# Patient Record
Sex: FEMALE | Race: WHITE | Employment: OTHER
[De-identification: names, ages, dates, MRNs, and addresses within clinical notes are randomized per-mention and may not be internally consistent; named-entity substitution may affect disease eponyms.]

---

## 2017-01-11 DIAGNOSIS — R60.0 BILATERAL EDEMA OF LOWER EXTREMITY: ICD-10-CM

## 2017-01-11 RX ORDER — FUROSEMIDE 20 MG/1
TABLET ORAL
Qty: 30 TABLET | Refills: 0 | Status: SHIPPED | OUTPATIENT
Start: 2017-01-11 | End: 2017-03-17 | Stop reason: SDUPTHER

## 2017-01-16 ENCOUNTER — TELEPHONE (OUTPATIENT)
Dept: FAMILY MEDICINE CLINIC | Facility: CLINIC | Age: 57
End: 2017-01-16

## 2017-01-16 RX ORDER — PANTOPRAZOLE SODIUM 20 MG/1
20 TABLET, DELAYED RELEASE ORAL DAILY
Qty: 30 TABLET | Refills: 3 | Status: ON HOLD | OUTPATIENT
Start: 2017-01-16 | End: 2017-03-30 | Stop reason: SDUPTHER

## 2017-01-19 RX ORDER — LORATADINE 10 MG/1
TABLET ORAL
Qty: 30 TABLET | Refills: 0 | Status: SHIPPED | OUTPATIENT
Start: 2017-01-19 | End: 2017-02-20 | Stop reason: SDUPTHER

## 2017-01-20 ENCOUNTER — TELEPHONE (OUTPATIENT)
Dept: GASTROENTEROLOGY | Facility: CLINIC | Age: 57
End: 2017-01-20

## 2017-01-20 DIAGNOSIS — K21.9 GASTROESOPHAGEAL REFLUX DISEASE, ESOPHAGITIS PRESENCE NOT SPECIFIED: Primary | ICD-10-CM

## 2017-01-25 RX ORDER — LANSOPRAZOLE 30 MG/1
30 CAPSULE, DELAYED RELEASE ORAL 2 TIMES DAILY
Qty: 60 CAPSULE | Refills: 3 | Status: ON HOLD | OUTPATIENT
Start: 2017-01-25 | End: 2017-04-01 | Stop reason: HOSPADM

## 2017-01-30 RX ORDER — BLOOD-GLUCOSE METER
KIT MISCELLANEOUS
Qty: 100 STRIP | Refills: 0 | Status: SHIPPED | OUTPATIENT
Start: 2017-01-30 | End: 2017-02-28 | Stop reason: SDUPTHER

## 2017-02-01 ENCOUNTER — TELEPHONE (OUTPATIENT)
Dept: FAMILY MEDICINE CLINIC | Facility: CLINIC | Age: 57
End: 2017-02-01

## 2017-02-03 ENCOUNTER — OFFICE VISIT (OUTPATIENT)
Dept: PULMONOLOGY | Facility: CLINIC | Age: 57
End: 2017-02-03

## 2017-02-03 VITALS
BODY MASS INDEX: 47.77 KG/M2 | DIASTOLIC BLOOD PRESSURE: 76 MMHG | SYSTOLIC BLOOD PRESSURE: 145 MMHG | RESPIRATION RATE: 16 BRPM | OXYGEN SATURATION: 97 % | WEIGHT: 293 LBS | HEART RATE: 81 BPM

## 2017-02-03 DIAGNOSIS — I48.0 PAROXYSMAL ATRIAL FIBRILLATION (HCC): ICD-10-CM

## 2017-02-03 DIAGNOSIS — Z13.1 DM (DIABETES MELLITUS SCREEN): ICD-10-CM

## 2017-02-03 DIAGNOSIS — I10 ESSENTIAL HYPERTENSION: ICD-10-CM

## 2017-02-03 DIAGNOSIS — G47.33 OSA ON CPAP: Primary | ICD-10-CM

## 2017-02-03 DIAGNOSIS — M54.40 CHRONIC MIDLINE LOW BACK PAIN WITH SCIATICA, SCIATICA LATERALITY UNSPECIFIED: ICD-10-CM

## 2017-02-03 DIAGNOSIS — G89.29 CHRONIC MIDLINE LOW BACK PAIN WITH SCIATICA, SCIATICA LATERALITY UNSPECIFIED: ICD-10-CM

## 2017-02-03 DIAGNOSIS — Z99.89 OSA ON CPAP: Primary | ICD-10-CM

## 2017-02-03 DIAGNOSIS — E03.9 ACQUIRED HYPOTHYROIDISM: ICD-10-CM

## 2017-02-03 DIAGNOSIS — H54.7 BLINDNESS: ICD-10-CM

## 2017-02-03 DIAGNOSIS — H40.10X0 OPEN-ANGLE GLAUCOMA OF RIGHT EYE, UNSPECIFIED GLAUCOMA STAGE, UNSPECIFIED OPEN-ANGLE GLAUCOMA TYPE: ICD-10-CM

## 2017-02-03 DIAGNOSIS — E66.01 MORBID OBESITY DUE TO EXCESS CALORIES (HCC): ICD-10-CM

## 2017-02-03 PROCEDURE — 99213 OFFICE O/P EST LOW 20 MIN: CPT | Performed by: INTERNAL MEDICINE

## 2017-02-21 RX ORDER — LORATADINE 10 MG/1
TABLET ORAL
Qty: 30 TABLET | Refills: 0 | Status: SHIPPED | OUTPATIENT
Start: 2017-02-21 | End: 2017-03-17 | Stop reason: SDUPTHER

## 2017-02-28 RX ORDER — BLOOD-GLUCOSE METER
KIT MISCELLANEOUS
Qty: 100 STRIP | Refills: 0 | Status: SHIPPED | OUTPATIENT
Start: 2017-02-28 | End: 2017-03-17 | Stop reason: SDUPTHER

## 2017-03-03 RX ORDER — BLOOD-GLUCOSE CONTROL, NORMAL
EACH MISCELLANEOUS
Qty: 1 EACH | Refills: 3 | Status: SHIPPED | OUTPATIENT
Start: 2017-03-03 | End: 2018-06-05 | Stop reason: SDUPTHER

## 2017-03-06 DIAGNOSIS — K21.9 GASTROESOPHAGEAL REFLUX DISEASE, ESOPHAGITIS PRESENCE NOT SPECIFIED: Primary | ICD-10-CM

## 2017-03-06 RX ORDER — ISOPROPYL ALCOHOL 0.75 G/1
SWAB TOPICAL
Qty: 100 EACH | Refills: 0 | Status: SHIPPED | OUTPATIENT
Start: 2017-03-06 | End: 2017-03-17 | Stop reason: SDUPTHER

## 2017-03-08 RX ORDER — RANITIDINE 150 MG/1
150 TABLET ORAL 2 TIMES DAILY
Qty: 60 TABLET | Refills: 3 | Status: ON HOLD | OUTPATIENT
Start: 2017-03-08 | End: 2017-04-01 | Stop reason: HOSPADM

## 2017-03-09 ENCOUNTER — OFFICE VISIT (OUTPATIENT)
Dept: FAMILY MEDICINE CLINIC | Facility: CLINIC | Age: 57
End: 2017-03-09

## 2017-03-09 VITALS
WEIGHT: 293 LBS | TEMPERATURE: 98 F | BODY MASS INDEX: 45.99 KG/M2 | DIASTOLIC BLOOD PRESSURE: 70 MMHG | HEART RATE: 86 BPM | HEIGHT: 67 IN | SYSTOLIC BLOOD PRESSURE: 138 MMHG

## 2017-03-09 DIAGNOSIS — I87.2 VENOUS INSUFFICIENCY OF BOTH LOWER EXTREMITIES: ICD-10-CM

## 2017-03-09 DIAGNOSIS — M79.89 LEG SWELLING: ICD-10-CM

## 2017-03-09 DIAGNOSIS — I83.90 VARICOSE VEIN OF LEG: Primary | ICD-10-CM

## 2017-03-09 DIAGNOSIS — I10 ESSENTIAL HYPERTENSION: ICD-10-CM

## 2017-03-09 PROCEDURE — 99213 OFFICE O/P EST LOW 20 MIN: CPT | Performed by: FAMILY MEDICINE

## 2017-03-09 RX ORDER — TACROLIMUS 1 MG/G
OINTMENT TOPICAL 2 TIMES DAILY
COMMUNITY
End: 2018-03-09 | Stop reason: ALTCHOICE

## 2017-03-09 ASSESSMENT — ENCOUNTER SYMPTOMS
COUGH: 0
ABDOMINAL DISTENTION: 0
CHEST TIGHTNESS: 0
ABDOMINAL PAIN: 0
SHORTNESS OF BREATH: 0
WHEEZING: 0

## 2017-03-17 DIAGNOSIS — R60.0 BILATERAL EDEMA OF LOWER EXTREMITY: ICD-10-CM

## 2017-03-20 ENCOUNTER — TELEPHONE (OUTPATIENT)
Dept: GASTROENTEROLOGY | Age: 57
End: 2017-03-20

## 2017-03-20 RX ORDER — BLOOD-GLUCOSE METER
KIT MISCELLANEOUS
Qty: 100 STRIP | Refills: 0 | Status: SHIPPED | OUTPATIENT
Start: 2017-03-20 | End: 2017-05-02 | Stop reason: SDUPTHER

## 2017-03-20 RX ORDER — ISOPROPYL ALCOHOL 0.75 G/1
SWAB TOPICAL
Qty: 100 EACH | Refills: 0 | Status: SHIPPED | OUTPATIENT
Start: 2017-03-20 | End: 2017-04-14 | Stop reason: SDUPTHER

## 2017-03-20 RX ORDER — FUROSEMIDE 20 MG/1
TABLET ORAL
Qty: 30 TABLET | Refills: 0 | Status: SHIPPED | OUTPATIENT
Start: 2017-03-20 | End: 2017-05-02 | Stop reason: SDUPTHER

## 2017-03-20 RX ORDER — LORATADINE 10 MG/1
TABLET ORAL
Qty: 30 TABLET | Refills: 0 | Status: SHIPPED | OUTPATIENT
Start: 2017-03-20 | End: 2017-06-08 | Stop reason: SDUPTHER

## 2017-03-23 DIAGNOSIS — K21.00 GASTROESOPHAGEAL REFLUX DISEASE WITH ESOPHAGITIS: Primary | ICD-10-CM

## 2017-03-23 RX ORDER — NICOTINE POLACRILEX 4 MG/1
20 GUM, CHEWING ORAL DAILY
Qty: 30 TABLET | Refills: 2 | Status: ON HOLD | OUTPATIENT
Start: 2017-03-23 | End: 2017-04-01 | Stop reason: HOSPADM

## 2017-03-27 RX ORDER — LANCETS
EACH MISCELLANEOUS
Qty: 102 EACH | Refills: 0 | Status: SHIPPED | OUTPATIENT
Start: 2017-03-27 | End: 2017-05-02 | Stop reason: SDUPTHER

## 2017-03-30 ENCOUNTER — HOSPITAL ENCOUNTER (OUTPATIENT)
Age: 57
Setting detail: OBSERVATION
Discharge: HOME | End: 2017-04-01
Attending: INTERNAL MEDICINE | Admitting: INTERNAL MEDICINE
Payer: COMMERCIAL

## 2017-03-30 DIAGNOSIS — M51.36 DEGENERATIVE DISC DISEASE, LUMBAR: ICD-10-CM

## 2017-03-30 LAB
ANION GAP SERPL CALCULATED.3IONS-SCNC: 17 MMOL/L (ref 9–17)
BUN BLDV-MCNC: 12 MG/DL (ref 6–20)
BUN/CREAT BLD: ABNORMAL (ref 9–20)
CALCIUM SERPL-MCNC: 8.6 MG/DL (ref 8.6–10.4)
CHLORIDE BLD-SCNC: 107 MMOL/L (ref 98–107)
CO2: 22 MMOL/L (ref 20–31)
CREAT SERPL-MCNC: 0.63 MG/DL (ref 0.5–0.9)
GFR AFRICAN AMERICAN: >60 ML/MIN
GFR NON-AFRICAN AMERICAN: >60 ML/MIN
GFR SERPL CREATININE-BSD FRML MDRD: ABNORMAL ML/MIN/{1.73_M2}
GFR SERPL CREATININE-BSD FRML MDRD: ABNORMAL ML/MIN/{1.73_M2}
GLUCOSE BLD-MCNC: 115 MG/DL (ref 65–105)
GLUCOSE BLD-MCNC: 122 MG/DL (ref 70–99)
GLUCOSE BLD-MCNC: 82 MG/DL (ref 65–105)
HCT VFR BLD CALC: 38.9 % (ref 36–46)
HEMOGLOBIN: 13.4 G/DL (ref 12–16)
MCH RBC QN AUTO: 30.6 PG (ref 26–34)
MCHC RBC AUTO-ENTMCNC: 34.6 G/DL (ref 31–37)
MCV RBC AUTO: 88.3 FL (ref 80–100)
PDW BLD-RTO: 13.3 % (ref 12.5–15.4)
PLATELET # BLD: 219 K/UL (ref 140–450)
PMV BLD AUTO: 8.7 FL (ref 6–12)
POTASSIUM SERPL-SCNC: 4 MMOL/L (ref 3.7–5.3)
RBC # BLD: 4.4 M/UL (ref 4–5.2)
SODIUM BLD-SCNC: 146 MMOL/L (ref 135–144)
TROPONIN INTERP: NORMAL
TROPONIN INTERP: NORMAL
TROPONIN T: <0.03 NG/ML
TROPONIN T: <0.03 NG/ML
WBC # BLD: 7 K/UL (ref 3.5–11)

## 2017-03-30 PROCEDURE — 6370000000 HC RX 637 (ALT 250 FOR IP): Performed by: INTERNAL MEDICINE

## 2017-03-30 PROCEDURE — 80048 BASIC METABOLIC PNL TOTAL CA: CPT

## 2017-03-30 PROCEDURE — 82947 ASSAY GLUCOSE BLOOD QUANT: CPT

## 2017-03-30 PROCEDURE — G0379 DIRECT REFER HOSPITAL OBSERV: HCPCS

## 2017-03-30 PROCEDURE — 93005 ELECTROCARDIOGRAM TRACING: CPT

## 2017-03-30 PROCEDURE — 36415 COLL VENOUS BLD VENIPUNCTURE: CPT

## 2017-03-30 PROCEDURE — 85027 COMPLETE CBC AUTOMATED: CPT

## 2017-03-30 PROCEDURE — G0378 HOSPITAL OBSERVATION PER HR: HCPCS

## 2017-03-30 PROCEDURE — 84484 ASSAY OF TROPONIN QUANT: CPT

## 2017-03-30 RX ORDER — ALBUTEROL SULFATE 90 UG/1
2 AEROSOL, METERED RESPIRATORY (INHALATION) EVERY 4 HOURS PRN
Status: DISCONTINUED | OUTPATIENT
Start: 2017-03-30 | End: 2017-03-30

## 2017-03-30 RX ORDER — CYCLOBENZAPRINE HCL 5 MG
5 TABLET ORAL NIGHTLY PRN
Status: DISCONTINUED | OUTPATIENT
Start: 2017-03-30 | End: 2017-04-01 | Stop reason: HOSPADM

## 2017-03-30 RX ORDER — GABAPENTIN 100 MG/1
100 CAPSULE ORAL DAILY
Status: DISCONTINUED | OUTPATIENT
Start: 2017-03-30 | End: 2017-04-01 | Stop reason: HOSPADM

## 2017-03-30 RX ORDER — MAGNESIUM HYDROXIDE/ALUMINUM HYDROXICE/SIMETHICONE 120; 1200; 1200 MG/30ML; MG/30ML; MG/30ML
5 SUSPENSION ORAL PRN
Status: DISCONTINUED | OUTPATIENT
Start: 2017-03-30 | End: 2017-04-01 | Stop reason: HOSPADM

## 2017-03-30 RX ORDER — CETIRIZINE HYDROCHLORIDE 10 MG/1
10 TABLET ORAL DAILY
Status: DISCONTINUED | OUTPATIENT
Start: 2017-03-30 | End: 2017-04-01 | Stop reason: HOSPADM

## 2017-03-30 RX ORDER — NITROGLYCERIN 0.4 MG/1
0.4 TABLET SUBLINGUAL EVERY 5 MIN PRN
Status: DISCONTINUED | OUTPATIENT
Start: 2017-03-30 | End: 2017-04-01 | Stop reason: HOSPADM

## 2017-03-30 RX ORDER — M-VIT,TX,IRON,MINS/CALC/FOLIC 27MG-0.4MG
1 TABLET ORAL DAILY
Status: DISCONTINUED | OUTPATIENT
Start: 2017-03-30 | End: 2017-04-01 | Stop reason: HOSPADM

## 2017-03-30 RX ORDER — SIMVASTATIN 5 MG
5 TABLET ORAL NIGHTLY
Status: DISCONTINUED | OUTPATIENT
Start: 2017-03-30 | End: 2017-04-01 | Stop reason: HOSPADM

## 2017-03-30 RX ORDER — FUROSEMIDE 20 MG/1
20 TABLET ORAL EVERY OTHER DAY
Status: DISCONTINUED | OUTPATIENT
Start: 2017-03-30 | End: 2017-04-01 | Stop reason: HOSPADM

## 2017-03-30 RX ORDER — ALBUTEROL SULFATE 90 UG/1
2 AEROSOL, METERED RESPIRATORY (INHALATION) EVERY 4 HOURS PRN
Status: DISCONTINUED | OUTPATIENT
Start: 2017-03-30 | End: 2017-04-01 | Stop reason: HOSPADM

## 2017-03-30 RX ORDER — ONDANSETRON 2 MG/ML
4 INJECTION INTRAMUSCULAR; INTRAVENOUS EVERY 6 HOURS PRN
Status: DISCONTINUED | OUTPATIENT
Start: 2017-03-30 | End: 2017-04-01 | Stop reason: HOSPADM

## 2017-03-30 RX ORDER — FLUTICASONE PROPIONATE 50 MCG
1 SPRAY, SUSPENSION (ML) NASAL DAILY
Status: DISCONTINUED | OUTPATIENT
Start: 2017-03-30 | End: 2017-04-01 | Stop reason: HOSPADM

## 2017-03-30 RX ORDER — ACETAMINOPHEN 325 MG/1
650 TABLET ORAL EVERY 4 HOURS PRN
Status: DISCONTINUED | OUTPATIENT
Start: 2017-03-30 | End: 2017-04-01 | Stop reason: HOSPADM

## 2017-03-30 RX ORDER — PANTOPRAZOLE SODIUM 40 MG/1
40 TABLET, DELAYED RELEASE ORAL
Status: DISCONTINUED | OUTPATIENT
Start: 2017-03-31 | End: 2017-04-01 | Stop reason: HOSPADM

## 2017-03-30 RX ORDER — LEVOTHYROXINE SODIUM 0.1 MG/1
100 TABLET ORAL DAILY
Status: DISCONTINUED | OUTPATIENT
Start: 2017-03-30 | End: 2017-04-01 | Stop reason: HOSPADM

## 2017-03-30 RX ADMIN — METFORMIN HYDROCHLORIDE 500 MG: 500 TABLET ORAL at 17:34

## 2017-03-30 RX ADMIN — SIMVASTATIN 5 MG: 5 TABLET, FILM COATED ORAL at 20:14

## 2017-03-30 RX ADMIN — Medication 1 TABLET: at 20:14

## 2017-03-30 RX ADMIN — ACETAMINOPHEN 650 MG: 325 TABLET ORAL at 23:57

## 2017-03-30 ASSESSMENT — PAIN DESCRIPTION - PROGRESSION
CLINICAL_PROGRESSION: RAPIDLY IMPROVING

## 2017-03-30 ASSESSMENT — PAIN DESCRIPTION - PAIN TYPE
TYPE: ACUTE PAIN
TYPE: ACUTE PAIN

## 2017-03-30 ASSESSMENT — PAIN DESCRIPTION - LOCATION
LOCATION: CHEST
LOCATION: CHEST;NECK

## 2017-03-30 ASSESSMENT — PAIN SCALES - GENERAL
PAINLEVEL_OUTOF10: 2
PAINLEVEL_OUTOF10: 2
PAINLEVEL_OUTOF10: 3

## 2017-03-30 ASSESSMENT — PAIN DESCRIPTION - ORIENTATION: ORIENTATION: RIGHT

## 2017-03-30 ASSESSMENT — PAIN DESCRIPTION - DESCRIPTORS
DESCRIPTORS: BURNING
DESCRIPTORS: ACHING;BURNING

## 2017-03-31 LAB
EKG ATRIAL RATE: 61 BPM
EKG P AXIS: 30 DEGREES
EKG P-R INTERVAL: 172 MS
EKG Q-T INTERVAL: 448 MS
EKG QRS DURATION: 84 MS
EKG QTC CALCULATION (BAZETT): 450 MS
EKG R AXIS: 19 DEGREES
EKG T AXIS: 13 DEGREES
EKG VENTRICULAR RATE: 61 BPM
GLUCOSE BLD-MCNC: 80 MG/DL (ref 65–105)
GLUCOSE BLD-MCNC: 95 MG/DL (ref 65–105)
GLUCOSE BLD-MCNC: 96 MG/DL (ref 65–105)
GLUCOSE BLD-MCNC: 98 MG/DL (ref 65–105)
TROPONIN INTERP: NORMAL
TROPONIN T: <0.03 NG/ML

## 2017-03-31 PROCEDURE — G0378 HOSPITAL OBSERVATION PER HR: HCPCS

## 2017-03-31 PROCEDURE — 6360000002 HC RX W HCPCS: Performed by: INTERNAL MEDICINE

## 2017-03-31 PROCEDURE — 96374 THER/PROPH/DIAG INJ IV PUSH: CPT

## 2017-03-31 PROCEDURE — 84484 ASSAY OF TROPONIN QUANT: CPT

## 2017-03-31 PROCEDURE — 6370000000 HC RX 637 (ALT 250 FOR IP): Performed by: INTERNAL MEDICINE

## 2017-03-31 PROCEDURE — 82947 ASSAY GLUCOSE BLOOD QUANT: CPT

## 2017-03-31 PROCEDURE — 36415 COLL VENOUS BLD VENIPUNCTURE: CPT

## 2017-03-31 RX ADMIN — ACETAMINOPHEN 650 MG: 325 TABLET ORAL at 12:20

## 2017-03-31 RX ADMIN — METFORMIN HYDROCHLORIDE 500 MG: 500 TABLET ORAL at 17:51

## 2017-03-31 RX ADMIN — ASPIRIN 325 MG: 325 TABLET, COATED ORAL at 12:20

## 2017-03-31 RX ADMIN — SIMVASTATIN 5 MG: 5 TABLET, FILM COATED ORAL at 20:04

## 2017-03-31 RX ADMIN — Medication 1 TABLET: at 20:04

## 2017-03-31 RX ADMIN — PANTOPRAZOLE SODIUM 40 MG: 40 TABLET, DELAYED RELEASE ORAL at 12:20

## 2017-03-31 RX ADMIN — ONDANSETRON 4 MG: 2 INJECTION, SOLUTION INTRAMUSCULAR; INTRAVENOUS at 06:49

## 2017-03-31 ASSESSMENT — PAIN SCALES - GENERAL: PAINLEVEL_OUTOF10: 9

## 2017-04-01 VITALS
RESPIRATION RATE: 16 BRPM | DIASTOLIC BLOOD PRESSURE: 53 MMHG | OXYGEN SATURATION: 97 % | TEMPERATURE: 98.6 F | BODY MASS INDEX: 47.35 KG/M2 | HEART RATE: 62 BPM | SYSTOLIC BLOOD PRESSURE: 104 MMHG | WEIGHT: 293 LBS

## 2017-04-01 LAB
GLUCOSE BLD-MCNC: 116 MG/DL (ref 65–105)
GLUCOSE BLD-MCNC: 118 MG/DL (ref 65–105)

## 2017-04-01 PROCEDURE — G0378 HOSPITAL OBSERVATION PER HR: HCPCS

## 2017-04-01 PROCEDURE — 82947 ASSAY GLUCOSE BLOOD QUANT: CPT

## 2017-04-01 PROCEDURE — 6370000000 HC RX 637 (ALT 250 FOR IP): Performed by: INTERNAL MEDICINE

## 2017-04-01 RX ORDER — GABAPENTIN 100 MG/1
100 CAPSULE ORAL DAILY
Qty: 90 CAPSULE | Refills: 3 | Status: SHIPPED | OUTPATIENT
Start: 2017-04-01 | End: 2017-10-27 | Stop reason: SINTOL

## 2017-04-01 RX ORDER — PANTOPRAZOLE SODIUM 40 MG/1
40 TABLET, DELAYED RELEASE ORAL
Qty: 30 TABLET | Refills: 3 | Status: SHIPPED | OUTPATIENT
Start: 2017-04-01 | End: 2017-08-08 | Stop reason: SDUPTHER

## 2017-04-01 RX ADMIN — ASPIRIN 325 MG: 325 TABLET, COATED ORAL at 08:53

## 2017-04-01 RX ADMIN — PANTOPRAZOLE SODIUM 40 MG: 40 TABLET, DELAYED RELEASE ORAL at 08:53

## 2017-04-01 RX ADMIN — VITAMIN D, TAB 1000IU (100/BT) 5000 UNITS: 25 TAB at 08:52

## 2017-04-01 RX ADMIN — Medication 1 TABLET: at 08:52

## 2017-04-01 RX ADMIN — MULTIPLE VITAMINS W/ MINERALS TAB 1 TABLET: TAB at 08:53

## 2017-04-01 RX ADMIN — LEVOTHYROXINE SODIUM 100 MCG: 100 TABLET ORAL at 06:38

## 2017-04-01 ASSESSMENT — PAIN SCALES - GENERAL: PAINLEVEL_OUTOF10: 3

## 2017-04-05 RX ORDER — ISOPROPYL ALCOHOL 0.75 G/1
SWAB TOPICAL
Qty: 100 EACH | Refills: 0 | Status: SHIPPED | OUTPATIENT
Start: 2017-04-05 | End: 2017-06-15 | Stop reason: SDUPTHER

## 2017-04-17 RX ORDER — ISOPROPYL ALCOHOL 0.75 G/1
SWAB TOPICAL
Qty: 100 EACH | Refills: 0 | Status: SHIPPED | OUTPATIENT
Start: 2017-04-17 | End: 2017-08-09 | Stop reason: SDUPTHER

## 2017-04-24 RX ORDER — INHALER, ASSIST DEVICES
SPACER (EA) MISCELLANEOUS
Qty: 1 EACH | Refills: 0 | Status: SHIPPED | OUTPATIENT
Start: 2017-04-24 | End: 2018-06-15

## 2017-05-02 DIAGNOSIS — E66.01 MORBID OBESITY DUE TO EXCESS CALORIES (HCC): ICD-10-CM

## 2017-05-02 DIAGNOSIS — R60.0 BILATERAL EDEMA OF LOWER EXTREMITY: ICD-10-CM

## 2017-05-02 RX ORDER — ASPIRIN 325 MG
TABLET, DELAYED RELEASE (ENTERIC COATED) ORAL
Qty: 30 TABLET | Refills: 0 | Status: SHIPPED | OUTPATIENT
Start: 2017-05-02 | End: 2017-06-01 | Stop reason: SDUPTHER

## 2017-05-02 RX ORDER — FOLIC ACID/MV,IRON,MIN/LUTEIN 0.4-18-25
TABLET ORAL
Qty: 30 TABLET | Refills: 0 | Status: SHIPPED | OUTPATIENT
Start: 2017-05-02 | End: 2017-06-01 | Stop reason: SDUPTHER

## 2017-05-02 RX ORDER — LOVASTATIN 10 MG/1
TABLET ORAL
Qty: 30 TABLET | Refills: 0 | Status: SHIPPED | OUTPATIENT
Start: 2017-05-02 | End: 2017-06-01 | Stop reason: SDUPTHER

## 2017-05-02 RX ORDER — LANCETS
EACH MISCELLANEOUS
Qty: 102 EACH | Refills: 0 | Status: SHIPPED | OUTPATIENT
Start: 2017-05-02 | End: 2017-06-01 | Stop reason: SDUPTHER

## 2017-05-02 RX ORDER — FUROSEMIDE 20 MG/1
TABLET ORAL
Qty: 30 TABLET | Refills: 0 | Status: SHIPPED | OUTPATIENT
Start: 2017-05-02 | End: 2017-08-04

## 2017-05-02 RX ORDER — BLOOD-GLUCOSE METER
KIT MISCELLANEOUS
Qty: 100 STRIP | Refills: 0 | Status: SHIPPED | OUTPATIENT
Start: 2017-05-02 | End: 2017-06-01 | Stop reason: SDUPTHER

## 2017-05-31 ENCOUNTER — OFFICE VISIT (OUTPATIENT)
Dept: GASTROENTEROLOGY | Age: 57
End: 2017-05-31
Payer: COMMERCIAL

## 2017-05-31 VITALS
HEIGHT: 66 IN | WEIGHT: 293 LBS | SYSTOLIC BLOOD PRESSURE: 127 MMHG | HEART RATE: 72 BPM | DIASTOLIC BLOOD PRESSURE: 81 MMHG | BODY MASS INDEX: 47.09 KG/M2 | TEMPERATURE: 98.3 F

## 2017-05-31 DIAGNOSIS — K21.00 GASTROESOPHAGEAL REFLUX DISEASE WITH ESOPHAGITIS: Primary | ICD-10-CM

## 2017-05-31 PROCEDURE — 99213 OFFICE O/P EST LOW 20 MIN: CPT | Performed by: INTERNAL MEDICINE

## 2017-06-01 ENCOUNTER — OFFICE VISIT (OUTPATIENT)
Dept: FAMILY MEDICINE CLINIC | Age: 57
End: 2017-06-01
Payer: COMMERCIAL

## 2017-06-01 VITALS
SYSTOLIC BLOOD PRESSURE: 150 MMHG | BODY MASS INDEX: 47.09 KG/M2 | TEMPERATURE: 96.8 F | DIASTOLIC BLOOD PRESSURE: 81 MMHG | HEART RATE: 70 BPM | WEIGHT: 293 LBS | HEIGHT: 66 IN

## 2017-06-01 DIAGNOSIS — E03.9 HYPOTHYROIDISM, UNSPECIFIED TYPE: ICD-10-CM

## 2017-06-01 DIAGNOSIS — E66.01 MORBID OBESITY DUE TO EXCESS CALORIES (HCC): ICD-10-CM

## 2017-06-01 DIAGNOSIS — Z76.0 MEDICATION REFILL: ICD-10-CM

## 2017-06-01 DIAGNOSIS — I83.90 VARICOSE VEIN OF LEG: Primary | ICD-10-CM

## 2017-06-01 DIAGNOSIS — Z00.00 HEALTHCARE MAINTENANCE: ICD-10-CM

## 2017-06-01 PROCEDURE — 90471 IMMUNIZATION ADMIN: CPT | Performed by: HOSPITALIST

## 2017-06-01 PROCEDURE — 99213 OFFICE O/P EST LOW 20 MIN: CPT | Performed by: HOSPITALIST

## 2017-06-01 PROCEDURE — 90715 TDAP VACCINE 7 YRS/> IM: CPT | Performed by: HOSPITALIST

## 2017-06-01 RX ORDER — LEVOTHYROXINE SODIUM 0.1 MG/1
100 TABLET ORAL DAILY
Qty: 30 TABLET | Refills: 5 | Status: SHIPPED | OUTPATIENT
Start: 2017-06-01 | End: 2018-07-10 | Stop reason: SDUPTHER

## 2017-06-01 RX ORDER — ASPIRIN 325 MG
TABLET, DELAYED RELEASE (ENTERIC COATED) ORAL
Qty: 30 TABLET | Refills: 0 | Status: SHIPPED | OUTPATIENT
Start: 2017-06-01 | End: 2017-06-01 | Stop reason: SDUPTHER

## 2017-06-01 RX ORDER — ASPIRIN 325 MG
TABLET, DELAYED RELEASE (ENTERIC COATED) ORAL
Qty: 30 TABLET | Refills: 2 | Status: SHIPPED | OUTPATIENT
Start: 2017-06-01 | End: 2017-08-04 | Stop reason: SDUPTHER

## 2017-06-01 RX ORDER — FOLIC ACID/MV,IRON,MIN/LUTEIN 0.4-18-25
TABLET ORAL
Qty: 30 TABLET | Refills: 0 | Status: SHIPPED | OUTPATIENT
Start: 2017-06-01 | End: 2017-06-29 | Stop reason: SDUPTHER

## 2017-06-01 RX ORDER — LANCETS
EACH MISCELLANEOUS
Qty: 102 EACH | Refills: 0 | Status: SHIPPED | OUTPATIENT
Start: 2017-06-01 | End: 2017-06-29 | Stop reason: SDUPTHER

## 2017-06-01 RX ORDER — LOVASTATIN 10 MG/1
TABLET ORAL
Qty: 30 TABLET | Refills: 0 | Status: SHIPPED | OUTPATIENT
Start: 2017-06-01 | End: 2017-06-01 | Stop reason: SDUPTHER

## 2017-06-01 RX ORDER — BLOOD-GLUCOSE METER
KIT MISCELLANEOUS
Qty: 100 STRIP | Refills: 0 | Status: SHIPPED | OUTPATIENT
Start: 2017-06-01 | End: 2017-06-28 | Stop reason: SDUPTHER

## 2017-06-01 RX ORDER — LOVASTATIN 10 MG/1
TABLET ORAL
Qty: 30 TABLET | Refills: 2 | Status: SHIPPED | OUTPATIENT
Start: 2017-06-01 | End: 2017-09-21 | Stop reason: SDUPTHER

## 2017-06-01 ASSESSMENT — ENCOUNTER SYMPTOMS
CHOKING: 0
APNEA: 0
COUGH: 0
ABDOMINAL DISTENTION: 0
WHEEZING: 0
SHORTNESS OF BREATH: 0

## 2017-06-08 RX ORDER — FLUTICASONE PROPIONATE 50 MCG
SPRAY, SUSPENSION (ML) NASAL
Qty: 32 BOTTLE | Refills: 0 | Status: SHIPPED | OUTPATIENT
Start: 2017-06-08 | End: 2017-07-25 | Stop reason: SDUPTHER

## 2017-06-08 RX ORDER — LORATADINE 10 MG/1
TABLET ORAL
Qty: 30 TABLET | Refills: 0 | Status: SHIPPED | OUTPATIENT
Start: 2017-06-08 | End: 2017-07-07 | Stop reason: SDUPTHER

## 2017-06-15 RX ORDER — ISOPROPYL ALCOHOL 0.75 G/1
SWAB TOPICAL
Qty: 100 EACH | Refills: 0 | Status: SHIPPED | OUTPATIENT
Start: 2017-06-15 | End: 2017-07-11 | Stop reason: SDUPTHER

## 2017-06-16 LAB
CHOLESTEROL, TOTAL: 135 MG/DL
CHOLESTEROL/HDL RATIO: 3.4
HDLC SERPL-MCNC: 40 MG/DL (ref 35–70)
LDL CHOLESTEROL CALCULATED: 68 MG/DL (ref 0–160)
TRIGL SERPL-MCNC: 137 MG/DL
TSH SERPL DL<=0.05 MIU/L-ACNC: 3.28 UIU/ML
VITAMIN D 25-HYDROXY: 62.8
VITAMIN D2, 25 HYDROXY: NORMAL
VITAMIN D3,25 HYDROXY: NORMAL
VLDLC SERPL CALC-MCNC: NORMAL MG/DL

## 2017-06-22 DIAGNOSIS — M51.36 DEGENERATIVE DISC DISEASE, LUMBAR: ICD-10-CM

## 2017-06-22 RX ORDER — ACETAMINOPHEN 500 MG
TABLET ORAL
Qty: 120 TABLET | Refills: 0 | Status: SHIPPED | OUTPATIENT
Start: 2017-06-22 | End: 2017-08-04 | Stop reason: SDUPTHER

## 2017-06-22 RX ORDER — ACETAMINOPHEN 500 MG
1000 TABLET ORAL EVERY 6 HOURS PRN
Qty: 120 TABLET | Refills: 3 | Status: SHIPPED | OUTPATIENT
Start: 2017-06-22 | End: 2018-01-17 | Stop reason: SDUPTHER

## 2017-06-29 RX ORDER — BLOOD-GLUCOSE METER
KIT MISCELLANEOUS
Qty: 100 STRIP | Refills: 0 | Status: SHIPPED | OUTPATIENT
Start: 2017-06-29 | End: 2017-07-20 | Stop reason: SDUPTHER

## 2017-06-30 RX ORDER — FOLIC ACID/MV,IRON,MIN/LUTEIN 0.4-18-25
TABLET ORAL
Qty: 30 TABLET | Refills: 0 | Status: SHIPPED | OUTPATIENT
Start: 2017-06-30 | End: 2017-07-20 | Stop reason: SDUPTHER

## 2017-06-30 RX ORDER — LANCETS
EACH MISCELLANEOUS
Qty: 102 EACH | Refills: 0 | Status: SHIPPED | OUTPATIENT
Start: 2017-06-30 | End: 2017-07-20 | Stop reason: SDUPTHER

## 2017-07-08 RX ORDER — LORATADINE 10 MG/1
TABLET ORAL
Qty: 30 TABLET | Refills: 0 | Status: SHIPPED | OUTPATIENT
Start: 2017-07-08 | End: 2017-07-25 | Stop reason: SDUPTHER

## 2017-07-12 RX ORDER — ISOPROPYL ALCOHOL 0.75 G/1
SWAB TOPICAL
Qty: 100 EACH | Refills: 0 | Status: SHIPPED | OUTPATIENT
Start: 2017-07-12 | End: 2017-08-04 | Stop reason: SDUPTHER

## 2017-07-14 DIAGNOSIS — E03.9 HYPOTHYROIDISM, UNSPECIFIED TYPE: ICD-10-CM

## 2017-07-14 DIAGNOSIS — Z00.00 HEALTHCARE MAINTENANCE: ICD-10-CM

## 2017-07-14 DIAGNOSIS — E66.01 MORBID OBESITY DUE TO EXCESS CALORIES (HCC): ICD-10-CM

## 2017-07-20 RX ORDER — FOLIC ACID/MV,IRON,MIN/LUTEIN 0.4-18-25
TABLET ORAL
Qty: 30 TABLET | Refills: 0 | Status: SHIPPED | OUTPATIENT
Start: 2017-07-20 | End: 2017-08-22 | Stop reason: SDUPTHER

## 2017-07-20 RX ORDER — BLOOD-GLUCOSE METER
KIT MISCELLANEOUS
Qty: 100 STRIP | Refills: 0 | Status: SHIPPED | OUTPATIENT
Start: 2017-07-20 | End: 2017-09-05 | Stop reason: SDUPTHER

## 2017-07-20 RX ORDER — LANCETS
EACH MISCELLANEOUS
Qty: 102 EACH | Refills: 0 | Status: SHIPPED | OUTPATIENT
Start: 2017-07-20 | End: 2017-08-22 | Stop reason: SDUPTHER

## 2017-07-25 RX ORDER — LORATADINE 10 MG/1
TABLET ORAL
Qty: 30 TABLET | Refills: 0 | Status: SHIPPED | OUTPATIENT
Start: 2017-07-25 | End: 2017-09-21 | Stop reason: SDUPTHER

## 2017-07-25 RX ORDER — FLUTICASONE PROPIONATE 50 MCG
SPRAY, SUSPENSION (ML) NASAL
Qty: 32 BOTTLE | Refills: 0 | Status: SHIPPED | OUTPATIENT
Start: 2017-07-25 | End: 2017-09-27 | Stop reason: SDUPTHER

## 2017-07-28 ENCOUNTER — OFFICE VISIT (OUTPATIENT)
Dept: FAMILY MEDICINE CLINIC | Age: 57
End: 2017-07-28
Payer: COMMERCIAL

## 2017-07-28 VITALS
TEMPERATURE: 96.6 F | HEART RATE: 74 BPM | DIASTOLIC BLOOD PRESSURE: 86 MMHG | WEIGHT: 293 LBS | HEIGHT: 66 IN | SYSTOLIC BLOOD PRESSURE: 140 MMHG | BODY MASS INDEX: 47.09 KG/M2

## 2017-07-28 DIAGNOSIS — J01.00 ACUTE NON-RECURRENT MAXILLARY SINUSITIS: Primary | ICD-10-CM

## 2017-07-28 PROCEDURE — 99213 OFFICE O/P EST LOW 20 MIN: CPT | Performed by: FAMILY MEDICINE

## 2017-07-28 ASSESSMENT — PATIENT HEALTH QUESTIONNAIRE - PHQ9
1. LITTLE INTEREST OR PLEASURE IN DOING THINGS: 0
2. FEELING DOWN, DEPRESSED OR HOPELESS: 0
SUM OF ALL RESPONSES TO PHQ QUESTIONS 1-9: 0
SUM OF ALL RESPONSES TO PHQ9 QUESTIONS 1 & 2: 0

## 2017-07-28 ASSESSMENT — ENCOUNTER SYMPTOMS
COUGH: 0
WHEEZING: 0
SHORTNESS OF BREATH: 0
SINUS PRESSURE: 1
FACIAL SWELLING: 0
SORE THROAT: 0

## 2017-08-04 ENCOUNTER — OFFICE VISIT (OUTPATIENT)
Dept: PULMONOLOGY | Age: 57
End: 2017-08-04
Payer: COMMERCIAL

## 2017-08-04 VITALS
BODY MASS INDEX: 47.09 KG/M2 | TEMPERATURE: 97.3 F | RESPIRATION RATE: 16 BRPM | OXYGEN SATURATION: 96 % | WEIGHT: 293 LBS | DIASTOLIC BLOOD PRESSURE: 83 MMHG | SYSTOLIC BLOOD PRESSURE: 146 MMHG | HEART RATE: 65 BPM | HEIGHT: 66 IN

## 2017-08-04 DIAGNOSIS — I10 ESSENTIAL HYPERTENSION: ICD-10-CM

## 2017-08-04 DIAGNOSIS — Z99.89 OSA ON CPAP: Primary | ICD-10-CM

## 2017-08-04 DIAGNOSIS — E03.9 HYPOTHYROIDISM, UNSPECIFIED TYPE: ICD-10-CM

## 2017-08-04 DIAGNOSIS — R73.9 HYPERGLYCEMIA: ICD-10-CM

## 2017-08-04 DIAGNOSIS — G47.33 OSA ON CPAP: Primary | ICD-10-CM

## 2017-08-04 DIAGNOSIS — E66.9 OBESITY (BMI 30.0-34.9): ICD-10-CM

## 2017-08-04 PROCEDURE — 99214 OFFICE O/P EST MOD 30 MIN: CPT | Performed by: INTERNAL MEDICINE

## 2017-08-05 RX ORDER — ISOPROPYL ALCOHOL 0.75 G/1
SWAB TOPICAL
Qty: 100 EACH | Refills: 0 | Status: SHIPPED | OUTPATIENT
Start: 2017-08-05 | End: 2017-08-09 | Stop reason: SDUPTHER

## 2017-08-07 ENCOUNTER — TELEPHONE (OUTPATIENT)
Dept: GASTROENTEROLOGY | Age: 57
End: 2017-08-07

## 2017-08-07 DIAGNOSIS — K21.00 GASTROESOPHAGEAL REFLUX DISEASE WITH ESOPHAGITIS: Primary | ICD-10-CM

## 2017-08-09 RX ORDER — ISOPROPYL ALCOHOL 0.75 G/1
SWAB TOPICAL
Qty: 100 EACH | Refills: 0 | Status: SHIPPED | OUTPATIENT
Start: 2017-08-09 | End: 2017-10-27 | Stop reason: SDUPTHER

## 2017-08-09 RX ORDER — PANTOPRAZOLE SODIUM 40 MG/1
40 TABLET, DELAYED RELEASE ORAL
Qty: 30 TABLET | Refills: 3 | Status: SHIPPED | OUTPATIENT
Start: 2017-08-09 | End: 2017-12-04 | Stop reason: SDUPTHER

## 2017-08-24 RX ORDER — LANCETS
EACH MISCELLANEOUS
Qty: 102 EACH | Refills: 0 | Status: SHIPPED | OUTPATIENT
Start: 2017-08-24 | End: 2018-06-15

## 2017-08-24 RX ORDER — FOLIC ACID/MV,IRON,MIN/LUTEIN 0.4-18-25
TABLET ORAL
Qty: 30 TABLET | Refills: 0 | Status: SHIPPED | OUTPATIENT
Start: 2017-08-24 | End: 2017-09-21 | Stop reason: SDUPTHER

## 2017-08-30 ENCOUNTER — TELEPHONE (OUTPATIENT)
Dept: SURGERY | Age: 57
End: 2017-08-30

## 2017-09-05 RX ORDER — BLOOD-GLUCOSE METER
KIT MISCELLANEOUS
Qty: 100 STRIP | Refills: 0 | Status: SHIPPED | OUTPATIENT
Start: 2017-09-05 | End: 2017-10-24 | Stop reason: SDUPTHER

## 2017-09-07 ENCOUNTER — OFFICE VISIT (OUTPATIENT)
Dept: FAMILY MEDICINE CLINIC | Age: 57
End: 2017-09-07
Payer: COMMERCIAL

## 2017-09-07 VITALS
HEIGHT: 67 IN | DIASTOLIC BLOOD PRESSURE: 80 MMHG | WEIGHT: 293 LBS | HEART RATE: 80 BPM | BODY MASS INDEX: 45.99 KG/M2 | SYSTOLIC BLOOD PRESSURE: 127 MMHG | TEMPERATURE: 97.6 F

## 2017-09-07 DIAGNOSIS — Z83.3 FAMILY HISTORY OF DIABETES MELLITUS (DM): ICD-10-CM

## 2017-09-07 DIAGNOSIS — E03.9 HYPOTHYROIDISM, UNSPECIFIED TYPE: ICD-10-CM

## 2017-09-07 DIAGNOSIS — E66.01 MORBID OBESITY DUE TO EXCESS CALORIES (HCC): ICD-10-CM

## 2017-09-07 DIAGNOSIS — R73.03 PREDIABETES: Primary | ICD-10-CM

## 2017-09-07 DIAGNOSIS — H40.9 GLAUCOMA OF BOTH EYES, UNSPECIFIED GLAUCOMA: ICD-10-CM

## 2017-09-07 LAB
CREATININE URINE POCT: NORMAL
HBA1C MFR BLD: 5.3 %
MICROALBUMIN/CREAT 24H UR: NORMAL MG/G{CREAT}
MICROALBUMIN/CREAT UR-RTO: NORMAL

## 2017-09-07 PROCEDURE — 99214 OFFICE O/P EST MOD 30 MIN: CPT | Performed by: FAMILY MEDICINE

## 2017-09-07 PROCEDURE — 83036 HEMOGLOBIN GLYCOSYLATED A1C: CPT | Performed by: FAMILY MEDICINE

## 2017-09-07 PROCEDURE — 82044 UR ALBUMIN SEMIQUANTITATIVE: CPT | Performed by: FAMILY MEDICINE

## 2017-09-07 RX ORDER — PREDNISONE 50 MG/1
TABLET ORAL
COMMUNITY
Start: 2017-09-05 | End: 2017-11-13 | Stop reason: ALTCHOICE

## 2017-09-10 ASSESSMENT — ENCOUNTER SYMPTOMS: SHORTNESS OF BREATH: 0

## 2017-09-21 DIAGNOSIS — Z76.0 MEDICATION REFILL: ICD-10-CM

## 2017-09-21 DIAGNOSIS — E66.01 MORBID OBESITY DUE TO EXCESS CALORIES (HCC): ICD-10-CM

## 2017-09-21 RX ORDER — ASPIRIN 325 MG
TABLET, DELAYED RELEASE (ENTERIC COATED) ORAL
Qty: 30 TABLET | Refills: 0 | Status: SHIPPED | OUTPATIENT
Start: 2017-09-21 | End: 2017-10-17 | Stop reason: SDUPTHER

## 2017-09-21 RX ORDER — LANCETS
EACH MISCELLANEOUS
Qty: 102 EACH | Refills: 0 | Status: SHIPPED | OUTPATIENT
Start: 2017-09-21 | End: 2017-10-17 | Stop reason: SDUPTHER

## 2017-09-21 RX ORDER — LOVASTATIN 10 MG/1
TABLET ORAL
Qty: 30 TABLET | Refills: 0 | Status: SHIPPED | OUTPATIENT
Start: 2017-09-21 | End: 2017-10-17 | Stop reason: SDUPTHER

## 2017-09-21 RX ORDER — FOLIC ACID/MV,IRON,MIN/LUTEIN 0.4-18-25
TABLET ORAL
Qty: 30 TABLET | Refills: 0 | Status: SHIPPED | OUTPATIENT
Start: 2017-09-21 | End: 2017-10-17 | Stop reason: SDUPTHER

## 2017-09-21 RX ORDER — LORATADINE 10 MG/1
TABLET ORAL
Qty: 30 TABLET | Refills: 0 | Status: SHIPPED | OUTPATIENT
Start: 2017-09-21 | End: 2017-10-17 | Stop reason: SDUPTHER

## 2017-09-27 DIAGNOSIS — Z76.0 MEDICATION REFILL: ICD-10-CM

## 2017-09-28 RX ORDER — METFORMIN HYDROCHLORIDE 500 MG/1
TABLET, EXTENDED RELEASE ORAL
Qty: 30 TABLET | Refills: 0 | Status: SHIPPED | OUTPATIENT
Start: 2017-09-28 | End: 2017-10-27 | Stop reason: SDUPTHER

## 2017-09-28 RX ORDER — BLOOD-GLUCOSE METER
KIT MISCELLANEOUS
Qty: 100 STRIP | Refills: 0 | Status: SHIPPED | OUTPATIENT
Start: 2017-09-28 | End: 2017-10-21 | Stop reason: SDUPTHER

## 2017-09-28 RX ORDER — FLUTICASONE PROPIONATE 50 MCG
SPRAY, SUSPENSION (ML) NASAL
Qty: 32 BOTTLE | Refills: 0 | Status: SHIPPED | OUTPATIENT
Start: 2017-09-28 | End: 2017-11-22 | Stop reason: SDUPTHER

## 2017-10-02 RX ORDER — ALBUTEROL SULFATE 90 UG/1
2 AEROSOL, METERED RESPIRATORY (INHALATION) EVERY 6 HOURS PRN
Qty: 18 G | Refills: 2 | Status: SHIPPED | OUTPATIENT
Start: 2017-10-02 | End: 2017-12-13 | Stop reason: SDUPTHER

## 2017-10-17 DIAGNOSIS — E66.01 MORBID OBESITY DUE TO EXCESS CALORIES (HCC): ICD-10-CM

## 2017-10-17 DIAGNOSIS — Z76.0 MEDICATION REFILL: ICD-10-CM

## 2017-10-17 RX ORDER — FOLIC ACID/MV,IRON,MIN/LUTEIN 0.4-18-25
TABLET ORAL
Qty: 30 TABLET | Refills: 0 | Status: SHIPPED | OUTPATIENT
Start: 2017-10-17 | End: 2017-11-16 | Stop reason: SDUPTHER

## 2017-10-17 RX ORDER — LORATADINE 10 MG/1
TABLET ORAL
Qty: 30 TABLET | Refills: 0 | Status: SHIPPED | OUTPATIENT
Start: 2017-10-17 | End: 2017-11-16 | Stop reason: SDUPTHER

## 2017-10-17 RX ORDER — LANCETS
EACH MISCELLANEOUS
Qty: 102 EACH | Refills: 0 | Status: SHIPPED | OUTPATIENT
Start: 2017-10-17 | End: 2017-11-13 | Stop reason: SDUPTHER

## 2017-10-17 RX ORDER — ASPIRIN 325 MG
TABLET, DELAYED RELEASE (ENTERIC COATED) ORAL
Qty: 30 TABLET | Refills: 0 | Status: SHIPPED | OUTPATIENT
Start: 2017-10-17 | End: 2017-11-13

## 2017-10-17 RX ORDER — LOVASTATIN 10 MG/1
TABLET ORAL
Qty: 30 TABLET | Refills: 0 | Status: SHIPPED | OUTPATIENT
Start: 2017-10-17 | End: 2017-11-16 | Stop reason: SDUPTHER

## 2017-10-17 NOTE — TELEPHONE ENCOUNTER
Medications are on med list please review and address    Please address the medication refill and close the encounter. If I can be of assistance, please route to the applicable pool. Thank you. Next Visit Date:  Future Appointments  Date Time Provider Fabby Artisi   10/27/2017 9:45 AM [facility]   11/13/2017 11:00 AM Db Singleton MD [facility]   12/4/2017 10:00 AM STV MAMMO RM 1 STV[facility] MAMMO STV Radiolog   12/8/2017 10:45 AM Marcelle Gant MD Resp Spec [facility]   6/6/2018 10:00 AM SCHEDULE, P [facility] [address] Maintenance   Topic Date Due    HIV screen  03/09/1975    Diabetic retinal exam  07/08/2016    Flu vaccine (1) 09/01/2017    Lipid screen  06/16/2018    Diabetic foot exam  09/07/2018    Diabetic hemoglobin A1C test  09/07/2018    Diabetic microalbuminuria test  09/07/2018    Breast cancer screen  11/29/2018    Colon cancer screen colonoscopy  04/16/2023    DTaP/Tdap/Td vaccine (2 - Td) 06/01/2027    Pneumococcal med risk  Completed    Hepatitis C screen  Completed       Hemoglobin A1C (%)   Date Value   09/07/2017 5.3   06/19/2015 5.0   04/27/2015 5.2             ( goal A1C is < 7)   Microalb/Crt.  Ratio (mcg/mg creat)   Date Value   05/07/2013 2     LDL Cholesterol (mg/dL)   Date Value   05/07/2013 78     LDL Calculated (mg/dL)   Date Value   06/16/2017 68       (goal LDL is <100)   AST (U/L)   Date Value   05/13/2015 18     ALT (U/L)   Date Value   05/13/2015 23     BUN (mg/dL)   Date Value   03/30/2017 12     BP Readings from Last 3 Encounters:   09/07/17 127/80   08/04/17 (!) 146/83   07/28/17 (!) 140/86          (goal 120/80)    All Future Testing planned in [facility]  Lab Frequency Next Occurrence               Patient Active Problem List:     Glaucoma     GERD (gastroesophageal reflux disease)     Hyperlipidemia     HTN (hypertension)     DJD (degenerative joint disease)     Obesity     Hypothyroidism     Polyneuropathy

## 2017-10-24 RX ORDER — BLOOD-GLUCOSE METER
KIT MISCELLANEOUS
Qty: 100 STRIP | Refills: 0 | Status: SHIPPED | OUTPATIENT
Start: 2017-10-24 | End: 2017-10-31 | Stop reason: SDUPTHER

## 2017-10-26 RX ORDER — ISOPROPYL ALCOHOL 0.75 G/1
SWAB TOPICAL
Qty: 100 EACH | Refills: 0 | Status: SHIPPED | OUTPATIENT
Start: 2017-10-26 | End: 2018-06-15

## 2017-10-27 ENCOUNTER — OFFICE VISIT (OUTPATIENT)
Dept: FAMILY MEDICINE CLINIC | Age: 57
End: 2017-10-27
Payer: COMMERCIAL

## 2017-10-27 VITALS
HEIGHT: 67 IN | DIASTOLIC BLOOD PRESSURE: 90 MMHG | SYSTOLIC BLOOD PRESSURE: 139 MMHG | HEART RATE: 82 BPM | TEMPERATURE: 97.7 F | BODY MASS INDEX: 45.99 KG/M2 | WEIGHT: 293 LBS

## 2017-10-27 DIAGNOSIS — E55.9 VITAMIN D DEFICIENCY: ICD-10-CM

## 2017-10-27 DIAGNOSIS — M47.896 OTHER OSTEOARTHRITIS OF SPINE, LUMBAR REGION: ICD-10-CM

## 2017-10-27 DIAGNOSIS — Z76.0 MEDICATION REFILL: ICD-10-CM

## 2017-10-27 DIAGNOSIS — G62.9 POLYNEUROPATHY: ICD-10-CM

## 2017-10-27 DIAGNOSIS — R26.89 LOSS OF BALANCE: Primary | ICD-10-CM

## 2017-10-27 DIAGNOSIS — Z23 NEEDS FLU SHOT: ICD-10-CM

## 2017-10-27 DIAGNOSIS — Z00.00 HEALTH CARE MAINTENANCE: ICD-10-CM

## 2017-10-27 PROBLEM — R42 DIZZINESS: Status: ACTIVE | Noted: 2017-10-27

## 2017-10-27 PROCEDURE — G8417 CALC BMI ABV UP PARAM F/U: HCPCS | Performed by: FAMILY MEDICINE

## 2017-10-27 PROCEDURE — 1036F TOBACCO NON-USER: CPT | Performed by: FAMILY MEDICINE

## 2017-10-27 PROCEDURE — 99213 OFFICE O/P EST LOW 20 MIN: CPT | Performed by: FAMILY MEDICINE

## 2017-10-27 PROCEDURE — G8484 FLU IMMUNIZE NO ADMIN: HCPCS | Performed by: FAMILY MEDICINE

## 2017-10-27 PROCEDURE — 3017F COLORECTAL CA SCREEN DOC REV: CPT | Performed by: FAMILY MEDICINE

## 2017-10-27 PROCEDURE — 90688 IIV4 VACCINE SPLT 0.5 ML IM: CPT | Performed by: FAMILY MEDICINE

## 2017-10-27 PROCEDURE — 90471 IMMUNIZATION ADMIN: CPT | Performed by: FAMILY MEDICINE

## 2017-10-27 PROCEDURE — 3014F SCREEN MAMMO DOC REV: CPT | Performed by: FAMILY MEDICINE

## 2017-10-27 PROCEDURE — G8427 DOCREV CUR MEDS BY ELIG CLIN: HCPCS | Performed by: FAMILY MEDICINE

## 2017-10-27 RX ORDER — CYCLOBENZAPRINE HCL 5 MG
TABLET ORAL
Qty: 30 TABLET | Refills: 3 | Status: SHIPPED | OUTPATIENT
Start: 2017-10-27 | End: 2018-02-15 | Stop reason: SDUPTHER

## 2017-10-27 RX ORDER — METFORMIN HYDROCHLORIDE 500 MG/1
TABLET, EXTENDED RELEASE ORAL
Qty: 30 TABLET | Refills: 0 | Status: SHIPPED | OUTPATIENT
Start: 2017-10-27 | End: 2018-03-09 | Stop reason: ALTCHOICE

## 2017-10-27 RX ORDER — DIPHENHYD/PHENYLEPH/ACETAMINOP 12.5-5-325
1 TABLET ORAL DAILY
Qty: 1 KIT | Refills: 0 | Status: SHIPPED | OUTPATIENT
Start: 2017-10-27 | End: 2017-10-27 | Stop reason: SDUPTHER

## 2017-10-27 RX ORDER — DIPHENHYD/PHENYLEPH/ACETAMINOP 12.5-5-325
1 TABLET ORAL DAILY
Qty: 1 KIT | Refills: 0 | Status: SHIPPED | OUTPATIENT
Start: 2017-10-27 | End: 2018-06-15 | Stop reason: SDUPTHER

## 2017-10-27 RX ORDER — ISOPROPYL ALCOHOL 0.75 G/1
SWAB TOPICAL
Qty: 100 EACH | Refills: 0 | Status: SHIPPED | OUTPATIENT
Start: 2017-10-27 | End: 2017-12-22 | Stop reason: SDUPTHER

## 2017-10-27 ASSESSMENT — ENCOUNTER SYMPTOMS
WHEEZING: 0
VOMITING: 0
SHORTNESS OF BREATH: 0
NAUSEA: 0
ABDOMINAL PAIN: 0
BACK PAIN: 1
COUGH: 0

## 2017-10-27 NOTE — TELEPHONE ENCOUNTER
PT called and wanted to know since she is allergic to Shellfish and shrimp is it still safe for her to be taking the [facility] calcium she was prescribed.  Please advise

## 2017-10-27 NOTE — PROGRESS NOTES
Subjective:      Patient ID: Knog Reid is a 62 y.o. female. HPI  Patient is here today with complaints of \"loss of balance. \" She states that she would be standing, washing her dishes when she would all ofa sudden feel like she's losing balance. She denies any presyncopal episodes/falls. She denies any dizziness, lightheadedness. She has a history of chronic low back pain, and associates the loss of balance to that. She has not used a walker in the past.     Review of Systems   Constitutional: Negative for appetite change, fatigue and fever. Respiratory: Negative for cough, shortness of breath and wheezing. Cardiovascular: Negative for chest pain, palpitations and leg swelling. Gastrointestinal: Negative for abdominal pain, nausea and vomiting. Endocrine: Negative for polydipsia and polyuria. Musculoskeletal: Positive for back pain. Negative for gait problem. Neurological: Negative for dizziness and light-headedness. Objective:   Physical Exam   Constitutional: She is oriented to person, place, and time. Morbidly obese   HENT:   Head: Normocephalic and atraumatic. Cardiovascular: Normal rate, regular rhythm and normal heart sounds. No murmur heard. Pulmonary/Chest: Effort normal and breath sounds normal. She has no wheezes. Musculoskeletal: She exhibits no edema. Neurological: She is alert and oriented to person, place, and time. Assessment/Plan:      1. Loss of balance  - calcium carbonate-vitamin D (CALTRATE) 600-400 MG-UNIT TABS per tab; TAKE 1 TABLET BY MOUTH TWICE A DAY  Dispense: 60 tablet; Refill: 0  - Walker rolling  - Misc. Devices (WALKER) MISC; 1 each by Does not apply route daily Heavy duty >300lbs    Loss of balance   Difficulty in ambulation  Dispense: 1 each; Refill: 0  - Blood Pressure Monitoring (BLOOD PRESSURE KIT) KIT; 1 kit by Does not apply route daily  Dispense: 1 kit; Refill: 0    2.  Other osteoarthritis of spine, lumbar region  - cyclobenzaprine (FLEXERIL) 5 MG tablet; TAKE 1 TABLET AT BEDTIME AS NEEDED FOR MUSCLE SPASMS  Dispense: 30 tablet; Refill: 3    3. Vitamin D deficiency  - calcium carbonate-vitamin D (CALTRATE) 600-400 MG-UNIT TABS per tab; TAKE 1 TABLET BY MOUTH TWICE A DAY  Dispense: 60 tablet; Refill: 0        Health care maintenance  - flu shot   - HIV Screen; Future    BP Readings from Last 3 Encounters:   10/27/17 (!) 139/90   09/07/17 127/80   08/04/17 (!) 146/83       Kaci received counseling on the following healthy behaviors: nutrition, exercise and medication adherence    Discussed use, benefit, and side effects of prescribed medications. Barriers to medication compliance addressed. All patient questions answered. Pt voiced understanding. Return in about 3 months (around 1/27/2018).

## 2017-10-27 NOTE — PROGRESS NOTES
Attending Physician Statement  I have discussed the [facility], including pertinent history and exam findings,  with the resident. I have reviewed the key elements of all parts of the encounter with the resident. I agree with the assessment, plan and orders as documented by the resident.   (GE Modifier)

## 2017-10-31 RX ORDER — BLOOD-GLUCOSE METER
KIT MISCELLANEOUS
Qty: 100 STRIP | Refills: 0 | Status: SHIPPED | OUTPATIENT
Start: 2017-10-31 | End: 2018-02-28 | Stop reason: SDUPTHER

## 2017-11-03 ENCOUNTER — TELEPHONE (OUTPATIENT)
Dept: ADMINISTRATIVE | Age: 57
End: 2017-11-03

## 2017-11-13 ENCOUNTER — HOSPITAL ENCOUNTER (OUTPATIENT)
Age: 57
Setting detail: SPECIMEN
Discharge: HOME | End: 2017-11-13
Payer: COMMERCIAL

## 2017-11-13 ENCOUNTER — OFFICE VISIT (OUTPATIENT)
Dept: OBGYN | Age: 57
End: 2017-11-13
Payer: COMMERCIAL

## 2017-11-13 VITALS
WEIGHT: 293 LBS | HEIGHT: 67 IN | BODY MASS INDEX: 45.99 KG/M2 | DIASTOLIC BLOOD PRESSURE: 76 MMHG | SYSTOLIC BLOOD PRESSURE: 136 MMHG

## 2017-11-13 DIAGNOSIS — Z01.419 WOMEN'S ANNUAL ROUTINE GYNECOLOGICAL EXAMINATION: ICD-10-CM

## 2017-11-13 DIAGNOSIS — Z01.419 WOMEN'S ANNUAL ROUTINE GYNECOLOGICAL EXAMINATION: Primary | ICD-10-CM

## 2017-11-13 DIAGNOSIS — Z12.39 SCREENING FOR BREAST CANCER: ICD-10-CM

## 2017-11-13 PROCEDURE — G0145 SCR C/V CYTO,THINLAYER,RESCR: HCPCS

## 2017-11-13 PROCEDURE — 99396 PREV VISIT EST AGE 40-64: CPT | Performed by: OBSTETRICS & GYNECOLOGY

## 2017-11-13 RX ORDER — CYCLOBENZAPRINE HCL 5 MG
5 TABLET ORAL
COMMUNITY
End: 2017-11-13 | Stop reason: SDUPTHER

## 2017-11-13 NOTE — PROGRESS NOTES
5-    Do you do self breast exams: Yes    Past Medical History:   Diagnosis Date    Anxiety     Bronchial asthma     mild, intermittent    Carpal tunnel syndrome     Cataract     Chronic back pain     Chronic sinusitis     Depression     Diet-controlled type 2 diabetes mellitus ([address] 10/18/2012    DJD (degenerative joint disease)     S1, L3, L4, L5, T12    Edema of leg     bilateral legs    Environmental allergies     GERD (gastroesophageal reflux disease)     Glaucoma     Glucose intolerance (impaired glucose tolerance)     Headache(784.0)     History of bronchitis     Viral    History of diarrhea     HTN (hypertension)     Hyperlipidemia     Hypothyroid     hypothyroid state    Hypothyroidism     IBS (irritable bowel syndrome) 10/2/2012    Legally blind     due to glaucoma    MVP (mitral valve prolapse)     Obesity     EMILIA on CPAP     Osteopenia     Pancreatic cyst     Polyneuropathy (HCC)     Raynaud disease     Rhinopharyngitis     Allergic rhinopharyngitis    Stress fracture     Right 5th Metatarsal     TIA (transient ischemic attack)     Urinary incontinence     Vasodepressor syncope     Wears glasses        Past Surgical History:   Procedure Laterality Date    APPENDECTOMY  1978    CATARACT REMOVAL Left     CHOLECYSTECTOMY  1978    COLONOSCOPY      ESOPHAGEAL DILATATION      EYE SURGERY Bilateral     right iridectomy, right laser, bilateral trabeculectomy, left drain    GLAUCOMA SURGERY      HAND SURGERY Right     HYSTERECTOMY  1982    KNEE SURGERY Right 2000    TUBAL LIGATION  1981    UPPER GASTROINTESTINAL ENDOSCOPY         Family History   Problem Relation Age of Onset    Diabetes Mother     Heart Disease Mother      multiple heart attacks    Arthritis Mother     High Blood Pressure Mother     High Cholesterol Mother     Substance Abuse Mother     Heart Disease Father     Arthritis Father     Depression Father     High Cholesterol Father     Mental Illness Father     Substance Abuse Father     Diabetes Sister     High Blood Pressure Sister     Cancer Paternal Uncle      esophageal cancer    Diabetes Maternal Aunt     Cancer Maternal Aunt      colorectal cancer    Diabetes Maternal Uncle     Cancer Maternal Grandmother      colorectal cancer    Arthritis Maternal Grandmother     Diabetes Maternal Grandmother     High Blood Pressure Maternal Grandmother     Stroke Maternal Grandmother     Arthritis Maternal Grandfather     Arthritis Paternal Grandmother     Cancer Paternal Grandmother     Depression Paternal Grandmother     Heart Disease Paternal Grandmother     High Blood Pressure Paternal Grandmother     High Cholesterol Paternal Grandmother     Mental Illness Paternal Grandmother     Arthritis Paternal Grandfather        Chief Complaint   Patient presents with    Gynecologic Exam          Nurse: MAMADOU              PE:  Vital Signs  Blood pressure 136/76, height 5' 7\" (1.702 m), weight (!) 312 lb (141.5 kg), not currently breastfeeding. Labs:    No results found for this visit on 11/13/17. HPI: The patient is here today for a yearly exam.  She has no specific problems related to today's visit    NoPT denies fever, chills, nausea and vomiting       Objective  Lymphatic:   no lymphadenopathy  Heent:   negative   Cor: regular rate and rhythm, no murmurs              Pul:clear to auscultation bilaterally- no wheezes, rales or rhonchi, normal air movement, no respiratory distress      GI: Abdomen soft, non-tender.  BS normal. No masses,  No organomegaly, morbid obesity noted, old well-healed abdominal scar           Extremities: normal strength, tone, and muscle mass   Breasts: Breast:normal appearance, no masses or tenderness, Inspection negative, No nipple retraction or dimpling, No nipple discharge or bleeding, No axillary or supraclavicular adenopathy, Normal to palpation without dominant masses   Pelvic Exam: GENITAL/URINARY: External Genitalia:  General appearance; normal, Hair distribution; normal, Lesions absent  Vagina:  General appearance normal, Estrogen effect normal, Discharge absent, Lesions absent, Pelvic support normal  Uterus:  Hystory of hysterectomy  Adenexa: Masses absent, Tenderness absent, Enlargement absent, Nodularity absent                                      Vaginal discharge: no vaginal discharge                 Over 50% of time spent on counseling and care coordination on: see assessment and plan                        Assessment and Plan       1. Women's annual routine gynecological examination  PAP SMEAR   2. Screening for breast cancer  ADELINA DIGITAL SCREEN W CAD BILATERAL       I have discontinued Ms. Banerjee's prednisoLONE acetate and predniSONE. I am also having her maintain her aluminum & magnesium hydroxide-simethicone, Incontinence Supplies, vitamin D3, Medical Compression Stockings, aspirin, FREESTYLE CONTROL SOLUTION, triamcinolone, tacrolimus, Compression Stockings, OPTICHAMBER RIMA, levothyroxine, acetaminophen, pantoprazole, ACCU-CHEK MULTICLIX LANCETS, fluticasone, albuterol sulfate HFA, CERTAVITE/ANTIOXIDANTS, loratadine, lovastatin, B-D SINGLE USE SWABS REGULAR, B-D SINGLE USE SWABS REGULAR, calcium carbonate-vitamin D, cyclobenzaprine, Walker, Blood Pressure Kit, metFORMIN, and FREESTYLE LITE.

## 2017-11-16 DIAGNOSIS — E66.01 MORBID OBESITY DUE TO EXCESS CALORIES (HCC): ICD-10-CM

## 2017-11-16 DIAGNOSIS — Z76.0 MEDICATION REFILL: ICD-10-CM

## 2017-11-16 RX ORDER — LOVASTATIN 10 MG/1
TABLET ORAL
Qty: 30 TABLET | Refills: 0 | Status: SHIPPED | OUTPATIENT
Start: 2017-11-16 | End: 2017-12-13 | Stop reason: SDUPTHER

## 2017-11-16 RX ORDER — BLOOD-GLUCOSE METER
KIT MISCELLANEOUS
Qty: 100 STRIP | Refills: 0 | Status: SHIPPED | OUTPATIENT
Start: 2017-11-16 | End: 2017-12-13 | Stop reason: SDUPTHER

## 2017-11-16 RX ORDER — LORATADINE 10 MG/1
TABLET ORAL
Qty: 30 TABLET | Refills: 0 | Status: SHIPPED | OUTPATIENT
Start: 2017-11-16 | End: 2017-12-13 | Stop reason: SDUPTHER

## 2017-11-16 RX ORDER — FOLIC ACID/MV,IRON,MIN/LUTEIN 0.4-18-25
TABLET ORAL
Qty: 30 TABLET | Refills: 0 | Status: SHIPPED | OUTPATIENT
Start: 2017-11-16 | End: 2017-12-13 | Stop reason: SDUPTHER

## 2017-11-16 RX ORDER — LANCETS
EACH MISCELLANEOUS
Qty: 102 EACH | Refills: 0 | Status: SHIPPED | OUTPATIENT
Start: 2017-11-16 | End: 2017-12-13 | Stop reason: SDUPTHER

## 2017-11-16 RX ORDER — ASPIRIN 325 MG
TABLET, DELAYED RELEASE (ENTERIC COATED) ORAL
Qty: 30 TABLET | Refills: 0 | Status: SHIPPED | OUTPATIENT
Start: 2017-11-16 | End: 2017-12-13 | Stop reason: SDUPTHER

## 2017-11-21 LAB — CYTOLOGY REPORT: NORMAL

## 2017-11-22 RX ORDER — FLUTICASONE PROPIONATE 50 MCG
SPRAY, SUSPENSION (ML) NASAL
Qty: 32 BOTTLE | Refills: 0 | Status: SHIPPED | OUTPATIENT
Start: 2017-11-22 | End: 2018-01-12 | Stop reason: SDUPTHER

## 2017-11-24 DIAGNOSIS — E55.9 VITAMIN D DEFICIENCY: ICD-10-CM

## 2017-11-24 DIAGNOSIS — R26.89 LOSS OF BALANCE: ICD-10-CM

## 2017-11-24 NOTE — TELEPHONE ENCOUNTER
(hypertension)     DJD (degenerative joint disease)     Obesity     Hypothyroidism     Polyneuropathy (HCC)     Migraine     HIGH CHOLESTEROL     Mitral prolapse     Low back pain     CTS (carpal tunnel syndrome)     Supraspinatus syndrome     Impaired fasting glucose     Acute sinus infection     IBS (irritable bowel syndrome)     Back pain, chronic     Stress fracture, right 5th metatarsal      Upper airway cough syndrome     Ear fullness     Perioral numbness     Screening for osteoporosis     Headache     Left eye injury     Medication reaction     Osteopenia     Dyslipidemia     Lumbosacral pain     Needs flu shot     Hypothyroid     Need for hepatitis C screening test     Urinary incontinence     Anxiety     Sleep apnea     Depression     Chronic back pain     Carpal tunnel syndrome     Neuropathy ([address]     Mitral valve problem     Morbid obesity with BMI of 45.0-49.9, adult (HCC)     Frozen shoulder     Screening for breast cancer     Prediabetes     Skin tag     Degenerative disc disease, lumbar     Bilateral edema of lower extremity     Medication refill     Varicose vein of leg     Healthcare maintenance     Dizziness

## 2017-12-04 ENCOUNTER — HOSPITAL ENCOUNTER (OUTPATIENT)
Dept: MAMMOGRAPHY | Age: 57
Discharge: HOME | End: 2017-12-04
Payer: COMMERCIAL

## 2017-12-04 DIAGNOSIS — K21.00 GASTROESOPHAGEAL REFLUX DISEASE WITH ESOPHAGITIS: Primary | ICD-10-CM

## 2017-12-04 DIAGNOSIS — Z12.39 SCREENING FOR BREAST CANCER: ICD-10-CM

## 2017-12-04 PROCEDURE — G0202 SCR MAMMO BI INCL CAD: HCPCS

## 2017-12-04 RX ORDER — PANTOPRAZOLE SODIUM 40 MG/1
40 TABLET, DELAYED RELEASE ORAL
Qty: 30 TABLET | Refills: 3 | Status: SHIPPED | OUTPATIENT
Start: 2017-12-04 | End: 2018-04-11 | Stop reason: SDUPTHER

## 2017-12-08 ENCOUNTER — OFFICE VISIT (OUTPATIENT)
Dept: PULMONOLOGY | Age: 57
End: 2017-12-08
Payer: COMMERCIAL

## 2017-12-08 ENCOUNTER — TELEPHONE (OUTPATIENT)
Dept: PULMONOLOGY | Age: 57
End: 2017-12-08

## 2017-12-08 VITALS
SYSTOLIC BLOOD PRESSURE: 154 MMHG | HEIGHT: 67 IN | BODY MASS INDEX: 45.99 KG/M2 | WEIGHT: 293 LBS | OXYGEN SATURATION: 96 % | RESPIRATION RATE: 14 BRPM | DIASTOLIC BLOOD PRESSURE: 84 MMHG | TEMPERATURE: 97.1 F | HEART RATE: 73 BPM

## 2017-12-08 DIAGNOSIS — I10 ESSENTIAL HYPERTENSION: ICD-10-CM

## 2017-12-08 DIAGNOSIS — Z99.89 OSA ON CPAP: Primary | ICD-10-CM

## 2017-12-08 DIAGNOSIS — J45.20 MILD INTERMITTENT ASTHMA WITHOUT COMPLICATION: ICD-10-CM

## 2017-12-08 DIAGNOSIS — H54.7 BLINDNESS: ICD-10-CM

## 2017-12-08 DIAGNOSIS — G47.33 OSA ON CPAP: Primary | ICD-10-CM

## 2017-12-08 DIAGNOSIS — E66.9 OBESITY (BMI 35.0-39.9 WITHOUT COMORBIDITY): ICD-10-CM

## 2017-12-08 PROCEDURE — 3017F COLORECTAL CA SCREEN DOC REV: CPT | Performed by: INTERNAL MEDICINE

## 2017-12-08 PROCEDURE — G8417 CALC BMI ABV UP PARAM F/U: HCPCS | Performed by: INTERNAL MEDICINE

## 2017-12-08 PROCEDURE — 99214 OFFICE O/P EST MOD 30 MIN: CPT | Performed by: INTERNAL MEDICINE

## 2017-12-08 PROCEDURE — 1036F TOBACCO NON-USER: CPT | Performed by: INTERNAL MEDICINE

## 2017-12-08 PROCEDURE — 3014F SCREEN MAMMO DOC REV: CPT | Performed by: INTERNAL MEDICINE

## 2017-12-08 PROCEDURE — G8427 DOCREV CUR MEDS BY ELIG CLIN: HCPCS | Performed by: INTERNAL MEDICINE

## 2017-12-08 PROCEDURE — G8484 FLU IMMUNIZE NO ADMIN: HCPCS | Performed by: INTERNAL MEDICINE

## 2017-12-08 RX ORDER — CHOLECALCIFEROL (VITAMIN D3) 50 MCG
1 TABLET ORAL DAILY
COMMUNITY
Start: 2017-11-24 | End: 2017-12-26 | Stop reason: ALTCHOICE

## 2017-12-08 NOTE — PROGRESS NOTES
MOUTH DAILY 30 tablet 0    ACCU-CHEK MULTICLIX LANCETS MISC USE AS DIRECTED 102 each 0    FREESTYLE LITE strip USE AS DIRECTED THREE OR FOUR TIMES DAILY 100 strip 0    FREESTYLE LITE strip USE AS DIRECTED THREE OR FOUR TIMES DAILY 100 strip 0    Alcohol Swabs (B-D SINGLE USE SWABS REGULAR) PADS USE AS DIRECTED DAILY 100 each 0    cyclobenzaprine (FLEXERIL) 5 MG tablet TAKE 1 TABLET AT BEDTIME AS NEEDED FOR MUSCLE SPASMS 30 tablet 3    Misc. Devices (WALKER) MISC 1 each by Does not apply route daily Heavy duty >300lbs    Loss of balance   Difficulty in ambulation 1 each 0    Blood Pressure Monitoring (BLOOD PRESSURE KIT) KIT 1 kit by Does not apply route daily 1 kit 0    metFORMIN (GLUCOPHAGE-XR) 500 MG extended release tablet TAKE 1 TABLET BY MOUTH DAILY WITH SUPPER 30 tablet 0    Alcohol Swabs (B-D SINGLE USE SWABS REGULAR) PADS USE AS DIRECTED DAILY 100 each 0    albuterol sulfate HFA (VENTOLIN HFA) 108 (90 Base) MCG/ACT inhaler Inhale 2 puffs into the lungs every 6 hours as needed for Wheezing or Shortness of Breath 18 g 2    Accu-Chek Multiclix Lancets MISC USE AS DIRECTED 102 each 0    acetaminophen (APAP EXTRA STRENGTH) 500 MG tablet Take 2 tablets by mouth every 6 hours as needed for Pain 120 tablet 3    levothyroxine (SYNTHROID) 100 MCG tablet Take 1 tablet by mouth Daily 30 tablet 5    Spacer/Aero-Holding Chambers (OPTICHAMBER RIMA) MISC USE AS DIRECTED WITH INHALERS 1 each 0    triamcinolone (KENALOG) 0.1 % ointment Apply topically 2 times daily Apply topically 2 times daily.  tacrolimus (PROTOPIC) 0.1 % ointment Apply topically 2 times daily Apply topically 2 times daily.       Compression Stockings MISC by Does not apply route 20-30 mmHg  Wear it daily as instructed 1 each 0    Blood Glucose Calibration (FREESTYLE CONTROL SOLUTION) LIQD USE AS DIRECTED 1 each 3    aspirin 325 MG EC tablet TAKE 1 TABLET DAILY 30 tablet 3    Elastic Bandages & Supports (MEDICAL COMPRESSION STOCKINGS) MISC 1 each by Does not apply route daily as needed (daily as needed) 20-30 mmHG Ready to wear Knee High Compression Stockings # of 3 pairs 3 each 0    Incontinence Supplies MISC DX: Urinary Incontinence 100 each 3    aluminum & magnesium hydroxide-simethicone (MAALOX ADVANCED) 200-200-20 MG/5ML SUSP suspension Take 5 mLs by mouth as needed for Indigestion        No current facility-administered medications for this visit. Allergies   Allergen Reactions    Latex Rash     blisters    Amlodipine Other (See Comments)     Facial numbness  Facial numbness  Facial numbness  Facial numbness    Brimonidine Itching     Burning eyes severe    Lisinopril Nausea Only and Nausea And Vomiting     Other reaction(s): Hypotension    Metoprolol      Other reaction(s): Dizziness    Shellfish-Derived Products Anaphylaxis and Rash     shrimp    Statins      Other reaction(s): muscle cramps    Fosamax [Alendronate] Nausea Only and Nausea And Vomiting    Alendronate Sodium     Alphagan [Brimonidine Tartrate]      Eye irritation    Daypro [Oxaprozin]     Dilaudid [Hydromorphone Hcl]     Ibuprofen      \"rips up stomach\", black tarry stool    Naproxen     Nsaids     Pepcid Complete [Famotidine-Ca Carb-Mag Hydrox] Hives and Other (See Comments)     blisters    Pilocarpine      Blurred vision    Shrimp Flavor Hives and Swelling    Statins Support Therapy      Tolerates lovastatin.  Pravachol caused numbness in head/face    Sulfa Antibiotics     Tetracyclines & Related     Timoptic [Timolol Maleate]      Eye irritation      Timoptic [Timolol Maleate]     Tolectin [Tolmetin]     Voltaren [Diclofenac Sodium]      Flu symptoms      Bextra [Valdecoxib] Rash    Nubain [Nalbuphine Hcl] Nausea And Vomiting     Chest pain      Percocet [Oxycodone-Acetaminophen] Nausea And Vomiting     Sweating, chest pain    Tolectin [Tolmetin Sodium] Rash    Ultram [Tramadol Hcl] Itching and Rash     Rash on face    Vicodin [Hydrocodone-Acetaminophen] Nausea And Vomiting     swearing    Vioxx Rash       Immunization History   Administered Date(s) Administered    Influenza Virus Vaccine 10/18/2012, 10/22/2013, 09/28/2015    Influenza Whole 09/24/2010    Influenza, Mahamed Swartz, 3 Years and older, IM 10/04/2016, 10/27/2017    Pneumococcal Polysaccharide ([id number]) 12/08/2008    Tdap (Boostrix, Adacel) 06/01/2017        PAST MEDICAL HISTORY:         Diagnosis Date    Anxiety     Bronchial asthma     mild, intermittent    Carpal tunnel syndrome     Cataract     Chronic back pain     Chronic sinusitis     Depression     Diet-controlled type 2 diabetes mellitus ([facility] [address] 10/18/2012    DJD (degenerative joint disease)     S1, L3, L4, L5, T12    Edema of leg     bilateral legs    Environmental allergies     GERD (gastroesophageal reflux disease)     Glaucoma     Glucose intolerance (impaired glucose tolerance)     Headache(784.0)     History of bronchitis     Viral    History of diarrhea     HTN (hypertension)     Hyperlipidemia     Hypothyroid     hypothyroid state    Hypothyroidism     IBS (irritable bowel syndrome) 10/2/2012    Legally blind     due to glaucoma    MVP (mitral valve prolapse)     Obesity     EMILIA on CPAP     Osteopenia     Pancreatic cyst     Polyneuropathy (HCC)     Raynaud disease     Rhinopharyngitis     Allergic rhinopharyngitis    Stress fracture     Right 5th Metatarsal     TIA (transient ischemic attack)     Urinary incontinence     Vasodepressor syncope     Wears glasses        FAMILY HISTORY:       Problem Relation Age of Onset    Diabetes Mother     Heart Disease Mother      multiple heart attacks    Arthritis Mother     High Blood Pressure Mother     High Cholesterol Mother     Substance Abuse Mother     Heart Disease Father     Arthritis Father     Depression Father     High Cholesterol Father     Mental Illness Father     Substance Abuse Father     Diabetes Sister     High Blood Pressure Sister     Cancer Paternal Uncle      esophageal cancer    Diabetes Maternal Aunt     Cancer Maternal Aunt      colorectal cancer    Diabetes Maternal Uncle     Cancer Maternal Grandmother      colorectal cancer    Arthritis Maternal Grandmother     Diabetes Maternal Grandmother     High Blood Pressure Maternal Grandmother     Stroke Maternal Grandmother     Arthritis Maternal Grandfather     Arthritis Paternal Grandmother     Cancer Paternal Grandmother     Depression Paternal Grandmother     Heart Disease Paternal Grandmother     High Blood Pressure Paternal Grandmother     High Cholesterol Paternal Grandmother     Mental Illness Paternal Grandmother     Arthritis Paternal Grandfather        SURGICAL HISTORY:   Past Surgical History:   Procedure Laterality Date    APPENDECTOMY  1978    CATARACT REMOVAL Left     CHOLECYSTECTOMY  1978    COLONOSCOPY      ESOPHAGEAL DILATATION      EYE SURGERY Bilateral     right iridectomy, right laser, bilateral trabeculectomy, left drain    GLAUCOMA SURGERY      HAND SURGERY Right     HYSTERECTOMY  1982    KNEE SURGERY Right 2000    TUBAL LIGATION  1981    UPPER GASTROINTESTINAL ENDOSCOPY       History   Smoking Status    Never Smoker   Smokeless Tobacco    Never Used         PHYSICAL EXAM:   General Appearance:    Alert, cooperative, no distress, appears stated age. Legally blind or morbidly obese     Head:    Normocephalic, without obvious abnormality, atraumatic      Neck:   Supple, symmetrical, trachea midline, no adenopathy;     thyroid:  no enlargement/tenderness/nodules; no carotid    bruit or JVD.   Short fat neck     Back:     Symmetric, no curvature, ROM normal, no CVA tenderness     Lungs:     Clear to auscultation bilaterally, respirations unlabored no     dullness no bb, no wheezing no rales, vent all lobes,     diaphram motion normal      Chest Wall:    No tenderness or deformity      Heart:    Regular rate and rhythm, S1 and S2 normal, no murmur, rub     or gallop no rvh, P2 normal                          Abdomen:                              Pulses:                                 Lymph nodes:                    Neurologic:                  Soft, non-tender, bowel sounds active all four quadrants,     no masses, no organomegaly     2+ and symmetric all extremities      Cervical, supraclavicular not enlarged or matted or tender      CNII-XII intact, normal strength 5/5 . Sensation grossly normal  and reflexes normal 2+ throughout. Does have evidence of peripheral neuropathy due to diabetes. Extremities - Clubbing - no    Edema - no    DVT - no    Skin changes - no    Pulses normal       Musculoskeletal - no joint swelling or tenderness or synovitis. No acute arthritis        BP (!) 154/84   Pulse 73   Temp 97.1 °F (36.2 °C)   Resp 14   Ht 5' 7\" (1.702 m)   Wt (!) 309 lb (140.2 kg)   SpO2 96% Comment: Room air  BMI 48.40 kg/m²     Results for orders placed or performed during the hospital encounter of 11/13/17   GYN Cytology   Result Value Ref Range    Cytology Report       (NOTE)  [address],  [address] [place], [address]  [phone]  Fax: [phone]  GYNECOLOGIC CYTOLOGY REPORT    Patient Name: Onelia Wise  MR#: [id number]  Specimen #[id number]  Source:  1: Vaginal material, (ThinPrep vial, Imaging-assisted review)    Clinical History  Hysterectomy  Z01.419 Routine gyn exam without abnormal findings  High Risk HPV DNA testing is requested if the diagnosis is ASC-US    INTERPRETATION    Vaginal material, (ThinPrep vial, Imaging-assisted review):  Specimen Adequacy:      Satisfactory for evaluation. Descriptive Diagnosis:      Negative for intraepithelial lesion or malignancy.          Cytotechnologist:   LUDY Manuel(ASCP)  **Electronically Signed Out**  kelly/11/21/2017         Xray Chest:

## 2017-12-11 ENCOUNTER — OFFICE VISIT (OUTPATIENT)
Dept: PODIATRY | Age: 57
End: 2017-12-11
Payer: COMMERCIAL

## 2017-12-11 VITALS
SYSTOLIC BLOOD PRESSURE: 133 MMHG | TEMPERATURE: 97.7 F | DIASTOLIC BLOOD PRESSURE: 80 MMHG | HEART RATE: 83 BPM | WEIGHT: 293 LBS | BODY MASS INDEX: 45.99 KG/M2 | HEIGHT: 67 IN

## 2017-12-11 DIAGNOSIS — B35.1 ONYCHOMYCOSIS: ICD-10-CM

## 2017-12-11 DIAGNOSIS — M79.672 BILATERAL FOOT PAIN: ICD-10-CM

## 2017-12-11 DIAGNOSIS — E11.43 CONTROLLED TYPE 2 DIABETES MELLITUS WITH DIABETIC AUTONOMIC NEUROPATHY, WITHOUT LONG-TERM CURRENT USE OF INSULIN (HCC): Primary | ICD-10-CM

## 2017-12-11 DIAGNOSIS — M79.671 BILATERAL FOOT PAIN: ICD-10-CM

## 2017-12-11 DIAGNOSIS — M54.17 RADICULOPATHY OF LUMBOSACRAL REGION: ICD-10-CM

## 2017-12-11 PROCEDURE — 11721 DEBRIDE NAIL 6 OR MORE: CPT | Performed by: PODIATRIST

## 2017-12-11 PROCEDURE — 99213 OFFICE O/P EST LOW 20 MIN: CPT | Performed by: PODIATRIST

## 2017-12-13 DIAGNOSIS — E66.01 MORBID OBESITY DUE TO EXCESS CALORIES (HCC): ICD-10-CM

## 2017-12-13 DIAGNOSIS — Z76.0 MEDICATION REFILL: ICD-10-CM

## 2017-12-13 NOTE — TELEPHONE ENCOUNTER
Please address the medication refill and close the encounter. If I can be of assistance, please route to the applicable pool. Thank you. Next Visit Date:  Future Appointments  Date Time Provider Fabby Artisi   1/26/2018 9:45 AM [facility]   3/9/2018 10:30 AM Verner Erichsen, MD Resp Spec [address]   3/12/2018 2:15 PM Anu Chamorro DPM [facility]   6/6/2018 10:00 AM SCHEDULE, P ACC GI CLINIC ACC GI [facility]P   11/15/2018 11:10 AM Romana Elder MD [facility] Ob[facility]       Health Maintenance   Topic Date Due    HIV screen  03/09/1975    Diabetic retinal exam  07/08/2016    Lipid screen  06/16/2018    Diabetic foot exam  09/07/2018    Diabetic hemoglobin A1C test  09/07/2018    Diabetic microalbuminuria test  09/07/2018    Breast cancer screen  12/04/2019    Colon cancer screen colonoscopy  04/16/2023    DTaP/Tdap/Td vaccine (2 - Td) 06/01/2027    Flu vaccine  Completed    Pneumococcal med risk  Completed    Hepatitis C screen  Completed       Hemoglobin A1C (%)   Date Value   09/07/2017 5.3   06/19/2015 5.0   04/27/2015 5.2             ( goal A1C is < 7)   Microalb/Crt.  Ratio (mcg/mg creat)   Date Value   05/07/2013 2     LDL Cholesterol (mg/dL)   Date Value   05/07/2013 78     LDL Calculated (mg/dL)   Date Value   06/16/2017 68       (goal LDL is <100)   AST (U/L)   Date Value   05/13/2015 18     ALT (U/L)   Date Value   05/13/2015 23     BUN (mg/dL)   Date Value   03/30/2017 12     BP Readings from Last 3 Encounters:   12/11/17 133/80   12/08/17 (!) 154/84   11/13/17 136/76          (goal 120/80)    All Future Testing planned in [facility]  Lab Frequency Next Occurrence   Vitamin D 25 Hydroxy Once 01/30/2018   Vitamin B12 & Folate Once 12/15/2017   HIV Screen Once 01/26/2018   Sleep Study with PAP Titration Once 12/22/2017               Patient Active Problem List:     Glaucoma     GERD (gastroesophageal reflux disease)     Hyperlipidemia     HTN

## 2017-12-14 RX ORDER — BLOOD-GLUCOSE METER
KIT MISCELLANEOUS
Qty: 100 STRIP | Refills: 0 | Status: SHIPPED | OUTPATIENT
Start: 2017-12-14 | End: 2018-01-11 | Stop reason: SDUPTHER

## 2017-12-14 RX ORDER — LORATADINE 10 MG/1
TABLET ORAL
Qty: 30 TABLET | Refills: 0 | Status: SHIPPED | OUTPATIENT
Start: 2017-12-14 | End: 2018-01-11 | Stop reason: SDUPTHER

## 2017-12-14 RX ORDER — LANCETS
EACH MISCELLANEOUS
Qty: 102 EACH | Refills: 0 | Status: SHIPPED | OUTPATIENT
Start: 2017-12-14 | End: 2018-01-11 | Stop reason: SDUPTHER

## 2017-12-14 RX ORDER — LOVASTATIN 10 MG/1
TABLET ORAL
Qty: 30 TABLET | Refills: 0 | Status: SHIPPED | OUTPATIENT
Start: 2017-12-14 | End: 2018-01-11 | Stop reason: SDUPTHER

## 2017-12-14 RX ORDER — ASPIRIN 325 MG
TABLET, DELAYED RELEASE (ENTERIC COATED) ORAL
Qty: 30 TABLET | Refills: 0 | Status: SHIPPED | OUTPATIENT
Start: 2017-12-14 | End: 2018-01-11 | Stop reason: SDUPTHER

## 2017-12-14 RX ORDER — FOLIC ACID/MV,IRON,MIN/LUTEIN 0.4-18-25
TABLET ORAL
Qty: 30 TABLET | Refills: 0 | Status: SHIPPED | OUTPATIENT
Start: 2017-12-14 | End: 2018-01-11 | Stop reason: SDUPTHER

## 2017-12-22 DIAGNOSIS — E55.9 VITAMIN D DEFICIENCY: ICD-10-CM

## 2017-12-22 DIAGNOSIS — G62.9 POLYNEUROPATHY: ICD-10-CM

## 2017-12-22 DIAGNOSIS — R26.89 LOSS OF BALANCE: ICD-10-CM

## 2017-12-26 RX ORDER — ISOPROPYL ALCOHOL 0.75 G/1
SWAB TOPICAL
Qty: 100 EACH | Refills: 0 | Status: SHIPPED | OUTPATIENT
Start: 2017-12-26 | End: 2018-01-19 | Stop reason: SDUPTHER

## 2017-12-28 ENCOUNTER — TELEPHONE (OUTPATIENT)
Dept: ADMINISTRATIVE | Age: 57
End: 2017-12-28

## 2018-01-08 ENCOUNTER — HOSPITAL ENCOUNTER (OUTPATIENT)
Dept: SLEEP CENTER | Age: 58
Discharge: HOME | End: 2018-01-08
Payer: COMMERCIAL

## 2018-01-08 DIAGNOSIS — G47.33 OSA ON CPAP: ICD-10-CM

## 2018-01-08 DIAGNOSIS — Z99.89 OSA ON CPAP: ICD-10-CM

## 2018-01-08 PROCEDURE — 95811 POLYSOM 6/>YRS CPAP 4/> PARM: CPT

## 2018-01-08 ASSESSMENT — SLEEP AND FATIGUE QUESTIONNAIRES
HOW LIKELY ARE YOU TO NOD OFF OR FALL ASLEEP WHILE WATCHING TV: 2
HOW LIKELY ARE YOU TO NOD OFF OR FALL ASLEEP WHILE LYING DOWN TO REST IN THE AFTERNOON WHEN CIRCUMSTANCES PERMIT: 3
HOW LIKELY ARE YOU TO NOD OFF OR FALL ASLEEP WHILE SITTING INACTIVE IN A PUBLIC PLACE: 0
HOW LIKELY ARE YOU TO NOD OFF OR FALL ASLEEP WHEN YOU ARE A PASSENGER IN A CAR FOR AN HOUR WITHOUT A BREAK: 0
HOW LIKELY ARE YOU TO NOD OFF OR FALL ASLEEP WHILE SITTING AND TALKING TO SOMEONE: 0
HOW LIKELY ARE YOU TO NOD OFF OR FALL ASLEEP WHILE SITTING AND READING: 3
HOW LIKELY ARE YOU TO NOD OFF OR FALL ASLEEP WHILE SITTING QUIETLY AFTER LUNCH WITHOUT ALCOHOL: 1
ESS TOTAL SCORE: 9
HOW LIKELY ARE YOU TO NOD OFF OR FALL ASLEEP IN A CAR, WHILE STOPPED FOR A FEW MINUTES IN TRAFFIC: 0

## 2018-01-09 VITALS — BODY MASS INDEX: 45.99 KG/M2 | HEIGHT: 67 IN | RESPIRATION RATE: 12 BRPM | HEART RATE: 67 BPM | WEIGHT: 293 LBS

## 2018-01-09 ASSESSMENT — SLEEP AND FATIGUE QUESTIONNAIRES: NECK CIRCUMFERENCE (INCHES): 38

## 2018-01-12 RX ORDER — FLUTICASONE PROPIONATE 50 MCG
SPRAY, SUSPENSION (ML) NASAL
Qty: 32 BOTTLE | Refills: 0 | Status: SHIPPED | OUTPATIENT
Start: 2018-01-12 | End: 2018-03-12 | Stop reason: SDUPTHER

## 2018-01-12 NOTE — TELEPHONE ENCOUNTER
disease)     Hyperlipidemia     HTN (hypertension)     DJD (degenerative joint disease)     Obesity     Hypothyroidism     Polyneuropathy (HCC)     Migraine     HIGH CHOLESTEROL     Mitral prolapse     Low back pain     CTS (carpal tunnel syndrome)     Supraspinatus syndrome     Impaired fasting glucose     Acute sinus infection     IBS (irritable bowel syndrome)     Back pain, chronic     Stress fracture, right 5th metatarsal      Upper airway cough syndrome     Ear fullness     Perioral numbness     Screening for osteoporosis     Headache     Left eye injury     Medication reaction     Osteopenia     Dyslipidemia     Lumbosacral pain     Needs flu shot     Hypothyroid     Need for hepatitis C screening test     Urinary incontinence     Anxiety     Sleep apnea     Depression     Chronic back pain     Carpal tunnel syndrome     Neuropathy ([address]     Mitral valve problem     Morbid obesity with BMI of 45.0-49.9, adult (HCC)     Frozen shoulder     Screening for breast cancer     Prediabetes     Skin tag     Degenerative disc disease, lumbar     Bilateral edema of lower extremity     Medication refill     Varicose vein of leg     Healthcare maintenance     Dizziness

## 2018-01-17 DIAGNOSIS — M51.36 DEGENERATIVE DISC DISEASE, LUMBAR: ICD-10-CM

## 2018-01-18 RX ORDER — ACETAMINOPHEN 500 MG
1000 TABLET ORAL EVERY 6 HOURS PRN
Qty: 120 TABLET | Refills: 0 | Status: SHIPPED | OUTPATIENT
Start: 2018-01-18 | End: 2018-02-15 | Stop reason: SDUPTHER

## 2018-01-19 DIAGNOSIS — R26.89 LOSS OF BALANCE: ICD-10-CM

## 2018-01-19 DIAGNOSIS — E55.9 VITAMIN D DEFICIENCY: ICD-10-CM

## 2018-01-19 DIAGNOSIS — G62.9 POLYNEUROPATHY: ICD-10-CM

## 2018-01-21 RX ORDER — ISOPROPYL ALCOHOL 0.75 G/1
SWAB TOPICAL
Qty: 100 EACH | Refills: 0 | Status: SHIPPED | OUTPATIENT
Start: 2018-01-21 | End: 2018-02-15 | Stop reason: SDUPTHER

## 2018-01-26 ENCOUNTER — OFFICE VISIT (OUTPATIENT)
Dept: FAMILY MEDICINE CLINIC | Age: 58
End: 2018-01-26
Payer: COMMERCIAL

## 2018-01-26 VITALS
BODY MASS INDEX: 45.99 KG/M2 | DIASTOLIC BLOOD PRESSURE: 61 MMHG | SYSTOLIC BLOOD PRESSURE: 129 MMHG | HEIGHT: 67 IN | HEART RATE: 71 BPM | TEMPERATURE: 97.1 F | WEIGHT: 293 LBS

## 2018-01-26 DIAGNOSIS — M77.8 ELBOW TENDONITIS: ICD-10-CM

## 2018-01-26 DIAGNOSIS — E03.9 HYPOTHYROIDISM, UNSPECIFIED TYPE: ICD-10-CM

## 2018-01-26 DIAGNOSIS — I10 ESSENTIAL HYPERTENSION: Primary | ICD-10-CM

## 2018-01-26 PROCEDURE — 3014F SCREEN MAMMO DOC REV: CPT | Performed by: FAMILY MEDICINE

## 2018-01-26 PROCEDURE — 1036F TOBACCO NON-USER: CPT | Performed by: FAMILY MEDICINE

## 2018-01-26 PROCEDURE — G8417 CALC BMI ABV UP PARAM F/U: HCPCS | Performed by: FAMILY MEDICINE

## 2018-01-26 PROCEDURE — 99213 OFFICE O/P EST LOW 20 MIN: CPT | Performed by: FAMILY MEDICINE

## 2018-01-26 PROCEDURE — G8427 DOCREV CUR MEDS BY ELIG CLIN: HCPCS | Performed by: FAMILY MEDICINE

## 2018-01-26 PROCEDURE — 3017F COLORECTAL CA SCREEN DOC REV: CPT | Performed by: FAMILY MEDICINE

## 2018-01-26 PROCEDURE — G8484 FLU IMMUNIZE NO ADMIN: HCPCS | Performed by: FAMILY MEDICINE

## 2018-01-26 ASSESSMENT — ENCOUNTER SYMPTOMS
SORE THROAT: 0
NAUSEA: 0
WHEEZING: 0
SHORTNESS OF BREATH: 0
VOMITING: 0
ABDOMINAL PAIN: 0

## 2018-01-26 NOTE — PROGRESS NOTES
Attending Physician Statement  I have discussed the care of Ashley W Vicki Hudson, 62 y.o. female,including pertinent history and exam findings,  with the resident Dr. Alejandra Solis MD.  History:  Chief Complaint   Patient presents with    3 Month Follow-Up     labs    Elbow Pain     x 1 month pain with flexing/extending      61 yo female here for follow up on hypertension, type II DM, and hypothyroidism. I have reviewed the key elements of the encounter with the resident. Examination was done by resident as documented in residents note. BP Readings from Last 3 Encounters:   01/26/18 129/61   12/11/17 133/80   12/08/17 (!) 154/84     /61 (Site: Left Arm, Position: Sitting, Cuff Size: Medium Adult)   Pulse 71   Temp 97.1 °F (36.2 °C) (Temporal)   Ht 5' 7.01\" (1.702 m)   Wt (!) 313 lb 9.6 oz (142.2 kg)   BMI 49.10 kg/m²   Lab Results   Component Value Date    WBC 7.0 03/30/2017    HGB 13.4 03/30/2017    HCT 38.9 03/30/2017     03/30/2017    CHOL 135 06/16/2017    TRIG 137 06/16/2017    HDL 40 06/16/2017    ALT 23 05/13/2015    AST 18 05/13/2015     (H) 03/30/2017    K 4.0 03/30/2017     03/30/2017    CREATININE 0.63 03/30/2017    BUN 12 03/30/2017    CO2 22 03/30/2017    TSH 3.28 06/16/2017    INR 0.9 06/29/2013    LABA1C 5.3 09/07/2017    LABMICR 2 05/07/2013     Lab Results   Component Value Date    CALCIUM 8.6 03/30/2017     Lab Results   Component Value Date    LDLCALC 68 06/16/2017    LDLCHOLESTEROL 78 05/07/2013     I agree with the assessment, plan and diagnosis of   1. Essential hypertension  Basic Metabolic Panel    CBC With Auto Differential   2. Hypothyroidism, unspecified type     3. Elbow tendonitis       I agree with  orders as documented by the resident. Recommendations:   Patient's regimen reviewed, Hgb A1C is 5.9 and she follows up with endocrinology Dr. Severo Corning. Will update labs and she is up to date on her diabetic screens. Health maintenance reviewed and updated. Return in about 3 months (around 4/26/2018).    (Daniella Royalty ) Dr. Rodrigue Martin MD

## 2018-01-26 NOTE — PROGRESS NOTES
patient's weight loss. Patient would like to wait on bariatric surgery referral for now  - Basic Metabolic Panel; Future  - CBC With Auto Differential; Future    2. Hypothyroidism, unspecified type  Follows up with Dr. Yunior Cabral    3.  Elbow tendonitis  -Advised on supportive care  -extrastrength Tylenol prn

## 2018-02-09 RX ORDER — FOLIC ACID/MV,IRON,MIN/LUTEIN 0.4-18-25
TABLET ORAL
Qty: 30 TABLET | Refills: 0 | Status: SHIPPED | OUTPATIENT
Start: 2018-02-09 | End: 2018-03-12 | Stop reason: SDUPTHER

## 2018-02-09 RX ORDER — LANCETS
EACH MISCELLANEOUS
Qty: 102 EACH | Refills: 0 | Status: SHIPPED | OUTPATIENT
Start: 2018-02-09 | End: 2018-03-12 | Stop reason: SDUPTHER

## 2018-02-15 DIAGNOSIS — M47.896 OTHER OSTEOARTHRITIS OF SPINE, LUMBAR REGION: ICD-10-CM

## 2018-02-15 DIAGNOSIS — G62.9 POLYNEUROPATHY: ICD-10-CM

## 2018-02-15 DIAGNOSIS — R26.89 LOSS OF BALANCE: ICD-10-CM

## 2018-02-15 DIAGNOSIS — M51.36 DEGENERATIVE DISC DISEASE, LUMBAR: ICD-10-CM

## 2018-02-15 DIAGNOSIS — E55.9 VITAMIN D DEFICIENCY: ICD-10-CM

## 2018-02-16 RX ORDER — CYCLOBENZAPRINE HCL 5 MG
TABLET ORAL
Qty: 30 TABLET | Refills: 0 | Status: SHIPPED | OUTPATIENT
Start: 2018-02-16 | End: 2018-03-12 | Stop reason: SDUPTHER

## 2018-02-16 RX ORDER — ISOPROPYL ALCOHOL 0.75 G/1
SWAB TOPICAL
Qty: 100 EACH | Refills: 0 | Status: SHIPPED | OUTPATIENT
Start: 2018-02-16 | End: 2018-03-12 | Stop reason: SDUPTHER

## 2018-02-16 RX ORDER — ACETAMINOPHEN 500 MG
1000 TABLET ORAL EVERY 6 HOURS PRN
Qty: 120 TABLET | Refills: 0 | Status: SHIPPED | OUTPATIENT
Start: 2018-02-16 | End: 2018-03-12 | Stop reason: SDUPTHER

## 2018-03-02 ENCOUNTER — TELEPHONE (OUTPATIENT)
Dept: FAMILY MEDICINE CLINIC | Age: 58
End: 2018-03-02

## 2018-03-02 DIAGNOSIS — M15.9 PRIMARY OSTEOARTHRITIS INVOLVING MULTIPLE JOINTS: ICD-10-CM

## 2018-03-02 DIAGNOSIS — I10 ESSENTIAL HYPERTENSION: Primary | ICD-10-CM

## 2018-03-02 RX ORDER — BLOOD-GLUCOSE METER
KIT MISCELLANEOUS
Qty: 100 STRIP | Refills: 0 | Status: SHIPPED | OUTPATIENT
Start: 2018-03-02 | End: 2018-04-02 | Stop reason: SDUPTHER

## 2018-03-09 ENCOUNTER — OFFICE VISIT (OUTPATIENT)
Dept: PULMONOLOGY | Age: 58
End: 2018-03-09
Payer: COMMERCIAL

## 2018-03-09 VITALS
TEMPERATURE: 97.7 F | BODY MASS INDEX: 45.99 KG/M2 | RESPIRATION RATE: 14 BRPM | OXYGEN SATURATION: 97 % | SYSTOLIC BLOOD PRESSURE: 143 MMHG | WEIGHT: 293 LBS | HEIGHT: 67 IN | HEART RATE: 68 BPM | DIASTOLIC BLOOD PRESSURE: 81 MMHG

## 2018-03-09 DIAGNOSIS — E66.9 OBESITY (BMI 35.0-39.9 WITHOUT COMORBIDITY): ICD-10-CM

## 2018-03-09 DIAGNOSIS — J45.20 MILD INTERMITTENT ASTHMA, UNSPECIFIED WHETHER COMPLICATED: ICD-10-CM

## 2018-03-09 DIAGNOSIS — I10 ESSENTIAL HYPERTENSION: ICD-10-CM

## 2018-03-09 DIAGNOSIS — G47.33 OSA ON CPAP: Primary | ICD-10-CM

## 2018-03-09 DIAGNOSIS — Z99.89 OSA ON CPAP: Primary | ICD-10-CM

## 2018-03-09 DIAGNOSIS — H54.7 BLINDNESS: ICD-10-CM

## 2018-03-09 PROCEDURE — 3017F COLORECTAL CA SCREEN DOC REV: CPT | Performed by: INTERNAL MEDICINE

## 2018-03-09 PROCEDURE — 3014F SCREEN MAMMO DOC REV: CPT | Performed by: INTERNAL MEDICINE

## 2018-03-09 PROCEDURE — 99214 OFFICE O/P EST MOD 30 MIN: CPT | Performed by: INTERNAL MEDICINE

## 2018-03-09 PROCEDURE — G8417 CALC BMI ABV UP PARAM F/U: HCPCS | Performed by: INTERNAL MEDICINE

## 2018-03-09 PROCEDURE — G8482 FLU IMMUNIZE ORDER/ADMIN: HCPCS | Performed by: INTERNAL MEDICINE

## 2018-03-09 PROCEDURE — 1036F TOBACCO NON-USER: CPT | Performed by: INTERNAL MEDICINE

## 2018-03-09 PROCEDURE — G8427 DOCREV CUR MEDS BY ELIG CLIN: HCPCS | Performed by: INTERNAL MEDICINE

## 2018-03-09 ASSESSMENT — SLEEP AND FATIGUE QUESTIONNAIRES
HOW LIKELY ARE YOU TO NOD OFF OR FALL ASLEEP WHILE SITTING AND READING: 2
HOW LIKELY ARE YOU TO NOD OFF OR FALL ASLEEP IN A CAR, WHILE STOPPED FOR A FEW MINUTES IN TRAFFIC: 0
HOW LIKELY ARE YOU TO NOD OFF OR FALL ASLEEP WHEN YOU ARE A PASSENGER IN A CAR FOR AN HOUR WITHOUT A BREAK: 3
HOW LIKELY ARE YOU TO NOD OFF OR FALL ASLEEP WHILE SITTING AND TALKING TO SOMEONE: 0
HOW LIKELY ARE YOU TO NOD OFF OR FALL ASLEEP WHILE SITTING INACTIVE IN A PUBLIC PLACE: 2
HOW LIKELY ARE YOU TO NOD OFF OR FALL ASLEEP WHILE LYING DOWN TO REST IN THE AFTERNOON WHEN CIRCUMSTANCES PERMIT: 3
ESS TOTAL SCORE: 14
HOW LIKELY ARE YOU TO NOD OFF OR FALL ASLEEP WHILE SITTING QUIETLY AFTER LUNCH WITHOUT ALCOHOL: 2
HOW LIKELY ARE YOU TO NOD OFF OR FALL ASLEEP WHILE WATCHING TV: 2

## 2018-03-09 NOTE — PROGRESS NOTES
Date(s) Administered    Influenza Virus Vaccine 10/18/2012, 10/22/2013, 09/28/2015    Influenza Whole 09/24/2010    Influenza, Orlando Regalado, 3 Years and older, IM 10/04/2016, 10/27/2017    Pneumococcal Polysaccharide ([id number]) 12/08/2008    Tdap (Boostrix, Adacel) 06/01/2017        Pneumococcal Vaccine     [x] Up to date    [] Indicated   [] Refused  [] Contraindicated       Influenza Vaccine   [x] Up to date    [] Indicated   [] Refused  [] Contraindicated     REVIEW OF SYSTEMS:Systolic conductor for all other system including upper and lower extremities and no additional information was obtained   Review of Systems -  General ROS: negative for - chills, fatigue, fever or weight loss  ENT ROS: negative for - headaches, oral lesions or sore throat  Cardiovascular ROS: no chest pain , orthopnea or pnd   Gastrointestinal ROS: no abdominal pain, change in bowel habits, or black or bloody stools  Skin - no rash   Neuro - no blurry vision , no loc .  No focal weakness   msk - no jt tenderness or swelling    Vascular - no claudication , rest completed and negative   Lymphatic - complete and negative   Hematology - oncology - complete and negative   Allergy immunology - complete and negative    no burning or hematuria   Upper Extremities  Lower Extremities      Current Outpatient Prescriptions   Medication Sig Dispense Refill    FREESTYLE LITE strip USE AS DIRECTED THREE OR FOUR TIMES DAILY 100 strip 0    VENTOLIN  (90 Base) MCG/ACT inhaler INHALE 2 PUFFS INTO THE LUNGS EVERY SIX HOURS AS NEEDED 18 g 0    calcium carbonate-vitamin D (CALTRATE) 600-400 MG-UNIT TABS per tab TAKE 1 TABLET BY MOUTH TWICE A DAY 60 tablet 0    Alcohol Swabs (B-D SINGLE USE SWABS REGULAR) PADS USE AS DIRECTED DAILY 100 each 0    MAPAP 500 MG tablet TAKE 2 TABLETS BY MOUTH EVERY 6 HOURS AS NEEDED FOR PAIN 120 tablet 0    cyclobenzaprine (FLEXERIL) 5 MG tablet TAKE 1 TABLET AT BEDTIME AS NEEDED FOR MUSCLE SPASMS 30 tablet 0    ACCU-CHEK MULTICLIX LANCETS MISC USE AS DIRECTED 102 each 0    Multiple Vitamins-Minerals (CERTAVITE/ANTIOXIDANTS) TABS TAKE 1 TABLET BY MOUTH DAILY 30 tablet 0    fluticasone (FLONASE) 50 MCG/ACT nasal spray USE ONE SPRAY IN EACH NOSTRIL TWICE A DAY 32 Bottle 0    FREESTYLE LITE strip USE AS DIRECTED THREE OR FOUR TIMES DAILY 100 strip 0    lovastatin (MEVACOR) 10 MG tablet TAKE 1 TABLET BY MOUTH AT BEDTIME 30 tablet 3    loratadine (CLARITIN) 10 MG tablet TAKE 1 TABLET DAILY 30 tablet 3    pantoprazole (PROTONIX) 40 MG tablet Take 1 tablet by mouth every morning (before breakfast) 30 tablet 3    Misc.  Devices (WALKER) MISC 1 each by Does not apply route daily Heavy duty >300lbs    Loss of balance   Difficulty in ambulation 1 each 0    Blood Pressure Monitoring (BLOOD PRESSURE KIT) KIT 1 kit by Does not apply route daily 1 kit 0    Alcohol Swabs (B-D SINGLE USE SWABS REGULAR) PADS USE AS DIRECTED DAILY 100 each 0    Accu-Chek Multiclix Lancets MISC USE AS DIRECTED 102 each 0    levothyroxine (SYNTHROID) 100 MCG tablet Take 1 tablet by mouth Daily (Patient taking differently: Take 100 mcg by mouth Daily ) 30 tablet 5    Spacer/Aero-Holding Chambers (OPTICHAMBER RIMA) MISC USE AS DIRECTED WITH INHALERS 1 each 0    Compression Stockings MISC by Does not apply route 20-30 mmHg  Wear it daily as instructed 1 each 0    Blood Glucose Calibration (FREESTYLE CONTROL SOLUTION) LIQD USE AS DIRECTED 1 each 3    aspirin 325 MG EC tablet TAKE 1 TABLET DAILY 30 tablet 3    Elastic Bandages & Supports (MEDICAL COMPRESSION STOCKINGS) MISC 1 each by Does not apply route daily as needed (daily as needed) 20-30 mmHG Ready to wear Knee High Compression Stockings # of 3 pairs 3 each 0    Incontinence Supplies MISC DX: Urinary Incontinence 100 each 3    aluminum & magnesium hydroxide-simethicone (MAALOX ADVANCED) 200-200-20 MG/5ML SUSP suspension Take 5 mLs by mouth as needed for Indigestion        No current facility-administered medications for this visit. Allergies   Allergen Reactions    Latex Rash     blisters    Amlodipine Other (See Comments)     Facial numbness  Facial numbness  Facial numbness  Facial numbness    Brimonidine Itching     Burning eyes severe    Lisinopril Nausea Only and Nausea And Vomiting     Other reaction(s): Hypotension    Metoprolol      Other reaction(s): Dizziness    Shellfish-Derived Products Anaphylaxis and Rash     shrimp    Statins      Other reaction(s): muscle cramps    Fosamax [Alendronate] Nausea Only and Nausea And Vomiting    Alendronate Sodium     Alphagan [Brimonidine Tartrate]      Eye irritation    Daypro [Oxaprozin]     Dilaudid [Hydromorphone Hcl]     Ibuprofen      \"rips up stomach\", black tarry stool    Naproxen     Nsaids     Pepcid Complete [Famotidine-Ca Carb-Mag Hydrox] Hives and Other (See Comments)     blisters    Pilocarpine      Blurred vision    Shrimp Flavor Hives and Swelling    Statins Support Therapy      Tolerates lovastatin.  Pravachol caused numbness in head/face    Sulfa Antibiotics     Tetracyclines & Related     Timoptic [Timolol Maleate]      Eye irritation      Timoptic [Timolol Maleate]     Tolectin [Tolmetin]     Voltaren [Diclofenac Sodium]      Flu symptoms      Bextra [Valdecoxib] Rash    Nubain [Nalbuphine Hcl] Nausea And Vomiting     Chest pain      Percocet [Oxycodone-Acetaminophen] Nausea And Vomiting     Sweating, chest pain    Tolectin [Tolmetin Sodium] Rash    Ultram [Tramadol Hcl] Itching and Rash     Rash on face    Vicodin [Hydrocodone-Acetaminophen] Nausea And Vomiting     swearing    Vioxx Rash       Immunization History   Administered Date(s) Administered    Influenza Virus Vaccine 10/18/2012, 10/22/2013, 09/28/2015    Influenza Whole 09/24/2010    Influenza, Quadv, 3 Years and older, IM 10/04/2016, 10/27/2017    Pneumococcal Polysaccharide ([id number]) 12/08/2008    Tdap (Boostrix, Adacel)

## 2018-03-12 DIAGNOSIS — M51.36 DEGENERATIVE DISC DISEASE, LUMBAR: ICD-10-CM

## 2018-03-12 DIAGNOSIS — E55.9 VITAMIN D DEFICIENCY: ICD-10-CM

## 2018-03-12 DIAGNOSIS — G62.9 POLYNEUROPATHY: ICD-10-CM

## 2018-03-12 DIAGNOSIS — M47.896 OTHER OSTEOARTHRITIS OF SPINE, LUMBAR REGION: ICD-10-CM

## 2018-03-12 DIAGNOSIS — R26.89 LOSS OF BALANCE: ICD-10-CM

## 2018-03-12 NOTE — TELEPHONE ENCOUNTER
Next Visit Date:  Future Appointments  Date Time Provider Fabby Juarez   4/2/2018 1:15 PM [facility]letier DPM [facility]   4/27/2018 9:00 AM [facility]   6/6/2018 10:00 AM SCHEDULE, P [facility]   6/15/2018 10:45 AM Nitza Rob MD Resp Spec [facility]   11/15/2018 11:10 AM Kerri Jordan MD [facility]       Health Maintenance   Topic Date Due    HIV screen  03/09/1975    Shingles Vaccine (1 of 2 - 2 Dose Series) 03/09/2010    Diabetic retinal exam  07/08/2016    Lipid screen  06/16/2018    TSH testing  06/16/2018    Diabetic foot exam  09/07/2018    A1C test (Diabetic or Prediabetic)  09/07/2018    Diabetic microalbuminuria test  09/07/2018    Breast cancer screen  12/04/2019    Colon cancer screen colonoscopy  04/16/2023    DTaP/Tdap/Td vaccine (2 - Td) 06/01/2027    Flu vaccine  Completed    Pneumococcal med risk  Completed    Hepatitis C screen  Completed       Hemoglobin A1C (%)   Date Value   09/07/2017 5.3   06/19/2015 5.0   04/27/2015 5.2             ( goal A1C is < 7)   Microalb/Crt.  Ratio (mcg/mg creat)   Date Value   05/07/2013 2     LDL Cholesterol (mg/dL)   Date Value   05/07/2013 78     LDL Calculated (mg/dL)   Date Value   06/16/2017 68       (goal LDL is <100)   AST (U/L)   Date Value   05/13/2015 18     ALT (U/L)   Date Value   05/13/2015 23     BUN (mg/dL)   Date Value   03/30/2017 12     BP Readings from Last 3 Encounters:   03/09/18 (!) 143/81   01/26/18 129/61   12/11/17 133/80          (goal 120/80)    All Future Testing planned in [facility]  Lab Frequency Next Occurrence   Vitamin D 25 Hydroxy Once 06/01/2018   Vitamin B12 & Folate Once 02/28/2018   HIV Screen Once 08/38/8761   Basic Metabolic Panel Once 80/83/8079   CBC With Auto Differential Once 07/26/2018               Patient Active Problem List:     Glaucoma     GERD (gastroesophageal reflux disease)     Hyperlipidemia     HTN (hypertension)     DJD

## 2018-03-12 NOTE — TELEPHONE ENCOUNTER
Sidney Nunez from [facility] is calling again , she said she has been waiting 10 days for these orders. Can you please place these orders ASAP.

## 2018-03-13 NOTE — TELEPHONE ENCOUNTER
Sidney Nunez from [facility] is calling to check the status of these orders.

## 2018-03-14 RX ORDER — ISOPROPYL ALCOHOL 0.75 G/1
SWAB TOPICAL
Qty: 100 EACH | Refills: 0 | Status: SHIPPED | OUTPATIENT
Start: 2018-03-14 | End: 2018-04-05 | Stop reason: SDUPTHER

## 2018-03-14 RX ORDER — CYCLOBENZAPRINE HCL 5 MG
TABLET ORAL
Qty: 30 TABLET | Refills: 0 | Status: SHIPPED | OUTPATIENT
Start: 2018-03-14 | End: 2018-04-05 | Stop reason: SDUPTHER

## 2018-03-14 RX ORDER — LANCETS
EACH MISCELLANEOUS
Qty: 102 EACH | Refills: 0 | Status: SHIPPED | OUTPATIENT
Start: 2018-03-14 | End: 2018-04-05 | Stop reason: SDUPTHER

## 2018-03-14 RX ORDER — ACETAMINOPHEN 500 MG
1000 TABLET ORAL EVERY 6 HOURS PRN
Qty: 120 TABLET | Refills: 0 | Status: SHIPPED | OUTPATIENT
Start: 2018-03-14 | End: 2018-04-05 | Stop reason: SDUPTHER

## 2018-03-14 RX ORDER — FLUTICASONE PROPIONATE 50 MCG
SPRAY, SUSPENSION (ML) NASAL
Qty: 32 BOTTLE | Refills: 0 | Status: SHIPPED | OUTPATIENT
Start: 2018-03-14 | End: 2018-05-09 | Stop reason: SDUPTHER

## 2018-03-14 RX ORDER — ADHESIVE BANDAGE 3/4"
1 BANDAGE TOPICAL DAILY
Qty: 1 EACH | Refills: 0 | Status: SHIPPED | OUTPATIENT
Start: 2018-03-14 | End: 2018-06-15

## 2018-03-14 RX ORDER — FOLIC ACID/MV,IRON,MIN/LUTEIN 0.4-18-25
TABLET ORAL
Qty: 30 TABLET | Refills: 0 | Status: SHIPPED | OUTPATIENT
Start: 2018-03-14 | End: 2018-04-05 | Stop reason: SDUPTHER

## 2018-03-19 NOTE — TELEPHONE ENCOUNTER
The prescription needs to be printed for BP Cuff , thank you. Abdoul Valle will address this tomorrow with  while she in office.

## 2018-03-20 DIAGNOSIS — M15.9 PRIMARY OSTEOARTHRITIS INVOLVING MULTIPLE JOINTS: Primary | ICD-10-CM

## 2018-03-28 LAB — STATUS: NORMAL

## 2018-04-02 ENCOUNTER — TELEPHONE (OUTPATIENT)
Dept: FAMILY MEDICINE CLINIC | Age: 58
End: 2018-04-02

## 2018-04-02 ENCOUNTER — OFFICE VISIT (OUTPATIENT)
Dept: PODIATRY | Age: 58
End: 2018-04-02
Payer: COMMERCIAL

## 2018-04-02 VITALS
HEIGHT: 67 IN | SYSTOLIC BLOOD PRESSURE: 137 MMHG | BODY MASS INDEX: 45.99 KG/M2 | WEIGHT: 293 LBS | HEART RATE: 80 BPM | DIASTOLIC BLOOD PRESSURE: 77 MMHG

## 2018-04-02 DIAGNOSIS — M54.17 RADICULOPATHY OF LUMBOSACRAL REGION: ICD-10-CM

## 2018-04-02 DIAGNOSIS — B35.1 ONYCHOMYCOSIS: ICD-10-CM

## 2018-04-02 DIAGNOSIS — M79.675 PAIN DUE TO ONYCHOMYCOSIS OF TOENAILS OF BOTH FEET: ICD-10-CM

## 2018-04-02 DIAGNOSIS — M79.674 PAIN DUE TO ONYCHOMYCOSIS OF TOENAILS OF BOTH FEET: ICD-10-CM

## 2018-04-02 DIAGNOSIS — B35.1 PAIN DUE TO ONYCHOMYCOSIS OF TOENAILS OF BOTH FEET: ICD-10-CM

## 2018-04-02 DIAGNOSIS — E11.43 CONTROLLED TYPE 2 DIABETES MELLITUS WITH DIABETIC AUTONOMIC NEUROPATHY, WITHOUT LONG-TERM CURRENT USE OF INSULIN (HCC): Primary | ICD-10-CM

## 2018-04-02 PROCEDURE — 3046F HEMOGLOBIN A1C LEVEL >9.0%: CPT | Performed by: PODIATRIST

## 2018-04-02 PROCEDURE — 1036F TOBACCO NON-USER: CPT | Performed by: PODIATRIST

## 2018-04-02 PROCEDURE — 11721 DEBRIDE NAIL 6 OR MORE: CPT | Performed by: PODIATRIST

## 2018-04-02 PROCEDURE — 3017F COLORECTAL CA SCREEN DOC REV: CPT | Performed by: PODIATRIST

## 2018-04-02 PROCEDURE — 3014F SCREEN MAMMO DOC REV: CPT | Performed by: PODIATRIST

## 2018-04-02 PROCEDURE — 99213 OFFICE O/P EST LOW 20 MIN: CPT | Performed by: PODIATRIST

## 2018-04-02 PROCEDURE — G8417 CALC BMI ABV UP PARAM F/U: HCPCS | Performed by: PODIATRIST

## 2018-04-03 RX ORDER — BLOOD-GLUCOSE METER
KIT MISCELLANEOUS
Qty: 100 STRIP | Refills: 0 | Status: SHIPPED | OUTPATIENT
Start: 2018-04-03 | End: 2018-04-26 | Stop reason: SDUPTHER

## 2018-04-05 DIAGNOSIS — M51.36 DEGENERATIVE DISC DISEASE, LUMBAR: ICD-10-CM

## 2018-04-05 DIAGNOSIS — G62.9 POLYNEUROPATHY: ICD-10-CM

## 2018-04-05 DIAGNOSIS — M47.896 OTHER OSTEOARTHRITIS OF SPINE, LUMBAR REGION: ICD-10-CM

## 2018-04-05 RX ORDER — ACETAMINOPHEN 500 MG
1000 TABLET ORAL EVERY 6 HOURS PRN
Qty: 120 TABLET | Refills: 0 | Status: SHIPPED | OUTPATIENT
Start: 2018-04-05 | End: 2018-05-09 | Stop reason: SDUPTHER

## 2018-04-05 RX ORDER — ISOPROPYL ALCOHOL 0.75 G/1
SWAB TOPICAL
Qty: 100 EACH | Refills: 0 | Status: SHIPPED | OUTPATIENT
Start: 2018-04-05 | End: 2018-05-09 | Stop reason: SDUPTHER

## 2018-04-05 RX ORDER — FOLIC ACID/MV,IRON,MIN/LUTEIN 0.4-18-25
TABLET ORAL
Qty: 30 TABLET | Refills: 0 | Status: SHIPPED | OUTPATIENT
Start: 2018-04-05 | End: 2018-05-09 | Stop reason: SDUPTHER

## 2018-04-05 RX ORDER — CYCLOBENZAPRINE HCL 5 MG
TABLET ORAL
Qty: 30 TABLET | Refills: 0 | Status: SHIPPED | OUTPATIENT
Start: 2018-04-05 | End: 2018-05-09 | Stop reason: SDUPTHER

## 2018-04-05 RX ORDER — LANCETS
EACH MISCELLANEOUS
Qty: 102 EACH | Refills: 0 | Status: SHIPPED | OUTPATIENT
Start: 2018-04-05 | End: 2018-05-09 | Stop reason: SDUPTHER

## 2018-04-11 ENCOUNTER — TELEPHONE (OUTPATIENT)
Dept: GASTROENTEROLOGY | Age: 58
End: 2018-04-11

## 2018-04-11 DIAGNOSIS — K21.00 GASTROESOPHAGEAL REFLUX DISEASE WITH ESOPHAGITIS: ICD-10-CM

## 2018-04-11 RX ORDER — PANTOPRAZOLE SODIUM 40 MG/1
40 TABLET, DELAYED RELEASE ORAL
Qty: 30 TABLET | Refills: 3 | Status: SHIPPED | OUTPATIENT
Start: 2018-04-11 | End: 2018-07-10 | Stop reason: SDUPTHER

## 2018-04-18 DIAGNOSIS — R26.89 LOSS OF BALANCE: ICD-10-CM

## 2018-04-18 DIAGNOSIS — E55.9 VITAMIN D DEFICIENCY: ICD-10-CM

## 2018-04-27 ENCOUNTER — HOSPITAL ENCOUNTER (OUTPATIENT)
Age: 58
Setting detail: SPECIMEN
Discharge: HOME | End: 2018-04-27
Payer: COMMERCIAL

## 2018-04-27 ENCOUNTER — OFFICE VISIT (OUTPATIENT)
Dept: FAMILY MEDICINE CLINIC | Age: 58
End: 2018-04-27
Payer: COMMERCIAL

## 2018-04-27 VITALS
DIASTOLIC BLOOD PRESSURE: 83 MMHG | HEIGHT: 67 IN | HEART RATE: 75 BPM | SYSTOLIC BLOOD PRESSURE: 132 MMHG | BODY MASS INDEX: 45.99 KG/M2 | TEMPERATURE: 97.5 F | WEIGHT: 293 LBS

## 2018-04-27 DIAGNOSIS — M15.9 PRIMARY OSTEOARTHRITIS INVOLVING MULTIPLE JOINTS: ICD-10-CM

## 2018-04-27 DIAGNOSIS — Z00.00 HEALTH CARE MAINTENANCE: ICD-10-CM

## 2018-04-27 DIAGNOSIS — I10 ESSENTIAL HYPERTENSION: ICD-10-CM

## 2018-04-27 DIAGNOSIS — I10 ESSENTIAL HYPERTENSION: Primary | ICD-10-CM

## 2018-04-27 LAB
ABSOLUTE EOS #: 0.16 K/UL (ref 0–0.44)
ABSOLUTE IMMATURE GRANULOCYTE: 0.03 K/UL (ref 0–0.3)
ABSOLUTE LYMPH #: 2.57 K/UL (ref 1.1–3.7)
ABSOLUTE MONO #: 0.5 K/UL (ref 0.1–1.2)
ANION GAP SERPL CALCULATED.3IONS-SCNC: 10 MMOL/L (ref 9–17)
BASOPHILS # BLD: 1 % (ref 0–2)
BASOPHILS ABSOLUTE: 0.04 K/UL (ref 0–0.2)
BUN BLDV-MCNC: 13 MG/DL (ref 6–20)
BUN/CREAT BLD: ABNORMAL (ref 9–20)
CALCIUM SERPL-MCNC: 9.4 MG/DL (ref 8.6–10.4)
CHLORIDE BLD-SCNC: 105 MMOL/L (ref 98–107)
CO2: 30 MMOL/L (ref 20–31)
CREAT SERPL-MCNC: 0.69 MG/DL (ref 0.5–0.9)
DIFFERENTIAL TYPE: NORMAL
EOSINOPHILS RELATIVE PERCENT: 2 % (ref 1–4)
GFR AFRICAN AMERICAN: >60 ML/MIN
GFR NON-AFRICAN AMERICAN: >60 ML/MIN
GFR SERPL CREATININE-BSD FRML MDRD: ABNORMAL ML/MIN/{1.73_M2}
GFR SERPL CREATININE-BSD FRML MDRD: ABNORMAL ML/MIN/{1.73_M2}
GLUCOSE BLD-MCNC: 94 MG/DL (ref 70–99)
HCT VFR BLD CALC: 44.6 % (ref 36.3–47.1)
HEMOGLOBIN: 14.6 G/DL (ref 11.9–15.1)
HIV AG/AB: NONREACTIVE
IMMATURE GRANULOCYTES: 0 %
LYMPHOCYTES # BLD: 30 % (ref 24–43)
MCH RBC QN AUTO: 30.7 PG (ref 25.2–33.5)
MCHC RBC AUTO-ENTMCNC: 32.7 G/DL (ref 28.4–34.8)
MCV RBC AUTO: 93.7 FL (ref 82.6–102.9)
MONOCYTES # BLD: 6 % (ref 3–12)
NRBC AUTOMATED: 0 PER 100 WBC
PDW BLD-RTO: 12.1 % (ref 11.8–14.4)
PLATELET # BLD: 257 K/UL (ref 138–453)
PLATELET ESTIMATE: NORMAL
PMV BLD AUTO: 10.9 FL (ref 8.1–13.5)
POTASSIUM SERPL-SCNC: 4.5 MMOL/L (ref 3.7–5.3)
RBC # BLD: 4.76 M/UL (ref 3.95–5.11)
RBC # BLD: NORMAL 10*6/UL
SEG NEUTROPHILS: 61 % (ref 36–65)
SEGMENTED NEUTROPHILS ABSOLUTE COUNT: 5.16 K/UL (ref 1.5–8.1)
SODIUM BLD-SCNC: 145 MMOL/L (ref 135–144)
WBC # BLD: 8.5 K/UL (ref 3.5–11.3)
WBC # BLD: NORMAL 10*3/UL

## 2018-04-27 PROCEDURE — 1036F TOBACCO NON-USER: CPT | Performed by: FAMILY MEDICINE

## 2018-04-27 PROCEDURE — 99213 OFFICE O/P EST LOW 20 MIN: CPT | Performed by: FAMILY MEDICINE

## 2018-04-27 PROCEDURE — 99213 OFFICE O/P EST LOW 20 MIN: CPT

## 2018-04-27 PROCEDURE — 3017F COLORECTAL CA SCREEN DOC REV: CPT | Performed by: FAMILY MEDICINE

## 2018-04-27 PROCEDURE — G8427 DOCREV CUR MEDS BY ELIG CLIN: HCPCS | Performed by: FAMILY MEDICINE

## 2018-04-27 PROCEDURE — G8417 CALC BMI ABV UP PARAM F/U: HCPCS | Performed by: FAMILY MEDICINE

## 2018-04-27 RX ORDER — LIDOCAINE 50 MG/G
1 PATCH TOPICAL DAILY
Qty: 30 PATCH | Refills: 0 | Status: SHIPPED | OUTPATIENT
Start: 2018-04-27 | End: 2018-05-21 | Stop reason: SDUPTHER

## 2018-04-27 ASSESSMENT — ENCOUNTER SYMPTOMS
SHORTNESS OF BREATH: 0
PHOTOPHOBIA: 0
BLURRED VISION: 0
COUGH: 0
WHEEZING: 0
ORTHOPNEA: 0

## 2018-04-28 ENCOUNTER — APPOINTMENT (OUTPATIENT)
Dept: GENERAL RADIOLOGY | Age: 58
End: 2018-04-28
Payer: COMMERCIAL

## 2018-04-28 ENCOUNTER — HOSPITAL ENCOUNTER (EMERGENCY)
Age: 58
Discharge: HOME | End: 2018-04-28
Attending: EMERGENCY MEDICINE
Payer: COMMERCIAL

## 2018-04-28 VITALS
SYSTOLIC BLOOD PRESSURE: 154 MMHG | TEMPERATURE: 97.3 F | RESPIRATION RATE: 16 BRPM | HEART RATE: 88 BPM | WEIGHT: 293 LBS | BODY MASS INDEX: 46.99 KG/M2 | OXYGEN SATURATION: 99 % | DIASTOLIC BLOOD PRESSURE: 96 MMHG

## 2018-04-28 DIAGNOSIS — R13.10 DYSPHAGIA, UNSPECIFIED TYPE: Primary | ICD-10-CM

## 2018-04-28 PROCEDURE — 99284 EMERGENCY DEPT VISIT MOD MDM: CPT

## 2018-04-28 PROCEDURE — 70360 X-RAY EXAM OF NECK: CPT

## 2018-04-28 ASSESSMENT — ENCOUNTER SYMPTOMS
RHINORRHEA: 0
TROUBLE SWALLOWING: 1
ABDOMINAL PAIN: 0
SHORTNESS OF BREATH: 0

## 2018-04-30 ENCOUNTER — TELEPHONE (OUTPATIENT)
Dept: GASTROENTEROLOGY | Age: 58
End: 2018-04-30

## 2018-05-09 ENCOUNTER — TELEPHONE (OUTPATIENT)
Dept: ADMINISTRATIVE | Age: 58
End: 2018-05-09

## 2018-05-09 DIAGNOSIS — G62.9 POLYNEUROPATHY: ICD-10-CM

## 2018-05-09 DIAGNOSIS — M47.896 OTHER OSTEOARTHRITIS OF SPINE, LUMBAR REGION: ICD-10-CM

## 2018-05-09 DIAGNOSIS — M51.36 DEGENERATIVE DISC DISEASE, LUMBAR: ICD-10-CM

## 2018-05-09 RX ORDER — ISOPROPYL ALCOHOL 0.75 G/1
SWAB TOPICAL
Qty: 100 EACH | Refills: 0 | Status: SHIPPED | OUTPATIENT
Start: 2018-05-09 | End: 2018-06-06 | Stop reason: SDUPTHER

## 2018-05-09 RX ORDER — LANCETS
EACH MISCELLANEOUS
Qty: 102 EACH | Refills: 0 | Status: SHIPPED | OUTPATIENT
Start: 2018-05-09 | End: 2018-06-06 | Stop reason: SDUPTHER

## 2018-05-09 RX ORDER — ACETAMINOPHEN 500 MG
1000 TABLET ORAL EVERY 6 HOURS PRN
Qty: 120 TABLET | Refills: 0 | Status: SHIPPED | OUTPATIENT
Start: 2018-05-09 | End: 2018-06-06 | Stop reason: SDUPTHER

## 2018-05-09 RX ORDER — FOLIC ACID/MV,IRON,MIN/LUTEIN 0.4-18-25
TABLET ORAL
Qty: 30 TABLET | Refills: 0 | Status: SHIPPED | OUTPATIENT
Start: 2018-05-09 | End: 2018-06-06 | Stop reason: SDUPTHER

## 2018-05-09 RX ORDER — CYCLOBENZAPRINE HCL 5 MG
TABLET ORAL
Qty: 30 TABLET | Refills: 0 | Status: SHIPPED | OUTPATIENT
Start: 2018-05-09 | End: 2018-06-06 | Stop reason: SDUPTHER

## 2018-05-09 RX ORDER — FLUTICASONE PROPIONATE 50 MCG
SPRAY, SUSPENSION (ML) NASAL
Qty: 32 BOTTLE | Refills: 0 | Status: SHIPPED | OUTPATIENT
Start: 2018-05-09 | End: 2018-07-03 | Stop reason: SDUPTHER

## 2018-05-18 DIAGNOSIS — E55.9 VITAMIN D DEFICIENCY: ICD-10-CM

## 2018-05-18 DIAGNOSIS — R26.89 LOSS OF BALANCE: ICD-10-CM

## 2018-05-21 DIAGNOSIS — Z76.0 MEDICATION REFILL: ICD-10-CM

## 2018-05-21 DIAGNOSIS — M15.9 PRIMARY OSTEOARTHRITIS INVOLVING MULTIPLE JOINTS: ICD-10-CM

## 2018-05-22 RX ORDER — LIDOCAINE 50 MG/G
1 PATCH TOPICAL DAILY
Qty: 30 PATCH | Refills: 0 | Status: SHIPPED | OUTPATIENT
Start: 2018-05-22 | End: 2018-06-19 | Stop reason: SDUPTHER

## 2018-05-22 RX ORDER — BLOOD-GLUCOSE METER
KIT MISCELLANEOUS
Qty: 100 STRIP | Refills: 0 | Status: SHIPPED | OUTPATIENT
Start: 2018-05-22 | End: 2018-05-30 | Stop reason: SDUPTHER

## 2018-05-22 RX ORDER — ASPIRIN 325 MG
TABLET, DELAYED RELEASE (ENTERIC COATED) ORAL
Qty: 30 TABLET | Refills: 0 | Status: SHIPPED | OUTPATIENT
Start: 2018-05-22 | End: 2018-06-15 | Stop reason: SDUPTHER

## 2018-05-24 ENCOUNTER — HOSPITAL ENCOUNTER (OUTPATIENT)
Age: 58
Setting detail: OUTPATIENT SURGERY
Discharge: HOME | End: 2018-05-24
Attending: INTERNAL MEDICINE | Admitting: INTERNAL MEDICINE
Payer: COMMERCIAL

## 2018-05-24 ENCOUNTER — ANESTHESIA EVENT (OUTPATIENT)
Dept: ENDOSCOPY | Age: 58
End: 2018-05-24
Payer: COMMERCIAL

## 2018-05-24 ENCOUNTER — ANESTHESIA (OUTPATIENT)
Dept: ENDOSCOPY | Age: 58
End: 2018-05-24
Payer: COMMERCIAL

## 2018-05-24 VITALS
SYSTOLIC BLOOD PRESSURE: 126 MMHG | OXYGEN SATURATION: 98 % | DIASTOLIC BLOOD PRESSURE: 71 MMHG | RESPIRATION RATE: 21 BRPM

## 2018-05-24 VITALS
TEMPERATURE: 97.6 F | HEART RATE: 79 BPM | RESPIRATION RATE: 25 BRPM | OXYGEN SATURATION: 100 % | DIASTOLIC BLOOD PRESSURE: 83 MMHG | SYSTOLIC BLOOD PRESSURE: 131 MMHG

## 2018-05-24 LAB — GLUCOSE BLD-MCNC: 104 MG/DL (ref 65–105)

## 2018-05-24 PROCEDURE — 2500000003 HC RX 250 WO HCPCS: Performed by: NURSE ANESTHETIST, CERTIFIED REGISTERED

## 2018-05-24 PROCEDURE — 3609012700 HC EGD DILATION SAVORY: Performed by: INTERNAL MEDICINE

## 2018-05-24 PROCEDURE — 2580000003 HC RX 258: Performed by: NURSE ANESTHETIST, CERTIFIED REGISTERED

## 2018-05-24 PROCEDURE — 7100000011 HC PHASE II RECOVERY - ADDTL 15 MIN: Performed by: INTERNAL MEDICINE

## 2018-05-24 PROCEDURE — 6360000002 HC RX W HCPCS: Performed by: NURSE ANESTHETIST, CERTIFIED REGISTERED

## 2018-05-24 PROCEDURE — 7100000010 HC PHASE II RECOVERY - FIRST 15 MIN: Performed by: INTERNAL MEDICINE

## 2018-05-24 PROCEDURE — 3700000001 HC ADD 15 MINUTES (ANESTHESIA): Performed by: INTERNAL MEDICINE

## 2018-05-24 PROCEDURE — 82947 ASSAY GLUCOSE BLOOD QUANT: CPT

## 2018-05-24 PROCEDURE — 3700000000 HC ANESTHESIA ATTENDED CARE: Performed by: INTERNAL MEDICINE

## 2018-05-24 RX ORDER — LIDOCAINE HYDROCHLORIDE 10 MG/ML
INJECTION, SOLUTION INFILTRATION; PERINEURAL PRN
Status: DISCONTINUED | OUTPATIENT
Start: 2018-05-24 | End: 2018-05-24 | Stop reason: SDUPTHER

## 2018-05-24 RX ORDER — PROPOFOL 10 MG/ML
INJECTION, EMULSION INTRAVENOUS PRN
Status: DISCONTINUED | OUTPATIENT
Start: 2018-05-24 | End: 2018-05-24 | Stop reason: SDUPTHER

## 2018-05-24 RX ORDER — GLYCOPYRROLATE 1 MG/5 ML
SYRINGE (ML) INTRAVENOUS PRN
Status: DISCONTINUED | OUTPATIENT
Start: 2018-05-24 | End: 2018-05-24 | Stop reason: SDUPTHER

## 2018-05-24 RX ORDER — SODIUM CHLORIDE 9 MG/ML
INJECTION, SOLUTION INTRAVENOUS CONTINUOUS PRN
Status: DISCONTINUED | OUTPATIENT
Start: 2018-05-24 | End: 2018-05-24 | Stop reason: SDUPTHER

## 2018-05-24 RX ADMIN — PROPOFOL 20 MG: 10 INJECTION, EMULSION INTRAVENOUS at 10:24

## 2018-05-24 RX ADMIN — PROPOFOL 20 MG: 10 INJECTION, EMULSION INTRAVENOUS at 10:28

## 2018-05-24 RX ADMIN — PROPOFOL 20 MG: 10 INJECTION, EMULSION INTRAVENOUS at 10:30

## 2018-05-24 RX ADMIN — Medication 0.2 MG: at 10:22

## 2018-05-24 RX ADMIN — LIDOCAINE HYDROCHLORIDE 50 MG: 10 INJECTION, SOLUTION INFILTRATION; PERINEURAL at 10:22

## 2018-05-24 RX ADMIN — PROPOFOL 80 MG: 10 INJECTION, EMULSION INTRAVENOUS at 10:22

## 2018-05-24 RX ADMIN — PROPOFOL 20 MG: 10 INJECTION, EMULSION INTRAVENOUS at 10:27

## 2018-05-24 RX ADMIN — PHENYLEPHRINE HYDROCHLORIDE 200 MCG: 10 INJECTION INTRAVENOUS at 10:26

## 2018-05-24 RX ADMIN — SODIUM CHLORIDE: 9 INJECTION, SOLUTION INTRAVENOUS at 10:19

## 2018-05-24 ASSESSMENT — PAIN DESCRIPTION - ONSET: ONSET: ON-GOING

## 2018-05-24 ASSESSMENT — PAIN - FUNCTIONAL ASSESSMENT: PAIN_FUNCTIONAL_ASSESSMENT: 0-10

## 2018-05-24 ASSESSMENT — PAIN SCALES - GENERAL
PAINLEVEL_OUTOF10: 4
PAINLEVEL_OUTOF10: 3

## 2018-05-24 ASSESSMENT — PAIN DESCRIPTION - DESCRIPTORS
DESCRIPTORS: ACHING
DESCRIPTORS: ACHING
DESCRIPTORS: BURNING;ACHING;DULL

## 2018-05-24 ASSESSMENT — PAIN DESCRIPTION - LOCATION
LOCATION: ABDOMEN
LOCATION: ABDOMEN

## 2018-05-24 ASSESSMENT — PAIN DESCRIPTION - PAIN TYPE
TYPE: ACUTE PAIN
TYPE: ACUTE PAIN

## 2018-05-24 ASSESSMENT — ENCOUNTER SYMPTOMS
SHORTNESS OF BREATH: 0
STRIDOR: 0

## 2018-05-24 ASSESSMENT — PAIN DESCRIPTION - FREQUENCY: FREQUENCY: INTERMITTENT

## 2018-05-30 RX ORDER — BLOOD-GLUCOSE METER
KIT MISCELLANEOUS
Qty: 100 STRIP | Refills: 0 | Status: SHIPPED | OUTPATIENT
Start: 2018-05-30 | End: 2018-06-15

## 2018-06-06 ENCOUNTER — OFFICE VISIT (OUTPATIENT)
Dept: GASTROENTEROLOGY | Age: 58
End: 2018-06-06
Payer: COMMERCIAL

## 2018-06-06 VITALS
SYSTOLIC BLOOD PRESSURE: 151 MMHG | WEIGHT: 293 LBS | HEIGHT: 67 IN | DIASTOLIC BLOOD PRESSURE: 78 MMHG | BODY MASS INDEX: 45.99 KG/M2 | HEART RATE: 73 BPM

## 2018-06-06 DIAGNOSIS — R13.19 ESOPHAGEAL DYSPHAGIA: Primary | ICD-10-CM

## 2018-06-06 DIAGNOSIS — G62.9 POLYNEUROPATHY: ICD-10-CM

## 2018-06-06 DIAGNOSIS — M47.896 OTHER OSTEOARTHRITIS OF SPINE, LUMBAR REGION: ICD-10-CM

## 2018-06-06 DIAGNOSIS — M51.36 DEGENERATIVE DISC DISEASE, LUMBAR: ICD-10-CM

## 2018-06-06 PROCEDURE — 99214 OFFICE O/P EST MOD 30 MIN: CPT | Performed by: INTERNAL MEDICINE

## 2018-06-06 RX ORDER — SUCRALFATE ORAL 1 G/10ML
1 SUSPENSION ORAL 4 TIMES DAILY
Qty: 400 ML | Refills: 0 | Status: ON HOLD | OUTPATIENT
Start: 2018-06-06 | End: 2019-03-06

## 2018-06-07 RX ORDER — CYCLOBENZAPRINE HCL 5 MG
TABLET ORAL
Qty: 30 TABLET | Refills: 0 | Status: SHIPPED | OUTPATIENT
Start: 2018-06-07 | End: 2020-01-02

## 2018-06-07 RX ORDER — LANCETS
EACH MISCELLANEOUS
Qty: 102 EACH | Refills: 0 | Status: SHIPPED | OUTPATIENT
Start: 2018-06-07 | End: 2018-06-15

## 2018-06-07 RX ORDER — BLOOD-GLUCOSE CONTROL, NORMAL
EACH MISCELLANEOUS
Qty: 1 EACH | Refills: 0 | Status: SHIPPED | OUTPATIENT
Start: 2018-06-07 | End: 2018-11-05 | Stop reason: ALTCHOICE

## 2018-06-07 RX ORDER — FOLIC ACID/MV,IRON,MIN/LUTEIN 0.4-18-25
TABLET ORAL
Qty: 30 TABLET | Refills: 0 | Status: SHIPPED | OUTPATIENT
Start: 2018-06-07 | End: 2018-09-26 | Stop reason: SDUPTHER

## 2018-06-07 RX ORDER — ISOPROPYL ALCOHOL 0.75 G/1
SWAB TOPICAL
Qty: 100 EACH | Refills: 0 | Status: SHIPPED | OUTPATIENT
Start: 2018-06-07 | End: 2018-06-15

## 2018-06-07 RX ORDER — ACETAMINOPHEN 500 MG
1000 TABLET ORAL EVERY 6 HOURS PRN
Qty: 120 TABLET | Refills: 0 | Status: SHIPPED | OUTPATIENT
Start: 2018-06-07 | End: 2018-07-03 | Stop reason: SDUPTHER

## 2018-06-15 ENCOUNTER — OFFICE VISIT (OUTPATIENT)
Dept: PULMONOLOGY | Age: 58
End: 2018-06-15
Payer: COMMERCIAL

## 2018-06-15 VITALS
BODY MASS INDEX: 45.99 KG/M2 | WEIGHT: 293 LBS | SYSTOLIC BLOOD PRESSURE: 136 MMHG | TEMPERATURE: 97.1 F | DIASTOLIC BLOOD PRESSURE: 86 MMHG | HEIGHT: 67 IN | OXYGEN SATURATION: 98 % | RESPIRATION RATE: 16 BRPM | HEART RATE: 64 BPM

## 2018-06-15 DIAGNOSIS — K21.9 GASTROESOPHAGEAL REFLUX DISEASE WITHOUT ESOPHAGITIS: ICD-10-CM

## 2018-06-15 DIAGNOSIS — E11.49 CONTROLLED TYPE 2 DIABETES MELLITUS WITH OTHER NEUROLOGIC COMPLICATION, WITHOUT LONG-TERM CURRENT USE OF INSULIN (HCC): ICD-10-CM

## 2018-06-15 DIAGNOSIS — G47.33 OSA ON CPAP: Primary | ICD-10-CM

## 2018-06-15 DIAGNOSIS — R55 SYNCOPE, UNSPECIFIED SYNCOPE TYPE: ICD-10-CM

## 2018-06-15 DIAGNOSIS — J45.20 MILD INTERMITTENT ASTHMA WITHOUT COMPLICATION: ICD-10-CM

## 2018-06-15 DIAGNOSIS — E03.8 OTHER SPECIFIED HYPOTHYROIDISM: ICD-10-CM

## 2018-06-15 DIAGNOSIS — I10 ESSENTIAL HYPERTENSION: ICD-10-CM

## 2018-06-15 DIAGNOSIS — E66.9 OBESITY (BMI 35.0-39.9 WITHOUT COMORBIDITY): ICD-10-CM

## 2018-06-15 DIAGNOSIS — Z99.89 OSA ON CPAP: Primary | ICD-10-CM

## 2018-06-15 PROCEDURE — 2022F DILAT RTA XM EVC RTNOPTHY: CPT | Performed by: INTERNAL MEDICINE

## 2018-06-15 PROCEDURE — 3046F HEMOGLOBIN A1C LEVEL >9.0%: CPT | Performed by: INTERNAL MEDICINE

## 2018-06-15 PROCEDURE — 1036F TOBACCO NON-USER: CPT | Performed by: INTERNAL MEDICINE

## 2018-06-15 PROCEDURE — 3017F COLORECTAL CA SCREEN DOC REV: CPT | Performed by: INTERNAL MEDICINE

## 2018-06-15 PROCEDURE — G8427 DOCREV CUR MEDS BY ELIG CLIN: HCPCS | Performed by: INTERNAL MEDICINE

## 2018-06-15 PROCEDURE — G8417 CALC BMI ABV UP PARAM F/U: HCPCS | Performed by: INTERNAL MEDICINE

## 2018-06-15 PROCEDURE — 99214 OFFICE O/P EST MOD 30 MIN: CPT | Performed by: INTERNAL MEDICINE

## 2018-06-15 ASSESSMENT — SLEEP AND FATIGUE QUESTIONNAIRES
ESS TOTAL SCORE: 7
HOW LIKELY ARE YOU TO NOD OFF OR FALL ASLEEP WHILE SITTING AND TALKING TO SOMEONE: 0
HOW LIKELY ARE YOU TO NOD OFF OR FALL ASLEEP WHILE SITTING INACTIVE IN A PUBLIC PLACE: 0
HOW LIKELY ARE YOU TO NOD OFF OR FALL ASLEEP WHILE LYING DOWN TO REST IN THE AFTERNOON WHEN CIRCUMSTANCES PERMIT: 3
HOW LIKELY ARE YOU TO NOD OFF OR FALL ASLEEP WHILE WATCHING TV: 1
HOW LIKELY ARE YOU TO NOD OFF OR FALL ASLEEP IN A CAR, WHILE STOPPED FOR A FEW MINUTES IN TRAFFIC: 0
HOW LIKELY ARE YOU TO NOD OFF OR FALL ASLEEP WHILE SITTING QUIETLY AFTER LUNCH WITHOUT ALCOHOL: 0
HOW LIKELY ARE YOU TO NOD OFF OR FALL ASLEEP WHILE SITTING AND READING: 2
HOW LIKELY ARE YOU TO NOD OFF OR FALL ASLEEP WHEN YOU ARE A PASSENGER IN A CAR FOR AN HOUR WITHOUT A BREAK: 1

## 2018-06-19 DIAGNOSIS — Z76.0 MEDICATION REFILL: ICD-10-CM

## 2018-06-19 DIAGNOSIS — R26.89 LOSS OF BALANCE: ICD-10-CM

## 2018-06-19 DIAGNOSIS — E55.9 VITAMIN D DEFICIENCY: ICD-10-CM

## 2018-06-19 DIAGNOSIS — M15.9 PRIMARY OSTEOARTHRITIS INVOLVING MULTIPLE JOINTS: ICD-10-CM

## 2018-06-19 RX ORDER — LIDOCAINE 50 MG/G
1 PATCH TOPICAL DAILY
Qty: 30 PATCH | Refills: 0 | Status: SHIPPED | OUTPATIENT
Start: 2018-06-19 | End: 2018-07-17 | Stop reason: SDUPTHER

## 2018-06-19 RX ORDER — ASPIRIN 325 MG
TABLET, DELAYED RELEASE (ENTERIC COATED) ORAL
Qty: 30 TABLET | Refills: 0 | Status: SHIPPED | OUTPATIENT
Start: 2018-06-19 | End: 2018-07-17 | Stop reason: SDUPTHER

## 2018-07-05 ENCOUNTER — TELEPHONE (OUTPATIENT)
Dept: FAMILY MEDICINE CLINIC | Age: 58
End: 2018-07-05

## 2018-07-05 NOTE — TELEPHONE ENCOUNTER
Called patient to let her know that Dr Adriano Hahn would like her to be seen for further refills of her flexeril and to let her know that the Dr said that she was allergic to some components in her multivitamin. She stated that she has been taking them with no issues.  She has an appointment already scheduled for 07/10/2018

## 2018-07-10 ENCOUNTER — OFFICE VISIT (OUTPATIENT)
Dept: FAMILY MEDICINE CLINIC | Age: 58
End: 2018-07-10
Payer: COMMERCIAL

## 2018-07-10 VITALS
HEART RATE: 75 BPM | WEIGHT: 293 LBS | BODY MASS INDEX: 45.99 KG/M2 | TEMPERATURE: 98.5 F | DIASTOLIC BLOOD PRESSURE: 74 MMHG | HEIGHT: 67 IN | SYSTOLIC BLOOD PRESSURE: 135 MMHG

## 2018-07-10 DIAGNOSIS — Z23 NEED FOR PROPHYLACTIC VACCINATION AND INOCULATION AGAINST VARICELLA: ICD-10-CM

## 2018-07-10 DIAGNOSIS — Z76.0 MEDICATION REFILL: ICD-10-CM

## 2018-07-10 DIAGNOSIS — Z13.220 SCREENING FOR HYPERLIPIDEMIA: ICD-10-CM

## 2018-07-10 DIAGNOSIS — K21.00 GASTROESOPHAGEAL REFLUX DISEASE WITH ESOPHAGITIS: ICD-10-CM

## 2018-07-10 DIAGNOSIS — E03.9 HYPOTHYROIDISM, UNSPECIFIED TYPE: ICD-10-CM

## 2018-07-10 LAB — HBA1C MFR BLD: 5.4 %

## 2018-07-10 PROCEDURE — G8417 CALC BMI ABV UP PARAM F/U: HCPCS | Performed by: STUDENT IN AN ORGANIZED HEALTH CARE EDUCATION/TRAINING PROGRAM

## 2018-07-10 PROCEDURE — 1036F TOBACCO NON-USER: CPT | Performed by: STUDENT IN AN ORGANIZED HEALTH CARE EDUCATION/TRAINING PROGRAM

## 2018-07-10 PROCEDURE — 99213 OFFICE O/P EST LOW 20 MIN: CPT | Performed by: STUDENT IN AN ORGANIZED HEALTH CARE EDUCATION/TRAINING PROGRAM

## 2018-07-10 PROCEDURE — 83036 HEMOGLOBIN GLYCOSYLATED A1C: CPT | Performed by: STUDENT IN AN ORGANIZED HEALTH CARE EDUCATION/TRAINING PROGRAM

## 2018-07-10 PROCEDURE — 3017F COLORECTAL CA SCREEN DOC REV: CPT | Performed by: STUDENT IN AN ORGANIZED HEALTH CARE EDUCATION/TRAINING PROGRAM

## 2018-07-10 PROCEDURE — G8427 DOCREV CUR MEDS BY ELIG CLIN: HCPCS | Performed by: STUDENT IN AN ORGANIZED HEALTH CARE EDUCATION/TRAINING PROGRAM

## 2018-07-10 RX ORDER — ALBUTEROL SULFATE 90 UG/1
AEROSOL, METERED RESPIRATORY (INHALATION)
Qty: 18 G | Refills: 3 | Status: SHIPPED | OUTPATIENT
Start: 2018-07-10 | End: 2018-11-28 | Stop reason: SDUPTHER

## 2018-07-10 RX ORDER — LOVASTATIN 10 MG/1
TABLET ORAL
Qty: 30 TABLET | Refills: 3 | Status: SHIPPED | OUTPATIENT
Start: 2018-07-10 | End: 2018-09-12 | Stop reason: SDUPTHER

## 2018-07-10 RX ORDER — FOLIC ACID/MV,IRON,MIN/LUTEIN 0.4-18-25
TABLET ORAL
Qty: 30 TABLET | Refills: 0 | Status: SHIPPED | OUTPATIENT
Start: 2018-07-10 | End: 2018-08-09 | Stop reason: SDUPTHER

## 2018-07-10 RX ORDER — PANTOPRAZOLE SODIUM 40 MG/1
40 TABLET, DELAYED RELEASE ORAL
Qty: 30 TABLET | Refills: 3 | Status: SHIPPED | OUTPATIENT
Start: 2018-07-10 | End: 2018-11-28 | Stop reason: SDUPTHER

## 2018-07-10 RX ORDER — LEVOTHYROXINE SODIUM 0.1 MG/1
100 TABLET ORAL DAILY
Qty: 30 TABLET | Refills: 0 | Status: SHIPPED | OUTPATIENT
Start: 2018-07-10 | End: 2018-11-05 | Stop reason: SDUPTHER

## 2018-07-10 RX ORDER — FLUTICASONE PROPIONATE 50 MCG
SPRAY, SUSPENSION (ML) NASAL
Qty: 32 BOTTLE | Refills: 0 | Status: SHIPPED | OUTPATIENT
Start: 2018-07-10 | End: 2018-10-29 | Stop reason: SDUPTHER

## 2018-07-10 RX ORDER — ACETAMINOPHEN 500 MG
1000 TABLET ORAL EVERY 6 HOURS PRN
Qty: 120 TABLET | Refills: 0 | Status: CANCELLED | OUTPATIENT
Start: 2018-07-10

## 2018-07-10 RX ORDER — LORATADINE 10 MG/1
TABLET ORAL
Qty: 30 TABLET | Refills: 3 | Status: SHIPPED | OUTPATIENT
Start: 2018-07-10 | End: 2019-05-25 | Stop reason: SDUPTHER

## 2018-07-10 ASSESSMENT — ENCOUNTER SYMPTOMS
NAUSEA: 0
SHORTNESS OF BREATH: 0
RHINORRHEA: 0
ABDOMINAL PAIN: 0
SORE THROAT: 0
VOMITING: 0
EYE PAIN: 0
COUGH: 0
PHOTOPHOBIA: 0
WHEEZING: 0

## 2018-07-10 NOTE — PATIENT INSTRUCTIONS
Thank you for letting us take care of you today. We hope all your questions were addressed. If a question was overlooked or something else comes to mind after you return home, please contact a member of your Care Team listed below. Please make sure you have a routine office visit set up to follow-up on [address]. Your Care Team at [place] [id number] is Team #3  Delfina Zhao MD (Faculty)  Andreea Traore MD (Faculty  Luis A Ortiz MD (Resident)  Cammie Jaramillo MD (Resident)   Bruce Wang MD (Resident)  Shakir Aguilar MD (Resident)  Db Montenegro MD (Resident)  Ronna Skiff, LPN Gaylin Petit., HARRIS Oliver., Carlos Og ([facility])  Edi Nguyen RN, ([id number] [facility])  Mary Kay Calderón, Ph.D., (Behavioral Services)  Chang Kasper, [address] [place] [address] (Clinical Pharmacist)     Office phone number: [phone]    If you need to get in right away due to illness, please be advised we have \"Same Day\" appointments available Monday-Friday. Please call us at [phone] option #3 to schedule your \"Same Day\" appointment.

## 2018-07-10 NOTE — PROGRESS NOTES
Attending Physician Statement  I have discussed the care of Agnieszka Dwyer, including pertinent history and exam findings,  with the resident. I have reviewed the key elements of all parts of the encounter with the resident. I agree with the assessment, plan and orders as documented by the resident. (Evelena Schirmer) - Abby Horvath M.D  Vitals:    07/10/18 0930   BP: 135/74   Pulse: 75   Temp: 98.5 °F (36.9 °C)     1. Screening for hyperlipidemia    2. Need for prophylactic vaccination and inoculation against varicella    3. Degenerative disc disease, lumbar    4. Medication refill    5. Gastroesophageal reflux disease with esophagitis    6. Hypothyroidism, unspecified type    delete pre diabetes diagnosis.   Stop antihistaminics if not needed
testing  06/16/2018    Lipid screen  06/16/2018    Flu vaccine (1) 09/01/2018    Diabetic foot exam  09/07/2018    A1C test (Diabetic or Prediabetic)  09/07/2018    Diabetic microalbuminuria test  09/07/2018    Breast cancer screen  12/04/2019    Colon cancer screen colonoscopy  04/16/2023    DTaP/Tdap/Td vaccine (2 - Td) 06/01/2027    Pneumococcal med risk  Completed    Hepatitis C screen  Completed    HIV screen  Completed
Reason    fluticasone (FLONASE) 50 MCG/ACT nasal spray REORDER    VENTOLIN  (90 Base) MCG/ACT inhaler REORDER    lovastatin (MEVACOR) 10 MG tablet REORDER    loratadine (CLARITIN) 10 MG tablet REORDER    pantoprazole (PROTONIX) 40 MG tablet REORDER    levothyroxine (SYNTHROID) 100 MCG tablet REORDER       Kaci received counseling on the following healthy behaviors: nutrition, exercise and medication adherence    Discussed use, benefit, and side effects of prescribed medications. Barriers to medication compliance addressed. All patient questions answered. Pt voiced understanding.

## 2018-07-17 DIAGNOSIS — Z76.0 MEDICATION REFILL: ICD-10-CM

## 2018-07-17 DIAGNOSIS — R26.89 LOSS OF BALANCE: ICD-10-CM

## 2018-07-17 DIAGNOSIS — E55.9 VITAMIN D DEFICIENCY: ICD-10-CM

## 2018-07-17 DIAGNOSIS — M15.9 PRIMARY OSTEOARTHRITIS INVOLVING MULTIPLE JOINTS: ICD-10-CM

## 2018-07-17 RX ORDER — LIDOCAINE 50 MG/G
1 PATCH TOPICAL DAILY
Qty: 30 PATCH | Refills: 0 | Status: SHIPPED | OUTPATIENT
Start: 2018-07-17 | End: 2018-08-15 | Stop reason: SDUPTHER

## 2018-07-17 RX ORDER — ASPIRIN 325 MG
TABLET, DELAYED RELEASE (ENTERIC COATED) ORAL
Qty: 30 TABLET | Refills: 0 | Status: SHIPPED | OUTPATIENT
Start: 2018-07-17 | End: 2018-08-15 | Stop reason: SDUPTHER

## 2018-07-17 NOTE — TELEPHONE ENCOUNTER
Hyperlipidemia     HTN (hypertension)     DJD (degenerative joint disease)     Obesity     Hypothyroidism     Polyneuropathy (HCC)     Migraine     HIGH CHOLESTEROL     Mitral prolapse     Low back pain     CTS (carpal tunnel syndrome)     Supraspinatus syndrome     Impaired fasting glucose     Acute sinus infection     IBS (irritable bowel syndrome)     Back pain, chronic     Stress fracture, right 5th metatarsal      Upper airway cough syndrome     Ear fullness     Perioral numbness     Screening for osteoporosis     Headache     Left eye injury     Medication reaction     Osteopenia     Dyslipidemia     Lumbosacral pain     Needs flu shot     Hypothyroid     Need for hepatitis C screening test     Urinary incontinence     Anxiety     Sleep apnea     Depression     Chronic back pain     Carpal tunnel syndrome     Neuropathy ([address]     Mitral valve problem     Morbid obesity with BMI of 45.0-49.9, adult (HCC)     Frozen shoulder     Screening for breast cancer     Skin tag     Degenerative disc disease, lumbar     Bilateral edema of lower extremity     Medication refill     Varicose vein of leg     Healthcare maintenance     Dizziness

## 2018-07-23 ENCOUNTER — OFFICE VISIT (OUTPATIENT)
Dept: PODIATRY | Age: 58
End: 2018-07-23
Payer: COMMERCIAL

## 2018-07-23 VITALS
BODY MASS INDEX: 45.99 KG/M2 | SYSTOLIC BLOOD PRESSURE: 117 MMHG | HEIGHT: 67 IN | HEART RATE: 67 BPM | DIASTOLIC BLOOD PRESSURE: 74 MMHG | WEIGHT: 293 LBS

## 2018-07-23 DIAGNOSIS — E11.43 CONTROLLED TYPE 2 DIABETES MELLITUS WITH DIABETIC AUTONOMIC NEUROPATHY, WITHOUT LONG-TERM CURRENT USE OF INSULIN (HCC): Primary | ICD-10-CM

## 2018-07-23 DIAGNOSIS — B35.1 PAIN DUE TO ONYCHOMYCOSIS OF TOENAILS OF BOTH FEET: ICD-10-CM

## 2018-07-23 DIAGNOSIS — M79.674 PAIN DUE TO ONYCHOMYCOSIS OF TOENAILS OF BOTH FEET: ICD-10-CM

## 2018-07-23 DIAGNOSIS — B35.1 ONYCHOMYCOSIS: ICD-10-CM

## 2018-07-23 DIAGNOSIS — M79.675 PAIN DUE TO ONYCHOMYCOSIS OF TOENAILS OF BOTH FEET: ICD-10-CM

## 2018-07-23 DIAGNOSIS — M54.17 RADICULOPATHY OF LUMBOSACRAL REGION: ICD-10-CM

## 2018-07-23 PROCEDURE — 3017F COLORECTAL CA SCREEN DOC REV: CPT | Performed by: PODIATRIST

## 2018-07-23 PROCEDURE — G8417 CALC BMI ABV UP PARAM F/U: HCPCS | Performed by: PODIATRIST

## 2018-07-23 PROCEDURE — 3044F HG A1C LEVEL LT 7.0%: CPT | Performed by: PODIATRIST

## 2018-07-23 PROCEDURE — 1036F TOBACCO NON-USER: CPT | Performed by: PODIATRIST

## 2018-07-23 PROCEDURE — G8427 DOCREV CUR MEDS BY ELIG CLIN: HCPCS | Performed by: PODIATRIST

## 2018-07-23 PROCEDURE — 99212 OFFICE O/P EST SF 10 MIN: CPT | Performed by: PODIATRIST

## 2018-07-23 PROCEDURE — 2022F DILAT RTA XM EVC RTNOPTHY: CPT | Performed by: PODIATRIST

## 2018-07-23 PROCEDURE — 11721 DEBRIDE NAIL 6 OR MORE: CPT | Performed by: PODIATRIST

## 2018-07-23 NOTE — PROGRESS NOTES
last year     Blood Pressure  BP Readings from Last 3 Encounters:   07/23/18 117/74   07/10/18 135/74   06/15/18 136/86      []  If SBP >140 mmhg - refer to PCP for follow up   []  If DBP > 90 mmhg - refer to PCP for follow up   [] BP is controlled <140/90     Order labs as PCP ordered.   (ie: Lipids, A1C, CMP)

## 2018-07-23 NOTE — PROGRESS NOTES
NEEDED FOR PAIN 7/3/18  Yes Carlitos Gamble MD   Alcohol Swabs (B-D SINGLE USE SWABS REGULAR) PADS USE AS DIRECTED DAILY 7/3/18  Yes Carlitos Gamble MD   Blood Glucose Calibration (FREESTYLE CONTROL SOLUTION) LIQD USE AS DIRECTED 6/7/18  Yes Cata Luna MD   Multiple Vitamins-Minerals (CERTAVITE/ANTIOXIDANTS) TABS TAKE 1 TABLET BY MOUTH DAILY 6/7/18  Yes Cata Luna MD   cyclobenzaprine (FLEXERIL) 5 MG tablet TAKE 1 TABLET AT BEDTIME AS NEEDED FOR MUSCLE SPASMS 6/7/18  Yes Cata Luna MD   aspirin 325 MG EC tablet TAKE 1 TABLET DAILY 12/16/16  Yes Bravo Andrews MD   aluminum & magnesium hydroxide-simethicone (MAALOX ADVANCED) 200-200-20 MG/5ML SUSP suspension Take 5 mLs by mouth as needed for Indigestion    Yes Historical Provider, MD   sucralfate (CARAFATE) 1 GM/10ML suspension Take 10 mLs by mouth 4 times daily for 10 days 6/6/18 6/16/18  Dawson Savage MD     Scheduled Meds:  Continuous Infusions:  PRN Meds:    Allergies   Allergen Reactions    Latex Rash     blisters    Amlodipine Other (See Comments)     Facial numbness  Facial numbness  Facial numbness  Facial numbness    Brimonidine Itching     Burning eyes severe    Lisinopril Nausea Only and Nausea And Vomiting     Other reaction(s): Hypotension    Metoprolol      Other reaction(s): Dizziness    Shellfish-Derived Products Anaphylaxis and Rash     shrimp    Statins      Other reaction(s): muscle cramps    Fosamax [Alendronate] Nausea Only and Nausea And Vomiting    Alendronate Sodium     Alphagan [Brimonidine Tartrate]      Eye irritation    Daypro [Oxaprozin]     Dilaudid [Hydromorphone Hcl]     Ibuprofen      \"rips up stomach\", black tarry stool    Naproxen     Nsaids     Pepcid Complete [Famotidine-Ca Carb-Mag Hydrox] Hives and Other (See Comments)     blisters    Pilocarpine      Blurred vision    Shrimp Flavor Hives and Swelling    Statins Support Therapy      Tolerates lovastatin.  Pravachol caused numbness in head/face  Sulfa Antibiotics     Tetracyclines & Related     Timoptic [Timolol Maleate]      Eye irritation      Timoptic [Timolol Maleate]     Tolectin [Tolmetin]     Voltaren [Diclofenac Sodium]      Flu symptoms      Bextra [Valdecoxib] Rash    Nubain [Nalbuphine Hcl] Nausea And Vomiting     Chest pain      Percocet [Oxycodone-Acetaminophen] Nausea And Vomiting     Sweating, chest pain    Tolectin [Tolmetin Sodium] Rash    Ultram [Tramadol Hcl] Itching and Rash     Rash on face    Vicodin [Hydrocodone-Acetaminophen] Nausea And Vomiting     swearing    Vioxx Rash       Family History   Problem Relation Age of Onset    Diabetes Mother     Heart Disease Mother         multiple heart attacks    Arthritis Mother     High Blood Pressure Mother     High Cholesterol Mother     Substance Abuse Mother     Heart Disease Father     Arthritis Father     Depression Father     High Cholesterol Father     Mental Illness Father     Substance Abuse Father     Diabetes Sister     High Blood Pressure Sister     Cancer Paternal Uncle         esophageal cancer    Diabetes Maternal Aunt     Cancer Maternal Aunt         colorectal cancer    Diabetes Maternal Uncle     Cancer Maternal Grandmother         colorectal cancer    Arthritis Maternal Grandmother     Diabetes Maternal Grandmother     High Blood Pressure Maternal Grandmother     Stroke Maternal Grandmother     Arthritis Maternal Grandfather     Arthritis Paternal Grandmother     Cancer Paternal Grandmother     Depression Paternal Grandmother     Heart Disease Paternal Grandmother     High Blood Pressure Paternal Grandmother     High Cholesterol Paternal Grandmother     Mental Illness Paternal Grandmother     Arthritis Paternal Grandfather        Social History   Substance Use Topics    Smoking status: Never Smoker    Smokeless tobacco: Never Used    Alcohol use No       Review of Systems: All 12 systems reviewed and pertinent positives noted above. Lower Extremity Physical Examination:     Vitals:   Vitals:    07/23/18 1259   BP: 117/74   Pulse: 67     Gen: AAOx3, NAD     Vascular: DP pulses are palpable, bilateral. PT pulses not palpable, likely due to edema. CFT is brisk to all digits. Skin temp is warm to warm proximal to distal, bilateral. Non-pitting edema to leg, + varicosities noted, bilateral.     Neurologic:  SWMF on right 9/10, 7/10 on left.       Dermatologic: Nails 1-5 b/l are thickened, discolored and elongated. Webspaces 1-4 are c/d/i, bilateral. No open lesions or subcutaneous nodules noted, bilateral.     Musculoskeletal: Muscle strength 5/5 for all LE muscle groups. No gross deformity, Bilateral.      NCV/EMG: L-5 radiculopathy    Asessment: Patient is a 62 y.o. female with:    Diagnosis Orders   1. Controlled type 2 diabetes mellitus with diabetic autonomic neuropathy, without long-term current use of insulin ([facility])  26856 - [place] DEBRIDEMENT OF NAILS, 6 OR MORE     DIABETES FOOT EXAM   2. Onychomycosis  58163 - [place] DEBRIDEMENT OF NAILS, 6 OR MORE   3. Radiculopathy of lumbosacral region     4. Pain due to onychomycosis of toenails of both feet  76993 - [place] DEBRIDEMENT OF NAILS, 6 OR MORE       Plan:   Patient seen and evaluated. Current condition and treatment options discussed in detail. Debrided nails 1-5, bilateral in thickness and in length without incident.   Discussed gentle use of pumice stone on HPK of left foot    RTC in 3 months     Electronically signed by Mary Trejo DPM on 7/23/2018 at 1:16 PM

## 2018-07-25 RX ORDER — BLOOD-GLUCOSE METER
KIT MISCELLANEOUS
Qty: 100 STRIP | Refills: 0 | Status: SHIPPED | OUTPATIENT
Start: 2018-07-25 | End: 2018-08-15 | Stop reason: SDUPTHER

## 2018-07-27 DIAGNOSIS — G62.9 POLYNEUROPATHY: ICD-10-CM

## 2018-07-27 DIAGNOSIS — M51.36 DEGENERATIVE DISC DISEASE, LUMBAR: ICD-10-CM

## 2018-07-27 RX ORDER — PEN NEEDLE, DIABETIC 32GX 5/32"
NEEDLE, DISPOSABLE MISCELLANEOUS
Qty: 100 EACH | Refills: 0 | Status: SHIPPED | OUTPATIENT
Start: 2018-07-27 | End: 2018-08-24 | Stop reason: SDUPTHER

## 2018-07-27 RX ORDER — ACETAMINOPHEN 500 MG
1000 TABLET ORAL EVERY 6 HOURS PRN
Qty: 120 TABLET | Refills: 0 | Status: SHIPPED | OUTPATIENT
Start: 2018-07-27 | End: 2018-08-24 | Stop reason: SDUPTHER

## 2018-07-27 NOTE — TELEPHONE ENCOUNTER
Next Visit Date:  Future Appointments  Date Time Provider Fabby Juarez   9/12/2018 10:00 AM SCHEDULE, P [facility] ACC GI [facility]   10/4/2018 9:15 [facility]   10/29/2018 1:15 PM Korin Webster DPM [facility] Podiatry Arvie Bradshaw   11/15/2018 11:10 AM Theo Stuart MD [place] [facility]   12/10/2018 10:45 AM Tasneem Corrales MD Resp Spec [address] Maintenance   Topic Date Due    Shingles Vaccine (1 of 2 - 2 Dose Series) 03/09/2010    Diabetic retinal exam  07/08/2016    Lipid screen  06/16/2018    TSH testing  06/16/2018    Flu vaccine (1) 09/01/2018    Diabetic microalbuminuria test  09/07/2018    A1C test (Diabetic or Prediabetic)  07/10/2019    Diabetic foot exam  07/23/2019    Breast cancer screen  12/04/2019    Colon cancer screen colonoscopy  04/16/2023    DTaP/Tdap/Td vaccine (2 - Td) 06/01/2027    Pneumococcal med risk  Completed    Hepatitis C screen  Completed    HIV screen  Completed       Hemoglobin A1C (%)   Date Value   07/10/2018 5.4   09/07/2017 5.3   06/19/2015 5.0             ( goal A1C is < 7)   Microalb/Crt.  Ratio (mcg/mg creat)   Date Value   05/07/2013 2     LDL Cholesterol (mg/dL)   Date Value   05/07/2013 78   10/17/2012 86     LDL Calculated (mg/dL)   Date Value   06/16/2017 68   05/19/2014 104       (goal LDL is <100)   AST (U/L)   Date Value   05/13/2015 18     ALT (U/L)   Date Value   05/13/2015 23     BUN (mg/dL)   Date Value   04/27/2018 13     BP Readings from Last 3 Encounters:   07/23/18 117/74   07/10/18 135/74   06/15/18 136/86          (goal 120/80)    All Future Testing planned in [facility]  Lab Frequency Next Occurrence   Lipid, Fasting Once 08/09/2018   TSH Once 07/10/2019               Patient Active Problem List:     Glaucoma     GERD (gastroesophageal reflux disease)     Hyperlipidemia     HTN (hypertension)     DJD (degenerative joint disease)     Obesity     Hypothyroidism     Polyneuropathy (HCC)     Migraine     HIGH

## 2018-08-02 ENCOUNTER — TELEPHONE (OUTPATIENT)
Dept: ADMINISTRATIVE | Age: 58
End: 2018-08-02

## 2018-08-02 NOTE — TELEPHONE ENCOUNTER
Hello pt is calling to see if a nurse can please call her about her meds please, [phone] thanks writer.

## 2018-08-07 ENCOUNTER — TELEPHONE (OUTPATIENT)
Dept: ADMINISTRATIVE | Age: 58
End: 2018-08-07

## 2018-08-08 NOTE — TELEPHONE ENCOUNTER
Spoke with patient regarding her statin. Pt states that she has been feeling unwell with multiple forms of the generic. Pt has been on Mevacor since 2012. Reviewed pts chart, calculated her ASCVD score: 3.6%. Statin is not recommended. Pt to continue off of Mevacor for the time being. Will have fating lipid performed, which was ordered at her last visit. Will address at her next appointment. Thanks.

## 2018-08-09 DIAGNOSIS — Z76.0 MEDICATION REFILL: ICD-10-CM

## 2018-08-09 RX ORDER — FOLIC ACID/MV,IRON,MIN/LUTEIN 0.4-18-25
TABLET ORAL
Qty: 30 TABLET | Refills: 0 | Status: SHIPPED | OUTPATIENT
Start: 2018-08-09 | End: 2018-08-24 | Stop reason: SDUPTHER

## 2018-08-09 NOTE — TELEPHONE ENCOUNTER
Medication is on med list please review and address    Please address the medication refill and close the encounter. If I can be of assistance, please route to the applicable pool. Thank you. Last visit:N/A  Last Med refill:N/A    Next Visit Date:  Future Appointments  Date Time Provider Fabby Juarez   9/12/2018 10:00 AM [facility]EDU[facility]   10/4/2018 9:15 AM MD Monica Liz [facility]   10/29/2018 1:15 PM David Luna DPM [facility] Podiatry [address] [facility]   11/15/2018 11:10 AM Ana Lilia Wilson MD [place] [facility]   12/10/2018 10:45 AM Otoniel Alcantara MD Resp Spec [address] Maintenance   Topic Date Due    Shingles Vaccine (1 of 2 - 2 Dose Series) 03/09/2010    Diabetic retinal exam  07/08/2016    Lipid screen  06/16/2018    TSH testing  06/16/2018    Flu vaccine (1) 09/01/2018    Diabetic microalbuminuria test  09/07/2018    A1C test (Diabetic or Prediabetic)  07/10/2019    Diabetic foot exam  07/23/2019    Breast cancer screen  12/04/2019    Colon cancer screen colonoscopy  04/16/2023    DTaP/Tdap/Td vaccine (2 - Td) 06/01/2027    Pneumococcal med risk  Completed    Hepatitis C screen  Completed    HIV screen  Completed       Hemoglobin A1C (%)   Date Value   07/10/2018 5.4   09/07/2017 5.3   06/19/2015 5.0             ( goal A1C is < 7)   Microalb/Crt.  Ratio (mcg/mg creat)   Date Value   05/07/2013 2     LDL Cholesterol (mg/dL)   Date Value   05/07/2013 78   10/17/2012 86     LDL Calculated (mg/dL)   Date Value   06/16/2017 68   05/19/2014 104       (goal LDL is <100)   AST (U/L)   Date Value   05/13/2015 18     ALT (U/L)   Date Value   05/13/2015 23     BUN (mg/dL)   Date Value   04/27/2018 13     BP Readings from Last 3 Encounters:   07/23/18 117/74   07/10/18 135/74   06/15/18 136/86          (goal 120/80)    All Future Testing planned in [facility]  Lab Frequency Next Occurrence   Lipid, Fasting Once 08/09/2018   TSH Once 07/10/2019

## 2018-08-15 DIAGNOSIS — M15.9 PRIMARY OSTEOARTHRITIS INVOLVING MULTIPLE JOINTS: ICD-10-CM

## 2018-08-15 DIAGNOSIS — Z76.0 MEDICATION REFILL: ICD-10-CM

## 2018-08-15 DIAGNOSIS — R26.89 LOSS OF BALANCE: ICD-10-CM

## 2018-08-15 DIAGNOSIS — E55.9 VITAMIN D DEFICIENCY: ICD-10-CM

## 2018-08-16 RX ORDER — BLOOD-GLUCOSE METER
KIT MISCELLANEOUS
Qty: 100 STRIP | Refills: 0 | Status: SHIPPED | OUTPATIENT
Start: 2018-08-16 | End: 2018-09-12 | Stop reason: SDUPTHER

## 2018-08-16 RX ORDER — LIDOCAINE 50 MG/G
1 PATCH TOPICAL DAILY
Qty: 30 PATCH | Refills: 0 | Status: SHIPPED | OUTPATIENT
Start: 2018-08-16 | End: 2018-09-12 | Stop reason: SDUPTHER

## 2018-08-16 RX ORDER — ASPIRIN 325 MG
TABLET, DELAYED RELEASE (ENTERIC COATED) ORAL
Qty: 30 TABLET | Refills: 0 | Status: SHIPPED | OUTPATIENT
Start: 2018-08-16 | End: 2018-09-12 | Stop reason: SDUPTHER

## 2018-08-16 NOTE — TELEPHONE ENCOUNTER
Medication is on med list please review and address    Please address the medication refill and close the encounter. If I can be of assistance, please route to the applicable pool. Thank you. Last visit:NA  Last Med refill:N/A    Next Visit Date:  Future Appointments  Date Time Provider Fabby Juarez   9/12/2018 10:00 AM SCHEDULE, [facility] ACC GI CLINIC ACC GI [facility]   10/4/2018 9:15 AM [facility]   10/29/2018 1:15 PM Brian Cordova DPM [facility] Podiatry [address] [facility]   11/15/2018 11:10 AM Leila Morejon MD [place] [facility]   12/10/2018 10:45 AM Franklin Ortega MD Resp Spec [address] Maintenance   Topic Date Due    Shingles Vaccine (1 of 2 - 2 Dose Series) 03/09/2010    Diabetic retinal exam  07/08/2016    Lipid screen  06/16/2018    TSH testing  06/16/2018    Flu vaccine (1) 09/01/2018    Diabetic microalbuminuria test  09/07/2018    A1C test (Diabetic or Prediabetic)  07/10/2019    Diabetic foot exam  07/23/2019    Breast cancer screen  12/04/2019    Colon cancer screen colonoscopy  04/16/2023    DTaP/Tdap/Td vaccine (2 - Td) 06/01/2027    Pneumococcal med risk  Completed    Hepatitis C screen  Completed    HIV screen  Completed       Hemoglobin A1C (%)   Date Value   07/10/2018 5.4   09/07/2017 5.3   06/19/2015 5.0             ( goal A1C is < 7)   Microalb/Crt.  Ratio (mcg/mg creat)   Date Value   05/07/2013 2     LDL Cholesterol (mg/dL)   Date Value   05/07/2013 78   10/17/2012 86     LDL Calculated (mg/dL)   Date Value   06/16/2017 68   05/19/2014 104       (goal LDL is <100)   AST (U/L)   Date Value   05/13/2015 18     ALT (U/L)   Date Value   05/13/2015 23     BUN (mg/dL)   Date Value   04/27/2018 13     BP Readings from Last 3 Encounters:   07/23/18 117/74   07/10/18 135/74   06/15/18 136/86          (goal 120/80)    All Future Testing planned in [facility]  Lab Frequency Next Occurrence   Lipid, Fasting Once 08/09/2018   TSH Once 07/10/2019 Patient Active Problem List:     Glaucoma     GERD (gastroesophageal reflux disease)     Hyperlipidemia     HTN (hypertension)     DJD (degenerative joint disease)     Obesity     Hypothyroidism     Polyneuropathy     Migraine     HIGH CHOLESTEROL     Mitral prolapse     Low back pain     CTS (carpal tunnel syndrome)     Supraspinatus syndrome     Impaired fasting glucose     Acute sinus infection     IBS (irritable bowel syndrome)     Back pain, chronic     Stress fracture, right 5th metatarsal      Upper airway cough syndrome     Ear fullness     Perioral numbness     Screening for osteoporosis     Headache     Left eye injury     Medication reaction     Osteopenia     Dyslipidemia     Lumbosacral pain     Needs flu shot     Hypothyroid     Need for hepatitis C screening test     Urinary incontinence     Anxiety     Sleep apnea     Depression     Chronic back pain     Carpal tunnel syndrome     Neuropathy     Mitral valve problem     Morbid obesity with BMI of 45.0-49.9, adult (HCC)     Frozen shoulder     Screening for breast cancer     Skin tag     Degenerative disc disease, lumbar     Bilateral edema of lower extremity     Medication refill     Varicose vein of leg     Healthcare maintenance     Dizziness

## 2018-08-24 DIAGNOSIS — G62.9 POLYNEUROPATHY: ICD-10-CM

## 2018-08-24 DIAGNOSIS — M51.36 DEGENERATIVE DISC DISEASE, LUMBAR: ICD-10-CM

## 2018-08-24 DIAGNOSIS — Z76.0 MEDICATION REFILL: ICD-10-CM

## 2018-08-24 RX ORDER — ACETAMINOPHEN 500 MG
1000 TABLET ORAL EVERY 6 HOURS PRN
Qty: 120 TABLET | Refills: 0 | Status: SHIPPED | OUTPATIENT
Start: 2018-08-24 | End: 2018-09-12 | Stop reason: SDUPTHER

## 2018-08-24 RX ORDER — FOLIC ACID/MV,IRON,MIN/LUTEIN 0.4-18-25
TABLET ORAL
Qty: 30 TABLET | Refills: 0 | Status: SHIPPED | OUTPATIENT
Start: 2018-08-24 | End: 2018-09-26 | Stop reason: SDUPTHER

## 2018-08-24 RX ORDER — FLUTICASONE PROPIONATE 50 MCG
SPRAY, SUSPENSION (ML) NASAL
Qty: 32 BOTTLE | Refills: 0 | Status: SHIPPED | OUTPATIENT
Start: 2018-08-24 | End: 2018-10-29 | Stop reason: SDUPTHER

## 2018-08-24 RX ORDER — PEN NEEDLE, DIABETIC 32GX 5/32"
NEEDLE, DISPOSABLE MISCELLANEOUS
Qty: 100 EACH | Refills: 0 | Status: SHIPPED | OUTPATIENT
Start: 2018-08-24 | End: 2018-09-26 | Stop reason: SDUPTHER

## 2018-08-24 NOTE — TELEPHONE ENCOUNTER
(gastroesophageal reflux disease)     Hyperlipidemia     HTN (hypertension)     DJD (degenerative joint disease)     Obesity     Hypothyroidism     Polyneuropathy     Migraine     HIGH CHOLESTEROL     Mitral prolapse     Low back pain     CTS (carpal tunnel syndrome)     Supraspinatus syndrome     Impaired fasting glucose     Acute sinus infection     IBS (irritable bowel syndrome)     Back pain, chronic     Stress fracture, right 5th metatarsal      Upper airway cough syndrome     Ear fullness     Perioral numbness     Screening for osteoporosis     Headache     Left eye injury     Medication reaction     Osteopenia     Dyslipidemia     Lumbosacral pain     Needs flu shot     Hypothyroid     Need for hepatitis C screening test     Urinary incontinence     Anxiety     Sleep apnea     Depression     Chronic back pain     Carpal tunnel syndrome     Neuropathy     Mitral valve problem     Morbid obesity with BMI of 45.0-49.9, adult (HCC)     Frozen shoulder     Screening for breast cancer     Skin tag     Degenerative disc disease, lumbar     Bilateral edema of lower extremity     Medication refill     Varicose vein of leg     Healthcare maintenance     Dizziness     Please address the medication refill and close the encounter. If I can be of assistance, please route to the applicable pool. Thank you.

## 2018-08-24 NOTE — TELEPHONE ENCOUNTER
Last visit:07/10/2018  Last Med refill:07/27/2018    Next Visit Date:  Future Appointments  Date Time Provider Department Center   9/12/2018 10:00 AM [facility]   10/4/2018 9:15 AM [facility]   10/29/2018 1:15 PM Liliana Ruiz DPM [facility] Podiatry T[facility]   11/15/2018 11:10 AM Jany Wayne MD [place] [facility]   12/10/2018 10:45 AM Kamran Wilson MD Resp Spec [address] [facility]   12/11/2018 12:30 PM [facility] MAMMOGRAPHY ROOM AT [facility] Rad       Health Maintenance   Topic Date Due    Shingles Vaccine (1 of 2 - 2 Dose Series) 03/09/2010    Diabetic retinal exam  07/08/2016    Lipid screen  06/16/2018    TSH testing  06/16/2018    Diabetic microalbuminuria test  09/07/2018    Flu vaccine (1) 09/01/2018    A1C test (Diabetic or Prediabetic)  07/10/2019    Diabetic foot exam  07/23/2019    Breast cancer screen  12/04/2019    Colon cancer screen colonoscopy  04/16/2023    DTaP/Tdap/Td vaccine (2 - Td) 06/01/2027    Pneumococcal med risk  Completed    Hepatitis C screen  Completed    HIV screen  Completed       Hemoglobin A1C (%)   Date Value   07/10/2018 5.4   09/07/2017 5.3   06/19/2015 5.0             ( goal A1C is < 7)   Microalb/Crt.  Ratio (mcg/mg creat)   Date Value   05/07/2013 2     LDL Cholesterol (mg/dL)   Date Value   05/07/2013 78   10/17/2012 86     LDL Calculated (mg/dL)   Date Value   06/16/2017 68   05/19/2014 104       (goal LDL is <100)   AST (U/L)   Date Value   05/13/2015 18     ALT (U/L)   Date Value   05/13/2015 23     BUN (mg/dL)   Date Value   04/27/2018 13     BP Readings from Last 3 Encounters:   07/23/18 117/74   07/10/18 135/74   06/15/18 136/86          (goal 120/80)    All Future Testing planned in [facility]  Lab Frequency Next Occurrence   Lipid, Fasting Once 10/10/2018   TSH Once 07/10/2019   ADELINA DIGITAL SCREENING SELF REFERRAL BILATERAL Once 08/22/2018               Patient Active Problem List:     Glaucoma     GERD

## 2018-08-27 RX ORDER — LANCETS
EACH MISCELLANEOUS
Qty: 102 EACH | Refills: 0 | Status: SHIPPED | OUTPATIENT
Start: 2018-08-27 | End: 2018-09-25 | Stop reason: SDUPTHER

## 2018-08-27 NOTE — TELEPHONE ENCOUNTER
PT also needs a new order for a blood glucose machine    Item is listed on med list please review and address    Please address the medication refill and close the encounter. If I can be of assistance, please route to the applicable pool. Thank you. Last visit:n/a  Last Med refill:n/a    Next Visit Date:  Future Appointments  Date Time Provider Fabby Juarez   9/12/2018 10:00 AM SCHEDULE, [facility] ACC GI CLINIC ACC GI [facility]   10/4/2018 9:15 AM [facility]   10/29/2018 1:15 PM Grayson Ramirez DPM [address] [facility]   11/15/2018 11:10 AM [facility]   12/10/2018 10:45 AM Viry Zayas MD Resp Spec TOLP   12/11/2018 12:30 PM [facility] MAMMOGRAPHY ROOM AT [facility] Rad       Health Maintenance   Topic Date Due    Shingles Vaccine (1 of 2 - 2 Dose Series) 03/09/2010    Diabetic retinal exam  07/08/2016    Lipid screen  06/16/2018    TSH testing  06/16/2018    Diabetic microalbuminuria test  09/07/2018    Flu vaccine (1) 09/01/2018    A1C test (Diabetic or Prediabetic)  07/10/2019    Diabetic foot exam  07/23/2019    Breast cancer screen  12/04/2019    Colon cancer screen colonoscopy  04/16/2023    DTaP/Tdap/Td vaccine (2 - Td) 06/01/2027    Pneumococcal med risk  Completed    Hepatitis C screen  Completed    HIV screen  Completed       Hemoglobin A1C (%)   Date Value   07/10/2018 5.4   09/07/2017 5.3   06/19/2015 5.0             ( goal A1C is < 7)   Microalb/Crt.  Ratio (mcg/mg creat)   Date Value   05/07/2013 2     LDL Cholesterol (mg/dL)   Date Value   05/07/2013 78   10/17/2012 86     LDL Calculated (mg/dL)   Date Value   06/16/2017 68   05/19/2014 104       (goal LDL is <100)   AST (U/L)   Date Value   05/13/2015 18     ALT (U/L)   Date Value   05/13/2015 23     BUN (mg/dL)   Date Value   04/27/2018 13     BP Readings from Last 3 Encounters:   07/23/18 117/74   07/10/18 135/74   06/15/18 136/86          (goal 120/80)    All Future

## 2018-09-12 ENCOUNTER — OFFICE VISIT (OUTPATIENT)
Dept: GASTROENTEROLOGY | Age: 58
End: 2018-09-12
Payer: COMMERCIAL

## 2018-09-12 VITALS
HEART RATE: 74 BPM | DIASTOLIC BLOOD PRESSURE: 82 MMHG | WEIGHT: 293 LBS | BODY MASS INDEX: 45.99 KG/M2 | HEIGHT: 67 IN | SYSTOLIC BLOOD PRESSURE: 146 MMHG

## 2018-09-12 DIAGNOSIS — E55.9 VITAMIN D DEFICIENCY: ICD-10-CM

## 2018-09-12 DIAGNOSIS — M15.9 PRIMARY OSTEOARTHRITIS INVOLVING MULTIPLE JOINTS: ICD-10-CM

## 2018-09-12 DIAGNOSIS — R26.89 LOSS OF BALANCE: ICD-10-CM

## 2018-09-12 DIAGNOSIS — R13.13 PHARYNGEAL DYSPHAGIA: Primary | ICD-10-CM

## 2018-09-12 DIAGNOSIS — Z76.0 MEDICATION REFILL: ICD-10-CM

## 2018-09-12 PROCEDURE — 99213 OFFICE O/P EST LOW 20 MIN: CPT

## 2018-09-12 PROCEDURE — 99214 OFFICE O/P EST MOD 30 MIN: CPT | Performed by: INTERNAL MEDICINE

## 2018-09-12 PROCEDURE — 99213 OFFICE O/P EST LOW 20 MIN: CPT | Performed by: INTERNAL MEDICINE

## 2018-09-12 NOTE — TELEPHONE ENCOUNTER
Please address the medication refill and close the encounter. If I can be of assistance, please route to the applicable pool. Thank you. Last visit:  Last Med refill:    Next Visit Date:  Future Appointments  Date Time Provider Fabby Juarez   9/18/2018 9:00 AM [address] [facility] RAD [facility] Rad   10/4/2018 9:15 AM [facility]   10/29/2018 1:15 PM Korin Webster DPM [facility] Podiatry TOLP   11/15/2018 11:10 AM Theo Stuart [facility] [place] [facility]   12/10/2018 10:45 AM Tasneem Corrales MD Resp Spec Arvie Bradshaw   12/11/2018 12:30 PM [facility] MAMMOGRAPHY [facility] Rad   12/19/2018 10:30 AM SCHEDULE, [facility] ACC GI CLINIC ACC GI TOLP       Health Maintenance   Topic Date Due    Shingles Vaccine (1 of 2 - 2 Dose Series) 03/09/2010    Diabetic retinal exam  07/08/2016    Lipid screen  06/16/2018    TSH testing  06/16/2018    Flu vaccine (1) 09/01/2018    Diabetic microalbuminuria test  09/07/2018    A1C test (Diabetic or Prediabetic)  07/10/2019    Diabetic foot exam  07/23/2019    Breast cancer screen  12/04/2019    Colon cancer screen colonoscopy  04/16/2023    DTaP/Tdap/Td vaccine (2 - Td) 06/01/2027    Pneumococcal med risk  Completed    Hepatitis C screen  Completed    HIV screen  Completed       Hemoglobin A1C (%)   Date Value   07/10/2018 5.4   09/07/2017 5.3   06/19/2015 5.0             ( goal A1C is < 7)   Microalb/Crt.  Ratio (mcg/mg creat)   Date Value   05/07/2013 2     LDL Cholesterol (mg/dL)   Date Value   05/07/2013 78   10/17/2012 86     LDL Calculated (mg/dL)   Date Value   06/16/2017 68   05/19/2014 104       (goal LDL is <100)   AST (U/L)   Date Value   05/13/2015 18     ALT (U/L)   Date Value   05/13/2015 23     BUN (mg/dL)   Date Value   04/27/2018 13     BP Readings from Last 3 Encounters:   09/12/18 (!) 146/82   07/23/18 117/74   07/10/18 135/74          (goal 120/80)    All Future Testing planned in [facility]  Lab Frequency Next Occurrence Lipid, Fasting Once 10/10/2018   TSH Once 07/10/2019   ADELINA DIGITAL SCREENING SELF REFERRAL BILATERAL Once 08/22/2018   FL ESOPHAGRAM Once 09/12/2018               Patient Active Problem List:     Glaucoma     GERD (gastroesophageal reflux disease)     Hyperlipidemia     HTN (hypertension)     DJD (degenerative joint disease)     Obesity     Hypothyroidism     Polyneuropathy     Migraine     HIGH CHOLESTEROL     Mitral prolapse     Low back pain     CTS (carpal tunnel syndrome)     Supraspinatus syndrome     Impaired fasting glucose     Acute sinus infection     IBS (irritable bowel syndrome)     Back pain, chronic     Stress fracture, right 5th metatarsal      Upper airway cough syndrome     Ear fullness     Perioral numbness     Screening for osteoporosis     Headache     Left eye injury     Medication reaction     Osteopenia     Dyslipidemia     Lumbosacral pain     Needs flu shot     Hypothyroid     Need for hepatitis C screening test     Urinary incontinence     Anxiety     Sleep apnea     Depression     Chronic back pain     Carpal tunnel syndrome     Neuropathy     Mitral valve problem     Morbid obesity with BMI of 45.0-49.9, adult (HCC)     Frozen shoulder     Screening for breast cancer     Skin tag     Degenerative disc disease, lumbar     Bilateral edema of lower extremity     Medication refill     Varicose vein of leg     Healthcare maintenance     Dizziness

## 2018-09-12 NOTE — PROGRESS NOTES
GASTROENTEROLOGY CLINIC VISIT      REASON FOR VISIT:  Dysphagia      HISTORY OF PRESENT ILLNESS:     This is a 62 y.o.  female. Patient denies any further dysphagia since last EGD with dilation of stenosis. Also, denies any further abdominal pain which she experienced after last EGD. Carafate for 10 days resolved the epigastric pain. On Protonix 40 mg before 1st meal.  Primary issue that remains is globus sensation. No coughing or choking when drinking/eating. BM regular. No melena or hematochezia. Relates last colon negative at age 48. VITAL SIGNS:  BP (!) 146/82   Pulse 74   Ht 5' 7\" (1.702 m)   Wt (!) 312 lb 9.6 oz (141.8 kg)   BMI 48.96 kg/m²      PHYSICAL EXAM:   General appearance: Alert, NAD  Lungs: CTA bilaterally, unlabored breathing pattern. Heart: S1S2, RRR without murmurs or gallops. Abdomen: Soft, NT, ND +BS, no masses. Morbidly obese. Skin:  No jaundice. No clubbing, cyanosis, or edema. ASSESSMENT:  1. Globus, likely gerd related. 2. Dysphagia from esophageal stricture, resolved after last dilation. 3. Obesity        PLAN:  1. Barium esophagram to evaluate proximal esophagus. If negative, consider increasing PPI to BID for globus sensation. Also, consider esophageal manometry. 2. Next colon at age 61 for screening. 3. Wt reduction. 4. Continue daily PPI for now.         Electronically signed by:  Frank Rivera D.O.  9/12/2018    10:14 AM

## 2018-09-13 RX ORDER — BLOOD-GLUCOSE METER
KIT MISCELLANEOUS
Qty: 100 STRIP | Refills: 0 | Status: SHIPPED | OUTPATIENT
Start: 2018-09-13 | End: 2018-10-10 | Stop reason: SDUPTHER

## 2018-09-13 RX ORDER — ASPIRIN 325 MG
TABLET, DELAYED RELEASE (ENTERIC COATED) ORAL
Qty: 30 TABLET | Refills: 0 | Status: SHIPPED | OUTPATIENT
Start: 2018-09-13 | End: 2018-10-10 | Stop reason: SDUPTHER

## 2018-09-13 RX ORDER — LIDOCAINE 50 MG/G
1 PATCH TOPICAL DAILY
Qty: 30 PATCH | Refills: 0 | Status: SHIPPED | OUTPATIENT
Start: 2018-09-13 | End: 2018-10-10 | Stop reason: SDUPTHER

## 2018-09-18 ENCOUNTER — HOSPITAL ENCOUNTER (OUTPATIENT)
Dept: GENERAL RADIOLOGY | Age: 58
Discharge: HOME | End: 2018-09-20
Payer: COMMERCIAL

## 2018-09-18 DIAGNOSIS — R13.13 PHARYNGEAL DYSPHAGIA: ICD-10-CM

## 2018-09-18 PROCEDURE — 74220 X-RAY XM ESOPHAGUS 1CNTRST: CPT

## 2018-09-25 RX ORDER — LANCETS
EACH MISCELLANEOUS
Qty: 102 EACH | Refills: 0 | Status: SHIPPED | OUTPATIENT
Start: 2018-09-25 | End: 2018-10-29 | Stop reason: SDUPTHER

## 2018-09-25 NOTE — TELEPHONE ENCOUNTER
Last visit:7/10/2018  Last Med refill:8/27/2018    Next Visit Date:  Future Appointments  Date Time Provider Fabby Ann   10/4/2018 9:15 AM [facility]   10/29/2018 1:15 PM Migue Pelaez DPM [facility]   11/15/2018 11:10 AM Vinayak Crocker MD [place] [facility]   12/10/2018 10:45 AM Chana Shanks MD Resp Spec TOLP   12/11/2018 12:30 PM [facility] MAMMOGRAPHY ROOM AT [facility] [address] [facility] Rad   12/19/2018 10:30 AM SCHEDULE, P ACC GI CLINIC ACC GI [facility]       Health Maintenance   Topic Date Due    Shingles Vaccine (1 of 2 - 2 Dose Series) 03/09/2010    Diabetic retinal exam  07/08/2016    Lipid screen  06/16/2018    TSH testing  06/16/2018    Flu vaccine (1) 09/01/2018    Diabetic microalbuminuria test  09/07/2018    A1C test (Diabetic or Prediabetic)  07/10/2019    Diabetic foot exam  07/23/2019    Breast cancer screen  12/04/2019    Colon cancer screen colonoscopy  04/16/2023    DTaP/Tdap/Td vaccine (2 - Td) 06/01/2027    Pneumococcal med risk  Completed    Hepatitis C screen  Completed    HIV screen  Completed       Hemoglobin A1C (%)   Date Value   07/10/2018 5.4   09/07/2017 5.3   06/19/2015 5.0             ( goal A1C is < 7)   Microalb/Crt.  Ratio (mcg/mg creat)   Date Value   05/07/2013 2     LDL Cholesterol (mg/dL)   Date Value   05/07/2013 78   10/17/2012 86     LDL Calculated (mg/dL)   Date Value   06/16/2017 68   05/19/2014 104       (goal LDL is <100)   AST (U/L)   Date Value   05/13/2015 18     ALT (U/L)   Date Value   05/13/2015 23     BUN (mg/dL)   Date Value   04/27/2018 13     BP Readings from Last 3 Encounters:   09/12/18 (!) 146/82   07/23/18 117/74   07/10/18 135/74          (goal 120/80)    All Future Testing planned in [facility]  Lab Frequency Next Occurrence   Lipid, Fasting Once 10/10/2018   TSH Once 07/10/2019   ADELINA DIGITAL SCREENING SELF REFERRAL BILATERAL Once 08/22/2018               Patient Active Problem List:     Glaucoma     GERD (gastroesophageal reflux disease)     Hyperlipidemia     HTN (hypertension)     DJD (degenerative joint disease)     Obesity     Hypothyroidism     Polyneuropathy     Migraine     HIGH CHOLESTEROL     Mitral prolapse     Low back pain     CTS (carpal tunnel syndrome)     Supraspinatus syndrome     Impaired fasting glucose     Acute sinus infection     IBS (irritable bowel syndrome)     Back pain, chronic     Stress fracture, right 5th metatarsal      Upper airway cough syndrome     Ear fullness     Perioral numbness     Screening for osteoporosis     Headache     Left eye injury     Medication reaction     Osteopenia     Dyslipidemia     Lumbosacral pain     Needs flu shot     Hypothyroid     Need for hepatitis C screening test     Urinary incontinence     Anxiety     Sleep apnea     Depression     Chronic back pain     Carpal tunnel syndrome     Neuropathy     Mitral valve problem     Morbid obesity with BMI of 45.0-49.9, adult (HCC)     Frozen shoulder     Screening for breast cancer     Skin tag     Degenerative disc disease, lumbar     Bilateral edema of lower extremity     Medication refill     Varicose vein of leg     Healthcare maintenance     Dizziness       Please address the medication refill and close the encounter. If I can be of assistance, please route to the applicable pool. Thank you.

## 2018-09-26 DIAGNOSIS — Z76.0 MEDICATION REFILL: ICD-10-CM

## 2018-09-26 DIAGNOSIS — G62.9 POLYNEUROPATHY: ICD-10-CM

## 2018-09-26 DIAGNOSIS — M51.36 DEGENERATIVE DISC DISEASE, LUMBAR: ICD-10-CM

## 2018-09-26 PROBLEM — Z00.00 HEALTHCARE MAINTENANCE: Status: RESOLVED | Noted: 2017-06-01 | Resolved: 2018-09-26

## 2018-09-26 RX ORDER — PEN NEEDLE, DIABETIC 32GX 5/32"
NEEDLE, DISPOSABLE MISCELLANEOUS
Qty: 100 EACH | Refills: 0 | Status: SHIPPED | OUTPATIENT
Start: 2018-09-26 | End: 2018-10-29 | Stop reason: SDUPTHER

## 2018-09-26 RX ORDER — FOLIC ACID/MV,IRON,MIN/LUTEIN 0.4-18-25
TABLET ORAL
Qty: 30 TABLET | Refills: 0 | Status: SHIPPED | OUTPATIENT
Start: 2018-09-26 | End: 2018-10-29 | Stop reason: SDUPTHER

## 2018-09-26 RX ORDER — ACETAMINOPHEN 500MG 500 MG/1
1000 TABLET, COATED ORAL EVERY 6 HOURS PRN
Qty: 120 TABLET | Refills: 0 | Status: SHIPPED | OUTPATIENT
Start: 2018-09-26 | End: 2018-10-29 | Stop reason: SDUPTHER

## 2018-09-26 NOTE — TELEPHONE ENCOUNTER
Next Visit Date:  Future Appointments  Date Time Provider Fabby Artisi   10/4/2018 9:15 AM [facility]   10/29/2018 1:15 PM Maryana Joe DPM [facility]   11/15/2018 11:10 AM Gerardo Epley, MD [facility]   12/10/2018 10:45 AM Rodolfo Vasquez MD Resp Spec [facility]   12/11/2018 12:30 PM [facility]MOGRAP[facility]   12/19/2018 10:30 AM SCHEDULE, [facility]       Health Maintenance   Topic Date Due    Shingles Vaccine (1 of 2 - 2 Dose Series) 03/09/2010    Diabetic retinal exam  07/08/2016    Lipid screen  06/16/2018    TSH testing  06/16/2018    Flu vaccine (1) 09/01/2018    Diabetic microalbuminuria test  09/07/2018    A1C test (Diabetic or Prediabetic)  07/10/2019    Diabetic foot exam  07/23/2019    Breast cancer screen  12/04/2019    Colon cancer screen colonoscopy  04/16/2023    DTaP/Tdap/Td vaccine (2 - Td) 06/01/2027    Pneumococcal med risk  Completed    Hepatitis C screen  Completed    HIV screen  Completed       Hemoglobin A1C (%)   Date Value   07/10/2018 5.4   09/07/2017 5.3   06/19/2015 5.0             ( goal A1C is < 7)   Microalb/Crt.  Ratio (mcg/mg creat)   Date Value   05/07/2013 2     LDL Cholesterol (mg/dL)   Date Value   05/07/2013 78   10/17/2012 86     LDL Calculated (mg/dL)   Date Value   06/16/2017 68   05/19/2014 104       (goal LDL is <100)   AST (U/L)   Date Value   05/13/2015 18     ALT (U/L)   Date Value   05/13/2015 23     BUN (mg/dL)   Date Value   04/27/2018 13     BP Readings from Last 3 Encounters:   09/12/18 (!) 146/82   07/23/18 117/74   07/10/18 135/74          (goal 120/80)    All Future Testing planned in [facility]  Lab Frequency Next Occurrence   Lipid, Fasting Once 10/10/2018   TSH Once 07/10/2019   ADELINA DIGITAL SCREENING SELF REFERRAL BILATERAL Once 08/22/2018               Patient Active Problem List:     Glaucoma     GERD (gastroesophageal reflux disease)

## 2018-10-03 ENCOUNTER — OFFICE VISIT (OUTPATIENT)
Dept: OBGYN | Age: 58
End: 2018-10-03
Payer: COMMERCIAL

## 2018-10-03 ENCOUNTER — HOSPITAL ENCOUNTER (OUTPATIENT)
Age: 58
Setting detail: SPECIMEN
Discharge: HOME | End: 2018-10-03
Payer: COMMERCIAL

## 2018-10-03 VITALS
DIASTOLIC BLOOD PRESSURE: 78 MMHG | SYSTOLIC BLOOD PRESSURE: 126 MMHG | HEIGHT: 67 IN | WEIGHT: 293 LBS | BODY MASS INDEX: 45.99 KG/M2

## 2018-10-03 DIAGNOSIS — N76.3 CHRONIC VULVITIS: Primary | ICD-10-CM

## 2018-10-03 PROCEDURE — G8417 CALC BMI ABV UP PARAM F/U: HCPCS | Performed by: OBSTETRICS & GYNECOLOGY

## 2018-10-03 PROCEDURE — 1036F TOBACCO NON-USER: CPT | Performed by: OBSTETRICS & GYNECOLOGY

## 2018-10-03 PROCEDURE — 87070 CULTURE OTHR SPECIMN AEROBIC: CPT

## 2018-10-03 PROCEDURE — 99213 OFFICE O/P EST LOW 20 MIN: CPT | Performed by: OBSTETRICS & GYNECOLOGY

## 2018-10-03 PROCEDURE — 3017F COLORECTAL CA SCREEN DOC REV: CPT | Performed by: OBSTETRICS & GYNECOLOGY

## 2018-10-03 PROCEDURE — G8427 DOCREV CUR MEDS BY ELIG CLIN: HCPCS | Performed by: OBSTETRICS & GYNECOLOGY

## 2018-10-03 PROCEDURE — G8484 FLU IMMUNIZE NO ADMIN: HCPCS | Performed by: OBSTETRICS & GYNECOLOGY

## 2018-10-03 RX ORDER — CLOTRIMAZOLE AND BETAMETHASONE DIPROPIONATE 10; .64 MG/G; MG/G
CREAM TOPICAL
Qty: 1 TUBE | Refills: 1 | Status: SHIPPED | OUTPATIENT
Start: 2018-10-03 | End: 2019-04-24

## 2018-10-03 ASSESSMENT — PATIENT HEALTH QUESTIONNAIRE - PHQ9
2. FEELING DOWN, DEPRESSED OR HOPELESS: 0
1. LITTLE INTEREST OR PLEASURE IN DOING THINGS: 0
SUM OF ALL RESPONSES TO PHQ QUESTIONS 1-9: 0
SUM OF ALL RESPONSES TO PHQ QUESTIONS 1-9: 0
SUM OF ALL RESPONSES TO PHQ9 QUESTIONS 1 & 2: 0

## 2018-10-03 NOTE — PROGRESS NOTES
albuterol sulfate HFA (VENTOLIN HFA) 108 (90 Base) MCG/ACT inhaler, INHALE 2 PUFFS INTO THE LUNGS EVERY SIX HOURS AS NEEDED, Disp: 18 g, Rfl: 3    pantoprazole (PROTONIX) 40 MG tablet, Take 1 tablet by mouth every morning (before breakfast), Disp: 30 tablet, Rfl: 3    levothyroxine (SYNTHROID) 100 MCG tablet, Take 1 tablet by mouth Daily, Disp: 30 tablet, Rfl: 0    Blood Glucose Calibration (FREESTYLE CONTROL SOLUTION) LIQD, USE AS DIRECTED, Disp: 1 each, Rfl: 0    cyclobenzaprine (FLEXERIL) 5 MG tablet, TAKE 1 TABLET AT BEDTIME AS NEEDED FOR MUSCLE SPASMS, Disp: 30 tablet, Rfl: 0    sucralfate (CARAFATE) 1 GM/10ML suspension, Take 10 mLs by mouth 4 times daily for 10 days, Disp: 400 mL, Rfl: 0    aluminum & magnesium hydroxide-simethicone (MAALOX ADVANCED) 200-200-20 MG/5ML SUSP suspension, Take 5 mLs by mouth as needed for Indigestion , Disp: , Rfl:     Alcohol Swabs (PRO COMFORT ALCOHOL) 70 % PADS, USE AS DIRECTED DAILY, Disp: 100 each, Rfl: 0    PHARBETOL EXTRA STRENGTH 500 MG tablet, TAKE 2 TABLETS BY MOUTH EVERY 6 HOURS AS NEEDED FOR PAIN, Disp: 120 tablet, Rfl: 0    FREESTYLE LITE strip, USE AS DIRECTED THREE OR FOUR TIMES DAILY, Disp: 100 strip, Rfl: 0    lidocaine (LIDODERM) 5 %, PLACE 1 PATCH ONTO THE SKIN DAILY 12 HOURS ON, 12 HOURS OFF., Disp: 30 patch, Rfl: 0    fluticasone (FLONASE) 50 MCG/ACT nasal spray, USE 1 SPRAY IN EACH NOSTRIL TWICE A DAY, Disp: 32 Bottle, Rfl: 0    Elastic Bandages & Supports (MEDICAL COMPRESSION STOCKINGS) MISC, 1 each by Does not apply route daily as needed (swelling) Grade II: 20-30, Disp: 1 each, Rfl: 0    fluticasone (FLONASE) 50 MCG/ACT nasal spray, USE ONE SPRAY IN EACH NOSTRIL TWICE A DAY, Disp: 32 Bottle, Rfl: 0    loratadine (CLARITIN) 10 MG tablet, TAKE 1 TABLET DAILY, Disp: 30 tablet, Rfl: 3    History   Sexual Activity    Sexual activity: No       Last Yearly:  11/9/16    Last pap: 11/9/16    Last HPV: n/a    Chief Complaint   Patient presents with

## 2018-10-04 ENCOUNTER — HOSPITAL ENCOUNTER (OUTPATIENT)
Age: 58
Setting detail: SPECIMEN
Discharge: HOME | End: 2018-10-04
Payer: COMMERCIAL

## 2018-10-04 ENCOUNTER — OFFICE VISIT (OUTPATIENT)
Dept: FAMILY MEDICINE CLINIC | Age: 58
End: 2018-10-04
Payer: COMMERCIAL

## 2018-10-04 VITALS
HEART RATE: 73 BPM | HEIGHT: 67 IN | BODY MASS INDEX: 45.99 KG/M2 | WEIGHT: 293 LBS | DIASTOLIC BLOOD PRESSURE: 78 MMHG | TEMPERATURE: 97.1 F | SYSTOLIC BLOOD PRESSURE: 128 MMHG

## 2018-10-04 DIAGNOSIS — G89.29 CHRONIC BILATERAL LOW BACK PAIN, WITH SCIATICA PRESENCE UNSPECIFIED: ICD-10-CM

## 2018-10-04 DIAGNOSIS — Z23 FLU VACCINE NEED: ICD-10-CM

## 2018-10-04 DIAGNOSIS — E66.01 MORBID OBESITY WITH BMI OF 45.0-49.9, ADULT (HCC): ICD-10-CM

## 2018-10-04 DIAGNOSIS — R73.03 PREDIABETES: ICD-10-CM

## 2018-10-04 DIAGNOSIS — E03.8 OTHER SPECIFIED HYPOTHYROIDISM: ICD-10-CM

## 2018-10-04 DIAGNOSIS — M54.5 CHRONIC BILATERAL LOW BACK PAIN, WITH SCIATICA PRESENCE UNSPECIFIED: ICD-10-CM

## 2018-10-04 DIAGNOSIS — I10 ESSENTIAL HYPERTENSION: Primary | ICD-10-CM

## 2018-10-04 LAB
CREATININE URINE: 36 MG/DL (ref 28–217)
MICROALBUMIN/CREAT 24H UR: <12 MG/L
MICROALBUMIN/CREAT UR-RTO: NORMAL MCG/MG CREAT

## 2018-10-04 PROCEDURE — 90688 IIV4 VACCINE SPLT 0.5 ML IM: CPT | Performed by: STUDENT IN AN ORGANIZED HEALTH CARE EDUCATION/TRAINING PROGRAM

## 2018-10-04 PROCEDURE — 99213 OFFICE O/P EST LOW 20 MIN: CPT | Performed by: STUDENT IN AN ORGANIZED HEALTH CARE EDUCATION/TRAINING PROGRAM

## 2018-10-04 PROCEDURE — 3017F COLORECTAL CA SCREEN DOC REV: CPT | Performed by: STUDENT IN AN ORGANIZED HEALTH CARE EDUCATION/TRAINING PROGRAM

## 2018-10-04 PROCEDURE — G8482 FLU IMMUNIZE ORDER/ADMIN: HCPCS | Performed by: STUDENT IN AN ORGANIZED HEALTH CARE EDUCATION/TRAINING PROGRAM

## 2018-10-04 PROCEDURE — 1036F TOBACCO NON-USER: CPT | Performed by: STUDENT IN AN ORGANIZED HEALTH CARE EDUCATION/TRAINING PROGRAM

## 2018-10-04 PROCEDURE — G8417 CALC BMI ABV UP PARAM F/U: HCPCS | Performed by: STUDENT IN AN ORGANIZED HEALTH CARE EDUCATION/TRAINING PROGRAM

## 2018-10-04 PROCEDURE — G8427 DOCREV CUR MEDS BY ELIG CLIN: HCPCS | Performed by: STUDENT IN AN ORGANIZED HEALTH CARE EDUCATION/TRAINING PROGRAM

## 2018-10-04 RX ORDER — LEVOTHYROXINE SODIUM 112 UG/1
112 TABLET ORAL DAILY
Status: CANCELLED | OUTPATIENT
Start: 2018-10-04

## 2018-10-04 RX ORDER — PRAVASTATIN SODIUM 10 MG
10 TABLET ORAL DAILY
Qty: 30 TABLET | Refills: 3 | Status: CANCELLED | OUTPATIENT
Start: 2018-10-04

## 2018-10-04 RX ORDER — SIMVASTATIN 10 MG
10 TABLET ORAL NIGHTLY
Qty: 30 TABLET | Refills: 3 | Status: CANCELLED | OUTPATIENT
Start: 2018-10-04

## 2018-10-04 RX ORDER — ROSUVASTATIN CALCIUM 10 MG/1
10 TABLET, COATED ORAL DAILY
Qty: 30 TABLET | Refills: 3 | Status: CANCELLED | OUTPATIENT
Start: 2018-10-04

## 2018-10-04 ASSESSMENT — ENCOUNTER SYMPTOMS
RHINORRHEA: 0
VOMITING: 0
WHEEZING: 0
NAUSEA: 0
ABDOMINAL PAIN: 0
PHOTOPHOBIA: 0
SORE THROAT: 0
COUGH: 0
EYE PAIN: 0
SHORTNESS OF BREATH: 0

## 2018-10-04 NOTE — PROGRESS NOTES
Subjective:    Wendy Starks is a 62 y.o. female with  has a past medical history of Anxiety; Bronchial asthma; Carpal tunnel syndrome; Cataract; Chronic back pain; Chronic sinusitis; Depression; Diet-controlled type 2 diabetes mellitus ([address] DJD (degenerative joint disease); Edema of leg; Environmental allergies; GERD (gastroesophageal reflux disease); Glaucoma; Glucose intolerance (impaired glucose tolerance); Headache(784.0); History of bronchitis; History of diarrhea; HTN (hypertension); Hyperlipidemia; Hypothyroid; Hypothyroidism; IBS (irritable bowel syndrome); Legally blind; MVP (mitral valve prolapse); Obesity; EMILIA on CPAP; Osteopenia; Pancreatic cyst; Polyneuropathy; Raynaud disease; Rhinopharyngitis; Stress fracture; TIA (transient ischemic attack); Urinary incontinence; Vasodepressor syncope; and Wears glasses.     Family History   Problem Relation Age of Onset    Diabetes Mother     Heart Disease Mother         multiple heart attacks    Arthritis Mother     High Blood Pressure Mother     High Cholesterol Mother     Substance Abuse Mother     Heart Disease Father     Arthritis Father     Depression Father     High Cholesterol Father     Mental Illness Father     Substance Abuse Father     Diabetes Sister     High Blood Pressure Sister     Cancer Paternal Uncle         esophageal cancer    Diabetes Maternal Aunt     Cancer Maternal Aunt         colorectal cancer    Diabetes Maternal Uncle     Cancer Maternal Grandmother         colorectal cancer    Arthritis Maternal Grandmother     Diabetes Maternal Grandmother     High Blood Pressure Maternal Grandmother     Stroke Maternal Grandmother     Arthritis Maternal Grandfather     Arthritis Paternal Grandmother     Cancer Paternal Grandmother     Depression Paternal Grandmother     Heart Disease Paternal Grandmother     High Blood Pressure Paternal Grandmother     High Cholesterol Paternal Grandmother     Mental Illness and time. She appears well-developed. She is cooperative. No distress. HENT:   Head: Normocephalic and atraumatic. Eyes: Pupils are equal, round, and reactive to light. EOM are normal.   Neck: Neck supple. No tracheal deviation present. Cardiovascular: Normal rate and regular rhythm. Exam reveals no gallop and no friction rub. No murmur heard. Pulmonary/Chest: Effort normal and breath sounds normal. She has no wheezes. She has no rales. Abdominal: Soft. She exhibits no distension and no mass. There is no tenderness. Musculoskeletal: Normal range of motion. She exhibits tenderness (of the lumbar back). She exhibits no deformity. Neurological: She is alert and oriented to person, place, and time. Skin: Skin is warm and dry. No rash noted. No erythema. Lab Results   Component Value Date    WBC 8.5 04/27/2018    HGB 14.6 04/27/2018    HCT 44.6 04/27/2018     04/27/2018    CHOL 135 06/16/2017    TRIG 137 06/16/2017    HDL 40 06/16/2017    ALT 23 05/13/2015    AST 18 05/13/2015     (H) 04/27/2018    K 4.5 04/27/2018     04/27/2018    CREATININE 0.69 04/27/2018    BUN 13 04/27/2018    CO2 30 04/27/2018    TSH 3.28 06/16/2017    INR 0.9 06/29/2013    LABA1C 5.4 07/10/2018    LABMICR 2 05/07/2013     Lab Results   Component Value Date    CALCIUM 9.4 04/27/2018     Lab Results   Component Value Date    LDLCALC 68 06/16/2017    LDLCHOLESTEROL 78 05/07/2013       Assessment and Plan:    1. Other specified hypothyroidism  - Recent TSH was elevated to 6.78, recommended pt to increase her synthroid to 112mcg daily. Pt refusing at this time, states that she had bad reaction to that dose in the past with Dr. Maryse Montes. Pt wanting to wait at this time and repeat labs at next visit  - Pt denies any current symptoms    2. Essential hypertension  - Stable, continue current management  - Will hold off on starting new statin (Crestor), recent lipid panel reviewed. Total :148, LDL: 82    3.

## 2018-10-05 ENCOUNTER — TELEPHONE (OUTPATIENT)
Dept: FAMILY MEDICINE CLINIC | Age: 58
End: 2018-10-05

## 2018-10-05 NOTE — TELEPHONE ENCOUNTER
Writer called pt- ask her how was her visit on 10 04 18, with Dr Bishop Paz, said overall her visit was okay, and wants to discuss cholesterol medication during upcoming appointment 10 05 18.

## 2018-10-06 LAB
CULTURE: NORMAL
Lab: NORMAL
SPECIMEN DESCRIPTION: NORMAL
STATUS: NORMAL

## 2018-10-10 DIAGNOSIS — R26.89 LOSS OF BALANCE: ICD-10-CM

## 2018-10-10 DIAGNOSIS — E55.9 VITAMIN D DEFICIENCY: ICD-10-CM

## 2018-10-10 DIAGNOSIS — Z76.0 MEDICATION REFILL: ICD-10-CM

## 2018-10-10 DIAGNOSIS — M15.9 PRIMARY OSTEOARTHRITIS INVOLVING MULTIPLE JOINTS: ICD-10-CM

## 2018-10-11 RX ORDER — BLOOD-GLUCOSE METER
KIT MISCELLANEOUS
Qty: 100 STRIP | Refills: 0 | Status: SHIPPED | OUTPATIENT
Start: 2018-10-11 | End: 2018-11-05 | Stop reason: ALTCHOICE

## 2018-10-11 RX ORDER — LIDOCAINE 50 MG/G
1 PATCH TOPICAL DAILY
Qty: 30 PATCH | Refills: 0 | Status: SHIPPED | OUTPATIENT
Start: 2018-10-11 | End: 2018-11-06 | Stop reason: SDUPTHER

## 2018-10-11 RX ORDER — ASPIRIN 325 MG
TABLET, DELAYED RELEASE (ENTERIC COATED) ORAL
Qty: 30 TABLET | Refills: 0 | Status: SHIPPED | OUTPATIENT
Start: 2018-10-11 | End: 2018-11-06 | Stop reason: SDUPTHER

## 2018-10-29 ENCOUNTER — HOSPITAL ENCOUNTER (OUTPATIENT)
Age: 58
Setting detail: SPECIMEN
Discharge: HOME | End: 2018-10-29
Payer: COMMERCIAL

## 2018-10-29 ENCOUNTER — OFFICE VISIT (OUTPATIENT)
Dept: PODIATRY | Age: 58
End: 2018-10-29
Payer: COMMERCIAL

## 2018-10-29 VITALS
BODY MASS INDEX: 45.99 KG/M2 | HEIGHT: 67 IN | SYSTOLIC BLOOD PRESSURE: 126 MMHG | WEIGHT: 293 LBS | HEART RATE: 64 BPM | DIASTOLIC BLOOD PRESSURE: 78 MMHG

## 2018-10-29 DIAGNOSIS — G62.9 POLYNEUROPATHY: ICD-10-CM

## 2018-10-29 DIAGNOSIS — E11.42 TYPE 2 DIABETES MELLITUS WITH DIABETIC POLYNEUROPATHY, WITHOUT LONG-TERM CURRENT USE OF INSULIN (HCC): Primary | ICD-10-CM

## 2018-10-29 DIAGNOSIS — L30.9 DERMATITIS: ICD-10-CM

## 2018-10-29 DIAGNOSIS — M51.36 DEGENERATIVE DISC DISEASE, LUMBAR: ICD-10-CM

## 2018-10-29 DIAGNOSIS — Z76.0 MEDICATION REFILL: ICD-10-CM

## 2018-10-29 DIAGNOSIS — B35.1 ONYCHOMYCOSIS: ICD-10-CM

## 2018-10-29 PROCEDURE — 3017F COLORECTAL CA SCREEN DOC REV: CPT | Performed by: STUDENT IN AN ORGANIZED HEALTH CARE EDUCATION/TRAINING PROGRAM

## 2018-10-29 PROCEDURE — 11100 PR BIOPSY OF SKIN LESION: CPT | Performed by: STUDENT IN AN ORGANIZED HEALTH CARE EDUCATION/TRAINING PROGRAM

## 2018-10-29 PROCEDURE — 11721 DEBRIDE NAIL 6 OR MORE: CPT | Performed by: STUDENT IN AN ORGANIZED HEALTH CARE EDUCATION/TRAINING PROGRAM

## 2018-10-29 PROCEDURE — 2022F DILAT RTA XM EVC RTNOPTHY: CPT | Performed by: STUDENT IN AN ORGANIZED HEALTH CARE EDUCATION/TRAINING PROGRAM

## 2018-10-29 PROCEDURE — G8427 DOCREV CUR MEDS BY ELIG CLIN: HCPCS | Performed by: STUDENT IN AN ORGANIZED HEALTH CARE EDUCATION/TRAINING PROGRAM

## 2018-10-29 PROCEDURE — 3044F HG A1C LEVEL LT 7.0%: CPT | Performed by: STUDENT IN AN ORGANIZED HEALTH CARE EDUCATION/TRAINING PROGRAM

## 2018-10-29 PROCEDURE — 99212 OFFICE O/P EST SF 10 MIN: CPT | Performed by: STUDENT IN AN ORGANIZED HEALTH CARE EDUCATION/TRAINING PROGRAM

## 2018-10-29 PROCEDURE — 1036F TOBACCO NON-USER: CPT | Performed by: STUDENT IN AN ORGANIZED HEALTH CARE EDUCATION/TRAINING PROGRAM

## 2018-10-29 PROCEDURE — 99213 OFFICE O/P EST LOW 20 MIN: CPT | Performed by: STUDENT IN AN ORGANIZED HEALTH CARE EDUCATION/TRAINING PROGRAM

## 2018-10-29 PROCEDURE — G8482 FLU IMMUNIZE ORDER/ADMIN: HCPCS | Performed by: STUDENT IN AN ORGANIZED HEALTH CARE EDUCATION/TRAINING PROGRAM

## 2018-10-29 PROCEDURE — G8417 CALC BMI ABV UP PARAM F/U: HCPCS | Performed by: STUDENT IN AN ORGANIZED HEALTH CARE EDUCATION/TRAINING PROGRAM

## 2018-10-29 RX ORDER — ACETAMINOPHEN 500 MG
1000 TABLET ORAL EVERY 6 HOURS PRN
Qty: 120 TABLET | Refills: 0 | Status: SHIPPED | OUTPATIENT
Start: 2018-10-29 | End: 2018-11-28 | Stop reason: SDUPTHER

## 2018-10-29 RX ORDER — FOLIC ACID/MV,IRON,MIN/LUTEIN 0.4-18-25
TABLET ORAL
Qty: 30 TABLET | Refills: 0 | Status: SHIPPED | OUTPATIENT
Start: 2018-10-29 | End: 2018-12-03 | Stop reason: SDUPTHER

## 2018-10-29 RX ORDER — FLUTICASONE PROPIONATE 50 MCG
SPRAY, SUSPENSION (ML) NASAL
Qty: 32 BOTTLE | Refills: 0 | Status: SHIPPED | OUTPATIENT
Start: 2018-10-29 | End: 2019-01-02 | Stop reason: SDUPTHER

## 2018-10-29 RX ORDER — LANCETS
EACH MISCELLANEOUS
Qty: 102 EACH | Refills: 0 | Status: SHIPPED | OUTPATIENT
Start: 2018-10-29 | End: 2018-11-05 | Stop reason: ALTCHOICE

## 2018-10-29 RX ORDER — LIDOCAINE HYDROCHLORIDE 10 MG/ML
10 INJECTION, SOLUTION INFILTRATION; PERINEURAL ONCE
Status: COMPLETED | OUTPATIENT
Start: 2018-10-29 | End: 2018-10-29

## 2018-10-29 RX ORDER — PEN NEEDLE, DIABETIC 32GX 5/32"
NEEDLE, DISPOSABLE MISCELLANEOUS
Qty: 100 EACH | Refills: 0 | Status: SHIPPED | OUTPATIENT
Start: 2018-10-29 | End: 2018-11-05 | Stop reason: ALTCHOICE

## 2018-10-29 RX ADMIN — LIDOCAINE HYDROCHLORIDE 10 ML: 10 INJECTION, SOLUTION INFILTRATION; PERINEURAL at 14:25

## 2018-10-29 NOTE — PROGRESS NOTES
stomach\", black tarry stool  Black stools  \"rips up stomach\", black tarry stool    Lisinopril Nausea Only, Nausea And Vomiting and Other (See Comments)     Other reaction(s): Hypotension    Metoprolol Other (See Comments)     Other reaction(s): Dizziness    Naproxen Other (See Comments)     Chest pain , swelling    Oxycodone-Acetaminophen      Chest pain severe    Rofecoxib Rash     swelling    Shellfish-Derived Products Anaphylaxis and Rash     shrimp  shrimp    Simvastatin Nausea And Vomiting     Other reaction(s): muscle cramps    Statins      Other reaction(s): muscle cramps    Sulfa Antibiotics Hives    Tetracyclines & Related Itching and Rash    Timoptic [Timolol Maleate] Itching and Swelling     Eyes burning severely    Tramadol Itching and Rash    Fosamax [Alendronate] Nausea Only and Nausea And Vomiting    Nalbuphine Nausea And Vomiting    Alendronate Sodium     Alphagan [Brimonidine Tartrate]      Eye irritation    Dilaudid [Hydromorphone Hcl]     Doxycycline Monohydrate     Nsaids     Other Itching and Swelling     Eyes burning    Pepcid Complete [Famotidine-Ca Carb-Mag Hydrox] Hives and Other (See Comments)     blisters    Pilocarpine      Blurred vision    Pravastatin Other (See Comments)     Facial numming    Shrimp Flavor Hives and Swelling    Statins Support Therapy      Tolerates lovastatin.  Pravachol caused numbness in head/face    Timoptic [Timolol Maleate]      Eye irritation      Tolectin [Tolmetin]      Unknown reaction  - as a child    Voltaren [Diclofenac Sodium]      Flu symptoms      Nubain [Nalbuphine Hcl] Nausea And Vomiting     Chest pain      Percocet [Oxycodone-Acetaminophen] Nausea And Vomiting     Sweating, chest pain    Tolectin [Tolmetin Sodium] Rash    Tolmetin Sodium Rash    Ultram [Tramadol Hcl] Itching and Rash     Rash on face    Vicodin [Hydrocodone-Acetaminophen] Nausea And Vomiting     swearing    Vioxx Rash       Family History   Problem red in coloration with scaling skin noted to the L anterior foot and medial aspect of the leg.     Musculoskeletal: Muscle strength 5/5 for all LE muscle groups. No gross deformity, Bilateral.      NCV/EMG: L-5 radiculopathy    10/29/18 L Dorsal Foot      10/29/18 L medial leg          Asessment: Patient is a 62 y.o. female with:    Diagnosis Orders   1. Type 2 diabetes mellitus with diabetic polyneuropathy, without long-term current use of insulin ([facility])  05060 - VA DEBRIDEMENT OF NAILS, 6 OR MORE     DIABETES FOOT EXAM   2. Dermatitis  48465 - VA BIOPSY OF SKIN LESION    Surgical Pathology   3. Onychomycosis  17000 - VA DEBRIDEMENT OF NAILS, 6 OR MORE       Plan:   Patient seen and evaluated. Current condition and treatment options discussed in detail. Debrided nails 1-5, bilateral in thickness and in length without incident. Punch biopsy performed to the L medial skin lesion, sent for pathology. Applied fibracol to the wound and adhesive bandage overtop. Patient to keep LLE dry. Patient to RTC 11/7/18 to review results.       Electronically signed by Maurice Donis DPM on 10/29/2018 at 2:15 PM

## 2018-11-01 LAB — DERMATOLOGY PATHOLOGY REPORT: NORMAL

## 2018-11-02 DIAGNOSIS — R60.0 BILATERAL EDEMA OF LOWER EXTREMITY: ICD-10-CM

## 2018-11-02 RX ORDER — FUROSEMIDE 20 MG/1
TABLET ORAL
Qty: 30 TABLET | Refills: 0 | Status: SHIPPED | OUTPATIENT
Start: 2018-11-02 | End: 2019-01-02 | Stop reason: SDUPTHER

## 2018-11-05 ENCOUNTER — HOSPITAL ENCOUNTER (OUTPATIENT)
Age: 58
Setting detail: SPECIMEN
Discharge: HOME | End: 2018-11-05
Payer: COMMERCIAL

## 2018-11-05 ENCOUNTER — OFFICE VISIT (OUTPATIENT)
Dept: FAMILY MEDICINE CLINIC | Age: 58
End: 2018-11-05
Payer: COMMERCIAL

## 2018-11-05 VITALS
BODY MASS INDEX: 45.99 KG/M2 | HEIGHT: 67 IN | HEART RATE: 71 BPM | WEIGHT: 293 LBS | TEMPERATURE: 97.4 F | DIASTOLIC BLOOD PRESSURE: 78 MMHG | SYSTOLIC BLOOD PRESSURE: 134 MMHG

## 2018-11-05 DIAGNOSIS — R22.43 LOCALIZED SWELLING OF BOTH LOWER LEGS: ICD-10-CM

## 2018-11-05 DIAGNOSIS — E66.01 MORBID OBESITY WITH BMI OF 45.0-49.9, ADULT (HCC): ICD-10-CM

## 2018-11-05 DIAGNOSIS — E03.9 HYPOTHYROIDISM, UNSPECIFIED TYPE: ICD-10-CM

## 2018-11-05 DIAGNOSIS — Z76.0 MEDICATION REFILL: ICD-10-CM

## 2018-11-05 DIAGNOSIS — E66.01 MORBID OBESITY WITH BMI OF 45.0-49.9, ADULT (HCC): Primary | ICD-10-CM

## 2018-11-05 LAB
ESTIMATED AVERAGE GLUCOSE: 111 MG/DL
HBA1C MFR BLD: 5.5 % (ref 4–6)
THYROXINE, FREE: 1.29 NG/DL (ref 0.93–1.7)
TSH SERPL DL<=0.05 MIU/L-ACNC: 2.5 MIU/L (ref 0.3–5)

## 2018-11-05 PROCEDURE — 99213 OFFICE O/P EST LOW 20 MIN: CPT | Performed by: STUDENT IN AN ORGANIZED HEALTH CARE EDUCATION/TRAINING PROGRAM

## 2018-11-05 RX ORDER — CHLORAL HYDRATE 500 MG
3000 CAPSULE ORAL 2 TIMES DAILY
Qty: 90 CAPSULE | Refills: 3 | Status: ON HOLD | OUTPATIENT
Start: 2018-11-05 | End: 2019-03-06

## 2018-11-05 RX ORDER — LEVOTHYROXINE SODIUM 0.1 MG/1
100 TABLET ORAL DAILY
Qty: 30 TABLET | Refills: 0 | Status: SHIPPED | OUTPATIENT
Start: 2018-11-05 | End: 2020-01-02 | Stop reason: SDUPTHER

## 2018-11-05 ASSESSMENT — ENCOUNTER SYMPTOMS
RHINORRHEA: 0
COLOR CHANGE: 1
COUGH: 0
SORE THROAT: 0
EYE PAIN: 0
VOMITING: 0
NAUSEA: 0
BACK PAIN: 1
PHOTOPHOBIA: 0
ABDOMINAL PAIN: 0
SHORTNESS OF BREATH: 0
WHEEZING: 0

## 2018-11-05 ASSESSMENT — PATIENT HEALTH QUESTIONNAIRE - PHQ9
SUM OF ALL RESPONSES TO PHQ QUESTIONS 1-9: 0
1. LITTLE INTEREST OR PLEASURE IN DOING THINGS: 0
2. FEELING DOWN, DEPRESSED OR HOPELESS: 0
SUM OF ALL RESPONSES TO PHQ QUESTIONS 1-9: 0
SUM OF ALL RESPONSES TO PHQ9 QUESTIONS 1 & 2: 0

## 2018-11-05 NOTE — PROGRESS NOTES
Attending Physician Statement    Wt Readings from Last 3 Encounters:   11/05/18 (!) 311 lb (141.1 kg)   10/29/18 (!) 312 lb 12.8 oz (141.9 kg)   10/04/18 (!) 310 lb 6.4 oz (140.8 kg)     Temp Readings from Last 3 Encounters:   11/05/18 97.4 °F (36.3 °C) (Oral)   10/04/18 97.1 °F (36.2 °C) (Temporal)   07/10/18 98.5 °F (36.9 °C) (Temporal)     BP Readings from Last 3 Encounters:   11/05/18 134/78   10/29/18 126/78   10/04/18 128/78     Pulse Readings from Last 3 Encounters:   11/05/18 71   10/29/18 64   10/04/18 73         I have discussed the care of Soumyae 36, including pertinent history and exam findings,  with the resident. I have reviewed the key elements of all parts of the encounter with the resident. I agree with the assessment, plan and orders as documented by the resident.   (GE Modifier)
HDL 40 06/16/2017    ALT 23 05/13/2015    AST 18 05/13/2015     (H) 04/27/2018    K 4.5 04/27/2018     04/27/2018    CREATININE 0.69 04/27/2018    BUN 13 04/27/2018    CO2 30 04/27/2018    TSH 3.28 06/16/2017    INR 0.9 06/29/2013    LABA1C 5.4 07/10/2018    LABMICR CANNOT BE CALCULATED 10/04/2018     Lab Results   Component Value Date    CALCIUM 9.4 04/27/2018     Lab Results   Component Value Date    LDLCALC 68 06/16/2017    LDLCHOLESTEROL 78 05/07/2013       Assessment and Plan:    1. Localized swelling of both lower legs  - ECHO Complete 2D W Doppler W Color; Future  - Pt to obtain Compression stockings, waiting on insurance  - Follows with [facility]    2. Hypothyroidism, unspecified type  - TSH; Future  - T4, Free; Future  - Wanted to increase dose of synthroid to 112 mcg but pt adamantly refuses due to history of adverse reactions at that dose  - Pt to follow with Dr. Edyta Lyman     3. Morbid obesity with BMI of 45.0-49.9, adult ([facility] [address]  - Hemoglobin A1C; Future  - Pt off metformin  - A1C has been below 5.7 since 2013    4. Hypothyroidism, unspecified type  - levothyroxine (SYNTHROID) 100 MCG tablet; Take 1 tablet by mouth Daily  Dispense: 30 tablet;  Refill: 0      Requested Prescriptions     Signed Prescriptions Disp Refills    levothyroxine (SYNTHROID) 100 MCG tablet 30 tablet 0     Sig: Take 1 tablet by mouth Daily    Omega-3 Fatty Acids (FISH OIL) 1000 MG CAPS 90 capsule 3     Sig: Take 3 capsules by mouth 2 times daily       Medications Discontinued During This Encounter   Medication Reason    ACCU-CHEK FASTCLIX LANCETS MISC Therapy completed    Alcohol Swabs (PRO COMFORT ALCOHOL) 70 % PADS Therapy completed    FREESTYLE LITE strip Therapy completed    Blood Glucose Calibration (FREESTYLE CONTROL SOLUTION) LIQD Therapy completed    levothyroxine (SYNTHROID) 100 MCG tablet CT       Kaci received counseling on the following healthy behaviors:nutrition, exercise and medication

## 2018-11-06 ENCOUNTER — TELEPHONE (OUTPATIENT)
Dept: FAMILY MEDICINE CLINIC | Age: 58
End: 2018-11-06

## 2018-11-07 ENCOUNTER — OFFICE VISIT (OUTPATIENT)
Dept: PODIATRY | Age: 58
End: 2018-11-07
Payer: COMMERCIAL

## 2018-11-07 VITALS
HEART RATE: 75 BPM | DIASTOLIC BLOOD PRESSURE: 84 MMHG | BODY MASS INDEX: 45.99 KG/M2 | SYSTOLIC BLOOD PRESSURE: 133 MMHG | HEIGHT: 67 IN | WEIGHT: 293 LBS

## 2018-11-07 DIAGNOSIS — E11.42 TYPE 2 DIABETES MELLITUS WITH DIABETIC POLYNEUROPATHY, WITHOUT LONG-TERM CURRENT USE OF INSULIN (HCC): ICD-10-CM

## 2018-11-07 DIAGNOSIS — L30.9 DERMATITIS: Primary | ICD-10-CM

## 2018-11-07 PROCEDURE — 99212 OFFICE O/P EST SF 10 MIN: CPT | Performed by: STUDENT IN AN ORGANIZED HEALTH CARE EDUCATION/TRAINING PROGRAM

## 2018-11-07 PROCEDURE — 99213 OFFICE O/P EST LOW 20 MIN: CPT | Performed by: STUDENT IN AN ORGANIZED HEALTH CARE EDUCATION/TRAINING PROGRAM

## 2018-11-07 RX ORDER — BETAMETHASONE DIPROPIONATE 0.5 MG/G
CREAM TOPICAL
Qty: 1 TUBE | Refills: 0 | Status: SHIPPED | OUTPATIENT
Start: 2018-11-07 | End: 2019-04-24

## 2018-11-07 RX ORDER — METHYLPREDNISOLONE 4 MG/1
TABLET ORAL
Qty: 1 KIT | Refills: 0 | Status: SHIPPED | OUTPATIENT
Start: 2018-11-07 | End: 2018-12-10 | Stop reason: ALTCHOICE

## 2018-11-07 NOTE — PROGRESS NOTES
 ESOPHAGEAL DILATATION      EYE SURGERY Bilateral     right iridectomy, right laser, bilateral trabeculectomy, left drain    GLAUCOMA SURGERY      HAND SURGERY Right     HYSTERECTOMY  1982    KNEE SURGERY Right 2000    TUBAL LIGATION  1981    UPPER GASTROINTESTINAL ENDOSCOPY      UPPER GASTROINTESTINAL ENDOSCOPY  5/24/2018    EGD DILATION SAVORY performed by Lazarus Bean, MD at [facility] Endoscopy       Prior to Admission medications    Medication Sig Start Date End Date Taking? Authorizing Provider   betamethasone dipropionate (DIPROLENE) 0.05 % cream Apply topically 2 times daily. 11/7/18  Yes Sriram Welch DPM   methylPREDNISolone (MEDROL DOSEPACK) 4 MG tablet Take by mouth. 6 tab day 1  5 tab day 2  4 tab day 3  3 tab day 4  2 tab day 5  1 tab day 6 11/7/18  Yes Sriram Welch DPM   levothyroxine (SYNTHROID) 100 MCG tablet Take 1 tablet by mouth Daily 11/5/18  Yes Marcos Levin MD   Omega-3 Fatty Acids (FISH OIL) 1000 MG CAPS Take 3 capsules by mouth 2 times daily 11/5/18  Yes Hilda Greenfield MD   furosemide (LASIX) 20 MG tablet TAKE 1 TABLET EVERY OTHER DAY 11/2/18  Yes Hilda Greenfield MD   MAPAP 500 MG tablet TAKE 2 TABLETS BY MOUTH EVERY 6 HOURS AS NEEDED FOR PAIN 10/29/18  Yes Marcos Levin MD   Multiple Vitamins-Minerals (CERTAVITE/ANTIOXIDANTS) TABS TAKE 1 TABLET BY MOUTH DAILY 10/29/18  Yes Marcos Levin MD   fluticasone (FLONASE) 50 MCG/ACT nasal spray USE 1 SPRAY IN EACH NOSTRIL TWICE A DAY 10/29/18  Yes Marcos Levin MD   aspirin 325 MG EC tablet TAKE 1 TABLET DAILY 10/11/18  Yes Hilda Greenfield MD   lidocaine (LIDODERM) 5 % PLACE 1 PATCH ONTO THE SKIN DAILY 12 HOURS ON, 12 HOURS OFF. 10/11/18  Yes Marcos Levin MD   calcium carbonate-vitamin D (CALTRATE) 600-400 MG-UNIT TABS per tab TAKE 1 TABLET BY MOUTH TWICE A DAY 10/11/18  Yes Marcos Levin MD   clotrimazole-betamethasone (LOTRISONE) 1-0.05 % cream Apply topically 2 times daily as needed for burning.   Not to exceed 6 weeks

## 2018-11-15 ENCOUNTER — OFFICE VISIT (OUTPATIENT)
Dept: OBGYN | Age: 58
End: 2018-11-15
Payer: COMMERCIAL

## 2018-11-15 ENCOUNTER — HOSPITAL ENCOUNTER (OUTPATIENT)
Age: 58
Setting detail: SPECIMEN
Discharge: HOME | End: 2018-11-15
Payer: COMMERCIAL

## 2018-11-15 VITALS — HEIGHT: 67 IN | WEIGHT: 293 LBS | BODY MASS INDEX: 45.99 KG/M2

## 2018-11-15 DIAGNOSIS — Z01.419 WOMEN'S ANNUAL ROUTINE GYNECOLOGICAL EXAMINATION: ICD-10-CM

## 2018-11-15 DIAGNOSIS — Z12.39 SCREENING FOR BREAST CANCER: ICD-10-CM

## 2018-11-15 DIAGNOSIS — Z01.419 WOMEN'S ANNUAL ROUTINE GYNECOLOGICAL EXAMINATION: Primary | ICD-10-CM

## 2018-11-15 PROCEDURE — G8482 FLU IMMUNIZE ORDER/ADMIN: HCPCS | Performed by: OBSTETRICS & GYNECOLOGY

## 2018-11-15 PROCEDURE — 99396 PREV VISIT EST AGE 40-64: CPT | Performed by: OBSTETRICS & GYNECOLOGY

## 2018-11-15 PROCEDURE — G0145 SCR C/V CYTO,THINLAYER,RESCR: HCPCS

## 2018-11-15 NOTE — PROGRESS NOTES
YEARLY PHYSICAL    Date of service: 11/15/2018    Chuck Houser  Is a 62 y.o.  single female    PT's PCP is: Titus Moe MD     : 1960                                             Subjective:       No LMP recorded. Patient has had a hysterectomy. Are your menses regular: not applicable    OB History   No data available        History   Smoking Status    Never Smoker   Smokeless Tobacco    Never Used        History   Alcohol Use No       Family History   Problem Relation Age of Onset    Diabetes Mother     Heart Disease Mother         multiple heart attacks    Arthritis Mother     High Blood Pressure Mother     High Cholesterol Mother     Substance Abuse Mother     Heart Disease Father     Arthritis Father     Depression Father     High Cholesterol Father     Mental Illness Father     Substance Abuse Father     Diabetes Sister     High Blood Pressure Sister     Cancer Paternal Uncle         esophageal cancer    Diabetes Maternal Aunt     Cancer Maternal Aunt         colorectal cancer    Diabetes Maternal Uncle     Cancer Maternal Grandmother         colorectal cancer    Arthritis Maternal Grandmother     Diabetes Maternal Grandmother     High Blood Pressure Maternal Grandmother     Stroke Maternal Grandmother     Arthritis Maternal Grandfather     Arthritis Paternal Grandmother     Cancer Paternal Grandmother     Depression Paternal Grandmother     Heart Disease Paternal Grandmother     High Blood Pressure Paternal Grandmother     High Cholesterol Paternal Grandmother     Mental Illness Paternal Grandmother     Arthritis Paternal Grandfather        Allergies: Latex; Adhesive tape; Amlodipine; Bextra [valdecoxib]; Brimonidine; Daypro [oxaprozin]; Diclofenac sodium; Famotidine; Hydrocodone-acetaminophen; Hydromorphone; Ibuprofen; Lisinopril; Metoprolol; Naproxen;  Oxycodone-acetaminophen; Rofecoxib; (hypertension)     Hyperlipidemia     Hypothyroid     hypothyroid state    Hypothyroidism     IBS (irritable bowel syndrome) 10/2/2012    Legally blind     due to glaucoma    Mild intermittent asthma without complication     MVP (mitral valve prolapse)     Obesity     EMILIA on CPAP     Osteopenia     Pancreatic cyst     Polyneuropathy     Raynaud disease     Rhinopharyngitis     Allergic rhinopharyngitis    Stress fracture     Right 5th Metatarsal     Syncope     TIA (transient ischemic attack)     Urinary incontinence     Vasodepressor syncope     Wears glasses        Past Surgical History:   Procedure Laterality Date    APPENDECTOMY  1978    CATARACT REMOVAL Left     CHOLECYSTECTOMY  1978    COLONOSCOPY      ESOPHAGEAL DILATATION      EYE SURGERY Bilateral     right iridectomy, right laser, bilateral trabeculectomy, left drain    GLAUCOMA SURGERY      HAND SURGERY Right     HYSTERECTOMY  1982    KNEE SURGERY Right 2000    TUBAL LIGATION  1981    UPPER GASTROINTESTINAL ENDOSCOPY      UPPER GASTROINTESTINAL ENDOSCOPY  5/24/2018    EGD DILATION SAVORY performed by Fortino Jalloh MD at [facility] Endoscopy       Family History   Problem Relation Age of Onset    Diabetes Mother     Heart Disease Mother         multiple heart attacks    Arthritis Mother     High Blood Pressure Mother     High Cholesterol Mother     Substance Abuse Mother     Heart Disease Father     Arthritis Father     Depression Father     High Cholesterol Father     Mental Illness Father     Substance Abuse Father     Diabetes Sister     High Blood Pressure Sister     Cancer Paternal Uncle         esophageal cancer    Diabetes Maternal Aunt     Cancer Maternal Aunt         colorectal cancer    Diabetes Maternal Uncle     Cancer Maternal Grandmother         colorectal cancer    Arthritis Maternal Grandmother     Diabetes Maternal Grandmother     High Blood Pressure Maternal Grandmother     Stroke counseled on her preventative health maintenance recommendations and follow-up. Assessment and Plan        Diagnosis Orders   1. Women's annual routine gynecological examination  PAP SMEAR   2. Screening for breast cancer  ADELINA DIGITAL SCREEN W CAD BILATERAL             I am having Ms. Banerjee maintain her aluminum & magnesium hydroxide-simethicone, sucralfate, cyclobenzaprine, Medical Compression Stockings, albuterol sulfate HFA, loratadine, pantoprazole, clotrimazole-betamethasone, MAPAP, CERTAVITE/ANTIOXIDANTS, fluticasone, furosemide, levothyroxine, fish oil, calcium carbonate-vitamin D, lidocaine, aspirin, betamethasone dipropionate, and methylPREDNISolone. Return in about 1 year (around 11/15/2019). There are no Patient Instructions on file for this visit.        Lesa Ayala,11/15/2018 11:21 AM

## 2018-11-21 ENCOUNTER — OFFICE VISIT (OUTPATIENT)
Dept: PODIATRY | Age: 58
End: 2018-11-21
Payer: COMMERCIAL

## 2018-11-21 VITALS
SYSTOLIC BLOOD PRESSURE: 133 MMHG | BODY MASS INDEX: 45.99 KG/M2 | DIASTOLIC BLOOD PRESSURE: 70 MMHG | HEART RATE: 69 BPM | WEIGHT: 293 LBS | HEIGHT: 67 IN

## 2018-11-21 DIAGNOSIS — S81.802S WOUND OF LEFT LOWER EXTREMITY, SEQUELA: Primary | ICD-10-CM

## 2018-11-21 DIAGNOSIS — L30.9 DERMATITIS: ICD-10-CM

## 2018-11-21 PROCEDURE — 99213 OFFICE O/P EST LOW 20 MIN: CPT | Performed by: STUDENT IN AN ORGANIZED HEALTH CARE EDUCATION/TRAINING PROGRAM

## 2018-11-21 RX ORDER — BACITRACIN ZINC AND POLYMYXIN B SULFATE 500; 1000 [USP'U]/G; [USP'U]/G
OINTMENT TOPICAL
Qty: 1 TUBE | Refills: 1 | Status: SHIPPED | OUTPATIENT
Start: 2018-11-21 | End: 2018-11-28

## 2018-11-21 NOTE — PROGRESS NOTES
Patient instructed to remove shoes and socks, instructed to sit in exam chair. Current PCP name is  and date of last visit 11/5/18. Do you have a follow up visit scheduled? No  Diabetic visit information    Blood pressure (Control is BP <140/90)  BP Readings from Last 3 Encounters:   11/07/18 133/84   11/05/18 134/78   10/29/18 126/78       BP taken with correct size cuff? - Yes   Repeated if > 140/90 No      Tobacco use:  Patient  reports that she has never smoked. She has never used smokeless tobacco.  If Smoker - Cessation materials given?- NA       Diabetic Health Maintenance Items due  There are no preventive care reminders to display for this patient. Diabetic retinal exam done in last year? - Yes   If No: remind patient that it is due and they should schedule an exam    Medications  Is patient taking any medications for diabetes? -   Yes  Have blood sugars been controlled? Fasting blood sugars under 120   -   Yes   Random home sugars or today's POCT glucose is under 180 -   Yes   []  If No to the above then patient should schedule appt with PCP. Diabetic Plan    A1C Plan  Lab Results   Component Value Date    LABA1C 5.5 11/05/2018    LABA1C 5.4 07/10/2018    LABA1C 5.3 09/07/2017      []  If A1C over 8 and last result >3 months ago - Order A1C and refer to PCP   []  If last A1C over 6 months ago - Order A1C and refer to PCP for follow up   []  If elevated blood sugars > 180 - refer to PCP for follow up    []  Blood sugar controlled - A1C under 8 and last check was < 6 months      Cholesterol Plan   Lab Results   Component Value Date    LDLCALC 68 06/16/2017    LDLCHOLESTEROL 78 05/07/2013      []  If LDL > 100 and last result >3 months ago - order Fasting lipids and refer to PCP for follow up   []  If LDL < 100 and over 1 year ago - Order Fasting lipids and refer to PCP for follow up   [] LDL is controlled.   LDL < 100 and checked within the last year     Blood Pressure  BP Readings from Last
Relation Age of Onset    Diabetes Mother     Heart Disease Mother         multiple heart attacks    Arthritis Mother     High Blood Pressure Mother     High Cholesterol Mother     Substance Abuse Mother     Heart Disease Father     Arthritis Father     Depression Father     High Cholesterol Father     Mental Illness Father     Substance Abuse Father     Diabetes Sister     High Blood Pressure Sister     Cancer Paternal Uncle         esophageal cancer    Diabetes Maternal Aunt     Cancer Maternal Aunt         colorectal cancer    Diabetes Maternal Uncle     Cancer Maternal Grandmother         colorectal cancer    Arthritis Maternal Grandmother     Diabetes Maternal Grandmother     High Blood Pressure Maternal Grandmother     Stroke Maternal Grandmother     Arthritis Maternal Grandfather     Arthritis Paternal Grandmother     Cancer Paternal Grandmother     Depression Paternal Grandmother     Heart Disease Paternal Grandmother     High Blood Pressure Paternal Grandmother     High Cholesterol Paternal Grandmother     Mental Illness Paternal Grandmother     Arthritis Paternal Grandfather        Social History   Substance Use Topics    Smoking status: Never Smoker    Smokeless tobacco: Never Used    Alcohol use No       Review of Systems: All 12 systems reviewed and pertinent positives noted above. Lower Extremity Physical Examination:     Vitals:   Vitals:    11/21/18 1301   BP: 133/70   Pulse: 69     Gen: AAOx3, NAD     Vascular: DP pulses are palpable, bilateral. PT pulses not palpable, likely due to edema. CFT is brisk to all digits. Skin temp is warm to warm proximal to distal, bilateral. Non-pitting edema to leg, + varicosities noted, bilateral.     Neurologic:  SWMF on right 9/10, 7/10 on left.       Dermatologic: Nails 1-5 b/l are thickened, discolored and elongated.  Webspaces 1-4 are c/d/i, bilateral. No open lesions or subcutaneous nodules noted, bilateral. Skin lesion

## 2018-11-29 ENCOUNTER — TELEPHONE (OUTPATIENT)
Dept: GASTROENTEROLOGY | Age: 58
End: 2018-11-29

## 2018-12-03 DIAGNOSIS — G62.9 POLYNEUROPATHY: ICD-10-CM

## 2018-12-03 DIAGNOSIS — Z76.0 MEDICATION REFILL: ICD-10-CM

## 2018-12-03 LAB — CYTOLOGY REPORT: NORMAL

## 2018-12-03 RX ORDER — LANCETS
EACH MISCELLANEOUS
Qty: 102 EACH | Refills: 0 | OUTPATIENT
Start: 2018-12-03

## 2018-12-03 RX ORDER — PEN NEEDLE, DIABETIC 32GX 5/32"
NEEDLE, DISPOSABLE MISCELLANEOUS
Qty: 100 EACH | Refills: 0 | OUTPATIENT
Start: 2018-12-03

## 2018-12-03 RX ORDER — FOLIC ACID/MV,IRON,MIN/LUTEIN 0.4-18-25
TABLET ORAL
Qty: 30 TABLET | Refills: 0 | Status: SHIPPED | OUTPATIENT
Start: 2018-12-03 | End: 2019-01-02 | Stop reason: SDUPTHER

## 2018-12-03 RX ORDER — BLOOD-GLUCOSE METER
KIT MISCELLANEOUS
Qty: 100 STRIP | Refills: 0 | OUTPATIENT
Start: 2018-12-03

## 2018-12-03 NOTE — TELEPHONE ENCOUNTER
Please address the medication refill and close the encounter. If I can be of assistance, please route to the applicable pool. Thank you. Last visit:11/5/2018  Last Med refill:11/30/2018    Next Visit Date:  Future Appointments  Date Time Provider Fabby Ann   12/5/2018 2:15 PM Yazmin Us DPM [facility] Pod[facility]ry [facility]   12/10/2018 10:45 AM Joycelyn Velarde MD Resp Spec [facility]   12/11/2018 12:30 PM [facility] MAMMOGRAPHY ROOM AT [facility]. [address] [facility]   1/16/2019 8:30 AM SCHEDULE, [facility]       Health Maintenance   Topic Date Due    Shingles Vaccine (1 of 2 - 2 Dose Series) 03/09/2010    Diabetic retinal exam  12/28/2018 (Originally 7/8/2016)    Lipid screen  12/28/2018 (Originally 6/16/2018)    Potassium monitoring  04/27/2019    Creatinine monitoring  04/27/2019    Diabetic microalbuminuria test  10/04/2019    Diabetic foot exam  10/29/2019    A1C test (Diabetic or Prediabetic)  11/05/2019    TSH testing  11/05/2019    Breast cancer screen  12/04/2019    Colon cancer screen colonoscopy  04/16/2023    DTaP/Tdap/Td vaccine (2 - Td) 06/01/2027    Flu vaccine  Completed    Pneumococcal med risk  Completed    Hepatitis C screen  Completed    HIV screen  Completed       Hemoglobin A1C (%)   Date Value   11/05/2018 5.5   07/10/2018 5.4   09/07/2017 5.3             ( goal A1C is < 7)   Microalb/Crt.  Ratio (mcg/mg creat)   Date Value   10/04/2018 CANNOT BE CALCULATED     LDL Cholesterol (mg/dL)   Date Value   05/07/2013 78   10/17/2012 86     LDL Calculated (mg/dL)   Date Value   06/16/2017 68   05/19/2014 104       (goal LDL is <100)   AST (U/L)   Date Value   05/13/2015 18     ALT (U/L)   Date Value   05/13/2015 23     BUN (mg/dL)   Date Value   04/27/2018 13     BP Readings from Last 3 Encounters:   11/21/18 133/70   11/07/18 133/84   11/05/18 134/78          (goal 120/80)    All Future Testing planned in [facility]  Lab Frequency Next Occurrence   Lipid, Fasting Once

## 2018-12-05 ENCOUNTER — OFFICE VISIT (OUTPATIENT)
Dept: PODIATRY | Age: 58
End: 2018-12-05
Payer: COMMERCIAL

## 2018-12-05 VITALS
HEIGHT: 67 IN | HEART RATE: 69 BPM | DIASTOLIC BLOOD PRESSURE: 76 MMHG | BODY MASS INDEX: 45.99 KG/M2 | WEIGHT: 293 LBS | SYSTOLIC BLOOD PRESSURE: 127 MMHG

## 2018-12-05 DIAGNOSIS — S81.802S WOUND OF LEFT LOWER EXTREMITY, SEQUELA: ICD-10-CM

## 2018-12-05 DIAGNOSIS — E11.42 TYPE 2 DIABETES MELLITUS WITH DIABETIC POLYNEUROPATHY, WITHOUT LONG-TERM CURRENT USE OF INSULIN (HCC): ICD-10-CM

## 2018-12-05 DIAGNOSIS — L30.9 DERMATITIS: Primary | ICD-10-CM

## 2018-12-05 PROCEDURE — 99213 OFFICE O/P EST LOW 20 MIN: CPT | Performed by: STUDENT IN AN ORGANIZED HEALTH CARE EDUCATION/TRAINING PROGRAM

## 2018-12-05 PROCEDURE — 99212 OFFICE O/P EST SF 10 MIN: CPT | Performed by: STUDENT IN AN ORGANIZED HEALTH CARE EDUCATION/TRAINING PROGRAM

## 2018-12-05 NOTE — PROGRESS NOTES
Patient instructed to remove shoes and socks, instructed to sit in exam chair. Current PCP name is Dr Cheryl Franco  and date of last visit 11-5-18. Do you have a follow up visit scheduled?   Yes or no    If yes the date is NO
Disease Mother         multiple heart attacks    Arthritis Mother     High Blood Pressure Mother     High Cholesterol Mother     Substance Abuse Mother     Heart Disease Father     Arthritis Father     Depression Father     High Cholesterol Father     Mental Illness Father     Substance Abuse Father     Diabetes Sister     High Blood Pressure Sister     Cancer Paternal Uncle         esophageal cancer    Diabetes Maternal Aunt     Cancer Maternal Aunt         colorectal cancer    Diabetes Maternal Uncle     Cancer Maternal Grandmother         colorectal cancer    Arthritis Maternal Grandmother     Diabetes Maternal Grandmother     High Blood Pressure Maternal Grandmother     Stroke Maternal Grandmother     Arthritis Maternal Grandfather     Arthritis Paternal Grandmother     Cancer Paternal Grandmother     Depression Paternal Grandmother     Heart Disease Paternal Grandmother     High Blood Pressure Paternal Grandmother     High Cholesterol Paternal Grandmother     Mental Illness Paternal Grandmother     Arthritis Paternal Grandfather        Social History   Substance Use Topics    Smoking status: Never Smoker    Smokeless tobacco: Never Used    Alcohol use No       Review of Systems: All 12 systems reviewed and pertinent positives noted above. Lower Extremity Physical Examination:     Vitals:   Vitals:    12/05/18 1403   BP: 127/76   Pulse: 69     Gen: AAOx3, NAD     Vascular: DP pulses are palpable, bilateral. PT pulses not palpable, likely due to edema. CFT is brisk to all digits. Skin temp is warm to warm proximal to distal, bilateral. Non-pitting edema to leg, + varicosities noted, bilateral.     Neurologic:  SWMF on right 9/10, 7/10 on left.       Dermatologic: Nails 1-5 b/l are thickened, discolored and elongated.  Webspaces 1-4 are c/d/i, bilateral. No open lesions or subcutaneous nodules noted, bilateral. Skin lesion hyperpigmented in coloration with scaling skin noted to

## 2018-12-10 ENCOUNTER — OFFICE VISIT (OUTPATIENT)
Dept: PULMONOLOGY | Age: 58
End: 2018-12-10
Payer: COMMERCIAL

## 2018-12-10 VITALS
OXYGEN SATURATION: 98 % | RESPIRATION RATE: 16 BRPM | HEART RATE: 69 BPM | SYSTOLIC BLOOD PRESSURE: 135 MMHG | BODY MASS INDEX: 45.99 KG/M2 | TEMPERATURE: 98.4 F | HEIGHT: 67 IN | WEIGHT: 293 LBS | DIASTOLIC BLOOD PRESSURE: 86 MMHG

## 2018-12-10 DIAGNOSIS — G47.33 OSA ON CPAP: Primary | ICD-10-CM

## 2018-12-10 DIAGNOSIS — E66.01 MORBID OBESITY DUE TO EXCESS CALORIES (HCC): ICD-10-CM

## 2018-12-10 DIAGNOSIS — Z99.89 OSA ON CPAP: Primary | ICD-10-CM

## 2018-12-10 PROCEDURE — 99213 OFFICE O/P EST LOW 20 MIN: CPT | Performed by: INTERNAL MEDICINE

## 2018-12-10 PROCEDURE — G8427 DOCREV CUR MEDS BY ELIG CLIN: HCPCS | Performed by: INTERNAL MEDICINE

## 2018-12-10 PROCEDURE — G8482 FLU IMMUNIZE ORDER/ADMIN: HCPCS | Performed by: INTERNAL MEDICINE

## 2018-12-10 PROCEDURE — 1036F TOBACCO NON-USER: CPT | Performed by: INTERNAL MEDICINE

## 2018-12-10 PROCEDURE — 3017F COLORECTAL CA SCREEN DOC REV: CPT | Performed by: INTERNAL MEDICINE

## 2018-12-10 PROCEDURE — G8417 CALC BMI ABV UP PARAM F/U: HCPCS | Performed by: INTERNAL MEDICINE

## 2018-12-10 ASSESSMENT — SLEEP AND FATIGUE QUESTIONNAIRES
HOW LIKELY ARE YOU TO NOD OFF OR FALL ASLEEP WHILE WATCHING TV: 2
HOW LIKELY ARE YOU TO NOD OFF OR FALL ASLEEP WHEN YOU ARE A PASSENGER IN A CAR FOR AN HOUR WITHOUT A BREAK: 0
HOW LIKELY ARE YOU TO NOD OFF OR FALL ASLEEP WHILE SITTING INACTIVE IN A PUBLIC PLACE: 0
ESS TOTAL SCORE: 8
HOW LIKELY ARE YOU TO NOD OFF OR FALL ASLEEP WHILE SITTING QUIETLY AFTER LUNCH WITHOUT ALCOHOL: 1
HOW LIKELY ARE YOU TO NOD OFF OR FALL ASLEEP IN A CAR, WHILE STOPPED FOR A FEW MINUTES IN TRAFFIC: 0
HOW LIKELY ARE YOU TO NOD OFF OR FALL ASLEEP WHILE LYING DOWN TO REST IN THE AFTERNOON WHEN CIRCUMSTANCES PERMIT: 3
HOW LIKELY ARE YOU TO NOD OFF OR FALL ASLEEP WHILE SITTING AND TALKING TO SOMEONE: 0
HOW LIKELY ARE YOU TO NOD OFF OR FALL ASLEEP WHILE SITTING AND READING: 2

## 2018-12-11 ENCOUNTER — HOSPITAL ENCOUNTER (OUTPATIENT)
Dept: WOMENS IMAGING | Age: 58
Discharge: HOME | End: 2018-12-13
Payer: COMMERCIAL

## 2018-12-11 DIAGNOSIS — Z12.39 SCREENING FOR BREAST CANCER: ICD-10-CM

## 2018-12-11 DIAGNOSIS — Z12.39 BREAST SCREENING: ICD-10-CM

## 2018-12-11 PROCEDURE — 77067 SCR MAMMO BI INCL CAD: CPT

## 2018-12-19 ENCOUNTER — OFFICE VISIT (OUTPATIENT)
Dept: PODIATRY | Age: 58
End: 2018-12-19
Payer: COMMERCIAL

## 2018-12-19 VITALS
HEART RATE: 64 BPM | RESPIRATION RATE: 18 BRPM | WEIGHT: 293 LBS | SYSTOLIC BLOOD PRESSURE: 127 MMHG | DIASTOLIC BLOOD PRESSURE: 86 MMHG | BODY MASS INDEX: 45.99 KG/M2 | HEIGHT: 67 IN

## 2018-12-19 DIAGNOSIS — S81.802S WOUND OF LEFT LOWER EXTREMITY, SEQUELA: ICD-10-CM

## 2018-12-19 DIAGNOSIS — B35.1 ONYCHOMYCOSIS: ICD-10-CM

## 2018-12-19 DIAGNOSIS — L30.9 DERMATITIS: Primary | ICD-10-CM

## 2018-12-19 DIAGNOSIS — E11.42 TYPE 2 DIABETES MELLITUS WITH DIABETIC POLYNEUROPATHY, WITHOUT LONG-TERM CURRENT USE OF INSULIN (HCC): ICD-10-CM

## 2018-12-19 PROCEDURE — 99213 OFFICE O/P EST LOW 20 MIN: CPT | Performed by: STUDENT IN AN ORGANIZED HEALTH CARE EDUCATION/TRAINING PROGRAM

## 2018-12-19 PROCEDURE — 11721 DEBRIDE NAIL 6 OR MORE: CPT | Performed by: STUDENT IN AN ORGANIZED HEALTH CARE EDUCATION/TRAINING PROGRAM

## 2018-12-19 PROCEDURE — 99212 OFFICE O/P EST SF 10 MIN: CPT

## 2018-12-19 NOTE — PROGRESS NOTES
[facility] Progress Note    Nazia Shah is a 62 y.o. female who presents to clinic for follow of left skin lesion and dermatitis that was previously biopsied in November. Biopsy results showed dermatitis/eczema. Pt finished course of medrol dose pack and still has steroid cream. Last visit patient had yellow drainage from the biopsy site. The area has healed and she has not had drainage or problems since last visit. Relates that the dermatitis does not burn/itch anymore and that the scaling has resolved. Patient denies any other complaints at this time. Currently denies F/C/N/V.      Lab Results   Component Value Date    LABA1C 5.5 11/05/2018     Lab Results   Component Value Date     11/05/2018       Past Medical History:   Diagnosis Date    Anxiety     Bronchial asthma     mild, intermittent    Carpal tunnel syndrome     Cataract     Chronic back pain     Chronic sinusitis     Depression     Diet-controlled type 2 diabetes mellitus ([address] 10/18/2012    DJD (degenerative joint disease)     S1, L3, L4, L5, T12    Edema of leg     bilateral legs    Environmental allergies     GERD (gastroesophageal reflux disease)     Glaucoma     Glucose intolerance (impaired glucose tolerance)     Headache(784.0)     History of bronchitis     Viral    History of diarrhea     HTN (hypertension)     Hyperlipidemia     Hypothyroid     hypothyroid state    Hypothyroidism     IBS (irritable bowel syndrome) 10/2/2012    Legally blind     due to glaucoma    Mild intermittent asthma without complication     MVP (mitral valve prolapse)     Obesity     EMILIA on CPAP     Osteopenia     Pancreatic cyst     Polyneuropathy     Raynaud disease     Rhinopharyngitis     Allergic rhinopharyngitis    Stress fracture     Right 5th Metatarsal     Syncope     TIA (transient ischemic attack)     Urinary incontinence     Vasodepressor syncope     Wears glasses        Past Surgical History:

## 2018-12-19 NOTE — PROGRESS NOTES
Patient instructed to remove shoes and socks, instructed to sit in exam chair. Current PCP name is madelyn and date of last visit 11-15-18. Do you have a follow up visit scheduled? Yes or no    If yes the date is NO  Diabetic visit information    Blood pressure (Control is BP <140/90)  BP Readings from Last 3 Encounters:   12/10/18 135/86   12/05/18 127/76   11/21/18 133/70       BP taken with correct size cuff? - Yes   Repeated if > 140/90 Yes      Tobacco use:  Patient  reports that she has never smoked. She has never used smokeless tobacco.  If Smoker - Cessation materials given?- NA       Diabetic Health Maintenance Items due  There are no preventive care reminders to display for this patient. Diabetic retinal exam done in last year? - Yes   If No: remind patient that it is due and they should schedule an exam    Medications  Is patient taking any medications for diabetes? -   Yes  Have blood sugars been controlled? Fasting blood sugars under 120   -   Yes   Random home sugars or today's POCT glucose is under 180 -   Yes   []  If No to the above then patient should schedule appt with PCP. Diabetic Plan    A1C Plan  Lab Results   Component Value Date    LABA1C 5.5 11/05/2018    LABA1C 5.4 07/10/2018    LABA1C 5.3 09/07/2017      []  If A1C over 8 and last result >3 months ago - Order A1C and refer to PCP   []  If last A1C over 6 months ago - Order A1C and refer to PCP for follow up   []  If elevated blood sugars > 180 - refer to PCP for follow up    []  Blood sugar controlled - A1C under 8 and last check was < 6 months      Cholesterol Plan   Lab Results   Component Value Date    LDLCALC 68 06/16/2017    LDLCHOLESTEROL 78 05/07/2013      []  If LDL > 100 and last result >3 months ago - order Fasting lipids and refer to PCP for follow up   []  If LDL < 100 and over 1 year ago - Order Fasting lipids and refer to PCP for follow up   [] LDL is controlled.   LDL < 100 and checked within the last year     Blood

## 2018-12-20 DIAGNOSIS — Z76.0 MEDICATION REFILL: ICD-10-CM

## 2018-12-20 DIAGNOSIS — M51.36 DEGENERATIVE DISC DISEASE, LUMBAR: ICD-10-CM

## 2018-12-20 RX ORDER — ASPIRIN 325 MG
TABLET, DELAYED RELEASE (ENTERIC COATED) ORAL
Qty: 30 TABLET | Refills: 0 | Status: SHIPPED | OUTPATIENT
Start: 2018-12-20 | End: 2019-01-17 | Stop reason: SDUPTHER

## 2018-12-20 RX ORDER — PSEUDOEPHED/ACETAMINOPH/DIPHEN 30MG-500MG
1000 TABLET ORAL EVERY 6 HOURS PRN
Qty: 120 TABLET | Refills: 0 | Status: SHIPPED | OUTPATIENT
Start: 2018-12-20 | End: 2019-01-17 | Stop reason: SDUPTHER

## 2018-12-20 NOTE — TELEPHONE ENCOUNTER
PELVIS (2 VIEWS) Once 01/19/2019   XR LUMBAR SPINE (2-3 VIEWS) Once 01/19/2019   ECHO Complete 2D W Doppler W Color Once 11/12/2018               Patient Active Problem List:     Glaucoma     GERD (gastroesophageal reflux disease)     DJD (degenerative joint disease)     Obesity     Hypothyroidism     Polyneuropathy     Migraine     Mitral prolapse     Low back pain     CTS (carpal tunnel syndrome)     Supraspinatus syndrome     Impaired fasting glucose     Acute sinus infection     IBS (irritable bowel syndrome)     Back pain, chronic     Stress fracture, right 5th metatarsal      Upper airway cough syndrome     Ear fullness     Perioral numbness     Screening for osteoporosis     Headache     Left eye injury     Medication reaction     Osteopenia     Lumbosacral pain     Flu vaccine need     Hypothyroid     Urinary incontinence     Anxiety     Sleep apnea     Depression     Chronic back pain     Carpal tunnel syndrome     Neuropathy     Mitral valve problem     Morbid obesity with BMI of 45.0-49.9, adult (HCC)     Frozen shoulder     Skin tag     Degenerative disc disease, lumbar     Bilateral edema of lower extremity     Medication refill     Varicose vein of leg     Dizziness

## 2018-12-26 DIAGNOSIS — K21.00 GASTROESOPHAGEAL REFLUX DISEASE WITH ESOPHAGITIS: ICD-10-CM

## 2018-12-26 RX ORDER — PANTOPRAZOLE SODIUM 40 MG/1
TABLET, DELAYED RELEASE ORAL
Qty: 30 TABLET | Refills: 0 | Status: SHIPPED | OUTPATIENT
Start: 2018-12-26 | End: 2019-01-29 | Stop reason: SDUPTHER

## 2018-12-27 ENCOUNTER — TELEPHONE (OUTPATIENT)
Dept: GASTROENTEROLOGY | Age: 58
End: 2018-12-27

## 2019-01-02 DIAGNOSIS — Z76.0 MEDICATION REFILL: ICD-10-CM

## 2019-01-02 DIAGNOSIS — R60.0 BILATERAL EDEMA OF LOWER EXTREMITY: ICD-10-CM

## 2019-01-03 ENCOUNTER — TELEPHONE (OUTPATIENT)
Dept: FAMILY MEDICINE CLINIC | Age: 59
End: 2019-01-03

## 2019-01-04 RX ORDER — FUROSEMIDE 20 MG/1
TABLET ORAL
Qty: 30 TABLET | Refills: 0 | Status: SHIPPED | OUTPATIENT
Start: 2019-01-04 | End: 2019-05-02 | Stop reason: SDUPTHER

## 2019-01-04 RX ORDER — FLUTICASONE PROPIONATE 50 MCG
SPRAY, SUSPENSION (ML) NASAL
Qty: 32 BOTTLE | Refills: 0 | Status: SHIPPED | OUTPATIENT
Start: 2019-01-04 | End: 2019-05-02 | Stop reason: SDUPTHER

## 2019-01-04 RX ORDER — FOLIC ACID/MV,IRON,MIN/LUTEIN 0.4-18-25
TABLET ORAL
Qty: 30 TABLET | Refills: 0 | Status: SHIPPED | OUTPATIENT
Start: 2019-01-04 | End: 2019-04-08 | Stop reason: SDUPTHER

## 2019-01-17 DIAGNOSIS — M51.36 DEGENERATIVE DISC DISEASE, LUMBAR: ICD-10-CM

## 2019-01-17 DIAGNOSIS — Z76.0 MEDICATION REFILL: ICD-10-CM

## 2019-01-17 RX ORDER — PSEUDOEPHED/ACETAMINOPH/DIPHEN 30MG-500MG
1000 TABLET ORAL EVERY 6 HOURS PRN
Qty: 120 TABLET | Refills: 0 | Status: SHIPPED | OUTPATIENT
Start: 2019-01-17 | End: 2019-04-13 | Stop reason: SDUPTHER

## 2019-01-17 RX ORDER — ASPIRIN 325 MG
TABLET, DELAYED RELEASE (ENTERIC COATED) ORAL
Qty: 30 TABLET | Refills: 0 | Status: SHIPPED | OUTPATIENT
Start: 2019-01-17 | End: 2019-04-13 | Stop reason: SDUPTHER

## 2019-01-23 ENCOUNTER — OFFICE VISIT (OUTPATIENT)
Dept: GASTROENTEROLOGY | Age: 59
End: 2019-01-23
Payer: COMMERCIAL

## 2019-01-23 VITALS
BODY MASS INDEX: 48.4 KG/M2 | HEART RATE: 77 BPM | DIASTOLIC BLOOD PRESSURE: 70 MMHG | SYSTOLIC BLOOD PRESSURE: 132 MMHG | WEIGHT: 293 LBS

## 2019-01-23 DIAGNOSIS — R14.0 ABDOMINAL BLOATING: ICD-10-CM

## 2019-01-23 DIAGNOSIS — K21.00 GASTROESOPHAGEAL REFLUX DISEASE WITH ESOPHAGITIS: Primary | ICD-10-CM

## 2019-01-23 DIAGNOSIS — R13.10 DYSPHAGIA, UNSPECIFIED TYPE: ICD-10-CM

## 2019-01-23 PROCEDURE — G8482 FLU IMMUNIZE ORDER/ADMIN: HCPCS | Performed by: INTERNAL MEDICINE

## 2019-01-23 PROCEDURE — G8417 CALC BMI ABV UP PARAM F/U: HCPCS | Performed by: INTERNAL MEDICINE

## 2019-01-23 PROCEDURE — G8427 DOCREV CUR MEDS BY ELIG CLIN: HCPCS | Performed by: INTERNAL MEDICINE

## 2019-01-23 PROCEDURE — 1036F TOBACCO NON-USER: CPT | Performed by: INTERNAL MEDICINE

## 2019-01-23 PROCEDURE — 3017F COLORECTAL CA SCREEN DOC REV: CPT | Performed by: INTERNAL MEDICINE

## 2019-01-23 PROCEDURE — 99214 OFFICE O/P EST MOD 30 MIN: CPT | Performed by: INTERNAL MEDICINE

## 2019-01-23 ASSESSMENT — ENCOUNTER SYMPTOMS
ABDOMINAL DISTENTION: 1
NAUSEA: 0
CONSTIPATION: 0
VOMITING: 0
ABDOMINAL PAIN: 1
SORE THROAT: 1
SHORTNESS OF BREATH: 1
DIARRHEA: 0
ANAL BLEEDING: 0
SINUS PAIN: 0
RECTAL PAIN: 0
SINUS PRESSURE: 0
BLOOD IN STOOL: 0
TROUBLE SWALLOWING: 1
CHEST TIGHTNESS: 0
COUGH: 0
BACK PAIN: 1

## 2019-01-29 DIAGNOSIS — K21.00 GASTROESOPHAGEAL REFLUX DISEASE WITH ESOPHAGITIS: ICD-10-CM

## 2019-01-29 RX ORDER — PANTOPRAZOLE SODIUM 40 MG/1
TABLET, DELAYED RELEASE ORAL
Qty: 30 TABLET | Refills: 0 | Status: SHIPPED | OUTPATIENT
Start: 2019-01-29 | End: 2019-04-01 | Stop reason: SDUPTHER

## 2019-02-01 ENCOUNTER — TELEPHONE (OUTPATIENT)
Dept: PULMONOLOGY | Age: 59
End: 2019-02-01

## 2019-02-01 DIAGNOSIS — G47.39 OTHER SLEEP APNEA: Primary | ICD-10-CM

## 2019-02-01 DIAGNOSIS — Z99.89 OSA ON CPAP: ICD-10-CM

## 2019-02-01 DIAGNOSIS — G47.33 OSA ON CPAP: ICD-10-CM

## 2019-02-11 ENCOUNTER — HOSPITAL ENCOUNTER (OUTPATIENT)
Dept: GENERAL RADIOLOGY | Age: 59
Discharge: HOME | End: 2019-02-13
Payer: COMMERCIAL

## 2019-02-11 DIAGNOSIS — R13.10 DYSPHAGIA, UNSPECIFIED TYPE: ICD-10-CM

## 2019-02-11 DIAGNOSIS — R13.12 OROPHARYNGEAL DYSPHAGIA: Primary | ICD-10-CM

## 2019-02-11 PROCEDURE — 6360000004 HC RX CONTRAST MEDICATION: Performed by: INTERNAL MEDICINE

## 2019-02-11 PROCEDURE — 92611 MOTION FLUOROSCOPY/SWALLOW: CPT

## 2019-02-11 PROCEDURE — 74230 X-RAY XM SWLNG FUNCJ C+: CPT

## 2019-02-11 PROCEDURE — A4641 RADIOPHARM DX AGENT NOC: HCPCS | Performed by: INTERNAL MEDICINE

## 2019-02-11 RX ADMIN — BARIUM SULFATE 355 ML: 0.6 SUSPENSION ORAL at 13:57

## 2019-02-19 ENCOUNTER — TELEPHONE (OUTPATIENT)
Dept: PODIATRY | Age: 59
End: 2019-02-19

## 2019-02-27 ENCOUNTER — TELEPHONE (OUTPATIENT)
Dept: GASTROENTEROLOGY | Age: 59
End: 2019-02-27

## 2019-03-06 ENCOUNTER — ANESTHESIA (OUTPATIENT)
Dept: ENDOSCOPY | Age: 59
End: 2019-03-06
Payer: COMMERCIAL

## 2019-03-06 ENCOUNTER — HOSPITAL ENCOUNTER (OUTPATIENT)
Age: 59
Setting detail: OUTPATIENT SURGERY
Discharge: HOME | End: 2019-03-06
Attending: INTERNAL MEDICINE | Admitting: INTERNAL MEDICINE
Payer: COMMERCIAL

## 2019-03-06 ENCOUNTER — ANESTHESIA EVENT (OUTPATIENT)
Dept: ENDOSCOPY | Age: 59
End: 2019-03-06
Payer: COMMERCIAL

## 2019-03-06 VITALS
RESPIRATION RATE: 20 BRPM | BODY MASS INDEX: 45.99 KG/M2 | HEIGHT: 67 IN | SYSTOLIC BLOOD PRESSURE: 120 MMHG | OXYGEN SATURATION: 97 % | WEIGHT: 293 LBS | TEMPERATURE: 97.9 F | DIASTOLIC BLOOD PRESSURE: 60 MMHG | HEART RATE: 72 BPM

## 2019-03-06 VITALS
DIASTOLIC BLOOD PRESSURE: 59 MMHG | OXYGEN SATURATION: 98 % | RESPIRATION RATE: 22 BRPM | SYSTOLIC BLOOD PRESSURE: 124 MMHG

## 2019-03-06 PROCEDURE — 43248 EGD GUIDE WIRE INSERTION: CPT | Performed by: INTERNAL MEDICINE

## 2019-03-06 PROCEDURE — 7100000010 HC PHASE II RECOVERY - FIRST 15 MIN: Performed by: INTERNAL MEDICINE

## 2019-03-06 PROCEDURE — 6360000002 HC RX W HCPCS: Performed by: NURSE ANESTHETIST, CERTIFIED REGISTERED

## 2019-03-06 PROCEDURE — 93005 ELECTROCARDIOGRAM TRACING: CPT

## 2019-03-06 PROCEDURE — 3609012700 HC EGD DILATION SAVORY: Performed by: INTERNAL MEDICINE

## 2019-03-06 PROCEDURE — 2580000003 HC RX 258: Performed by: NURSE ANESTHETIST, CERTIFIED REGISTERED

## 2019-03-06 PROCEDURE — C1769 GUIDE WIRE: HCPCS | Performed by: INTERNAL MEDICINE

## 2019-03-06 PROCEDURE — 7100000011 HC PHASE II RECOVERY - ADDTL 15 MIN: Performed by: INTERNAL MEDICINE

## 2019-03-06 PROCEDURE — 3700000000 HC ANESTHESIA ATTENDED CARE: Performed by: INTERNAL MEDICINE

## 2019-03-06 PROCEDURE — 2500000003 HC RX 250 WO HCPCS: Performed by: NURSE ANESTHETIST, CERTIFIED REGISTERED

## 2019-03-06 RX ORDER — SODIUM CHLORIDE 9 MG/ML
INJECTION, SOLUTION INTRAVENOUS CONTINUOUS PRN
Status: DISCONTINUED | OUTPATIENT
Start: 2019-03-06 | End: 2019-03-06 | Stop reason: SDUPTHER

## 2019-03-06 RX ORDER — PROPOFOL 10 MG/ML
INJECTION, EMULSION INTRAVENOUS PRN
Status: DISCONTINUED | OUTPATIENT
Start: 2019-03-06 | End: 2019-03-06 | Stop reason: SDUPTHER

## 2019-03-06 RX ORDER — LIDOCAINE HYDROCHLORIDE 10 MG/ML
INJECTION, SOLUTION EPIDURAL; INFILTRATION; INTRACAUDAL; PERINEURAL PRN
Status: DISCONTINUED | OUTPATIENT
Start: 2019-03-06 | End: 2019-03-06 | Stop reason: SDUPTHER

## 2019-03-06 RX ADMIN — LIDOCAINE HYDROCHLORIDE 50 MG: 10 INJECTION, SOLUTION EPIDURAL; INFILTRATION; INTRACAUDAL at 14:18

## 2019-03-06 RX ADMIN — SODIUM CHLORIDE: 9 INJECTION, SOLUTION INTRAVENOUS at 14:04

## 2019-03-06 RX ADMIN — PROPOFOL 200 MG: 10 INJECTION, EMULSION INTRAVENOUS at 14:18

## 2019-03-06 ASSESSMENT — PAIN SCALES - GENERAL
PAINLEVEL_OUTOF10: 0

## 2019-03-06 ASSESSMENT — ENCOUNTER SYMPTOMS
SHORTNESS OF BREATH: 0
STRIDOR: 0

## 2019-03-06 ASSESSMENT — PAIN - FUNCTIONAL ASSESSMENT: PAIN_FUNCTIONAL_ASSESSMENT: 0-10

## 2019-03-07 LAB
EKG ATRIAL RATE: 64 BPM
EKG P AXIS: 50 DEGREES
EKG P-R INTERVAL: 172 MS
EKG Q-T INTERVAL: 454 MS
EKG QRS DURATION: 86 MS
EKG QTC CALCULATION (BAZETT): 468 MS
EKG R AXIS: 16 DEGREES
EKG T AXIS: 13 DEGREES
EKG VENTRICULAR RATE: 64 BPM

## 2019-03-20 ENCOUNTER — OFFICE VISIT (OUTPATIENT)
Dept: GASTROENTEROLOGY | Age: 59
End: 2019-03-20
Payer: COMMERCIAL

## 2019-03-20 VITALS
DIASTOLIC BLOOD PRESSURE: 89 MMHG | HEART RATE: 72 BPM | SYSTOLIC BLOOD PRESSURE: 129 MMHG | WEIGHT: 293 LBS | BODY MASS INDEX: 48.4 KG/M2

## 2019-03-20 DIAGNOSIS — K21.00 GASTROESOPHAGEAL REFLUX DISEASE WITH ESOPHAGITIS: Primary | ICD-10-CM

## 2019-03-20 DIAGNOSIS — M15.9 PRIMARY OSTEOARTHRITIS INVOLVING MULTIPLE JOINTS: ICD-10-CM

## 2019-03-20 DIAGNOSIS — K58.9 IRRITABLE BOWEL SYNDROME, UNSPECIFIED TYPE: ICD-10-CM

## 2019-03-20 DIAGNOSIS — R13.10 DYSPHAGIA, UNSPECIFIED TYPE: ICD-10-CM

## 2019-03-20 PROCEDURE — G8428 CUR MEDS NOT DOCUMENT: HCPCS | Performed by: INTERNAL MEDICINE

## 2019-03-20 PROCEDURE — 99213 OFFICE O/P EST LOW 20 MIN: CPT | Performed by: INTERNAL MEDICINE

## 2019-03-20 PROCEDURE — G8417 CALC BMI ABV UP PARAM F/U: HCPCS | Performed by: INTERNAL MEDICINE

## 2019-03-20 PROCEDURE — 3017F COLORECTAL CA SCREEN DOC REV: CPT | Performed by: INTERNAL MEDICINE

## 2019-03-20 PROCEDURE — 1036F TOBACCO NON-USER: CPT | Performed by: INTERNAL MEDICINE

## 2019-03-20 PROCEDURE — G8482 FLU IMMUNIZE ORDER/ADMIN: HCPCS | Performed by: INTERNAL MEDICINE

## 2019-03-20 RX ORDER — MULTIVIT-MIN/IRON/FOLIC ACID/K 18-600-40
1000 CAPSULE ORAL DAILY
COMMUNITY
End: 2021-06-11 | Stop reason: SDUPTHER

## 2019-03-20 RX ORDER — LIDOCAINE 50 MG/G
1 PATCH TOPICAL DAILY
Qty: 30 PATCH | Refills: 2 | Status: SHIPPED | OUTPATIENT
Start: 2019-03-20 | End: 2019-04-13 | Stop reason: SDUPTHER

## 2019-03-22 ENCOUNTER — OFFICE VISIT (OUTPATIENT)
Dept: PULMONOLOGY | Age: 59
End: 2019-03-22
Payer: COMMERCIAL

## 2019-03-22 VITALS
RESPIRATION RATE: 12 BRPM | HEART RATE: 81 BPM | BODY MASS INDEX: 45.99 KG/M2 | SYSTOLIC BLOOD PRESSURE: 136 MMHG | OXYGEN SATURATION: 98 % | WEIGHT: 293 LBS | HEIGHT: 67 IN | DIASTOLIC BLOOD PRESSURE: 78 MMHG

## 2019-03-22 DIAGNOSIS — G47.33 OSA ON CPAP: Primary | ICD-10-CM

## 2019-03-22 DIAGNOSIS — E08.22 DIABETES MELLITUS DUE TO UNDERLYING CONDITION WITH STAGE 3 CHRONIC KIDNEY DISEASE, WITHOUT LONG-TERM CURRENT USE OF INSULIN (HCC): ICD-10-CM

## 2019-03-22 DIAGNOSIS — M85.80 OSTEOPENIA, UNSPECIFIED LOCATION: ICD-10-CM

## 2019-03-22 DIAGNOSIS — K21.9 GASTROESOPHAGEAL REFLUX DISEASE WITHOUT ESOPHAGITIS: ICD-10-CM

## 2019-03-22 DIAGNOSIS — E66.01 CLASS 2 SEVERE OBESITY DUE TO EXCESS CALORIES WITH SERIOUS COMORBIDITY IN ADULT, UNSPECIFIED BMI (HCC): ICD-10-CM

## 2019-03-22 DIAGNOSIS — I10 ESSENTIAL HYPERTENSION: ICD-10-CM

## 2019-03-22 DIAGNOSIS — Z99.89 OSA ON CPAP: Primary | ICD-10-CM

## 2019-03-22 DIAGNOSIS — N18.30 DIABETES MELLITUS DUE TO UNDERLYING CONDITION WITH STAGE 3 CHRONIC KIDNEY DISEASE, WITHOUT LONG-TERM CURRENT USE OF INSULIN (HCC): ICD-10-CM

## 2019-03-22 PROCEDURE — 3017F COLORECTAL CA SCREEN DOC REV: CPT | Performed by: INTERNAL MEDICINE

## 2019-03-22 PROCEDURE — G8427 DOCREV CUR MEDS BY ELIG CLIN: HCPCS | Performed by: INTERNAL MEDICINE

## 2019-03-22 PROCEDURE — G8417 CALC BMI ABV UP PARAM F/U: HCPCS | Performed by: INTERNAL MEDICINE

## 2019-03-22 PROCEDURE — 1036F TOBACCO NON-USER: CPT | Performed by: INTERNAL MEDICINE

## 2019-03-22 PROCEDURE — G8482 FLU IMMUNIZE ORDER/ADMIN: HCPCS | Performed by: INTERNAL MEDICINE

## 2019-03-22 PROCEDURE — 99214 OFFICE O/P EST MOD 30 MIN: CPT | Performed by: INTERNAL MEDICINE

## 2019-03-22 ASSESSMENT — SLEEP AND FATIGUE QUESTIONNAIRES
HOW LIKELY ARE YOU TO NOD OFF OR FALL ASLEEP WHILE SITTING AND READING: 1
HOW LIKELY ARE YOU TO NOD OFF OR FALL ASLEEP WHILE SITTING QUIETLY AFTER LUNCH WITHOUT ALCOHOL: 0
HOW LIKELY ARE YOU TO NOD OFF OR FALL ASLEEP WHILE SITTING AND TALKING TO SOMEONE: 0
HOW LIKELY ARE YOU TO NOD OFF OR FALL ASLEEP WHILE WATCHING TV: 1
ESS TOTAL SCORE: 5
HOW LIKELY ARE YOU TO NOD OFF OR FALL ASLEEP WHEN YOU ARE A PASSENGER IN A CAR FOR AN HOUR WITHOUT A BREAK: 0
HOW LIKELY ARE YOU TO NOD OFF OR FALL ASLEEP WHILE SITTING INACTIVE IN A PUBLIC PLACE: 0
HOW LIKELY ARE YOU TO NOD OFF OR FALL ASLEEP IN A CAR, WHILE STOPPED FOR A FEW MINUTES IN TRAFFIC: 0
HOW LIKELY ARE YOU TO NOD OFF OR FALL ASLEEP WHILE LYING DOWN TO REST IN THE AFTERNOON WHEN CIRCUMSTANCES PERMIT: 3

## 2019-04-01 ENCOUNTER — TELEPHONE (OUTPATIENT)
Dept: GASTROENTEROLOGY | Age: 59
End: 2019-04-01

## 2019-04-01 DIAGNOSIS — K21.00 GASTROESOPHAGEAL REFLUX DISEASE WITH ESOPHAGITIS: ICD-10-CM

## 2019-04-01 NOTE — TELEPHONE ENCOUNTER
Patient contacted the office and left a voicemail stating that the ENT that Dr. Rosado referred her to is unable to accommodate her transportation schedule because that is the days the provider does his surgery on. Patient is questioning if Dr. Rosado can refer her to a different ENT. Please discuss with Dr. Rosado and inform the patient of which ENT he would like her to see.

## 2019-04-01 NOTE — TELEPHONE ENCOUNTER
Please address the medication refill and close the encounter. If I can be of assistance, please route to the applicable pool. Thank you. Next Visit Date:  Future Appointments   Date Time Provider Fabby Artisi   4/24/2019  1:15 PM Harjinder Giordano DPM [facility] Podiatry [facility]   5/8/2019 10:30 AM [facility]   7/10/2019  8:45 AM Deandre Sharif MD sv gr lks [facility]   7/26/2019 10:30 AM Christ Siegel  [address] Maintenance   Topic Date Due    Hepatitis B Vaccine (1 of 3 - Risk 3-dose series) 03/09/1979    Shingles Vaccine (1 of 2) 03/09/2010    Lipid screen  06/16/2018    Potassium monitoring  04/27/2019    Creatinine monitoring  04/27/2019    Diabetic microalbuminuria test  10/04/2019    Diabetic foot exam  10/29/2019    A1C test (Diabetic or Prediabetic)  11/05/2019    TSH testing  11/05/2019    Diabetic retinal exam  02/22/2020    Breast cancer screen  12/11/2020    Colon cancer screen colonoscopy  04/16/2023    DTaP/Tdap/Td vaccine (2 - Td) 06/01/2027    Flu vaccine  Completed    Pneumococcal 0-64 years at Risk Vaccine  Completed    Hepatitis C screen  Completed    HIV screen  Completed       Hemoglobin A1C (%)   Date Value   11/05/2018 5.5   07/10/2018 5.4   09/07/2017 5.3             ( goal A1C is < 7)   Microalb/Crt.  Ratio (mcg/mg creat)   Date Value   10/04/2018 CANNOT BE CALCULATED     LDL Cholesterol (mg/dL)   Date Value   05/07/2013 78   10/17/2012 86     LDL Calculated (mg/dL)   Date Value   06/16/2017 68   05/19/2014 104       (goal LDL is <100)   AST (U/L)   Date Value   05/13/2015 18     ALT (U/L)   Date Value   05/13/2015 23     BUN (mg/dL)   Date Value   04/27/2018 13     BP Readings from Last 3 Encounters:   03/22/19 136/78   03/20/19 129/89   03/06/19 120/60          (goal 120/80)    All Future Testing planned in [facility]  Lab Frequency Next Occurrence   Lipid, Fasting Once 04/18/2019   XR HIP BILATERAL W AP PELVIS (2 VIEWS) Once 10/04/2019   XR LUMBAR SPINE (2-3 VIEWS) Once 10/04/2019   ECHO Complete 2D W Doppler W Color Once 11/12/2018   EGD Once 04/25/2019   SLP videofluoroscopic swallow study Once 04/25/2019               Patient Active Problem List:     Glaucoma     GERD (gastroesophageal reflux disease)     DJD (degenerative joint disease)     Obesity     Hypothyroidism     Polyneuropathy     Migraine     Mitral prolapse     Low back pain     CTS (carpal tunnel syndrome)     Supraspinatus syndrome     Impaired fasting glucose     Acute sinus infection     IBS (irritable bowel syndrome)     Back pain, chronic     Stress fracture, right 5th metatarsal      Upper airway cough syndrome     Ear fullness     Perioral numbness     Screening for osteoporosis     Headache     Left eye injury     Medication reaction     Osteopenia     Lumbosacral pain     Flu vaccine need     Hypothyroid     Urinary incontinence     Anxiety     Sleep apnea     Depression     Chronic back pain     Carpal tunnel syndrome     Neuropathy     Mitral valve problem     Morbid obesity with BMI of 45.0-49.9, adult (HCC)     Frozen shoulder     Skin tag     Degenerative disc disease, lumbar     Bilateral edema of lower extremity     Medication refill     Varicose vein of leg     Dizziness     Dysphagia     Abdominal bloating

## 2019-04-01 NOTE — TELEPHONE ENCOUNTER
Last visit:   Last Med refill: 1-30-19  Does patient have enough medication for 72 hours: No:     Next Visit Date:  Future Appointments   Date Time Provider Fabby Juarez   4/24/2019  1:15 PM Zenobia Lugo DPM [facility] Podiatry [facility]   5/8/2019 10:30 AM Dalia Duggan MD [address] FP [facility]   7/10/2019  8:45 AM Baltazar Hickman MD sv gr lks [facility]   7/26/2019 10:30 AM Tania Bullock  [address] Maintenance   Topic Date Due    Hepatitis B Vaccine (1 of 3 - Risk 3-dose series) 03/09/1979    Shingles Vaccine (1 of 2) 03/09/2010    Lipid screen  06/16/2018    Potassium monitoring  04/27/2019    Creatinine monitoring  04/27/2019    Diabetic microalbuminuria test  10/04/2019    Diabetic foot exam  10/29/2019    A1C test (Diabetic or Prediabetic)  11/05/2019    TSH testing  11/05/2019    Diabetic retinal exam  02/22/2020    Breast cancer screen  12/11/2020    Colon cancer screen colonoscopy  04/16/2023    DTaP/Tdap/Td vaccine (2 - Td) 06/01/2027    Flu vaccine  Completed    Pneumococcal 0-64 years at Risk Vaccine  Completed    Hepatitis C screen  Completed    HIV screen  Completed       Hemoglobin A1C (%)   Date Value   11/05/2018 5.5   07/10/2018 5.4   09/07/2017 5.3             ( goal A1C is < 7)   Microalb/Crt.  Ratio (mcg/mg creat)   Date Value   10/04/2018 CANNOT BE CALCULATED     LDL Cholesterol (mg/dL)   Date Value   05/07/2013 78   10/17/2012 86     LDL Calculated (mg/dL)   Date Value   06/16/2017 68   05/19/2014 104       (goal LDL is <100)   AST (U/L)   Date Value   05/13/2015 18     ALT (U/L)   Date Value   05/13/2015 23     BUN (mg/dL)   Date Value   04/27/2018 13     BP Readings from Last 3 Encounters:   03/22/19 136/78   03/20/19 129/89   03/06/19 120/60          (goal 120/80)    All Future Testing planned in [facility]  Lab Frequency Next Occurrence   Lipid, Fasting Once 04/18/2019   XR HIP BILATERAL W AP PELVIS (2 VIEWS) Once 10/04/2019   XR LUMBAR SPINE (2-3 VIEWS) Once

## 2019-04-02 RX ORDER — PANTOPRAZOLE SODIUM 40 MG/1
TABLET, DELAYED RELEASE ORAL
Qty: 30 TABLET | Refills: 0 | Status: SHIPPED | OUTPATIENT
Start: 2019-04-02 | End: 2019-04-29 | Stop reason: SDUPTHER

## 2019-04-03 ENCOUNTER — TELEPHONE (OUTPATIENT)
Dept: GASTROENTEROLOGY | Age: 59
End: 2019-04-03

## 2019-04-03 DIAGNOSIS — R13.12 OROPHARYNGEAL DYSPHAGIA: Primary | ICD-10-CM

## 2019-04-03 RX ORDER — PANTOPRAZOLE SODIUM 40 MG/1
TABLET, DELAYED RELEASE ORAL
Qty: 30 TABLET | Refills: 0 | OUTPATIENT
Start: 2019-04-03

## 2019-04-08 DIAGNOSIS — Z76.0 MEDICATION REFILL: ICD-10-CM

## 2019-04-09 RX ORDER — FOLIC ACID/MV,IRON,MIN/LUTEIN 0.4-18-25
TABLET ORAL
Qty: 30 TABLET | Refills: 0 | Status: SHIPPED | OUTPATIENT
Start: 2019-04-09 | End: 2019-08-26 | Stop reason: SDUPTHER

## 2019-04-13 DIAGNOSIS — M15.9 PRIMARY OSTEOARTHRITIS INVOLVING MULTIPLE JOINTS: ICD-10-CM

## 2019-04-13 DIAGNOSIS — Z76.0 MEDICATION REFILL: ICD-10-CM

## 2019-04-13 DIAGNOSIS — M51.36 DEGENERATIVE DISC DISEASE, LUMBAR: ICD-10-CM

## 2019-04-16 RX ORDER — ASPIRIN 325 MG
TABLET, DELAYED RELEASE (ENTERIC COATED) ORAL
Qty: 30 TABLET | Refills: 0 | Status: SHIPPED | OUTPATIENT
Start: 2019-04-16 | End: 2019-05-02 | Stop reason: SDUPTHER

## 2019-04-16 RX ORDER — PSEUDOEPHED/ACETAMINOPH/DIPHEN 30MG-500MG
1000 TABLET ORAL EVERY 6 HOURS PRN
Qty: 120 TABLET | Refills: 0 | Status: SHIPPED | OUTPATIENT
Start: 2019-04-16 | End: 2019-05-01 | Stop reason: SDUPTHER

## 2019-04-16 RX ORDER — LIDOCAINE 50 MG/G
1 PATCH TOPICAL DAILY
Qty: 30 PATCH | Refills: 2 | Status: ON HOLD | OUTPATIENT
Start: 2019-04-16 | End: 2019-12-09

## 2019-04-16 NOTE — TELEPHONE ENCOUNTER
Please address the medication refill and close the encounter. If I can be of assistance, please route to the applicable pool. Thank you. Last visit: [id number]  Last Med refill: [id number]  Does patient have enough medication for 72 hours:  Next Visit Date:  Future Appointments   Date Time Provider Fabby Juarez   4/24/2019  1:15 PM Margie Prieto DPM [facility] Podiatry [facility]   5/8/2019 10:30 [facility]   7/10/2019  8:45 AM Mary Ann Reis MD sv gr lks [facility]   7/26/2019 10:30 AM Mariama Harrell  [address] Maintenance   Topic Date Due    Hepatitis B Vaccine (1 of 3 - Risk 3-dose series) 03/09/1979    Shingles Vaccine (1 of 2) 03/09/2010    Lipid screen  06/16/2018    Potassium monitoring  04/27/2019    Creatinine monitoring  04/27/2019    Diabetic microalbuminuria test  10/04/2019    Diabetic foot exam  10/29/2019    A1C test (Diabetic or Prediabetic)  11/05/2019    TSH testing  11/05/2019    Diabetic retinal exam  02/22/2020    Breast cancer screen  12/11/2020    Colon cancer screen colonoscopy  04/16/2023    DTaP/Tdap/Td vaccine (2 - Td) 06/01/2027    Flu vaccine  Completed    Pneumococcal 0-64 years Vaccine  Completed    Hepatitis C screen  Completed    HIV screen  Completed       Hemoglobin A1C (%)   Date Value   11/05/2018 5.5   07/10/2018 5.4   09/07/2017 5.3             ( goal A1C is < 7)   Microalb/Crt.  Ratio (mcg/mg creat)   Date Value   10/04/2018 CANNOT BE CALCULATED     LDL Cholesterol (mg/dL)   Date Value   05/07/2013 78   10/17/2012 86     LDL Calculated (mg/dL)   Date Value   06/16/2017 68   05/19/2014 104       (goal LDL is <100)   AST (U/L)   Date Value   05/13/2015 18     ALT (U/L)   Date Value   05/13/2015 23     BUN (mg/dL)   Date Value   04/27/2018 13     BP Readings from Last 3 Encounters:   03/22/19 136/78   03/20/19 129/89   03/06/19 120/60          (goal 120/80)    All Future Testing planned in [facility]  Lab Frequency Next Occurrence   Lipid, Fasting Once 04/18/2019   XR HIP BILATERAL W AP PELVIS (2 VIEWS) Once 10/04/2019   XR LUMBAR SPINE (2-3 VIEWS) Once 10/04/2019   ECHO Complete 2D W Doppler W Color Once 11/12/2018   EGD Once 04/25/2019   SLP videofluoroscopic swallow study Once 04/25/2019               Patient Active Problem List:     Glaucoma     GERD (gastroesophageal reflux disease)     DJD (degenerative joint disease)     Obesity     Hypothyroidism     Polyneuropathy     Migraine     Mitral prolapse     Low back pain     CTS (carpal tunnel syndrome)     Supraspinatus syndrome     Impaired fasting glucose     Acute sinus infection     IBS (irritable bowel syndrome)     Back pain, chronic     Stress fracture, right 5th metatarsal      Upper airway cough syndrome     Ear fullness     Perioral numbness     Screening for osteoporosis     Headache     Left eye injury     Medication reaction     Osteopenia     Lumbosacral pain     Flu vaccine need     Hypothyroid     Urinary incontinence     Anxiety     Sleep apnea     Depression     Chronic back pain     Carpal tunnel syndrome     Neuropathy     Mitral valve problem     Morbid obesity with BMI of 45.0-49.9, adult (HCC)     Frozen shoulder     Skin tag     Degenerative disc disease, lumbar     Bilateral edema of lower extremity     Medication refill     Varicose vein of leg     Dizziness     Dysphagia     Abdominal bloating

## 2019-04-24 ENCOUNTER — OFFICE VISIT (OUTPATIENT)
Dept: PODIATRY | Age: 59
End: 2019-04-24
Payer: COMMERCIAL

## 2019-04-24 VITALS
HEART RATE: 70 BPM | WEIGHT: 293 LBS | HEIGHT: 67 IN | SYSTOLIC BLOOD PRESSURE: 125 MMHG | DIASTOLIC BLOOD PRESSURE: 67 MMHG | BODY MASS INDEX: 45.99 KG/M2

## 2019-04-24 DIAGNOSIS — E11.43 CONTROLLED TYPE 2 DIABETES MELLITUS WITH DIABETIC AUTONOMIC NEUROPATHY, WITHOUT LONG-TERM CURRENT USE OF INSULIN (HCC): ICD-10-CM

## 2019-04-24 DIAGNOSIS — M72.2 PLANTAR FASCIITIS, LEFT: ICD-10-CM

## 2019-04-24 DIAGNOSIS — L30.0 NUMMULAR ECZEMA: ICD-10-CM

## 2019-04-24 DIAGNOSIS — M79.674 PAIN DUE TO ONYCHOMYCOSIS OF TOENAILS OF BOTH FEET: Primary | ICD-10-CM

## 2019-04-24 DIAGNOSIS — M79.675 PAIN DUE TO ONYCHOMYCOSIS OF TOENAILS OF BOTH FEET: Primary | ICD-10-CM

## 2019-04-24 DIAGNOSIS — B35.1 PAIN DUE TO ONYCHOMYCOSIS OF TOENAILS OF BOTH FEET: Primary | ICD-10-CM

## 2019-04-24 PROCEDURE — G8427 DOCREV CUR MEDS BY ELIG CLIN: HCPCS | Performed by: STUDENT IN AN ORGANIZED HEALTH CARE EDUCATION/TRAINING PROGRAM

## 2019-04-24 PROCEDURE — 3017F COLORECTAL CA SCREEN DOC REV: CPT | Performed by: STUDENT IN AN ORGANIZED HEALTH CARE EDUCATION/TRAINING PROGRAM

## 2019-04-24 PROCEDURE — 1036F TOBACCO NON-USER: CPT | Performed by: STUDENT IN AN ORGANIZED HEALTH CARE EDUCATION/TRAINING PROGRAM

## 2019-04-24 PROCEDURE — 2022F DILAT RTA XM EVC RTNOPTHY: CPT | Performed by: STUDENT IN AN ORGANIZED HEALTH CARE EDUCATION/TRAINING PROGRAM

## 2019-04-24 PROCEDURE — 99213 OFFICE O/P EST LOW 20 MIN: CPT | Performed by: STUDENT IN AN ORGANIZED HEALTH CARE EDUCATION/TRAINING PROGRAM

## 2019-04-24 PROCEDURE — 3046F HEMOGLOBIN A1C LEVEL >9.0%: CPT | Performed by: STUDENT IN AN ORGANIZED HEALTH CARE EDUCATION/TRAINING PROGRAM

## 2019-04-24 PROCEDURE — 99212 OFFICE O/P EST SF 10 MIN: CPT | Performed by: STUDENT IN AN ORGANIZED HEALTH CARE EDUCATION/TRAINING PROGRAM

## 2019-04-24 PROCEDURE — 11721 DEBRIDE NAIL 6 OR MORE: CPT | Performed by: STUDENT IN AN ORGANIZED HEALTH CARE EDUCATION/TRAINING PROGRAM

## 2019-04-24 PROCEDURE — G8417 CALC BMI ABV UP PARAM F/U: HCPCS | Performed by: STUDENT IN AN ORGANIZED HEALTH CARE EDUCATION/TRAINING PROGRAM

## 2019-04-24 RX ORDER — BETAMETHASONE DIPROPIONATE 0.5 MG/G
CREAM TOPICAL
Qty: 1 TUBE | Refills: 0 | Status: SHIPPED | OUTPATIENT
Start: 2019-04-24 | End: 2019-07-03 | Stop reason: ALTCHOICE

## 2019-04-24 NOTE — PATIENT INSTRUCTIONS
treatment and safety. Be sure to make and go to all appointments, and call your doctor if you are having problems. It's also a good idea to know your test results and keep a list of the medicines you take. Where can you learn more? Go to [email or web address] and sign in to your [employer] account. Enter B780 in the SkimaTalk box to learn more about \"Plantar Fasciitis: Exercises. \"     If you do not have an account, please click on the \"Sign Up Now\" link. Current as of: September 20, 2018  Content Version: 11.9  © 0957-5434 [employer]. Care instructions adapted under license by [facility] ([facility]). If you have questions about a medical condition or this instruction, always ask your healthcare professional. [address] any warranty or liability for your use of this information. Patient Education        Plantar Fasciitis: Exercises  Your Care Instructions  Here are some examples of typical rehabilitation exercises for your condition. Start each exercise slowly. Ease off the exercise if you start to have pain. Your doctor or physical therapist will tell you when you can start these exercises and which ones will work best for you. How to do the exercises  Towel stretch    7. Sit with your legs extended and knees straight. 8. Place a towel around your foot just under the toes. 9. Hold each end of the towel in each hand, with your hands above your knees. 10. Pull back with the towel so that your foot stretches toward you. 11. Hold the position for at least 15 to 30 seconds. 12. Repeat 2 to 4 times a session, up to 5 sessions a day. Calf stretch    4. Stand facing a wall with your hands on the wall at about eye level. Put the leg you want to stretch about a step behind your other leg. 5. Keeping your back heel on the floor, bend your front knee until you feel a stretch in the back leg. 6. Hold the stretch for 15 to 30 seconds.  Repeat 2 to 4

## 2019-04-24 NOTE — PROGRESS NOTES
disease     Rhinopharyngitis     Allergic rhinopharyngitis    Stress fracture     Right 5th Metatarsal     Syncope     TIA (transient ischemic attack)     Urinary incontinence     Vasodepressor syncope     Wears glasses        Past Surgical History:   Procedure Laterality Date    APPENDECTOMY  1978    CATARACT REMOVAL Left     CHOLECYSTECTOMY  1978    COLONOSCOPY      ESOPHAGEAL DILATATION      EYE SURGERY Bilateral     right iridectomy, right laser, bilateral trabeculectomy, left drain    GLAUCOMA SURGERY      HAND SURGERY Right     HYSTERECTOMY  1982    KNEE SURGERY Right 2000    TUBAL LIGATION  1981    UPPER GASTROINTESTINAL ENDOSCOPY      UPPER GASTROINTESTINAL ENDOSCOPY  5/24/2018    EGD DILATION SAVORY performed by Sander Basurto MD at [facility]  3/6/2019    EGD DILATION SAVORY performed by Ghislaine Epperson MD at [facility]       Prior to Admission medications    Medication Sig Start Date End Date Taking?  Authorizing Provider   betamethasone dipropionate (DIPROLENE) 0.05 % cream Apply small thin layer topically to eczema lesions to foot/leg 4/24/19  Yes Kelly Brandon DPM   ACETAMINOPHEN EXTRA STRENGTH 500 MG tablet TAKE 2 TABLETS BY MOUTH EVERY 6 HOURS AS NEEDED FOR PAIN 4/16/19  Yes Areli Kunz MD   aspirin 325 MG EC tablet TAKE 1 TABLET DAILY 4/16/19  Yes Hilda Greenfield MD   lidocaine (LIDODERM) 5 % PLACE 1 PATCH ONTO THE SKIN DAILY 12 HOURS ON, 12 HOURS OFF. 4/16/19  Yes Areli Kunz MD   Multiple Vitamins-Minerals (CERTAVITE/ANTIOXIDANTS) TABS TAKE 1 TABLET BY MOUTH DAILY 4/9/19  Yes Hilda Greenfield MD   pantoprazole (PROTONIX) 40 MG tablet TAKE 1 TABLET BY MOUTH EVERY MORNING BEFORE BREAKFAST 4/2/19  Yes Juana Bravo MD   vitamin D (CHOLECALCIFEROL) 1000 UNIT TABS tablet Take 1,000 Units by mouth daily   Yes Historical Provider, MD   furosemide (LASIX) 20 MG tablet TAKE 1 TABLET EVERY OTHER DAY 1/4/19  Yes Hilda Greenfield MD   fluticasone (FLONASE) 50 MCG/ACT nasal spray USE 1 SPRAY IN EACH NOSTRIL TWICE A DAY 1/4/19  Yes León Viera MD   VENTOLIN  (90 Base) MCG/ACT inhaler INHALE 2 PUFFS INTO THE LUNGS EVERY SIX HOURS AS NEEDED 11/30/18  Yes León Viera MD   calcium carbonate-vitamin D (CALTRATE) 600-400 MG-UNIT TABS per tab TAKE 1 TABLET BY MOUTH TWICE A DAY 11/12/18  Yes León Viera MD   levothyroxine (SYNTHROID) 100 MCG tablet Take 1 tablet by mouth Daily 11/5/18  Yes León Viera MD   Elastic Bandages & Supports (MEDICAL COMPRESSION STOCKINGS) [address] 1 each by Does not apply route daily as needed (swelling) Grade II: 20-30 7/10/18  Yes León iVera MD   loratadine (CLARITIN) 10 MG tablet TAKE 1 TABLET DAILY 7/10/18  Yes León Virea MD   cyclobenzaprine (FLEXERIL) 5 MG tablet TAKE 1 TABLET AT BEDTIME AS NEEDED FOR MUSCLE SPASMS 6/7/18  Yes Francisco J Saucedo MD   aluminum & magnesium hydroxide-simethicone (MAALOX ADVANCED) 200-200-20 MG/5ML SUSP suspension Take 5 mLs by mouth as needed for Indigestion    Yes Historical Provider, MD     Scheduled Meds:  Continuous Infusions:  PRN Meds:    Allergies   Allergen Reactions    Latex Rash     blisters  blisters    Adhesive Tape Rash     blisters    Amlodipine Other (See Comments)     Facial numbness  Facial numbness  Facial numbness  Facial numbness  Facial numbness  Facial numbness  Facial numbness    Bextra [Valdecoxib] Rash     swelling    Brimonidine Itching     Burning eyes severe    Daypro [Oxaprozin] Anaphylaxis    Diclofenac Sodium Swelling and Shortness Of Breath    Famotidine Other (See Comments)     Blisters down her arms    Hydrocodone-Acetaminophen Nausea Only     Severe chest pain    Hydromorphone Other (See Comments)     Rapid heart beat,  Nausea, spent 3 days in cardiac unit    Ibuprofen      Other reaction(s): bleeding  Other reaction(s): Unknown  Black stools  \"rips up stomach\", black tarry stool  Black stools  \"rips up stomach\", black tarry stool    Lisinopril Nausea Only, Nausea And Vomiting and Other (See Comments)     Other reaction(s): Hypotension    Metoprolol Other (See Comments)     Other reaction(s): Dizziness    Naproxen Other (See Comments)     Chest pain , swelling    Oxycodone-Acetaminophen      Chest pain severe    Rofecoxib Rash     swelling    Shellfish-Derived Products Anaphylaxis and Rash     shrimp  shrimp  shrimp    Simvastatin Nausea And Vomiting     Other reaction(s): muscle cramps    Statins      Other reaction(s): muscle cramps  Tolerates lovastatin. Pravachol caused numbness in head/face    Sulfa Antibiotics Hives    Tetracyclines & Related Itching and Rash    Timoptic [Timolol Maleate] Itching and Swelling     Eyes burning severely    Tramadol Itching and Rash    Fosamax [Alendronate] Nausea Only and Nausea And Vomiting    Nalbuphine Nausea And Vomiting    Alendronate Sodium     Alendronate Sodium     Alphagan [Brimonidine Tartrate]      Eye irritation    Dilaudid [Hydromorphone Hcl]     Doxycycline Monohydrate     Nsaids     Other Itching and Swelling     Eyes burning    Pepcid Complete [Famotidine-Ca Carb-Mag Hydrox] Hives and Other (See Comments)     blisters    Pilocarpine Itching and Swelling     Blurred vision  Eyes burning    Pravastatin Other (See Comments)     Facial numming    Shrimp Flavor Hives and Swelling    Statins Support Therapy      Tolerates lovastatin.  Pravachol caused numbness in head/face    Timoptic [Timolol Maleate]      Eye irritation      Tolectin [Tolmetin]      Unknown reaction  - as a child    Voltaren [Diclofenac Sodium]      Flu symptoms      Nubain [Nalbuphine Hcl] Nausea And Vomiting     Chest pain      Percocet [Oxycodone-Acetaminophen] Nausea And Vomiting     Sweating, chest pain    Tolectin [Tolmetin Sodium] Rash    Tolmetin Sodium Rash    Ultram [Tramadol Hcl] Itching and Rash     Rash on face    Vicodin [Hydrocodone-Acetaminophen] Nausea And Webspaces 1-4 are c/d/i, bilateral. Circular well-circumscribed scaling salmon colored plaques to anterior leg and dorsal foot x3, left. Biopsy site to left leg 100% epithelialized.     Musculoskeletal: Muscle strength 5/5 for all LE muscle groups. No gross deformity, Bilateral. No pain on palpation of lesions. TTP medial calc tuberosity, Left. No TTP to plantar fascial bands or pain with medial-lateral squeeze. 10/29/18 Derm Path  -- Diagnosis --   SKIN, LEFT MEDIAL LEG, PUNCH BIOPSY:            -  SUBACUTE SPONGIOTIC DERMATITIS, COMPATIBLE WITH A CONTACT   DERMATITIS, NUMMULAR ECZEMA OR EARLY STASIS DERMATITIS.      Asessment: Patient is a 61 y.o. female with:    Diagnosis Orders   1. Pain due to onychomycosis of toenails of both feet     2. Controlled type 2 diabetes mellitus with diabetic autonomic neuropathy, without long-term current use of insulin ([facility])  01472 - NV DEBRIDEMENT OF NAILS, 6 OR MORE   3. Nummular eczema  betamethasone dipropionate (DIPROLENE) 0.05 % cream    Faiza Garza MD, Dermatology, [facility]   4.  Plantar fasciitis, left         Plan:   -Patient seen and evaluated  -Current condition and treatment options discussed in detail  -Nails 1-5 b/l debrided in thickness/length with sterile nipper without incident  -Educated pt regarding importance of tight glycemic control, daily foot checks/hygiene, supportive shoe gear, no barefoot ambulation and monitor for SOI  -Rx Betamethasone diproprionate 0.05% cream, apply thin layer to affected lesions once daily  -Referral to Dermatology for long-term management of recurrent eczema  -Plantar fascitis stretching exercises dispensed to patient  -Patient left before all paperwork dispensed - clinic staff will mail Rx/Referral  -Discussed with Dr. Joseluis Mcdaniel    Electronically signed by Jovani Davies DPM on 4/24/2019 at 1:35 PM

## 2019-04-29 DIAGNOSIS — K21.00 GASTROESOPHAGEAL REFLUX DISEASE WITH ESOPHAGITIS: ICD-10-CM

## 2019-04-29 RX ORDER — PANTOPRAZOLE SODIUM 40 MG/1
TABLET, DELAYED RELEASE ORAL
Qty: 30 TABLET | Refills: 0 | Status: SHIPPED | OUTPATIENT
Start: 2019-04-29 | End: 2019-05-02 | Stop reason: SDUPTHER

## 2019-04-29 NOTE — TELEPHONE ENCOUNTER
Last visit: 11/05/2019  Last Med refill:   Does patient have enough medication for 72 hours:     Next Visit Date:  Future Appointments   Date Time Provider Fabby Ann   5/8/2019 10:30 AM MD Ki Egan FP [facility]   5/29/2019 12:45 PM Ravinder Gay DPM [facility] Podiatry [facility]   7/3/2019 10:30 AM Sasha Crump MD [facility]   7/10/2019  8:45 AM Rubin Gibson MD sv gr lks [facility]   7/26/2019 10:30 AM Marie Miner  [address] Maintenance   Topic Date Due    Hepatitis B Vaccine (1 of 3 - Risk 3-dose series) 03/09/1979    Shingles Vaccine (1 of 2) 03/09/2010    Lipid screen  06/16/2018    Potassium monitoring  04/27/2019    Creatinine monitoring  04/27/2019    Diabetic microalbuminuria test  10/04/2019    Diabetic foot exam  10/29/2019    A1C test (Diabetic or Prediabetic)  11/05/2019    TSH testing  11/05/2019    Diabetic retinal exam  02/22/2020    Breast cancer screen  12/11/2020    Colon cancer screen colonoscopy  04/16/2023    DTaP/Tdap/Td vaccine (2 - Td) 06/01/2027    Flu vaccine  Completed    Pneumococcal 0-64 years Vaccine  Completed    Hepatitis C screen  Completed    HIV screen  Completed       Hemoglobin A1C (%)   Date Value   11/05/2018 5.5   07/10/2018 5.4   09/07/2017 5.3             ( goal A1C is < 7)   Microalb/Crt.  Ratio (mcg/mg creat)   Date Value   10/04/2018 CANNOT BE CALCULATED     LDL Cholesterol (mg/dL)   Date Value   05/07/2013 78   10/17/2012 86     LDL Calculated (mg/dL)   Date Value   06/16/2017 68   05/19/2014 104       (goal LDL is <100)   AST (U/L)   Date Value   05/13/2015 18     ALT (U/L)   Date Value   05/13/2015 23     BUN (mg/dL)   Date Value   04/27/2018 13     BP Readings from Last 3 Encounters:   04/24/19 125/67   03/22/19 136/78   03/20/19 129/89          (goal 120/80)    All Future Testing planned in [facility]  Lab Frequency Next Occurrence   Lipid, Fasting Once 07/10/2019   XR HIP BILATERAL W AP PELVIS (2 VIEWS) Once

## 2019-05-01 DIAGNOSIS — M51.36 DEGENERATIVE DISC DISEASE, LUMBAR: ICD-10-CM

## 2019-05-01 RX ORDER — PSEUDOEPHED/ACETAMINOPH/DIPHEN 30MG-500MG
1000 TABLET ORAL EVERY 6 HOURS PRN
Qty: 120 TABLET | Refills: 0 | Status: SHIPPED | OUTPATIENT
Start: 2019-05-01 | End: 2019-10-08 | Stop reason: SDUPTHER

## 2019-05-01 NOTE — TELEPHONE ENCOUNTER
Please address the medication refill and close the encounter. If I can be of assistance, please route to the applicable pool. Thank you. Last visit: [id number]  Last Med refill: [id number]  Does patient have enough medication for 72 hours:     Next Visit Date:  Future Appointments   Date Time Provider Fabby Juarez   5/8/2019 10:30 AM Jazmín Byrd MD [facility] FP [facility]   5/29/2019 12:45 PM Mikey Ny DPM [facility] Pod[facility]ry [facility]   7/3/2019 10:30 AM Genet Recio MD [facility] derm [facility]   7/10/2019  8:45 AM Eagle Erazo MD sv gr lks [facility]   7/26/2019 10:30 AM Sharron Luna  [address] Maintenance   Topic Date Due    Hepatitis B Vaccine (1 of 3 - Risk 3-dose series) 03/09/1979    Shingles Vaccine (1 of 2) 03/09/2010    Lipid screen  06/16/2018    Potassium monitoring  04/27/2019    Creatinine monitoring  04/27/2019    Diabetic microalbuminuria test  10/04/2019    Diabetic foot exam  10/29/2019    A1C test (Diabetic or Prediabetic)  11/05/2019    TSH testing  11/05/2019    Diabetic retinal exam  02/22/2020    Breast cancer screen  12/11/2020    Colon cancer screen colonoscopy  04/16/2023    DTaP/Tdap/Td vaccine (2 - Td) 06/01/2027    Flu vaccine  Completed    Pneumococcal 0-64 years Vaccine  Completed    Hepatitis C screen  Completed    HIV screen  Completed       Hemoglobin A1C (%)   Date Value   11/05/2018 5.5   07/10/2018 5.4   09/07/2017 5.3             ( goal A1C is < 7)   Microalb/Crt.  Ratio (mcg/mg creat)   Date Value   10/04/2018 CANNOT BE CALCULATED     LDL Cholesterol (mg/dL)   Date Value   05/07/2013 78   10/17/2012 86     LDL Calculated (mg/dL)   Date Value   06/16/2017 68   05/19/2014 104       (goal LDL is <100)   AST (U/L)   Date Value   05/13/2015 18     ALT (U/L)   Date Value   05/13/2015 23     BUN (mg/dL)   Date Value   04/27/2018 13     BP Readings from Last 3 Encounters:   04/24/19 125/67   03/22/19 136/78   03/20/19 129/89          (goal 120/80)    All Future Testing planned in [facility]  Lab Frequency Next Occurrence   Lipid, Fasting Once 07/10/2019   XR HIP BILATERAL W AP PELVIS (2 VIEWS) Once 10/04/2019   XR LUMBAR SPINE (2-3 VIEWS) Once 10/04/2019   ECHO Complete 2D W Doppler W Color Once 11/12/2018   EGD Once 04/25/2019   SLP videofluoroscopic swallow study Once 04/25/2019               Patient Active Problem List:     Glaucoma     GERD (gastroesophageal reflux disease)     DJD (degenerative joint disease)     Obesity     Hypothyroidism     Polyneuropathy     Migraine     Mitral prolapse     Low back pain     CTS (carpal tunnel syndrome)     Supraspinatus syndrome     Impaired fasting glucose     Acute sinus infection     IBS (irritable bowel syndrome)     Back pain, chronic     Stress fracture, right 5th metatarsal      Upper airway cough syndrome     Ear fullness     Perioral numbness     Screening for osteoporosis     Headache     Left eye injury     Medication reaction     Osteopenia     Lumbosacral pain     Flu vaccine need     Hypothyroid     Urinary incontinence     Anxiety     Sleep apnea     Depression     Chronic back pain     Carpal tunnel syndrome     Neuropathy     Mitral valve problem     Morbid obesity with BMI of 45.0-49.9, adult (HCC)     Frozen shoulder     Skin tag     Degenerative disc disease, lumbar     Bilateral edema of lower extremity     Medication refill     Varicose vein of leg     Dizziness     Dysphagia     Abdominal bloating

## 2019-05-02 DIAGNOSIS — R60.0 BILATERAL EDEMA OF LOWER EXTREMITY: ICD-10-CM

## 2019-05-02 DIAGNOSIS — R26.89 LOSS OF BALANCE: ICD-10-CM

## 2019-05-02 DIAGNOSIS — E55.9 VITAMIN D DEFICIENCY: ICD-10-CM

## 2019-05-02 DIAGNOSIS — Z76.0 MEDICATION REFILL: ICD-10-CM

## 2019-05-02 DIAGNOSIS — K21.00 GASTROESOPHAGEAL REFLUX DISEASE WITH ESOPHAGITIS: ICD-10-CM

## 2019-05-06 RX ORDER — PANTOPRAZOLE SODIUM 40 MG/1
TABLET, DELAYED RELEASE ORAL
Qty: 30 TABLET | Refills: 0 | Status: SHIPPED | OUTPATIENT
Start: 2019-05-06 | End: 2019-05-20 | Stop reason: SDUPTHER

## 2019-05-06 RX ORDER — ASPIRIN 325 MG
TABLET, DELAYED RELEASE (ENTERIC COATED) ORAL
Qty: 30 TABLET | Refills: 0 | Status: SHIPPED | OUTPATIENT
Start: 2019-05-06 | End: 2019-05-20

## 2019-05-06 RX ORDER — FUROSEMIDE 20 MG/1
TABLET ORAL
Qty: 30 TABLET | Refills: 0 | Status: SHIPPED | OUTPATIENT
Start: 2019-05-06 | End: 2019-05-20

## 2019-05-06 RX ORDER — FLUTICASONE PROPIONATE 50 MCG
SPRAY, SUSPENSION (ML) NASAL
Qty: 32 BOTTLE | Refills: 0 | Status: SHIPPED | OUTPATIENT
Start: 2019-05-06 | End: 2020-04-16 | Stop reason: SDUPTHER

## 2019-05-13 DIAGNOSIS — Z76.0 MEDICATION REFILL: ICD-10-CM

## 2019-05-13 RX ORDER — ASPIRIN 325 MG
TABLET, DELAYED RELEASE (ENTERIC COATED) ORAL
Qty: 30 TABLET | Refills: 0 | Status: SHIPPED | OUTPATIENT
Start: 2019-05-13 | End: 2019-05-20

## 2019-05-20 ENCOUNTER — OFFICE VISIT (OUTPATIENT)
Dept: FAMILY MEDICINE CLINIC | Age: 59
End: 2019-05-20
Payer: COMMERCIAL

## 2019-05-20 VITALS
BODY MASS INDEX: 47.61 KG/M2 | OXYGEN SATURATION: 99 % | DIASTOLIC BLOOD PRESSURE: 78 MMHG | TEMPERATURE: 97.6 F | SYSTOLIC BLOOD PRESSURE: 134 MMHG | HEART RATE: 71 BPM | WEIGHT: 293 LBS | RESPIRATION RATE: 18 BRPM

## 2019-05-20 DIAGNOSIS — Z76.0 MEDICATION REFILL: ICD-10-CM

## 2019-05-20 DIAGNOSIS — E55.9 VITAMIN D DEFICIENCY: ICD-10-CM

## 2019-05-20 DIAGNOSIS — Z13.220 SCREENING FOR HYPERLIPIDEMIA: ICD-10-CM

## 2019-05-20 DIAGNOSIS — K21.00 GASTROESOPHAGEAL REFLUX DISEASE WITH ESOPHAGITIS: Primary | ICD-10-CM

## 2019-05-20 PROCEDURE — 1036F TOBACCO NON-USER: CPT | Performed by: STUDENT IN AN ORGANIZED HEALTH CARE EDUCATION/TRAINING PROGRAM

## 2019-05-20 PROCEDURE — 99213 OFFICE O/P EST LOW 20 MIN: CPT | Performed by: STUDENT IN AN ORGANIZED HEALTH CARE EDUCATION/TRAINING PROGRAM

## 2019-05-20 PROCEDURE — 3017F COLORECTAL CA SCREEN DOC REV: CPT | Performed by: STUDENT IN AN ORGANIZED HEALTH CARE EDUCATION/TRAINING PROGRAM

## 2019-05-20 PROCEDURE — G8417 CALC BMI ABV UP PARAM F/U: HCPCS | Performed by: STUDENT IN AN ORGANIZED HEALTH CARE EDUCATION/TRAINING PROGRAM

## 2019-05-20 PROCEDURE — G8427 DOCREV CUR MEDS BY ELIG CLIN: HCPCS | Performed by: STUDENT IN AN ORGANIZED HEALTH CARE EDUCATION/TRAINING PROGRAM

## 2019-05-20 RX ORDER — ATORVASTATIN CALCIUM 10 MG/1
TABLET, FILM COATED ORAL
Refills: 3 | COMMUNITY
Start: 2019-05-13 | End: 2019-08-16

## 2019-05-20 RX ORDER — ASPIRIN 81 MG/1
81 TABLET ORAL DAILY
Qty: 90 TABLET | Refills: 1 | Status: SHIPPED | OUTPATIENT
Start: 2019-05-20 | End: 2019-08-16

## 2019-05-20 RX ORDER — PANTOPRAZOLE SODIUM 40 MG/1
TABLET, DELAYED RELEASE ORAL
Qty: 90 TABLET | Refills: 1 | Status: SHIPPED | OUTPATIENT
Start: 2019-05-20 | End: 2019-07-08 | Stop reason: SDUPTHER

## 2019-05-20 RX ORDER — ALBUTEROL SULFATE 90 UG/1
AEROSOL, METERED RESPIRATORY (INHALATION)
Qty: 18 G | Refills: 2 | Status: SHIPPED | OUTPATIENT
Start: 2019-05-20 | End: 2020-01-02 | Stop reason: SDUPTHER

## 2019-05-20 ASSESSMENT — ENCOUNTER SYMPTOMS
CHEST TIGHTNESS: 0
COUGH: 1
ABDOMINAL PAIN: 0

## 2019-05-20 NOTE — PROGRESS NOTES
I have reviewed and discussed key elements of [facility] Darrell Andre with the resident including plan of care and follow up and agree with the care rodrigue plan.

## 2019-05-20 NOTE — PROGRESS NOTES
Subjective:      Patient ID: Ras Hahn is a 61 y.o. female care of GERD and asthma presents for follow-up for med refills. She had PFT done many years ago per patient with pulmonology and was started on an albuterol inhaler. Patient says she uses it 3-4 times a week for dyspneic episodes and at times. Occasional night awakenings for cough. Takes PPI daily which helps with her GERD. Had EGD done recently by GI and had esophageal dilation for dysphagia which is now better. HPI    Review of Systems   Constitutional: Negative for fatigue, fever and unexpected weight change. Respiratory: Positive for cough. Negative for chest tightness. Cardiovascular: Negative for chest pain, palpitations and leg swelling. Gastrointestinal: Negative for abdominal pain. Neurological: Negative for dizziness. Objective:   Physical Exam   Constitutional: She is oriented to person, place, and time. She appears well-developed and well-nourished. No distress. Cardiovascular: Normal rate, regular rhythm and normal heart sounds. Exam reveals no gallop and no friction rub. No murmur heard. Pulmonary/Chest: Effort normal and breath sounds normal.   Neurological: She is alert and oriented to person, place, and time. Skin: She is not diaphoretic. Nursing note and vitals reviewed. /78   Pulse 71   Temp 97.6 °F (36.4 °C) (Oral)   Resp 18   Wt (!) 304 lb (137.9 kg)   SpO2 99%   BMI 47.61 kg/m²       Assessment:        1. Gastroesophageal reflux disease with esophagitis  Controlled, cont same and next time can try lower dose. Already had EGD and f/u with GI.    - pantoprazole (PROTONIX) 40 MG tablet; TAKE 1 TABLET BY MOUTH EVERY MORNING BEFORE BREAKFAST  Dispense: 90 tablet; Refill: 1    2. Vitamin D deficiency    - calcium carbonate-vitamin D (CALTRATE) 600-400 MG-UNIT TABS per tab; TAKE 1 TABLET BY MOUTH TWICE A DAY  Dispense: 90 tablet; Refill: 1    3.  Screening for hyperlipidemia    - Lipid Panel; Future    4. Medication refill  Will f/u next month for asthma    - albuterol sulfate HFA (VENTOLIN HFA) 108 (90 Base) MCG/ACT inhaler; INHALE 2 PUFFS INTO THE LUNGS EVERY SIX HOURS AS NEEDED  Dispense: 18 g; Refill: 2    Advised to use flonase daily until next month to evaluate asthma, likely uncontrolled, may need maintenance therapy. 1.  Kaci received counseling on the following healthy behaviors: medication adherence  2. Reviewed prior labs and health maintenance  3. Continue current medications, diet and exercise. 4.  Discussed use, benefit, and side effects of prescribed medications. Barriers to medication compliance addressed. All patient questions answered. Pt voiced understanding. 5.  Patient given educational materials - see patient instructions  6. Was a self-tracking handout given in paper form or via S[employer]? No  If yes, see orders or list here. PHQ Scores 11/5/2018 10/3/2018 7/28/2017   PHQ2 Score 0 0 0   PHQ9 Score 0 0 0     Interpretation of Total Score Depression Severity: 1-4 = Minimal depression, 5-9 = Mild depression, 10-14 = Moderate depression, 15-19 = Moderately severe depression, 20-27 = Severe depression  Normal    Completed Refills   Requested Prescriptions     Signed Prescriptions Disp Refills    aspirin EC 81 MG EC tablet 90 tablet 1     Sig: Take 1 tablet by mouth daily    calcium carbonate-vitamin D (CALTRATE) 600-400 MG-UNIT TABS per tab 90 tablet 1     Sig: TAKE 1 TABLET BY MOUTH TWICE A DAY    pantoprazole (PROTONIX) 40 MG tablet 90 tablet 1     Sig: TAKE 1 TABLET BY MOUTH EVERY MORNING BEFORE BREAKFAST    albuterol sulfate HFA (VENTOLIN HFA) 108 (90 Base) MCG/ACT inhaler 18 g 2     Sig: INHALE 2 PUFFS INTO THE LUNGS EVERY SIX HOURS AS NEEDED       Return in about 1 month (around 6/20/2019) for asthma.           Alyx Chaney MD

## 2019-05-20 NOTE — PROGRESS NOTES
Visit Information    Have you changed or started any medications since your last visit including any over-the-counter medicines, vitamins, or herbal medicines? no   Are you having any side effects from any of your medications? -  no  Have you stopped taking any of your medications? Is so, why? -  yes -     Have you seen any other physician or provider since your last visit? Yes - Records Obtained  Have you had any other diagnostic tests since your last visit? No  Have you been seen in the emergency room and/or had an admission to a hospital since we last saw you? No  Have you had your routine dental cleaning in the past 6 months? no    Have you activated your [employer] account? If not, what are your barriers?  Yes     Patient Care Team:  Bebeto Omer MD as PCP - General (Family Medicine)  Todd Johnson MD as PCP - S Attributed Provider  Adine Baumgarten, DO as Surgeon (Orthopedic Surgery)  Norris Amor MD as Consulting Physician (Cardiology)  Petros Clinton MD as Consulting Physician (Pulmonology)  Laqueta Dandy as Consulting Physician  Melanie Dacosta MD as Consulting Physician (Endocrinology)  Anni Bustamante MD as Consulting Physician (Obstetrics & Gynecology)  Shanta Ricardo MD as Consulting Physician (Gastroenterology)    Medical History Review  Past Medical, Family, and Social History reviewed and does contribute to the patient presenting condition    Health Maintenance   Topic Date Due    Hepatitis B Vaccine (1 of 3 - Risk 3-dose series) 03/09/1979    Shingles Vaccine (1 of 2) 03/09/2010    Lipid screen  06/16/2018    Potassium monitoring  04/27/2019    Creatinine monitoring  04/27/2019    Diabetic microalbuminuria test  10/04/2019    Diabetic foot exam  10/29/2019    A1C test (Diabetic or Prediabetic)  11/05/2019    TSH testing  11/05/2019    Diabetic retinal exam  02/22/2020    Breast cancer screen  12/11/2020    Colon cancer screen colonoscopy  04/16/2023    DTaP/Tdap/Td

## 2019-05-25 DIAGNOSIS — Z76.0 MEDICATION REFILL: ICD-10-CM

## 2019-05-28 RX ORDER — LORATADINE 10 MG/1
TABLET ORAL
Qty: 30 TABLET | Refills: 5 | Status: SHIPPED | OUTPATIENT
Start: 2019-05-28 | End: 2020-04-16 | Stop reason: SDUPTHER

## 2019-05-28 NOTE — TELEPHONE ENCOUNTER
Doppler W Color Once 11/12/2018   EGD Once 04/25/2019   SLP videofluoroscopic swallow study Once 04/25/2019   Lipid Panel Once 05/20/2019               Patient Active Problem List:     Glaucoma     GERD (gastroesophageal reflux disease)     DJD (degenerative joint disease)     Obesity     Hypothyroidism     Polyneuropathy     Migraine     Mitral prolapse     Low back pain     CTS (carpal tunnel syndrome)     Supraspinatus syndrome     Impaired fasting glucose     Acute sinus infection     IBS (irritable bowel syndrome)     Back pain, chronic     Stress fracture, right 5th metatarsal      Upper airway cough syndrome     Ear fullness     Perioral numbness     Screening for osteoporosis     Headache     Left eye injury     Medication reaction     Osteopenia     Lumbosacral pain     Flu vaccine need     Hypothyroid     Urinary incontinence     Anxiety     Sleep apnea     Depression     Chronic back pain     Carpal tunnel syndrome     Neuropathy     Mitral valve problem     Morbid obesity with BMI of 45.0-49.9, adult (HCC)     Frozen shoulder     Skin tag     Degenerative disc disease, lumbar     Bilateral edema of lower extremity     Medication refill     Varicose vein of leg     Dizziness     Dysphagia     Abdominal bloating

## 2019-05-29 ENCOUNTER — OFFICE VISIT (OUTPATIENT)
Dept: PODIATRY | Age: 59
End: 2019-05-29
Payer: COMMERCIAL

## 2019-05-29 ENCOUNTER — HOSPITAL ENCOUNTER (OUTPATIENT)
Dept: GENERAL RADIOLOGY | Age: 59
Discharge: HOME | End: 2019-05-31
Payer: COMMERCIAL

## 2019-05-29 ENCOUNTER — HOSPITAL ENCOUNTER (OUTPATIENT)
Age: 59
Discharge: HOME | End: 2019-05-31
Payer: COMMERCIAL

## 2019-05-29 VITALS
DIASTOLIC BLOOD PRESSURE: 80 MMHG | BODY MASS INDEX: 45.99 KG/M2 | HEIGHT: 67 IN | SYSTOLIC BLOOD PRESSURE: 133 MMHG | WEIGHT: 293 LBS | HEART RATE: 69 BPM

## 2019-05-29 DIAGNOSIS — M79.674 PAIN DUE TO ONYCHOMYCOSIS OF TOENAILS OF BOTH FEET: ICD-10-CM

## 2019-05-29 DIAGNOSIS — B35.1 PAIN DUE TO ONYCHOMYCOSIS OF TOENAILS OF BOTH FEET: ICD-10-CM

## 2019-05-29 DIAGNOSIS — M79.671 RIGHT FOOT PAIN: Primary | ICD-10-CM

## 2019-05-29 DIAGNOSIS — M79.671 RIGHT FOOT PAIN: ICD-10-CM

## 2019-05-29 DIAGNOSIS — E11.43 CONTROLLED TYPE 2 DIABETES MELLITUS WITH DIABETIC AUTONOMIC NEUROPATHY, WITHOUT LONG-TERM CURRENT USE OF INSULIN (HCC): ICD-10-CM

## 2019-05-29 DIAGNOSIS — L30.0 NUMMULAR ECZEMA: ICD-10-CM

## 2019-05-29 DIAGNOSIS — E11.42 TYPE 2 DIABETES MELLITUS WITH DIABETIC POLYNEUROPATHY, WITHOUT LONG-TERM CURRENT USE OF INSULIN (HCC): ICD-10-CM

## 2019-05-29 DIAGNOSIS — M79.675 PAIN DUE TO ONYCHOMYCOSIS OF TOENAILS OF BOTH FEET: ICD-10-CM

## 2019-05-29 PROCEDURE — 99213 OFFICE O/P EST LOW 20 MIN: CPT | Performed by: STUDENT IN AN ORGANIZED HEALTH CARE EDUCATION/TRAINING PROGRAM

## 2019-05-29 PROCEDURE — G8427 DOCREV CUR MEDS BY ELIG CLIN: HCPCS | Performed by: STUDENT IN AN ORGANIZED HEALTH CARE EDUCATION/TRAINING PROGRAM

## 2019-05-29 PROCEDURE — 73630 X-RAY EXAM OF FOOT: CPT

## 2019-05-29 PROCEDURE — 1036F TOBACCO NON-USER: CPT | Performed by: STUDENT IN AN ORGANIZED HEALTH CARE EDUCATION/TRAINING PROGRAM

## 2019-05-29 PROCEDURE — 3017F COLORECTAL CA SCREEN DOC REV: CPT | Performed by: STUDENT IN AN ORGANIZED HEALTH CARE EDUCATION/TRAINING PROGRAM

## 2019-05-29 PROCEDURE — 2022F DILAT RTA XM EVC RTNOPTHY: CPT | Performed by: STUDENT IN AN ORGANIZED HEALTH CARE EDUCATION/TRAINING PROGRAM

## 2019-05-29 PROCEDURE — 11721 DEBRIDE NAIL 6 OR MORE: CPT | Performed by: STUDENT IN AN ORGANIZED HEALTH CARE EDUCATION/TRAINING PROGRAM

## 2019-05-29 PROCEDURE — 99212 OFFICE O/P EST SF 10 MIN: CPT | Performed by: STUDENT IN AN ORGANIZED HEALTH CARE EDUCATION/TRAINING PROGRAM

## 2019-05-29 PROCEDURE — G8417 CALC BMI ABV UP PARAM F/U: HCPCS | Performed by: STUDENT IN AN ORGANIZED HEALTH CARE EDUCATION/TRAINING PROGRAM

## 2019-05-29 PROCEDURE — 3046F HEMOGLOBIN A1C LEVEL >9.0%: CPT | Performed by: STUDENT IN AN ORGANIZED HEALTH CARE EDUCATION/TRAINING PROGRAM

## 2019-05-29 NOTE — PROGRESS NOTES
[facility]ry [facility] Progress Note    Kamila Murphy is a 61 y.o. female who presents to clinic for routine diabetic foot care. Patient is a well-controlled diabetic, last A1C was 5.5% on 11/5/18. She states BS was 128 this morning. Relates nails are thickened/elongated and painful with ambulation, requests debridement. She reports she dropped a fan on her foot yesterday and reports pain to the right foot. She cannot see well and does not know if the foot is injured. Reports pins and needles occasionally to b/l feet, no symptoms of intermittent claudication. She denies wounds but relates recurrence of eczema on left dorsal foot and anterior leg. Patient was previously prescribed betamethasone cream by the clinic after skin biopsy revealed nummular eczema (10/29/19). She states she will follow up with Derm early July. She uses betamethasone for eczema.      Lab Results   Component Value Date    LABA1C 5.5 11/05/2018     Lab Results   Component Value Date     11/05/2018       Past Medical History:   Diagnosis Date    Anxiety     Bronchial asthma     mild, intermittent    Carpal tunnel syndrome     Cataract     Chronic back pain     Chronic sinusitis     Depression     Diet-controlled type 2 diabetes mellitus ([facility] [address] 10/18/2012    DJD (degenerative joint disease)     S1, L3, L4, L5, T12    Edema of leg     bilateral legs    Environmental allergies     GERD (gastroesophageal reflux disease)     Glaucoma     Glucose intolerance (impaired glucose tolerance)     Headache(784.0)     History of bronchitis     Viral    History of diarrhea     HTN (hypertension)     Hyperlipidemia     Hypothyroid     hypothyroid state    Hypothyroidism     IBS (irritable bowel syndrome) 10/2/2012    Legally blind     due to glaucoma    Mild intermittent asthma without complication     MVP (mitral valve prolapse)     Obesity     EMILIA on CPAP     Osteopenia     Pancreatic cyst     Polyneuropathy     Raynaud disease     Rhinopharyngitis     Allergic rhinopharyngitis    Stress fracture     Right 5th Metatarsal     Syncope     TIA (transient ischemic attack)     Urinary incontinence     Vasodepressor syncope     Wears glasses        Past Surgical History:   Procedure Laterality Date    APPENDECTOMY  1978    CATARACT REMOVAL Left     CHOLECYSTECTOMY  1978    COLONOSCOPY      ESOPHAGEAL DILATATION      EYE SURGERY Bilateral     right iridectomy, right laser, bilateral trabeculectomy, left drain    GLAUCOMA SURGERY      HAND SURGERY Right     HYSTERECTOMY  1982    KNEE SURGERY Right 2000    TUBAL LIGATION  1981    UPPER GASTROINTESTINAL ENDOSCOPY      UPPER GASTROINTESTINAL ENDOSCOPY  5/24/2018    EGD DILATION SAVORY performed by Saranya Jose MD at [address] [place] [address]  3/6/2019    EGD DILATION SAVORY performed by Shanda Ignacio MD at [facility]       Prior to Admission medications    Medication Sig Start Date End Date Taking?  Authorizing Provider   loratadine (CLARITIN) 10 MG tablet TAKE 1 TABLET BY MOUTH DAILY 5/28/19  Yes Jaya Preciado MD   atorvastatin (LIPITOR) 10 MG tablet  5/13/19  Yes Historical Provider, MD   aspirin EC 81 MG EC tablet Take 1 tablet by mouth daily 5/20/19  Yes Kayleigh Donis MD   calcium carbonate-vitamin D (CALTRATE) 600-400 MG-UNIT TABS per tab TAKE 1 TABLET BY MOUTH TWICE A DAY 5/20/19  Yes Kayleigh Donis MD   pantoprazole (PROTONIX) 40 MG tablet TAKE 1 TABLET BY MOUTH EVERY MORNING BEFORE BREAKFAST 5/20/19  Yes Johann Christie MD   albuterol sulfate HFA (VENTOLIN HFA) 108 (90 Base) MCG/ACT inhaler INHALE 2 PUFFS INTO THE LUNGS EVERY SIX HOURS AS NEEDED 5/20/19  Yes Kayleigh Donis MD   fluticasone (FLONASE) 50 MCG/ACT nasal spray USE 1 SPRAY IN EACH NOSTRIL TWICE A DAY 5/6/19  Yes Hilda Greenfield MD   ACETAMINOPHEN EXTRA STRENGTH 500 MG tablet TAKE 2 TABLETS BY MOUTH EVERY 6 HOURS AS NEEDED FOR PAIN 5/1/19  Yes Jaya Preciado MD betamethasone dipropionate (DIPROLENE) 0.05 % cream Apply small thin layer topically to eczema lesions to foot/leg 4/24/19  Yes Louise Sabillon DPM   lidocaine (LIDODERM) 5 % PLACE 1 PATCH ONTO THE SKIN DAILY 12 HOURS ON, 12 HOURS OFF. 4/16/19  Yes Renita Valladares MD   Multiple Vitamins-Minerals (CERTAVITE/ANTIOXIDANTS) TABS TAKE 1 TABLET BY MOUTH DAILY 4/9/19  Yes Renita Valladares MD   vitamin D (CHOLECALCIFEROL) 1000 UNIT TABS tablet Take 1,000 Units by mouth daily   Yes Historical Provider, MD   levothyroxine (SYNTHROID) 100 MCG tablet Take 1 tablet by mouth Daily 11/5/18  Yes Renita Valladares MD   Elastic Bandages & Supports (MEDICAL [employer] STOCKINGS) [facility] 1 each by Does not apply route daily as needed (swelling) Grade II: 20-30 7/10/18  Yes Hilda Wills MD   cyclobenzaprine (FLEXERIL) 5 MG tablet TAKE 1 TABLET AT BEDTIME AS NEEDED FOR MUSCLE SPASMS 6/7/18  Yes Jonelle Gómez MD   aluminum & magnesium hydroxide-simethicone (MAALOX ADVANCED) 200-200-20 MG/5ML SUSP suspension Take 5 mLs by mouth as needed for Indigestion    Yes Historical Provider, MD     Scheduled Meds:  Continuous Infusions:  PRN Meds:    Allergies   Allergen Reactions    Latex Rash     blisters  blisters    Adhesive Tape Rash     blisters    Amlodipine Other (See Comments)     Facial numbness  Facial numbness  Facial numbness  Facial numbness  Facial numbness  Facial numbness  Facial numbness    Bextra [Valdecoxib] Rash     swelling    Brimonidine Itching     Burning eyes severe    Daypro [Oxaprozin] Anaphylaxis    Diclofenac Sodium Swelling and Shortness Of Breath    Famotidine Other (See Comments)     Blisters down her arms    Hydrocodone-Acetaminophen Nausea Only     Severe chest pain    Hydromorphone Other (See Comments)     Rapid heart beat,  Nausea, spent 3 days in cardiac unit    Ibuprofen      Other reaction(s): bleeding  Other reaction(s): Unknown  Black stools  \"rips up stomach\", black tarry stool  Black stools  \"rips up stomach\", black tarry stool    Lisinopril Nausea Only, Nausea And Vomiting and Other (See Comments)     Other reaction(s): Hypotension    Metoprolol Other (See Comments)     Other reaction(s): Dizziness    Naproxen Other (See Comments)     Chest pain , swelling    Oxycodone-Acetaminophen      Chest pain severe    Rofecoxib Rash     swelling    Shellfish-Derived Products Anaphylaxis and Rash     shrimp  shrimp  shrimp    Simvastatin Nausea And Vomiting     Other reaction(s): muscle cramps    Statins      Other reaction(s): muscle cramps  Tolerates lovastatin. Pravachol caused numbness in head/face    Sulfa Antibiotics Hives    Tetracyclines & Related Itching and Rash    Timoptic [Timolol Maleate] Itching and Swelling     Eyes burning severely    Tramadol Itching and Rash    Fosamax [Alendronate] Nausea Only and Nausea And Vomiting    Nalbuphine Nausea And Vomiting    Alendronate Sodium     Alendronate Sodium     Alphagan [Brimonidine Tartrate]      Eye irritation    Dilaudid [Hydromorphone Hcl]     Doxycycline Monohydrate     Nsaids     Other Itching and Swelling     Eyes burning    Pepcid Complete [Famotidine-Ca Carb-Mag Hydrox] Hives and Other (See Comments)     blisters    Pilocarpine Itching and Swelling     Blurred vision  Eyes burning    Pravastatin Other (See Comments)     Facial numming    Shrimp Flavor Hives and Swelling    Statins Support Therapy      Tolerates lovastatin.  Pravachol caused numbness in head/face    Timoptic [Timolol Maleate]      Eye irritation      Tolectin [Tolmetin]      Unknown reaction  - as a child    Voltaren [Diclofenac Sodium]      Flu symptoms      Nubain [Nalbuphine Hcl] Nausea And Vomiting     Chest pain      Percocet [Oxycodone-Acetaminophen] Nausea And Vomiting     Sweating, chest pain    Tolectin [Tolmetin Sodium] Rash    Tolmetin Sodium Rash    Ultram [Tramadol Hcl] Itching and Rash     Rash on face    Vicodin are thickened, discolored and elongated. Webspaces 1-4 are c/d/i, bilateral. Circular well-circumscribed scaling salmon colored plaques to anterior leg and dorsal foot x3, left. Biopsy site to left leg 100% epithelialized. Right 3rd digit ecchymosis with flaking epidermis. No deep wound or gross deformity.      Musculoskeletal: Muscle strength 5/5 for all LE muscle groups. No gross deformity, Bilateral. No pain on palpation of lesions. POP to right 3rd and 4th digit. 10/29/18 Derm Path  -- Diagnosis --   SKIN, LEFT MEDIAL LEG, PUNCH BIOPSY:            -  SUBACUTE SPONGIOTIC DERMATITIS, COMPATIBLE WITH A CONTACT   DERMATITIS, NUMMULAR ECZEMA OR EARLY STASIS DERMATITIS.      Asessment: Patient is a 61 y.o. female with:    Diagnosis Orders   1. Right foot pain  XR FOOT RIGHT (MIN 3 VIEWS)   2. Pain due to onychomycosis of toenails of both feet     3. Controlled type 2 diabetes mellitus with diabetic autonomic neuropathy, without long-term current use of insulin ([address]     4. Nummular eczema     5.  Type 2 diabetes mellitus with diabetic polyneuropathy, without long-term current use of insulin ([facility])         Plan:   -Patient seen and evaluated  -Current condition and treatment options discussed in detail  -Nails 1-5 b/l debrided in thickness/length with sterile nipper without incident  -Educated pt regarding importance of tight glycemic control, daily foot checks/hygiene, supportive shoe gear, no barefoot ambulation and monitor for SOI  -Continue Betamethasone diproprionate 0.05% cream, apply thin layer to affected lesions once daily  -Follow up Dermatology for long-term management of recurrent eczema  -Rx XR right foot - will call patient and let her know if fracture is present  -Issac splint daily and continue wearing stiff soled diabetic shoe  -Discussed with Dr. Crow Irizarry    Electronically signed by Carmenza Rausch DPM on 5/29/2019 at 1:35 PM

## 2019-05-29 NOTE — PROGRESS NOTES
Patient instructed to remove shoes and socks, instructed to sit in exam chair. Current PCP name is Becka Liu and date of last visit 5/20/19. Do you have a follow up visit scheduled?   Yes   If yes the date is 6/17/19

## 2019-05-29 NOTE — PATIENT INSTRUCTIONS
Patient Education        Foot Pain: Care Instructions  Your Care Instructions  Foot injuries that cause pain and swelling are fairly common. Almost all sports or home repair projects can cause a misstep that ends up as foot pain. Normal wear and tear, especially as you get older, also can cause foot pain. Most minor foot injuries will heal on their own, and home treatment is usually all you need to do. If you have a severe injury, you may need tests and treatment. Follow-up care is a key part of your treatment and safety. Be sure to make and go to all appointments, and call your doctor if you are having problems. It's also a good idea to know your test results and keep a list of the medicines you take. How can you care for yourself at home? · Take pain medicines exactly as directed. ? If the doctor gave you a prescription medicine for pain, take it as prescribed. ? If you are not taking a prescription pain medicine, ask your doctor if you can take an over-the-counter medicine. · Rest and protect your foot. Take a break from any activity that may cause pain. · Put ice or a cold pack on your foot for 10 to 20 minutes at a time. Put a thin cloth between the ice and your skin. · Prop up the sore foot on a pillow when you ice it or anytime you sit or lie down during the next 3 days. Try to keep it above the level of your heart. This will help reduce swelling. · Your doctor may recommend that you wrap your foot with an elastic bandage. Keep your foot wrapped for as long as your doctor advises. · If your doctor recommends crutches, use them as directed. · Wear roomy footwear. · As soon as pain and swelling end, begin gentle exercises of your foot. Your doctor can tell you which exercises will help. When should you call for help? Call 911 anytime you think you may need emergency care.  For example, call if:    · Your foot turns pale, white, blue, or cold.    Call your doctor now or seek immediate medical care

## 2019-06-03 ENCOUNTER — TELEPHONE (OUTPATIENT)
Dept: GASTROENTEROLOGY | Age: 59
End: 2019-06-03

## 2019-06-19 ENCOUNTER — OFFICE VISIT (OUTPATIENT)
Dept: PODIATRY | Age: 59
End: 2019-06-19
Payer: COMMERCIAL

## 2019-06-19 VITALS
SYSTOLIC BLOOD PRESSURE: 139 MMHG | WEIGHT: 293 LBS | DIASTOLIC BLOOD PRESSURE: 69 MMHG | HEIGHT: 67 IN | HEART RATE: 70 BPM | BODY MASS INDEX: 45.99 KG/M2

## 2019-06-19 DIAGNOSIS — S92.511D CLOSED DISPLACED FRACTURE OF PROXIMAL PHALANX OF LESSER TOE OF RIGHT FOOT WITH ROUTINE HEALING, SUBSEQUENT ENCOUNTER: Primary | ICD-10-CM

## 2019-06-19 PROCEDURE — 99212 OFFICE O/P EST SF 10 MIN: CPT

## 2019-06-19 PROCEDURE — 99213 OFFICE O/P EST LOW 20 MIN: CPT | Performed by: PODIATRIST

## 2019-06-19 NOTE — PROGRESS NOTES
Patient instructed to remove shoes and socks, instructed to sit in exam chair. Current PCP name is  and date of last visit 5/20/19. Do you have a follow up visit scheduled?   Yes 6/24/19

## 2019-06-19 NOTE — PROGRESS NOTES
[facility] Podiatry Clinic Progress Note    Wendel Dancer is a 61 y.o. female who presents to clinic for follow-up of right 2nd toe fracture. States that she has been aminta-taping the toe. Relates that the pain is gone except for some occasional aching. She states that she will be seeing dermatology in a few weeks for the skin lesions on the leg. Had been using steroid cream with some improvement of symptoms. Denies any new problems.    PCP is Renita Valladares MD      Past Medical History:   Diagnosis Date    Anxiety     Bronchial asthma     mild, intermittent    Carpal tunnel syndrome     Cataract     Chronic back pain     Chronic sinusitis     Depression     Diet-controlled type 2 diabetes mellitus ([address] 10/18/2012    DJD (degenerative joint disease)     S1, L3, L4, L5, T12    Edema of leg     bilateral legs    Environmental allergies     GERD (gastroesophageal reflux disease)     Glaucoma     Glucose intolerance (impaired glucose tolerance)     Headache(784.0)     History of bronchitis     Viral    History of diarrhea     HTN (hypertension)     Hyperlipidemia     Hypothyroid     hypothyroid state    Hypothyroidism     IBS (irritable bowel syndrome) 10/2/2012    Legally blind     due to glaucoma    Mild intermittent asthma without complication     MVP (mitral valve prolapse)     Obesity     EMILIA on CPAP     Osteopenia     Pancreatic cyst     Polyneuropathy     Raynaud disease     Rhinopharyngitis     Allergic rhinopharyngitis    Stress fracture     Right 5th Metatarsal     Syncope     TIA (transient ischemic attack)     Urinary incontinence     Vasodepressor syncope     Wears glasses        Past Surgical History:   Procedure Laterality Date    APPENDECTOMY  1978    CATARACT REMOVAL Left     CHOLECYSTECTOMY  1978    COLONOSCOPY      ESOPHAGEAL DILATATION      EYE SURGERY Bilateral     right iridectomy, right laser, bilateral trabeculectomy, left drain    GLAUCOMA SURGERY  HAND SURGERY Right     HYSTERECTOMY  1982    KNEE SURGERY Right 2000    TUBAL LIGATION  1981    UPPER GASTROINTESTINAL ENDOSCOPY      UPPER GASTROINTESTINAL ENDOSCOPY  5/24/2018    EGD DILATION SAVORY performed by Fátima Frank MD at [address] [facility]  3/6/2019    EGD DILATION SAVORY performed by Jj Santoro MD at [facility]       Prior to Admission medications    Medication Sig Start Date End Date Taking?  Authorizing Provider   loratadine (CLARITIN) 10 MG tablet TAKE 1 TABLET BY MOUTH DAILY 5/28/19  Yes Luke Boggs MD   atorvastatin (LIPITOR) 10 MG tablet  5/13/19  Yes Historical Provider, MD   aspirin EC 81 MG EC tablet Take 1 tablet by mouth daily 5/20/19  Yes Zenobia Mason MD   calcium carbonate-vitamin D (CALTRATE) 600-400 MG-UNIT TABS per tab TAKE 1 TABLET BY MOUTH TWICE A DAY 5/20/19  Yes Zenobia Mason MD   pantoprazole (PROTONIX) 40 MG tablet TAKE 1 TABLET BY MOUTH EVERY MORNING BEFORE BREAKFAST 5/20/19  Yes Johann Leblanc MD   albuterol sulfate HFA (VENTOLIN HFA) 108 (90 Base) MCG/ACT inhaler INHALE 2 PUFFS INTO THE LUNGS EVERY SIX HOURS AS NEEDED 5/20/19  Yes Zenobia Mason MD   fluticasone (FLONASE) 50 MCG/ACT nasal spray USE 1 SPRAY IN EACH NOSTRIL TWICE A DAY 5/6/19  Yes Hilda Greenfield MD   ACETAMINOPHEN EXTRA STRENGTH 500 MG tablet TAKE 2 TABLETS BY MOUTH EVERY 6 HOURS AS NEEDED FOR PAIN 5/1/19  Yes Luke Boggs MD   betamethasone dipropionate (DIPROLENE) 0.05 % cream Apply small thin layer topically to eczema lesions to foot/leg 4/24/19  Yes Guevara Katz DPM   lidocaine (LIDODERM) 5 % PLACE 1 PATCH ONTO THE SKIN DAILY 12 HOURS ON, 12 HOURS OFF. 4/16/19  Yes Luke Boggs MD   Multiple Vitamins-Minerals (CERTAVITE/ANTIOXIDANTS) TABS TAKE 1 TABLET BY MOUTH DAILY 4/9/19  Yes Luke Boggs MD   vitamin D (CHOLECALCIFEROL) 1000 UNIT TABS tablet Take 1,000 Units by mouth daily   Yes Historical Provider, MD   levothyroxine (SYNTHROID) 100 MCG tablet Take 1 tablet by mouth Daily 11/5/18  Yes Radha Asher MD   Elastic Bandages & Supports ([facility]) [address] 1 each by Does not apply route daily as needed (swelling) Grade II: 20-30 7/10/18  Yes Hilda Abdi MD   cyclobenzaprine (FLEXERIL) 5 MG tablet TAKE 1 TABLET AT BEDTIME AS NEEDED FOR MUSCLE SPASMS 6/7/18  Yes Stella Butler MD   aluminum & magnesium hydroxide-simethicone (MAALOX ADVANCED) 200-200-20 MG/5ML SUSP suspension Take 5 mLs by mouth as needed for Indigestion    Yes Historical Provider, MD     Scheduled Meds:  Continuous Infusions:  PRN Meds:    Allergies   Allergen Reactions    Latex Rash     blisters  blisters    Adhesive Tape Rash     blisters    Amlodipine Other (See Comments)     Facial numbness  Facial numbness  Facial numbness  Facial numbness  Facial numbness  Facial numbness  Facial numbness    Bextra [Valdecoxib] Rash     swelling    Brimonidine Itching     Burning eyes severe    Daypro [Oxaprozin] Anaphylaxis    Diclofenac Sodium Swelling and Shortness Of Breath    Famotidine Other (See Comments)     Blisters down her arms    Hydrocodone-Acetaminophen Nausea Only     Severe chest pain    Hydromorphone Other (See Comments)     Rapid heart beat,  Nausea, spent 3 days in cardiac unit    Ibuprofen      Other reaction(s): bleeding  Other reaction(s): Unknown  Black stools  \"rips up stomach\", black tarry stool  Black stools  \"rips up stomach\", black tarry stool    Lisinopril Nausea Only, Nausea And Vomiting and Other (See Comments)     Other reaction(s): Hypotension    Metoprolol Other (See Comments)     Other reaction(s): Dizziness    Naproxen Other (See Comments)     Chest pain , swelling    Oxycodone-Acetaminophen      Chest pain severe    Rofecoxib Rash     swelling    Shellfish-Derived Products Anaphylaxis and Rash     shrimp  shrimp  shrimp    Simvastatin Nausea And Vomiting     Other reaction(s): muscle cramps    Statins      Other esophageal cancer    Diabetes Maternal Aunt     Cancer Maternal Aunt         colorectal cancer    Diabetes Maternal Uncle     Cancer Maternal Grandmother         colorectal cancer    Arthritis Maternal Grandmother     Diabetes Maternal Grandmother     High Blood Pressure Maternal Grandmother     Stroke Maternal Grandmother     Arthritis Maternal Grandfather     Arthritis Paternal Grandmother     Cancer Paternal Grandmother     Depression Paternal Grandmother     Heart Disease Paternal Grandmother     High Blood Pressure Paternal Grandmother     High Cholesterol Paternal Grandmother     Mental Illness Paternal Grandmother     Arthritis Paternal Grandfather        Social History     Tobacco Use    Smoking status: Never Smoker    Smokeless tobacco: Never Used   Substance Use Topics    Alcohol use: No     Alcohol/week: 0.0 oz       Lower Extremity Physical Examination:     Vitals:   Vitals:    06/19/19 1248   BP: 139/69   Pulse: 70        Lab Results   Component Value Date    LABA1C 5.5 11/05/2018       Gen: AAOx3, NAD     Vascular: DP/PT pulses are palpable, bilateral. CFT <3 sec to all digits. Skin temp is warm to warm proximal to distal, bilateral. Non-pitting edema to leg, + varicosities noted, bilateral.     Neurologic:  SWMF 5.07g 10/10 sites bilateral.      Dermatologic: Nails 1-5 b/l are thickened, discolored. Webspaces 1-4 are c/d/i, bilateral. Hemosiderin deposition to lower extremity. No open wound, Bilateral.      Musculoskeletal: Muscle strength 5/5 for all LE muscle groups. No gross deformity, Bilateral. No TTP of right 2nd toe or 2nd MTPJ.         Asessment: Patient is a 61 y.o. female with:    Diagnosis Orders   1.  Closed displaced fracture of proximal phalanx of lesser toe of right foot with routine healing, subsequent encounter         Plan:   -Patient seen and evaluated  -Current condition and treatment options discussed in detail  -XR reviewed and discussed with patient -Follow-up with dermatology  -May continue aminta splinting if pain present   -RTC in 3 months  -Call the office if any new problems present or if patient has any questions     Electronically signed by Mata Cortez DPM on 6/19/2019 at 1:51 PM   I performed a history and physical examination of the patient and discussed management with the resident. I reviewed the residents note and agree with the documented findings and plan of care. Any areas of disagreement are noted on the chart. I was personally present for the key portions of any procedures. I have documented in the chart those procedures where I was not present during the key portions. I have reviewed the Podiatry Resident progress note. I agree with the chief complaint, past medical history, past surgical history, allergies, medications, social and family history as documented unless otherwise noted below. Documentation of the HPI, Physical Exam and Medical Decision Making performed by medical students or scribes is based on my personal performance of the HPI, PE and MDM. I have personally evaluated this patient and have completed at least one if not all key elements of the E/M (history, physical exam, and MDM). Additional findings are as noted. I performed a history and physical examination of the patient and discussed management with the resident. I reviewed the residents note and agree with the documented findings and plan of care. Any areas of disagreement are noted on the chart. I was personally present for the key portions of any procedures. I have documented in the chart those procedures where I was not present during the key portions. I have reviewed the Podiatry Resident progress note. I agree with the chief complaint, past medical history, past surgical history, allergies, medications, social and family history as documented unless otherwise noted below.  Documentation of the HPI, Physical Exam and Medical Decision Making performed by medical

## 2019-07-03 ENCOUNTER — OFFICE VISIT (OUTPATIENT)
Dept: DERMATOLOGY | Age: 59
End: 2019-07-03
Payer: COMMERCIAL

## 2019-07-03 VITALS
DIASTOLIC BLOOD PRESSURE: 78 MMHG | SYSTOLIC BLOOD PRESSURE: 137 MMHG | HEART RATE: 71 BPM | HEIGHT: 67 IN | BODY MASS INDEX: 45.99 KG/M2 | WEIGHT: 293 LBS | OXYGEN SATURATION: 97 %

## 2019-07-03 DIAGNOSIS — I87.2 VENOUS STASIS DERMATITIS OF LEFT LOWER EXTREMITY: Primary | ICD-10-CM

## 2019-07-03 PROCEDURE — G8427 DOCREV CUR MEDS BY ELIG CLIN: HCPCS | Performed by: DERMATOLOGY

## 2019-07-03 PROCEDURE — 99212 OFFICE O/P EST SF 10 MIN: CPT | Performed by: DERMATOLOGY

## 2019-07-03 PROCEDURE — 3017F COLORECTAL CA SCREEN DOC REV: CPT | Performed by: DERMATOLOGY

## 2019-07-03 PROCEDURE — G8417 CALC BMI ABV UP PARAM F/U: HCPCS | Performed by: DERMATOLOGY

## 2019-07-03 PROCEDURE — 1036F TOBACCO NON-USER: CPT | Performed by: DERMATOLOGY

## 2019-07-03 RX ORDER — BETAMETHASONE DIPROPIONATE 0.05 %
OINTMENT (GRAM) TOPICAL
Qty: 50 G | Refills: 2 | Status: SHIPPED | OUTPATIENT
Start: 2019-07-03 | End: 2019-10-16

## 2019-07-08 ENCOUNTER — HOSPITAL ENCOUNTER (OUTPATIENT)
Age: 59
Setting detail: SPECIMEN
Discharge: HOME | End: 2019-07-08
Payer: COMMERCIAL

## 2019-07-08 ENCOUNTER — OFFICE VISIT (OUTPATIENT)
Dept: FAMILY MEDICINE CLINIC | Age: 59
End: 2019-07-08
Payer: COMMERCIAL

## 2019-07-08 VITALS
WEIGHT: 293 LBS | OXYGEN SATURATION: 98 % | HEIGHT: 67 IN | HEART RATE: 71 BPM | BODY MASS INDEX: 45.99 KG/M2 | SYSTOLIC BLOOD PRESSURE: 131 MMHG | DIASTOLIC BLOOD PRESSURE: 77 MMHG

## 2019-07-08 DIAGNOSIS — J45.20 MILD INTERMITTENT ASTHMA WITHOUT COMPLICATION: Primary | ICD-10-CM

## 2019-07-08 DIAGNOSIS — K21.00 GASTROESOPHAGEAL REFLUX DISEASE WITH ESOPHAGITIS: ICD-10-CM

## 2019-07-08 DIAGNOSIS — Z13.220 SCREENING FOR HYPERLIPIDEMIA: ICD-10-CM

## 2019-07-08 LAB
CHOLESTEROL/HDL RATIO: 3.4
CHOLESTEROL: 162 MG/DL
HDLC SERPL-MCNC: 47 MG/DL
LDL CHOLESTEROL: 97 MG/DL (ref 0–130)
TRIGL SERPL-MCNC: 88 MG/DL
VLDLC SERPL CALC-MCNC: NORMAL MG/DL (ref 1–30)

## 2019-07-08 PROCEDURE — 3017F COLORECTAL CA SCREEN DOC REV: CPT | Performed by: STUDENT IN AN ORGANIZED HEALTH CARE EDUCATION/TRAINING PROGRAM

## 2019-07-08 PROCEDURE — G8417 CALC BMI ABV UP PARAM F/U: HCPCS | Performed by: STUDENT IN AN ORGANIZED HEALTH CARE EDUCATION/TRAINING PROGRAM

## 2019-07-08 PROCEDURE — 1036F TOBACCO NON-USER: CPT | Performed by: STUDENT IN AN ORGANIZED HEALTH CARE EDUCATION/TRAINING PROGRAM

## 2019-07-08 PROCEDURE — G8427 DOCREV CUR MEDS BY ELIG CLIN: HCPCS | Performed by: STUDENT IN AN ORGANIZED HEALTH CARE EDUCATION/TRAINING PROGRAM

## 2019-07-08 PROCEDURE — 99213 OFFICE O/P EST LOW 20 MIN: CPT | Performed by: STUDENT IN AN ORGANIZED HEALTH CARE EDUCATION/TRAINING PROGRAM

## 2019-07-08 RX ORDER — PANTOPRAZOLE SODIUM 40 MG/1
TABLET, DELAYED RELEASE ORAL
Qty: 90 TABLET | Refills: 1 | Status: SHIPPED | OUTPATIENT
Start: 2019-07-08 | End: 2019-08-07

## 2019-07-08 ASSESSMENT — ENCOUNTER SYMPTOMS
NAUSEA: 0
SORE THROAT: 0
VOMITING: 0
PHOTOPHOBIA: 0
COUGH: 0
SHORTNESS OF BREATH: 0
WHEEZING: 0
EYE PAIN: 0
ABDOMINAL PAIN: 0
RHINORRHEA: 0

## 2019-08-07 ENCOUNTER — OFFICE VISIT (OUTPATIENT)
Dept: GASTROENTEROLOGY | Age: 59
End: 2019-08-07
Payer: COMMERCIAL

## 2019-08-07 VITALS — SYSTOLIC BLOOD PRESSURE: 125 MMHG | HEART RATE: 68 BPM | DIASTOLIC BLOOD PRESSURE: 77 MMHG

## 2019-08-07 DIAGNOSIS — K21.9 GASTROESOPHAGEAL REFLUX DISEASE WITHOUT ESOPHAGITIS: Primary | ICD-10-CM

## 2019-08-07 PROCEDURE — G8417 CALC BMI ABV UP PARAM F/U: HCPCS | Performed by: INTERNAL MEDICINE

## 2019-08-07 PROCEDURE — 99213 OFFICE O/P EST LOW 20 MIN: CPT | Performed by: INTERNAL MEDICINE

## 2019-08-07 PROCEDURE — G8427 DOCREV CUR MEDS BY ELIG CLIN: HCPCS | Performed by: INTERNAL MEDICINE

## 2019-08-07 PROCEDURE — 1036F TOBACCO NON-USER: CPT | Performed by: INTERNAL MEDICINE

## 2019-08-07 PROCEDURE — 3017F COLORECTAL CA SCREEN DOC REV: CPT | Performed by: INTERNAL MEDICINE

## 2019-08-07 RX ORDER — PANTOPRAZOLE SODIUM 40 MG/1
40 TABLET, DELAYED RELEASE ORAL
Qty: 60 TABLET | Refills: 5 | Status: SHIPPED | OUTPATIENT
Start: 2019-08-07 | End: 2020-03-11

## 2019-08-07 NOTE — PROGRESS NOTES
DIGESTIVE HEALTH PROGRESS NOTE    HISTORY OF PRESENT ILLNESS: Ms. Jaxon Cabral is a 61 y.o. female who presents for follow up on GERD. She continues to report sensation of residual food in her throat. Occasional heartburns. ENT evaluation was suggestive of acid reflux. She reports left upper quadrant cramping pain lasting for seconds. Intermittent. Bowels moving okay. She takes Protonix once a day. Past Medical, Family, and Social History reviewed and does not contribute to the patient presenting condition. Patient's PMH/PSH,SH,PSYCH Hx, MEDs, ALLERGIES, and ROS were all reviewed and updated in the appropriate sections.     PAST MEDICAL HISTORY:  Past Medical History:   Diagnosis Date    Anxiety     Bronchial asthma     mild, intermittent    Carpal tunnel syndrome     Cataract     Chronic back pain     Chronic sinusitis     Depression     [facility]2012    due to underlying condition with stage 3 chronic kidney disease, without long-termcurrent use of insulin    DJD (degenerative joint disease)     S1, L3, L4, L5, T12    Edema of leg     bilateral legs    Environmental allergies     GERD (gastroesophageal reflux disease)     Glaucoma     Glucose intolerance (impaired glucose tolerance)     Headache(784.0)     History of bronchitis     Viral    History of diarrhea     HTN (hypertension)     Hyperlipidemia     Hypothyroid     hypothyroid state    Hypothyroidism     IBS (irritable bowel syndrome) 10/2/2012    Legally blind     due to glaucoma    Mild intermittent asthma without complication     MVP (mitral valve prolapse)     Obesity     Class 2 severe obesity due to excess calories with serious comorbidity in adult    EMILIA on CPAP     Osteopenia     Pancreatic cyst     Polyneuropathy     Raynaud disease     Rhinopharyngitis     Allergic rhinopharyngitis    Stress fracture     Right 5th Metatarsal     Syncope     TIA (transient ischemic attack)     Urinary incontinence     Vasodepressor syncope     Wears glasses        Past Surgical History:   Procedure Laterality Date    APPENDECTOMY  1978    CATARACT REMOVAL Left     CHOLECYSTECTOMY  1978    COLONOSCOPY      ESOPHAGEAL DILATATION      EYE SURGERY Bilateral     right iridectomy, right laser, bilateral trabeculectomy, left drain    GLAUCOMA SURGERY      HAND SURGERY Right     HYSTERECTOMY  1982    KNEE SURGERY Right 2000    TUBAL LIGATION  1981    UPPER GASTROINTESTINAL ENDOSCOPY      UPPER GASTROINTESTINAL ENDOSCOPY  5/24/2018    EGD DILATION SAVORY performed by [facility] MD [facility]  3/6/2019    EGD DILATION SAVORY performed by Wilmer Gonzalez MD at [facility]       CURRENT MEDICATIONS:    Current Outpatient Medications:     pantoprazole (PROTONIX) 40 MG tablet, TAKE 1 TABLET BY MOUTH EVERY MORNING BEFORE BREAKFAST, Disp: 90 tablet, Rfl: 1    betamethasone dipropionate (DIPROLENE) 0.05 % ointment, Apply topically twice daily most stubborn areas of rash, Disp: 50 g, Rfl: 2    triamcinolone (KENALOG) 0.1 % ointment, Apply to rash twice daily (not face, armpit or groin), Disp: 80 g, Rfl: 2    loratadine (CLARITIN) 10 MG tablet, TAKE 1 TABLET BY MOUTH DAILY, Disp: 30 tablet, Rfl: 5    atorvastatin (LIPITOR) 10 MG tablet, , Disp: , Rfl: 3    aspirin EC 81 MG EC tablet, Take 1 tablet by mouth daily, Disp: 90 tablet, Rfl: 1    calcium carbonate-vitamin D (CALTRATE) 600-400 MG-UNIT TABS per tab, TAKE 1 TABLET BY MOUTH TWICE A DAY, Disp: 90 tablet, Rfl: 1    albuterol sulfate HFA (VENTOLIN HFA) 108 (90 Base) MCG/ACT inhaler, INHALE 2 PUFFS INTO THE LUNGS EVERY SIX HOURS AS NEEDED, Disp: 18 g, Rfl: 2    fluticasone (FLONASE) 50 MCG/ACT nasal spray, USE 1 SPRAY IN EACH NOSTRIL TWICE A DAY, Disp: 32 Bottle, Rfl: 0    ACETAMINOPHEN EXTRA STRENGTH 500 MG tablet, TAKE 2 TABLETS BY MOUTH EVERY 6 HOURS AS NEEDED FOR PAIN, Disp: 120 tablet, Rfl: 0    lidocaine (LIDODERM) 5 %, PLACE 1 PATCH ONTO THE SKIN DAILY 12 HOURS ON, 12 HOURS OFF., Disp: 30 patch, Rfl: 2    Multiple Vitamins-Minerals (CERTAVITE/ANTIOXIDANTS) TABS, TAKE 1 TABLET BY MOUTH DAILY, Disp: 30 tablet, Rfl: 0    Cholecalciferol (VITAMIN D) 2000 units CAPS capsule, Take 1,000 Units by mouth daily , Disp: , Rfl:     levothyroxine (SYNTHROID) 100 MCG tablet, Take 1 tablet by mouth Daily, Disp: 30 tablet, Rfl: 0    Elastic Bandages & Supports (MEDICAL COMPRESSION STOCKINGS) MISC, 1 each by Does not apply route daily as needed (swelling) Grade II: 20-30, Disp: 1 each, Rfl: 0    cyclobenzaprine (FLEXERIL) 5 MG tablet, TAKE 1 TABLET AT BEDTIME AS NEEDED FOR MUSCLE SPASMS, Disp: 30 tablet, Rfl: 0    aluminum & magnesium hydroxide-simethicone (MAALOX ADVANCED) 200-200-20 MG/5ML SUSP suspension, Take 5 mLs by mouth as needed for Indigestion , Disp: , Rfl:     ALLERGIES:   Allergies   Allergen Reactions    Latex Rash     blisters  blisters    Adhesive Tape Rash     blisters    Amlodipine Other (See Comments)     Facial numbness  Facial numbness  Facial numbness  Facial numbness  Facial numbness  Facial numbness  Facial numbness    Bextra [Valdecoxib] Rash     swelling    Brimonidine Itching     Burning eyes severe    Daypro [Oxaprozin] Anaphylaxis    Diclofenac Sodium Swelling and Shortness Of Breath    Famotidine Other (See Comments)     Blisters down her arms    Hydrocodone-Acetaminophen Nausea Only     Severe chest pain    Hydromorphone Other (See Comments)     Rapid heart beat,  Nausea, spent 3 days in cardiac unit    Ibuprofen      Other reaction(s): bleeding  Other reaction(s): Unknown  Black stools  \"rips up stomach\", black tarry stool  Black stools  \"rips up stomach\", black tarry stool    Lisinopril Nausea Only, Nausea And Vomiting and Other (See Comments)     Other reaction(s): Hypotension    Metoprolol Other (See Comments)     Other reaction(s): Moist oral mucosae, no lesions or ulcers. The neck is supple, without lymphadenopathy or jugular venous distension. No masses. Normal thyroid. Cardiovascular: S1 S2 RRR no rubs or murmurs. Pulmonary: clear BL. No accessory muscle usage. Abdominal Exam: Soft, NT ND, no hepato or spleno megaly, +BS, no ascites. LABORATORY DATA: Reviewed  Lab Results   Component Value Date    WBC 8.5 04/27/2018    HGB 14.6 04/27/2018    HCT 44.6 04/27/2018    MCV 93.7 04/27/2018     04/27/2018     (H) 04/27/2018    K 4.5 04/27/2018     04/27/2018    CO2 30 04/27/2018    BUN 13 04/27/2018    CREATININE 0.69 04/27/2018    LABPROT 6.5 10/17/2012    LABALBU 3.8 05/13/2015    BILITOT 0.52 05/13/2015    ALKPHOS 68 05/13/2015    AST 18 05/13/2015    ALT 23 05/13/2015    INR 0.9 06/29/2013         Lab Results   Component Value Date    RBC 4.76 04/27/2018    HGB 14.6 04/27/2018    MCV 93.7 04/27/2018    MCH 30.7 04/27/2018    MCHC 32.7 04/27/2018    RDW 12.1 04/27/2018    MPV 10.9 04/27/2018    BASOPCT 1 04/27/2018    LYMPHSABS 2.57 04/27/2018    MONOSABS 0.50 04/27/2018    NEUTROABS 5.16 04/27/2018    EOSABS 0.16 04/27/2018    BASOSABS 0.04 04/27/2018         DIAGNOSTIC TESTING:   No results found. IMPRESSION: Ms. Mary Chandler is a 61 y.o. female with GERD. Possible laryngeal pharyngeal reflux. Trial of Protonix twice a day. Follow up in 5 to 6 weeks. Cordelia Lucas MD [facility]      Please note that this chart was generated using voice recognition Dragon dictation software. Although every effort was made to ensure the accuracy of this automated transcription, some errors in transcription may have occurred.

## 2019-08-16 ENCOUNTER — OFFICE VISIT (OUTPATIENT)
Dept: PULMONOLOGY | Age: 59
End: 2019-08-16
Payer: COMMERCIAL

## 2019-08-16 VITALS
BODY MASS INDEX: 45.99 KG/M2 | HEIGHT: 67 IN | WEIGHT: 293 LBS | OXYGEN SATURATION: 99 % | SYSTOLIC BLOOD PRESSURE: 130 MMHG | RESPIRATION RATE: 12 BRPM | HEART RATE: 65 BPM | DIASTOLIC BLOOD PRESSURE: 74 MMHG

## 2019-08-16 DIAGNOSIS — Z99.89 OSA ON CPAP: Primary | ICD-10-CM

## 2019-08-16 DIAGNOSIS — K21.9 GASTROESOPHAGEAL REFLUX DISEASE WITHOUT ESOPHAGITIS: ICD-10-CM

## 2019-08-16 DIAGNOSIS — M85.80 OSTEOPENIA, UNSPECIFIED LOCATION: ICD-10-CM

## 2019-08-16 DIAGNOSIS — E08.00 DIABETES MELLITUS DUE TO UNDERLYING CONDITION WITH HYPEROSMOLARITY WITHOUT COMA, WITHOUT LONG-TERM CURRENT USE OF INSULIN (HCC): ICD-10-CM

## 2019-08-16 DIAGNOSIS — E66.01 CLASS 2 SEVERE OBESITY DUE TO EXCESS CALORIES WITH SERIOUS COMORBIDITY IN ADULT, UNSPECIFIED BMI (HCC): ICD-10-CM

## 2019-08-16 DIAGNOSIS — G47.33 OSA ON CPAP: Primary | ICD-10-CM

## 2019-08-16 DIAGNOSIS — I10 ESSENTIAL HYPERTENSION: ICD-10-CM

## 2019-08-16 PROCEDURE — 3017F COLORECTAL CA SCREEN DOC REV: CPT | Performed by: INTERNAL MEDICINE

## 2019-08-16 PROCEDURE — 1036F TOBACCO NON-USER: CPT | Performed by: INTERNAL MEDICINE

## 2019-08-16 PROCEDURE — 99213 OFFICE O/P EST LOW 20 MIN: CPT | Performed by: INTERNAL MEDICINE

## 2019-08-16 PROCEDURE — G8417 CALC BMI ABV UP PARAM F/U: HCPCS | Performed by: INTERNAL MEDICINE

## 2019-08-16 PROCEDURE — G8427 DOCREV CUR MEDS BY ELIG CLIN: HCPCS | Performed by: INTERNAL MEDICINE

## 2019-08-16 RX ORDER — ROSUVASTATIN CALCIUM 5 MG/1
TABLET, COATED ORAL
Refills: 3 | COMMUNITY
Start: 2019-08-05 | End: 2020-01-02 | Stop reason: SDUPTHER

## 2019-08-16 RX ORDER — ASPIRIN 325 MG
325 TABLET ORAL DAILY
COMMUNITY
End: 2019-10-03

## 2019-08-16 ASSESSMENT — SLEEP AND FATIGUE QUESTIONNAIRES
HOW LIKELY ARE YOU TO NOD OFF OR FALL ASLEEP IN A CAR, WHILE STOPPED FOR A FEW MINUTES IN TRAFFIC: 0
HOW LIKELY ARE YOU TO NOD OFF OR FALL ASLEEP WHILE SITTING AND TALKING TO SOMEONE: 0
HOW LIKELY ARE YOU TO NOD OFF OR FALL ASLEEP WHILE SITTING INACTIVE IN A PUBLIC PLACE: 0
HOW LIKELY ARE YOU TO NOD OFF OR FALL ASLEEP WHILE SITTING AND READING: 1
HOW LIKELY ARE YOU TO NOD OFF OR FALL ASLEEP WHILE SITTING QUIETLY AFTER LUNCH WITHOUT ALCOHOL: 0
HOW LIKELY ARE YOU TO NOD OFF OR FALL ASLEEP WHILE WATCHING TV: 1
HOW LIKELY ARE YOU TO NOD OFF OR FALL ASLEEP WHEN YOU ARE A PASSENGER IN A CAR FOR AN HOUR WITHOUT A BREAK: 0
HOW LIKELY ARE YOU TO NOD OFF OR FALL ASLEEP WHILE LYING DOWN TO REST IN THE AFTERNOON WHEN CIRCUMSTANCES PERMIT: 3
ESS TOTAL SCORE: 5

## 2019-08-16 NOTE — PROGRESS NOTES
NOT MET   [x]      3. REFUSED                    []        REASON CRITERIA NOT MET     1. SMOKING LESS THAN 30 PY  [x]      2. AGE LESS THAN 55 or GREATER 77 YEARS  []      3. QUIT SMOKING 15 YEARS OR GREATER   []      4. RECENT CT WITH IN 11 MONTHS    []      5. LIFE EXPECTANCY < 5 YEARS   []      6.  SIGNS  AND SYMPTOMS OF LUNG CANCER   []           Immunization   Immunization History   Administered Date(s) Administered    Influenza 10/18/2012, 10/22/2013    Influenza Vaccine, unspecified formulation 10/22/2013    Influenza Virus Vaccine 09/28/2015, 10/04/2016, 10/27/2017, 10/04/2018    Influenza Whole 09/24/2010    Influenza, Rosary Cheri, 3 Years and older, IM (Fluzone 3 yrs and older or Afluria 5 yrs and older) 10/04/2016, 10/27/2017, 10/04/2018    Pneumococcal Polysaccharide ([id number]) 12/08/2008    Tdap (Boostrix, Adacel) 06/01/2017        Pneumococcal Vaccine     [x] Up to date    [] Indicated   [] Refused  [] Contraindicated       Influenza Vaccine   [x] Up to date    [] Indicated   [] Refused  [] Contraindicated       PAST MEDICAL HISTORY:         Diagnosis Date    Anxiety     Bronchial asthma     mild, intermittent    Carpal tunnel syndrome     Cataract     Chronic back pain     Chronic sinusitis     Depression     Diet-controlled type 2 diabetes mellitus ([facility] 10/18/2012    due to underlying condition with stage 3 chronic kidney disease, without long-termcurrent use of insulin    DJD (degenerative joint disease)     S1, L3, L4, L5, T12    Edema of leg     bilateral legs    Environmental allergies     GERD (gastroesophageal reflux disease)     Glaucoma     Glucose intolerance (impaired glucose tolerance)     Headache(784.0)     History of bronchitis     Viral    History of diarrhea     HTN (hypertension)     Hyperlipidemia     Hypothyroid     hypothyroid state    Hypothyroidism     IBS (irritable bowel syndrome) 10/2/2012    Legally blind     due to glaucoma    Mild intermittent  Timoptic [Timolol Maleate] Itching and Swelling     Eyes burning severely    Tramadol Itching and Rash    Fosamax [Alendronate] Nausea Only and Nausea And Vomiting    Nalbuphine Nausea And Vomiting    Alendronate Sodium     Alendronate Sodium     Alphagan [Brimonidine Tartrate]      Eye irritation    Dilaudid [Hydromorphone Hcl]     Doxycycline Monohydrate     Nsaids     Other Itching and Swelling     Eyes burning    Pepcid Complete [Famotidine-Ca Carb-Mag Hydrox] Hives and Other (See Comments)     blisters    Pilocarpine Itching and Swelling     Blurred vision  Eyes burning    Pravastatin Other (See Comments)     Facial numming    Shrimp Flavor Hives and Swelling    Statins Support Therapy      Tolerates lovastatin. Pravachol caused numbness in head/face    Timoptic [Timolol Maleate]      Eye irritation      Tolectin [Tolmetin]      Unknown reaction  - as a child    Voltaren [Diclofenac Sodium]      Flu symptoms      Nubain [Nalbuphine Hcl] Nausea And Vomiting     Chest pain      Percocet [Oxycodone-Acetaminophen] Nausea And Vomiting     Sweating, chest pain    Tolectin [Tolmetin Sodium] Rash    Tolmetin Sodium Rash    Ultram [Tramadol Hcl] Itching and Rash     Rash on face    Vicodin [Hydrocodone-Acetaminophen] Nausea And Vomiting     swearing    Vioxx Rash     Social History     Tobacco Use   Smoking Status Never Smoker   Smokeless Tobacco Never Used     Prior to Admission medications    Medication Sig Start Date End Date Taking?  Authorizing Provider   rosuvastatin (CRESTOR) 5 MG tablet  8/5/19  Yes Historical Provider, MD   aspirin 325 MG tablet Take 325 mg by mouth daily   Yes Historical Provider, MD   pantoprazole (PROTONIX) 40 MG tablet Take 1 tablet by mouth 2 times daily (before meals) 8/7/19  Yes Kathy Elizabeth MD   betamethasone dipropionate (DIPROLENE) 0.05 % ointment Apply topically twice daily most stubborn areas of rash 7/3/19  Yes Dang Baxter MD   triamcinolone (KENALOG) 0.1 % ointment Apply to rash twice daily (not face, armpit or groin) 7/3/19  Yes Stella Breaux MD   loratadine (CLARITIN) 10 MG tablet TAKE 1 TABLET BY MOUTH DAILY 5/28/19  Yes Catherine Rose MD   calcium carbonate-vitamin D (CALTRATE) 600-400 MG-UNIT TABS per tab TAKE 1 TABLET BY MOUTH TWICE A DAY 5/20/19  Yes Johann Salazar MD   albuterol sulfate HFA (VENTOLIN HFA) 108 (90 Base) MCG/ACT inhaler INHALE 2 PUFFS INTO THE LUNGS EVERY SIX HOURS AS NEEDED 5/20/19  Yes Johann Salazar MD   fluticasone (FLONASE) 50 MCG/ACT nasal spray USE 1 SPRAY IN EACH NOSTRIL TWICE A DAY 5/6/19  Yes Hilda Greenfield MD   ACETAMINOPHEN EXTRA STRENGTH 500 MG tablet TAKE 2 TABLETS BY MOUTH EVERY 6 HOURS AS NEEDED FOR PAIN 5/1/19  Yes Hilda Greenfield MD   lidocaine (LIDODERM) 5 % PLACE 1 PATCH ONTO THE SKIN DAILY 12 HOURS ON, 12 HOURS OFF. 4/16/19  Yes Catherine Rose MD   Multiple Vitamins-Minerals (CERTAVITE/ANTIOXIDANTS) TABS TAKE 1 TABLET BY MOUTH DAILY 4/9/19  Yes Catherine Rose MD   Cholecalciferol (VITAMIN D) 2000 units CAPS capsule Take 1,000 Units by mouth daily    Yes Historical Provider, MD   levothyroxine (SYNTHROID) 100 MCG tablet Take 1 tablet by mouth Daily 11/5/18  Yes Catherine Rose MD   Elastic Bandages & Supports ([facility]) [facility] 1 each by Does not apply route daily as needed (swelling) Grade II: 20-30 7/10/18  Yes Catherine Rose MD   cyclobenzaprine (FLEXERIL) 5 MG tablet TAKE 1 TABLET AT BEDTIME AS NEEDED FOR MUSCLE SPASMS 6/7/18  Yes Tree Nicole MD   aluminum & magnesium hydroxide-simethicone (MAALOX ADVANCED) 200-200-20 MG/5ML SUSP suspension Take 5 mLs by mouth as needed for Indigestion    Yes Historical Provider, MD         Physical Exam  General Appearance:    Alert, cooperative, no distress, appears stated age, morbid morbid obesity. No respiratory distress, not using any accessory muscles.      Head:    Normocephalic, without obvious abnormality, atraumatic   Nose reveals no polyps. No sinus tenderness. Throat reveal no abnormality, oropharyngeal orifice is not compromised. Eye examination is normal, no jaundice or Darien syndrome. Ear examination normal.                   :    Neck:   Supple, symmetrical, trachea midline, no adenopathy;     thyroid:  no enlargement/tenderness/nodules; no carotid    bruit or JVD. Neck is short and obese    Back:     Symmetric, no curvature, ROM normal, no CVA tenderness   Lungs:       Not a good air entry on both side. Breathing is vesicular. Expiration is not prolonged. No rales or rhonchi are audible. AP diameter is not increased. No pleural friction rub is audible. Chest Wall:    No tenderness or deformity      Heart:    Regular rate and rhythm, S1 and S2 normal, no murmur, rub        or gallop no rvh                           Abdomen:                                                 Pulses:                                            Lymph nodes:                    Neurologic:                  Soft, non-tender, bowel sounds active all four quadrants,     no masses, no organomegaly         2+ and symmetric all extremities            Cervical, supraclavicular not enlarged or matted or tender      CNII-XII intact, normal strength 5/5 . Sensation grossly normal  and reflexes normal 2+  throughout     Clubbing No  Lower ext edema Yes1+   [x] , 2 +  [] , 3+   []  Upper ext edema No       Musculoskeletal - no joint swelling or tenderness or synovitis               /74 (Site: Left Lower Arm, Position: Sitting, Cuff Size: Small Adult)   Pulse 65   Resp 12   Ht 5' 7\" (1.702 m)   Wt (!) 305 lb (138.3 kg)   SpO2 99% Comment: Room air at rest  Breastfeeding? No   BMI 47.77 kg/m²     CXR  No recent chest x-ray      CT Scans  No recent CT scan    Echo  No echocardiogram        Assessment   Diagnosis Orders   1. EMILIA on CPAP     2.  Class 2 severe obesity due to excess calories with serious comorbidity in adult, unspecified BMI precautions again. Assessment Thomas episodes as advised. Review of Systems -system was conducted for all other system including upper and lower extremities. No additional information was obtained. General ROS: negative for - chills, fatigue, fever or weight loss  ENT ROS: negative for - headaches, oral lesions or sore throat  Cardiovascular ROS: no chest pain , orthopnea or pnd   Gastrointestinal ROS: no abdominal pain, change in bowel habits, or black or bloody stools  Skin - no rash   Neuro - no blurry vision , no loc . No focal weakness   msk - no jt tenderness or swelling    Vascular - no claudication , rest completed and negative   Lymphatic - complete and negative   Hematology - oncology - complete and negative   Allergy immunology - complete and negative    no burning or hematuria       LUNG CANCER SCREENING     4. CRITERIA MET    []     CT ORDERED  []      5. CRITERIA NOT MET   [x]      6. REFUSED                    []        REASON CRITERIA NOT MET     7. SMOKING LESS THAN 30 PY  [x]      8. AGE LESS THAN 55 or GREATER 77 YEARS  []      9. QUIT SMOKING 15 YEARS OR GREATER   []      10. RECENT CT WITH IN 11 MONTHS    []      11. LIFE EXPECTANCY < 5 YEARS   []      12.  SIGNS  AND SYMPTOMS OF LUNG CANCER   []           Immunization   Immunization History   Administered Date(s) Administered    Influenza 10/18/2012, 10/22/2013    Influenza Vaccine, unspecified formulation 10/22/2013    Influenza Virus Vaccine 09/28/2015, 10/04/2016, 10/27/2017, 10/04/2018    Influenza Whole 09/24/2010    Influenza, Reyna Pro, 3 Years and older, IM (Fluzone 3 yrs and older or Afluria 5 yrs and older) 10/04/2016, 10/27/2017, 10/04/2018    Pneumococcal Polysaccharide ([id number]) 12/08/2008    Tdap (Boostrix, Adacel) 06/01/2017        Pneumococcal Vaccine     [x] Up to date    [] Indicated   [] Refused  [] Contraindicated       Influenza Vaccine   [x] Up to date    [] Indicated   [] Refused  []

## 2019-08-26 DIAGNOSIS — Z76.0 MEDICATION REFILL: ICD-10-CM

## 2019-08-27 RX ORDER — FOLIC ACID/MV,IRON,MIN/LUTEIN 0.4-18-25
TABLET ORAL
Qty: 30 TABLET | Refills: 3 | Status: SHIPPED | OUTPATIENT
Start: 2019-08-27 | End: 2019-12-02 | Stop reason: SDUPTHER

## 2019-08-27 NOTE — TELEPHONE ENCOUNTER
11/12/2018   EGD Once 04/25/2019   SLP videofluoroscopic swallow study Once 04/25/2019       Next Visit Date:  Future Appointments   Date Time Provider Fabby Artisi   9/25/2019 12:45 PM Royal Yue DPM [facility]   10/3/2019 10:15 AM [facility]   10/16/2019  9:15 AM Carlos Hi MD  gr lks [facility]   11/18/2019  9:40 AM Asa Oconnell MD [facility]   12/12/2019 10:00 AM [facility] MAMMOGRAPHY ROOM AT [facility] Rad   2/21/2020  9:30 AM Dioni Perez MD Resp Spec [facility]         Patient Active Problem List:     Glaucoma     GERD (gastroesophageal reflux disease)     DJD (degenerative joint disease)     Obesity     Hypothyroidism     Polyneuropathy     Migraine     Mitral prolapse     Low back pain     CTS (carpal tunnel syndrome)     Supraspinatus syndrome     Impaired fasting glucose     Acute sinus infection     IBS (irritable bowel syndrome)     Back pain, chronic     Stress fracture, right 5th metatarsal      Upper airway cough syndrome     Ear fullness     Perioral numbness     Screening for osteoporosis     Headache     Left eye injury     Medication reaction     Osteopenia     Lumbosacral pain     Flu vaccine need     Hypothyroid     Urinary incontinence     Anxiety     Sleep apnea     Depression     Chronic back pain     Carpal tunnel syndrome     Neuropathy     Mitral valve problem     Morbid obesity with BMI of 45.0-49.9, adult (HCC)     Frozen shoulder     Skin tag     Degenerative disc disease, lumbar     Bilateral edema of lower extremity     Medication refill     Varicose vein of leg     Dizziness     Dysphagia     Abdominal bloating     Mild intermittent asthma without complication

## 2019-10-03 ENCOUNTER — OFFICE VISIT (OUTPATIENT)
Dept: DERMATOLOGY | Age: 59
End: 2019-10-03
Payer: COMMERCIAL

## 2019-10-03 VITALS
HEIGHT: 67 IN | SYSTOLIC BLOOD PRESSURE: 131 MMHG | DIASTOLIC BLOOD PRESSURE: 85 MMHG | OXYGEN SATURATION: 96 % | BODY MASS INDEX: 45.99 KG/M2 | HEART RATE: 69 BPM | WEIGHT: 293 LBS

## 2019-10-03 DIAGNOSIS — I87.2 VENOUS STASIS DERMATITIS OF LEFT LOWER EXTREMITY: ICD-10-CM

## 2019-10-03 PROCEDURE — 99213 OFFICE O/P EST LOW 20 MIN: CPT | Performed by: DERMATOLOGY

## 2019-10-03 PROCEDURE — 3017F COLORECTAL CA SCREEN DOC REV: CPT | Performed by: DERMATOLOGY

## 2019-10-03 PROCEDURE — 1036F TOBACCO NON-USER: CPT | Performed by: DERMATOLOGY

## 2019-10-03 PROCEDURE — G8427 DOCREV CUR MEDS BY ELIG CLIN: HCPCS | Performed by: DERMATOLOGY

## 2019-10-03 PROCEDURE — G8417 CALC BMI ABV UP PARAM F/U: HCPCS | Performed by: DERMATOLOGY

## 2019-10-03 PROCEDURE — G8484 FLU IMMUNIZE NO ADMIN: HCPCS | Performed by: DERMATOLOGY

## 2019-10-03 RX ORDER — ASPIRIN 81 MG/1
TABLET, COATED ORAL
Refills: 1 | COMMUNITY
Start: 2019-09-16 | End: 2019-12-02 | Stop reason: SDUPTHER

## 2019-10-08 DIAGNOSIS — M51.36 DEGENERATIVE DISC DISEASE, LUMBAR: ICD-10-CM

## 2019-10-08 RX ORDER — PSEUDOEPHED/ACETAMINOPH/DIPHEN 30MG-500MG
1000 TABLET ORAL EVERY 6 HOURS PRN
Qty: 120 TABLET | Refills: 0 | Status: SHIPPED | OUTPATIENT
Start: 2019-10-08 | End: 2019-12-02 | Stop reason: SDUPTHER

## 2019-10-16 ENCOUNTER — OFFICE VISIT (OUTPATIENT)
Dept: GASTROENTEROLOGY | Age: 59
End: 2019-10-16
Payer: COMMERCIAL

## 2019-10-16 VITALS
HEART RATE: 77 BPM | WEIGHT: 293 LBS | BODY MASS INDEX: 47.6 KG/M2 | SYSTOLIC BLOOD PRESSURE: 135 MMHG | DIASTOLIC BLOOD PRESSURE: 81 MMHG

## 2019-10-16 DIAGNOSIS — K59.00 CONSTIPATION, UNSPECIFIED CONSTIPATION TYPE: Primary | ICD-10-CM

## 2019-10-16 PROCEDURE — 1036F TOBACCO NON-USER: CPT | Performed by: INTERNAL MEDICINE

## 2019-10-16 PROCEDURE — G8428 CUR MEDS NOT DOCUMENT: HCPCS | Performed by: INTERNAL MEDICINE

## 2019-10-16 PROCEDURE — 99213 OFFICE O/P EST LOW 20 MIN: CPT | Performed by: INTERNAL MEDICINE

## 2019-10-16 PROCEDURE — G8417 CALC BMI ABV UP PARAM F/U: HCPCS | Performed by: INTERNAL MEDICINE

## 2019-10-16 PROCEDURE — G8484 FLU IMMUNIZE NO ADMIN: HCPCS | Performed by: INTERNAL MEDICINE

## 2019-10-16 PROCEDURE — 3017F COLORECTAL CA SCREEN DOC REV: CPT | Performed by: INTERNAL MEDICINE

## 2019-10-17 ENCOUNTER — TELEPHONE (OUTPATIENT)
Dept: GASTROENTEROLOGY | Age: 59
End: 2019-10-17

## 2019-10-21 NOTE — TELEPHONE ENCOUNTER
Frequency Next Occurrence   Lipid, Fasting Once 04/18/2019   XR HIP BILATERAL W AP PELVIS (2 VIEWS) Once 10/04/2019   XR LUMBAR SPINE (2-3 VIEWS) Once 10/04/2019   ECHO Complete 2D W Doppler W Color Once 11/12/2018   EGD Once 04/25/2019   SLP videofluoroscopic swallow study Once 04/25/2019               Patient Active Problem List:     Glaucoma     GERD (gastroesophageal reflux disease)     DJD (degenerative joint disease)     Obesity     Hypothyroidism     Polyneuropathy     Migraine     Mitral prolapse     Low back pain     CTS (carpal tunnel syndrome)     Supraspinatus syndrome     Impaired fasting glucose     Acute sinus infection     IBS (irritable bowel syndrome)     Back pain, chronic     Stress fracture, right 5th metatarsal      Upper airway cough syndrome     Ear fullness     Perioral numbness     Screening for osteoporosis     Headache     Left eye injury     Medication reaction     Osteopenia     Lumbosacral pain     Flu vaccine need     Hypothyroid     Urinary incontinence     Anxiety     Sleep apnea     Depression     Chronic back pain     Carpal tunnel syndrome     Neuropathy     Mitral valve problem     Morbid obesity with BMI of 45.0-49.9, adult (HCC)     Frozen shoulder     Skin tag     Degenerative disc disease, lumbar     Bilateral edema of lower extremity     Medication refill     Varicose vein of leg     Dizziness     Dysphagia     Abdominal bloating
Detail Level: Detailed
Introduction Text (Please End With A Colon): The following procedure was deferred:

## 2019-10-30 ENCOUNTER — OFFICE VISIT (OUTPATIENT)
Dept: PODIATRY | Age: 59
End: 2019-10-30
Payer: COMMERCIAL

## 2019-10-30 VITALS
RESPIRATION RATE: 18 BRPM | BODY MASS INDEX: 45.99 KG/M2 | HEART RATE: 71 BPM | DIASTOLIC BLOOD PRESSURE: 78 MMHG | WEIGHT: 293 LBS | HEIGHT: 67 IN | SYSTOLIC BLOOD PRESSURE: 132 MMHG

## 2019-10-30 DIAGNOSIS — E55.9 VITAMIN D DEFICIENCY: ICD-10-CM

## 2019-10-30 DIAGNOSIS — M79.674 PAIN DUE TO ONYCHOMYCOSIS OF TOENAILS OF BOTH FEET: ICD-10-CM

## 2019-10-30 DIAGNOSIS — M79.675 PAIN DUE TO ONYCHOMYCOSIS OF TOENAILS OF BOTH FEET: ICD-10-CM

## 2019-10-30 DIAGNOSIS — M72.2 PLANTAR FASCIITIS OF LEFT FOOT: ICD-10-CM

## 2019-10-30 DIAGNOSIS — I73.9 PVD (PERIPHERAL VASCULAR DISEASE) (HCC): ICD-10-CM

## 2019-10-30 DIAGNOSIS — E11.43 CONTROLLED TYPE 2 DIABETES MELLITUS WITH DIABETIC AUTONOMIC NEUROPATHY, WITHOUT LONG-TERM CURRENT USE OF INSULIN (HCC): ICD-10-CM

## 2019-10-30 DIAGNOSIS — K59.00 CONSTIPATION, UNSPECIFIED CONSTIPATION TYPE: Primary | ICD-10-CM

## 2019-10-30 DIAGNOSIS — S92.511D CLOSED DISPLACED FRACTURE OF PROXIMAL PHALANX OF LESSER TOE OF RIGHT FOOT WITH ROUTINE HEALING, SUBSEQUENT ENCOUNTER: Primary | ICD-10-CM

## 2019-10-30 DIAGNOSIS — I87.2 VENOUS STASIS DERMATITIS OF BOTH LOWER EXTREMITIES: ICD-10-CM

## 2019-10-30 DIAGNOSIS — B35.1 PAIN DUE TO ONYCHOMYCOSIS OF TOENAILS OF BOTH FEET: ICD-10-CM

## 2019-10-30 PROCEDURE — G8484 FLU IMMUNIZE NO ADMIN: HCPCS | Performed by: STUDENT IN AN ORGANIZED HEALTH CARE EDUCATION/TRAINING PROGRAM

## 2019-10-30 PROCEDURE — 11721 DEBRIDE NAIL 6 OR MORE: CPT | Performed by: STUDENT IN AN ORGANIZED HEALTH CARE EDUCATION/TRAINING PROGRAM

## 2019-10-30 PROCEDURE — 3017F COLORECTAL CA SCREEN DOC REV: CPT | Performed by: STUDENT IN AN ORGANIZED HEALTH CARE EDUCATION/TRAINING PROGRAM

## 2019-10-30 PROCEDURE — 2022F DILAT RTA XM EVC RTNOPTHY: CPT | Performed by: STUDENT IN AN ORGANIZED HEALTH CARE EDUCATION/TRAINING PROGRAM

## 2019-10-30 PROCEDURE — 3046F HEMOGLOBIN A1C LEVEL >9.0%: CPT | Performed by: STUDENT IN AN ORGANIZED HEALTH CARE EDUCATION/TRAINING PROGRAM

## 2019-10-30 PROCEDURE — 99212 OFFICE O/P EST SF 10 MIN: CPT

## 2019-10-30 PROCEDURE — 1036F TOBACCO NON-USER: CPT | Performed by: STUDENT IN AN ORGANIZED HEALTH CARE EDUCATION/TRAINING PROGRAM

## 2019-10-30 PROCEDURE — 99213 OFFICE O/P EST LOW 20 MIN: CPT | Performed by: STUDENT IN AN ORGANIZED HEALTH CARE EDUCATION/TRAINING PROGRAM

## 2019-10-30 PROCEDURE — G8417 CALC BMI ABV UP PARAM F/U: HCPCS | Performed by: STUDENT IN AN ORGANIZED HEALTH CARE EDUCATION/TRAINING PROGRAM

## 2019-10-30 PROCEDURE — G8427 DOCREV CUR MEDS BY ELIG CLIN: HCPCS | Performed by: STUDENT IN AN ORGANIZED HEALTH CARE EDUCATION/TRAINING PROGRAM

## 2019-11-12 RX ORDER — POLYETHYLENE GLYCOL 3350 17 G/17G
POWDER, FOR SOLUTION ORAL
Qty: 255 G | Refills: 0 | Status: CANCELLED | OUTPATIENT
Start: 2019-11-12

## 2019-11-12 RX ORDER — SODIUM, POTASSIUM,MAG SULFATES 17.5-3.13G
SOLUTION, RECONSTITUTED, ORAL ORAL
Qty: 1 BOTTLE | Refills: 0 | Status: CANCELLED | OUTPATIENT
Start: 2019-11-12

## 2019-11-14 RX ORDER — LACTULOSE 10 G/15ML
30 SOLUTION ORAL
Qty: 400 ML | Refills: 0 | Status: SHIPPED | OUTPATIENT
Start: 2019-11-14 | End: 2019-12-12 | Stop reason: ALTCHOICE

## 2019-11-18 ENCOUNTER — TELEPHONE (OUTPATIENT)
Dept: GASTROENTEROLOGY | Age: 59
End: 2019-11-18

## 2019-12-01 RX ORDER — ALBUTEROL SULFATE 90 UG/1
2 AEROSOL, METERED RESPIRATORY (INHALATION) EVERY 6 HOURS PRN
Qty: 1 INHALER | Refills: 3 | OUTPATIENT
Start: 2019-12-01 | End: 2020-03-11

## 2019-12-02 DIAGNOSIS — E55.9 VITAMIN D DEFICIENCY: ICD-10-CM

## 2019-12-02 DIAGNOSIS — Z76.0 MEDICATION REFILL: ICD-10-CM

## 2019-12-02 DIAGNOSIS — M51.36 DEGENERATIVE DISC DISEASE, LUMBAR: ICD-10-CM

## 2019-12-02 RX ORDER — ACETAMINOPHEN 500 MG
1000 TABLET ORAL EVERY 6 HOURS PRN
Qty: 120 TABLET | Refills: 0 | Status: SHIPPED | OUTPATIENT
Start: 2019-12-02 | End: 2020-01-02 | Stop reason: SDUPTHER

## 2019-12-02 RX ORDER — ASPIRIN 81 MG/1
81 TABLET, COATED ORAL DAILY
Qty: 30 TABLET | Refills: 1 | Status: SHIPPED | OUTPATIENT
Start: 2019-12-02 | End: 2020-01-02 | Stop reason: SDUPTHER

## 2019-12-02 RX ORDER — FOLIC ACID/MV,IRON,MIN/LUTEIN 0.4-18-25
TABLET ORAL
Qty: 30 TABLET | Refills: 3 | Status: SHIPPED | OUTPATIENT
Start: 2019-12-02 | End: 2020-01-02 | Stop reason: SDUPTHER

## 2019-12-05 ENCOUNTER — TELEPHONE (OUTPATIENT)
Dept: GASTROENTEROLOGY | Age: 59
End: 2019-12-05

## 2019-12-09 ENCOUNTER — HOSPITAL ENCOUNTER (OUTPATIENT)
Age: 59
Setting detail: OUTPATIENT SURGERY
Discharge: HOME | End: 2019-12-09
Attending: INTERNAL MEDICINE | Admitting: INTERNAL MEDICINE
Payer: COMMERCIAL

## 2019-12-09 ENCOUNTER — ANESTHESIA (OUTPATIENT)
Dept: OPERATING ROOM | Age: 59
End: 2019-12-09
Payer: COMMERCIAL

## 2019-12-09 ENCOUNTER — ANESTHESIA EVENT (OUTPATIENT)
Dept: OPERATING ROOM | Age: 59
End: 2019-12-09
Payer: COMMERCIAL

## 2019-12-09 VITALS
DIASTOLIC BLOOD PRESSURE: 99 MMHG | TEMPERATURE: 98.1 F | HEIGHT: 67 IN | HEART RATE: 72 BPM | WEIGHT: 293 LBS | RESPIRATION RATE: 18 BRPM | BODY MASS INDEX: 45.99 KG/M2 | OXYGEN SATURATION: 99 % | SYSTOLIC BLOOD PRESSURE: 127 MMHG

## 2019-12-09 VITALS
OXYGEN SATURATION: 99 % | DIASTOLIC BLOOD PRESSURE: 62 MMHG | SYSTOLIC BLOOD PRESSURE: 124 MMHG | RESPIRATION RATE: 17 BRPM

## 2019-12-09 LAB
GLUCOSE BLD-MCNC: 101 MG/DL (ref 65–105)
GLUCOSE BLD-MCNC: 97 MG/DL (ref 65–105)

## 2019-12-09 PROCEDURE — 7100000010 HC PHASE II RECOVERY - FIRST 15 MIN: Performed by: INTERNAL MEDICINE

## 2019-12-09 PROCEDURE — 2500000003 HC RX 250 WO HCPCS: Performed by: NURSE ANESTHETIST, CERTIFIED REGISTERED

## 2019-12-09 PROCEDURE — 3700000000 HC ANESTHESIA ATTENDED CARE: Performed by: INTERNAL MEDICINE

## 2019-12-09 PROCEDURE — 7100000011 HC PHASE II RECOVERY - ADDTL 15 MIN: Performed by: INTERNAL MEDICINE

## 2019-12-09 PROCEDURE — 2709999900 HC NON-CHARGEABLE SUPPLY: Performed by: INTERNAL MEDICINE

## 2019-12-09 PROCEDURE — 82947 ASSAY GLUCOSE BLOOD QUANT: CPT

## 2019-12-09 PROCEDURE — 3700000001 HC ADD 15 MINUTES (ANESTHESIA): Performed by: INTERNAL MEDICINE

## 2019-12-09 PROCEDURE — 45378 DIAGNOSTIC COLONOSCOPY: CPT | Performed by: INTERNAL MEDICINE

## 2019-12-09 PROCEDURE — 2580000003 HC RX 258: Performed by: ANESTHESIOLOGY

## 2019-12-09 PROCEDURE — 6360000002 HC RX W HCPCS: Performed by: NURSE ANESTHETIST, CERTIFIED REGISTERED

## 2019-12-09 PROCEDURE — 2580000003 HC RX 258: Performed by: NURSE ANESTHETIST, CERTIFIED REGISTERED

## 2019-12-09 PROCEDURE — 3609027000 HC COLONOSCOPY: Performed by: INTERNAL MEDICINE

## 2019-12-09 RX ORDER — SODIUM CHLORIDE, SODIUM LACTATE, POTASSIUM CHLORIDE, CALCIUM CHLORIDE 600; 310; 30; 20 MG/100ML; MG/100ML; MG/100ML; MG/100ML
INJECTION, SOLUTION INTRAVENOUS CONTINUOUS PRN
Status: DISCONTINUED | OUTPATIENT
Start: 2019-12-09 | End: 2019-12-09 | Stop reason: SDUPTHER

## 2019-12-09 RX ORDER — PROPOFOL 10 MG/ML
INJECTION, EMULSION INTRAVENOUS PRN
Status: DISCONTINUED | OUTPATIENT
Start: 2019-12-09 | End: 2019-12-09 | Stop reason: SDUPTHER

## 2019-12-09 RX ORDER — SODIUM CHLORIDE 9 MG/ML
INJECTION, SOLUTION INTRAVENOUS CONTINUOUS
Status: DISCONTINUED | OUTPATIENT
Start: 2019-12-09 | End: 2019-12-09 | Stop reason: HOSPADM

## 2019-12-09 RX ORDER — LIDOCAINE HYDROCHLORIDE 20 MG/ML
INJECTION, SOLUTION EPIDURAL; INFILTRATION; INTRACAUDAL; PERINEURAL PRN
Status: DISCONTINUED | OUTPATIENT
Start: 2019-12-09 | End: 2019-12-09 | Stop reason: SDUPTHER

## 2019-12-09 RX ADMIN — PROPOFOL 30 MG: 10 INJECTION, EMULSION INTRAVENOUS at 10:28

## 2019-12-09 RX ADMIN — PROPOFOL 20 MG: 10 INJECTION, EMULSION INTRAVENOUS at 10:22

## 2019-12-09 RX ADMIN — PROPOFOL 30 MG: 10 INJECTION, EMULSION INTRAVENOUS at 10:24

## 2019-12-09 RX ADMIN — PROPOFOL 100 MG: 10 INJECTION, EMULSION INTRAVENOUS at 10:18

## 2019-12-09 RX ADMIN — PROPOFOL 20 MG: 10 INJECTION, EMULSION INTRAVENOUS at 10:26

## 2019-12-09 RX ADMIN — SODIUM CHLORIDE, POTASSIUM CHLORIDE, SODIUM LACTATE AND CALCIUM CHLORIDE: 600; 310; 30; 20 INJECTION, SOLUTION INTRAVENOUS at 09:49

## 2019-12-09 RX ADMIN — SODIUM CHLORIDE: 9 INJECTION, SOLUTION INTRAVENOUS at 09:08

## 2019-12-09 RX ADMIN — LIDOCAINE HYDROCHLORIDE 80 MG: 20 INJECTION, SOLUTION EPIDURAL; INFILTRATION; INTRACAUDAL; PERINEURAL at 10:18

## 2019-12-09 ASSESSMENT — PULMONARY FUNCTION TESTS
PIF_VALUE: 1
PIF_VALUE: 0
PIF_VALUE: 1

## 2019-12-09 ASSESSMENT — PAIN SCALES - GENERAL
PAINLEVEL_OUTOF10: 0
PAINLEVEL_OUTOF10: 7

## 2019-12-09 ASSESSMENT — PAIN DESCRIPTION - ORIENTATION: ORIENTATION: LOWER

## 2019-12-09 ASSESSMENT — PAIN DESCRIPTION - LOCATION: LOCATION: BACK

## 2019-12-09 ASSESSMENT — PAIN DESCRIPTION - DESCRIPTORS: DESCRIPTORS: ACHING

## 2019-12-09 ASSESSMENT — PAIN DESCRIPTION - PAIN TYPE: TYPE: CHRONIC PAIN

## 2019-12-12 ENCOUNTER — OFFICE VISIT (OUTPATIENT)
Dept: OBGYN | Age: 59
End: 2019-12-12
Payer: COMMERCIAL

## 2019-12-12 ENCOUNTER — HOSPITAL ENCOUNTER (OUTPATIENT)
Dept: WOMENS IMAGING | Age: 59
Discharge: HOME | End: 2019-12-14
Payer: COMMERCIAL

## 2019-12-12 ENCOUNTER — HOSPITAL ENCOUNTER (OUTPATIENT)
Age: 59
Setting detail: SPECIMEN
Discharge: HOME | End: 2019-12-12
Payer: COMMERCIAL

## 2019-12-12 VITALS
HEIGHT: 67 IN | DIASTOLIC BLOOD PRESSURE: 72 MMHG | SYSTOLIC BLOOD PRESSURE: 138 MMHG | WEIGHT: 293 LBS | BODY MASS INDEX: 45.99 KG/M2

## 2019-12-12 DIAGNOSIS — Z01.419 WOMEN'S ANNUAL ROUTINE GYNECOLOGICAL EXAMINATION: ICD-10-CM

## 2019-12-12 DIAGNOSIS — Z01.419 WOMEN'S ANNUAL ROUTINE GYNECOLOGICAL EXAMINATION: Primary | ICD-10-CM

## 2019-12-12 DIAGNOSIS — Z12.31 BREAST CANCER SCREENING BY MAMMOGRAM: ICD-10-CM

## 2019-12-12 PROCEDURE — 99396 PREV VISIT EST AGE 40-64: CPT | Performed by: OBSTETRICS & GYNECOLOGY

## 2019-12-12 PROCEDURE — 77063 BREAST TOMOSYNTHESIS BI: CPT

## 2019-12-12 PROCEDURE — G8484 FLU IMMUNIZE NO ADMIN: HCPCS | Performed by: OBSTETRICS & GYNECOLOGY

## 2019-12-12 PROCEDURE — G0145 SCR C/V CYTO,THINLAYER,RESCR: HCPCS

## 2019-12-23 LAB — CYTOLOGY REPORT: NORMAL

## 2020-01-02 ENCOUNTER — HOSPITAL ENCOUNTER (OUTPATIENT)
Age: 60
Setting detail: SPECIMEN
Discharge: HOME | End: 2020-01-02
Payer: COMMERCIAL

## 2020-01-02 ENCOUNTER — OFFICE VISIT (OUTPATIENT)
Dept: FAMILY MEDICINE CLINIC | Age: 60
End: 2020-01-02
Payer: COMMERCIAL

## 2020-01-02 VITALS
HEART RATE: 68 BPM | DIASTOLIC BLOOD PRESSURE: 82 MMHG | HEIGHT: 67 IN | WEIGHT: 293 LBS | SYSTOLIC BLOOD PRESSURE: 141 MMHG | BODY MASS INDEX: 45.99 KG/M2

## 2020-01-02 LAB
CREATININE URINE: 37.1 MG/DL (ref 28–217)
MICROALBUMIN/CREAT 24H UR: <12 MG/L
MICROALBUMIN/CREAT UR-RTO: NORMAL MCG/MG CREAT
T4 TOTAL: 10 UG/DL (ref 4.5–12)
TSH SERPL DL<=0.05 MIU/L-ACNC: 3.02 MIU/L (ref 0.3–5)

## 2020-01-02 PROCEDURE — 99213 OFFICE O/P EST LOW 20 MIN: CPT | Performed by: STUDENT IN AN ORGANIZED HEALTH CARE EDUCATION/TRAINING PROGRAM

## 2020-01-02 PROCEDURE — 3046F HEMOGLOBIN A1C LEVEL >9.0%: CPT | Performed by: FAMILY MEDICINE

## 2020-01-02 PROCEDURE — G8427 DOCREV CUR MEDS BY ELIG CLIN: HCPCS | Performed by: FAMILY MEDICINE

## 2020-01-02 PROCEDURE — 99211 OFF/OP EST MAY X REQ PHY/QHP: CPT | Performed by: FAMILY MEDICINE

## 2020-01-02 PROCEDURE — 1036F TOBACCO NON-USER: CPT | Performed by: FAMILY MEDICINE

## 2020-01-02 PROCEDURE — 3017F COLORECTAL CA SCREEN DOC REV: CPT | Performed by: FAMILY MEDICINE

## 2020-01-02 PROCEDURE — G8417 CALC BMI ABV UP PARAM F/U: HCPCS | Performed by: FAMILY MEDICINE

## 2020-01-02 PROCEDURE — 2022F DILAT RTA XM EVC RTNOPTHY: CPT | Performed by: FAMILY MEDICINE

## 2020-01-02 PROCEDURE — G8484 FLU IMMUNIZE NO ADMIN: HCPCS | Performed by: FAMILY MEDICINE

## 2020-01-02 RX ORDER — LIDOCAINE 4 G/G
1 PATCH TOPICAL DAILY
Qty: 30 PATCH | Refills: 0 | Status: SHIPPED | OUTPATIENT
Start: 2020-01-02 | End: 2020-02-01

## 2020-01-02 RX ORDER — FOLIC ACID/MV,IRON,MIN/LUTEIN 0.4-18-25
TABLET ORAL
Qty: 30 TABLET | Refills: 3 | Status: SHIPPED | OUTPATIENT
Start: 2020-01-02 | End: 2020-05-21 | Stop reason: SDUPTHER

## 2020-01-02 RX ORDER — ACETAMINOPHEN 500 MG
1000 TABLET ORAL EVERY 6 HOURS PRN
Qty: 120 TABLET | Refills: 3 | Status: SHIPPED | OUTPATIENT
Start: 2020-01-02 | End: 2020-05-21 | Stop reason: SDUPTHER

## 2020-01-02 RX ORDER — LEVOTHYROXINE SODIUM 0.1 MG/1
100 TABLET ORAL DAILY
Qty: 30 TABLET | Refills: 3 | Status: SHIPPED | OUTPATIENT
Start: 2020-01-02 | End: 2020-05-21 | Stop reason: SDUPTHER

## 2020-01-02 RX ORDER — CYCLOBENZAPRINE HCL 5 MG
5 TABLET ORAL 2 TIMES DAILY PRN
Qty: 20 TABLET | Refills: 0 | Status: SHIPPED | OUTPATIENT
Start: 2020-01-02 | End: 2020-01-12

## 2020-01-02 RX ORDER — ASPIRIN 81 MG/1
81 TABLET, COATED ORAL DAILY
Qty: 30 TABLET | Refills: 1 | Status: SHIPPED | OUTPATIENT
Start: 2020-01-02 | End: 2020-03-11

## 2020-01-02 RX ORDER — ROSUVASTATIN CALCIUM 5 MG/1
5 TABLET, COATED ORAL DAILY
Qty: 30 TABLET | Refills: 3 | Status: SHIPPED | OUTPATIENT
Start: 2020-01-02 | End: 2020-05-21 | Stop reason: SDUPTHER

## 2020-01-02 RX ORDER — ALBUTEROL SULFATE 90 UG/1
AEROSOL, METERED RESPIRATORY (INHALATION)
Qty: 18 G | Refills: 2 | Status: SHIPPED | OUTPATIENT
Start: 2020-01-02 | End: 2020-01-08 | Stop reason: CLARIF

## 2020-01-02 ASSESSMENT — ENCOUNTER SYMPTOMS
RHINORRHEA: 0
WHEEZING: 0
EYE PAIN: 0
PHOTOPHOBIA: 0
VOMITING: 0
NAUSEA: 0
ABDOMINAL PAIN: 0
SORE THROAT: 0
COUGH: 0
SHORTNESS OF BREATH: 0

## 2020-01-02 NOTE — PROGRESS NOTES
Visit Information    Have you changed or started any medications since your last visit including any over-the-counter medicines, vitamins, or herbal medicines? no   Have you stopped taking any of your medications? Is so, why? -  no  Are you having any side effects from any of your medications? - no    Have you seen any other physician or provider since your last visit?  no   Have you had any other diagnostic tests since your last visit?  no   Have you been seen in the emergency room and/or had an admission in a hospital since we last saw you?  no   Have you had your routine dental cleaning in the past 6 months?  no     Do you have an active MyChart account? If no, what is the barrier?   Yes    Patient Care Team:  Nile Ellison MD as PCP - General (Family Medicine)  Marjan Suh DO as Surgeon (Orthopedic Surgery)  Pooja Myles MD as Consulting Physician (Cardiology)  David Lincoln MD as Consulting Physician (Pulmonology)  Robert Gloria as Consulting Physician  Rolly English MD as Consulting Physician (Endocrinology)  Gilbert Winston MD as Consulting Physician (Obstetrics & Gynecology)  Tone Jarquin MD as Consulting Physician (Gastroenterology)    Medical History Review  Past Medical, Family, and Social History reviewed and does contribute to the patient presenting condition    Health Maintenance   Topic Date Due    Hepatitis B vaccine (1 of 3 - Risk 3-dose series) [birth year]09/1979    Shingles Vaccine (1 of 2) 03/09/2010    Flu vaccine (1) 09/01/2019    Diabetic microalbuminuria test  10/04/2019    Diabetic foot exam  10/29/2019    A1C test (Diabetic or Prediabetic)  11/05/2019    TSH testing  11/05/2019    Diabetic retinal exam  02/22/2020    Lipid screen  07/08/2020    Breast cancer screen  12/12/2021    DTaP/Tdap/Td vaccine (2 - Td) 06/01/2027    Colon cancer screen colonoscopy  12/09/2029    Pneumococcal 0-64 years Vaccine  Completed    Hepatitis C screen  Completed    HIV screen Completed

## 2020-01-02 NOTE — PROGRESS NOTES
Attending Physician Statement  I have discussed the care of RobertaPrebleincluding pertinent history and exam findings,  with the resident. I have reviewed the key elements of all parts of the encounter with the resident. I agree with the assessment, plan and orders as documented by the resident.   (GE Modifier)    Mild intermittent Asthma- stable  Chronic LBP- needs rolling wheel walker  HM- reviewed/ Hep B next visit

## 2020-01-02 NOTE — PROGRESS NOTES
Subjective:    Genesis Kong is a 61 y.o. female with  has a past medical history of Anxiety, Bronchial asthma, Carpal tunnel syndrome, Cataract, Chronic back pain, Chronic sinusitis, Depression, Diet-controlled type 2 diabetes mellitus ([address] DJD (degenerative joint disease), Edema of leg, Environmental allergies, GERD (gastroesophageal reflux disease), Glaucoma, Glucose intolerance (impaired glucose tolerance), Headache(784.0), History of bronchitis, History of diarrhea, HTN (hypertension), Hyperlipidemia, Hypothyroid, Hypothyroidism, IBS (irritable bowel syndrome), Legally blind, Mild intermittent asthma without complication, MVP (mitral valve prolapse), Obesity, EMILIA on CPAP, Osteopenia, Pancreatic cyst, Polyneuropathy, Raynaud disease, Rhinopharyngitis, Stress fracture, Syncope, TIA (transient ischemic attack), Urinary incontinence, Vasodepressor syncope, and Wears glasses.     Family History   Problem Relation Age of Onset    Diabetes Mother     Heart Disease Mother         multiple heart attacks    Arthritis Mother     High Blood Pressure Mother     High Cholesterol Mother     Substance Abuse Mother     Heart Disease Father     Arthritis Father     Depression Father     High Cholesterol Father     Mental Illness Father     Substance Abuse Father     Diabetes Sister     High Blood Pressure Sister     Cancer Paternal Uncle         esophageal cancer    Diabetes Maternal Aunt     Cancer Maternal Aunt         colorectal cancer    Diabetes Maternal Uncle     Cancer Maternal Grandmother         colorectal cancer    Arthritis Maternal Grandmother     Diabetes Maternal Grandmother     High Blood Pressure Maternal Grandmother     Stroke Maternal Grandmother     Arthritis Maternal Grandfather     Arthritis Paternal Grandmother     Cancer Paternal Grandmother     Depression Paternal Grandmother     Heart Disease Paternal Grandmother     High Blood Pressure Paternal Grandmother     High times daily as needed for Muscle spasms  Dispense: 20 tablet; Refill: 0    3. Loss of balance  - Form for rolling walker repair completed. To be faxed. 4. Degenerative disc disease, lumbar  - acetaminophen (ACETAMINOPHEN EXTRA STRENGTH) 500 MG tablet; Take 2 tablets by mouth every 6 hours as needed for Pain  Dispense: 120 tablet; Refill: 3  - lidocaine 4 % external patch; Place 1 patch onto the skin daily  Dispense: 30 patch; Refill: 0  - cyclobenzaprine (FLEXERIL) 5 MG tablet; Take 1 tablet by mouth 2 times daily as needed for Muscle spasms  Dispense: 20 tablet; Refill: 0    5. Hypothyroidism, unspecified type  - TSH; Future  - T4; Future  - levothyroxine (SYNTHROID) 100 MCG tablet; Take 1 tablet by mouth Daily Indications: 112 mg  Dispense: 30 tablet; Refill: 3    6. Vitamin D deficiency  - calcium carbonate-vitamin D (CALTRATE) 600-400 MG-UNIT TABS per tab; TAKE 1 TABLET BY MOUTH TWICE A DAY  Dispense: 90 tablet; Refill: 1    7. Type 2 diabetes mellitus without complication, without long-term current use of insulin ([facility])  - Microalbumin, Ur; Future  - Hemoglobin A1C; Future  - ASPIRIN LOW DOSE 81 MG EC tablet; Take 1 tablet by mouth daily  Dispense: 30 tablet; Refill: 1    8. Medication refill  - Multiple Vitamins-Minerals (CERTAVITE/ANTIOXIDANTS) TABS; TAKE 1 TABLET DAILY  Dispense: 30 tablet; Refill: 3  - albuterol sulfate HFA (VENTOLIN HFA) 108 (90 Base) MCG/ACT inhaler; INHALE 2 PUFFS INTO THE LUNGS EVERY SIX HOURS AS NEEDED  Dispense: 18 g; Refill: 2  - levothyroxine (SYNTHROID) 100 MCG tablet; Take 1 tablet by mouth Daily Indications: 112 mg  Dispense: 30 tablet;  Refill: 3      Requested Prescriptions     Signed Prescriptions Disp Refills    ASPIRIN LOW DOSE 81 MG EC tablet 30 tablet 1     Sig: Take 1 tablet by mouth daily    Multiple Vitamins-Minerals (CERTAVITE/ANTIOXIDANTS) TABS 30 tablet 3     Sig: TAKE 1 TABLET DAILY    calcium carbonate-vitamin D (CALTRATE) 600-400 MG-UNIT TABS per tab 90 tablet 1     Sig: TAKE 1 TABLET BY MOUTH TWICE A DAY    acetaminophen (ACETAMINOPHEN EXTRA STRENGTH) 500 MG tablet 120 tablet 3     Sig: Take 2 tablets by mouth every 6 hours as needed for Pain    rosuvastatin (CRESTOR) 5 MG tablet 30 tablet 3     Sig: Take 1 tablet by mouth daily    albuterol sulfate HFA (VENTOLIN HFA) 108 (90 Base) MCG/ACT inhaler 18 g 2     Sig: INHALE 2 PUFFS INTO THE LUNGS EVERY SIX HOURS AS NEEDED    levothyroxine (SYNTHROID) 100 MCG tablet 30 tablet 3     Sig: Take 1 tablet by mouth Daily Indications: 112 mg    lidocaine 4 % external patch 30 patch 0     Sig: Place 1 patch onto the skin daily    cyclobenzaprine (FLEXERIL) 5 MG tablet 20 tablet 0     Sig: Take 1 tablet by mouth 2 times daily as needed for Muscle spasms       Medications Discontinued During This Encounter   Medication Reason    ASPIRIN LOW DOSE 81 MG EC tablet REORDER    Multiple Vitamins-Minerals (CERTAVITE/ANTIOXIDANTS) TABS REORDER    calcium carbonate-vitamin D (CALTRATE) 600-400 MG-UNIT TABS per tab REORDER    acetaminophen (ACETAMINOPHEN EXTRA STRENGTH) 500 MG tablet REORDER    rosuvastatin (CRESTOR) 5 MG tablet REORDER    albuterol sulfate HFA (VENTOLIN HFA) 108 (90 Base) MCG/ACT inhaler REORDER    levothyroxine (SYNTHROID) 100 MCG tablet REORDER    cyclobenzaprine (FLEXERIL) 5 MG tablet LIST CLEANUP       Kaci received counseling on the following healthy behaviors:nutrition, exercise and medication adherence    Discussed use, benefit, and side effects of prescribed medications. Barriers to medication compliance addressed. All patient questions answered. Pt voicedunderstanding. Return in about 3 months (around 4/2/2020) for DM check, back pain.

## 2020-01-03 ENCOUNTER — TELEPHONE (OUTPATIENT)
Dept: FAMILY MEDICINE CLINIC | Age: 60
End: 2020-01-03

## 2020-01-03 LAB
ESTIMATED AVERAGE GLUCOSE: 108 MG/DL
HBA1C MFR BLD: 5.4 % (ref 4–6)

## 2020-01-03 NOTE — TELEPHONE ENCOUNTER
Pc from patient she is requesting hand rails for her toilet. She wants them faxed to [facility] in [place]. Ph. [phone] Fax: [phone]  Order pended, please sign if appropriate.

## 2020-01-07 ENCOUNTER — TELEPHONE (OUTPATIENT)
Dept: FAMILY MEDICINE CLINIC | Age: 60
End: 2020-01-07

## 2020-01-08 ENCOUNTER — OFFICE VISIT (OUTPATIENT)
Dept: GASTROENTEROLOGY | Age: 60
End: 2020-01-08
Payer: COMMERCIAL

## 2020-01-08 VITALS
HEART RATE: 69 BPM | BODY MASS INDEX: 47.14 KG/M2 | WEIGHT: 293 LBS | DIASTOLIC BLOOD PRESSURE: 73 MMHG | SYSTOLIC BLOOD PRESSURE: 129 MMHG

## 2020-01-08 PROCEDURE — 1036F TOBACCO NON-USER: CPT | Performed by: INTERNAL MEDICINE

## 2020-01-08 PROCEDURE — G8417 CALC BMI ABV UP PARAM F/U: HCPCS | Performed by: INTERNAL MEDICINE

## 2020-01-08 PROCEDURE — 3017F COLORECTAL CA SCREEN DOC REV: CPT | Performed by: INTERNAL MEDICINE

## 2020-01-08 PROCEDURE — 99213 OFFICE O/P EST LOW 20 MIN: CPT | Performed by: INTERNAL MEDICINE

## 2020-01-08 PROCEDURE — G8484 FLU IMMUNIZE NO ADMIN: HCPCS | Performed by: INTERNAL MEDICINE

## 2020-01-08 PROCEDURE — G8427 DOCREV CUR MEDS BY ELIG CLIN: HCPCS | Performed by: INTERNAL MEDICINE

## 2020-01-08 ASSESSMENT — ENCOUNTER SYMPTOMS
SORE THROAT: 0
BACK PAIN: 1
CONSTIPATION: 0
NAUSEA: 0
CHEST TIGHTNESS: 0
ABDOMINAL PAIN: 1
ABDOMINAL DISTENTION: 1
ANAL BLEEDING: 0
VOMITING: 0
TROUBLE SWALLOWING: 1
RECTAL PAIN: 0
SINUS PAIN: 0
BLOOD IN STOOL: 0
SINUS PRESSURE: 0
COUGH: 0
DIARRHEA: 0
SHORTNESS OF BREATH: 1

## 2020-01-08 NOTE — PROGRESS NOTES
GI CLINIC FOLLOW UP    INTERVAL HISTORY:   No referring provider defined for this encounter. Chief Complaint   Patient presents with    Constipation     Constipation getting better but still pain in her LLQ. Colonoscopy done.  Gastroesophageal Reflux     Protonix is helping but still will need Maylox from time to time. HISTORY OF PRESENT ILLNESS: Liana Rivera is a 61 y.o. female GERD and LPR. She takes Protonix twice a day. Occasion she takes antiacids for GERD. She reports intermittent mid abdominal discomfort. No clear triggers. Cramping. Bowels moving okay. She is doing good on Citrucel. Past Medical,Family, and Social History reviewed and does not contribute to the patient presentingcondition. Patient's PMH/PSH,SH,PSYCH Hx, MEDs, ALLERGIES, and ROS were all reviewed and updated in the appropriate sections.     PAST MEDICAL HISTORY:  Past Medical History:   Diagnosis Date    Anxiety     Bronchial asthma     mild, intermittent    Carpal tunnel syndrome     Cataract     Chronic back pain     Chronic sinusitis     Depression     DJD (degenerative joint disease)     S1, L3, L4, L5, T12    Edema of leg     bilateral legs    Environmental allergies     GERD (gastroesophageal reflux disease)     Glaucoma     Glucose intolerance (impaired glucose tolerance)     Headache(784.0)     History of bronchitis     Viral    History of diarrhea     HTN (hypertension)     Hyperlipidemia     Hypothyroid     hypothyroid state    Hypothyroidism     IBS (irritable bowel syndrome) 10/2/2012    Legally blind     due to glaucoma    Mild intermittent asthma without complication     MVP (mitral valve prolapse)     Obesity     Class 2 severe obesity due to excess calories with serious comorbidity in adult    EMILIA on CPAP     Osteopenia     Pancreatic cyst     Polyneuropathy     Raynaud disease     Rhinopharyngitis     Allergic rhinopharyngitis    Stress fracture Right 5th Metatarsal     Syncope     TIA (transient ischemic attack)     Urinary incontinence     Vasodepressor syncope     Wears glasses        Past Surgical History:   Procedure Laterality Date    APPENDECTOMY  1978    CATARACT REMOVAL Left     CHOLECYSTECTOMY  1978    COLONOSCOPY      COLONOSCOPY N/A 12/9/2019    COLORECTAL CANCER SCREENING, NOT HIGH RISK performed by Dimitry Hill MD at [address] [place] [address] Bilateral     right iridectomy, right laser, bilateral trabeculectomy, left drain    GLAUCOMA SURGERY      HAND SURGERY Right     HYSTERECTOMY  1982    KNEE SURGERY Right 2000    TUBAL LIGATION  1981    UPPER GASTROINTESTINAL ENDOSCOPY      UPPER GASTROINTESTINAL ENDOSCOPY  5/24/2018    EGD DILATION SAVORY performed by Leatha Rivera MD at [address] [place] [address]  3/6/2019    EGD DILATION SAVORY performed by Dimitry Hill MD at [facility]       CURRENT MEDICATIONS:    Current Outpatient Medications:     ASPIRIN LOW DOSE 81 MG EC tablet, Take 1 tablet by mouth daily, Disp: 30 tablet, Rfl: 1    Multiple Vitamins-Minerals (CERTAVITE/ANTIOXIDANTS) TABS, TAKE 1 TABLET DAILY, Disp: 30 tablet, Rfl: 3    calcium carbonate-vitamin D (CALTRATE) 600-400 MG-UNIT TABS per tab, TAKE 1 TABLET BY MOUTH TWICE A DAY, Disp: 90 tablet, Rfl: 1    acetaminophen (ACETAMINOPHEN EXTRA STRENGTH) 500 MG tablet, Take 2 tablets by mouth every 6 hours as needed for Pain, Disp: 120 tablet, Rfl: 3    rosuvastatin (CRESTOR) 5 MG tablet, Take 1 tablet by mouth daily, Disp: 30 tablet, Rfl: 3    albuterol sulfate HFA (VENTOLIN HFA) 108 (90 Base) MCG/ACT inhaler, INHALE 2 PUFFS INTO THE LUNGS EVERY SIX HOURS AS NEEDED, Disp: 18 g, Rfl: 2    levothyroxine (SYNTHROID) 100 MCG tablet, Take 1 tablet by mouth Daily Indications: 112 mg, Disp: 30 tablet, Rfl: 3    lidocaine 4 % external patch, Place 1 patch onto the skin daily, Disp: 30 patch, Rfl: 0   stools  \"rips up stomach\", black tarry stool    Lisinopril Nausea Only, Nausea And Vomiting and Other (See Comments)     Other reaction(s): Hypotension    Metoprolol Other (See Comments)     Other reaction(s): Dizziness    Naproxen Other (See Comments)     Chest pain , swelling    Oxycodone-Acetaminophen      Chest pain severe    Rofecoxib Rash     swelling    Shellfish-Derived Products Anaphylaxis and Rash     shrimp  shrimp  shrimp    Simvastatin Nausea And Vomiting     Other reaction(s): muscle cramps    Statins      Other reaction(s): muscle cramps  Tolerates lovastatin. Pravachol caused numbness in head/face    Sulfa Antibiotics Hives    Tetracyclines & Related Itching and Rash    Timoptic [Timolol Maleate] Itching and Swelling     Eyes burning severely    Tramadol Itching and Rash    Fosamax [Alendronate] Nausea Only and Nausea And Vomiting    Nalbuphine Nausea And Vomiting    Alendronate Sodium     Alendronate Sodium     Alphagan [Brimonidine Tartrate]      Eye irritation    Dilaudid [Hydromorphone Hcl]     Doxycycline Monohydrate     Nsaids     Other Itching and Swelling     Eyes burning    Pepcid Complete [Famotidine-Ca Carb-Mag Hydrox] Hives and Other (See Comments)     blisters    Pilocarpine Itching and Swelling     Blurred vision  Eyes burning    Pravastatin Other (See Comments)     Facial numming    Shrimp Flavor Hives and Swelling    Statins Support Therapy      Tolerates lovastatin.  Pravachol caused numbness in head/face    Timoptic [Timolol Maleate]      Eye irritation      Tolectin [Tolmetin]      Unknown reaction  - as a child    Voltaren [Diclofenac Sodium]      Flu symptoms      Nubain [Nalbuphine Hcl] Nausea And Vomiting     Chest pain      Percocet [Oxycodone-Acetaminophen] Nausea And Vomiting     Sweating, chest pain    Tolectin [Tolmetin Sodium] Rash    Tolmetin Sodium Rash    Ultram [Tramadol Hcl] Itching and Rash     Rash on face    [employer]: Male   Lifestyle    Physical activity:     Days per week: Not on file     Minutes per session: Not on file    Stress: Not on file   Relationships    Social connections:     Talks on phone: Not on file     Gets together: Not on file     Attends Holiness service: Not on file     Active member of club or organization: Not on file     Attends meetings of clubs or organizations: Not on file     Relationship status: Not on file    Intimate partner violence:     Fear of current or ex partner: Not on file     Emotionally abused: Not on file     Physically abused: Not on file     Forced sexual activity: Not on file   Other Topics Concern    Not on file   Social History Narrative    Not on file       REVIEW OF SYSTEMS: A 12-point review of systemswas obtained and pertinent positives and negatives were enumerated above in the history of present illness. All other reviewed systems / symptoms were negative. Review of Systems   Constitutional: Positive for fatigue. Negative for appetite change and fever. HENT: Positive for trouble swallowing. Negative for congestion, sinus pressure, sinus pain and sore throat. Eyes: Positive for visual disturbance. Respiratory: Positive for shortness of breath. Negative for cough and chest tightness. Cardiovascular: Negative for chest pain, palpitations and leg swelling. Gastrointestinal: Positive for abdominal distention and abdominal pain. Negative for anal bleeding, blood in stool, constipation, diarrhea, nausea, rectal pain and vomiting. Endocrine: Negative. Genitourinary: Negative for difficulty urinating. Musculoskeletal: Positive for arthralgias, back pain and gait problem. Skin: Negative. Allergic/Immunologic: Positive for environmental allergies and food allergies. Neurological: Positive for dizziness and light-headedness. Negative for weakness and headaches. Hematological: Bruises/bleeds easily.    Psychiatric/Behavioral: Positive for sleep disturbance. The patient is nervous/anxious. LABORATORY DATA: Reviewed  Lab Results   Component Value Date    WBC 8.5 04/27/2018    HGB 14.6 04/27/2018    HCT 44.6 04/27/2018    MCV 93.7 04/27/2018     04/27/2018     (H) 04/27/2018    K 4.5 04/27/2018     04/27/2018    CO2 30 04/27/2018    BUN 13 04/27/2018    CREATININE 0.69 04/27/2018    LABPROT 6.5 10/17/2012    LABALBU 3.8 05/13/2015    BILITOT 0.52 05/13/2015    ALKPHOS 68 05/13/2015    AST 18 05/13/2015    ALT 23 05/13/2015    INR 0.9 06/29/2013         Lab Results   Component Value Date    RBC 4.76 04/27/2018    HGB 14.6 04/27/2018    MCV 93.7 04/27/2018    MCH 30.7 04/27/2018    MCHC 32.7 04/27/2018    RDW 12.1 04/27/2018    MPV 10.9 04/27/2018    BASOPCT 1 04/27/2018    LYMPHSABS 2.57 04/27/2018    MONOSABS 0.50 04/27/2018    NEUTROABS 5.16 04/27/2018    EOSABS 0.16 04/27/2018    BASOSABS 0.04 04/27/2018         DIAGNOSTIC TESTING:     Yessica Digital Screen Self Referral W Or Wo Cad Bilateral    Result Date: 12/12/2019  EXAMINATION: BILATERAL DIGITAL SCREENING MAMMOGRAM, 12/12/2019 10:28 am TECHNIQUE: CC and MLO views of the left and right breasts were obtained. Computer aided detection was utilized in the interpretation of this exam. COMPARISON: December 11, 2018. December 4, 2017. November 29, 2016. HISTORY: Screening. FINDINGS: The breasts are almost entirely fatty. There is no new dominant mass, suspicious microcalcification, or area of architectural distortion. No mammographic evidence for malignancy. BIRADS: BIRADS - CATEGORY 1 Negative, no evidence of malignancy. Normal interval follow-up is recommended in 12 months. OVERALL ASSESSMENT - NEGATIVE A letter of notification will be sent to the patient regarding the results. The [employer] recommends annual mammograms for women 40 years and older. PHYSICAL EXAMINATION: Vital signs reviewed per the nursing documentation.      There were no vitals taken for this visit. There is no height or weight on file to calculate BMI. Physical Exam      I personally reviewed the nurse's notes and documentation and I agree with her notes. General: alert, appears stated age and cooperative Psych: Normal. and Alert and oriented, appropriate affect. . Normal affect. Mentation normal  HEENT: PERRLA. Clear conjunctivae and sclerae. Moist oral mucosae, no lesions or ulcers. The neck is supple, without lymphadenopathy or jugular venous distension. No masses. Normal thyroid. Cardiovascular: S1 S2 RRR no rubs or murmurs. Pulmonary: clear BL. No accessory muscle usage. Abdominal Exam: Soft, NT ND, no hepato or spleno megaly, +BS, no ascites. IMPRESSION: Ms. Jhoana Gonzalez is a 61 y.o. female with GERD and LPR. Mild constipation. Abdominal pain. Very likely functional.  She cannot use Levsin or Bentyl due to glaucoma. We will try peppermint oil or IBgard. Follow-up in 3 to 4 months. Thank you for allowing me to participate in the care of Ms. Banerjee. For any further questions please do not hesitate to contact me. I have reviewed and agree with the ROS entered by the MA/LPN. Note is dictated utilizing voice recognition software. Unfortunately this leads to occasional typographical errors. Please contact our office if you have any questions.     Jasmina Price MD  [facility]  O: #[phone]

## 2020-02-05 ENCOUNTER — OFFICE VISIT (OUTPATIENT)
Dept: PODIATRY | Age: 60
End: 2020-02-05
Payer: COMMERCIAL

## 2020-02-05 VITALS
SYSTOLIC BLOOD PRESSURE: 120 MMHG | BODY MASS INDEX: 45.99 KG/M2 | DIASTOLIC BLOOD PRESSURE: 72 MMHG | HEART RATE: 67 BPM | TEMPERATURE: 98.1 F | HEIGHT: 67 IN | WEIGHT: 293 LBS

## 2020-02-05 PROCEDURE — 99212 OFFICE O/P EST SF 10 MIN: CPT

## 2020-02-05 PROCEDURE — 11721 DEBRIDE NAIL 6 OR MORE: CPT | Performed by: STUDENT IN AN ORGANIZED HEALTH CARE EDUCATION/TRAINING PROGRAM

## 2020-02-05 RX ORDER — LIDOCAINE HYDROCHLORIDE 20 MG/ML
JELLY TOPICAL
COMMUNITY
Start: 2020-01-13 | End: 2021-01-07 | Stop reason: ALTCHOICE

## 2020-02-05 RX ORDER — CHOLECALCIFEROL (VITAMIN D3) 50 MCG
TABLET ORAL
COMMUNITY
Start: 2020-01-16 | End: 2020-05-21 | Stop reason: SDUPTHER

## 2020-02-05 NOTE — PROGRESS NOTES
Readings from Last 3 Encounters:   01/08/20 129/73   01/02/20 (!) 141/82   12/12/19 138/72      []  If SBP >140 mmhg - refer to PCP for follow up   []  If DBP > 90 mmhg - refer to PCP for follow up   [x] BP is controlled <140/90     Order labs as PCP ordered.   (ie: Lipids, A1C, CMP)

## 2020-02-05 NOTE — PROGRESS NOTES
[address],  [address] Valentin Grier  Tel: [phone]   Fax: [phone]    Subjective     CC: Diabetic foot exam and painful and elongated toe nails    HPI:  Reuben Orr is a 61y.o. year old female who presents to clinic today for diabetic foot examination and also complaining of painful and elongated toenails. The patient is a diabetic, and they're last HgbA1c was 5.4% in (1/2/20). The patient states their digits are painful when the toenails are elongated, causing rubbing in shoe gear. The patient denies tingling and numbness in the lower extremities. Patient denies any rest pain or claudication symptoms. The patient denies any history of acute trauma. The patient denies any other pedal complaints. Primary care physician is Zully Brooks MD.    ROS:    Constitutional: Denies nausea, vomiting, fever, chills. Neurologic: Denies numbness, tingling, and burning in the feet. Vascular: Denies symptoms of lower extremity claudication. Skin: Painful thickened toenails   Otherwise negative except as noted in the HPI.      PMH:  Past Medical History:   Diagnosis Date    Anxiety     Bronchial asthma     mild, intermittent    Carpal tunnel syndrome     Cataract     Chronic back pain     Chronic sinusitis     Depression     DJD (degenerative joint disease)     S1, L3, L4, L5, T12    Edema of leg     bilateral legs    Environmental allergies     GERD (gastroesophageal reflux disease)     Glaucoma     Glucose intolerance (impaired glucose tolerance)     Headache(784.0)     History of bronchitis     Viral    History of diarrhea     HTN (hypertension)     Hyperlipidemia     Hypothyroid     hypothyroid state    Hypothyroidism     IBS (irritable bowel syndrome) 10/2/2012    Legally blind     due to glaucoma    Mild intermittent asthma without complication     MVP (mitral valve prolapse)     Obesity     Class 2 severe obesity due to excess calories with serious comorbidity in adult    EMILIA on CPAP     Osteopenia     Pancreatic cyst     Polyneuropathy     Raynaud disease     Rhinopharyngitis     Allergic rhinopharyngitis    Stress fracture     Right 5th Metatarsal     Syncope     TIA (transient ischemic attack)     Urinary incontinence     Vasodepressor syncope     Wears glasses        Surgical History:   Past Surgical History:   Procedure Laterality Date    APPENDECTOMY  1978    CATARACT REMOVAL Left     CHOLECYSTECTOMY  1978    COLONOSCOPY      COLONOSCOPY N/A 12/9/2019    COLORECTAL CANCER SCREENING, NOT HIGH RISK performed by Charmaine Bartlett MD at [address] [place] [address] Bilateral     right iridectomy, right laser, bilateral trabeculectomy, left drain    GLAUCOMA SURGERY      HAND SURGERY Right     HYSTERECTOMY  1982    KNEE SURGERY Right 2000    TUBAL LIGATION  1981    UPPER GASTROINTESTINAL ENDOSCOPY      UPPER GASTROINTESTINAL ENDOSCOPY  5/24/2018    EGD DILATION SAVORY performed by Lisa Cortés MD at [address] [place] [address]  3/6/2019    EGD DILATION SAVORY performed by Charmaine Bartlett MD at [facility]       Social History:  Social History     Tobacco Use    Smoking status: Never Smoker    Smokeless tobacco: Never Used   Substance Use Topics    Alcohol use: No     Alcohol/week: 0.0 standard drinks    Drug use: No       Medications:  Prior to Admission medications    Medication Sig Start Date End Date Taking?  Authorizing Provider   VITAMIN D-3 SUPER STRENGTH 50 MCG (2000 UT) TABS  1/16/20  Yes Historical Provider, MD   lidocaine (XYLOCAINE) 2 % jelly  1/13/20  Yes Historical Provider, MD   ASPIRIN LOW DOSE 81 MG EC tablet Take 1 tablet by mouth daily 1/2/20  Yes Miriam Reyes MD   Multiple Vitamins-Minerals (CERTAVITE/ANTIOXIDANTS) TABS TAKE 1 TABLET DAILY 1/2/20  Yes Miriam Reyes MD   calcium carbonate-vitamin D (CALTRATE) 600-400 MG-UNIT TABS per tab TAKE 1 TABLET BY MOUTH TWICE A DAY 1/2/20  Yes Ana Hayes MD   acetaminophen (ACETAMINOPHEN EXTRA STRENGTH) 500 MG tablet Take 2 tablets by mouth every 6 hours as needed for Pain 1/2/20  Yes Ana Hayes MD   rosuvastatin (CRESTOR) 5 MG tablet Take 1 tablet by mouth daily 1/2/20  Yes Ana Hayes MD   levothyroxine (SYNTHROID) 100 MCG tablet Take 1 tablet by mouth Daily Indications: 112 mg 1/2/20  Yes Ana Hayes MD   Psyllium (HM FIBER POWDER PO) Take by mouth   Yes Historical Provider, MD   albuterol sulfate HFA (PROVENTIL HFA) 108 (90 Base) MCG/ACT inhaler Inhale 2 puffs into the lungs every 6 hours as needed for Wheezing 12/1/19  Yes Mathew Salinas MD   pantoprazole (PROTONIX) 40 MG tablet Take 1 tablet by mouth 2 times daily (before meals) 8/7/19  Yes Polo Pollock MD   loratadine (CLARITIN) 10 MG tablet TAKE 1 TABLET BY MOUTH DAILY 5/28/19  Yes Hilda Greenfield MD   fluticasone (FLONASE) 50 MCG/ACT nasal spray USE 1 SPRAY IN EACH NOSTRIL TWICE A DAY 5/6/19  Yes Ana Hayes MD   Cholecalciferol (VITAMIN D) 2000 units CAPS capsule Take 1,000 Units by mouth daily    Yes Historical Provider, MD   Elastic Bandages & Supports ([facility]) [address] [facility] 1 each by Does not apply route daily as needed (swelling) Grade II: 20-30 7/10/18  Yes Ana Hayes MD   aluminum & magnesium hydroxide-simethicone (MAALOX ADVANCED) 200-200-20 MG/5ML SUSP suspension Take 5 mLs by mouth as needed for Indigestion    Yes Historical Provider, MD       Objective     Vitals:    02/05/20 1245   BP: 120/72   Pulse: 67   Temp: 98.1 °F (36.7 °C)       Lab Results   Component Value Date    LABA1C 5.4 01/02/2020       Physical Exam:  General:  Alert and oriented x3. In no acute distress.      Lower Extremity Physical Exam:    Vascular: DP and PT pulses are palpable, Bilateral. CFT <3 seconds to all digits, Bilateral.  No edema, Bilateral.  Hair growth is absent to the level of the digits, Bilateral.     Neuro: Saph/sural/SP/DP/plantar sensation intact to light touch. Protective sensation is intact to 6/10 sites as tested with a 5.07g SWMF, Bilateral.     Musculoskeletal: EHL/FHL/GS/TA gross motor intact. TTP to distal digits b/l. Gross deformity is absent, Bilateral.     Dermatologic: Skin shiny and atrophic with no hair growth noted. Interdigital maceration absent, Bilateral. Nails 1-5 thickened, elongated with subungual debris noted b/l. Class A Findings (Q7) - One Class A finding  [] Non traumatic amputation of foot or integral skeletal portion thereof  Class B Findings (Q8) - Two Class B findings  []Absent posterior tibial pulse   []Absent dorsalis pedis pulse   [x]Advanced trophic changes; three of the following are required:   [x]hair growth (decrease or absence)   [x]nail changes (thickening)   []pigmentary changes (discoloration)   [x]skin texture (thin, shiny)   []skin color (rubor or redness)  Class C Findings (Q9) - One Class B and two Class C findings  []Claudication   []Temperature changes   []Edema   []Paresthesia   []Burning    Assessment   [facility] A Gypsy Leonardo is a 61 y.o. female with     Diagnosis Orders   1. Controlled type 2 diabetes mellitus with diabetic autonomic neuropathy, without long-term current use of insulin ([facility])  [address]257 - [place] DEBRIDEMENT OF NAILS, 6 OR MORE   2. Pain due to onychomycosis of toenails of both feet  34739 - [place] DEBRIDEMENT OF NAILS, 6 OR MORE   3. Plantar fasciitis of left foot          Plan   · Patient examined and evaluated. · Diagnosis and treatment options discussed in detail. · Mycotic nails 1-10 debrided sharply with sterile nail nippers without incident. · Patient was educated on the utmost importance of tight glycemic control. · Patient was advised to continue daily foot checks, in addition to not ambulating barefoot. · Patient to RTC in 3 months. · Please, call the office with any questions or concerns.     No orders of the defined types were placed in this

## 2020-02-25 ENCOUNTER — HOSPITAL ENCOUNTER (OUTPATIENT)
Dept: WOMENS IMAGING | Age: 60
Discharge: HOME | End: 2020-02-27
Payer: COMMERCIAL

## 2020-02-25 PROCEDURE — 77080 DXA BONE DENSITY AXIAL: CPT

## 2020-03-11 RX ORDER — ASPIRIN 81 MG/1
81 TABLET, COATED ORAL DAILY
Qty: 30 TABLET | Refills: 1 | Status: SHIPPED | OUTPATIENT
Start: 2020-03-11 | End: 2020-05-21 | Stop reason: SDUPTHER

## 2020-03-11 RX ORDER — PANTOPRAZOLE SODIUM 40 MG/1
40 TABLET, DELAYED RELEASE ORAL
Qty: 60 TABLET | Refills: 5 | Status: SHIPPED | OUTPATIENT
Start: 2020-03-11 | End: 2020-07-13 | Stop reason: SDUPTHER

## 2020-03-11 RX ORDER — ALBUTEROL SULFATE 90 UG/1
AEROSOL, METERED RESPIRATORY (INHALATION)
Qty: 18 G | Refills: 2 | Status: SHIPPED | OUTPATIENT
Start: 2020-03-11 | End: 2020-05-21 | Stop reason: SDUPTHER

## 2020-03-11 NOTE — TELEPHONE ENCOUNTER
Last visit:   Last Med refill:   Does patient have enough medication for 72 hours: No:     Next Visit Date:  Future Appointments   Date Time Provider Fabby Ann   4/2/2020  9:30 AM Dulce Zheng MD [facility]   4/6/2020 10:00 AM Dione Feliciano MD Resp Spec [facility]   4/8/2020 12:45 PM Daniella Begum, DPJOSÉ MIGUEL [facility] Podiatry [facility]   5/13/2020  9:30 AM Jairo Saini MD [address] Maintenance   Topic Date Due    Hepatitis B vaccine (1 of 3 - Risk 3-dose series) 03/09/1979    Shingles Vaccine (1 of 2) 03/09/2010    Flu vaccine (1) 09/01/2019    Diabetic foot exam  10/29/2019    Lipid screen  07/08/2020    A1C test (Diabetic or Prediabetic)  01/02/2021    Diabetic microalbuminuria test  01/02/2021    TSH testing  01/02/2021    Diabetic retinal exam  03/06/2021    Breast cancer screen  12/12/2021    DTaP/Tdap/Td vaccine (2 - Td) 06/01/2027    Colon cancer screen colonoscopy  12/09/2029    Pneumococcal 0-64 years Vaccine  Completed    Hepatitis C screen  Completed    HIV screen  Completed    Hepatitis A vaccine  Aged Out    Hib vaccine  Aged Out    Meningococcal (ACWY) vaccine  Aged Out       Hemoglobin A1C (%)   Date Value   01/02/2020 5.4   11/05/2018 5.5   07/10/2018 5.4             ( goal A1C is < 7)   Microalb/Crt.  Ratio (mcg/mg creat)   Date Value   01/02/2020 CANNOT BE CALCULATED     LDL Cholesterol (mg/dL)   Date Value   07/08/2019 97   05/07/2013 78     LDL Calculated (mg/dL)   Date Value   06/16/2017 68   05/19/2014 104       (goal LDL is <100)   AST (U/L)   Date Value   05/13/2015 18     ALT (U/L)   Date Value   05/13/2015 23     BUN (mg/dL)   Date Value   04/27/2018 13     BP Readings from Last 3 Encounters:   02/05/20 120/72   01/08/20 129/73   01/02/20 (!) 141/82          (goal 120/80)    All Future Testing planned in [facility]  Lab Frequency Next Occurrence               Patient Active Problem List:     Glaucoma     GERD (gastroesophageal reflux disease)

## 2020-03-25 ENCOUNTER — TELEPHONE (OUTPATIENT)
Dept: PODIATRY | Age: 60
End: 2020-03-25

## 2020-03-25 PROBLEM — E03.9 HYPOTHYROIDISM: Status: RESOLVED | Noted: 2020-03-25 | Resolved: 2020-03-24

## 2020-04-07 ENCOUNTER — TELEPHONE (OUTPATIENT)
Dept: PHARMACY | Age: 60
End: 2020-04-07

## 2020-04-16 RX ORDER — FLUTICASONE PROPIONATE 50 MCG
SPRAY, SUSPENSION (ML) NASAL
Qty: 1 BOTTLE | Refills: 5 | Status: SHIPPED | OUTPATIENT
Start: 2020-04-16 | End: 2020-10-22

## 2020-04-16 RX ORDER — LORATADINE 10 MG/1
TABLET ORAL
Qty: 30 TABLET | Refills: 5 | Status: SHIPPED | OUTPATIENT
Start: 2020-04-16 | End: 2020-10-22

## 2020-04-21 NOTE — TELEPHONE ENCOUNTER
Patient called asking for the Generic form of the Citrucel which is Methylcellulose fiber caplets. Insurance doesn't cover it so she wanted to find them over the counter. I sent an RX to pharmacy and I told her to call her pharmcy and see how much they would cost or idf they had them over the counter. She also asked about her appointment in May. Writer let her know that when we start seeing patients in the office we will let her know or we will do a phone call if we are not in office yet.

## 2020-05-20 ENCOUNTER — OFFICE VISIT (OUTPATIENT)
Dept: PODIATRY | Age: 60
End: 2020-05-20
Payer: COMMERCIAL

## 2020-05-20 VITALS
SYSTOLIC BLOOD PRESSURE: 124 MMHG | DIASTOLIC BLOOD PRESSURE: 78 MMHG | HEART RATE: 76 BPM | BODY MASS INDEX: 45.99 KG/M2 | WEIGHT: 293 LBS | HEIGHT: 67 IN

## 2020-05-20 PROCEDURE — 11721 DEBRIDE NAIL 6 OR MORE: CPT | Performed by: STUDENT IN AN ORGANIZED HEALTH CARE EDUCATION/TRAINING PROGRAM

## 2020-05-20 PROCEDURE — 99212 OFFICE O/P EST SF 10 MIN: CPT

## 2020-05-20 NOTE — PROGRESS NOTES
TWICE A DAY 4/16/20  Yes JULITA Garcia CNP   loratadine (CLARITIN) 10 MG tablet TAKE 1 TABLET BY MOUTH DAILY 4/16/20  Yes JULITA Garcia CNP   ASPIRIN LOW DOSE 81 MG EC tablet TAKE 1 TABLET BY MOUTH DAILY 3/11/20  Yes Paola Johnson MD   albuterol sulfate  (90 Base) MCG/ACT inhaler INHALE 2 PUFFS INTO THE LUNGS EVERY SIX HOURS AS NEEDED 3/11/20  Yes Paola Johnson MD   pantoprazole (PROTONIX) 40 MG tablet TAKE 1 TABLET BY MOUTH 2 TIMES DAILY (BEFORE MEALS) 3/11/20  Yes Mariaelena Peterson MD   VITAMIN D-3 SUPER STRENGTH 50 MCG (2000 UT) TABS  1/16/20  Yes Historical Provider, MD   lidocaine (XYLOCAINE) 2 % jelly  1/13/20  Yes Historical Provider, MD   Multiple Vitamins-Minerals (CERTAVITE/ANTIOXIDANTS) TABS TAKE 1 TABLET DAILY 1/2/20  Yes Paola Johnson MD   calcium carbonate-vitamin D (CALTRATE) 600-400 MG-UNIT TABS per tab TAKE 1 TABLET BY MOUTH TWICE A DAY 1/2/20  Yes Paola Johnson MD   acetaminophen (ACETAMINOPHEN EXTRA STRENGTH) 500 MG tablet Take 2 tablets by mouth every 6 hours as needed for Pain 1/2/20  Yes Paola Johnson MD   rosuvastatin (CRESTOR) 5 MG tablet Take 1 tablet by mouth daily 1/2/20  Yes Paola Johnson MD   levothyroxine (SYNTHROID) 100 MCG tablet Take 1 tablet by mouth Daily Indications: 112 mg 1/2/20  Yes Paola Johnson MD   Psyllium (HM FIBER POWDER PO) Take by mouth   Yes Historical Provider, MD   Cholecalciferol (VITAMIN D) 2000 units CAPS capsule Take 1,000 Units by mouth daily    Yes Historical Provider, MD   Elastic Bandages & Supports ([facility]) [address] 1 each by Does not apply route daily as needed (swelling) Grade II: 20-30 7/10/18  Yes Paola Johnson MD   aluminum & magnesium hydroxide-simethicone (MAALOX ADVANCED) 200-200-20 MG/5ML SUSP suspension Take 5 mLs by mouth as needed for Indigestion    Yes Historical Provider, MD       Objective     Vitals:    05/20/20 1245   BP: 124/78   Pulse: 76       Lab Results   Component Value Date treatment options discussed in detail. · Debrided nails 1-5 bilateral with sterile nail nippers without incident. · Patient was educated on the utmost importance of tight glycemic control. · Patient was advised to continue daily foot checks, in addition to not ambulating barefoot. · Patient to RTC in 3 months. · Please, call the office with any questions or concerns. No orders of the defined types were placed in this encounter.     Orders Placed This Encounter   Procedures    HM DIABETES FOOT EXAM    [place] DEBRIDEMENT OF NAILS, 6 OR MORE       Austin Ayala DPM   Podiatric Medicine & Surgery   5/20/2020 at 1:14 PM

## 2020-05-20 NOTE — PROGRESS NOTES
last year     Blood Pressure  BP Readings from Last 3 Encounters:   02/05/20 120/72   01/08/20 129/73   01/02/20 (!) 141/82      []  If SBP >140 mmhg - refer to PCP for follow up   []  If DBP > 90 mmhg - refer to PCP for follow up   [] BP is controlled <140/90     Order labs as PCP ordered.   (ie: Lipids, A1C, CMP)

## 2020-05-21 ENCOUNTER — VIRTUAL VISIT (OUTPATIENT)
Dept: FAMILY MEDICINE CLINIC | Age: 60
End: 2020-05-21
Payer: COMMERCIAL

## 2020-05-21 ENCOUNTER — HOSPITAL ENCOUNTER (OUTPATIENT)
Age: 60
Setting detail: SPECIMEN
Discharge: HOME | End: 2020-05-21
Payer: COMMERCIAL

## 2020-05-21 ENCOUNTER — OFFICE VISIT (OUTPATIENT)
Dept: PRIMARY CARE CLINIC | Age: 60
End: 2020-05-21
Payer: COMMERCIAL

## 2020-05-21 VITALS
HEART RATE: 59 BPM | WEIGHT: 293 LBS | SYSTOLIC BLOOD PRESSURE: 148 MMHG | OXYGEN SATURATION: 99 % | BODY MASS INDEX: 45.99 KG/M2 | HEIGHT: 67 IN | DIASTOLIC BLOOD PRESSURE: 99 MMHG | TEMPERATURE: 97.7 F

## 2020-05-21 VITALS
HEIGHT: 67 IN | SYSTOLIC BLOOD PRESSURE: 143 MMHG | HEART RATE: 62 BPM | BODY MASS INDEX: 46.98 KG/M2 | DIASTOLIC BLOOD PRESSURE: 90 MMHG | TEMPERATURE: 96.8 F

## 2020-05-21 PROCEDURE — 3017F COLORECTAL CA SCREEN DOC REV: CPT | Performed by: NURSE PRACTITIONER

## 2020-05-21 PROCEDURE — G8417 CALC BMI ABV UP PARAM F/U: HCPCS | Performed by: NURSE PRACTITIONER

## 2020-05-21 PROCEDURE — 99213 OFFICE O/P EST LOW 20 MIN: CPT | Performed by: NURSE PRACTITIONER

## 2020-05-21 PROCEDURE — G8428 CUR MEDS NOT DOCUMENT: HCPCS | Performed by: NURSE PRACTITIONER

## 2020-05-21 PROCEDURE — 99213 OFFICE O/P EST LOW 20 MIN: CPT | Performed by: STUDENT IN AN ORGANIZED HEALTH CARE EDUCATION/TRAINING PROGRAM

## 2020-05-21 PROCEDURE — 1036F TOBACCO NON-USER: CPT | Performed by: NURSE PRACTITIONER

## 2020-05-21 PROCEDURE — U0003 INFECTIOUS AGENT DETECTION BY NUCLEIC ACID (DNA OR RNA); SEVERE ACUTE RESPIRATORY SYNDROME CORONAVIRUS 2 (SARS-COV-2) (CORONAVIRUS DISEASE [COVID-19]), AMPLIFIED PROBE TECHNIQUE, MAKING USE OF HIGH THROUGHPUT TECHNOLOGIES AS DESCRIBED BY CMS-2020-01-R: HCPCS

## 2020-05-21 RX ORDER — ASPIRIN 81 MG/1
81 TABLET, COATED ORAL DAILY
Qty: 30 TABLET | Refills: 1 | Status: SHIPPED | OUTPATIENT
Start: 2020-05-21 | End: 2020-07-13 | Stop reason: SDUPTHER

## 2020-05-21 RX ORDER — FOLIC ACID/MV,IRON,MIN/LUTEIN 0.4-18-25
TABLET ORAL
Qty: 30 TABLET | Refills: 3 | Status: SHIPPED | OUTPATIENT
Start: 2020-05-21 | End: 2020-10-12

## 2020-05-21 RX ORDER — ACETAMINOPHEN 500 MG
1000 TABLET ORAL EVERY 6 HOURS PRN
Qty: 120 TABLET | Refills: 3 | Status: SHIPPED | OUTPATIENT
Start: 2020-05-21 | End: 2020-07-13 | Stop reason: SDUPTHER

## 2020-05-21 RX ORDER — ROSUVASTATIN CALCIUM 5 MG/1
5 TABLET, COATED ORAL DAILY
Qty: 30 TABLET | Refills: 3 | Status: SHIPPED | OUTPATIENT
Start: 2020-05-21 | End: 2021-06-11 | Stop reason: SDUPTHER

## 2020-05-21 RX ORDER — CHOLECALCIFEROL (VITAMIN D3) 50 MCG
2000 TABLET ORAL DAILY
Qty: 30 TABLET | Refills: 0 | Status: SHIPPED | OUTPATIENT
Start: 2020-05-21 | End: 2020-09-14

## 2020-05-21 RX ORDER — LEVOTHYROXINE SODIUM 0.1 MG/1
100 TABLET ORAL DAILY
Qty: 30 TABLET | Refills: 3 | Status: SHIPPED | OUTPATIENT
Start: 2020-05-21 | End: 2021-06-11 | Stop reason: SDUPTHER

## 2020-05-21 RX ORDER — ALBUTEROL SULFATE 90 UG/1
AEROSOL, METERED RESPIRATORY (INHALATION)
Qty: 18 G | Refills: 2 | Status: SHIPPED | OUTPATIENT
Start: 2020-05-21 | End: 2020-07-13 | Stop reason: SDUPTHER

## 2020-05-21 ASSESSMENT — ENCOUNTER SYMPTOMS
NAUSEA: 0
DIARRHEA: 0
EYE ITCHING: 1
CHEST TIGHTNESS: 0
SHORTNESS OF BREATH: 0
SORE THROAT: 0
VOMITING: 0
RHINORRHEA: 0
WHEEZING: 0
COUGH: 0

## 2020-05-21 NOTE — PROGRESS NOTES
headaches. Vitals:    05/21/20 1340   BP: (!) 148/99   Site: Right Wrist   Position: Sitting   Cuff Size: Medium Adult   Pulse: 59   Temp: 97.7 °F (36.5 °C)   TempSrc: Temporal   SpO2: 99%   Weight: 300 lb (136.1 kg)   Height: 5' 7\" (1.702 m)      Physical Exam  Vitals signs and nursing note reviewed. Constitutional:       General: She is not in acute distress. Appearance: Normal appearance. She is not ill-appearing, toxic-appearing or diaphoretic. HENT:      Head: Normocephalic and atraumatic. Nose:      Comments: Exam deferred  Cardiovascular:      Rate and Rhythm: Normal rate and regular rhythm. Pulmonary:      Effort: Pulmonary effort is normal.      Breath sounds: Normal breath sounds. No wheezing, rhonchi or rales. Neurological:      General: No focal deficit present. Mental Status: She is alert and oriented to person, place, and time. Psychiatric:         Mood and Affect: Mood normal.         Behavior: Behavior normal.        Assessment/Plan:    Patient presents with one week history of decreased sense of taste and smell. She had a virtual visit with her primary care provider today who recommend that she come have covered testing done. On physical exam she is resting comfortably he is in no acute distress. Lungs are clear to auscultation. Vital signs are stable. She does have multiple comorbidities including type 2 diabetes, obesity, hypertension and asthma. When tested for COVID-19 to rule this out however I do believe this is likely due to seasonal allergies. 1. Decreased sense of taste      2. Decreased sense of smell    - COVID-19 Ambulatory; Future      ·  Practice meticulous handwashing and cover cough to prevent spread of infection  · Encouraged to increase fluids and rest  · Home quarantine for 14 days or until contacted with Covid results  · Tylenol OTC PRN for pain, discomfort or fever as directed on package. Take with food.   · Advised symptoms may last up

## 2020-05-21 NOTE — PROGRESS NOTES
Visit Information    Have you changed or started any medications since your last visit including any over-the-counter medicines, vitamins, or herbal medicines? no   Have you stopped taking any of your medications? Is so, why? -  no  Are you having any side effects from any of your medications? - no    Have you seen any other physician or provider since your last visit? yes - Gastro, Podiatry, Dentist, Eye  Have you had any other diagnostic tests since your last visit? Dexa Scan in [place],   Have you been seen in the emergency room and/or had an admission in a hospital since we last saw you?  yes - [address] in [place]   Have you had your routine dental cleaning in the past 6 months?  yes - February     Do you have an active MyChart account? If no, what is the barrier?   Yes    Patient Care Team:  Cassie Bansal MD as PCP - General (Family Medicine)  Ziggy Bucio DO as Surgeon (Orthopedic Surgery)  Nilsa Hines MD as Consulting Physician (Cardiology)  Elida Driscoll MD as Consulting Physician (Pulmonology)  Earnest King as Consulting Physician  Tee Nicole MD as Consulting Physician (Endocrinology)  Dioni Mcmillan MD as Consulting Physician (Obstetrics & Gynecology)  Daljit Marti MD as Consulting Physician (Gastroenterology)    Medical History Review  Past Medical, Family, and Social History reviewed and does contribute to the patient presenting condition    Health Maintenance   Topic Date Due    Shingles Vaccine (1 of 2) 03/09/2010    Diabetic foot exam  10/29/2019    Lipid screen  07/08/2020    Flu vaccine (Season Ended) 09/01/2020    A1C test (Diabetic or Prediabetic)  01/02/2021    Diabetic microalbuminuria test  01/02/2021    TSH testing  01/02/2021    Diabetic retinal exam  03/06/2021    Breast cancer screen  12/12/2021    DTaP/Tdap/Td vaccine (2 - Td) 06/01/2027    Colon cancer screen colonoscopy  12/09/2029    Pneumococcal 0-64 years Vaccine  Completed    Hepatitis C

## 2020-05-21 NOTE — PATIENT INSTRUCTIONS
mouth, nose, and eyes. What can you do to avoid spreading the virus to others? To help avoid spreading the virus to others:  · Cover your mouth with a tissue when you cough or sneeze. Then throw the tissue in the trash. · Use a disinfectant to clean things that you touch often. · Wear a cloth face cover if you have to go to public areas. · Stay home if you are sick or have been exposed to the virus. Don't go to school, work, or public areas. And don't use public transportation. · If you are sick:  ? Leave your home only if you need to get medical care. But call the doctor's office first so they know you're coming. And wear a face cover. ? Wear the face cover whenever you're around other people. It can help stop the spread of the virus when you cough or sneeze. ? Clean and disinfect your home every day. Use household  and disinfectant wipes or sprays. Take special care to clean things that you grab with your hands. These include doorknobs, remote controls, phones, and handles on your refrigerator and microwave. And don't forget countertops, tabletops, bathrooms, and computer keyboards. When to call for help  [id number] anytime you think you may need emergency care. For example, call if:  · You have severe trouble breathing. (You can't talk at all.)  · You have constant chest pain or pressure. · You are severely dizzy or lightheaded. · You are confused or can't think clearly. · Your face and lips have a blue color. · You pass out (lose consciousness) or are very hard to wake up. Call your doctor now if you develop symptoms such as:  · Shortness of breath. · Fever. · Cough. If you need to get care, call ahead to the doctor's office for instructions before you go. Make sure you wear a face cover to prevent exposing other people to the virus. Where can you get the latest information? The following health organizations are tracking and studying this virus.  Their websites contain the most up-to-date

## 2020-05-24 LAB — SARS-COV-2, NAA: NOT DETECTED

## 2020-05-26 ENCOUNTER — TELEPHONE (OUTPATIENT)
Dept: PRIMARY CARE CLINIC | Age: 60
End: 2020-05-26

## 2020-05-27 ENCOUNTER — OFFICE VISIT (OUTPATIENT)
Dept: GASTROENTEROLOGY | Age: 60
End: 2020-05-27
Payer: COMMERCIAL

## 2020-05-27 PROCEDURE — 99421 OL DIG E/M SVC 5-10 MIN: CPT | Performed by: INTERNAL MEDICINE

## 2020-05-27 RX ORDER — AMOXICILLIN 250 MG
2 CAPSULE ORAL DAILY
Qty: 60 TABLET | Refills: 5 | Status: SHIPPED | OUTPATIENT
Start: 2020-05-27 | End: 2020-06-26

## 2020-05-27 ASSESSMENT — ENCOUNTER SYMPTOMS
ABDOMINAL PAIN: 1
VOMITING: 0
CHEST TIGHTNESS: 0
SORE THROAT: 0
NAUSEA: 0
ABDOMINAL DISTENTION: 1
SHORTNESS OF BREATH: 1
COUGH: 0
BACK PAIN: 1
BLOOD IN STOOL: 0
SINUS PRESSURE: 0
RECTAL PAIN: 0
TROUBLE SWALLOWING: 0
CONSTIPATION: 1
ANAL BLEEDING: 0
DIARRHEA: 0
SINUS PAIN: 0

## 2020-05-27 NOTE — PROGRESS NOTES
Review of Systems   Constitutional: Positive for fatigue. Negative for appetite change and fever. HENT: Negative for congestion, sinus pressure, sinus pain, sore throat and trouble swallowing. Eyes: Positive for visual disturbance. Respiratory: Positive for shortness of breath. Negative for cough and chest tightness. Cardiovascular: Negative for chest pain, palpitations and leg swelling. Gastrointestinal: Positive for abdominal distention, abdominal pain and constipation (on and off). Negative for anal bleeding, blood in stool, diarrhea, nausea, rectal pain and vomiting. Endocrine: Negative. Genitourinary: Negative for difficulty urinating. Musculoskeletal: Positive for arthralgias, back pain and gait problem. Skin: Negative. Allergic/Immunologic: Positive for environmental allergies and food allergies. Neurological: Positive for dizziness and light-headedness. Negative for weakness and headaches. Hematological: Bruises/bleeds easily. Psychiatric/Behavioral: Positive for sleep disturbance. The patient is nervous/anxious.

## 2020-05-27 NOTE — PROGRESS NOTES
Kirby Knapp is a 61 y.o. female evaluated via telephone on 5/27/2020. Consent:  She and/or health care decision maker is aware that that she may receive a bill for this telephone service, depending on her insurance coverage, and has provided verbal consent to proceed: Yes      Documentation:  I communicated with the patient and/or health care decision maker about GERD and constipation. Details of this discussion including any medical advice provided: She reports frequent heartburns recently. In spite of her taking Protonix twice a day. No specific foods giving her trouble. She reports mild constipation. She has been taking Citrucel twice a day. She reports a bowel movement every 3 days. No blood in the stool or melena. She reports bloating and gas. We recommended adding Criselda-Colace 2/day. She reports no true allergies. She thinks she has intolerance to a common ingredient in multiple medications. This gives her some abdominal discomfort. Continue Protonix twice a day. If she continues to have issues despite of her bowels moving normally then we will add Pepcid. I affirm this is a Patient Initiated Episode with a Patient who has not had a related appointment within my department in the past 7 days or scheduled within the next 24 hours.     Patient identification was verified at the start of the visit: Yes    Total Time: minutes: 5-10 minutes    Note: not billable if this call serves to triage the patient into an appointment for the relevant concern      Annette Torres

## 2020-05-29 ENCOUNTER — TELEPHONE (OUTPATIENT)
Dept: GASTROENTEROLOGY | Age: 60
End: 2020-05-29

## 2020-06-01 ENCOUNTER — TELEPHONE (OUTPATIENT)
Dept: GASTROENTEROLOGY | Age: 60
End: 2020-06-01

## 2020-06-01 NOTE — TELEPHONE ENCOUNTER
Patient called for clarification of her fiber medication. She feel taking the fiber 2 pills 2 times a day. She is going to try 2 pills daily and will go to 2 pills 2X daily if needed.

## 2020-06-04 NOTE — TELEPHONE ENCOUNTER
More clarification :  She will take her fiber in the AM and her Senna PM.   Her memory is not the greatest and gets confused.

## 2020-06-19 RX ORDER — DOCUSATE SODIUM 100 MG/1
100 CAPSULE, LIQUID FILLED ORAL 2 TIMES DAILY
Qty: 60 CAPSULE | Refills: 3 | Status: SHIPPED | OUTPATIENT
Start: 2020-06-19 | End: 2020-07-19

## 2020-07-13 ENCOUNTER — OFFICE VISIT (OUTPATIENT)
Dept: FAMILY MEDICINE CLINIC | Age: 60
End: 2020-07-13
Payer: COMMERCIAL

## 2020-07-13 ENCOUNTER — TELEPHONE (OUTPATIENT)
Dept: FAMILY MEDICINE CLINIC | Age: 60
End: 2020-07-13

## 2020-07-13 VITALS
DIASTOLIC BLOOD PRESSURE: 76 MMHG | HEART RATE: 72 BPM | WEIGHT: 293 LBS | TEMPERATURE: 97.8 F | BODY MASS INDEX: 47.77 KG/M2 | SYSTOLIC BLOOD PRESSURE: 139 MMHG

## 2020-07-13 PROCEDURE — 99213 OFFICE O/P EST LOW 20 MIN: CPT | Performed by: STUDENT IN AN ORGANIZED HEALTH CARE EDUCATION/TRAINING PROGRAM

## 2020-07-13 RX ORDER — PANTOPRAZOLE SODIUM 40 MG/1
40 TABLET, DELAYED RELEASE ORAL
Qty: 60 TABLET | Refills: 5 | Status: SHIPPED | OUTPATIENT
Start: 2020-07-13 | End: 2020-10-12 | Stop reason: SDUPTHER

## 2020-07-13 RX ORDER — ALBUTEROL SULFATE 90 UG/1
AEROSOL, METERED RESPIRATORY (INHALATION)
Qty: 18 G | Refills: 2 | Status: SHIPPED | OUTPATIENT
Start: 2020-07-13 | End: 2021-06-11 | Stop reason: SDUPTHER

## 2020-07-13 RX ORDER — ASPIRIN 81 MG/1
81 TABLET, COATED ORAL DAILY
Qty: 30 TABLET | Refills: 1 | Status: SHIPPED | OUTPATIENT
Start: 2020-07-13 | End: 2020-10-12 | Stop reason: SDUPTHER

## 2020-07-13 RX ORDER — ACETAMINOPHEN 500 MG
1000 TABLET ORAL EVERY 6 HOURS PRN
Qty: 120 TABLET | Refills: 3 | Status: SHIPPED | OUTPATIENT
Start: 2020-07-13 | End: 2020-11-23 | Stop reason: SDUPTHER

## 2020-07-13 ASSESSMENT — ENCOUNTER SYMPTOMS
DIARRHEA: 0
ABDOMINAL PAIN: 0
PHOTOPHOBIA: 0
EYE PAIN: 0
BACK PAIN: 0
COUGH: 0
WHEEZING: 0
SHORTNESS OF BREATH: 0
SORE THROAT: 0
NAUSEA: 0
CHEST TIGHTNESS: 0
EYE DISCHARGE: 0
CONSTIPATION: 0
BLOOD IN STOOL: 0
EYE REDNESS: 0

## 2020-07-13 NOTE — PATIENT INSTRUCTIONS
Thank you for letting us take care of you today. We hope all your questions were addressed. If a question was overlooked or something else comes to mind after you return home, please contact a member of your Care Team listed below. Please make sure you have a routine office visit set up to follow-up on [address]. Your Care Team at [place] [address] is Team #2  Carlitos Lopez DO (Faculty)  Hever Aguirre MD (Faculty)  Weber Favre, MD (Resident)  Tammie Peterson MD (Resident)  Margoth Hauser MD (Resident)  Carli Garcia MD (Resident)  Kenzie Pham, RAJI Kaba. ,HARRIS PENA, [facility] office)  [facility] office)  Odilia Vasquez ([address] [facility])  Rafael Leger, [address] [facility] ([address] [facility])  Aubrey Jacinto, [facility] (Clinical Pharmacist)     Office phone number: [phone]    If you need to get in right away due to illness, please be advised we have \"Same Day\" appointments available Monday-Friday. Please call us at [phone] option #3 to schedule your \"Same Day\" appointment.

## 2020-07-13 NOTE — TELEPHONE ENCOUNTER
Patient need a prescription for bilateral carpal tunnel brace. patient states she do not need the compression stocking.

## 2020-07-13 NOTE — PROGRESS NOTES
I have reviewed and discussed key elements of [facility] with the resident including plan of care and follow up and agree with the care rodrigue plan.

## 2020-07-13 NOTE — PROGRESS NOTES
Depression Paternal Grandmother     Heart Disease Paternal Grandmother     High Blood Pressure Paternal Grandmother     High Cholesterol Paternal Grandmother     Mental Illness Paternal Grandmother     Arthritis Paternal Grandfather        Presented tothe office today for:  Chief Complaint   Patient presents with    Carpal Tunnel     patient she need carpal tunnel brace for bilateral wrist       HPI  Patient is a 62 yo female here to follow up on b/l carpal tunnel. Has had it for few years. Wears night brace daily. Review of Systems   Constitutional: Negative for chills, diaphoresis, fatigue and fever. HENT: Negative for ear pain and sore throat. Eyes: Negative for photophobia, pain, discharge, redness and visual disturbance. Respiratory: Negative for cough, chest tightness, shortness of breath and wheezing. Cardiovascular: Negative for chest pain, palpitations and leg swelling. Gastrointestinal: Negative for abdominal pain, blood in stool, constipation, diarrhea and nausea. Endocrine: Negative for cold intolerance and heat intolerance. Genitourinary: Negative for difficulty urinating and hematuria. Musculoskeletal: Negative for back pain and joint swelling. Neurological: Negative for light-headedness and headaches. Psychiatric/Behavioral: Negative for agitation and confusion. Objective:    /76 (Site: Left Upper Arm, Position: Sitting, Cuff Size: Large Adult)   Pulse 72   Temp 97.8 °F (36.6 °C) (Temporal)   Wt (!) 305 lb (138.3 kg)   BMI 47.77 kg/m²    BP Readings from Last 3 Encounters:   07/13/20 139/76   05/21/20 (!) 148/99   05/21/20 (!) 143/90       Physical Exam  Eyes:      General: No scleral icterus. Conjunctiva/sclera: Conjunctivae normal.   Neck:      Musculoskeletal: Normal range of motion and neck supple. Thyroid: No thyromegaly. Cardiovascular:      Rate and Rhythm: Normal rate and regular rhythm. Heart sounds: Normal heart sounds. Pulmonary:      Effort: Pulmonary effort is normal.      Breath sounds: Normal breath sounds. No wheezing or rales. Chest:      Chest wall: No tenderness. Musculoskeletal:         General: No tenderness. Skin:     Coloration: Skin is not pale. Findings: No erythema. Lab Results   Component Value Date    WBC 8.5 04/27/2018    HGB 14.6 04/27/2018    HCT 44.6 04/27/2018     04/27/2018    CHOL 162 07/08/2019    TRIG 88 07/08/2019    HDL 47 07/08/2019    ALT 23 05/13/2015    AST 18 05/13/2015     (H) 04/27/2018    K 4.5 04/27/2018     04/27/2018    CREATININE 0.69 04/27/2018    BUN 13 04/27/2018    CO2 30 04/27/2018    TSH 3.02 01/02/2020    INR 0.9 06/29/2013    LABA1C 5.4 01/02/2020    LABMICR CANNOT BE CALCULATED 01/02/2020     Lab Results   Component Value Date    CALCIUM 9.4 04/27/2018     Lab Results   Component Value Date    LDLCALC 68 06/16/2017    LDLCHOLESTEROL 97 07/08/2019       Assessment and Plan:    1. Screening for hyperlipidemia    - Lipid, Fasting; Future    2. Type 2 diabetes mellitus without complication, without long-term current use of insulin (HCC)    - ASPIRIN LOW DOSE 81 MG EC tablet; Take 1 tablet by mouth daily  Dispense: 30 tablet; Refill: 1    3. Bilateral carpal tunnel syndrome  - wrist splints nightly  - will check tsh levels  - chronic, stable for years. discussed options of corticsteroid and surgical release  If worsening of symptoms. 4. Degenerative disc disease, lumbar    - acetaminophen (ACETAMINOPHEN EXTRA STRENGTH) 500 MG tablet; Take 2 tablets by mouth every 6 hours as needed for Pain  Dispense: 120 tablet; Refill: 3    5. Chronic bilateral low back pain, unspecified whether sciatica present    - acetaminophen (ACETAMINOPHEN EXTRA STRENGTH) 500 MG tablet; Take 2 tablets by mouth every 6 hours as needed for Pain  Dispense: 120 tablet; Refill: 3    6.  Vitamin D deficiency    - calcium carbonate-vitamin D (CALTRATE) 600-400 MG-UNIT TABS per tab; TAKE 1 TABLET BY MOUTH TWICE A DAY  Dispense: 90 tablet; Refill: 1    7. Morbid obesity (HCC)    - TSH With Reflex Ft4; Future  trouble losing weight   Does not want bariatric        Requested Prescriptions     Signed Prescriptions Disp Refills    ASPIRIN LOW DOSE 81 MG EC tablet 30 tablet 1     Sig: Take 1 tablet by mouth daily    acetaminophen (ACETAMINOPHEN EXTRA STRENGTH) 500 MG tablet 120 tablet 3     Sig: Take 2 tablets by mouth every 6 hours as needed for Pain    calcium carbonate-vitamin D (CALTRATE) 600-400 MG-UNIT TABS per tab 90 tablet 1     Sig: TAKE 1 TABLET BY MOUTH TWICE A DAY    albuterol sulfate  (90 Base) MCG/ACT inhaler 18 g 2     Sig: INHALE 2 PUFFS INTO THE LUNGS EVERY SIX HOURS AS NEEDED    pantoprazole (PROTONIX) 40 MG tablet 60 tablet 5     Sig: Take 1 tablet by mouth 2 times daily (before meals)    Elastic Bandages & Supports (MEDICAL COMPRESSION STOCKINGS) MISC 1 each 0     Si each by Does not apply route daily as needed (swelling) Grade II: 20-30       Medications Discontinued During This Encounter   Medication Reason    ASPIRIN LOW DOSE 81 MG EC tablet REORDER    acetaminophen (ACETAMINOPHEN EXTRA STRENGTH) 500 MG tablet REORDER    calcium carbonate-vitamin D (CALTRATE) 600-400 MG-UNIT TABS per tab REORDER    albuterol sulfate  (90 Base) MCG/ACT inhaler REORDER    pantoprazole (PROTONIX) 40 MG tablet REORDER    Elastic Bandages & Supports (MEDICAL COMPRESSION STOCKINGS) MISC CT Clemons received counseling on the following healthy behaviors:nutrition, exercise and medication adherence    Discussed use, benefit, and side effects of prescribed medications. Barriers to medication compliance addressed. All patient questions answered. Pt voicedunderstanding. Return in about 3 months (around 10/13/2020).

## 2020-07-13 NOTE — PROGRESS NOTES
Visit Information    Have you changed or started any medications since your last visit including any over-the-counter medicines, vitamins, or herbal medicines? no   Have you stopped taking any of your medications? Is so, why? -  no  Are you having any side effects from any of your medications? - no    Have you seen any other physician or provider since your last visit?  no   Have you had any other diagnostic tests since your last visit?  no   Have you been seen in the emergency room and/or had an admission in a hospital since we last saw you?  no   Have you had your routine dental cleaning in the past 6 months?  no     Do you have an active MyChart account? If no, what is the barrier?   No:     Patient Care Team:  Juan Jose Martinez MD as PCP - General (Family Medicine)  Jamaica Gross DO as Surgeon (Orthopedic Surgery)  Ketty Gómez MD as Consulting Physician (Cardiology)  Emmanuel Nevarez MD as Consulting Physician (Pulmonology)  Karuna Whitaker as Consulting Physician  Brigette Leiva MD as Consulting Physician (Endocrinology)  Arvel Spurling, MD as Consulting Physician (Obstetrics & Gynecology)  Jose G Piedra MD as Consulting Physician (Gastroenterology)    Medical History Review  Past Medical, Family, and Social History reviewed and does not contribute to the patient presenting condition    Health Maintenance   Topic Date Due    Lipid screen  07/08/2020    Flu vaccine (1) 09/01/2020    A1C test (Diabetic or Prediabetic)  01/02/2021    Diabetic microalbuminuria test  01/02/2021    TSH testing  01/02/2021    Diabetic retinal exam  03/06/2021    Diabetic foot exam  05/20/2021    Breast cancer screen  12/12/2021    DTaP/Tdap/Td vaccine (2 - Td) 06/01/2027    Colon cancer screen colonoscopy  12/09/2029    Shingles Vaccine  Completed    Pneumococcal 0-64 years Vaccine  Completed    Hepatitis C screen  Completed    HIV screen  Completed    Hepatitis A vaccine  Aged Out    Hib vaccine  Aged Out    Meningococcal (ACWY) vaccine  Aged Out

## 2020-07-21 ENCOUNTER — TELEPHONE (OUTPATIENT)
Dept: GASTROENTEROLOGY | Age: 60
End: 2020-07-21

## 2020-07-21 NOTE — TELEPHONE ENCOUNTER
Patient called office to check the status os her wrist splints, she said it needs to be faxed to [facility] in [place] at [address]: [phone]. Order pended , please sign. Thank you.

## 2020-07-22 RX ORDER — CIMETIDINE 200 MG
1 TABLET ORAL 2 TIMES DAILY
Qty: 60 TABLET | Refills: 11 | Status: SHIPPED | OUTPATIENT
Start: 2020-07-22 | End: 2020-09-14

## 2020-07-22 NOTE — TELEPHONE ENCOUNTER
Original PA did not have the patients correct [employer] number. New PA sent through with the correct number.  Will notify the insurance company when Prior Auth comes through

## 2020-07-30 ENCOUNTER — TELEPHONE (OUTPATIENT)
Dept: FAMILY MEDICINE CLINIC | Age: 60
End: 2020-07-30

## 2020-07-30 NOTE — TELEPHONE ENCOUNTER
Call from patient stating she needs a referral for her carpal tunnel.     Writer did print her referral and fax to [facility] @ [phone] today @ 10:42am 07/30/2020

## 2020-08-03 NOTE — PATIENT INSTRUCTIONS
Patient Education        Diabetes Foot Health: Care Instructions  Your Care Instructions     When you have diabetes, your feet need extra care and attention. Diabetes can damage the nerve endings and blood vessels in your feet, making you less likely to notice when your feet are injured. Diabetes also limits your body's ability to fight infection and get blood to areas that need it. If you get a minor foot injury, it could become an ulcer or a serious infection. With good foot care, you can prevent most of these problems. Caring for your feet can be quick and easy. Most of the care can be done when you are bathing or getting ready for bed. Follow-up care is a key part of your treatment and safety. Be sure to make and go to all appointments, and call your doctor if you are having problems. It's also a good idea to know your test results and keep a list of the medicines you take. How can you care for yourself at home? · Keep your blood sugar close to normal by watching what and how much you eat, monitoring blood sugar, taking medicines if prescribed, and getting regular exercise. · Do not smoke. Smoking affects blood flow and can make foot problems worse. If you need help quitting, talk to your doctor about stop-smoking programs and medicines. These can increase your chances of quitting for good. · Eat a diet that is low in fats. High fat intake can cause fat to build up in your blood vessels and decrease blood flow. · Inspect your feet daily for blisters, cuts, cracks, or sores. If you cannot see well, use a mirror or have someone help you. · Take care of your feet:  ? Wash your feet every day. Use warm (not hot) water. Check the water temperature with your wrists or other part of your body, not your feet. ? Dry your feet well. Pat them dry. Do not rub the skin on your feet too hard. Dry well between your toes.  If the skin on your feet stays moist, bacteria or a fungus can grow, which can lead to infection. ? Keep your skin soft. Use moisturizing skin cream to keep the skin on your feet soft and prevent calluses and cracks. But do not put the cream between your toes, and stop using any cream that causes a rash. ? Clean underneath your toenails carefully. Do not use a sharp object to clean underneath your toenails. Use the blunt end of a nail file or other rounded tool. ? Trim and file your toenails straight across to prevent ingrown toenails. Use a nail clipper, not scissors. Use an emery board to smooth the edges. · Change socks daily. Socks without seams are best, because seams often rub the feet. You can find socks for people with diabetes from specialty catalogs. · Look inside your shoes every day for things like gravel or torn linings, which could cause blisters or sores. · Buy shoes that fit well:  ? Look for shoes that have plenty of space around the toes. This helps prevent bunions and blisters. ? Try on shoes while wearing the kind of socks you will usually wear with the shoes. ? Avoid plastic shoes. They may rub your feet and cause blisters. Good shoes should be made of materials that are flexible and breathable, such as leather or cloth. ? Break in new shoes slowly by wearing them for no more than an hour a day for several days. Take extra time to check your feet for red areas, blisters, or other problems after you wear new shoes. · Do not go barefoot. Do not wear sandals, and do not wear shoes with very thin soles. Thin soles are easy to puncture. They also do not protect your feet from hot pavement or cold weather. · Have your doctor check your feet during each visit. If you have a foot problem, see your doctor. Do not try to treat an early foot problem at home. Home remedies or treatments that you can buy without a prescription (such as corn removers) can be harmful. · Always get early treatment for foot problems.  A minor irritation can lead to a major problem if not properly cared

## 2020-08-26 ENCOUNTER — OFFICE VISIT (OUTPATIENT)
Dept: PODIATRY | Age: 60
End: 2020-08-26
Payer: COMMERCIAL

## 2020-08-26 VITALS
WEIGHT: 293 LBS | DIASTOLIC BLOOD PRESSURE: 70 MMHG | BODY MASS INDEX: 45.99 KG/M2 | HEART RATE: 78 BPM | HEIGHT: 67 IN | TEMPERATURE: 97.3 F | SYSTOLIC BLOOD PRESSURE: 140 MMHG

## 2020-08-26 PROCEDURE — 11055 PARING/CUTG B9 HYPRKER LES 1: CPT | Performed by: STUDENT IN AN ORGANIZED HEALTH CARE EDUCATION/TRAINING PROGRAM

## 2020-08-26 PROCEDURE — 11721 DEBRIDE NAIL 6 OR MORE: CPT | Performed by: STUDENT IN AN ORGANIZED HEALTH CARE EDUCATION/TRAINING PROGRAM

## 2020-08-26 PROCEDURE — 99999 PR OFFICE/OUTPT VISIT,PROCEDURE ONLY: CPT | Performed by: STUDENT IN AN ORGANIZED HEALTH CARE EDUCATION/TRAINING PROGRAM

## 2020-08-26 PROCEDURE — 99212 OFFICE O/P EST SF 10 MIN: CPT | Performed by: PODIATRIST

## 2020-08-26 NOTE — PROGRESS NOTES
[address],  [address]  Tel: [phone]   Fax: [phone]    Subjective     CC: Diabetic foot exam and painful and elongated toe nails    HPI:  Keith Rod is a 61y.o. year old female who presents to clinic today for diabetic foot examination and also complaining of painful and elongated toenails and a painful callus to the left foot. The patient is a diabetic, and they're last HgbA1c was 5.4% in (1/2/20). The patient states their digits are painful when the toenails are elongated, causing rubbing in shoe gear. The patient denies tingling and numbness in the lower extremities. Patient denies any rest pain or claudication symptoms. The patient denies any history of acute trauma. The patient denies any other pedal complaints. She states she wears her compression stockings for her leg edema. Primary care physician is Edith Hilton MD.    ROS:    Constitutional: Denies nausea, vomiting, fever, chills. Neurologic: Denies numbness, tingling, and burning in the feet. Vascular: Denies symptoms of lower extremity claudication. Skin: Painful thickened toenails   Otherwise negative except as noted in the HPI.      PMH:  Past Medical History:   Diagnosis Date    Anxiety     Bronchial asthma     mild, intermittent    Carpal tunnel syndrome     Cataract     Chronic back pain     Chronic sinusitis     Class 2 severe obesity due to excess calories with serious comorbidity in [facility])     Depression     Diabetes mellitus due to underlying condition with hyperosmolarity without coma (HCC)     DJD (degenerative joint disease)     S1, L3, L4, L5, T12    Edema of leg     bilateral legs    Environmental allergies     GERD (gastroesophageal reflux disease)     Glaucoma     Glucose intolerance (impaired glucose tolerance)     Headache(784.0)     History of bronchitis     Viral    History of diarrhea     HTN (hypertension)     Hyperlipidemia tablet by mouth daily 7/13/20  Yes Ashutosh Thibodeaux MD   acetaminophen (ACETAMINOPHEN EXTRA STRENGTH) 500 MG tablet Take 2 tablets by mouth every 6 hours as needed for Pain 7/13/20  Yes Ashutosh Thibodeaux MD   calcium carbonate-vitamin D (CALTRATE) 600-400 MG-UNIT TABS per tab TAKE 1 TABLET BY MOUTH TWICE A DAY 7/13/20  Yes Ashutosh Thibodeaux MD   albuterol sulfate  (90 Base) MCG/ACT inhaler INHALE 2 PUFFS INTO THE LUNGS EVERY SIX HOURS AS NEEDED 7/13/20  Yes Ashutosh Tihbodeaux MD   pantoprazole (PROTONIX) 40 MG tablet Take 1 tablet by mouth 2 times daily (before meals) 7/13/20  Yes Ashutosh Thibodeaux MD   Elastic Bandages & Supports ([address] [facility]) [address] 1 each by Does not apply route daily as needed (swelling) Grade II: 20-30 7/13/20  Yes Ashutosh Thibodeaux MD   levothyroxine (SYNTHROID) 100 MCG tablet Take 1 tablet by mouth Daily Indications: 112 mg 5/21/20  Yes Anton Evans MD   VITAMIN D-3 SUPER STRENGTH 50 MCG (2580 UT) TABS Take 1 tablet by mouth daily 5/21/20  Yes Anton Evans MD   Multiple Vitamins-Minerals (CERTAVITE/ANTIOXIDANTS) TABS TAKE 1 TABLET DAILY 5/21/20  Yes Anton Evans MD   rosuvastatin (CRESTOR) 5 MG tablet Take 1 tablet by mouth daily 5/21/20  Yes Atnon Evans MD   methylcellulose 500 MG TABS Take 2 tablets by mouth 2 times daily 4/21/20  Yes Garth Haas MD   fluticasone (FLONASE) 50 MCG/ACT nasal spray USE 1 SPRAY IN EACH NOSTRIL TWICE A DAY 4/16/20  Yes JULITA Bustillo CNP   loratadine (CLARITIN) 10 MG tablet TAKE 1 TABLET BY MOUTH DAILY 4/16/20  Yes JULITA Bustillo CNP   lidocaine (XYLOCAINE) 2 % jelly  1/13/20  Yes Historical Provider, MD   Psyllium (HM FIBER POWDER PO) Take by mouth   Yes Historical Provider, MD   Cholecalciferol (VITAMIN D) 2000 units CAPS capsule Take 1,000 Units by mouth daily    Yes Historical Provider, MD   aluminum & magnesium hydroxide-simethicone (MAALOX ADVANCED) 200-200-20 MG/5ML SUSP suspension Take 5 mLs by mouth as needed for Indigestion

## 2020-08-26 NOTE — PROGRESS NOTES
Patient instructed to remove shoes and socks, instructed to sit in exam chair. Current PCP name is Veena Leroy and date of last visit 7/13/20. Do you have a follow up visit scheduled?  no       TODAYS BS: 105      Diabetic visit information    Blood pressure (Control is BP <140/90)  BP Readings from Last 3 Encounters:   07/13/20 139/76   05/21/20 (!) 148/99   05/21/20 (!) 143/90       BP taken with correct size cuff? - Yes   Repeated if > 140/90 NA      Tobacco use:  Patient  reports that she has never smoked. She has never used smokeless tobacco.  If Smoker - Cessation materials given? - Yes       Diabetic Health Maintenance Items due  Diabetes Management   Topic Date Due    Lipid screen  07/08/2020       Diabetic retinal exam done in last year? - Yes   If No: remind patient that it is due and they should schedule an exam    Medications  Is patient taking any medications for diabetes? -   Yes  Have blood sugars been controlled? Fasting blood sugars under 120   -   Yes   Random home sugars or today's POCT glucose is under 180 -   Yes   []  If No to the above then patient should schedule appt with PCP. Diabetic Plan    A1C Plan  Lab Results   Component Value Date    LABA1C 5.4 01/02/2020    LABA1C 5.5 11/05/2018    LABA1C 5.4 07/10/2018      []  If A1C over 8 and last result >3 months ago - Order A1C and refer to PCP   []  If last A1C over 6 months ago - Order A1C and refer to PCP for follow up   []  If elevated blood sugars > 180 - refer to PCP for follow up    []  Blood sugar controlled - A1C under 8 and last check was < 6 months      Cholesterol Plan   Lab Results   Component Value Date    LDLCALC 68 06/16/2017    LDLCHOLESTEROL 97 07/08/2019      []  If LDL > 100 and last result >3 months ago - order Fasting lipids and refer to PCP for follow up   []  If LDL < 100 and over 1 year ago - Order Fasting lipids and refer to PCP for follow up   [] LDL is controlled.   LDL < 100 and checked within the last year     Blood

## 2020-09-14 ENCOUNTER — OFFICE VISIT (OUTPATIENT)
Dept: GASTROENTEROLOGY | Age: 60
End: 2020-09-14
Payer: COMMERCIAL

## 2020-09-14 VITALS — WEIGHT: 293 LBS | TEMPERATURE: 97.2 F | BODY MASS INDEX: 48.08 KG/M2

## 2020-09-14 PROCEDURE — 1036F TOBACCO NON-USER: CPT | Performed by: INTERNAL MEDICINE

## 2020-09-14 PROCEDURE — 99213 OFFICE O/P EST LOW 20 MIN: CPT | Performed by: INTERNAL MEDICINE

## 2020-09-14 PROCEDURE — G8417 CALC BMI ABV UP PARAM F/U: HCPCS | Performed by: INTERNAL MEDICINE

## 2020-09-14 PROCEDURE — G8427 DOCREV CUR MEDS BY ELIG CLIN: HCPCS | Performed by: INTERNAL MEDICINE

## 2020-09-14 PROCEDURE — 3017F COLORECTAL CA SCREEN DOC REV: CPT | Performed by: INTERNAL MEDICINE

## 2020-09-14 RX ORDER — LUBIPROSTONE 8 UG/1
8 CAPSULE, GELATIN COATED ORAL 2 TIMES DAILY WITH MEALS
Qty: 30 CAPSULE | Refills: 3 | Status: SHIPPED | OUTPATIENT
Start: 2020-09-14 | End: 2020-09-22

## 2020-09-14 RX ORDER — DOCUSATE SODIUM 100 MG/1
100 CAPSULE, LIQUID FILLED ORAL 2 TIMES DAILY
COMMUNITY
End: 2020-10-22

## 2020-09-14 ASSESSMENT — ENCOUNTER SYMPTOMS
TROUBLE SWALLOWING: 0
SINUS PAIN: 0
BLOOD IN STOOL: 0
ANAL BLEEDING: 0
BACK PAIN: 1
NAUSEA: 0
VOMITING: 0
RECTAL PAIN: 0
ABDOMINAL DISTENTION: 1
SORE THROAT: 0
DIARRHEA: 0
SINUS PRESSURE: 0
CONSTIPATION: 1
CHEST TIGHTNESS: 0
ABDOMINAL PAIN: 1
COUGH: 0
SHORTNESS OF BREATH: 1

## 2020-09-14 NOTE — PROGRESS NOTES
GI CLINIC FOLLOW UP    INTERVAL HISTORY:   No referring provider defined for this encounter. No chief complaint on file. HISTORY OF PRESENT ILLNESS: Mandy Astorga is a 61 y.o. female with chronic constipation. She has been taking Citrucel. This helps some. She reports a bowel movement every 3 days or so. She has not been taking docusate. She keeps forgetting to take it. She reports some nausea. No vomiting. Past Medical,Family, and Social History reviewed and does not contribute to the patient presentingcondition. Patient's PMH/PSH,SH,PSYCH Hx, MEDs, ALLERGIES, and ROS were all reviewed and updated in the appropriate sections.     PAST MEDICAL HISTORY:  Past Medical History:   Diagnosis Date    Anxiety     Bronchial asthma     mild, intermittent    Carpal tunnel syndrome     Cataract     Chronic back pain     Chronic sinusitis     Class 2 severe obesity due to excess calories with serious comorbidity in adult [facility])     Depression     Diabetes mellitus due to underlying condition with hyperosmolarity without coma (HCC)     DJD (degenerative joint disease)     S1, L3, L4, L5, T12    Edema of leg     bilateral legs    Environmental allergies     GERD (gastroesophageal reflux disease)     Glaucoma     Glucose intolerance (impaired glucose tolerance)     Headache(784.0)     History of bronchitis     Viral    History of diarrhea     HTN (hypertension)     Hyperlipidemia     Hypothyroid     hypothyroid state    Hypothyroidism     IBS (irritable bowel syndrome) 10/2/2012    Legally blind     due to glaucoma    Mild intermittent asthma without complication     MVP (mitral valve prolapse)     Obesity     Class 2 severe obesity due to excess calories with serious comorbidity in adult    EMILIA on CPAP     Osteopenia     Pancreatic cyst     Polyneuropathy     Raynaud disease     Rhinopharyngitis     Allergic rhinopharyngitis    Stress fracture     Right 5th Metatarsal     Syncope     TIA (transient ischemic attack)     Urinary incontinence     Vasodepressor syncope     Wears glasses        Past Surgical History:   Procedure Laterality Date    APPENDECTOMY  1978    CATARACT REMOVAL Left     CHOLECYSTECTOMY  1978    COLONOSCOPY      COLONOSCOPY N/A 12/9/2019    COLORECTAL CANCER SCREENING, NOT HIGH RISK performed by Shelley Aguayo MD at [address] [place] [address] Bilateral     right iridectomy, right laser, bilateral trabeculectomy, left drain    GLAUCOMA SURGERY      HAND SURGERY Right     HYSTERECTOMY  1982    KNEE SURGERY Right 2000    TUBAL LIGATION  1981    UPPER GASTROINTESTINAL ENDOSCOPY      UPPER GASTROINTESTINAL ENDOSCOPY  5/24/2018    EGD DILATION SAVORY performed by Yahaira Bernal MD at [address] [place] [address]  3/6/2019    EGD DILATION SAVORY performed by Shelley Aguayo MD at [facility]       CURRENT MEDICATIONS:    Current Outpatient Medications:     methylcellulose (FIBER THERAPY) 500 MG TABS, Take 1 tablet by mouth 2 times daily, Disp: 60 tablet, Rfl: 11    ASPIRIN LOW DOSE 81 MG EC tablet, Take 1 tablet by mouth daily, Disp: 30 tablet, Rfl: 1    acetaminophen (ACETAMINOPHEN EXTRA STRENGTH) 500 MG tablet, Take 2 tablets by mouth every 6 hours as needed for Pain, Disp: 120 tablet, Rfl: 3    calcium carbonate-vitamin D (CALTRATE) 600-400 MG-UNIT TABS per tab, TAKE 1 TABLET BY MOUTH TWICE A DAY, Disp: 90 tablet, Rfl: 1    albuterol sulfate  (90 Base) MCG/ACT inhaler, INHALE 2 PUFFS INTO THE LUNGS EVERY SIX HOURS AS NEEDED, Disp: 18 g, Rfl: 2    pantoprazole (PROTONIX) 40 MG tablet, Take 1 tablet by mouth 2 times daily (before meals), Disp: 60 tablet, Rfl: 5    Elastic Bandages & Supports (MEDICAL COMPRESSION STOCKINGS) MISC, 1 each by Does not apply route daily as needed (swelling) Grade II: 20-30, Disp: 1 each, Rfl: 0    levothyroxine (SYNTHROID) 100 MCG tablet, Take 1 tablet by mouth Daily Indications: 112 mg, Disp: 30 tablet, Rfl: 3    VITAMIN D-3 SUPER STRENGTH 50 MCG (2000 UT) TABS, Take 1 tablet by mouth daily, Disp: 30 tablet, Rfl: 0    Multiple Vitamins-Minerals (CERTAVITE/ANTIOXIDANTS) TABS, TAKE 1 TABLET DAILY, Disp: 30 tablet, Rfl: 3    rosuvastatin (CRESTOR) 5 MG tablet, Take 1 tablet by mouth daily, Disp: 30 tablet, Rfl: 3    methylcellulose 500 MG TABS, Take 2 tablets by mouth 2 times daily, Disp: 120 tablet, Rfl: 5    fluticasone (FLONASE) 50 MCG/ACT nasal spray, USE 1 SPRAY IN EACH NOSTRIL TWICE A DAY, Disp: 1 Bottle, Rfl: 5    loratadine (CLARITIN) 10 MG tablet, TAKE 1 TABLET BY MOUTH DAILY, Disp: 30 tablet, Rfl: 5    lidocaine (XYLOCAINE) 2 % jelly, , Disp: , Rfl:     Psyllium (HM FIBER POWDER PO), Take by mouth, Disp: , Rfl:     Cholecalciferol (VITAMIN D) 2000 units CAPS capsule, Take 1,000 Units by mouth daily , Disp: , Rfl:     aluminum & magnesium hydroxide-simethicone (MAALOX ADVANCED) 200-200-20 MG/5ML SUSP suspension, Take 5 mLs by mouth as needed for Indigestion , Disp: , Rfl:     ALLERGIES:   Allergies   Allergen Reactions    Latex Rash     blisters  blisters    Adhesive Tape Rash     blisters    Amlodipine Other (See Comments)     Facial numbness  Facial numbness  Facial numbness  Facial numbness  Facial numbness  Facial numbness  Facial numbness    Bextra [Valdecoxib] Rash     swelling    Brimonidine Itching     Burning eyes severe    Daypro [Oxaprozin] Anaphylaxis    Diclofenac Sodium Swelling and Shortness Of Breath    Famotidine Other (See Comments)     Blisters down her arms    Hydrocodone-Acetaminophen Nausea Only     Severe chest pain    Hydromorphone Other (See Comments)     Rapid heart beat,  Nausea, spent 3 days in cardiac unit    Ibuprofen      Other reaction(s): bleeding  Other reaction(s): Unknown  Black stools  \"rips up stomach\", black tarry stool  Black stools  \"rips up stomach\", black tarry stool    Lisinopril Nausea Only, Nausea And Vomiting and Other (See Comments)     Other reaction(s): Hypotension    Metoprolol Other (See Comments)     Other reaction(s): Dizziness    Naproxen Other (See Comments)     Chest pain , swelling    Oxycodone-Acetaminophen      Chest pain severe    Rofecoxib Rash     swelling    Shellfish-Derived Products Anaphylaxis and Rash     shrimp  shrimp  shrimp    Simvastatin Nausea And Vomiting     Other reaction(s): muscle cramps    Statins      Other reaction(s): muscle cramps  Tolerates lovastatin. Pravachol caused numbness in head/face    Sulfa Antibiotics Hives    Tetracyclines & Related Itching and Rash    Timoptic [Timolol Maleate] Itching and Swelling     Eyes burning severely    Tramadol Itching and Rash    Fosamax [Alendronate] Nausea Only and Nausea And Vomiting    Nalbuphine Nausea And Vomiting    Alendronate Sodium     Alendronate Sodium     Alphagan [Brimonidine Tartrate]      Eye irritation    Dilaudid [Hydromorphone Hcl]     Doxycycline Monohydrate     Nsaids     Other Itching and Swelling     Eyes burning    Pepcid Complete [Famotidine-Ca Carb-Mag Hydrox] Hives and Other (See Comments)     blisters    Pilocarpine Itching and Swelling     Blurred vision  Eyes burning    Pravastatin Other (See Comments)     Facial numming    Shrimp Flavor Hives and Swelling    Statins Support Therapy      Tolerates lovastatin.  Pravachol caused numbness in head/face    Timoptic [Timolol Maleate]      Eye irritation      Tolectin [Tolmetin]      Unknown reaction  - as a child    Voltaren [Diclofenac Sodium]      Flu symptoms      Nubain [Nalbuphine Hcl] Nausea And Vomiting     Chest pain      Percocet [Oxycodone-Acetaminophen] Nausea And Vomiting     Sweating, chest pain    Tolectin [Tolmetin Sodium] Rash    Tolmetin Sodium Rash    Ultram [Tramadol Hcl] Itching and Rash     Rash on face    Vicodin [Hydrocodone-Acetaminophen] Nausea And Vomiting     swearing    Vioxx on file     Minutes per session: Not on file    Stress: Not on file   Relationships    Social connections     Talks on phone: Not on file     Gets together: Not on file     Attends [employer] service: Not on file     Active member of club or organization: Not on file     Attends meetings of clubs or organizations: Not on file     Relationship status: Not on file    Intimate partner violence     Fear of current or ex partner: Not on file     Emotionally abused: Not on file     Physically abused: Not on file     Forced sexual activity: Not on file   Other Topics Concern    Not on file   Social History Narrative    Not on file       REVIEW OF SYSTEMS: A 12-point review of systemswas obtained and pertinent positives and negatives were enumerated above in the history of present illness. All other reviewed systems / symptoms were negative. Review of Systems   Constitutional: Positive for fatigue. Negative for appetite change and fever. HENT: Negative for congestion, sinus pressure, sinus pain, sore throat and trouble swallowing. Eyes: Positive for visual disturbance. Respiratory: Positive for shortness of breath. Negative for cough and chest tightness. Cardiovascular: Negative for chest pain, palpitations and leg swelling. Gastrointestinal: Positive for abdominal distention, abdominal pain and constipation (on and off). Negative for anal bleeding, blood in stool, diarrhea, nausea, rectal pain and vomiting. Endocrine: Negative. Genitourinary: Negative for difficulty urinating. Musculoskeletal: Positive for arthralgias, back pain and gait problem. Skin: Negative. Allergic/Immunologic: Positive for environmental allergies and food allergies. Neurological: Positive for dizziness and light-headedness. Negative for weakness and headaches. Hematological: Bruises/bleeds easily. Psychiatric/Behavioral: Positive for sleep disturbance. The patient is nervous/anxious.             LABORATORY DATA: Reviewed  Lab Results   Component Value Date    WBC 8.5 04/27/2018    HGB 14.6 04/27/2018    HCT 44.6 04/27/2018    MCV 93.7 04/27/2018     04/27/2018     (H) 04/27/2018    K 4.5 04/27/2018     04/27/2018    CO2 30 04/27/2018    BUN 13 04/27/2018    CREATININE 0.69 04/27/2018    LABPROT 6.5 10/17/2012    LABALBU 3.8 05/13/2015    BILITOT 0.52 05/13/2015    ALKPHOS 68 05/13/2015    AST 18 05/13/2015    ALT 23 05/13/2015    INR 0.9 06/29/2013         Lab Results   Component Value Date    RBC 4.76 04/27/2018    HGB 14.6 04/27/2018    MCV 93.7 04/27/2018    MCH 30.7 04/27/2018    MCHC 32.7 04/27/2018    RDW 12.1 04/27/2018    MPV 10.9 04/27/2018    BASOPCT 1 04/27/2018    LYMPHSABS 2.57 04/27/2018    MONOSABS 0.50 04/27/2018    NEUTROABS 5.16 04/27/2018    EOSABS 0.16 04/27/2018    BASOSABS 0.04 04/27/2018         DIAGNOSTIC TESTING:     No results found. PHYSICAL EXAMINATION: Vital signs reviewed per the nursing documentation. There were no vitals taken for this visit. There is no height or weight on file to calculate BMI. Physical Exam      I personally reviewed the nurse's notes and documentation and I agree with her notes. General: alert, appears stated age and cooperative Psych: Normal. and Alert and oriented, appropriate affect. . Normal affect. Mentation normal  HEENT: PERRLA. Clear conjunctivae and sclerae. Moist oral mucosae, no lesions or ulcers. The neck is supple, without lymphadenopathy or jugular venous distension. No masses. Normal thyroid. Cardiovascular: S1 S2 RRR no rubs or murmurs. Pulmonary: clear BL. No accessory muscle usage. Abdominal Exam: Soft, NT ND, no hepato or spleno megaly, +BS, no ascites. IMPRESSION: Ms. Jorge Luis Patino is a 61 y.o. female with constipation. Trial of Amitiza. Follow-up in  4 weeks. Thank you for allowing me to participate in the care of Ms. Banerjee. For any further questions please do not hesitate to contact me.     I have

## 2020-09-18 ENCOUNTER — TELEPHONE (OUTPATIENT)
Dept: FAMILY MEDICINE CLINIC | Age: 60
End: 2020-09-18

## 2020-09-18 NOTE — TELEPHONE ENCOUNTER
PC from pt asking if flu vaccines are in office yet, informed no but they should be in by her upcoming appt. Also asked when due for next pneumo vaccine, informed not due until age 72.

## 2020-09-22 RX ORDER — LUBIPROSTONE 8 UG/1
8 CAPSULE, GELATIN COATED ORAL 2 TIMES DAILY WITH MEALS
Qty: 60 CAPSULE | Refills: 3
Start: 2020-09-22 | End: 2021-01-07 | Stop reason: ALTCHOICE

## 2020-09-30 LAB
CREATININE, URINE: NORMAL
MICROALBUMIN/CREAT 24H UR: 0.7 MG/G{CREAT}
MICROALBUMIN/CREAT UR-RTO: NORMAL

## 2020-10-07 ENCOUNTER — TELEPHONE (OUTPATIENT)
Dept: PODIATRY | Age: 60
End: 2020-10-07

## 2020-10-07 NOTE — TELEPHONE ENCOUNTER
Writer called pt to inform her that Dr. Lang Osuna will not be in podiatry clinic on 11/25 and that her appointment needs to be rescheduled for either the week prior or the following week. Pt unavailable. Home # just kept ringing with no end, irasemar left vm on mobile # with callback number instructing pt to call the office. Appt will be cancelled and letter sent in the mail.

## 2020-10-08 ENCOUNTER — OFFICE VISIT (OUTPATIENT)
Dept: PULMONOLOGY | Age: 60
End: 2020-10-08
Payer: COMMERCIAL

## 2020-10-08 VITALS
SYSTOLIC BLOOD PRESSURE: 137 MMHG | RESPIRATION RATE: 16 BRPM | WEIGHT: 293 LBS | DIASTOLIC BLOOD PRESSURE: 79 MMHG | OXYGEN SATURATION: 98 % | HEIGHT: 67 IN | HEART RATE: 65 BPM | TEMPERATURE: 97.3 F | BODY MASS INDEX: 45.99 KG/M2

## 2020-10-08 PROCEDURE — G8484 FLU IMMUNIZE NO ADMIN: HCPCS | Performed by: INTERNAL MEDICINE

## 2020-10-08 PROCEDURE — G8427 DOCREV CUR MEDS BY ELIG CLIN: HCPCS | Performed by: INTERNAL MEDICINE

## 2020-10-08 PROCEDURE — 99214 OFFICE O/P EST MOD 30 MIN: CPT | Performed by: INTERNAL MEDICINE

## 2020-10-08 PROCEDURE — G8417 CALC BMI ABV UP PARAM F/U: HCPCS | Performed by: INTERNAL MEDICINE

## 2020-10-08 PROCEDURE — 3017F COLORECTAL CA SCREEN DOC REV: CPT | Performed by: INTERNAL MEDICINE

## 2020-10-08 PROCEDURE — 1036F TOBACCO NON-USER: CPT | Performed by: INTERNAL MEDICINE

## 2020-10-08 RX ORDER — LANCETS
EACH MISCELLANEOUS
COMMUNITY
Start: 2020-08-27 | End: 2021-06-11 | Stop reason: SDUPTHER

## 2020-10-08 ASSESSMENT — SLEEP AND FATIGUE QUESTIONNAIRES
HOW LIKELY ARE YOU TO NOD OFF OR FALL ASLEEP IN A CAR, WHILE STOPPED FOR A FEW MINUTES IN TRAFFIC: 0
ESS TOTAL SCORE: 8
HOW LIKELY ARE YOU TO NOD OFF OR FALL ASLEEP WHILE SITTING QUIETLY AFTER LUNCH WITHOUT ALCOHOL: 0
HOW LIKELY ARE YOU TO NOD OFF OR FALL ASLEEP WHILE LYING DOWN TO REST IN THE AFTERNOON WHEN CIRCUMSTANCES PERMIT: 3
HOW LIKELY ARE YOU TO NOD OFF OR FALL ASLEEP WHILE SITTING INACTIVE IN A PUBLIC PLACE: 1
HOW LIKELY ARE YOU TO NOD OFF OR FALL ASLEEP WHILE WATCHING TV: 1
HOW LIKELY ARE YOU TO NOD OFF OR FALL ASLEEP WHILE SITTING AND TALKING TO SOMEONE: 0
HOW LIKELY ARE YOU TO NOD OFF OR FALL ASLEEP WHILE SITTING AND READING: 3
HOW LIKELY ARE YOU TO NOD OFF OR FALL ASLEEP WHEN YOU ARE A PASSENGER IN A CAR FOR AN HOUR WITHOUT A BREAK: 0

## 2020-10-08 NOTE — PROGRESS NOTES
LUNG CANCER SCREENING     1. CRITERIA MET    []     CT ORDERED  []      2. CRITERIA NOT MET   [x]      3. REFUSED                    []        REASON CRITERIA NOT MET     1. SMOKING LESS THAN 30 PY  [x]      2. AGE LESS THAN 55 or GREATER 77 YEARS  []      3. QUIT SMOKING 15 YEARS OR GREATER   []      4. RECENT CT WITH IN 11 MONTHS    []      5. LIFE EXPECTANCY < 5 YEARS   []      6.  SIGNS  AND SYMPTOMS OF LUNG CANCER   []           Immunization   Immunization History   Administered Date(s) Administered    Influenza 10/18/2012, 10/22/2013    Influenza Vaccine, unspecified formulation 10/22/2013    Influenza Virus Vaccine 09/28/2015, 10/04/2016, 10/27/2017, 10/04/2018    Influenza Whole 09/24/2010    Influenza, Trinh Jacks, IM, (6 mo and older Fluzone, Flulaval, Fluarix and 3 yrs and older Afluria) 10/04/2016, 10/27/2017, 10/04/2018    Pneumococcal Polysaccharide ([id number]) 12/08/2008    Tdap (Boostrix, Adacel) 06/01/2017    Zoster Recombinant (Shingrix) 01/15/2020, 03/19/2020        Pneumococcal Vaccine     [x] Up to date    [] Indicated   [] Refused  [] Contraindicated       Influenza Vaccine   [x] Up to date    [] Indicated   [] Refused  [] Contraindicated       PAST MEDICAL HISTORY:         Diagnosis Date    Anxiety     Bronchial asthma     mild, intermittent    Carpal tunnel syndrome     Cataract     Chronic back pain     Chronic sinusitis     Class 2 severe obesity due to excess calories with serious comorbidity in adult ([facility] [address]     Depression     Diabetes mellitus due to underlying condition with hyperosmolarity without coma (HCC)     DJD (degenerative joint disease)     S1, L3, L4, L5, T12    Edema of leg     bilateral legs    Environmental allergies     GERD (gastroesophageal reflux disease)     Glaucoma     Glucose intolerance (impaired glucose tolerance)     Headache(784.0)     History of bronchitis     Viral    History of diarrhea     HTN (hypertension)     Hyperlipidemia     1978    COLONOSCOPY      COLONOSCOPY N/A 12/9/2019    COLORECTAL CANCER SCREENING, NOT HIGH RISK performed by Tyson Leslie MD at [address] [place] [address] Bilateral     right iridectomy, right laser, bilateral trabeculectomy, left drain    GLAUCOMA SURGERY      HAND SURGERY Right     HYSTERECTOMY  1982    KNEE SURGERY Right 2000    TUBAL LIGATION  1981    UPPER GASTROINTESTINAL ENDOSCOPY      UPPER GASTROINTESTINAL ENDOSCOPY  5/24/2018    EGD DILATION SAVORY performed by Danilo Up MD at [address] [place] [address]  3/6/2019    EGD DILATION SAVORY performed by Tyson Leslie MD at [facility]              Not in a hospital admission.   Allergies   Allergen Reactions    Latex Rash     blisters  blisters    Adhesive Tape Rash     blisters    Amlodipine Other (See Comments)     Facial numbness  Facial numbness  Facial numbness  Facial numbness  Facial numbness  Facial numbness  Facial numbness    Bextra [Valdecoxib] Rash     swelling    Brimonidine Itching     Burning eyes severe    Daypro [Oxaprozin] Anaphylaxis    Diclofenac Sodium Swelling and Shortness Of Breath    Famotidine Other (See Comments)     Blisters down her arms    Hydrocodone-Acetaminophen Nausea Only     Severe chest pain    Hydromorphone Other (See Comments)     Rapid heart beat,  Nausea, spent 3 days in cardiac unit    Ibuprofen      Other reaction(s): bleeding  Other reaction(s): Unknown  Black stools  \"rips up stomach\", black tarry stool  Black stools  \"rips up stomach\", black tarry stool    Lisinopril Nausea Only, Nausea And Vomiting and Other (See Comments)     Other reaction(s): Hypotension    Metoprolol Other (See Comments)     Other reaction(s): Dizziness    Naproxen Other (See Comments)     Chest pain , swelling    Oxycodone-Acetaminophen      Chest pain severe    Rofecoxib Rash     swelling    Shellfish-Derived Products Anaphylaxis and Rash shrimp  shrimp  shrimp    Simvastatin Nausea And Vomiting     Other reaction(s): muscle cramps    Statins      Other reaction(s): muscle cramps  Tolerates lovastatin. Pravachol caused numbness in head/face    Sulfa Antibiotics Hives    Tetracyclines & Related Itching and Rash    Timoptic [Timolol Maleate] Itching and Swelling     Eyes burning severely    Tramadol Itching and Rash    Fosamax [Alendronate] Nausea Only and Nausea And Vomiting    Nalbuphine Nausea And Vomiting    Alendronate Sodium     Alendronate Sodium     Alphagan [Brimonidine Tartrate]      Eye irritation    Dilaudid [Hydromorphone Hcl]     Doxycycline Monohydrate     Nsaids     Other Itching and Swelling     Eyes burning    Pepcid Complete [Famotidine-Ca Carb-Mag Hydrox] Hives and Other (See Comments)     blisters    Pilocarpine Itching and Swelling     Blurred vision  Eyes burning    Pravastatin Other (See Comments)     Facial numming    Shrimp Flavor Hives and Swelling    Statins Support Therapy      Tolerates lovastatin. Pravachol caused numbness in head/face    Timoptic [Timolol Maleate]      Eye irritation      Tolectin [Tolmetin]      Unknown reaction  - as a child    Voltaren [Diclofenac Sodium]      Flu symptoms      Nubain [Nalbuphine Hcl] Nausea And Vomiting     Chest pain      Percocet [Oxycodone-Acetaminophen] Nausea And Vomiting     Sweating, chest pain    Tolectin [Tolmetin Sodium] Rash    Tolmetin Sodium Rash    Ultram [Tramadol Hcl] Itching and Rash     Rash on face    Vicodin [Hydrocodone-Acetaminophen] Nausea And Vomiting     swearing    Vioxx Rash     Social History     Tobacco Use   Smoking Status Never Smoker   Smokeless Tobacco Never Used     Prior to Admission medications    Medication Sig Start Date End Date Taking?  Authorizing Provider   Accu-Chek FastClix Lancets MISC  8/27/20  Yes Historical Provider, MD   lubiprostone (AMITIZA) 8 MCG CAPS capsule Take 1 capsule by mouth 2 times daily (with meals) 9/22/20  Yes Natanael El MD   ASPIRIN LOW DOSE 81 MG EC tablet Take 1 tablet by mouth daily 7/13/20  Yes David Nichole MD   acetaminophen (ACETAMINOPHEN EXTRA STRENGTH) 500 MG tablet Take 2 tablets by mouth every 6 hours as needed for Pain 7/13/20  Yes David Nichole MD   calcium carbonate-vitamin D (CALTRATE) 600-400 MG-UNIT TABS per tab TAKE 1 TABLET BY MOUTH TWICE A DAY 7/13/20  Yes David Nichole MD   albuterol sulfate  (90 Base) MCG/ACT inhaler INHALE 2 PUFFS INTO THE LUNGS EVERY SIX HOURS AS NEEDED 7/13/20  Yes David Nichole MD   pantoprazole (PROTONIX) 40 MG tablet Take 1 tablet by mouth 2 times daily (before meals) 7/13/20  Yes David Nichole MD   Elastic Bandages & Supports ([facility])  [address] [facility] [address] 1 each by Does not apply route daily as needed (swelling) Grade II: 20-30 7/13/20  Yes David Nichole MD   levothyroxine (SYNTHROID) 100 MCG tablet Take 1 tablet by mouth Daily Indications: 112 mg 5/21/20  Yes Dorie Redd MD   Multiple Vitamins-Minerals (CERTAVITE/ANTIOXIDANTS) TABS TAKE 1 TABLET DAILY 5/21/20  Yes Dorie Redd MD   rosuvastatin (CRESTOR) 5 MG tablet Take 1 tablet by mouth daily 5/21/20  Yes Dorie Redd MD   fluticasone (FLONASE) 50 MCG/ACT nasal spray USE 1 SPRAY IN EACH NOSTRIL TWICE A DAY 4/16/20  Yes JULITA Matute CNP   loratadine (CLARITIN) 10 MG tablet TAKE 1 TABLET BY MOUTH DAILY 4/16/20  Yes JULITA Matute CNP   Cholecalciferol (VITAMIN D) 2000 units CAPS capsule Take 1,000 Units by mouth daily    Yes Historical Provider, MD   aluminum & magnesium hydroxide-simethicone (MAALOX ADVANCED) 200-200-20 MG/5ML SUSP suspension Take 5 mLs by mouth as needed for Indigestion Actually  Citracel   Yes Historical Provider, MD   docusate sodium (COLACE) 100 MG capsule Take 100 mg by mouth 2 times daily    Historical Provider, MD   lidocaine (XYLOCAINE) 2 % jelly  1/13/20   Historical Provider, MD   Psyllium (HM FIBER POWDER PO) Take by mouth Historical Provider, MD         Physical Exam  General Appearance:    Alert, cooperative, no distress, appears stated age, morbid morbid obesity. No respiratory distress, not using any accessory muscles. No headaches at this time. Head:    Normocephalic, without obvious abnormality, atraumatic   Nose reveals no polyps. No sinus tenderness. Throat reveal no abnormality, oropharyngeal orifice is not compromised. Eye examination is normal, no jaundice or Darien syndrome. Ear examination normal.                   :    Neck:   Supple, symmetrical, trachea midline, no adenopathy;     thyroid:  no enlargement/tenderness/nodules; no carotid    bruit or JVD. Neck is short and obese    Back:     Symmetric, no curvature, ROM normal, no CVA tenderness   Lungs:       Not a good air entry on both side. Breathing is vesicular. Expiration is not prolonged. No rales or rhonchi are audible. AP diameter is not increased. No pleural friction rub is audible. Chest Wall:    No tenderness or deformity      Heart:    Regular rate and rhythm, S1 and S2 normal, no murmur, rub        or gallop no rvh                           Abdomen:                                                 Pulses:                                            Lymph nodes:                    Neurologic:                  Soft, non-tender, bowel sounds active all four quadrants,     no masses, no organomegaly         2+ and symmetric all extremities            Cervical, supraclavicular not enlarged or matted or tender      CNII-XII intact, normal strength 5/5 . Sensation grossly normal  and reflexes normal 2+  throughout     Clubbing No  Lower ext edema Yes1+   [x] , 2 +  [] , 3+   []  Upper ext edema No       Musculoskeletal - no joint swelling or tenderness or synovitis               There were no vitals taken for this visit.     CXR  No recent chest x-ray      CT Scans  No recent CT scan    Echo  No echocardiogram        Assessment  Obstructive sleep apnea syndrome  Morbid obesity  Neurocardiogenic syncope  \Glucose intolerance  Hypersomnia  Non-refreshing sleep    Plan  Patient continues to be morbidly obese she was advised to lose weight which will help the apnea and glucose intolerance and even syncope    Patient will continue treatment under care of cardiology for the syncope. Sleep is not refreshing. She feeling tired and fatigued during the daytime. It is very likely that he does something wrong with her machine or the pressures are not adequate. She cannot be given a new machine at this time. I did arrange for her to go back to the sleep center for re-titration of CPAP pressures. If the CPAP pressures are adequate and she still continues to have a problem with quality of sleep then we will have to give her a new set CPAP machine. Continue her treatment for the syncope. She already had a flu vaccine. She was advised to take adequate precaution against exposure to COVID. I plan to see her follow-up after the titration has been performed.     She continue the humidification    She is not a candidate for lung cancer screening    Dictated with Dr. Cherylene Gip MD dictation over thank you

## 2020-10-12 RX ORDER — FOLIC ACID/MV,IRON,MIN/LUTEIN 0.4-18-25
TABLET ORAL
Qty: 30 TABLET | Refills: 3 | Status: SHIPPED | OUTPATIENT
Start: 2020-10-12 | End: 2021-02-05 | Stop reason: SDUPTHER

## 2020-10-12 RX ORDER — ASPIRIN 81 MG/1
81 TABLET, COATED ORAL DAILY
Qty: 30 TABLET | Refills: 1 | Status: SHIPPED | OUTPATIENT
Start: 2020-10-12 | End: 2020-11-23 | Stop reason: SDUPTHER

## 2020-10-12 RX ORDER — PANTOPRAZOLE SODIUM 40 MG/1
40 TABLET, DELAYED RELEASE ORAL
Qty: 60 TABLET | Refills: 5 | Status: SHIPPED | OUTPATIENT
Start: 2020-10-12 | End: 2021-02-05 | Stop reason: SDUPTHER

## 2020-10-16 ENCOUNTER — OFFICE VISIT (OUTPATIENT)
Dept: GASTROENTEROLOGY | Age: 60
End: 2020-10-16
Payer: COMMERCIAL

## 2020-10-16 VITALS
DIASTOLIC BLOOD PRESSURE: 66 MMHG | TEMPERATURE: 97.3 F | WEIGHT: 293 LBS | HEART RATE: 69 BPM | BODY MASS INDEX: 47.75 KG/M2 | SYSTOLIC BLOOD PRESSURE: 116 MMHG

## 2020-10-16 PROCEDURE — 3017F COLORECTAL CA SCREEN DOC REV: CPT | Performed by: INTERNAL MEDICINE

## 2020-10-16 PROCEDURE — 99213 OFFICE O/P EST LOW 20 MIN: CPT | Performed by: INTERNAL MEDICINE

## 2020-10-16 PROCEDURE — G8417 CALC BMI ABV UP PARAM F/U: HCPCS | Performed by: INTERNAL MEDICINE

## 2020-10-16 PROCEDURE — G8427 DOCREV CUR MEDS BY ELIG CLIN: HCPCS | Performed by: INTERNAL MEDICINE

## 2020-10-16 PROCEDURE — G8484 FLU IMMUNIZE NO ADMIN: HCPCS | Performed by: INTERNAL MEDICINE

## 2020-10-16 PROCEDURE — 1036F TOBACCO NON-USER: CPT | Performed by: INTERNAL MEDICINE

## 2020-10-16 RX ORDER — BISACODYL 5 MG
10 TABLET, DELAYED RELEASE (ENTERIC COATED) ORAL DAILY
Qty: 60 TABLET | Refills: 5 | Status: SHIPPED | OUTPATIENT
Start: 2020-10-16 | End: 2020-11-15

## 2020-10-16 ASSESSMENT — ENCOUNTER SYMPTOMS
SHORTNESS OF BREATH: 1
ABDOMINAL PAIN: 1
SINUS PAIN: 0
SORE THROAT: 0
CONSTIPATION: 1
CHEST TIGHTNESS: 0
SINUS PRESSURE: 0
DIARRHEA: 0
ABDOMINAL DISTENTION: 1
BACK PAIN: 1
RECTAL PAIN: 0
NAUSEA: 0
VOMITING: 0
COUGH: 0
ANAL BLEEDING: 0
TROUBLE SWALLOWING: 0
BLOOD IN STOOL: 0

## 2020-10-16 NOTE — PROGRESS NOTES
GI CLINIC FOLLOW UP    INTERVAL HISTORY:   Caprice Rodriguez MD  [facility] [address] [place] [address]    Chief Complaint   Patient presents with    Constipation     Patient was put on Amitiza and got it finally approved 10- She is allergic to many things and Amitiza gave her headaches daily after 5 days she got diarrhea all day, sshe stooped am dose on day 8 and took pm dose and stomach pain and buring and diarrhea in night time and het mood was terribe and stooped it day 9. HISTORY OF PRESENT ILLNESS: Nancy Curiel is a 61 y.o. female with constipation. She tried Amitiza. This gave her headache and mood changes. She had to stop it. When she was taking it it worked well for her. She had soft bowel movements. Currently she is having constipation again. Past Medical,Family, and Social History reviewed and does not contribute to the patient presentingcondition. Patient's PMH/PSH,SH,PSYCH Hx, MEDs, ALLERGIES, and ROS were all reviewed and updated in the appropriate sections.     PAST MEDICAL HISTORY:  Past Medical History:   Diagnosis Date    Anxiety     Bronchial asthma     mild, intermittent    Carpal tunnel syndrome     Cataract     Chronic back pain     Chronic sinusitis     Class 2 severe obesity due to excess calories with serious comorbidity in adult [facility])     Depression     Diabetes mellitus due to underlying condition with hyperosmolarity without coma (HCC)     DJD (degenerative joint disease)     S1, L3, L4, L5, T12    Edema of leg     bilateral legs    Environmental allergies     GERD (gastroesophageal reflux disease)     Glaucoma     Glucose intolerance (impaired glucose tolerance)     Headache(784.0)     History of bronchitis     Viral    History of diarrhea     HTN (hypertension)     Hyperlipidemia     Hypothyroid     hypothyroid state    Hypothyroidism     IBS (irritable bowel syndrome) 10/2/2012    Legally blind     due to glaucoma    Mild intermittent asthma without complication     MVP (mitral valve prolapse)     Obesity     Class 2 severe obesity due to excess calories with serious comorbidity in adult    EMILIA on CPAP     Osteopenia     Pancreatic cyst     Polyneuropathy     Raynaud disease     Rhinopharyngitis     Allergic rhinopharyngitis    Stress fracture     Right 5th Metatarsal     Syncope     TIA (transient ischemic attack)     Urinary incontinence     Vasodepressor syncope     Wears glasses        Past Surgical History:   Procedure Laterality Date    APPENDECTOMY  1978    CATARACT REMOVAL Left     CHOLECYSTECTOMY  1978    COLONOSCOPY      COLONOSCOPY N/A 12/9/2019    COLORECTAL CANCER SCREENING, NOT HIGH RISK performed by Tristan Davis MD at [address] [place] [address] Bilateral     right iridectomy, right laser, bilateral trabeculectomy, left drain    GLAUCOMA SURGERY      HAND SURGERY Right     HYSTERECTOMY  1982    KNEE SURGERY Right 2000    TUBAL LIGATION  1981    UPPER GASTROINTESTINAL ENDOSCOPY      UPPER GASTROINTESTINAL ENDOSCOPY  5/24/2018    EGD DILATION SAVORY performed by Saundra Norris MD at [address] [place] [address]  3/6/2019    EGD DILATION SAVORY performed by Tristan Davis MD at [facility]       CURRENT MEDICATIONS:    Current Outpatient Medications:     Multiple Vitamins-Minerals (CERTAVITE/ANTIOXIDANTS) TABS, TAKE 1 TABLET DAILY, Disp: 30 tablet, Rfl: 3    ASPIRIN LOW DOSE 81 MG EC tablet, Take 1 tablet by mouth daily, Disp: 30 tablet, Rfl: 1    pantoprazole (PROTONIX) 40 MG tablet, Take 1 tablet by mouth 2 times daily (before meals), Disp: 60 tablet, Rfl: 5    calcium carbonate-vitamin D (CALTRATE) 600-400 MG-UNIT TABS per tab, TAKE 1 TABLET BY MOUTH TWICE A DAY, Disp: 90 tablet, Rfl: 1    Accu-Chek FastClix Lancets MISC, , Disp: , Rfl:     lubiprostone (AMITIZA) 8 MCG CAPS capsule, Take 1 capsule by mouth 2 times daily (with meals), Disp: 60 capsule, Rfl: 3    docusate sodium (COLACE) 100 MG capsule, Take 100 mg by mouth 2 times daily, Disp: , Rfl:     acetaminophen (ACETAMINOPHEN EXTRA STRENGTH) 500 MG tablet, Take 2 tablets by mouth every 6 hours as needed for Pain, Disp: 120 tablet, Rfl: 3    albuterol sulfate  (90 Base) MCG/ACT inhaler, INHALE 2 PUFFS INTO THE LUNGS EVERY SIX HOURS AS NEEDED, Disp: 18 g, Rfl: 2    Elastic Bandages & Supports (MEDICAL COMPRESSION STOCKINGS) MISC, 1 each by Does not apply route daily as needed (swelling) Grade II: 20-30, Disp: 1 each, Rfl: 0    levothyroxine (SYNTHROID) 100 MCG tablet, Take 1 tablet by mouth Daily Indications: 112 mg, Disp: 30 tablet, Rfl: 3    rosuvastatin (CRESTOR) 5 MG tablet, Take 1 tablet by mouth daily, Disp: 30 tablet, Rfl: 3    fluticasone (FLONASE) 50 MCG/ACT nasal spray, USE 1 SPRAY IN EACH NOSTRIL TWICE A DAY, Disp: 1 Bottle, Rfl: 5    loratadine (CLARITIN) 10 MG tablet, TAKE 1 TABLET BY MOUTH DAILY, Disp: 30 tablet, Rfl: 5    lidocaine (XYLOCAINE) 2 % jelly, , Disp: , Rfl:     Psyllium (HM FIBER POWDER PO), Take by mouth, Disp: , Rfl:     Cholecalciferol (VITAMIN D) 2000 units CAPS capsule, Take 1,000 Units by mouth daily , Disp: , Rfl:     aluminum & magnesium hydroxide-simethicone (MAALOX ADVANCED) 200-200-20 MG/5ML SUSP suspension, Take 5 mLs by mouth as needed for Indigestion Actually  Citracel, Disp: , Rfl:     ALLERGIES:   Allergies   Allergen Reactions    Latex Rash     blisters  blisters    Adhesive Tape Rash     blisters    Amlodipine Other (See Comments)     Facial numbness  Facial numbness  Facial numbness  Facial numbness  Facial numbness  Facial numbness  Facial numbness    Bextra [Valdecoxib] Rash     swelling    Brimonidine Itching     Burning eyes severe    Daypro [Oxaprozin] Anaphylaxis    Diclofenac Sodium Swelling and Shortness Of Breath    Famotidine Other (See Comments)     Blisters down her arms    Hydrocodone-Acetaminophen Nausea Only     Severe chest pain    Hydromorphone Other (See Comments)     Rapid heart beat,  Nausea, spent 3 days in cardiac unit    Ibuprofen      Other reaction(s): bleeding  Other reaction(s): Unknown  Black stools  \"rips up stomach\", black tarry stool  Black stools  \"rips up stomach\", black tarry stool    Lisinopril Nausea Only, Nausea And Vomiting and Other (See Comments)     Other reaction(s): Hypotension    Metoprolol Other (See Comments)     Other reaction(s): Dizziness    Naproxen Other (See Comments)     Chest pain , swelling    Oxycodone-Acetaminophen      Chest pain severe    Rofecoxib Rash     swelling    Shellfish-Derived Products Anaphylaxis and Rash     shrimp  shrimp  shrimp    Simvastatin Nausea And Vomiting     Other reaction(s): muscle cramps    Statins      Other reaction(s): muscle cramps  Tolerates lovastatin. Pravachol caused numbness in head/face    Sulfa Antibiotics Hives    Tetracyclines & Related Itching and Rash    Timoptic [Timolol Maleate] Itching and Swelling     Eyes burning severely    Tramadol Itching and Rash    Fosamax [Alendronate] Nausea Only and Nausea And Vomiting    Nalbuphine Nausea And Vomiting    Alendronate Sodium     Alendronate Sodium     Alphagan [Brimonidine Tartrate]      Eye irritation    Dilaudid [Hydromorphone Hcl]     Doxycycline Monohydrate     Nsaids     Other Itching and Swelling     Eyes burning    Pepcid Complete [Famotidine-Ca Carb-Mag Hydrox] Hives and Other (See Comments)     blisters    Pilocarpine Itching and Swelling     Blurred vision  Eyes burning    Pravastatin Other (See Comments)     Facial numming    Shrimp Flavor Hives and Swelling    Statins Support Therapy      Tolerates lovastatin.  Pravachol caused numbness in head/face    Timoptic [Timolol Maleate]      Eye irritation      Tolectin [Tolmetin]      Unknown reaction  - as a child    Voltaren [Diclofenac Sodium]      Flu symptoms      Nubain [Nalbuphine Hcl] Nausea And Vomiting     Chest pain      Percocet [Oxycodone-Acetaminophen] Nausea And Vomiting     Sweating, chest pain    Tolectin [Tolmetin Sodium] Rash    Tolmetin Sodium Rash    Ultram [Tramadol Hcl] Itching and Rash     Rash on face    Vicodin [Hydrocodone-Acetaminophen] Nausea And Vomiting     swearing    Vioxx Rash       FAMILY HISTORY:       Problem Relation Age of Onset    Diabetes Mother     Heart Disease Mother         multiple heart attacks    Arthritis Mother     High Blood Pressure Mother     High Cholesterol Mother     Substance Abuse Mother     Heart Disease Father     Arthritis Father     Depression Father     High Cholesterol Father     Mental Illness Father     Substance Abuse Father     Diabetes Sister     High Blood Pressure Sister     Cancer Paternal Uncle         esophageal cancer    Diabetes Maternal Aunt     Cancer Maternal Aunt         colorectal cancer    Diabetes Maternal Uncle     Cancer Maternal Grandmother         colorectal cancer    Arthritis Maternal Grandmother     Diabetes Maternal Grandmother     High Blood Pressure Maternal Grandmother     Stroke Maternal Grandmother     Arthritis Maternal Grandfather     Arthritis Paternal Grandmother     Cancer Paternal Grandmother     Depression Paternal Grandmother     Heart Disease Paternal Grandmother     High Blood Pressure Paternal Grandmother     High Cholesterol Paternal Grandmother     Mental Illness Paternal Grandmother     Arthritis Paternal Grandfather          SOCIAL HISTORY:   Social History     Socioeconomic History    Marital status: Single     Spouse name: Not on file    Number of children: Not on file    Years of education: Not on file    Highest education level: Not on file   Occupational History    Not on file   Social Needs    Financial resource strain: Not on file    Food insecurity     Worry: Not on file     Inability: Not on file    Transportation needs     Medical: Not on file Non-medical: Not on file   Tobacco Use    Smoking status: Never Smoker    Smokeless tobacco: Never Used   Substance and Sexual Activity    Alcohol use: No     Alcohol/week: 0.0 standard drinks    Drug use: No    Sexual activity: Never     Partners: Male   Lifestyle    Physical activity     Days per week: Not on file     Minutes per session: Not on file    Stress: Not on file   Relationships    Social connections     Talks on phone: Not on file     Gets together: Not on file     Attends Rastafari service: Not on file     Active member of club or organization: Not on file     Attends meetings of clubs or organizations: Not on file     Relationship status: Not on file    Intimate partner violence     Fear of current or ex partner: Not on file     Emotionally abused: Not on file     Physically abused: Not on file     Forced sexual activity: Not on file   Other Topics Concern    Not on file   Social History Narrative    Not on file       REVIEW OF SYSTEMS: A 12-point review of systemswas obtained and pertinent positives and negatives were enumerated above in the history of present illness. All other reviewed systems / symptoms were negative. Review of Systems   Constitutional: Positive for fatigue. Negative for appetite change and fever. HENT: Negative. Negative for congestion, sinus pressure, sinus pain, sore throat and trouble swallowing. Eyes: Positive for visual disturbance. Respiratory: Positive for shortness of breath. Negative for cough and chest tightness. Cardiovascular: Negative for chest pain, palpitations and leg swelling. Gastrointestinal: Positive for abdominal distention, abdominal pain and constipation (on and off). Negative for anal bleeding, blood in stool, diarrhea, nausea, rectal pain and vomiting. Endocrine: Negative. Genitourinary: Negative. Negative for difficulty urinating. Musculoskeletal: Positive for arthralgias, back pain and gait problem. Skin: Negative. Allergic/Immunologic: Positive for environmental allergies and food allergies. Neurological: Positive for dizziness and light-headedness. Negative for weakness and headaches. Hematological: Bruises/bleeds easily. Psychiatric/Behavioral: Positive for sleep disturbance. The patient is nervous/anxious. LABORATORY DATA: Reviewed  Lab Results   Component Value Date    WBC 8.5 04/27/2018    HGB 14.6 04/27/2018    HCT 44.6 04/27/2018    MCV 93.7 04/27/2018     04/27/2018     (H) 04/27/2018    K 4.5 04/27/2018     04/27/2018    CO2 30 04/27/2018    BUN 13 04/27/2018    CREATININE 0.69 04/27/2018    LABPROT 6.5 10/17/2012    LABALBU 3.8 05/13/2015    BILITOT 0.52 05/13/2015    ALKPHOS 68 05/13/2015    AST 18 05/13/2015    ALT 23 05/13/2015    INR 0.9 06/29/2013         Lab Results   Component Value Date    RBC 4.76 04/27/2018    HGB 14.6 04/27/2018    MCV 93.7 04/27/2018    MCH 30.7 04/27/2018    MCHC 32.7 04/27/2018    RDW 12.1 04/27/2018    MPV 10.9 04/27/2018    BASOPCT 1 04/27/2018    LYMPHSABS 2.57 04/27/2018    MONOSABS 0.50 04/27/2018    NEUTROABS 5.16 04/27/2018    EOSABS 0.16 04/27/2018    BASOSABS 0.04 04/27/2018         DIAGNOSTIC TESTING:     No results found. PHYSICAL EXAMINATION: Vital signs reviewed per the nursing documentation. There were no vitals taken for this visit. There is no height or weight on file to calculate BMI. Physical Exam      I personally reviewed the nurse's notes and documentation and I agree with her notes. General: alert, appears stated age and cooperative Psych: Normal. and Alert and oriented, appropriate affect. . Normal affect. Mentation normal  HEENT: PERRLA. Clear conjunctivae and sclerae. Moist oral mucosae, no lesions or ulcers. The neck is supple, without lymphadenopathy or jugular venous distension. No masses. Normal thyroid. Cardiovascular: S1 S2 RRR no rubs or murmurs. Pulmonary: clear BL.  No accessory muscle usage.  Abdominal Exam: Soft, NT ND, no hepato or spleno megaly, +BS, no ascites. IMPRESSION: Ms. Maria Victoria Durand is a 61 y.o. female with constipation. Functional.  Trial of the Doculax. She reports intolerance to Lipitor. She has tried other statins without problems. She reports intolerance to red dye-gives her numbness in her scalp. Follow-up in 6 to 8 weeks. Thank you for allowing me to participate in the care of Ms. Banerjee. For any further questions please do not hesitate to contact me. I have reviewed and agree with the ROS entered by the MA/LPN. Note is dictated utilizing voice recognition software. Unfortunately this leads to occasional typographical errors. Please contact our office if you have any questions.     Sue Sanders MD  [facility]  O: #[phone]

## 2020-10-22 RX ORDER — FLUTICASONE PROPIONATE 50 MCG
SPRAY, SUSPENSION (ML) NASAL
Qty: 16 G | Refills: 5 | Status: SHIPPED | OUTPATIENT
Start: 2020-10-22 | End: 2022-05-10

## 2020-10-22 RX ORDER — LORATADINE 10 MG/1
TABLET ORAL
Qty: 30 TABLET | Refills: 5 | Status: SHIPPED | OUTPATIENT
Start: 2020-10-22 | End: 2021-05-10

## 2020-10-22 RX ORDER — DOCUSATE SODIUM 100 MG/1
CAPSULE, LIQUID FILLED ORAL
Qty: 60 CAPSULE | Refills: 3 | Status: SHIPPED | OUTPATIENT
Start: 2020-10-22 | End: 2021-01-07

## 2020-10-22 NOTE — TELEPHONE ENCOUNTER
Health Maintenance   Topic Date Due    Lipid screen  07/08/2020    Flu vaccine (1) 09/01/2020    A1C test (Diabetic or Prediabetic)  01/02/2021    Diabetic microalbuminuria test  01/02/2021    TSH testing  01/02/2021    Diabetic retinal exam  03/06/2021    Diabetic foot exam  08/26/2021    Breast cancer screen  12/12/2021    DTaP/Tdap/Td vaccine (2 - Td) 06/01/2027    Colon cancer screen colonoscopy  12/09/2029    Shingles Vaccine  Completed    Pneumococcal 0-64 years Vaccine  Completed    Hepatitis C screen  Completed    HIV screen  Completed    Hepatitis A vaccine  Aged Out    Hib vaccine  Aged Out    Meningococcal (ACWY) vaccine  Aged Out             (applicable per patient's age: Cancer Screenings, Depression Screening, Fall Risk Screening, Immunizations)    Hemoglobin A1C (%)   Date Value   01/02/2020 5.4   11/05/2018 5.5   07/10/2018 5.4     Microalb/Crt.  Ratio (mcg/mg creat)   Date Value   01/02/2020 CANNOT BE CALCULATED     LDL Cholesterol (mg/dL)   Date Value   07/08/2019 97     LDL Calculated (mg/dL)   Date Value   06/16/2017 68     AST (U/L)   Date Value   05/13/2015 18     ALT (U/L)   Date Value   05/13/2015 23     BUN (mg/dL)   Date Value   04/27/2018 13      (goal A1C is < 7)   (goal LDL is <100) need 30-50% reduction from baseline     BP Readings from Last 3 Encounters:   10/16/20 116/66   10/08/20 137/79   08/26/20 (!) 140/70    (goal /80)      All Future Testing planned in [facility]:  Lab Frequency Next Occurrence   Lipid, Fasting Once 07/13/2021   TSH With Reflex Ft4 Once 07/13/2021   Sleep Study with PAP Titration Once 10/15/2020       Next Visit Date:  Future Appointments   Date Time Provider Fabby Juarez   10/23/2020 10:30 AM Maritza Conde MD [facility]   11/6/2020  1:30 PM SCHEDULE, MTH COVID19 PAT SCREENING [facility] [place] HOD   11/10/2020  7:30 PM [facility]   11/18/2020 12:45 PM Mayda Scale, DPM [facility] 12/11/2020  9:00 AM Francisco J Madrigal MD Resp Spec [facility]   12/14/2020 10:00 AM [facility] MAMMOGRAPHY ROOM AT [facility] Rad   12/17/2020 10:30 AM Kenneth Dutta MD [facility]   12/23/2020  1:00 PM [facility]            Patient Active Problem List:     Glaucoma     GERD (gastroesophageal reflux disease)     DJD (degenerative joint disease)     Obesity     Polyneuropathy     Migraine     Mitral prolapse     Low back pain     CTS (carpal tunnel syndrome)     Supraspinatus syndrome     Impaired fasting glucose     Acute sinus infection     IBS (irritable bowel syndrome)     Back pain, chronic     Stress fracture, right 5th metatarsal      Upper airway cough syndrome     Ear fullness     Perioral numbness     Screening for osteoporosis     Headache     Left eye injury     Medication reaction     Osteopenia     Lumbosacral pain     Flu vaccine need     Hypothyroid     Urinary incontinence     Anxiety     Sleep apnea     Depression     Chronic back pain     Carpal tunnel syndrome     Neuropathy     Mitral valve problem     Morbid obesity with BMI of 45.0-49.9, adult (HCC)     Frozen shoulder     Skin tag     Degenerative disc disease, lumbar     Bilateral edema of lower extremity     Medication refill     Varicose vein of leg     Dizziness     Dysphagia     Abdominal bloating     Mild intermittent asthma without complication

## 2020-10-23 ENCOUNTER — OFFICE VISIT (OUTPATIENT)
Dept: FAMILY MEDICINE CLINIC | Age: 60
End: 2020-10-23
Payer: COMMERCIAL

## 2020-10-23 VITALS
HEART RATE: 64 BPM | HEIGHT: 67 IN | TEMPERATURE: 97 F | DIASTOLIC BLOOD PRESSURE: 73 MMHG | SYSTOLIC BLOOD PRESSURE: 126 MMHG | BODY MASS INDEX: 45.99 KG/M2 | WEIGHT: 293 LBS

## 2020-10-23 PROCEDURE — 1036F TOBACCO NON-USER: CPT | Performed by: STUDENT IN AN ORGANIZED HEALTH CARE EDUCATION/TRAINING PROGRAM

## 2020-10-23 PROCEDURE — 3017F COLORECTAL CA SCREEN DOC REV: CPT | Performed by: STUDENT IN AN ORGANIZED HEALTH CARE EDUCATION/TRAINING PROGRAM

## 2020-10-23 PROCEDURE — G8427 DOCREV CUR MEDS BY ELIG CLIN: HCPCS | Performed by: STUDENT IN AN ORGANIZED HEALTH CARE EDUCATION/TRAINING PROGRAM

## 2020-10-23 PROCEDURE — 99213 OFFICE O/P EST LOW 20 MIN: CPT | Performed by: STUDENT IN AN ORGANIZED HEALTH CARE EDUCATION/TRAINING PROGRAM

## 2020-10-23 PROCEDURE — G8417 CALC BMI ABV UP PARAM F/U: HCPCS | Performed by: STUDENT IN AN ORGANIZED HEALTH CARE EDUCATION/TRAINING PROGRAM

## 2020-10-23 PROCEDURE — G8482 FLU IMMUNIZE ORDER/ADMIN: HCPCS | Performed by: STUDENT IN AN ORGANIZED HEALTH CARE EDUCATION/TRAINING PROGRAM

## 2020-10-23 PROCEDURE — 90686 IIV4 VACC NO PRSV 0.5 ML IM: CPT | Performed by: FAMILY MEDICINE

## 2020-10-23 RX ORDER — NITROGLYCERIN 0.4 MG/1
TABLET SUBLINGUAL
Qty: 25 TABLET | Refills: 3 | Status: SHIPPED | OUTPATIENT
Start: 2020-10-23 | End: 2021-01-07

## 2020-10-23 ASSESSMENT — ENCOUNTER SYMPTOMS
NAUSEA: 0
SINUS PAIN: 0
COLOR CHANGE: 0
ANAL BLEEDING: 0
SHORTNESS OF BREATH: 0
ABDOMINAL PAIN: 0
CHEST TIGHTNESS: 0
COUGH: 0
DIARRHEA: 0
SORE THROAT: 0
WHEEZING: 0
SINUS PRESSURE: 0
ABDOMINAL DISTENTION: 0

## 2020-10-23 ASSESSMENT — PATIENT HEALTH QUESTIONNAIRE - PHQ9
SUM OF ALL RESPONSES TO PHQ QUESTIONS 1-9: 0
SUM OF ALL RESPONSES TO PHQ QUESTIONS 1-9: 0
2. FEELING DOWN, DEPRESSED OR HOPELESS: 0
SUM OF ALL RESPONSES TO PHQ9 QUESTIONS 1 & 2: 0
1. LITTLE INTEREST OR PLEASURE IN DOING THINGS: 0
SUM OF ALL RESPONSES TO PHQ QUESTIONS 1-9: 0

## 2020-10-23 NOTE — PROGRESS NOTES
I have reviewed and discussed key elements of [facility] with the resident including plan of care and follow up and agree with the care rodrigue plan.

## 2020-10-23 NOTE — PROGRESS NOTES
disturbance. Respiratory: Negative for cough, chest tightness, shortness of breath and wheezing. Cardiovascular: Negative for chest pain, palpitations and leg swelling. Gastrointestinal: Negative for abdominal distention, abdominal pain, anal bleeding, diarrhea and nausea. Genitourinary: Negative for enuresis, frequency and urgency. Musculoskeletal: Negative for arthralgias, gait problem and neck stiffness. Skin: Negative for color change and rash. Neurological: Negative for dizziness and headaches. Psychiatric/Behavioral: Negative for agitation and confusion.                  The patient has a   Family History   Problem Relation Age of Onset    Diabetes Mother     Heart Disease Mother         multiple heart attacks    Arthritis Mother     High Blood Pressure Mother     High Cholesterol Mother     Substance Abuse Mother     Heart Disease Father     Arthritis Father     Depression Father     High Cholesterol Father     Mental Illness Father     Substance Abuse Father     Diabetes Sister     High Blood Pressure Sister     Cancer Paternal Uncle         esophageal cancer    Diabetes Maternal Aunt     Cancer Maternal Aunt         colorectal cancer    Diabetes Maternal Uncle     Cancer Maternal Grandmother         colorectal cancer    Arthritis Maternal Grandmother     Diabetes Maternal Grandmother     High Blood Pressure Maternal Grandmother     Stroke Maternal Grandmother     Arthritis Maternal Grandfather     Arthritis Paternal Grandmother     Cancer Paternal Grandmother     Depression Paternal Grandmother     Heart Disease Paternal Grandmother     High Blood Pressure Paternal Grandmother     High Cholesterol Paternal Grandmother     Mental Illness Paternal Grandmother     Arthritis Paternal Grandfather        Objective:    /73 (Site: Right Upper Arm, Position: Sitting, Cuff Size: Large Adult)   Pulse 64   Temp 97 °F (36.1 °C) (Temporal)   Ht 5' 7\" (1.702 m) up with Dr. Ernesto Stearns   - complete repeat sleep study  - f/u in 2 months    2. Hypothyroidism, unspecified type  - continue with current medication  - appt with Dr. Dulce Roche next week    3. Atypical chest pain  - nitroGLYCERIN (NITROSTAT) 0.4 MG SL tablet; up to max of 3 total doses. If no relief after 1 dose, call 911. Dispense: 25 tablet; Refill: 3  - instructed patient to go to the ED if chest pain worsens or not resolved with 3 doses of NG. Pt in understanding and is able to teach back.  -Patient has a follow-up appoint with cardiology in 1 year. If chest pain still persist during the next visit in 2 months will have patient schedule an appointment with cardiology sooner. 4. Need for immunization against influenza  - INFLUENZA, QUADV, 3 YRS AND OLDER, IM PF, PREFILL SYR OR SDV, 0.5ML (AFLURIA QUADV, PF)  - AK IMMUNIZ ADMIN,1 SINGLE/COMB VAC/TOXOID            Requested Prescriptions     Signed Prescriptions Disp Refills    nitroGLYCERIN (NITROSTAT) 0.4 MG SL tablet 25 tablet 3     Sig: up to max of 3 total doses. If no relief after 1 dose, call 911. There are no discontinued medications. Kaci received counseling on the following healthy behaviors: nutrition, exercise and medication adherence    Discussed use,benefit, and side effects of prescribed medications. Barriers to medication compliance addressed. All patient questions answered. Pt voiced understanding. Return in about 2 months (around 12/23/2020) for atypical chest pain. Disclaimer: Some orall of this note was transcribed using voice-recognition software. This may cause typographical errors occasionally. Although all effort is made to fix these errors, please do not hesitate to contact our office if there Alex Francis concern with the understanding of this note.

## 2020-10-23 NOTE — PROGRESS NOTES
Vaccine Information Sheet, \"Influenza - Inactivated\"  given to Marvin Lund, or parent/legal guardian of  Marvin Lund and verbalized understanding. Patient responses:    Have you ever had a reaction to a flu vaccine? No  Do you have any current illness? No  Have you ever had Guillian [place] Syndrome? No  Do you have a serious allergy to any of the following: Neomycin, Polymyxin, Thimerosal, eggs or egg products? No    Flu vaccine given per order. Please see immunization tab. Risks and benefits explained. Current VIS given. Visit Information    Have you changed or started any medications since your last visit including any over-the-counter medicines, vitamins, or herbal medicines? no   Have you stopped taking any of your medications? Is so, why? -  no  Are you having any side effects from any of your medications? - no    Have you seen any other physician or provider since your last visit?  no   Have you had any other diagnostic tests since your last visit?  no   Have you been seen in the emergency room and/or had an admission in a hospital since we last saw you?  no   Have you had your routine dental cleaning in the past 6 months?  no     Do you have an active MyChart account? If no, what is the barrier?   Yes    Patient Care Team:  Author Alo MD as PCP - General (Family Medicine)  Author Alo MD as PCP - [facility] Empaneled Provider  Sho Lopez DO as Surgeon (Orthopedic Surgery)  Katiana Claudio MD as Consulting Physician (Cardiology)  Nia Olvera MD as Consulting Physician (Pulmonology)  Aaliyah Brown as Consulting Physician  Maty Flores MD as Consulting Physician (Endocrinology)  Skinny Ring MD as Consulting Physician (Obstetrics & Gynecology)  Fauzia Bullard MD as Consulting Physician (Gastroenterology)    Medical History Review  Past Medical, Family, and Social History reviewed and does contribute to the patient presenting condition    Health Maintenance   Topic Date Due    Lipid screen  07/08/2020    Flu vaccine (1) 09/01/2020    A1C test (Diabetic or Prediabetic)  01/02/2021    Diabetic microalbuminuria test  01/02/2021    TSH testing  01/02/2021    Diabetic retinal exam  03/06/2021    Diabetic foot exam  08/26/2021    Breast cancer screen  12/12/2021    DTaP/Tdap/Td vaccine (2 - Td) 06/01/2027    Colon cancer screen colonoscopy  12/09/2029    Shingles Vaccine  Completed    Pneumococcal 0-64 years Vaccine  Completed    Hepatitis C screen  Completed    HIV screen  Completed    Hepatitis A vaccine  Aged Out    Hib vaccine  Aged Out    Meningococcal (ACWY) vaccine  Aged Out

## 2020-11-06 ENCOUNTER — HOSPITAL ENCOUNTER (OUTPATIENT)
Dept: PREADMISSION TESTING | Age: 60
Setting detail: SPECIMEN
Discharge: HOME | End: 2020-11-10
Payer: COMMERCIAL

## 2020-11-06 DIAGNOSIS — Z01.818 PREOP TESTING: Primary | ICD-10-CM

## 2020-11-06 PROCEDURE — C9803 HOPD COVID-19 SPEC COLLECT: HCPCS

## 2020-11-06 PROCEDURE — U0003 INFECTIOUS AGENT DETECTION BY NUCLEIC ACID (DNA OR RNA); SEVERE ACUTE RESPIRATORY SYNDROME CORONAVIRUS 2 (SARS-COV-2) (CORONAVIRUS DISEASE [COVID-19]), AMPLIFIED PROBE TECHNIQUE, MAKING USE OF HIGH THROUGHPUT TECHNOLOGIES AS DESCRIBED BY CMS-2020-01-R: HCPCS

## 2020-11-08 LAB — SARS-COV-2, NAA: NOT DETECTED

## 2020-11-10 ENCOUNTER — HOSPITAL ENCOUNTER (OUTPATIENT)
Dept: SLEEP CENTER | Age: 60
Discharge: HOME | End: 2020-11-12
Payer: COMMERCIAL

## 2020-11-10 VITALS
TEMPERATURE: 96.9 F | RESPIRATION RATE: 12 BRPM | BODY MASS INDEX: 45.99 KG/M2 | HEIGHT: 67 IN | WEIGHT: 293 LBS | HEART RATE: 68 BPM

## 2020-11-10 PROCEDURE — 95811 POLYSOM 6/>YRS CPAP 4/> PARM: CPT

## 2020-11-19 LAB — STATUS: NORMAL

## 2020-11-23 DIAGNOSIS — M54.50 CHRONIC BILATERAL LOW BACK PAIN, UNSPECIFIED WHETHER SCIATICA PRESENT: ICD-10-CM

## 2020-11-23 DIAGNOSIS — M51.36 DEGENERATIVE DISC DISEASE, LUMBAR: ICD-10-CM

## 2020-11-23 DIAGNOSIS — E11.9 TYPE 2 DIABETES MELLITUS WITHOUT COMPLICATION, WITHOUT LONG-TERM CURRENT USE OF INSULIN (HCC): ICD-10-CM

## 2020-11-23 DIAGNOSIS — G89.29 CHRONIC BILATERAL LOW BACK PAIN, UNSPECIFIED WHETHER SCIATICA PRESENT: ICD-10-CM

## 2020-11-23 RX ORDER — ASPIRIN 81 MG/1
81 TABLET, COATED ORAL DAILY
Qty: 30 TABLET | Refills: 1 | Status: SHIPPED | OUTPATIENT
Start: 2020-11-23 | End: 2021-02-05 | Stop reason: SDUPTHER

## 2020-11-23 RX ORDER — ACETAMINOPHEN 500 MG
1000 TABLET ORAL EVERY 6 HOURS PRN
Qty: 120 TABLET | Refills: 3 | Status: SHIPPED | OUTPATIENT
Start: 2020-11-23 | End: 2021-02-05 | Stop reason: SDUPTHER

## 2020-11-23 NOTE — TELEPHONE ENCOUNTER
Patient request for Aspirin 81 MG and Acetaminophen 500 MG. Please review and e-scribe if applicable. Last Visit Date: 10/23/2020  Next Visit Date:  12/3/2020    Hemoglobin A1C (%)   Date Value   01/02/2020 5.4   11/05/2018 5.5   07/10/2018 5.4             ( goal A1C is < 7)   Microalb/Crt.  Ratio (mcg/mg creat)   Date Value   01/02/2020 CANNOT BE CALCULATED     LDL Cholesterol (mg/dL)   Date Value   07/08/2019 97     LDL Calculated (mg/dL)   Date Value   06/16/2017 68       (goal LDL is <100)   AST (U/L)   Date Value   05/13/2015 18     ALT (U/L)   Date Value   05/13/2015 23     BUN (mg/dL)   Date Value   04/27/2018 13     BP Readings from Last 3 Encounters:   10/23/20 126/73   10/16/20 116/66   10/08/20 137/79          (goal 120/80)        Patient Active Problem List:     Glaucoma     GERD (gastroesophageal reflux disease)     DJD (degenerative joint disease)     Obesity     Polyneuropathy     Migraine     Mitral prolapse     Low back pain     CTS (carpal tunnel syndrome)     Supraspinatus syndrome     Impaired fasting glucose     Acute sinus infection     IBS (irritable bowel syndrome)     Back pain, chronic     Stress fracture, right 5th metatarsal      Upper airway cough syndrome     Ear fullness     Perioral numbness     Screening for osteoporosis     Headache     Left eye injury     Medication reaction     Osteopenia     Lumbosacral pain     Flu vaccine need     Hypothyroid     Urinary incontinence     Anxiety     Sleep apnea     Depression     Chronic back pain     Carpal tunnel syndrome     Neuropathy     Mitral valve problem     Morbid obesity with BMI of 45.0-49.9, adult (HCC)     Frozen shoulder     Skin tag     Degenerative disc disease, lumbar     Bilateral edema of lower extremity     Medication refill     Varicose vein of leg     Dizziness     Dysphagia     Abdominal bloating     Mild intermittent asthma without complication      MA

## 2020-11-24 ENCOUNTER — TELEPHONE (OUTPATIENT)
Dept: PULMONOLOGY | Age: 60
End: 2020-11-24

## 2020-11-24 NOTE — TELEPHONE ENCOUNTER
Dr Evelia, Zelda Boy patient called and stated she wants her pressure on her sleep apnea machine changed and set to 10. She stated it is currently at 9 and this morning she noticed she woke up with a headache and felt anxious. Patient goes through [facility]. May you please review and advise.    Thank you, [place]

## 2020-12-01 ENCOUNTER — TELEPHONE (OUTPATIENT)
Dept: GASTROENTEROLOGY | Age: 60
End: 2020-12-01

## 2020-12-02 ENCOUNTER — OFFICE VISIT (OUTPATIENT)
Dept: PODIATRY | Age: 60
End: 2020-12-02
Payer: COMMERCIAL

## 2020-12-02 VITALS
TEMPERATURE: 97.3 F | DIASTOLIC BLOOD PRESSURE: 69 MMHG | WEIGHT: 293 LBS | HEART RATE: 69 BPM | SYSTOLIC BLOOD PRESSURE: 128 MMHG | HEIGHT: 67 IN | BODY MASS INDEX: 45.99 KG/M2

## 2020-12-02 PROCEDURE — G8417 CALC BMI ABV UP PARAM F/U: HCPCS | Performed by: STUDENT IN AN ORGANIZED HEALTH CARE EDUCATION/TRAINING PROGRAM

## 2020-12-02 PROCEDURE — 99213 OFFICE O/P EST LOW 20 MIN: CPT | Performed by: STUDENT IN AN ORGANIZED HEALTH CARE EDUCATION/TRAINING PROGRAM

## 2020-12-02 PROCEDURE — 3017F COLORECTAL CA SCREEN DOC REV: CPT | Performed by: STUDENT IN AN ORGANIZED HEALTH CARE EDUCATION/TRAINING PROGRAM

## 2020-12-02 PROCEDURE — G8482 FLU IMMUNIZE ORDER/ADMIN: HCPCS | Performed by: STUDENT IN AN ORGANIZED HEALTH CARE EDUCATION/TRAINING PROGRAM

## 2020-12-02 PROCEDURE — 1036F TOBACCO NON-USER: CPT | Performed by: STUDENT IN AN ORGANIZED HEALTH CARE EDUCATION/TRAINING PROGRAM

## 2020-12-02 PROCEDURE — G8427 DOCREV CUR MEDS BY ELIG CLIN: HCPCS | Performed by: STUDENT IN AN ORGANIZED HEALTH CARE EDUCATION/TRAINING PROGRAM

## 2020-12-02 PROCEDURE — 99212 OFFICE O/P EST SF 10 MIN: CPT | Performed by: STUDENT IN AN ORGANIZED HEALTH CARE EDUCATION/TRAINING PROGRAM

## 2020-12-02 PROCEDURE — 11721 DEBRIDE NAIL 6 OR MORE: CPT | Performed by: STUDENT IN AN ORGANIZED HEALTH CARE EDUCATION/TRAINING PROGRAM

## 2020-12-02 NOTE — PROGRESS NOTES
Patient instructed to remove shoes and socks and instructed to sit in exam chair. Current PCP is Hanh Dutta MD and date of last visit was 10/23/20. Do you have a follow up visit scheduled? Yes  If yes, the date is 12/3/20      Diabetic visit information    Blood pressure (Control is BP <140/90)  BP Readings from Last 3 Encounters:   10/23/20 126/73   10/16/20 116/66   10/08/20 137/79       BP taken with correct size cuff? - Yes   Repeated if > 140/90 NA      Tobacco use:  Patient  reports that she has never smoked. She has never used smokeless tobacco.  If Smoker - Cessation materials given? - Yes       Diabetic Health Maintenance Items due  Diabetes Management   Topic Date Due    Lipid screen  07/08/2020       Diabetic retinal exam done in last year? - Yes   If No: remind patient that it is due and they should schedule an exam    Medications  Is patient taking any medications for diabetes? -   Yes  Have blood sugars been controlled? Fasting blood sugars under 120   -   Yes   Random home sugars or today's POCT glucose is under 180 -   Yes   []  If No to the above then patient should schedule appt with PCP. Diabetic Plan    A1C Plan  Lab Results   Component Value Date    LABA1C 5.4 01/02/2020    LABA1C 5.5 11/05/2018    LABA1C 5.4 07/10/2018      []  If A1C over 8 and last result >3 months ago - Order A1C and refer to PCP   []  If last A1C over 6 months ago - Order A1C and refer to PCP for follow up   []  If elevated blood sugars > 180 - refer to PCP for follow up    []  Blood sugar controlled - A1C under 8 and last check was < 6 months      Cholesterol Plan   Lab Results   Component Value Date    LDLCALC 68 06/16/2017    LDLCHOLESTEROL 97 07/08/2019      []  If LDL > 100 and last result >3 months ago - order Fasting lipids and refer to PCP for follow up   []  If LDL < 100 and over 1 year ago - Order Fasting lipids and refer to PCP for follow up   [] LDL is controlled.   LDL < 100 and checked within the

## 2020-12-02 NOTE — PROGRESS NOTES
[address],  [address]  Tel: [phone]   Fax: [phone]    Subjective     CC: Diabetic foot exam and painful and elongated toe nails    HPI:  Sammy Kaba is a 61y.o. year old female who presents to clinic today for diabetic foot examination and also complaining of painful and elongated toenails and a painful callus to the left foot. Patient is legally blind due to glaucoma. The patient states their digits are painful when the toenails are elongated, causing rubbing in shoe gear. Patient expresses pain at the lateral aspect of the hallux nail. Patient complaining of swelling to the distal foot when wearing her open toed compression stockings. Patient denies any rest pain or claudication symptoms. The patient denies any history of acute trauma. The patient denies any other pedal complaints. Primary care physician is Marisa Dobbins MD.    ROS:    Constitutional: Denies nausea, vomiting, fever, chills. Neurologic: Denies numbness, tingling, and burning in the feet. Vascular: Denies symptoms of lower extremity claudication. Skin: Painful thickened toenails   Otherwise negative except as noted in the HPI.      PMH:  Past Medical History:   Diagnosis Date    Anxiety     Bronchial asthma     mild, intermittent    Carpal tunnel syndrome     Cataract     Chronic back pain     Chronic sinusitis     Class 2 severe obesity due to excess calories with serious comorbidity in adult [facility])     Depression     Diabetes mellitus due to underlying condition with hyperosmolarity without coma (HCC)     DJD (degenerative joint disease)     S1, L3, L4, L5, T12    Edema of leg     bilateral legs    Environmental allergies     GERD (gastroesophageal reflux disease)     Glaucoma     Glucose intolerance (impaired glucose tolerance)     Headache(784.0)     History of bronchitis     Viral    History of diarrhea     HTN (hypertension)     Hyperlipidemia  Hypothyroid     hypothyroid state    Hypothyroidism     IBS (irritable bowel syndrome) 10/2/2012    Legally blind     due to glaucoma    Mild intermittent asthma without complication     MVP (mitral valve prolapse)     Obesity     Class 2 severe obesity due to excess calories with serious comorbidity in adult    EMILIA on CPAP     Osteopenia     Pancreatic cyst     Polyneuropathy     Raynaud disease     Rhinopharyngitis     Allergic rhinopharyngitis    Stress fracture     Right 5th Metatarsal     Syncope     TIA (transient ischemic attack)     Urinary incontinence     Vasodepressor syncope     Wears glasses        Surgical History:   Past Surgical History:   Procedure Laterality Date    APPENDECTOMY  1978    CATARACT REMOVAL Left     CHOLECYSTECTOMY  1978    COLONOSCOPY      COLONOSCOPY N/A 12/9/2019    COLORECTAL CANCER SCREENING, NOT HIGH RISK performed by Yasmine Nguyen MD at [address] [place] [address] Bilateral     right iridectomy, right laser, bilateral trabeculectomy, left drain    GLAUCOMA SURGERY      HAND SURGERY Right     HYSTERECTOMY  1982    KNEE SURGERY Right 2000    TUBAL LIGATION  1981    UPPER GASTROINTESTINAL ENDOSCOPY      UPPER GASTROINTESTINAL ENDOSCOPY  5/24/2018    EGD DILATION SAVORY performed by Joann Lee MD at [address] [place] [address]  3/6/2019    EGD DILATION SAVORY performed by Yasmine Nguyen MD at [facility]       Social History:  Social History     Tobacco Use    Smoking status: Never Smoker    Smokeless tobacco: Never Used   Substance Use Topics    Alcohol use: No     Alcohol/week: 0.0 standard drinks    Drug use: No       Medications:  Prior to Admission medications    Medication Sig Start Date End Date Taking?  Authorizing Provider   ASPIRIN LOW DOSE 81 MG EC tablet Take 1 tablet by mouth daily 11/23/20  Yes Corinne Neumann MD   acetaminophen (ACETAMINOPHEN EXTRA STRENGTH) 500 MG tablet Take 2 tablets by mouth every 6 hours as needed for Pain 11/23/20  Yes Agnieszka Gooden MD   nitroGLYCERIN (NITROSTAT) 0.4 MG SL tablet up to max of 3 total doses.  If no relief after 1 dose, call 911. 10/23/20  Yes Agnieszka Gooden MD   loratadine (CLARITIN) 10 MG tablet TAKE 1 TABLET BY MOUTH DAILY 10/22/20  Yes Hemal Kern MD    MG capsule TAKE 1 CAPSULE BY MOUTH 2 TIMES DAILY 10/22/20  Yes Merle Gaines MD   fluticasone (FLONASE) 50 MCG/ACT nasal spray USE 1 SPRAY IN EACH NOSTRIL TWICE A DAY 10/22/20  Yes Hemal Kern MD   Multiple Vitamins-Minerals (CERTAVITE/ANTIOXIDANTS) TABS TAKE 1 TABLET DAILY 10/12/20  Yes Mari Montano MD   pantoprazole (PROTONIX) 40 MG tablet Take 1 tablet by mouth 2 times daily (before meals) 10/12/20  Yes Mari Montano MD   calcium carbonate-vitamin D (CALTRATE) 600-400 MG-UNIT TABS per tab TAKE 1 TABLET BY MOUTH TWICE A DAY 10/12/20  Yes Mari Montano MD   Accu-Chek FastClix Lancets [address] [place] [address]  8/27/20  Yes Historical Provider, MD   lubiprostone (AMITIZA) 8 MCG CAPS capsule Take 1 capsule by mouth 2 times daily (with meals) 9/22/20  Yes Merle Gaines MD   albuterol sulfate  (90 Base) MCG/ACT inhaler INHALE 2 PUFFS INTO THE LUNGS EVERY SIX HOURS AS NEEDED 7/13/20  Yes Cristhian Luna MD   Elastic Bandages & Supports ([address]) [facility] 1 each by Does not apply route daily as needed (swelling) Grade II: 20-30 7/13/20  Yes Cristhian Luna MD   levothyroxine (SYNTHROID) 100 MCG tablet Take 1 tablet by mouth Daily Indications: 112 mg 5/21/20  Yes Nissa Whitley MD   rosuvastatin (CRESTOR) 5 MG tablet Take 1 tablet by mouth daily 5/21/20  Yes Nissa Whitley MD   lidocaine (XYLOCAINE) 2 % jelly  1/13/20  Yes Historical Provider, MD   Psyllium (HM FIBER POWDER PO) Take by mouth   Yes Historical Provider, MD   Cholecalciferol (VITAMIN D) 2000 units CAPS capsule Take 1,000 Units by mouth daily    Yes Historical Provider, MD   aluminum & magnesium hydroxide-simethicone (MAALOX ADVANCED) 200-200-20 MG/5ML SUSP suspension Take 5 mLs by mouth as needed for Indigestion Actually  Citracel   Yes Historical Provider, MD       Objective     Vitals:    12/02/20 1258   BP: 128/69   Pulse:    Temp:        Lab Results   Component Value Date    LABA1C 5.4 01/02/2020       Physical Exam:  General:  Alert and oriented x3. In no acute distress. Lower Extremity Physical Exam:    Vascular: DP pulses are palpable, Bilateral. PT pulses non palpable, readily dopplerable bilateral. CFT <3 seconds to all digits, Bilateral.  Edema noted to the bilateral lower extremity, Bilateral.  Hair growth is absent to the level of the digits, Bilateral.     Neuro: Saph/sural/SP/DP/plantar sensation intact to light touch. Protective sensation is intact to 7/10 sites as tested with a 5.07g SWMF, Bilateral.     Musculoskeletal: EHL/FHL/GS/TA gross motor intact. Tenderness to palpation of the distal tuft of digits 1 through 5 bilateral.  Decreased ankle range of motion bilateral.    Dermatologic: Skin shiny and atrophic with no hair growth noted. Interdigital maceration absent, Bilateral. Nails 1-5 thickened, elongated with subungual debris noted b/l. Hair growth absent bilat. no noted hyperkeratotic lesion. No open wounds appreciated. Assessment   Carmelita Alston is a 61 y.o. female with     Diagnosis Orders   1. Neuropathy  HM DIABETES FOOT EXAM        Plan   · Patient examined and evaluated. · Diagnosis and treatment options discussed in detail. · Debrided nails 1-5 bilateral with sterile nail nippers without incident. · Patient was educated on the utmost importance of tight glycemic control. · Continue compression stockings for bilateral leg edema. Instructed patient to contact her prescribing doctor to prescribe full-length stockings to prevent the swelling of the distal aspect of the foot that is she is currently complaining about.   · Patient was advised to continue daily foot checks, in addition to not ambulating barefoot. · Patient to RTC in 3 months. · Please, call the office with any questions or concerns. · Discussed with Dr. Nella Bustillos. No orders of the defined types were placed in this encounter. No orders of the defined types were placed in this encounter.       Bryn Godoy DPM   Podiatric Medicine & Surgery   12/2/2020 at 1:03 PM

## 2020-12-03 ENCOUNTER — OFFICE VISIT (OUTPATIENT)
Dept: FAMILY MEDICINE CLINIC | Age: 60
End: 2020-12-03
Payer: COMMERCIAL

## 2020-12-03 VITALS
HEART RATE: 68 BPM | SYSTOLIC BLOOD PRESSURE: 116 MMHG | BODY MASS INDEX: 45.99 KG/M2 | WEIGHT: 293 LBS | DIASTOLIC BLOOD PRESSURE: 68 MMHG | TEMPERATURE: 97.1 F | HEIGHT: 67 IN

## 2020-12-03 LAB — HBA1C MFR BLD: 5.3 %

## 2020-12-03 PROCEDURE — 99213 OFFICE O/P EST LOW 20 MIN: CPT | Performed by: STUDENT IN AN ORGANIZED HEALTH CARE EDUCATION/TRAINING PROGRAM

## 2020-12-03 PROCEDURE — 1036F TOBACCO NON-USER: CPT | Performed by: STUDENT IN AN ORGANIZED HEALTH CARE EDUCATION/TRAINING PROGRAM

## 2020-12-03 PROCEDURE — 3017F COLORECTAL CA SCREEN DOC REV: CPT | Performed by: STUDENT IN AN ORGANIZED HEALTH CARE EDUCATION/TRAINING PROGRAM

## 2020-12-03 PROCEDURE — G8482 FLU IMMUNIZE ORDER/ADMIN: HCPCS | Performed by: STUDENT IN AN ORGANIZED HEALTH CARE EDUCATION/TRAINING PROGRAM

## 2020-12-03 PROCEDURE — G8427 DOCREV CUR MEDS BY ELIG CLIN: HCPCS | Performed by: STUDENT IN AN ORGANIZED HEALTH CARE EDUCATION/TRAINING PROGRAM

## 2020-12-03 PROCEDURE — 83036 HEMOGLOBIN GLYCOSYLATED A1C: CPT | Performed by: STUDENT IN AN ORGANIZED HEALTH CARE EDUCATION/TRAINING PROGRAM

## 2020-12-03 PROCEDURE — G8417 CALC BMI ABV UP PARAM F/U: HCPCS | Performed by: STUDENT IN AN ORGANIZED HEALTH CARE EDUCATION/TRAINING PROGRAM

## 2020-12-03 ASSESSMENT — ENCOUNTER SYMPTOMS
NAUSEA: 0
ABDOMINAL PAIN: 0
DIARRHEA: 0
SHORTNESS OF BREATH: 0
VOMITING: 0

## 2020-12-03 NOTE — PROGRESS NOTES
Visit Information    Have you changed or started any medications since your last visit including any over-the-counter medicines, vitamins, or herbal medicines? no   Have you stopped taking any of your medications? Is so, why? -  no  Are you having any side effects from any of your medications? - no    Have you seen any other physician or provider since your last visit?  no   Have you had any other diagnostic tests since your last visit?  no   Have you been seen in the emergency room and/or had an admission in a hospital since we last saw you?  no   Have you had your routine dental cleaning in the past 6 months?  no     Do you have an active MyChart account? If no, what is the barrier?   Yes    Patient Care Team:  Leola Rowley MD as PCP - General (Family Medicine)  Leola Rowley MD as PCP - [facility]  Llewellyn Harada, DO as Surgeon (Orthopedic Surgery)  Judson Adan MD as Consulting Physician (Cardiology)  Manohar Jolly MD as Consulting Physician (Pulmonology)  Gardenia Alvarado as Consulting Physician  Constance Peng MD as Consulting Physician (Endocrinology)  Jose Head MD as Consulting Physician (Obstetrics & Gynecology)  Jerica Salazar MD as Consulting Physician (Gastroenterology)    Medical History Review  Past Medical, Family, and Social History reviewed and does contribute to the patient presenting condition    Health Maintenance   Topic Date Due    Lipid screen  07/08/2020    A1C test (Diabetic or Prediabetic)  01/02/2021    Diabetic microalbuminuria test  01/02/2021    TSH testing  01/02/2021    Diabetic retinal exam  03/06/2021    Diabetic foot exam  08/26/2021    Breast cancer screen  12/12/2021    DTaP/Tdap/Td vaccine (2 - Td) 06/01/2027    Colon cancer screen colonoscopy  12/09/2029    Flu vaccine  Completed    Shingles Vaccine  Completed    Hepatitis C screen  Completed    HIV screen  Completed    Hepatitis A vaccine  Aged Out    Hib vaccine  Aged C/ Isidro Yang 19 Meningococcal (ACWY) vaccine  Aged Out    Pneumococcal 0-64 years Vaccine  Aged Out

## 2020-12-03 NOTE — PROGRESS NOTES
Subjective:    Rosemary Goddard is a 61 y.o. female with  has a past medical history of Anxiety, Bronchial asthma, Carpal tunnel syndrome, Cataract, Chronic back pain, Chronic sinusitis, Class 2 severe obesity due to excess calories with serious comorbidity in [facility]), Depression, Diabetes mellitus due to underlying condition with hyperosmolarity without coma ([facility] [address] DJD (degenerative joint disease), Edema of leg, Environmental allergies, GERD (gastroesophageal reflux disease), Glaucoma, Glucose intolerance (impaired glucose tolerance), Headache(784.0), History of bronchitis, History of diarrhea, HTN (hypertension), Hyperlipidemia, Hypothyroid, Hypothyroidism, IBS (irritable bowel syndrome), Legally blind, Mild intermittent asthma without complication, MVP (mitral valve prolapse), Obesity, EMILIA on CPAP, Osteopenia, Pancreatic cyst, Polyneuropathy, Raynaud disease, Rhinopharyngitis, Stress fracture, Syncope, TIA (transient ischemic attack), Urinary incontinence, Vasodepressor syncope, and Wears glasses. Presented to the office today for:  Chief Complaint   Patient presents with    Follow-up     here for DM followup     Diabetes       HPI    61year old female presented today to follow up for her chronic conditions. Doing well, no acute concerns today. Complaining of chronic right shoulder pain x 3 months. Stated previous hx of trauma to shoulder 10 years ago when she fell down. Previously done PT and shoulder was better however over the past 3 months has had more pain in the shoulder. Offered PT however wants referral to ortho. No other acute concerns today. Review of Systems   Constitutional: Negative for chills and fever. Respiratory: Negative for shortness of breath. Cardiovascular: Negative for chest pain. Gastrointestinal: Negative for abdominal pain, diarrhea, nausea and vomiting.        The patient has a   Family History   Problem Relation Age of Onset    Diabetes Mother     Heart Disease Mother         multiple heart attacks    Arthritis Mother     High Blood Pressure Mother     High Cholesterol Mother     Substance Abuse Mother     Heart Disease Father     Arthritis Father     Depression Father     High Cholesterol Father     Mental Illness Father     Substance Abuse Father     Diabetes Sister     High Blood Pressure Sister     Cancer Paternal Uncle         esophageal cancer    Diabetes Maternal Aunt     Cancer Maternal Aunt         colorectal cancer    Diabetes Maternal Uncle     Cancer Maternal Grandmother         colorectal cancer    Arthritis Maternal Grandmother     Diabetes Maternal Grandmother     High Blood Pressure Maternal Grandmother     Stroke Maternal Grandmother     Arthritis Maternal Grandfather     Arthritis Paternal Grandmother     Cancer Paternal Grandmother     Depression Paternal Grandmother     Heart Disease Paternal Grandmother     High Blood Pressure Paternal Grandmother     High Cholesterol Paternal Grandmother     Mental Illness Paternal Grandmother     Arthritis Paternal Grandfather        Objective:    /68 (Site: Left Upper Arm, Position: Sitting, Cuff Size: Large Adult)   Pulse 68   Temp 97.1 °F (36.2 °C) (Temporal)   Ht 5' 7\" (1.702 m)   Wt (!) 306 lb 12.8 oz (139.2 kg)   BMI 48.05 kg/m²    BP Readings from Last 3 Encounters:   12/03/20 116/68   12/02/20 128/69   10/23/20 126/73       Physical Exam  HENT:      Head: Normocephalic and atraumatic. Cardiovascular:      Rate and Rhythm: Normal rate and regular rhythm. Pulses: Normal pulses. Heart sounds: Normal heart sounds. Pulmonary:      Effort: Pulmonary effort is normal. No respiratory distress. Breath sounds: Normal breath sounds. No wheezing. Abdominal:      Palpations: Abdomen is soft. Tenderness: There is no abdominal tenderness. There is no guarding. Musculoskeletal:      Comments: Right shoulder pain on abduction > 90 degrees.    Skin: General: Skin is warm and dry. Neurological:      General: No focal deficit present. Mental Status: She is alert and oriented to person, place, and time. Lab Results   Component Value Date    WBC 8.5 04/27/2018    HGB 14.6 04/27/2018    HCT 44.6 04/27/2018     04/27/2018    CHOL 162 07/08/2019    TRIG 88 07/08/2019    HDL 47 07/08/2019    ALT 23 05/13/2015    AST 18 05/13/2015     (H) 04/27/2018    K 4.5 04/27/2018     04/27/2018    CREATININE 0.69 04/27/2018    BUN 13 04/27/2018    CO2 30 04/27/2018    TSH 3.02 01/02/2020    INR 0.9 06/29/2013    LABA1C 5.3 12/03/2020    LABMICR CANNOT BE CALCULATED 01/02/2020     Lab Results   Component Value Date    CALCIUM 9.4 04/27/2018     Lab Results   Component Value Date    LDLCALC 68 06/16/2017    LDLCHOLESTEROL 97 07/08/2019       Assessment and Plan:    1. Chronic right shoulder pain  - possibly rotator cuff tendinopathy  - [facility]    2. Screening for hyperlipidemia  - Lipid Panel; Future          Requested Prescriptions      No prescriptions requested or ordered in this encounter       There are no discontinued medications. Kaci received counseling on the following healthy behaviors: nutrition, exercise and medication adherence    Discussed use,benefit, and side effects of prescribed medications. Barriers to medication compliance addressed. All patient questions answered. Pt voiced understanding. Return in about 3 months (around 3/3/2021). Disclaimer: Some orall of this note was transcribed using voice-recognition software. This may cause typographical errors occasionally. Although all effort is made to fix these errors, please do not hesitate to contact our office if there Maegan Gooden concern with the understanding of this note.

## 2020-12-11 ENCOUNTER — VIRTUAL VISIT (OUTPATIENT)
Dept: PULMONOLOGY | Age: 60
End: 2020-12-11
Payer: COMMERCIAL

## 2020-12-11 PROCEDURE — G8427 DOCREV CUR MEDS BY ELIG CLIN: HCPCS | Performed by: INTERNAL MEDICINE

## 2020-12-11 PROCEDURE — 3017F COLORECTAL CA SCREEN DOC REV: CPT | Performed by: INTERNAL MEDICINE

## 2020-12-11 PROCEDURE — 99214 OFFICE O/P EST MOD 30 MIN: CPT | Performed by: INTERNAL MEDICINE

## 2020-12-11 RX ORDER — LANCING DEVICE/LANCETS
KIT MISCELLANEOUS
COMMUNITY
Start: 2020-11-25 | End: 2021-12-14 | Stop reason: SDUPTHER

## 2020-12-11 RX ORDER — BLOOD-GLUCOSE METER
KIT MISCELLANEOUS
COMMUNITY
Start: 2020-11-24 | End: 2021-10-22 | Stop reason: CLARIF

## 2020-12-11 RX ORDER — CHOLECALCIFEROL (VITAMIN D3) 50 MCG
TABLET ORAL
COMMUNITY
Start: 2020-12-03 | End: 2021-10-22 | Stop reason: CLARIF

## 2020-12-11 ASSESSMENT — SLEEP AND FATIGUE QUESTIONNAIRES
HOW LIKELY ARE YOU TO NOD OFF OR FALL ASLEEP WHILE LYING DOWN TO REST IN THE AFTERNOON WHEN CIRCUMSTANCES PERMIT: 3
HOW LIKELY ARE YOU TO NOD OFF OR FALL ASLEEP WHILE SITTING INACTIVE IN A PUBLIC PLACE: 2
HOW LIKELY ARE YOU TO NOD OFF OR FALL ASLEEP WHILE SITTING QUIETLY AFTER LUNCH WITHOUT ALCOHOL: 1
HOW LIKELY ARE YOU TO NOD OFF OR FALL ASLEEP WHILE SITTING AND TALKING TO SOMEONE: 0
HOW LIKELY ARE YOU TO NOD OFF OR FALL ASLEEP WHILE WATCHING TV: 2
HOW LIKELY ARE YOU TO NOD OFF OR FALL ASLEEP WHEN YOU ARE A PASSENGER IN A CAR FOR AN HOUR WITHOUT A BREAK: 3
ESS TOTAL SCORE: 13
HOW LIKELY ARE YOU TO NOD OFF OR FALL ASLEEP WHILE SITTING AND READING: 2
HOW LIKELY ARE YOU TO NOD OFF OR FALL ASLEEP IN A CAR, WHILE STOPPED FOR A FEW MINUTES IN TRAFFIC: 0

## 2020-12-11 NOTE — PROGRESS NOTES
call 911.  Yes Carlos Macdonald MD   loratadine (CLARITIN) 10 MG tablet TAKE 1 TABLET BY MOUTH DAILY Yes Chelita Cano MD    MG capsule TAKE 1 CAPSULE BY MOUTH 2 TIMES DAILY Yes Radha Peng MD   fluticasone (FLONASE) 50 MCG/ACT nasal spray USE 1 SPRAY IN EACH NOSTRIL TWICE A DAY Yes Chelita Cano MD   Multiple Vitamins-Minerals (CERTAVITE/ANTIOXIDANTS) TABS TAKE 1 TABLET DAILY Yes Nadiya Echeverria MD   pantoprazole (PROTONIX) 40 MG tablet Take 1 tablet by mouth 2 times daily (before meals) Yes Nadiya Echeverria MD   calcium carbonate-vitamin D (CALTRATE) 600-400 MG-UNIT TABS per tab TAKE 1 TABLET BY MOUTH TWICE A DAY Yes Nadiya Echeverria MD   Accu-Chek FastClix Lancets MISC  Yes Historical Provider, MD   albuterol sulfate  (90 Base) MCG/ACT inhaler INHALE 2 PUFFS INTO THE LUNGS EVERY SIX HOURS AS NEEDED Yes Mary Finch MD   Elastic Bandages & Supports (MEDICAL COMPRESSION STOCKINGS) [facility] 1 each by Does not apply route daily as needed (swelling) Grade II: 20-30 Yes Mary Finch MD   levothyroxine (SYNTHROID) 100 MCG tablet Take 1 tablet by mouth Daily Indications: 112 mg Yes Venecia Mina MD   rosuvastatin (CRESTOR) 5 MG tablet Take 1 tablet by mouth daily Yes Venecia Mina MD   lidocaine (XYLOCAINE) 2 % jelly  Yes Historical Provider, MD   Psyllium (HM FIBER POWDER PO) Take by mouth Yes Historical Provider, MD   Cholecalciferol (VITAMIN D) 2000 units CAPS capsule Take 1,000 Units by mouth daily  Yes Historical Provider, MD   aluminum & magnesium hydroxide-simethicone (MAALOX ADVANCED) 200-200-20 MG/5ML SUSP suspension Take 5 mLs by mouth as needed for Indigestion Actually  Citracel Yes Historical Provider, MD   lubiprostone (AMITIZA) 8 MCG CAPS capsule Take 1 capsule by mouth 2 times daily (with meals)  Patient not taking: Reported on 12/11/2020  Radha Peng MD       Social History     Tobacco Use    Smoking status: Never Smoker    Smokeless tobacco: Never Used   Substance Use Topics  Alcohol use: No     Alcohol/week: 0.0 standard drinks    Drug use: No            RECORD REVIEW: Previous medical records were reviewed at today's visit. Wt Readings from Last 3 Encounters:   12/03/20 (!) 306 lb 12.8 oz (139.2 kg)   12/02/20 (!) 309 lb (140.2 kg)   11/10/20 (!) 305 lb (138.3 kg)       Results for orders placed or performed in visit on 12/03/20   POCT glycosylated hemoglobin (Hb A1C)   Result Value Ref Range    Hemoglobin A1C 5.3 %       No results found. PHYSICAL EXAMINATION:  Due to this being a TeleHealth encounter, evaluation of the following organ systems is limited: Vitals/Constitutional/EENT/Resp/CV/GI//MS/Neuro/Skin/Heme-Lymph-Imm. Constitutional: [x] Appears well-developed and well-nourished. [x] Abnormal  Mental status  [x] Alert and awake  [x] Oriented to person/place/time [x]Able to follow commands    [x] No apparent distress      Eyes:  EOM    [x]  Normal  [] Abnormal-  Sclera  [x]  Normal  [] Abnormal -         Discharge [x]  None visible  [] Abnormal -    HENT:   [x] Normocephalic, atraumatic. [] Abnormal shaped head   [x] Mouth/Throat: Mucous membranes are moist.     Ears [x] Normal  [] Abnormal-    Neck: [x] Normal range of motion [x] Supple [x] No visualized mass. Pulmonary/Chest: [x] Respiratory effort normal.  [x] No visualized signs of difficulty breathing or respiratory distress        [] Abnormal      Musculoskeletal:   [x] Normal range of motion. [x] Normal gait with no signs of ataxia. [x]  No signs of cyanosis of the peripheral portions of extremities.          [] Abnormal       Neurological:        [x] Normal cranial nerve (limited exam to video visit) [x] No focal weakness observed       [] Abnormal          Speech       [x] Normal   [] Abnormal     Skin:        [x] No rash on visible skin  [x] Normal  [] Abnormal     Psychiatric:       [x] Normal  [] Abnormal        [x] Normal Mood  [] Anxious appearing        Other Pertinent Exam Findings: ASSESSMENT:  Obstructive sleep apnea syndrome  Obesity  Syncope  Hypothyroid state  Plan:   1. Patient was advised to set CPAP pressure 11 cm water. 2.   3. She will continue to replacement therapy. Thyroid function has been stable  4.   5. She is encouraged to lose weight  6.   7. She is not a candidate for lung cancer screening  8.   9. She already had flu vaccine. 10.   11. Advised to get Covid vaccine when it becomes available  12.   13. We will see her follow-up in 6 months  14.   15.   16. An  electronic signature was used to authenticate this note. --Dana Camacho MD on 12/11/2020 at 9:07 AM    9}    Pursuant to the emergency declaration under the [address] [place] [address], [facility] waiver authority and the [employer] Act, this Virtual  Visit was conducted, with patient's consent, to reduce the patient's risk of exposure to COVID-19 and provide continuity of care for an established patient. Services were provided through a video synchronous discussion virtually to substitute for in-person clinic visit.     _______________________________________________________________________________________________________________________________________________  FOR TELEPHONE VISITS PLEASE COMPLETE THE FOLLOWING      Consent:  She and/or health care decision maker is aware that that she may receive a bill for this telephone service, depending on her insurance coverage, and has provided verbal consent to proceed: Yes      I affirm this is a Patient Initiated Episode with an Established Patient who has not had a related appointment within my department in the past 7 days or scheduled within the next 24 hours.     Total Time: minutes: 21-30 minutes    Note: not billable if this call serves to triage the patient into an appointment for the relevant concern

## 2020-12-14 ENCOUNTER — HOSPITAL ENCOUNTER (OUTPATIENT)
Age: 60
Discharge: HOME | End: 2020-12-14
Payer: COMMERCIAL

## 2020-12-14 ENCOUNTER — HOSPITAL ENCOUNTER (OUTPATIENT)
Dept: WOMENS IMAGING | Age: 60
Discharge: HOME | End: 2020-12-16
Payer: COMMERCIAL

## 2020-12-14 LAB
CHOLESTEROL/HDL RATIO: 2.9
CHOLESTEROL: 132 MG/DL
HDLC SERPL-MCNC: 45 MG/DL
LDL CHOLESTEROL: 64 MG/DL (ref 0–130)
TRIGL SERPL-MCNC: 114 MG/DL
VLDLC SERPL CALC-MCNC: NORMAL MG/DL (ref 1–30)

## 2020-12-14 PROCEDURE — 77067 SCR MAMMO BI INCL CAD: CPT

## 2020-12-14 PROCEDURE — 36415 COLL VENOUS BLD VENIPUNCTURE: CPT

## 2020-12-14 PROCEDURE — 80061 LIPID PANEL: CPT

## 2021-01-07 ENCOUNTER — HOSPITAL ENCOUNTER (OUTPATIENT)
Age: 61
Setting detail: SPECIMEN
Discharge: HOME | End: 2021-01-07
Payer: COMMERCIAL

## 2021-01-07 ENCOUNTER — OFFICE VISIT (OUTPATIENT)
Dept: OBGYN | Age: 61
End: 2021-01-07
Payer: COMMERCIAL

## 2021-01-07 VITALS
SYSTOLIC BLOOD PRESSURE: 138 MMHG | BODY MASS INDEX: 45.99 KG/M2 | WEIGHT: 293 LBS | HEIGHT: 67 IN | DIASTOLIC BLOOD PRESSURE: 85 MMHG

## 2021-01-07 DIAGNOSIS — R22.2 ABDOMINAL WALL LUMP: ICD-10-CM

## 2021-01-07 DIAGNOSIS — Z01.419 WOMEN'S ANNUAL ROUTINE GYNECOLOGICAL EXAMINATION: Primary | ICD-10-CM

## 2021-01-07 DIAGNOSIS — N39.46 MIXED INCONTINENCE: ICD-10-CM

## 2021-01-07 DIAGNOSIS — Z01.419 WOMEN'S ANNUAL ROUTINE GYNECOLOGICAL EXAMINATION: ICD-10-CM

## 2021-01-07 PROCEDURE — 1036F TOBACCO NON-USER: CPT | Performed by: OBSTETRICS & GYNECOLOGY

## 2021-01-07 PROCEDURE — 3017F COLORECTAL CA SCREEN DOC REV: CPT | Performed by: OBSTETRICS & GYNECOLOGY

## 2021-01-07 PROCEDURE — G8427 DOCREV CUR MEDS BY ELIG CLIN: HCPCS | Performed by: OBSTETRICS & GYNECOLOGY

## 2021-01-07 PROCEDURE — 99396 PREV VISIT EST AGE 40-64: CPT | Performed by: OBSTETRICS & GYNECOLOGY

## 2021-01-07 PROCEDURE — G8417 CALC BMI ABV UP PARAM F/U: HCPCS | Performed by: OBSTETRICS & GYNECOLOGY

## 2021-01-07 PROCEDURE — G0145 SCR C/V CYTO,THINLAYER,RESCR: HCPCS

## 2021-01-07 PROCEDURE — G8482 FLU IMMUNIZE ORDER/ADMIN: HCPCS | Performed by: OBSTETRICS & GYNECOLOGY

## 2021-01-07 RX ORDER — TOLTERODINE 4 MG/1
4 CAPSULE, EXTENDED RELEASE ORAL DAILY
Qty: 30 CAPSULE | Refills: 5 | Status: SHIPPED | OUTPATIENT
Start: 2021-01-07 | End: 2021-09-21

## 2021-01-07 NOTE — PROGRESS NOTES
YEARLY PHYSICAL    Date of service: 2021    Beto Valero  Is a 61 y.o.  single female    PT's PCP is: Jeffrey Machuca MD     : 1960                                             Subjective:       No LMP recorded. Patient has had a hysterectomy. Are your menses regular: not applicable    OB History   No obstetric history on file.         Social History     Tobacco Use   Smoking Status Never Smoker   Smokeless Tobacco Never Used        Social History     Substance and Sexual Activity   Alcohol Use No    Alcohol/week: 0.0 standard drinks       Family History   Problem Relation Age of Onset    Diabetes Mother     Heart Disease Mother         multiple heart attacks    Arthritis Mother     High Blood Pressure Mother     High Cholesterol Mother     Substance Abuse Mother     Heart Disease Father     Arthritis Father     Depression Father     High Cholesterol Father     Mental Illness Father     Substance Abuse Father     Diabetes Sister     High Blood Pressure Sister     Cancer Paternal Uncle         esophageal cancer    Diabetes Maternal Aunt     Cancer Maternal Aunt         colorectal cancer    Diabetes Maternal Uncle     Cancer Maternal Grandmother         colorectal cancer    Arthritis Maternal Grandmother     Diabetes Maternal Grandmother     High Blood Pressure Maternal Grandmother     Stroke Maternal Grandmother     Arthritis Maternal Grandfather     Arthritis Paternal Grandmother     Cancer Paternal Grandmother     Depression Paternal Grandmother     Heart Disease Paternal Grandmother     High Blood Pressure Paternal Grandmother     High Cholesterol Paternal Grandmother     Mental Illness Paternal Grandmother     Arthritis Paternal Grandfather        Any family history of breast or ovarian cancer: ovarian cancer maternal aunt    Any family history of blood clots: No      Allergies: Latex, Adhesive tape, tab, TAKE 1 TABLET BY MOUTH TWICE A DAY, Disp: 90 tablet, Rfl: 1    Accu-Chek FastClix Lancets MISC, , Disp: , Rfl:     albuterol sulfate  (90 Base) MCG/ACT inhaler, INHALE 2 PUFFS INTO THE LUNGS EVERY SIX HOURS AS NEEDED, Disp: 18 g, Rfl: 2    Elastic Bandages & Supports (MEDICAL COMPRESSION STOCKINGS) MISC, 1 each by Does not apply route daily as needed (swelling) Grade II: 20-30, Disp: 1 each, Rfl: 0    levothyroxine (SYNTHROID) 100 MCG tablet, Take 1 tablet by mouth Daily Indications: 112 mg, Disp: 30 tablet, Rfl: 3    rosuvastatin (CRESTOR) 5 MG tablet, Take 1 tablet by mouth daily, Disp: 30 tablet, Rfl: 3    Psyllium (HM FIBER POWDER PO), Take by mouth, Disp: , Rfl:     Cholecalciferol (VITAMIN D) 2000 units CAPS capsule, Take 1,000 Units by mouth daily , Disp: , Rfl:     aluminum & magnesium hydroxide-simethicone (MAALOX ADVANCED) 200-200-20 MG/5ML SUSP suspension, Take 5 mLs by mouth as needed for Indigestion Actually  Citracel, Disp: , Rfl:     nitroGLYCERIN (NITROSTAT) 0.4 MG SL tablet, up to max of 3 total doses. If no relief after 1 dose, call 911.  (Patient not taking: Reported on 1/7/2021), Disp: 25 tablet, Rfl: 3     MG capsule, TAKE 1 CAPSULE BY MOUTH 2 TIMES DAILY (Patient not taking: Reported on 1/7/2021), Disp: 60 capsule, Rfl: 3    Social History     Substance and Sexual Activity   Sexual Activity Never    Partners: Male       Any bleeding or pain with intercourse: No    Last Yearly:  12/12/19    Last pap: 12/12/19    Last HPV: never    Last Mammogram: 12/14/2020    Last Dexascan 2/25/2020    Last colorectal screen- type: colonoscopy  date  2019    Do you do self breast exams: No    Past Medical History:   Diagnosis Date    Anxiety     Bronchial asthma     mild, intermittent    Carpal tunnel syndrome     Cataract     Chronic back pain     Chronic sinusitis     Class 2 severe obesity due to excess calories with serious comorbidity in [facility])     Depression     Diabetes mellitus due to underlying condition with hyperosmolarity without coma (HCC)     DJD (degenerative joint disease)     S1, L3, L4, L5, T12    Edema of leg     bilateral legs    Environmental allergies     GERD (gastroesophageal reflux disease)     Glaucoma     Glucose intolerance (impaired glucose tolerance)     Headache(784.0)     History of bronchitis     Viral    History of diarrhea     HTN (hypertension)     Hyperlipidemia     Hypothyroid     hypothyroid state    Hypothyroidism     IBS (irritable bowel syndrome) 10/2/2012    Legally blind     due to glaucoma    Mild intermittent asthma without complication     MVP (mitral valve prolapse)     Obesity     Class 2 severe obesity due to excess calories with serious comorbidity in adult    EMILIA on CPAP     Osteopenia     Pancreatic cyst     Polyneuropathy     Raynaud disease     Rhinopharyngitis     Allergic rhinopharyngitis    Stress fracture     Right 5th Metatarsal     Syncope     TIA (transient ischemic attack)     Urinary incontinence     Vasodepressor syncope     Wears glasses        Past Surgical History:   Procedure Laterality Date    APPENDECTOMY  1978    CATARACT REMOVAL Left     CHOLECYSTECTOMY  1978    COLONOSCOPY      COLONOSCOPY N/A 12/9/2019    COLORECTAL CANCER SCREENING, NOT HIGH RISK performed by Talia Frias MD at [address] [place] [address] Bilateral     right iridectomy, right laser, bilateral trabeculectomy, left drain    GLAUCOMA SURGERY      HAND SURGERY Right     HYSTERECTOMY  1982    KNEE SURGERY Right 2000    TUBAL LIGATION  1981    UPPER GASTROINTESTINAL ENDOSCOPY      UPPER GASTROINTESTINAL ENDOSCOPY  5/24/2018    EGD DILATION SAVORY performed by Martin Means MD at [address] [place] [address]  3/6/2019    EGD DILATION SAVORY performed by Talia Frias MD at [facility]       Family History   Problem Relation Age of Onset    Diabetes Mother     Heart Disease Mother         multiple heart attacks    Arthritis Mother     High Blood Pressure Mother     High Cholesterol Mother     Substance Abuse Mother     Heart Disease Father     Arthritis Father     Depression Father     High Cholesterol Father     Mental Illness Father     Substance Abuse Father     Diabetes Sister     High Blood Pressure Sister     Cancer Paternal Uncle         esophageal cancer    Diabetes Maternal Aunt     Cancer Maternal Aunt         colorectal cancer    Diabetes Maternal Uncle     Cancer Maternal Grandmother         colorectal cancer    Arthritis Maternal Grandmother     Diabetes Maternal Grandmother     High Blood Pressure Maternal Grandmother     Stroke Maternal Grandmother     Arthritis Maternal Grandfather     Arthritis Paternal Grandmother     Cancer Paternal Grandmother     Depression Paternal Grandmother     Heart Disease Paternal Grandmother     High Blood Pressure Paternal Grandmother     High Cholesterol Paternal Grandmother     Mental Illness Paternal Grandmother     Arthritis Paternal Grandfather        Chief Complaint   Patient presents with    Gynecologic Exam     last pap 12/2/19 normal           PE:  Vital Signs  Blood pressure 138/85, height 5' 7\" (1.702 m), weight (!) 309 lb (140.2 kg), not currently breastfeeding. Estimated body mass index is 48.4 kg/m² as calculated from the following:    Height as of this encounter: 5' 7\" (1.702 m). Weight as of this encounter: 309 lb (140.2 kg). Labs:    No results found for this visit on 01/07/21. NURSE: FRANCISCO    HPI: The patient is here today to discuss a couple of different problems. The first is that she is having worsening incontinence. This can be with laughing coughing or sneezing or it can be with a sudden temperature change hearing water running in these type of triggers.     The other issue is she has noticed a large knot in her left lower abdomen    Review of Systems      Objective  Lymphatic:   no lymphadenopathy  Heent:   negative   Cor: regular rate and rhythm, no murmurs              Pul:clear to auscultation bilaterally- no wheezes, rales or rhonchi, normal air movement, no respiratory distress      GI: positive findings: In the left lower quadrant there is a palpable knot probably 6 cm in size it is elevated and firm and somewhat tender. This feels like it is on the fascia and not deep within the abdomen itself. Obesity is also noted           Extremities: Patient does have limited mobility   Breasts: Breast:normal appearance, no masses or tenderness, Inspection negative, No nipple retraction or dimpling, No nipple discharge or bleeding, No axillary or supraclavicular adenopathy, Normal to palpation without dominant masses   Pelvic Exam: GENITAL/URINARY:  External Genitalia:  General appearance; normal, Hair distribution; normal, Lesions absent  Vagina:  General appearance normal, Discharge absent, Lesions absent, there appears to be a mild rectocele and cystocele. It does not seem change from previous exam  Uterus:  Hystory of hysterectomy  Adenexa:  Hystory of ovary removal                                    Vaginal discharge: no vaginal discharge    Assessment/plan: Patient cannot tolerate red dye so will not give Ditropan but a trial of Detrol. Will also order CT scan of abdomen and pelvis to help determine the nature of this large knot in the left lower quadrant of the abdomen. Patient cannot tolerate IV contrast  This is a 35-minute visit.   In regards to findings on exam, counseling and helping her with her handicap status

## 2021-01-13 DIAGNOSIS — M75.101 SUPRASPINATUS SYNDROME OF RIGHT SHOULDER: Primary | ICD-10-CM

## 2021-01-14 ENCOUNTER — OFFICE VISIT (OUTPATIENT)
Dept: ORTHOPEDIC SURGERY | Age: 61
End: 2021-01-14
Payer: COMMERCIAL

## 2021-01-14 VITALS — BODY MASS INDEX: 45.99 KG/M2 | HEIGHT: 67 IN | WEIGHT: 293 LBS

## 2021-01-14 DIAGNOSIS — M75.21 BICEPS TENDINITIS OF RIGHT UPPER EXTREMITY: Primary | ICD-10-CM

## 2021-01-14 PROCEDURE — G8427 DOCREV CUR MEDS BY ELIG CLIN: HCPCS | Performed by: STUDENT IN AN ORGANIZED HEALTH CARE EDUCATION/TRAINING PROGRAM

## 2021-01-14 PROCEDURE — 1036F TOBACCO NON-USER: CPT | Performed by: STUDENT IN AN ORGANIZED HEALTH CARE EDUCATION/TRAINING PROGRAM

## 2021-01-14 PROCEDURE — G8417 CALC BMI ABV UP PARAM F/U: HCPCS | Performed by: STUDENT IN AN ORGANIZED HEALTH CARE EDUCATION/TRAINING PROGRAM

## 2021-01-14 PROCEDURE — 99203 OFFICE O/P NEW LOW 30 MIN: CPT | Performed by: STUDENT IN AN ORGANIZED HEALTH CARE EDUCATION/TRAINING PROGRAM

## 2021-01-14 PROCEDURE — G8482 FLU IMMUNIZE ORDER/ADMIN: HCPCS | Performed by: STUDENT IN AN ORGANIZED HEALTH CARE EDUCATION/TRAINING PROGRAM

## 2021-01-14 PROCEDURE — 20550 NJX 1 TENDON SHEATH/LIGAMENT: CPT | Performed by: STUDENT IN AN ORGANIZED HEALTH CARE EDUCATION/TRAINING PROGRAM

## 2021-01-14 PROCEDURE — 3017F COLORECTAL CA SCREEN DOC REV: CPT | Performed by: STUDENT IN AN ORGANIZED HEALTH CARE EDUCATION/TRAINING PROGRAM

## 2021-01-14 NOTE — PROGRESS NOTES
[facility]  [address] [place] [address]  Dept: [phone]    Orthopedic Clinic New Patient    Subjective:     Chief Complaint   Patient presents with    Shoulder Pain     right        History of Present Illness:    Maddi Navarrete is a 61y.o. year old female who presents to our clinic as a new patient referral for right-sided shoulder pain. Patient has an extensive medical history as noted below. Patient states she has had a significant history of right-sided shoulder pain, which began when she dislocated her shoulder approximately 10 years ago. She denies any associated fracture of the dislocation. Patient states that majority of her pain is located on the anterolateral portion of her shoulder. Patient states that the pain is worse at night, as well as worse with activities. Patient denies having physical therapy for her shoulder however inquires about receiving prescription for today. She has no other acute complaints at this time.     Past Medical History: Isaura Osuna has a past medical history of Anxiety, Bronchial asthma, Carpal tunnel syndrome, Cataract, Chronic back pain, Chronic sinusitis, Class 2 severe obesity due to excess calories with serious comorbidity in adult [facility]), Depression, Diabetes mellitus due to underlying condition with hyperosmolarity without coma ([facility] [address] DJD (degenerative joint disease), Edema of leg, Environmental allergies, GERD (gastroesophageal reflux disease), Glaucoma, Glucose intolerance (impaired glucose tolerance), Headache(784.0), History of bronchitis, History of diarrhea, HTN (hypertension), Hyperlipidemia, Hypothyroid, Hypothyroidism, IBS (irritable bowel syndrome), Legally blind, Mild intermittent asthma without complication, MVP (mitral valve prolapse), Obesity, EMILIA on CPAP, Osteopenia, Pancreatic cyst, Polyneuropathy, Raynaud disease, Rhinopharyngitis, Stress fracture, Syncope, TIA (transient ischemic attack), Urinary incontinence, Vasodepressor syncope, and Wears glasses. Past Surgical History:    Isaura Osuna has a past surgical history that includes knee surgery (Right, 2000); eye surgery (Bilateral); Hand surgery (Right); Cholecystectomy (1978); Appendectomy (1978); Tubal ligation (1981); Hysterectomy (1982); Cataract removal (Left); Colonoscopy; Upper gastrointestinal endoscopy; Esophagus dilation; Glaucoma surgery; Upper gastrointestinal endoscopy (5/24/2018); Upper gastrointestinal endoscopy (3/6/2019); and Colonoscopy (N/A, 12/9/2019).   Current Medications:   Current Outpatient Medications   Medication Sig Dispense Refill    tolterodine (DETROL LA) 4 MG extended release capsule Take 1 capsule by mouth daily 30 capsule 5    FREESTYLE LITE strip       Lancets Misc. (ACCU-CHEK FASTCLIX LANCET) KIT       VITAMIN D-3 SUPER STRENGTH 50 MCG (2000 UT) TABS       ASPIRIN LOW DOSE 81 MG EC tablet Take 1 tablet by mouth daily 30 tablet 1  acetaminophen (ACETAMINOPHEN EXTRA STRENGTH) 500 MG tablet Take 2 tablets by mouth every 6 hours as needed for Pain (Patient taking differently: Take 1,000 mg by mouth every 6 hours as needed for Pain ) 120 tablet 3    loratadine (CLARITIN) 10 MG tablet TAKE 1 TABLET BY MOUTH DAILY 30 tablet 5    fluticasone (FLONASE) 50 MCG/ACT nasal spray USE 1 SPRAY IN EACH NOSTRIL TWICE A DAY 16 g 5    Multiple Vitamins-Minerals (CERTAVITE/ANTIOXIDANTS) TABS TAKE 1 TABLET DAILY 30 tablet 3    pantoprazole (PROTONIX) 40 MG tablet Take 1 tablet by mouth 2 times daily (before meals) 60 tablet 5    calcium carbonate-vitamin D (CALTRATE) 600-400 MG-UNIT TABS per tab TAKE 1 TABLET BY MOUTH TWICE A DAY 90 tablet 1    Accu-Chek FastClix Lancets MISC       albuterol sulfate  (90 Base) MCG/ACT inhaler INHALE 2 PUFFS INTO THE LUNGS EVERY SIX HOURS AS NEEDED 18 g 2    levothyroxine (SYNTHROID) 100 MCG tablet Take 1 tablet by mouth Daily Indications: 112 mg 30 tablet 3    rosuvastatin (CRESTOR) 5 MG tablet Take 1 tablet by mouth daily 30 tablet 3    Psyllium (HM FIBER POWDER PO) Take by mouth      Cholecalciferol (VITAMIN D) 2000 units CAPS capsule Take 1,000 Units by mouth daily       aluminum & magnesium hydroxide-simethicone (MAALOX ADVANCED) 200-200-20 MG/5ML SUSP suspension Take 5 mLs by mouth as needed for Indigestion Actually  Citracel       No current facility-administered medications for this visit.       Allergies: Allergies reviewed: Latex, Adhesive tape, Amlodipine, Bextra [valdecoxib], Brimonidine, Daypro [oxaprozin], Diclofenac sodium, Famotidine, Hydrocodone-acetaminophen, Hydromorphone, Ibuprofen, Lisinopril, Metoprolol, Naproxen, Oxycodone-acetaminophen, Rofecoxib, Shellfish-derived products, Simvastatin, Statins, Sulfa antibiotics, Tetracyclines & related, Timoptic [timolol maleate], Tramadol, Fosamax [alendronate], Nalbuphine, Alendronate sodium, Alendronate sodium, Alphagan [brimonidine tartrate], Dilaudid [hydromorphone hcl], Doxycycline monohydrate, Nsaids, Other, Pepcid complete [famotidine-ca carb-mag hydrox], Pilocarpine, Pravastatin, Red dye, Shrimp flavor, Statins support therapy, Timoptic [timolol maleate], Tolectin [tolmetin], Voltaren [diclofenac sodium], Nubain [nalbuphine hcl], Percocet [oxycodone-acetaminophen], Tolectin [tolmetin sodium], Tolmetin sodium, Ultram [tramadol hcl], Vicodin [hydrocodone-acetaminophen], and Vioxx  Social History:   Patient reports that she has never smoked. She has never used smokeless tobacco. She reports that she does not drink alcohol or use drugs. Family History:  Patient family history includes Arthritis in her father, maternal grandfather, maternal grandmother, mother, paternal grandfather, and paternal grandmother; Cancer in her maternal aunt, maternal grandmother, paternal grandmother, and paternal uncle; Depression in her father and paternal grandmother; Diabetes in her maternal aunt, maternal grandmother, maternal uncle, mother, and sister; Heart Disease in her father, mother, and paternal grandmother; High Blood Pressure in her maternal grandmother, mother, paternal grandmother, and sister; High Cholesterol in her father, mother, and paternal grandmother; Mental Illness in her father and paternal grandmother; Stroke in her maternal grandmother; Substance Abuse in her father and mother. Review of Systems:  Constitutional: Negative for fever and chills. HENT: Negative for congestion, nose bleeds, and sore throat. Eyes: Negative for blurred vision and double vision. Respiratory: Negative for cough, shortness of breath. Cardiovascular: Negative for chest pain and palpitations. Gastrointestinal: Negative for nausea, vomiting. Musculoskeletal: Positive for right shoulder pain  Skin: Negative for itching and rash. Neurological: Negative numbness, tingling, weakness. Psychiatric/Behavioral: Negative for depression and suicidal ideas. Objective:   Ht 5' 7\" (1.702 m)   Wt (!) 309 lb (140.2 kg)   BMI 48.40 kg/m²   Physical exam:  Gen: AAOx3, NAD, sitting comfortably in the room. CV: No dependent edema, regular rate. Resp: Unlabored respirations, equal chest rise bilaterally. Neuro: Alert and oriented to person and place. Eyes: Extra-ocular muscles intact. Mouth: Oral mucosa moist. No shira oral lesions. Skin: Warm, well perfused. Psych: Patient has good fund of knowledge and displays understanging of exam, diagnosis, and plan. Ortho exam:  RUE: Skin is intact, as erythema ecchymoses or open wounds. Significant TTP about the bicep tendon. AROM in  degrees, Flex 90 degrees with significant pain at end range of motion. Limited internal rotation relative to contralateral side. Speed test positive. Yergason's test positive. Delfino test negative. Lift off test equivocal.  Belly press test negative. Bearhug test negative. Compartments soft. 2+ rad pulse. Median/Radial/Ulnar/AIN/PIN motor intact. Median/Radial/Ulnar nerve SILT. Procedures:    Procedure: Injection procedure note    The alternatives, benefits, and risks were discussed with the patient. After answering all questions to the patient's satisfaction, the patient agreed to proceed forward with injection and gave verbal consent for the procedure. With the patient's permission, appropriate anatomic landmarks were identified and the right bicep tendon was prepped in a sterile fashion using alcohol and/or betadine. A 21 gauge needle was then used to inject 2cc 0.25% marcaine plain and 80mg depo medrol into the tendon. The injection was advanced without resistance confirming appropriate position. The patient tolerated the procedure well and the site was dressed with a band-aid. Patient was advised to ice the area for 15-20 minutes to relieve any injection site related pain. Patient was advised to contact nurse if area becomes swollen, hot, erythematous, or painful, or to go to the emergency room after business hours. Radiology:   History: 61y.o. year old female with a history of right shoulder pain    Comparison: None    Findings: Multiple views of the right shoulder in a skeletally mature individual without any acute fractures, dislocations or radiopaque foreign bodies. The humeral head is well placed within the glenoid. There is degenerative changes including osteophyte formation noted at the glenohumeral joint. Impression: Degenerative joint disease within the glenohumeral joint without evidence of acute fractures. Assessment:      Diagnosis Orders   1.  Biceps tendinitis of right upper extremity  External Referral To Physical Therapy       Plan: Thorough discussion on etiology and prognosis patient's right-sided shoulder pain. Discussed with the patient the etiology of her shoulder pain may be a result of multiple components including her glenohumeral arthritis, rotator cuff tendinopathy as well as biceps tendinitis. Given patient's physical exam, it appears that majority of pain is consistent with biceps tendinitis. Patient received a corticosteroid injection at the bicep tendon in hopes of relieving some pain as well as providing diagnostic utility. Patient was agreeable to the plan. All questions were answered. Radiographs were discussed in full detail. Additionally, physical therapy prescription was provided for the patient to assist with her range of motion of the right shoulder. We will follow-up with the patient in approximately 6 to 8 weeks following physical therapy to assess for any improvements. Follow-up in 4 weeks    No orders of the defined types were placed in this encounter.      Orders Placed This Encounter   Procedures    External Referral To Physical Therapy     Referral Priority:   Routine     Referral Type:   Eval and Treat     Referral Reason:   Specialty Services Required     Requested Specialty:   Physical Therapy     Number of Visits Requested:   1         Electronically signed by Jes Stout DO on 1/14/2021 at 5:16 PM

## 2021-01-15 LAB — CYTOLOGY REPORT: NORMAL

## 2021-01-20 ENCOUNTER — HOSPITAL ENCOUNTER (OUTPATIENT)
Dept: LAB | Age: 61
Discharge: HOME | End: 2021-01-20
Payer: COMMERCIAL

## 2021-01-20 ENCOUNTER — HOSPITAL ENCOUNTER (OUTPATIENT)
Dept: CT IMAGING | Age: 61
Discharge: HOME | End: 2021-01-22
Payer: COMMERCIAL

## 2021-01-20 DIAGNOSIS — K57.90 DIVERTICULOSIS: ICD-10-CM

## 2021-01-20 DIAGNOSIS — K46.9 ABDOMINAL HERNIA WITHOUT OBSTRUCTION AND WITHOUT GANGRENE, RECURRENCE NOT SPECIFIED, UNSPECIFIED HERNIA TYPE: Primary | ICD-10-CM

## 2021-01-20 DIAGNOSIS — R22.2 ABDOMINAL WALL LUMP: ICD-10-CM

## 2021-01-20 LAB
-: NORMAL
ABSOLUTE EOS #: 0.11 K/UL (ref 0–0.44)
ABSOLUTE IMMATURE GRANULOCYTE: 0.06 K/UL (ref 0–0.3)
ABSOLUTE LYMPH #: 3.66 K/UL (ref 1.1–3.7)
ABSOLUTE MONO #: 0.88 K/UL (ref 0.1–1.2)
ALBUMIN SERPL-MCNC: 4.4 G/DL (ref 3.5–5.2)
ALBUMIN/GLOBULIN RATIO: 1.4 (ref 1–2.5)
ALP BLD-CCNC: 81 U/L (ref 35–104)
ALT SERPL-CCNC: 35 U/L (ref 5–33)
AMORPHOUS: NORMAL
ANION GAP SERPL CALCULATED.3IONS-SCNC: 6 MMOL/L (ref 9–17)
AST SERPL-CCNC: 26 U/L
BACTERIA: NORMAL
BASOPHILS # BLD: 1 % (ref 0–2)
BASOPHILS ABSOLUTE: 0.07 K/UL (ref 0–0.2)
BILIRUB SERPL-MCNC: 0.72 MG/DL (ref 0.3–1.2)
BILIRUBIN URINE: NEGATIVE
BUN BLDV-MCNC: 20 MG/DL (ref 8–23)
BUN/CREAT BLD: 25 (ref 9–20)
CALCIUM SERPL-MCNC: 9.7 MG/DL (ref 8.6–10.4)
CASTS UA: NORMAL /LPF
CHLORIDE BLD-SCNC: 100 MMOL/L (ref 98–107)
CO2: 33 MMOL/L (ref 20–31)
COLOR: YELLOW
COMMENT UA: NORMAL
CREAT SERPL-MCNC: 0.79 MG/DL (ref 0.5–0.9)
CRYSTALS, UA: NORMAL /HPF
DIFFERENTIAL TYPE: ABNORMAL
EOSINOPHILS RELATIVE PERCENT: 1 % (ref 1–4)
EPITHELIAL CELLS UA: NORMAL /HPF (ref 0–25)
GFR AFRICAN AMERICAN: >60 ML/MIN
GFR NON-AFRICAN AMERICAN: >60 ML/MIN
GFR SERPL CREATININE-BSD FRML MDRD: ABNORMAL ML/MIN/{1.73_M2}
GFR SERPL CREATININE-BSD FRML MDRD: ABNORMAL ML/MIN/{1.73_M2}
GLUCOSE BLD-MCNC: 105 MG/DL (ref 70–99)
GLUCOSE URINE: NEGATIVE
HCT VFR BLD CALC: 46.8 % (ref 36.3–47.1)
HEMOGLOBIN: 15.6 G/DL (ref 11.9–15.1)
IMMATURE GRANULOCYTES: 1 %
KETONES, URINE: NEGATIVE
LEUKOCYTE ESTERASE, URINE: NEGATIVE
LYMPHOCYTES # BLD: 29 % (ref 24–43)
MCH RBC QN AUTO: 31 PG (ref 25.2–33.5)
MCHC RBC AUTO-ENTMCNC: 33.3 G/DL (ref 28.4–34.8)
MCV RBC AUTO: 92.9 FL (ref 82.6–102.9)
MONOCYTES # BLD: 7 % (ref 3–12)
MUCUS: NORMAL
NITRITE, URINE: NEGATIVE
NRBC AUTOMATED: 0 PER 100 WBC
OTHER OBSERVATIONS UA: NORMAL
PDW BLD-RTO: 12.2 % (ref 11.8–14.4)
PH UA: 6 (ref 5–9)
PLATELET # BLD: 249 K/UL (ref 138–453)
PLATELET ESTIMATE: ABNORMAL
PMV BLD AUTO: 9.5 FL (ref 8.1–13.5)
POTASSIUM SERPL-SCNC: 3.9 MMOL/L (ref 3.7–5.3)
PROTEIN UA: NEGATIVE
RBC # BLD: 5.04 M/UL (ref 3.95–5.11)
RBC # BLD: ABNORMAL 10*6/UL
RBC UA: NORMAL /HPF (ref 0–2)
RENAL EPITHELIAL, UA: NORMAL /HPF
SEG NEUTROPHILS: 61 % (ref 36–65)
SEGMENTED NEUTROPHILS ABSOLUTE COUNT: 7.69 K/UL (ref 1.5–8.1)
SODIUM BLD-SCNC: 139 MMOL/L (ref 135–144)
SPECIFIC GRAVITY UA: 1.01 (ref 1.01–1.02)
TOTAL PROTEIN: 7.6 G/DL (ref 6.4–8.3)
TRICHOMONAS: NORMAL
TURBIDITY: CLEAR
URINE HGB: NEGATIVE
UROBILINOGEN, URINE: NORMAL
WBC # BLD: 12.5 K/UL (ref 3.5–11.3)
WBC # BLD: ABNORMAL 10*3/UL
WBC UA: NORMAL /HPF (ref 0–5)
YEAST: NORMAL

## 2021-01-20 PROCEDURE — 80053 COMPREHEN METABOLIC PANEL: CPT

## 2021-01-20 PROCEDURE — 85025 COMPLETE CBC W/AUTO DIFF WBC: CPT

## 2021-01-20 PROCEDURE — 36415 COLL VENOUS BLD VENIPUNCTURE: CPT

## 2021-01-20 PROCEDURE — 74176 CT ABD & PELVIS W/O CONTRAST: CPT

## 2021-01-20 PROCEDURE — 81001 URINALYSIS AUTO W/SCOPE: CPT

## 2021-01-28 ENCOUNTER — OFFICE VISIT (OUTPATIENT)
Dept: ORTHOPEDIC SURGERY | Age: 61
End: 2021-01-28
Payer: COMMERCIAL

## 2021-01-28 VITALS — WEIGHT: 293 LBS | BODY MASS INDEX: 45.99 KG/M2 | HEIGHT: 67 IN

## 2021-01-28 DIAGNOSIS — M75.21 BICEPS TENDINITIS OF RIGHT UPPER EXTREMITY: Primary | ICD-10-CM

## 2021-01-28 DIAGNOSIS — M25.511 CHRONIC RIGHT SHOULDER PAIN: ICD-10-CM

## 2021-01-28 DIAGNOSIS — G89.29 CHRONIC RIGHT SHOULDER PAIN: ICD-10-CM

## 2021-01-28 PROCEDURE — 1036F TOBACCO NON-USER: CPT | Performed by: STUDENT IN AN ORGANIZED HEALTH CARE EDUCATION/TRAINING PROGRAM

## 2021-01-28 PROCEDURE — G8417 CALC BMI ABV UP PARAM F/U: HCPCS | Performed by: STUDENT IN AN ORGANIZED HEALTH CARE EDUCATION/TRAINING PROGRAM

## 2021-01-28 PROCEDURE — G8482 FLU IMMUNIZE ORDER/ADMIN: HCPCS | Performed by: STUDENT IN AN ORGANIZED HEALTH CARE EDUCATION/TRAINING PROGRAM

## 2021-01-28 PROCEDURE — 99212 OFFICE O/P EST SF 10 MIN: CPT | Performed by: STUDENT IN AN ORGANIZED HEALTH CARE EDUCATION/TRAINING PROGRAM

## 2021-01-28 PROCEDURE — G8427 DOCREV CUR MEDS BY ELIG CLIN: HCPCS | Performed by: STUDENT IN AN ORGANIZED HEALTH CARE EDUCATION/TRAINING PROGRAM

## 2021-01-28 PROCEDURE — 3017F COLORECTAL CA SCREEN DOC REV: CPT | Performed by: STUDENT IN AN ORGANIZED HEALTH CARE EDUCATION/TRAINING PROGRAM

## 2021-01-28 NOTE — PROGRESS NOTES
[facility]  [facility]  [address] [address]  Dept: [phone]  Dept Fax: [phone]        Orthopaedic Clinic Follow Up      Subjective:     Og Yee is a 61y.o. year old female who presents to the clinic today for routine followup regarding her right shoulder pain. She states she has had intermittent difficulty with her right shoulder since she dislocated it 10 years ago. Worst activities are vacuuming, reaching over her head to grab items, and reaching across her body to the opposite shoulder. She was seen in our clinic two weeks ago and provided with a biceps tendon injection and prescription for physical therapy. The injection provided some relief to her right shoulder pain. She has only been to two physical therapy sessions since she was last seen and believes that it has helped so far. She acknowledges that additional physical therapy appointments would most likely help with her shoulder pain and range of motion. Pain is localized to the inferior aspect of the anterolateral shoulder. She states she is unable to take NSAIDs due to GI side effects. No other complaints today and no new concerns since patient was last seen 2 weeks ago.      ROS:  Gen: No fevers, chills   MSK: pain in right shoulder   Neuro: no numbness, tingling     Objective :   Physical exam  Gen: AAOx3, no acute distress   Respiratory: no audible wheezing, symmetrical chest expansion MSK: Right upper extremity: No ecchymoses, abrasions, deformity, or lacerations. Skin intact. Tenderness to palpation along the inferior anterolateral aspect of the shoulder. Able to abduct and forward flex to 120 degrees compared with 180 degrees on the left, externally rotate about 15 degrees compared with 80 degrees on the left, and internally rotate to approximately L1 compared with T6 on the left. Speed test positive. Pain with apprehension test. Hand warm and perfused w/ BCR; 2+ radial pulse. Median/Radial/Ulnar/AIN/PIN gross motor intact. Median/Radial/Ulnar nerve SILT. Radiology:  No imaging taken from today's visit. Assessment:   61y.o. year old female with history of right shoulder pain and biceps tendonitis   Plan:      Patient seen and examined today. Discussed with patient that at this point, she would probably continue to improve with physical therapy, as she has only had two sessions so far to try to treat her biceps tendonitis and range of motion in the right shoulder. She was amenable to completing another six weeks of therapy and then following up for reassessment. She can continue to use tylenol and her heating pad as necessary for pain. Follow up:Return in about 6 weeks (around 3/11/2021) for reassessment shoulder range of motion . No orders of the defined types were placed in this encounter. No orders of the defined types were placed in this encounter.       Electronically signed by Jennifer Tran DO on 1/28/2021 at 10:32 AM

## 2021-01-29 RX ORDER — BUPIVACAINE HYDROCHLORIDE 2.5 MG/ML
2 INJECTION, SOLUTION INFILTRATION; PERINEURAL ONCE
Status: COMPLETED | OUTPATIENT
Start: 2021-01-29 | End: 2021-01-29

## 2021-01-29 RX ORDER — METHYLPREDNISOLONE ACETATE 80 MG/ML
80 INJECTION, SUSPENSION INTRA-ARTICULAR; INTRALESIONAL; INTRAMUSCULAR; SOFT TISSUE ONCE
Status: COMPLETED | OUTPATIENT
Start: 2021-01-29 | End: 2021-01-29

## 2021-01-29 RX ADMIN — METHYLPREDNISOLONE ACETATE 80 MG: 80 INJECTION, SUSPENSION INTRA-ARTICULAR; INTRALESIONAL; INTRAMUSCULAR; SOFT TISSUE at 08:30

## 2021-01-29 RX ADMIN — BUPIVACAINE HYDROCHLORIDE 5 MG: 2.5 INJECTION, SOLUTION INFILTRATION; PERINEURAL at 08:30

## 2021-02-05 DIAGNOSIS — Z76.0 MEDICATION REFILL: ICD-10-CM

## 2021-02-05 DIAGNOSIS — E55.9 VITAMIN D DEFICIENCY: ICD-10-CM

## 2021-02-05 DIAGNOSIS — M54.50 CHRONIC BILATERAL LOW BACK PAIN, UNSPECIFIED WHETHER SCIATICA PRESENT: ICD-10-CM

## 2021-02-05 DIAGNOSIS — G89.29 CHRONIC BILATERAL LOW BACK PAIN, UNSPECIFIED WHETHER SCIATICA PRESENT: ICD-10-CM

## 2021-02-05 DIAGNOSIS — E11.9 TYPE 2 DIABETES MELLITUS WITHOUT COMPLICATION, WITHOUT LONG-TERM CURRENT USE OF INSULIN (HCC): ICD-10-CM

## 2021-02-05 DIAGNOSIS — M51.36 DEGENERATIVE DISC DISEASE, LUMBAR: ICD-10-CM

## 2021-02-05 RX ORDER — PANTOPRAZOLE SODIUM 40 MG/1
40 TABLET, DELAYED RELEASE ORAL
Qty: 60 TABLET | Refills: 5 | Status: SHIPPED | OUTPATIENT
Start: 2021-02-05 | End: 2021-08-10 | Stop reason: SDUPTHER

## 2021-02-05 RX ORDER — ASPIRIN 81 MG/1
81 TABLET, COATED ORAL DAILY
Qty: 30 TABLET | Refills: 1 | Status: SHIPPED | OUTPATIENT
Start: 2021-02-05 | End: 2021-04-05 | Stop reason: SDUPTHER

## 2021-02-05 RX ORDER — ACETAMINOPHEN 500 MG
1000 TABLET ORAL EVERY 6 HOURS PRN
Qty: 120 TABLET | Refills: 3 | Status: SHIPPED | OUTPATIENT
Start: 2021-02-05 | End: 2021-06-11 | Stop reason: SDUPTHER

## 2021-02-05 RX ORDER — FOLIC ACID/MV,IRON,MIN/LUTEIN 0.4-18-25
TABLET ORAL
Qty: 30 TABLET | Refills: 3 | Status: SHIPPED | OUTPATIENT
Start: 2021-02-05 | End: 2021-02-23

## 2021-02-05 NOTE — TELEPHONE ENCOUNTER
Last visit:   Last Med refill:   Does patient have enough medication for 72 hours: No:     Next Visit Date:  Future Appointments   Date Time Provider Fabby Ann   2/24/2021  1:00 PM Hany Staff, DO [facility]   3/3/2021 12:45 PM Ben Jamil DPM [facility]   3/11/2021  1:10 PM SCHEDULE, [facility] OR[facility] [employer] [employer]   6/14/2021  9:30 AM Mary Carmen Guardado MD Resp Spec [address] Maintenance   Topic Date Due    Diabetic microalbuminuria test  01/02/2021    TSH testing  01/02/2021    Diabetic retinal exam  03/06/2021    Diabetic foot exam  12/02/2021    A1C test (Diabetic or Prediabetic)  12/03/2021    Lipid screen  12/14/2021    Breast cancer screen  12/14/2022    DTaP/Tdap/Td vaccine (2 - Td) 06/01/2027    Colon cancer screen colonoscopy  12/09/2029    Flu vaccine  Completed    Shingles Vaccine  Completed    Pneumococcal 0-64 years Vaccine  Completed    Hepatitis C screen  Completed    HIV screen  Completed    Hepatitis A vaccine  Aged Out    Hib vaccine  Aged Out    Meningococcal (ACWY) vaccine  Aged Out       Hemoglobin A1C (%)   Date Value   12/03/2020 5.3   01/02/2020 5.4   11/05/2018 5.5             ( goal A1C is < 7)   Microalb/Crt.  Ratio (mcg/mg creat)   Date Value   01/02/2020 CANNOT BE CALCULATED     LDL Cholesterol (mg/dL)   Date Value   12/14/2020 64   07/08/2019 97     LDL Calculated (mg/dL)   Date Value   06/16/2017 68   05/19/2014 104       (goal LDL is <100)   AST (U/L)   Date Value   01/20/2021 26     ALT (U/L)   Date Value   01/20/2021 35 (H)     BUN (mg/dL)   Date Value   01/20/2021 20     BP Readings from Last 3 Encounters:   01/07/21 138/85   12/03/20 116/68   12/02/20 128/69          (goal 120/80)    All Future Testing planned in [facility]  Lab Frequency Next Occurrence   Lipid, Fasting Once 07/13/2021   TSH With Reflex Ft4 Once 07/13/2021               Patient Active Problem List:     Glaucoma     GERD (gastroesophageal reflux disease)     DJD (degenerative joint disease)     Obesity     Polyneuropathy     Migraine     Mitral prolapse     Low back pain     CTS (carpal tunnel syndrome)     Supraspinatus syndrome     Impaired fasting glucose     Acute sinus infection     IBS (irritable bowel syndrome)     Back pain, chronic     Stress fracture, right 5th metatarsal      Upper airway cough syndrome     Ear fullness     Perioral numbness     Screening for osteoporosis     Headache     Left eye injury     Medication reaction     Osteopenia     Lumbosacral pain     Flu vaccine need     Hypothyroid     Urinary incontinence     Anxiety     Sleep apnea     Depression     Chronic back pain     Carpal tunnel syndrome     Neuropathy     Mitral valve problem     Morbid obesity with BMI of 45.0-49.9, adult (HCC)     Frozen shoulder     Skin tag     Degenerative disc disease, lumbar     Bilateral edema of lower extremity     Medication refill     Varicose vein of leg     Dizziness     Dysphagia     Abdominal bloating     Mild intermittent asthma without complication           Please address the medication refill and close the encounter. If I can be of assistance, please route to the applicable pool. Thank you.

## 2021-02-22 DIAGNOSIS — Z76.0 MEDICATION REFILL: ICD-10-CM

## 2021-02-23 RX ORDER — FOLIC ACID/MV,IRON,MIN/LUTEIN 0.4-18-25
TABLET ORAL
Qty: 30 TABLET | Refills: 3 | Status: SHIPPED | OUTPATIENT
Start: 2021-02-23 | End: 2021-06-11 | Stop reason: SDUPTHER

## 2021-02-23 NOTE — TELEPHONE ENCOUNTER
Last visit: 12/03/20  Last Med refill:   Does patient have enough medication for 72 hours:     Next Visit Date:  Future Appointments   Date Time Provider Fabby Artisi   2/24/2021  1:00 PM Brady Juan DO [facility]   3/3/2021 12:45 PM Madhavi Buchanan DPM [facility]   3/11/2021  1:10 PM SCHEDULE, [facility] OR[facility]   3/18/2021 10:00 AM [facility]   6/14/2021  9:30 AM Sowmya Douglas MD Resp Spec [address] Maintenance   Topic Date Due    Diabetic microalbuminuria test  01/02/2021    TSH testing  01/02/2021    Diabetic retinal exam  03/06/2021    Diabetic foot exam  12/02/2021    A1C test (Diabetic or Prediabetic)  12/03/2021    Lipid screen  12/14/2021    Breast cancer screen  12/14/2022    DTaP/Tdap/Td vaccine (2 - Td) 06/01/2027    Colon cancer screen colonoscopy  12/09/2029    Flu vaccine  Completed    Shingles Vaccine  Completed    Pneumococcal 0-64 years Vaccine  Completed    Hepatitis C screen  Completed    HIV screen  Completed    Hepatitis A vaccine  Aged Out    Hib vaccine  Aged Out    Meningococcal (ACWY) vaccine  Aged Out       Hemoglobin A1C (%)   Date Value   12/03/2020 5.3   01/02/2020 5.4   11/05/2018 5.5             ( goal A1C is < 7)   Microalb/Crt.  Ratio (mcg/mg creat)   Date Value   01/02/2020 CANNOT BE CALCULATED     LDL Cholesterol (mg/dL)   Date Value   12/14/2020 64   07/08/2019 97     LDL Calculated (mg/dL)   Date Value   06/16/2017 68   05/19/2014 104       (goal LDL is <100)   AST (U/L)   Date Value   01/20/2021 26     ALT (U/L)   Date Value   01/20/2021 35 (H)     BUN (mg/dL)   Date Value   01/20/2021 20     BP Readings from Last 3 Encounters:   01/07/21 138/85   12/03/20 116/68   12/02/20 128/69          (goal 120/80)    All Future Testing planned in [facility]  Lab Frequency Next Occurrence   Lipid, Fasting Once 07/13/2021   TSH With Reflex Ft4 Once 07/13/2021               Patient Active Problem List:     Glaucoma     GERD (gastroesophageal reflux disease)     DJD (degenerative joint disease)     Obesity     Polyneuropathy     Migraine     Mitral prolapse     Low back pain     CTS (carpal tunnel syndrome)     Supraspinatus syndrome     Impaired fasting glucose     Acute sinus infection     IBS (irritable bowel syndrome)     Back pain, chronic     Stress fracture, right 5th metatarsal      Upper airway cough syndrome     Ear fullness     Perioral numbness     Screening for osteoporosis     Headache     Left eye injury     Medication reaction     Osteopenia     Lumbosacral pain     Flu vaccine need     Hypothyroid     Urinary incontinence     Anxiety     Sleep apnea     Depression     Chronic back pain     Carpal tunnel syndrome     Neuropathy     Mitral valve problem     Morbid obesity with BMI of 45.0-49.9, adult (HCC)     Frozen shoulder     Skin tag     Degenerative disc disease, lumbar     Bilateral edema of lower extremity     Medication refill     Varicose vein of leg     Dizziness     Dysphagia     Abdominal bloating     Mild intermittent asthma without complication

## 2021-02-24 ENCOUNTER — OFFICE VISIT (OUTPATIENT)
Dept: SURGERY | Age: 61
End: 2021-02-24
Payer: COMMERCIAL

## 2021-02-24 VITALS
SYSTOLIC BLOOD PRESSURE: 158 MMHG | HEIGHT: 67 IN | TEMPERATURE: 97.3 F | WEIGHT: 293 LBS | HEART RATE: 71 BPM | DIASTOLIC BLOOD PRESSURE: 88 MMHG | BODY MASS INDEX: 45.99 KG/M2

## 2021-02-24 DIAGNOSIS — K46.9 ABDOMINAL HERNIA WITHOUT OBSTRUCTION AND WITHOUT GANGRENE, RECURRENCE NOT SPECIFIED, UNSPECIFIED HERNIA TYPE: ICD-10-CM

## 2021-02-24 DIAGNOSIS — R10.32 LEFT LOWER QUADRANT ABDOMINAL PAIN: ICD-10-CM

## 2021-02-24 DIAGNOSIS — E66.01 MORBID OBESITY WITH BMI OF 45.0-49.9, ADULT (HCC): Primary | ICD-10-CM

## 2021-02-24 PROCEDURE — 99204 OFFICE O/P NEW MOD 45 MIN: CPT | Performed by: SURGERY

## 2021-02-24 PROCEDURE — G8482 FLU IMMUNIZE ORDER/ADMIN: HCPCS | Performed by: SURGERY

## 2021-02-24 PROCEDURE — G8417 CALC BMI ABV UP PARAM F/U: HCPCS | Performed by: SURGERY

## 2021-02-24 PROCEDURE — 3017F COLORECTAL CA SCREEN DOC REV: CPT | Performed by: SURGERY

## 2021-02-24 PROCEDURE — G8427 DOCREV CUR MEDS BY ELIG CLIN: HCPCS | Performed by: SURGERY

## 2021-02-24 PROCEDURE — 1036F TOBACCO NON-USER: CPT | Performed by: SURGERY

## 2021-02-24 NOTE — PROGRESS NOTES
GENERAL SURGERY CONSULTATION      Patient's Name/ Date of Birth/ Gender: Stefanie Banerjee / 1960 (61 y.o.) / female     PCP: Jeffrey Machuca MD  Referring: Dr. Eddye Boxer    History of present Illness:  Patient is a pleasant 61 y.o. female  kindly referred by Dr. Colletta Landing. She has an extensive medical history thoroughly reviewed before her physical visit today. She has a LLQ hernia, CT images reviewed. This looks like either a port site hernia or a true Spigelian hernia. The fascial defect is small, less than 2 cm, and contains a good amount of fatty contents (greater omentum, no intestine). No peritoneal signs. Cramping and pain intermittently at site with positional changes. BMI 49. Multiple allergies (48) listed. She is using a walker. She does not drive. Her insurance covers drives to visits, including today. She was also reporting claustrophobia in elevator and our office staff had to go in elevator to assist her back to transportation after office visit. Please see below. Past Medical History:  has a past medical history of Anxiety, Bronchial asthma, Carpal tunnel syndrome, Cataract, Chronic back pain, Chronic sinusitis, Depression, Diabetes mellitus due to underlying condition with hyperosmolarity without coma ([address] DJD (degenerative joint disease), Edema of leg, Environmental allergies, GERD (gastroesophageal reflux disease), Glaucoma, Glucose intolerance (impaired glucose tolerance), Headache(784.0), History of bronchitis, HTN (hypertension), Hyperlipidemia, Hypothyroid, Hypothyroidism, IBS (irritable bowel syndrome), Legally blind, Mild intermittent asthma without complication, Morbid obesity with BMI of 45.0-49.9, adult ([address] MVP (mitral valve prolapse), OA (osteoarthritis), EMILIA on CPAP, Osteopenia, Pancreatic cyst, Polyneuropathy, Raynaud disease, Rhinopharyngitis, Stress fracture, Syncope, TIA (transient ischemic attack), Urinary incontinence, Vasodepressor syncope, and Wears glasses. Past Surgical History:   Past Surgical History:   Procedure Laterality Date    APPENDECTOMY  1978    CATARACT REMOVAL Left     CHOLECYSTECTOMY  1978    COLONOSCOPY      COLONOSCOPY N/A 12/9/2019    COLORECTAL CANCER SCREENING, NOT HIGH RISK performed by Heladio Ferro MD at [address] [place] [address] Bilateral     right iridectomy, right laser, bilateral trabeculectomy, left drain    GLAUCOMA SURGERY      HAND SURGERY Right     HYSTERECTOMY  1982    KNEE SURGERY Right 2000    TUBAL LIGATION  1981    UPPER GASTROINTESTINAL ENDOSCOPY      UPPER GASTROINTESTINAL ENDOSCOPY  5/24/2018    EGD DILATION SAVORY performed by Dali Farooq MD at [address] [place] [address]  3/6/2019    EGD DILATION SAVORY performed by Heladio Ferro MD at [facility]       Social History:  reports that she has never smoked. She has never used smokeless tobacco. She reports that she does not drink alcohol or use drugs. Family History: family history includes Arthritis in her father, maternal grandfather, maternal grandmother, mother, paternal grandfather, and paternal grandmother; Cancer in her maternal aunt, maternal grandmother, paternal grandmother, and paternal uncle; Depression in her father and paternal grandmother; Diabetes in her maternal aunt, maternal grandmother, maternal uncle, mother, and sister; Heart Disease in her father, mother, and paternal grandmother; High Blood Pressure in her maternal grandmother, mother, paternal grandmother, and sister; High Cholesterol in her father, mother, and paternal grandmother; Mental Illness in her father and paternal grandmother; Stroke in her maternal grandmother; Substance Abuse in her father and mother.     Review of Systems:   General: Completed and, except as mentioned above, was negative or noncontributory  Psychological:  Completed and, except as mentioned above, was negative or noncontributory Ophthalmic:  Completed and, except as mentioned above, was negative or noncontributory  ENT:  Completed and, except as mentioned above, was negative or noncontributory  Allergy and Immunology:  Completed and, except as mentioned above, was negative or noncontributory  Hematological and Lymphatic:  Completed and, except as mentioned above, was negative or noncontributory  Endocrine: Completed and, except as mentioned above, was negative or noncontributory  Breast:  Completed and, except as mentioned above, was negative or noncontributory  Respiratory:  Completed and, except as mentioned above, was negative or noncontributory  Cardiovascular:  Completed and, except as mentioned above, was negative or noncontributory  Gastrointestinal: Completed and, except as mentioned above, was negative or noncontributory  Genito-Urinary:  Completed and, except as mentioned above, was negative or noncontributory  Musculoskeletal:  Completed and, except as mentioned above, was negative or noncontributory  Neurological:  Completed and, except as mentioned above, was negative or noncontributory  Dermatological:  Completed and, except as mentioned above, was negative or noncontributory Allergies: Latex, Adhesive tape, Amlodipine, Bextra [valdecoxib], Brimonidine, Daypro [oxaprozin], Diclofenac sodium, Famotidine, Hydrocodone-acetaminophen, Hydromorphone, Ibuprofen, Lisinopril, Metoprolol, Naproxen, Oxycodone-acetaminophen, Rofecoxib, Shellfish-derived products, Simvastatin, Statins, Sulfa antibiotics, Tetracyclines & related, Timoptic [timolol maleate], Tramadol, Fosamax [alendronate], Nalbuphine, Alendronate sodium, Alendronate sodium, Alphagan [brimonidine tartrate], Dilaudid [hydromorphone hcl], Doxycycline monohydrate, Nsaids, Other, Pepcid complete [famotidine-ca carb-mag hydrox], Pilocarpine, Pravastatin, Red dye, Shrimp flavor, Statins support therapy, Timoptic [timolol maleate], Tolectin [tolmetin], Voltaren [diclofenac sodium], Nubain [nalbuphine hcl], Percocet [oxycodone-acetaminophen], Tolectin [tolmetin sodium], Tolmetin sodium, Ultram [tramadol hcl], Vicodin [hydrocodone-acetaminophen], and Vioxx    Current Meds:  Current Outpatient Medications:     Multiple Vitamins-Minerals (CERTAVITE/ANTIOXIDANTS) TABS, TAKE 1 TABLET DAILY, Disp: 30 tablet, Rfl: 3    ASPIRIN LOW DOSE 81 MG EC tablet, Take 1 tablet by mouth daily, Disp: 30 tablet, Rfl: 1    pantoprazole (PROTONIX) 40 MG tablet, Take 1 tablet by mouth 2 times daily (before meals), Disp: 60 tablet, Rfl: 5    calcium carbonate-vitamin D (CALTRATE) 600-400 MG-UNIT TABS per tab, TAKE 1 TABLET BY MOUTH TWICE A DAY, Disp: 90 tablet, Rfl: 1    VITAMIN D-3 SUPER STRENGTH 50 MCG (2000 UT) TABS, , Disp: , Rfl:     levothyroxine (SYNTHROID) 100 MCG tablet, Take 1 tablet by mouth Daily Indications: 112 mg, Disp: 30 tablet, Rfl: 3    rosuvastatin (CRESTOR) 5 MG tablet, Take 1 tablet by mouth daily, Disp: 30 tablet, Rfl: 3    Psyllium (HM FIBER POWDER PO), Take by mouth, Disp: , Rfl:     Cholecalciferol (VITAMIN D) 2000 units CAPS capsule, Take 1,000 Units by mouth daily , Disp: , Rfl:   acetaminophen (ACETAMINOPHEN EXTRA STRENGTH) 500 MG tablet, Take 2 tablets by mouth every 6 hours as needed for Pain, Disp: 120 tablet, Rfl: 3    tolterodine (DETROL LA) 4 MG extended release capsule, Take 1 capsule by mouth daily (Patient not taking: Reported on 2/24/2021), Disp: 30 capsule, Rfl: 5    FREESTYLE LITE strip, , Disp: , Rfl:     Lancets Misc. (ACCU-CHEK FASTCLIX LANCET) KIT, , Disp: , Rfl:     loratadine (CLARITIN) 10 MG tablet, TAKE 1 TABLET BY MOUTH DAILY, Disp: 30 tablet, Rfl: 5    fluticasone (FLONASE) 50 MCG/ACT nasal spray, USE 1 SPRAY IN EACH NOSTRIL TWICE A DAY, Disp: 16 g, Rfl: 5    Accu-Chek FastClix Lancets MISC, , Disp: , Rfl:     albuterol sulfate  (90 Base) MCG/ACT inhaler, INHALE 2 PUFFS INTO THE LUNGS EVERY SIX HOURS AS NEEDED, Disp: 18 g, Rfl: 2    aluminum & magnesium hydroxide-simethicone (MAALOX ADVANCED) 200-200-20 MG/5ML SUSP suspension, Take 5 mLs by mouth as needed for Indigestion Actually  Citracel, Disp: , Rfl:     Physical Exam:  Vital signs and Nurse's note reviewed. BP (!) 158/88 (Site: Right Upper Arm, Position: Sitting, Cuff Size: Large Adult)   Pulse 71   Temp 97.3 °F (36.3 °C) (Infrared)   Ht 5' 7\" (1.702 m)   Wt (!) 312 lb 6.4 oz (141.7 kg)   BMI 48.93 kg/m²    height is 5' 7\" (1.702 m) and weight is 312 lb 6.4 oz (141.7 kg) (abnormal). Her infrared temperature is 97.3 °F (36.3 °C). Her blood pressure is 158/88 (abnormal) and her pulse is 71. Gen:  A&Ox3, NAD. Pleasant and cooperative. HEENT: PERRLA, EOMI, no scleral icterus  Neck:  no goiter  CVS: Regular rate and rhythm  Resp: Good bilateral air entry, no active wheezing, no labored breathing  Abd: soft, obese, palpable deeper LLQ hernia without peritonitis.    Ext: Moves all extremities, no gross focal motor deficits, chronic dependent lymphedema b/l le  Skin: No erythema or ulcerations, no cellulitis or ecchymosis    Labs:   Lab Results   Component Value Date    WBC 12.5 01/20/2021 fluid.  The aorta is normal in caliber.       BONES/SOFT TISSUES: No acute osseous abnormality is identified.  Degenerative   disc disease and facet arthropathy in the lower lumbar spine.  Mild   degenerative change in the hips.  Fat containing parasagittal hernia in the   left lower quadrant has a neck of 1.5 cm and hernia sac of 6.2 x 3.3 x 4.5   cm.  No inflammatory process identified.           Impression   1.  Left lower quadrant abdominal wall hernia containing fat.  No acute   inflammatory process identified.       2.  Diverticulosis.               Impressions/Recommendations:     Left lower quadrant hernia containing fat, no intestine. This is either a port site hernia or Spigelian hernia, prior laparoscopic surgeries many years ago, difficult to ascertain definite scars. Morbid obesity BMI today 49. Multiple drug allergies. Detailed, lengthy, tactful/honest discussion. The underlying problem is her BMI. The hernia if severe in pain I can repair via open approach. I told her I would not her repair in [facility], but in [facility]. Furthermore, anesthesia would have to approve it. She has multiple reported (48) allergies and some are vague in symptoms associated. I told her outright the recurrence rate is extremely high for hernia repair given BMI. I personally recommend evaluation by a bariatric surgeon for a possible robotic sleeve gastrectomy. I recommend she see Dr. Nichelle Palma for a formal opinion. She could see him in [place] office if he still comes here given her transportation issue. She commented about the concern about insurance nt covering panniculectomy/excess skin removal after weight loss. I was quite forthright in saying this is the least of the problems- that is a \"problem\" she would frankly want to welcome, because that would mean she had significant weight loss. The weight is the underlying problem/big picture. It is not prudent to schedule her now for an elective LLQ hernia given the highly likely recurrence rate, not to mention the risks in general with general anesthesia, etc. Additional time was spent driving these points in counseling. She is at the end of interview agreeable to see Dr. Nichelle Palma in the [facility] clinic. I was told from offices that I place the referral, then it goes in the work cue, and then the bariatric office will contact her to set up appointment. Referral placed.     Sherly Veliz DO, MPH, [facility], [address] [place] [address] office [phone]  [place] office [phone]

## 2021-03-03 ENCOUNTER — OFFICE VISIT (OUTPATIENT)
Dept: PODIATRY | Age: 61
End: 2021-03-03
Payer: COMMERCIAL

## 2021-03-03 VITALS
BODY MASS INDEX: 45.99 KG/M2 | SYSTOLIC BLOOD PRESSURE: 137 MMHG | HEIGHT: 67 IN | DIASTOLIC BLOOD PRESSURE: 75 MMHG | WEIGHT: 293 LBS | HEART RATE: 71 BPM

## 2021-03-03 DIAGNOSIS — I73.9 PVD (PERIPHERAL VASCULAR DISEASE) (HCC): ICD-10-CM

## 2021-03-03 DIAGNOSIS — G62.9 NEUROPATHY: ICD-10-CM

## 2021-03-03 DIAGNOSIS — B35.1 PAIN DUE TO ONYCHOMYCOSIS OF TOENAILS OF BOTH FEET: ICD-10-CM

## 2021-03-03 DIAGNOSIS — M79.675 PAIN DUE TO ONYCHOMYCOSIS OF TOENAILS OF BOTH FEET: ICD-10-CM

## 2021-03-03 DIAGNOSIS — M79.674 PAIN DUE TO ONYCHOMYCOSIS OF TOENAILS OF BOTH FEET: ICD-10-CM

## 2021-03-03 DIAGNOSIS — E11.9 TYPE 2 DIABETES MELLITUS WITHOUT COMPLICATION, UNSPECIFIED WHETHER LONG TERM INSULIN USE (HCC): Primary | ICD-10-CM

## 2021-03-03 PROCEDURE — 3046F HEMOGLOBIN A1C LEVEL >9.0%: CPT | Performed by: STUDENT IN AN ORGANIZED HEALTH CARE EDUCATION/TRAINING PROGRAM

## 2021-03-03 PROCEDURE — 1036F TOBACCO NON-USER: CPT | Performed by: STUDENT IN AN ORGANIZED HEALTH CARE EDUCATION/TRAINING PROGRAM

## 2021-03-03 PROCEDURE — G8417 CALC BMI ABV UP PARAM F/U: HCPCS | Performed by: STUDENT IN AN ORGANIZED HEALTH CARE EDUCATION/TRAINING PROGRAM

## 2021-03-03 PROCEDURE — G8482 FLU IMMUNIZE ORDER/ADMIN: HCPCS | Performed by: STUDENT IN AN ORGANIZED HEALTH CARE EDUCATION/TRAINING PROGRAM

## 2021-03-03 PROCEDURE — G8427 DOCREV CUR MEDS BY ELIG CLIN: HCPCS | Performed by: STUDENT IN AN ORGANIZED HEALTH CARE EDUCATION/TRAINING PROGRAM

## 2021-03-03 PROCEDURE — 11721 DEBRIDE NAIL 6 OR MORE: CPT | Performed by: STUDENT IN AN ORGANIZED HEALTH CARE EDUCATION/TRAINING PROGRAM

## 2021-03-03 PROCEDURE — 99212 OFFICE O/P EST SF 10 MIN: CPT | Performed by: PODIATRIST

## 2021-03-03 PROCEDURE — 99213 OFFICE O/P EST LOW 20 MIN: CPT | Performed by: STUDENT IN AN ORGANIZED HEALTH CARE EDUCATION/TRAINING PROGRAM

## 2021-03-03 PROCEDURE — 3017F COLORECTAL CA SCREEN DOC REV: CPT | Performed by: STUDENT IN AN ORGANIZED HEALTH CARE EDUCATION/TRAINING PROGRAM

## 2021-03-03 PROCEDURE — 2022F DILAT RTA XM EVC RTNOPTHY: CPT | Performed by: STUDENT IN AN ORGANIZED HEALTH CARE EDUCATION/TRAINING PROGRAM

## 2021-03-03 NOTE — PROGRESS NOTES
[address],  [address]  Tel: [phone]   Fax: [phone]    Subjective     CC: Diabetic foot exam and painful and elongated toe nails    HPI:  Win Cagle is a 61 y.o. y who presents clinic today for diabetic foot examination ear old female, also complaining of elongated and painful toenails. Patient states that toenails get so long that they break off in her shoes, causing pain. Relates that she normally wears compression stockings to bilateral lower extremities for edema. Denies any rest pain or claudication symptoms. Denies any history of acute trauma. Denies any other pedal complaints. Primary care physician is Elton Luna MD.    ROS:    Constitutional: Denies nausea, vomiting, fever, chills. Neurologic: Denies numbness, tingling, and burning in the feet. Vascular: Denies symptoms of lower extremity claudication. Skin: Painful thickened toenails   Otherwise negative except as noted in the HPI.      PMH:  Past Medical History:   Diagnosis Date    Anxiety     Bronchial asthma     mild, intermittent    Carpal tunnel syndrome     Cataract     Chronic back pain     Chronic sinusitis     Depression     Diabetes mellitus due to underlying condition with hyperosmolarity without coma (HCC)     DJD (degenerative joint disease)     S1, L3, L4, L5, T12    Edema of leg     bilateral legs    Environmental allergies     GERD (gastroesophageal reflux disease)     Glaucoma     Glucose intolerance (impaired glucose tolerance)     Headache(784.0)     History of bronchitis     Viral    HTN (hypertension)     Hyperlipidemia     Hypothyroid     hypothyroid state    Hypothyroidism     IBS (irritable bowel syndrome) 10/02/2012    Legally blind     due to glaucoma    Mild intermittent asthma without complication     Morbid obesity with BMI of 45.0-49.9, adult (HCC)     MVP (mitral valve prolapse)     OA (osteoarthritis)     EMILIA EXTRA STRENGTH) 500 MG tablet Take 2 tablets by mouth every 6 hours as needed for Pain 2/5/21  Yes Claribel Giraldo MD   tolterodine (DETROL LA) 4 MG extended release capsule Take 1 capsule by mouth daily 1/7/21  Yes Rohini Valverde MD   FREESTYLE LITE strip  11/24/20  Yes Historical Provider, MD   Lancets Misc. (ACCU-CHEK FASTCLIX LANCET) KIT  11/25/20  Yes Historical Provider, MD   VITAMIN D-3 SUPER STRENGTH 50 MCG (2000 UT) TABS  12/3/20  Yes Historical Provider, MD   loratadine (CLARITIN) 10 MG tablet TAKE 1 TABLET BY MOUTH DAILY 10/22/20  Yes Raimundo Bernardo MD   fluticasone (FLONASE) 50 MCG/ACT nasal spray USE 1 SPRAY IN EACH NOSTRIL TWICE A DAY 10/22/20  Yes Raimundo Bernardo MD   Accu-Chek FastClix Lancets [address] [facility]  8/27/20  Yes Historical Provider, MD   albuterol sulfate  (90 Base) MCG/ACT inhaler INHALE 2 PUFFS INTO THE LUNGS EVERY SIX HOURS AS NEEDED 7/13/20  Yes Jeyson Ascencio MD   levothyroxine (SYNTHROID) 100 MCG tablet Take 1 tablet by mouth Daily Indications: 112 mg 5/21/20  Yes Elin Gudino MD   rosuvastatin (CRESTOR) 5 MG tablet Take 1 tablet by mouth daily 5/21/20  Yes Elin Gudino MD   Psyllium (HM FIBER POWDER PO) Take by mouth   Yes Historical Provider, MD   Cholecalciferol (VITAMIN D) 2000 units CAPS capsule Take 1,000 Units by mouth daily    Yes Historical Provider, MD   aluminum & magnesium hydroxide-simethicone (MAALOX ADVANCED) 200-200-20 MG/5ML SUSP suspension Take 5 mLs by mouth as needed for Indigestion Actually  Citracel   Yes Historical Provider, MD       Objective     Vitals:    03/03/21 1233   BP: 137/75   Pulse: 71       Lab Results   Component Value Date    LABA1C 5.3 12/03/2020       Physical Exam:  General:  Alert and oriented x3. In no acute distress.      Lower Extremity Physical Exam:    Vascular: DP pulses are palpable, Bilateral. PT pulses non palpable, readily dopplerable bilateral. CFT <3 seconds to all digits, Bilateral.  Edema noted to the bilateral lower

## 2021-03-03 NOTE — PATIENT INSTRUCTIONS
Patient Education        Diabetes Foot Health: Care Instructions  Your Care Instructions     When you have diabetes, your feet need extra care and attention. Diabetes can damage the nerve endings and blood vessels in your feet, making you less likely to notice when your feet are injured. Diabetes also limits your body's ability to fight infection and get blood to areas that need it. If you get a minor foot injury, it could become an ulcer or a serious infection. With good foot care, you can prevent most of these problems. Caring for your feet can be quick and easy. Most of the care can be done when you are bathing or getting ready for bed. Follow-up care is a key part of your treatment and safety. Be sure to make and go to all appointments, and call your doctor if you are having problems. It's also a good idea to know your test results and keep a list of the medicines you take. How can you care for yourself at home? · Keep your blood sugar close to normal by watching what and how much you eat, monitoring blood sugar, taking medicines if prescribed, and getting regular exercise. · Do not smoke. Smoking affects blood flow and can make foot problems worse. If you need help quitting, talk to your doctor about stop-smoking programs and medicines. These can increase your chances of quitting for good. · Eat a diet that is low in fats. High fat intake can cause fat to build up in your blood vessels and decrease blood flow. · Inspect your feet daily for blisters, cuts, cracks, or sores. If you cannot see well, use a mirror or have someone help you. · Take care of your feet:  ? Wash your feet every day. Use warm (not hot) water. Check the water temperature with your wrists or other part of your body, not your feet. ? Dry your feet well. Pat them dry. Do not rub the skin on your feet too hard. Dry well between your toes. If the skin on your feet stays moist, bacteria or a fungus can grow, which can lead to infection. When should you call for help? Call your doctor now or seek immediate medical care if:    · You have a foot sore, an ulcer or break in the skin that is not healing after 4 days, bleeding corns or calluses, or an ingrown toenail.     · You have blue or black areas, which can mean bruising or blood flow problems.     · You have peeling skin or tiny blisters between your toes or cracking or oozing of the skin.     · You have a fever for more than 24 hours and a foot sore.     · You have new numbness or tingling in your feet that does not go away after you move your feet or change positions.     · You have unexplained or unusual swelling of the foot or ankle. Watch closely for changes in your health, and be sure to contact your doctor if:    · You cannot do proper foot care. Where can you learn more? Go to https://Matchfundpepiceweb.Copier How To. org and sign in to your [employer] account. Enter A739 in the StyleUp box to learn more about \"Diabetes Foot Health: Care Instructions. \"     If you do not have an account, please click on the \"Sign Up Now\" link. Current as of: December 20, 2019               Content Version: 12.6  © 6751-7244 [email or web address]. Care instructions adapted under license by [facility] [employer] [facility] ([facility]). If you have questions about a medical condition or this instruction, always ask your healthcare professional. [place] [address] any warranty or liability for your use of this information.

## 2021-03-11 ENCOUNTER — OFFICE VISIT (OUTPATIENT)
Dept: ORTHOPEDIC SURGERY | Age: 61
End: 2021-03-11
Payer: COMMERCIAL

## 2021-03-11 VITALS — WEIGHT: 293 LBS | HEIGHT: 67 IN | BODY MASS INDEX: 45.99 KG/M2

## 2021-03-11 DIAGNOSIS — M75.21 BICEPS TENDINITIS OF RIGHT UPPER EXTREMITY: Primary | ICD-10-CM

## 2021-03-11 PROCEDURE — 1036F TOBACCO NON-USER: CPT | Performed by: STUDENT IN AN ORGANIZED HEALTH CARE EDUCATION/TRAINING PROGRAM

## 2021-03-11 PROCEDURE — 99213 OFFICE O/P EST LOW 20 MIN: CPT | Performed by: STUDENT IN AN ORGANIZED HEALTH CARE EDUCATION/TRAINING PROGRAM

## 2021-03-11 PROCEDURE — G8417 CALC BMI ABV UP PARAM F/U: HCPCS | Performed by: STUDENT IN AN ORGANIZED HEALTH CARE EDUCATION/TRAINING PROGRAM

## 2021-03-11 PROCEDURE — G8482 FLU IMMUNIZE ORDER/ADMIN: HCPCS | Performed by: STUDENT IN AN ORGANIZED HEALTH CARE EDUCATION/TRAINING PROGRAM

## 2021-03-11 PROCEDURE — G8427 DOCREV CUR MEDS BY ELIG CLIN: HCPCS | Performed by: STUDENT IN AN ORGANIZED HEALTH CARE EDUCATION/TRAINING PROGRAM

## 2021-03-11 PROCEDURE — 3017F COLORECTAL CA SCREEN DOC REV: CPT | Performed by: STUDENT IN AN ORGANIZED HEALTH CARE EDUCATION/TRAINING PROGRAM

## 2021-03-11 NOTE — PROGRESS NOTES
displays understanging of exam, diagnosis, and plan. Radiology:   None this visit    Assessment:      1. Biceps tendinitis of right upper extremity       Plan:      Reviewed clinical exam findings with patient in the office today. Patient is improving with conservative management. Recommend patient continue physical therapy and continue home exercises. Patient can follow-up as needed if her symptoms worsen. Patient demonstrates understanding, all questions and concerns addressed. No orders of the defined types were placed in this encounter. No orders of the defined types were placed in this encounter.       Electronically signed by Kenna Hickman DO   Orthopedic Surgery Resident PGY-3  [facility]  3/15/2021 at 6:16 AM

## 2021-03-18 ENCOUNTER — OFFICE VISIT (OUTPATIENT)
Dept: FAMILY MEDICINE CLINIC | Age: 61
End: 2021-03-18
Payer: COMMERCIAL

## 2021-03-18 VITALS
DIASTOLIC BLOOD PRESSURE: 75 MMHG | BODY MASS INDEX: 49.12 KG/M2 | HEART RATE: 69 BPM | WEIGHT: 293 LBS | TEMPERATURE: 96.8 F | SYSTOLIC BLOOD PRESSURE: 135 MMHG

## 2021-03-18 DIAGNOSIS — M25.551 HIP PAIN, RIGHT: Primary | ICD-10-CM

## 2021-03-18 DIAGNOSIS — K43.9 ABDOMINAL WALL HERNIA: ICD-10-CM

## 2021-03-18 PROCEDURE — 99213 OFFICE O/P EST LOW 20 MIN: CPT | Performed by: STUDENT IN AN ORGANIZED HEALTH CARE EDUCATION/TRAINING PROGRAM

## 2021-03-18 PROCEDURE — 99211 OFF/OP EST MAY X REQ PHY/QHP: CPT | Performed by: STUDENT IN AN ORGANIZED HEALTH CARE EDUCATION/TRAINING PROGRAM

## 2021-03-18 PROCEDURE — G8427 DOCREV CUR MEDS BY ELIG CLIN: HCPCS | Performed by: STUDENT IN AN ORGANIZED HEALTH CARE EDUCATION/TRAINING PROGRAM

## 2021-03-18 PROCEDURE — G8482 FLU IMMUNIZE ORDER/ADMIN: HCPCS | Performed by: STUDENT IN AN ORGANIZED HEALTH CARE EDUCATION/TRAINING PROGRAM

## 2021-03-18 PROCEDURE — 3017F COLORECTAL CA SCREEN DOC REV: CPT | Performed by: STUDENT IN AN ORGANIZED HEALTH CARE EDUCATION/TRAINING PROGRAM

## 2021-03-18 PROCEDURE — G8417 CALC BMI ABV UP PARAM F/U: HCPCS | Performed by: STUDENT IN AN ORGANIZED HEALTH CARE EDUCATION/TRAINING PROGRAM

## 2021-03-18 PROCEDURE — 1036F TOBACCO NON-USER: CPT | Performed by: STUDENT IN AN ORGANIZED HEALTH CARE EDUCATION/TRAINING PROGRAM

## 2021-03-18 ASSESSMENT — ENCOUNTER SYMPTOMS
DIARRHEA: 0
NAUSEA: 0
SHORTNESS OF BREATH: 0
VOMITING: 0
ABDOMINAL PAIN: 0

## 2021-03-18 NOTE — PROGRESS NOTES
Subjective:    Beto Valero is a 64 y.o. female with  has a past medical history of Anxiety, Bronchial asthma, Carpal tunnel syndrome, Cataract, Chronic back pain, Chronic sinusitis, Depression, Diabetes mellitus due to underlying condition with hyperosmolarity without coma ([facility] [address] DJD (degenerative joint disease), Edema of leg, Environmental allergies, GERD (gastroesophageal reflux disease), Glaucoma, Glucose intolerance (impaired glucose tolerance), Headache(784.0), History of bronchitis, HTN (hypertension), Hyperlipidemia, Hypothyroid, Hypothyroidism, IBS (irritable bowel syndrome), Legally blind, Mild intermittent asthma without complication, Morbid obesity with BMI of 45.0-49.9, adult ([facility] [address] MVP (mitral valve prolapse), OA (osteoarthritis), EMILIA on CPAP, Osteopenia, Pancreatic cyst, Polyneuropathy, Raynaud disease, Rhinopharyngitis, Stress fracture, Syncope, TIA (transient ischemic attack), Urinary incontinence, Vasodepressor syncope, and Wears glasses.     Family History   Problem Relation Age of Onset    Diabetes Mother     Heart Disease Mother         multiple heart attacks    Arthritis Mother     High Blood Pressure Mother     High Cholesterol Mother     Substance Abuse Mother     Heart Disease Father     Arthritis Father     Depression Father     High Cholesterol Father     Mental Illness Father     Substance Abuse Father     Diabetes Sister     High Blood Pressure Sister     Cancer Paternal Uncle         esophageal cancer    Diabetes Maternal Aunt     Cancer Maternal Aunt         colorectal cancer    Diabetes Maternal Uncle     Cancer Maternal Grandmother         colorectal cancer    Arthritis Maternal Grandmother     Diabetes Maternal Grandmother     High Blood Pressure Maternal Grandmother     Stroke Maternal Grandmother     Arthritis Maternal Grandfather     Arthritis Paternal Grandmother     Cancer Paternal Grandmother     Depression Paternal Grandmother     Heart Disease Paternal Grandmother     High Blood Pressure Paternal Grandmother     High Cholesterol Paternal Grandmother     Mental Illness Paternal Grandmother     Arthritis Paternal Grandfather        Presented tothe office today for:  Chief Complaint   Patient presents with    Pain     patient states she been having pain in her right hip going toward the groin     Hernia     patient states having a hernia on left lower quid       HPI  64 y.o. F presenting with hip pain radiating to the groin  No previous hx of arthritis  Worse towards the evening  Pain relief with tylenol    Also has LLQ abdominal wall hernia  Has been seen [facility]  Was recommended to defer hernia repair due to BMI and high recurrence rate  Requesting second opinion    Review of Systems   Constitutional: Negative for chills and fever. Respiratory: Negative for shortness of breath. Cardiovascular: Negative for chest pain. Gastrointestinal: Negative for abdominal pain, diarrhea, nausea and vomiting. Musculoskeletal:        Hip pain right       Objective:    /75 (Site: Left Upper Arm, Position: Sitting, Cuff Size: Large Adult)   Pulse 69   Temp 96.8 °F (36 °C) (Temporal)   Wt (!) 313 lb 9.6 oz (142.2 kg)   BMI 49.12 kg/m²    BP Readings from Last 3 Encounters:   03/18/21 135/75   03/03/21 137/75   02/24/21 (!) 158/88     Physical Exam  Vitals signs reviewed. Constitutional:       Appearance: She is well-developed. HENT:      Head: Normocephalic and atraumatic. Eyes:      Pupils: Pupils are equal, round, and reactive to light. Neck:      Musculoskeletal: Normal range of motion and neck supple. Cardiovascular:      Rate and Rhythm: Normal rate and regular rhythm. Heart sounds: Normal heart sounds. Pulmonary:      Effort: Pulmonary effort is normal. No respiratory distress. Breath sounds: Normal breath sounds. Abdominal:      General: Abdomen is flat. Palpations: Abdomen is soft. Hernia: A hernia is present. Musculoskeletal: Normal range of motion. Skin:     General: Skin is warm and dry. Findings: No erythema. Neurological:      Mental Status: She is alert and oriented to person, place, and time. Lab Results   Component Value Date    WBC 12.5 (H) 01/20/2021    HGB 15.6 (H) 01/20/2021    HCT 46.8 01/20/2021     01/20/2021    CHOL 132 12/14/2020    TRIG 114 12/14/2020    HDL 45 12/14/2020    ALT 35 (H) 01/20/2021    AST 26 01/20/2021     01/20/2021    K 3.9 01/20/2021     01/20/2021    CREATININE 0.79 01/20/2021    BUN 20 01/20/2021    CO2 33 (H) 01/20/2021    TSH 3.02 01/02/2020    INR 0.9 06/29/2013    LABA1C 5.3 12/03/2020    LABMICR CANNOT BE CALCULATED 01/02/2020     Lab Results   Component Value Date    CALCIUM 9.7 01/20/2021     Lab Results   Component Value Date    LDLCALC 68 06/16/2017    LDLCHOLESTEROL 64 12/14/2020       Assessment and Plan:    1. Hip pain, right  Likely arthritis  Tylenol as needed for pain control  - XR HIP RIGHT (2-3 VIEWS); Future  Patient deferred physical therapy    2. Abdominal wall hernia  Schedule patient with [facility] surgery next Wednesday  No evidence of incarceration          Requested Prescriptions      No prescriptions requested or ordered in this encounter       There are no discontinued medications. Return in about 4 weeks (around 4/15/2021) for PCP for hip pain.

## 2021-03-18 NOTE — PROGRESS NOTES
Visit Information    Have you changed or started any medications since your last visit including any over-the-counter medicines, vitamins, or herbal medicines? no   Have you stopped taking any of your medications? Is so, why? -  no  Are you having any side effects from any of your medications? - no    Have you seen any other physician or provider since your last visit?  no   Have you had any other diagnostic tests since your last visit?  no   Have you been seen in the emergency room and/or had an admission in a hospital since we last saw you?  no   Have you had your routine dental cleaning in the past 6 months?  no     Do you have an active MyChart account? If no, what is the barrier?   No:     Patient Care Team:  Sophia Lucas MD as PCP - General (Family Medicine)  Freddy Burciaga MD as PCP - [facility]  Luciana Galicia DO as Surgeon (Orthopedic Surgery)  Abida Mallory MD as Consulting Physician (Cardiology)  Laurie Leonardo MD as Consulting Physician (Pulmonology)  John Grajeda as Consulting Physician  Neisha Call MD as Consulting Physician (Endocrinology)  Aidan Collado MD as Consulting Physician (Obstetrics & Gynecology)  Cedric Marshall MD as Consulting Physician (Gastroenterology)    Medical History Review  Past Medical, Family, and Social History reviewed and does not contribute to the patient presenting condition    Health Maintenance   Topic Date Due    COVID-19 Vaccine (1) Never done    TSH testing  01/02/2021    Diabetic retinal exam  03/06/2021    Diabetic microalbuminuria test  09/30/2021    A1C test (Diabetic or Prediabetic)  12/03/2021    Lipid screen  12/14/2021    Diabetic foot exam  03/03/2022    Breast cancer screen  12/14/2022    DTaP/Tdap/Td vaccine (2 - Td) 06/01/2027    Colon cancer screen colonoscopy  12/09/2029    Flu vaccine  Completed    Shingles Vaccine  Completed    Pneumococcal 0-64 years Vaccine  Completed    Hepatitis C screen Completed    HIV screen  Completed    Hepatitis A vaccine  Aged Out    Hib vaccine  Aged Out    Meningococcal (ACWY) vaccine  Aged Out

## 2021-03-19 NOTE — PROGRESS NOTES
Attending Physician Statement  I  have discussed the care of Meghna Rodriguez including pertinent history and exam findings with the resident. I agree with the assessment, plan and orders as documented by the resident. /75 (Site: Left Upper Arm, Position: Sitting, Cuff Size: Large Adult)   Pulse 69   Temp 96.8 °F (36 °C) (Temporal)   Wt (!) 313 lb 9.6 oz (142.2 kg)   BMI 49.12 kg/m²    BP Readings from Last 3 Encounters:   03/18/21 135/75   03/03/21 137/75   02/24/21 (!) 158/88     Wt Readings from Last 3 Encounters:   03/18/21 (!) 313 lb 9.6 oz (142.2 kg)   03/11/21 (!) 310 lb (140.6 kg)   03/03/21 (!) 310 lb 9.6 oz (140.9 kg)          Diagnosis Orders   1.  Hip pain, right  XR HIP RIGHT (2-3 VIEWS)   2. Abdominal wall hernia           Quan Mojica DO 3/19/2021 11:37 AM

## 2021-04-02 ENCOUNTER — HOSPITAL ENCOUNTER (OUTPATIENT)
Age: 61
Discharge: HOME | End: 2021-04-04
Payer: COMMERCIAL

## 2021-04-02 ENCOUNTER — HOSPITAL ENCOUNTER (OUTPATIENT)
Dept: GENERAL RADIOLOGY | Age: 61
Discharge: HOME | End: 2021-04-04
Payer: COMMERCIAL

## 2021-04-02 DIAGNOSIS — M25.551 HIP PAIN, RIGHT: ICD-10-CM

## 2021-04-02 PROCEDURE — 73502 X-RAY EXAM HIP UNI 2-3 VIEWS: CPT

## 2021-04-05 DIAGNOSIS — E11.9 TYPE 2 DIABETES MELLITUS WITHOUT COMPLICATION, WITHOUT LONG-TERM CURRENT USE OF INSULIN (HCC): ICD-10-CM

## 2021-04-05 RX ORDER — ASPIRIN 81 MG/1
81 TABLET, COATED ORAL DAILY
Qty: 30 TABLET | Refills: 1 | Status: SHIPPED | OUTPATIENT
Start: 2021-04-05 | End: 2021-06-11 | Stop reason: SDUPTHER

## 2021-04-05 NOTE — TELEPHONE ENCOUNTER
Patient calling for refill on ASPIRIN - medication pending        Health Maintenance   Topic Date Due    COVID-19 Vaccine (1) Never done    TSH testing  01/02/2021    Diabetic retinal exam  03/06/2021    Diabetic microalbuminuria test  09/30/2021    A1C test (Diabetic or Prediabetic)  12/03/2021    Lipid screen  12/14/2021    Diabetic foot exam  03/03/2022    Breast cancer screen  12/14/2022    DTaP/Tdap/Td vaccine (2 - Td) 06/01/2027    Colon cancer screen colonoscopy  12/09/2029    Flu vaccine  Completed    Shingles Vaccine  Completed    Pneumococcal 0-64 years Vaccine  Completed    Hepatitis C screen  Completed    HIV screen  Completed    Hepatitis A vaccine  Aged Out    Hib vaccine  Aged Out    Meningococcal (ACWY) vaccine  Aged Out             (applicable per patient's age: Cancer Screenings, Depression Screening, Fall Risk Screening, Immunizations)    Hemoglobin A1C (%)   Date Value   12/03/2020 5.3   01/02/2020 5.4   11/05/2018 5.5     Microalb/Crt.  Ratio (mcg/mg creat)   Date Value   01/02/2020 CANNOT BE CALCULATED     LDL Cholesterol (mg/dL)   Date Value   12/14/2020 64     LDL Calculated (mg/dL)   Date Value   06/16/2017 68     AST (U/L)   Date Value   01/20/2021 26     ALT (U/L)   Date Value   01/20/2021 35 (H)     BUN (mg/dL)   Date Value   01/20/2021 20      (goal A1C is < 7)   (goal LDL is <100) need 30-50% reduction from baseline     BP Readings from Last 3 Encounters:   03/18/21 135/75   03/03/21 137/75   02/24/21 (!) 158/88    (goal /80)      All Future Testing planned in [facility]:  Lab Frequency Next Occurrence   Lipid, Fasting Once 07/13/2021   TSH With Reflex Ft4 Once 07/13/2021       Next Visit Date:  Future Appointments   Date Time Provider Fabby Juarez   4/15/2021  9:45 AM MD Monica Ireland FP [facility]   6/9/2021 12:45 PM Elissa Hayes DPM [facility]   6/14/2021  9:30 AM Maria Isabel Fulton MD Resp Spec [facility]   12/16/2021 10:00 AM [facility] MAMMOGRAPHY RO[facility] AT [facility]   1/12/2022  1:00 PM Irving Rodrigues MD [facility]            Patient Active Problem List:     Glaucoma     GERD (gastroesophageal reflux disease)     DJD (degenerative joint disease)     Obesity     Polyneuropathy     Migraine     Mitral prolapse     Low back pain     CTS (carpal tunnel syndrome)     Supraspinatus syndrome     Impaired fasting glucose     Acute sinus infection     IBS (irritable bowel syndrome)     Back pain, chronic     Stress fracture, right 5th metatarsal      Upper airway cough syndrome     Ear fullness     Perioral numbness     Screening for osteoporosis     Headache     Left eye injury     Medication reaction     Osteopenia     Lumbosacral pain     Flu vaccine need     Hypothyroid     Urinary incontinence     Anxiety     Sleep apnea     Depression     Chronic back pain     Carpal tunnel syndrome     Neuropathy     Mitral valve problem     Morbid obesity with BMI of 45.0-49.9, adult (HCC)     Frozen shoulder     Skin tag     Degenerative disc disease, lumbar     Bilateral edema of lower extremity     Medication refill     Varicose vein of leg     Dizziness     Dysphagia     Abdominal bloating     Mild intermittent asthma without complication

## 2021-04-15 ENCOUNTER — OFFICE VISIT (OUTPATIENT)
Dept: FAMILY MEDICINE CLINIC | Age: 61
End: 2021-04-15
Payer: COMMERCIAL

## 2021-04-15 VITALS
WEIGHT: 293 LBS | BODY MASS INDEX: 45.99 KG/M2 | DIASTOLIC BLOOD PRESSURE: 71 MMHG | HEIGHT: 67 IN | SYSTOLIC BLOOD PRESSURE: 115 MMHG | HEART RATE: 67 BPM

## 2021-04-15 DIAGNOSIS — M25.551 HIP PAIN, RIGHT: Primary | ICD-10-CM

## 2021-04-15 DIAGNOSIS — E66.01 MORBID OBESITY (HCC): ICD-10-CM

## 2021-04-15 PROCEDURE — G8427 DOCREV CUR MEDS BY ELIG CLIN: HCPCS | Performed by: STUDENT IN AN ORGANIZED HEALTH CARE EDUCATION/TRAINING PROGRAM

## 2021-04-15 PROCEDURE — 1036F TOBACCO NON-USER: CPT | Performed by: STUDENT IN AN ORGANIZED HEALTH CARE EDUCATION/TRAINING PROGRAM

## 2021-04-15 PROCEDURE — G8417 CALC BMI ABV UP PARAM F/U: HCPCS | Performed by: STUDENT IN AN ORGANIZED HEALTH CARE EDUCATION/TRAINING PROGRAM

## 2021-04-15 PROCEDURE — 3017F COLORECTAL CA SCREEN DOC REV: CPT | Performed by: STUDENT IN AN ORGANIZED HEALTH CARE EDUCATION/TRAINING PROGRAM

## 2021-04-15 PROCEDURE — 99213 OFFICE O/P EST LOW 20 MIN: CPT | Performed by: STUDENT IN AN ORGANIZED HEALTH CARE EDUCATION/TRAINING PROGRAM

## 2021-04-15 ASSESSMENT — ENCOUNTER SYMPTOMS
COLOR CHANGE: 0
SINUS PAIN: 0
ABDOMINAL DISTENTION: 0
SORE THROAT: 0
SINUS PRESSURE: 0
CHEST TIGHTNESS: 0
WHEEZING: 0
SHORTNESS OF BREATH: 0
DIARRHEA: 0
ANAL BLEEDING: 0
NAUSEA: 0
ABDOMINAL PAIN: 0
COUGH: 0

## 2021-04-15 NOTE — PROGRESS NOTES
Attending Physician Statement  I have discussed the care of Ashley W Vicki Hudson, 64 y.o. female,including pertinent history and exam findings,  with the resident Dr. Charu Terrazas MD.  History:  Chief Complaint   Patient presents with    Hip Pain     Patient is here for follow up on right hip pain. I have reviewed the key elements of the encounter with the resident. Examination was done by resident as documented in residents note. BP Readings from Last 3 Encounters:   04/15/21 115/71   03/18/21 135/75   03/03/21 137/75     /71   Pulse 67   Ht 5' 7\" (1.702 m)   Wt (!) 310 lb (140.6 kg)   BMI 48.55 kg/m²   Lab Results   Component Value Date    WBC 12.5 (H) 01/20/2021    HGB 15.6 (H) 01/20/2021    HCT 46.8 01/20/2021     01/20/2021    CHOL 132 12/14/2020    TRIG 114 12/14/2020    HDL 45 12/14/2020    ALT 35 (H) 01/20/2021    AST 26 01/20/2021     01/20/2021    K 3.9 01/20/2021     01/20/2021    CREATININE 0.79 01/20/2021    BUN 20 01/20/2021    CO2 33 (H) 01/20/2021    TSH 3.02 01/02/2020    INR 0.9 06/29/2013    LABA1C 5.3 12/03/2020    LABMICR CANNOT BE CALCULATED 01/02/2020     Lab Results   Component Value Date    CALCIUM 9.7 01/20/2021     Lab Results   Component Value Date    LDLCALC 68 06/16/2017    LDLCHOLESTEROL 64 12/14/2020     I agree with the assessment, plan and diagnosis of    Diagnosis Orders   1. Hip pain, right     2. Morbid obesity ([address]       I agree with  orders as documented by the resident. Recommendations:   Patient was counseled, exam is benign, x ray of the hip is unremarkable and symptoms are improved on Tylenol. She was provided with a referral to physical therapy. Return in about 8 weeks (around 6/10/2021) for right hip pain.    ([facility] ) Dr. Ankit Bello MD

## 2021-04-15 NOTE — PROGRESS NOTES
Subjective:    Alexys Leung is a 64 y.o. female with  has a past medical history of Anxiety, Bronchial asthma, Carpal tunnel syndrome, Cataract, Chronic back pain, Chronic sinusitis, Depression, Diabetes mellitus due to underlying condition with hyperosmolarity without coma ([facility] [address] DJD (degenerative joint disease), Edema of leg, Environmental allergies, GERD (gastroesophageal reflux disease), Glaucoma, Glucose intolerance (impaired glucose tolerance), Headache(784.0), History of bronchitis, HTN (hypertension), Hyperlipidemia, Hypothyroid, Hypothyroidism, IBS (irritable bowel syndrome), Legally blind, Mild intermittent asthma without complication, Morbid obesity with BMI of 45.0-49.9, adult ([address] MVP (mitral valve prolapse), OA (osteoarthritis), EMILIA on CPAP, Osteopenia, Pancreatic cyst, Polyneuropathy, Raynaud disease, Rhinopharyngitis, Stress fracture, Syncope, TIA (transient ischemic attack), Urinary incontinence, Vasodepressor syncope, and Wears glasses. Presented to the office today for:  Chief Complaint   Patient presents with    Hip Pain       HPI    Patient is a 69-year-old female with past medical history of obesity. She is presenting today with for follow-up of her right hip pain. Patient was seen in the office about a month ago at which time she was referred to physical therapy and for an x-ray. Patient has completed the x-ray which shows no abnormalities. Patient has not really followed up with physical therapy. She did see general surgery last month who recommended bariatric surgery. Patient however states that she still thinking about the surgery and is concerned of the amount of loose skin she might have after the surgery. Pain currently is managed with Tylenol extra strength and currently rates the pain 2 out of 10. Review of Systems   Constitutional: Negative for fatigue and fever. HENT: Negative for sinus pressure, sinus pain, sore throat and tinnitus.     Eyes: Negative for visual disturbance. Respiratory: Negative for cough, chest tightness, shortness of breath and wheezing. Cardiovascular: Negative for chest pain, palpitations and leg swelling. Gastrointestinal: Negative for abdominal distention, abdominal pain, anal bleeding, diarrhea and nausea. Genitourinary: Negative for enuresis, frequency and urgency. Musculoskeletal: Positive for arthralgias (Right-sided hip pain). Negative for gait problem and neck stiffness. Skin: Negative for color change and rash. Neurological: Negative for dizziness and headaches. Psychiatric/Behavioral: Negative for agitation and confusion.                  The patient has a   Family History   Problem Relation Age of Onset    Diabetes Mother     Heart Disease Mother         multiple heart attacks    Arthritis Mother     High Blood Pressure Mother     High Cholesterol Mother     Substance Abuse Mother     Heart Disease Father     Arthritis Father     Depression Father     High Cholesterol Father     Mental Illness Father     Substance Abuse Father     Diabetes Sister     High Blood Pressure Sister     Cancer Paternal Uncle         esophageal cancer    Diabetes Maternal Aunt     Cancer Maternal Aunt         colorectal cancer    Diabetes Maternal Uncle     Cancer Maternal Grandmother         colorectal cancer    Arthritis Maternal Grandmother     Diabetes Maternal Grandmother     High Blood Pressure Maternal Grandmother     Stroke Maternal Grandmother     Arthritis Maternal Grandfather     Arthritis Paternal Grandmother     Cancer Paternal Grandmother     Depression Paternal Grandmother     Heart Disease Paternal Grandmother     High Blood Pressure Paternal Grandmother     High Cholesterol Paternal Grandmother     Mental Illness Paternal Grandmother     Arthritis Paternal Grandfather        Objective:    /71   Pulse 67   Ht 5' 7\" (1.702 m)   Wt (!) 310 lb (140.6 kg)   BMI 48.55 kg/m²    BP Readings from Last 3 Encounters:   04/15/21 115/71   03/18/21 135/75   03/03/21 137/75       Physical Exam  Constitutional:       Appearance: Normal appearance. HENT:      Head: Atraumatic. Mouth/Throat:      Mouth: Mucous membranes are moist.      Pharynx: No oropharyngeal exudate or posterior oropharyngeal erythema. Eyes:      Extraocular Movements: Extraocular movements intact. Neck:      Musculoskeletal: Normal range of motion. Cardiovascular:      Rate and Rhythm: Normal rate and regular rhythm. Heart sounds: No murmur. No friction rub. No gallop. Pulmonary:      Effort: Pulmonary effort is normal.      Breath sounds: No wheezing, rhonchi or rales. Abdominal:      General: Abdomen is flat. Tenderness: There is no abdominal tenderness. There is no guarding. Hernia: No hernia is present. Musculoskeletal: Normal range of motion. General: No swelling, tenderness, deformity or signs of injury. Comments: Currently uses a walker for ambulation   Skin:     General: Skin is warm. Capillary Refill: Capillary refill takes less than 2 seconds. Coloration: Skin is not jaundiced. Findings: No bruising. Neurological:      Mental Status: She is alert and oriented to person, place, and time. Sensory: No sensory deficit. Motor: No weakness.       Gait: Gait normal.         Lab Results   Component Value Date    WBC 12.5 (H) 01/20/2021    HGB 15.6 (H) 01/20/2021    HCT 46.8 01/20/2021     01/20/2021    CHOL 132 12/14/2020    TRIG 114 12/14/2020    HDL 45 12/14/2020    ALT 35 (H) 01/20/2021    AST 26 01/20/2021     01/20/2021    K 3.9 01/20/2021     01/20/2021    CREATININE 0.79 01/20/2021    BUN 20 01/20/2021    CO2 33 (H) 01/20/2021    TSH 3.02 01/02/2020    INR 0.9 06/29/2013    LABA1C 5.3 12/03/2020    LABMICR CANNOT BE CALCULATED 01/02/2020     Lab Results   Component Value Date    CALCIUM 9.7 01/20/2021     Lab Results   Component Value Date

## 2021-05-10 DIAGNOSIS — E55.9 VITAMIN D DEFICIENCY: ICD-10-CM

## 2021-05-10 DIAGNOSIS — Z76.0 MEDICATION REFILL: ICD-10-CM

## 2021-05-10 RX ORDER — CALCIUM CARBONATE/VITAMIN D3 600 MG-10
TABLET ORAL
Qty: 60 TABLET | Refills: 1 | Status: SHIPPED | OUTPATIENT
Start: 2021-05-10 | End: 2021-06-11 | Stop reason: SDUPTHER

## 2021-05-10 NOTE — TELEPHONE ENCOUNTER
E-scribe request for calcium-carb. Please review and e-scribe if applicable. Last Visit Date:  04/15/2021  Next Visit Date:  6/11/2021    Hemoglobin A1C (%)   Date Value   12/03/2020 5.3   01/02/2020 5.4   11/05/2018 5.5             ( goal A1C is < 7)   Microalb/Crt.  Ratio (mcg/mg creat)   Date Value   01/02/2020 CANNOT BE CALCULATED     LDL Cholesterol (mg/dL)   Date Value   12/14/2020 64     LDL Calculated (mg/dL)   Date Value   06/16/2017 68       (goal LDL is <100)   AST (U/L)   Date Value   01/20/2021 26     ALT (U/L)   Date Value   01/20/2021 35 (H)     BUN (mg/dL)   Date Value   01/20/2021 20     BP Readings from Last 3 Encounters:   04/15/21 115/71   03/18/21 135/75   03/03/21 137/75          (goal 120/80)        Patient Active Problem List:     Glaucoma     GERD (gastroesophageal reflux disease)     DJD (degenerative joint disease)     Obesity     Polyneuropathy     Migraine     Mitral prolapse     Low back pain     CTS (carpal tunnel syndrome)     Supraspinatus syndrome     Impaired fasting glucose     Acute sinus infection     IBS (irritable bowel syndrome)     Back pain, chronic     Stress fracture, right 5th metatarsal      Upper airway cough syndrome     Ear fullness     Perioral numbness     Screening for osteoporosis     Headache     Left eye injury     Medication reaction     Osteopenia     Lumbosacral pain     Flu vaccine need     Hypothyroid     Urinary incontinence     Anxiety     Sleep apnea     Depression     Chronic back pain     Carpal tunnel syndrome     Neuropathy     Mitral valve problem     Morbid obesity with BMI of 45.0-49.9, adult (HCC)     Frozen shoulder     Skin tag     Degenerative disc disease, lumbar     Bilateral edema of lower extremity     Medication refill     Varicose vein of leg     Dizziness     Dysphagia     Abdominal bloating     Mild intermittent asthma without complication      ----German Hansen

## 2021-05-10 NOTE — TELEPHONE ENCOUNTER
Dr Shoaib Cruz, patient is current and is scheduled for follow up on 6/17/21. No mention of this medication in your last dictation but Dr Timothy Villalpando has prescribed in the past. Please sign for refill if ok. Thank you.

## 2021-05-13 RX ORDER — LORATADINE 10 MG/1
TABLET ORAL
Qty: 30 TABLET | Refills: 6 | Status: SHIPPED | OUTPATIENT
Start: 2021-05-13 | End: 2021-09-21

## 2021-06-07 ENCOUNTER — TELEPHONE (OUTPATIENT)
Dept: DERMATOLOGY | Age: 61
End: 2021-06-07

## 2021-06-07 NOTE — TELEPHONE ENCOUNTER
It may be best, then, for her PCP to take over the care of her stasis dermatitis since it stays well controlled with triamcinolone. The policy remains the same, I cannot refill if we haven't had an appointment in the past year.

## 2021-06-07 NOTE — TELEPHONE ENCOUNTER
Pt called, requesting a medication refill. I advised her it has been over a year and that we will need to make a f/u appt. Pt declined, states she just wants the medication before her eczema gets worse.

## 2021-06-09 ENCOUNTER — OFFICE VISIT (OUTPATIENT)
Dept: PODIATRY | Age: 61
End: 2021-06-09
Payer: COMMERCIAL

## 2021-06-09 VITALS
WEIGHT: 293 LBS | HEIGHT: 67 IN | HEART RATE: 72 BPM | BODY MASS INDEX: 45.99 KG/M2 | DIASTOLIC BLOOD PRESSURE: 76 MMHG | SYSTOLIC BLOOD PRESSURE: 136 MMHG

## 2021-06-09 DIAGNOSIS — B35.1 ONYCHOMYCOSIS: ICD-10-CM

## 2021-06-09 DIAGNOSIS — I73.9 PVD (PERIPHERAL VASCULAR DISEASE) (HCC): ICD-10-CM

## 2021-06-09 DIAGNOSIS — M20.5X2 HALLUX LIMITUS, LEFT: ICD-10-CM

## 2021-06-09 DIAGNOSIS — M20.21 HALLUX RIGIDUS, RIGHT FOOT: ICD-10-CM

## 2021-06-09 DIAGNOSIS — E11.43 CONTROLLED TYPE 2 DIABETES MELLITUS WITH DIABETIC AUTONOMIC NEUROPATHY, WITHOUT LONG-TERM CURRENT USE OF INSULIN (HCC): ICD-10-CM

## 2021-06-09 DIAGNOSIS — R60.0 EDEMA OF LOWER EXTREMITY: ICD-10-CM

## 2021-06-09 DIAGNOSIS — M77.41 METATARSALGIA OF RIGHT FOOT: Primary | ICD-10-CM

## 2021-06-09 PROCEDURE — 3044F HG A1C LEVEL LT 7.0%: CPT | Performed by: STUDENT IN AN ORGANIZED HEALTH CARE EDUCATION/TRAINING PROGRAM

## 2021-06-09 PROCEDURE — 11721 DEBRIDE NAIL 6 OR MORE: CPT | Performed by: STUDENT IN AN ORGANIZED HEALTH CARE EDUCATION/TRAINING PROGRAM

## 2021-06-09 PROCEDURE — S0395 IMPRESSION CASTING FT: HCPCS | Performed by: STUDENT IN AN ORGANIZED HEALTH CARE EDUCATION/TRAINING PROGRAM

## 2021-06-09 PROCEDURE — G8427 DOCREV CUR MEDS BY ELIG CLIN: HCPCS | Performed by: STUDENT IN AN ORGANIZED HEALTH CARE EDUCATION/TRAINING PROGRAM

## 2021-06-09 PROCEDURE — 99212 OFFICE O/P EST SF 10 MIN: CPT | Performed by: STUDENT IN AN ORGANIZED HEALTH CARE EDUCATION/TRAINING PROGRAM

## 2021-06-09 PROCEDURE — 99213 OFFICE O/P EST LOW 20 MIN: CPT | Performed by: STUDENT IN AN ORGANIZED HEALTH CARE EDUCATION/TRAINING PROGRAM

## 2021-06-09 PROCEDURE — 3017F COLORECTAL CA SCREEN DOC REV: CPT | Performed by: STUDENT IN AN ORGANIZED HEALTH CARE EDUCATION/TRAINING PROGRAM

## 2021-06-09 PROCEDURE — G8417 CALC BMI ABV UP PARAM F/U: HCPCS | Performed by: STUDENT IN AN ORGANIZED HEALTH CARE EDUCATION/TRAINING PROGRAM

## 2021-06-09 PROCEDURE — 2022F DILAT RTA XM EVC RTNOPTHY: CPT | Performed by: STUDENT IN AN ORGANIZED HEALTH CARE EDUCATION/TRAINING PROGRAM

## 2021-06-09 PROCEDURE — 1036F TOBACCO NON-USER: CPT | Performed by: STUDENT IN AN ORGANIZED HEALTH CARE EDUCATION/TRAINING PROGRAM

## 2021-06-09 NOTE — PROGRESS NOTES
Patient instructed to remove shoes and socks and instructed to sit in exam chair. Current PCP is Garnet Romberg, MD and date of last visit was 4-15-21. Do you have a follow up visit scheduled? No  Diabetic visit information    Blood pressure (Control is BP <140/90)  BP Readings from Last 3 Encounters:   04/15/21 115/71   03/18/21 135/75   03/03/21 137/75       BP taken with correct size cuff? - Yes   Repeated if > 140/90 Yes      Tobacco use:  Patient  reports that she has never smoked. She has never used smokeless tobacco.  If Smoker - Cessation materials given? - Yes       Diabetic Health Maintenance Items due  Diabetes Management   Topic Date Due    Diabetic retinal exam  03/06/2021       Diabetic retinal exam done in last year? - Yes   If No: remind patient that it is due and they should schedule an exam    Medications  Is patient taking any medications for diabetes? -   Yes  Have blood sugars been controlled? Fasting blood sugars under 120   -   Yes   Random home sugars or today's POCT glucose is under 180 -   Yes   []  If No to the above then patient should schedule appt with PCP. Diabetic Plan    A1C Plan  Lab Results   Component Value Date    LABA1C 5.3 12/03/2020    LABA1C 5.4 01/02/2020    LABA1C 5.5 11/05/2018      []  If A1C over 8 and last result >3 months ago - Order A1C and refer to PCP   []  If last A1C over 6 months ago - Order A1C and refer to PCP for follow up   []  If elevated blood sugars > 180 - refer to PCP for follow up    []  Blood sugar controlled - A1C under 8 and last check was < 6 months      Cholesterol Plan   Lab Results   Component Value Date    LDLCALC 68 06/16/2017    LDLCHOLESTEROL 64 12/14/2020      []  If LDL > 100 and last result >3 months ago - order Fasting lipids and refer to PCP for follow up   []  If LDL < 100 and over 1 year ago - Order Fasting lipids and refer to PCP for follow up   [] LDL is controlled.   LDL < 100 and checked within the last year     Blood Pressure  BP Readings from Last 3 Encounters:   04/15/21 115/71   03/18/21 135/75   03/03/21 137/75      []  If SBP >140 mmhg - refer to PCP for follow up   []  If DBP > 90 mmhg - refer to PCP for follow up   [] BP is controlled <140/90     Order labs as PCP ordered.   (ie: Lipids, A1C, CMP)

## 2021-06-09 NOTE — PROGRESS NOTES
[address],  [address] Valentin Grier  Tel: [phone]   Fax: [phone]    Subjective     CC: painful and elongated toe nails    HPI:  Geo Mathews is a 64 y.o. y who presents clinic today for diabetic foot examination ear old female, also complaining of elongated and painful toenails. Patient states that toenails get so long that they break off in her shoes, causing pain. Has difficulty examining feet due to poor vision. Notes dropping a can on foot 2 weeks ago with mild sharp localized pain that persists to 2nd and 3rd toes right foot. Relates that she normally wears compression stockings to bilateral lower extremities for edema however current ones aren't high enough and dig into skin. Denies any rest pain or claudication symptoms. Primary care physician is Gayathri Starks MD.    ROS:    Constitutional: Denies nausea, vomiting, fever, chills. Neurologic: Denies numbness, tingling, and burning in the feet. Vascular: Denies symptoms of lower extremity claudication. Skin: Painful thickened toenails   Otherwise negative except as noted in the HPI.      PMH:  Past Medical History:   Diagnosis Date    Abdominal bloating 1/23/2019    Anxiety     Bilateral edema of lower extremity 12/16/2016    Bronchial asthma     mild, intermittent    Carpal tunnel syndrome     Cataract     Chronic back pain     Chronic sinusitis     Degenerative disc disease, lumbar 12/16/2016    Depression     Diabetes mellitus due to underlying condition with hyperosmolarity without coma ([facility] [address]     Dizziness 10/27/2017    DJD (degenerative joint disease)     S1, L3, L4, L5, T12    Dysphagia 1/23/2019    Edema of leg     bilateral legs    Environmental allergies     Frozen shoulder 4/27/2015    GERD (gastroesophageal reflux disease)     Glaucoma     Glucose intolerance (impaired glucose tolerance)     Headache(784.0)     History of bronchitis     Viral    HTN (hypertension)     Hyperlipidemia     Hypothyroid     hypothyroid state    Hypothyroidism     IBS (irritable bowel syndrome) 10/02/2012    Legally blind     due to glaucoma    Medication refill 6/1/2017    Mild intermittent asthma without complication     Morbid obesity with BMI of 45.0-49.9, adult (HCC)     MVP (mitral valve prolapse)     Neuropathy     OA (osteoarthritis)     EMILIA on CPAP     Osteopenia     Pancreatic cyst     Polyneuropathy     Raynaud disease     Rhinopharyngitis     Allergic rhinopharyngitis    Skin lesion of left leg 6/11/2021    Skin tag 7/9/2015    Stress fracture     Right 5th Metatarsal     Syncope     TIA (transient ischemic attack)     Urinary incontinence     Varicose vein of leg 6/1/2017    Vasodepressor syncope     Wears glasses        Surgical History:   Past Surgical History:   Procedure Laterality Date    APPENDECTOMY  1978    CATARACT REMOVAL Left     CHOLECYSTECTOMY  1978    COLONOSCOPY      COLONOSCOPY N/A 12/9/2019    COLORECTAL CANCER SCREENING, NOT HIGH RISK performed by Janie Hylton MD at [address] [place] [address] Bilateral     right iridectomy, right laser, bilateral trabeculectomy, left drain    GLAUCOMA SURGERY      HAND SURGERY Right     HYSTERECTOMY  1982    KNEE SURGERY Right 2000    TUBAL LIGATION  1981    UPPER GASTROINTESTINAL ENDOSCOPY      UPPER GASTROINTESTINAL ENDOSCOPY  5/24/2018    EGD DILATION SAVORY performed by Hector Dandy, MD at [address] [place] [address]  3/6/2019    EGD DILATION SAVORY performed by Janie Hylton MD at [facility]       Social History:  Social History     Tobacco Use    Smoking status: Never Smoker    Smokeless tobacco: Never Used   Substance Use Topics    Alcohol use: No     Alcohol/week: 0.0 standard drinks    Drug use: No       Medications:  Prior to Admission medications    Medication Sig Start Date End Date Taking?  Authorizing Provider   loratadine (ALLERGY RELIEF) 10 MG tablet TAKE 1 TABLET BY MOUTH DAILY 5/13/21  Yes Skip Bueno MD   pantoprazole (PROTONIX) 40 MG tablet Take 1 tablet by mouth 2 times daily (before meals) 2/5/21  Yes Brayden Armijo MD   tolterodine (DETROL LA) 4 MG extended release capsule Take 1 capsule by mouth daily  Patient not taking: Reported on 6/11/2021 1/7/21  Yes Jamari Lujan MD   FREESTYLE LITE strip  11/24/20  Yes Historical Provider, MD   Lancets [facility] ([address]) KIT  11/25/20  Yes Historical Provider, MD   VITAMIN D-3 SUPER STRENGTH 50 MCG (2000 UT) TABS  12/3/20  Yes Historical Provider, MD   fluticasone (FLONASE) 50 MCG/ACT nasal spray USE 1 SPRAY IN EACH NOSTRIL TWICE A DAY 10/22/20  Yes Panchito Garcia MD   Psyllium (HM FIBER POWDER PO) Take by mouth   Yes Historical Provider, MD   aluminum & magnesium hydroxide-simethicone (MAALOX ADVANCED) 200-200-20 MG/5ML SUSP suspension Take 5 mLs by mouth as needed for Indigestion Actually  Citracel   Yes Historical Provider, MD   Blood Glucose Monitoring Suppl ([address]) w/Device KIT  4/1/21   Historical Provider, MD   Blood Glucose Calibration (ONETOUCH VERIO) SOLN  4/14/21   Historical Provider, MD   levothyroxine (SYNTHROID) 100 MCG tablet Take 1 tablet by mouth Daily Indications: 112 mg 6/11/21   Ginny Maldonado MD   ASPIRIN LOW DOSE 81 MG EC tablet Take 1 tablet by mouth daily 6/11/21   Ginny Maldonado MD   Multiple Vitamins-Minerals (CERTAVITE/ANTIOXIDANTS) TABS Take 1 each by mouth daily 6/11/21   Ginny Maldonado MD   Cholecalciferol (VITAMIN D) 50 MCG (2000 UT) CAPS capsule Take 1,000 Units by mouth daily 6/11/21   Ginny Maldonado MD   calcium carb-cholecalciferol 600-400 MG-UNIT TABS per tab TAKE 1 TABLET BY MOUTH TWICE A DAY 6/11/21   Ginny Maldonado MD   rosuvastatin (CRESTOR) 5 MG tablet Take 1 tablet by mouth daily 6/11/21   Ginny Maldonado MD   acetaminophen (ACETAMINOPHEN EXTRA STRENGTH) 500 MG tablet Take 2 tablets by mouth every 6 hours as needed for Pain 6/11/21   Lyubov Falcon MD   albuterol sulfate  (90 Base) MCG/ACT inhaler INHALE 2 PUFFS INTO THE LUNGS EVERY SIX HOURS AS NEEDED 6/11/21   Lyubov Falcon MD   Accu-Chek FastClix Lancets MISC Use as directed 6/11/21   Lyubov Falcon MD       Objective     Vitals:    06/09/21 1238   BP: 136/76   Pulse: 72       Lab Results   Component Value Date    LABA1C 5.5 06/11/2021       Physical Exam:  General:  Alert and oriented x3. In no acute distress. Lower Extremity Physical Exam:    Vascular: DP pulses are palpable, Bilateral. PT pulses non palpable, readily dopplerable bilateral. CFT <3 seconds to all digits, Bilateral.  Edema noted to the bilateral lower extremity, Bilateral.  Hair growth is absent to the level of the digits, Bilateral.     Neuro: Saph/sural/SP/DP/plantar sensation intact to light touch. Protective sensation is intact to 4/10 sites on the right foot and 3/10 sites on the left foot as tested with a 5.07g SWMF, Bilateral.     Musculoskeletal: EHL/FHL/GS/TA gross motor intact. Tenderness to palpation of the distal tuft of digits 1 through 5 bilateral.  Decreased ankle range of motion bilateral. Neg pain on calf squeeze. Reduced ROM 1st MTPJ R worse than L. Pain on ROM 1st MPJ R. TTP to dorsal 2nd and 3rd prox phalanx bases R foot, no pain on pROM    Dermatologic: Skin shiny and atrophic with no hair growth noted. Interdigital maceration absent, Bilateral. Nails 1-5 thickened, elongated with subungual debris noted b/l. Hair growth absent bilat. No noted hyperkeratotic lesion. No open wounds appreciated.     Sites of Onychomycosis Involvement (Check affected area)  [x] [x] [x] [x] [x] [x] [x] [x] [x] [x]  5 4 3 2 1 1 2 3 4 5                          Right                                        Left    Thickness  [x] [x] [x] [x] [x] [x] [x] [x] [x] [x]  5 4 3 2 1 1 2 3 4 5                         Right Left    Dystrophic Changes                                                                 [x] [x] [x] [x] [x] [x] [x] [x] [x] [x]  5 4 3 2 1 1 2 3 4 5                         Right                                        Left    Color                                                                  [x] [x] [x] [x] [x] [x] [x] [x] [x] [x]  5 4 3 2 1 1 2 3 4 5                          Right                                        Left    Incurvation/Ingrowin                                                                   [] [] [] [] [] [] [] [] [] []  5 4 3 2 1 1 2 3 4 5                         Right                                        Left    Inflammation/Pain                                                                   [x] [x] [x] [x] [x] [x] [x] [x] [x] [x]  5 4 3 2 1 1 2 3 4 5                         Right                                        Left        Assessment   Kaci Davalos is a 64 y.o. female with     Diagnosis Orders   1. Metatarsalgia of right foot  AR FOOT LONGITUD/METATARSAL SUP    74996 - AR DEBRIDEMENT OF NAILS, 6 OR MORE   2. Edema of lower extremity  AR FOOT LONGITUD/METATARSAL SUP    99590 - AR DEBRIDEMENT OF NAILS, 6 OR MORE   3. Hallux rigidus, right foot  AR FOOT LONGITUD/METATARSAL SUP    38888 - AR DEBRIDEMENT OF NAILS, 6 OR MORE   4. Hallux limitus, left  AR FOOT LONGITUD/METATARSAL SUP    33106 - AR DEBRIDEMENT OF NAILS, 6 OR MORE   5. PVD (peripheral vascular disease) ([facility])  39565 - AR DEBRIDEMENT OF NAILS, 6 OR MORE   6. Controlled type 2 diabetes mellitus with diabetic autonomic neuropathy, without long-term current use of insulin ([facility])  56326 - AR DEBRIDEMENT OF NAILS, 6 OR MORE   7. Onychomycosis          Plan   Pt was evaluated and examined. Patient was given personalized discharge instructions. Nails 1-10 were debrided sharply in length and thickness with a nipper and , without incident.  Pt will follow up in 3 months or sooner if any problems arise. Diagnosis was discussed with the pt and all of their questions were answered in detail. Proper foot hygiene and care was discussed with the pt. Informed patient on proper diabetic foot care and importance of tight glycemic control. Patient to check feet daily and contact the office with any questions/problems/concerns. Other comorbidity noted and will be managed by PCP. ·   · Diagnosis and treatment options discussed in detail. · Debrided nails 1-5 bilateral with sterile nail nippers without incident. · Patient was educated on the utmost importance of tight glycemic control. · Instructed patient to obtain OTC compression stockings for edema to the bilateral lower extremities. Elevate when at rest  · Molded for CMOs  · Dispensed sx shoe R foot, WBAT in shoe  · Patient was advised to continue daily foot checks, in addition to not ambulating barefoot. · Educated pt on signs of infection/dvt and to report to ED if arise. · Ordered XR right foot r/o fx. · Patient to RTC in 1 month to review XR, poss CMO , or sooner if problems arise  · Please, call the office with any questions or concerns. A Sx shoe was ordered and placed on the patient for pain control and stabilization due to right foot pain. Patient is ambulatory with weakness and instability therefore a surgical shoe will help with the weakness, stabilization and pain control. The shoee will improve ADL's.        Anne Fournier DPM   Podiatric Medicine & Surgery   6/23/2021 at 3:22 PM

## 2021-06-11 ENCOUNTER — OFFICE VISIT (OUTPATIENT)
Dept: FAMILY MEDICINE CLINIC | Age: 61
End: 2021-06-11
Payer: COMMERCIAL

## 2021-06-11 VITALS
TEMPERATURE: 98.1 F | WEIGHT: 293 LBS | DIASTOLIC BLOOD PRESSURE: 68 MMHG | BODY MASS INDEX: 45.99 KG/M2 | HEIGHT: 67 IN | HEART RATE: 64 BPM | SYSTOLIC BLOOD PRESSURE: 139 MMHG

## 2021-06-11 DIAGNOSIS — L98.9 SKIN LESION OF LEFT LEG: ICD-10-CM

## 2021-06-11 DIAGNOSIS — G89.29 CHRONIC BILATERAL LOW BACK PAIN, UNSPECIFIED WHETHER SCIATICA PRESENT: ICD-10-CM

## 2021-06-11 DIAGNOSIS — M51.36 DEGENERATIVE DISC DISEASE, LUMBAR: ICD-10-CM

## 2021-06-11 DIAGNOSIS — M54.50 CHRONIC BILATERAL LOW BACK PAIN, UNSPECIFIED WHETHER SCIATICA PRESENT: ICD-10-CM

## 2021-06-11 DIAGNOSIS — E11.9 TYPE 2 DIABETES MELLITUS WITHOUT COMPLICATION, WITHOUT LONG-TERM CURRENT USE OF INSULIN (HCC): ICD-10-CM

## 2021-06-11 DIAGNOSIS — E03.9 HYPOTHYROIDISM, UNSPECIFIED TYPE: ICD-10-CM

## 2021-06-11 DIAGNOSIS — Z76.0 MEDICATION REFILL: ICD-10-CM

## 2021-06-11 DIAGNOSIS — E55.9 VITAMIN D DEFICIENCY: ICD-10-CM

## 2021-06-11 DIAGNOSIS — M25.551 HIP PAIN, RIGHT: Primary | ICD-10-CM

## 2021-06-11 LAB — HBA1C MFR BLD: 5.5 %

## 2021-06-11 PROCEDURE — 3017F COLORECTAL CA SCREEN DOC REV: CPT | Performed by: STUDENT IN AN ORGANIZED HEALTH CARE EDUCATION/TRAINING PROGRAM

## 2021-06-11 PROCEDURE — 3044F HG A1C LEVEL LT 7.0%: CPT | Performed by: STUDENT IN AN ORGANIZED HEALTH CARE EDUCATION/TRAINING PROGRAM

## 2021-06-11 PROCEDURE — 1036F TOBACCO NON-USER: CPT | Performed by: STUDENT IN AN ORGANIZED HEALTH CARE EDUCATION/TRAINING PROGRAM

## 2021-06-11 PROCEDURE — 2022F DILAT RTA XM EVC RTNOPTHY: CPT | Performed by: STUDENT IN AN ORGANIZED HEALTH CARE EDUCATION/TRAINING PROGRAM

## 2021-06-11 PROCEDURE — 83036 HEMOGLOBIN GLYCOSYLATED A1C: CPT | Performed by: STUDENT IN AN ORGANIZED HEALTH CARE EDUCATION/TRAINING PROGRAM

## 2021-06-11 PROCEDURE — G8417 CALC BMI ABV UP PARAM F/U: HCPCS | Performed by: STUDENT IN AN ORGANIZED HEALTH CARE EDUCATION/TRAINING PROGRAM

## 2021-06-11 PROCEDURE — G8427 DOCREV CUR MEDS BY ELIG CLIN: HCPCS | Performed by: STUDENT IN AN ORGANIZED HEALTH CARE EDUCATION/TRAINING PROGRAM

## 2021-06-11 PROCEDURE — 99213 OFFICE O/P EST LOW 20 MIN: CPT | Performed by: STUDENT IN AN ORGANIZED HEALTH CARE EDUCATION/TRAINING PROGRAM

## 2021-06-11 PROCEDURE — 99211 OFF/OP EST MAY X REQ PHY/QHP: CPT | Performed by: HOSPITALIST

## 2021-06-11 RX ORDER — MULTIVIT-MIN/IRON/FOLIC ACID/K 18-600-40
1000 CAPSULE ORAL DAILY
Qty: 30 CAPSULE | Refills: 3 | Status: SHIPPED | OUTPATIENT
Start: 2021-06-11 | End: 2021-09-21 | Stop reason: SDUPTHER

## 2021-06-11 RX ORDER — ROSUVASTATIN CALCIUM 5 MG/1
5 TABLET, COATED ORAL DAILY
Qty: 30 TABLET | Refills: 3 | Status: SHIPPED | OUTPATIENT
Start: 2021-06-11 | End: 2021-09-21 | Stop reason: SDUPTHER

## 2021-06-11 RX ORDER — ASPIRIN 81 MG/1
81 TABLET, COATED ORAL DAILY
Qty: 30 TABLET | Refills: 1 | Status: SHIPPED | OUTPATIENT
Start: 2021-06-11 | End: 2021-08-18 | Stop reason: SDUPTHER

## 2021-06-11 RX ORDER — ACETAMINOPHEN 500 MG
1000 TABLET ORAL EVERY 6 HOURS PRN
Qty: 120 TABLET | Refills: 3 | Status: SHIPPED | OUTPATIENT
Start: 2021-06-11 | End: 2022-01-31 | Stop reason: SDUPTHER

## 2021-06-11 RX ORDER — ALBUTEROL SULFATE 90 UG/1
AEROSOL, METERED RESPIRATORY (INHALATION)
Qty: 18 G | Refills: 2 | Status: SHIPPED | OUTPATIENT
Start: 2021-06-11 | End: 2021-09-21 | Stop reason: SDUPTHER

## 2021-06-11 RX ORDER — BLOOD-GLUCOSE CONTROL, NORMAL
EACH MISCELLANEOUS
COMMUNITY
Start: 2021-04-14

## 2021-06-11 RX ORDER — CALCIUM CARBONATE/VITAMIN D3 600 MG-10
TABLET ORAL
Qty: 60 TABLET | Refills: 1 | Status: SHIPPED | OUTPATIENT
Start: 2021-06-11 | End: 2021-07-08

## 2021-06-11 RX ORDER — LANCETS
EACH MISCELLANEOUS
Qty: 102 EACH | Refills: 3 | Status: SHIPPED | OUTPATIENT
Start: 2021-06-11 | End: 2022-05-31

## 2021-06-11 RX ORDER — LEVOTHYROXINE SODIUM 0.1 MG/1
100 TABLET ORAL DAILY
Qty: 30 TABLET | Refills: 3 | Status: SHIPPED | OUTPATIENT
Start: 2021-06-11 | End: 2021-09-21 | Stop reason: SDUPTHER

## 2021-06-11 RX ORDER — FOLIC ACID/MV,IRON,MIN/LUTEIN 0.4-18-25
1 TABLET ORAL DAILY
Qty: 30 TABLET | Refills: 3 | Status: SHIPPED | OUTPATIENT
Start: 2021-06-11 | End: 2021-06-28

## 2021-06-11 RX ORDER — BLOOD-GLUCOSE METER
EACH MISCELLANEOUS
COMMUNITY
Start: 2021-04-01

## 2021-06-11 ASSESSMENT — PATIENT HEALTH QUESTIONNAIRE - PHQ9
SUM OF ALL RESPONSES TO PHQ QUESTIONS 1-9: 0
SUM OF ALL RESPONSES TO PHQ QUESTIONS 1-9: 0
1. LITTLE INTEREST OR PLEASURE IN DOING THINGS: 0
2. FEELING DOWN, DEPRESSED OR HOPELESS: 0
SUM OF ALL RESPONSES TO PHQ QUESTIONS 1-9: 0
SUM OF ALL RESPONSES TO PHQ9 QUESTIONS 1 & 2: 0

## 2021-06-11 ASSESSMENT — ENCOUNTER SYMPTOMS
ABDOMINAL PAIN: 0
NAUSEA: 0
DIARRHEA: 0
COUGH: 0
SHORTNESS OF BREATH: 0
VOMITING: 0
CONSTIPATION: 0

## 2021-06-11 NOTE — PROGRESS NOTES
Diabetic visit information    BP Readings from Last 3 Encounters:   06/09/21 136/76   04/15/21 115/71   03/18/21 135/75       Hemoglobin A1C (%)   Date Value   12/03/2020 5.3   01/02/2020 5.4   11/05/2018 5.5     Microalb/Crt. Ratio (mcg/mg creat)   Date Value   01/02/2020 CANNOT BE CALCULATED     LDL Cholesterol (mg/dL)   Date Value   12/14/2020 64     LDL Calculated (mg/dL)   Date Value   06/16/2017 68               Have you changed or started any medications since your last visit including any over-the-counter medicines, vitamins, or herbal medicines? no   Have you stopped taking any of your medications? Is so, why? - yes see list   Are you having any side effects from any of your medications? - no    Have you seen any other physician or provider since your last visit? yes - Podiatry   Have you had any other diagnostic tests since your last visit?  no   Have you been seen in the emergency room and/or had an admission in a hospital since we last saw you?  no     Have you had your annual diabetic retinal (eye) exam? Yes   (ensure copy of exam is in the chart)    Have you had your routine dental cleaning in the past 6 months? 02/2021     Do you have an active [employer] account? If not, what are your barriers? Yes    Patient Care Team:  Brandon Trotter MD as PCP - General (Family Medicine)  Corrinne Shorter, DO as Surgeon (Orthopedic Surgery)  Debbie Severe, MD as Consulting Physician (Cardiology)  Jalen Gutierrez MD as Consulting Physician (Pulmonology)  Rosey Ledesma as Consulting Physician  Montez Hodgkin, MD as Consulting Physician (Endocrinology)  Mica Morton MD as Consulting Physician (Obstetrics & Gynecology)  Arline Garcia MD as Consulting Physician (Gastroenterology)    Medical history Review  Past Medical, Family, and Social History reviewed and does not contribute to the patient presenting condition.     Health Maintenance   Topic Date Due    COVID-19 Vaccine (1) Never done    TSH testing  01/02/2021    Diabetic retinal exam  03/06/2021    Diabetic microalbuminuria test  09/30/2021    A1C test (Diabetic or Prediabetic)  12/03/2021    Lipid screen  12/14/2021    Diabetic foot exam  06/09/2022    Breast cancer screen  12/14/2022    Pneumococcal 0-64 years Vaccine (2 of 2) 03/09/2025    DTaP/Tdap/Td vaccine (2 - Td or Tdap) 06/01/2027    Colon cancer screen colonoscopy  12/09/2029    Flu vaccine  Completed    Shingles Vaccine  Completed    Hepatitis C screen  Completed    HIV screen  Completed    Hepatitis A vaccine  Aged Out    Hib vaccine  Aged Out    Meningococcal (ACWY) vaccine  Aged Out

## 2021-06-11 NOTE — PROGRESS NOTES
Subjective:    Geo Mathews is a 64 y.o. female with  has a past medical history of Anxiety, Bronchial asthma, Carpal tunnel syndrome, Cataract, Chronic back pain, Chronic sinusitis, Depression, Diabetes mellitus due to underlying condition with hyperosmolarity without coma ([facility] [address] DJD (degenerative joint disease), Edema of leg, Environmental allergies, GERD (gastroesophageal reflux disease), Glaucoma, Glucose intolerance (impaired glucose tolerance), Headache(784.0), History of bronchitis, HTN (hypertension), Hyperlipidemia, Hypothyroid, Hypothyroidism, IBS (irritable bowel syndrome), Legally blind, Mild intermittent asthma without complication, Morbid obesity with BMI of 45.0-49.9, adult ([facility] [address] MVP (mitral valve prolapse), OA (osteoarthritis), EMILIA on CPAP, Osteopenia, Pancreatic cyst, Polyneuropathy, Raynaud disease, Rhinopharyngitis, Stress fracture, Syncope, TIA (transient ischemic attack), Urinary incontinence, Vasodepressor syncope, and Wears glasses.     Family History   Problem Relation Age of Onset    Diabetes Mother     Heart Disease Mother         multiple heart attacks    Arthritis Mother     High Blood Pressure Mother     High Cholesterol Mother     Substance Abuse Mother     Heart Disease Father     Arthritis Father     Depression Father     High Cholesterol Father     Mental Illness Father     Substance Abuse Father     Diabetes Sister     High Blood Pressure Sister     Cancer Paternal Uncle         esophageal cancer    Diabetes Maternal Aunt     Cancer Maternal Aunt         colorectal cancer    Diabetes Maternal Uncle     Cancer Maternal Grandmother         colorectal cancer    Arthritis Maternal Grandmother     Diabetes Maternal Grandmother     High Blood Pressure Maternal Grandmother     Stroke Maternal Grandmother     Arthritis Maternal Grandfather     Arthritis Paternal Grandmother     Cancer Paternal Grandmother     Depression Paternal Grandmother     Heart Disease Exam  Vitals reviewed. Constitutional:       General: She is not in acute distress. Appearance: She is obese. Eyes:      Conjunctiva/sclera: Conjunctivae normal.   Neck:      Thyroid: No thyromegaly. Comments: No thyromegaly  Cardiovascular:      Rate and Rhythm: Normal rate and regular rhythm. Heart sounds: Normal heart sounds. Pulmonary:      Effort: Pulmonary effort is normal.      Breath sounds: Normal breath sounds. Abdominal:      General: Bowel sounds are normal.      Palpations: Abdomen is soft. Musculoskeletal:         General: Tenderness (right lateral hip , limited range of motion due to obesity ) present. Cervical back: Neck supple. Skin:     General: Skin is warm and dry. Findings: Lesion (lower 1/3rd medial aspect of left leg: ~1cm cicrcular dry erytheatmous area, minimal tenderness deep to palpation, no sewllin erythema or warmth) present. Neurological:      Mental Status: She is alert. Motor: No abnormal muscle tone. Lab Results   Component Value Date    WBC 12.5 (H) 01/20/2021    HGB 15.6 (H) 01/20/2021    HCT 46.8 01/20/2021     01/20/2021    CHOL 132 12/14/2020    TRIG 114 12/14/2020    HDL 45 12/14/2020    ALT 35 (H) 01/20/2021    AST 26 01/20/2021     01/20/2021    K 3.9 01/20/2021     01/20/2021    CREATININE 0.79 01/20/2021    BUN 20 01/20/2021    CO2 33 (H) 01/20/2021    TSH 3.02 01/02/2020    INR 0.9 06/29/2013    LABA1C 5.5 06/11/2021    LABMICR CANNOT BE CALCULATED 01/02/2020     Lab Results   Component Value Date    CALCIUM 9.7 01/20/2021     Lab Results   Component Value Date    LDLCALC 68 06/16/2017    LDLCHOLESTEROL 64 12/14/2020       Assessment and Plan:    1.  Hip pain, right  -April '21 hip xray: no acute findings, mild dengerative hcanges in lower lmbar/sacral spine  -did not go to PT (prescribed last visit) due to insurance reasons   -encouraged to find a PT that suits patient's location and insurance   - External Referral To Physical Therapy    2. Hypothyroidism, unspecified type  - levothyroxine (SYNTHROID) 100 MCG tablet; Take 1 tablet by mouth Daily Indications: 112 mg  Dispense: 30 tablet; Refill: 3  - TSH; Future    3. Medication refill  - levothyroxine (SYNTHROID) 100 MCG tablet; Take 1 tablet by mouth Daily Indications: 112 mg  Dispense: 30 tablet; Refill: 3  - Multiple Vitamins-Minerals (CERTAVITE/ANTIOXIDANTS) TABS; Take 1 each by mouth daily  Dispense: 30 tablet; Refill: 3    4. Type 2 diabetes mellitus without complication, without long-term current use of insulin (HCC)  - ASPIRIN LOW DOSE 81 MG EC tablet; Take 1 tablet by mouth daily  Dispense: 30 tablet; Refill: 1  - POCT glycosylated hemoglobin (Hb A1C)    5. Vitamin D deficiency  - calcium carb-cholecalciferol 600-400 MG-UNIT TABS per tab; TAKE 1 TABLET BY MOUTH TWICE A DAY  Dispense: 60 tablet; Refill: 1    6. Degenerative disc disease, lumbar  - acetaminophen (ACETAMINOPHEN EXTRA STRENGTH) 500 MG tablet; Take 2 tablets by mouth every 6 hours as needed for Pain  Dispense: 120 tablet; Refill: 3    7. Chronic bilateral low back pain, unspecified whether sciatica present  - acetaminophen (ACETAMINOPHEN EXTRA STRENGTH) 500 MG tablet; Take 2 tablets by mouth every 6 hours as needed for Pain  Dispense: 120 tablet; Refill: 3    8.  Skin lesion of left leg  -eczema vs bug bite vs ingrown hair  -instructed patient to apply cerave or other eczema lotion to area  -no signs of infection  -if presentation worsens, to call office         Requested Prescriptions     Signed Prescriptions Disp Refills    levothyroxine (SYNTHROID) 100 MCG tablet 30 tablet 3     Sig: Take 1 tablet by mouth Daily Indications: 112 mg    ASPIRIN LOW DOSE 81 MG EC tablet 30 tablet 1     Sig: Take 1 tablet by mouth daily    Multiple Vitamins-Minerals (CERTAVITE/ANTIOXIDANTS) TABS 30 tablet 3     Sig: Take 1 each by mouth daily    Cholecalciferol (VITAMIN D) 50 MCG (2000 UT) CAPS capsule 30 capsule 3     Sig: Take 1,000 Units by mouth daily    calcium carb-cholecalciferol 600-400 MG-UNIT TABS per tab 60 tablet 1     Sig: TAKE 1 TABLET BY MOUTH TWICE A DAY    rosuvastatin (CRESTOR) 5 MG tablet 30 tablet 3     Sig: Take 1 tablet by mouth daily    acetaminophen (ACETAMINOPHEN EXTRA STRENGTH) 500 MG tablet 120 tablet 3     Sig: Take 2 tablets by mouth every 6 hours as needed for Pain    albuterol sulfate  (90 Base) MCG/ACT inhaler 18 g 2     Sig: INHALE 2 PUFFS INTO THE LUNGS EVERY SIX HOURS AS NEEDED    Accu-Chek FastClix Lancets MISC 102 each 3     Sig: Use as directed       Medications Discontinued During This Encounter   Medication Reason    Cholecalciferol (VITAMIN D) 2000 units CAPS capsule REORDER    levothyroxine (SYNTHROID) 100 MCG tablet REORDER    rosuvastatin (CRESTOR) 5 MG tablet REORDER    albuterol sulfate  (90 Base) MCG/ACT inhaler REORDER    Accu-Chek FastClix Lancets MISC REORDER    acetaminophen (ACETAMINOPHEN EXTRA STRENGTH) 500 MG tablet REORDER    Multiple Vitamins-Minerals (CERTAVITE/ANTIOXIDANTS) TABS REORDER    ASPIRIN LOW DOSE 81 MG EC tablet REORDER    calcium carb-cholecalciferol 600-400 MG-UNIT TABS per tab REORDER       Kaci received counseling on the following healthy behaviors:nutrition, exercise and medication adherence    Discussed use, benefit, and side effects of prescribed medications. Barriers to medication compliance addressed. All patient questions answered. Pt voicedunderstanding.      Return in about 3 months (around 9/11/2021) for FOLLOW UP.

## 2021-06-14 ENCOUNTER — HOSPITAL ENCOUNTER (OUTPATIENT)
Age: 61
Discharge: HOME | End: 2021-06-16
Payer: COMMERCIAL

## 2021-06-14 ENCOUNTER — HOSPITAL ENCOUNTER (OUTPATIENT)
Dept: GENERAL RADIOLOGY | Age: 61
Discharge: HOME | End: 2021-06-16
Payer: COMMERCIAL

## 2021-06-14 ENCOUNTER — HOSPITAL ENCOUNTER (OUTPATIENT)
Age: 61
Discharge: HOME | End: 2021-06-14
Payer: COMMERCIAL

## 2021-06-14 DIAGNOSIS — E03.9 HYPOTHYROIDISM, UNSPECIFIED TYPE: ICD-10-CM

## 2021-06-14 DIAGNOSIS — M77.41 METATARSALGIA OF RIGHT FOOT: ICD-10-CM

## 2021-06-14 DIAGNOSIS — M20.5X2 HALLUX LIMITUS, LEFT: ICD-10-CM

## 2021-06-14 DIAGNOSIS — R60.0 EDEMA OF LOWER EXTREMITY: ICD-10-CM

## 2021-06-14 DIAGNOSIS — M20.21 HALLUX RIGIDUS, RIGHT FOOT: ICD-10-CM

## 2021-06-14 LAB — TSH SERPL DL<=0.05 MIU/L-ACNC: 3.12 MIU/L (ref 0.3–5)

## 2021-06-14 PROCEDURE — 36415 COLL VENOUS BLD VENIPUNCTURE: CPT

## 2021-06-14 PROCEDURE — 84443 ASSAY THYROID STIM HORMONE: CPT

## 2021-06-14 PROCEDURE — 73620 X-RAY EXAM OF FOOT: CPT

## 2021-06-15 ENCOUNTER — TELEPHONE (OUTPATIENT)
Dept: FAMILY MEDICINE CLINIC | Age: 61
End: 2021-06-15

## 2021-06-17 ENCOUNTER — OFFICE VISIT (OUTPATIENT)
Dept: PULMONOLOGY | Age: 61
End: 2021-06-17
Payer: COMMERCIAL

## 2021-06-17 VITALS
BODY MASS INDEX: 45.99 KG/M2 | WEIGHT: 293 LBS | DIASTOLIC BLOOD PRESSURE: 74 MMHG | RESPIRATION RATE: 16 BRPM | HEIGHT: 67 IN | OXYGEN SATURATION: 98 % | TEMPERATURE: 97.1 F | HEART RATE: 67 BPM | SYSTOLIC BLOOD PRESSURE: 126 MMHG

## 2021-06-17 DIAGNOSIS — E74.39 GLUCOSE INTOLERANCE: ICD-10-CM

## 2021-06-17 DIAGNOSIS — E03.8 HYPOTHYROIDISM DUE TO HASHIMOTO'S THYROIDITIS: ICD-10-CM

## 2021-06-17 DIAGNOSIS — E06.3 HYPOTHYROIDISM DUE TO HASHIMOTO'S THYROIDITIS: ICD-10-CM

## 2021-06-17 DIAGNOSIS — G47.33 OBSTRUCTIVE SLEEP APNEA SYNDROME: Primary | ICD-10-CM

## 2021-06-17 DIAGNOSIS — E66.01 MORBID OBESITY WITH BMI OF 45.0-49.9, ADULT (HCC): ICD-10-CM

## 2021-06-17 DIAGNOSIS — F41.9 ANXIETY: ICD-10-CM

## 2021-06-17 PROCEDURE — 99214 OFFICE O/P EST MOD 30 MIN: CPT | Performed by: INTERNAL MEDICINE

## 2021-06-17 PROCEDURE — 1036F TOBACCO NON-USER: CPT | Performed by: INTERNAL MEDICINE

## 2021-06-17 PROCEDURE — G8427 DOCREV CUR MEDS BY ELIG CLIN: HCPCS | Performed by: INTERNAL MEDICINE

## 2021-06-17 PROCEDURE — G8417 CALC BMI ABV UP PARAM F/U: HCPCS | Performed by: INTERNAL MEDICINE

## 2021-06-17 PROCEDURE — 3017F COLORECTAL CA SCREEN DOC REV: CPT | Performed by: INTERNAL MEDICINE

## 2021-06-17 ASSESSMENT — SLEEP AND FATIGUE QUESTIONNAIRES
HOW LIKELY ARE YOU TO NOD OFF OR FALL ASLEEP WHILE SITTING AND TALKING TO SOMEONE: 0
HOW LIKELY ARE YOU TO NOD OFF OR FALL ASLEEP WHILE SITTING AND READING: 1
ESS TOTAL SCORE: 3
HOW LIKELY ARE YOU TO NOD OFF OR FALL ASLEEP WHILE SITTING QUIETLY AFTER LUNCH WITHOUT ALCOHOL: 0
HOW LIKELY ARE YOU TO NOD OFF OR FALL ASLEEP WHILE WATCHING TV: 1
HOW LIKELY ARE YOU TO NOD OFF OR FALL ASLEEP WHILE LYING DOWN TO REST IN THE AFTERNOON WHEN CIRCUMSTANCES PERMIT: 1
HOW LIKELY ARE YOU TO NOD OFF OR FALL ASLEEP WHEN YOU ARE A PASSENGER IN A CAR FOR AN HOUR WITHOUT A BREAK: 0
HOW LIKELY ARE YOU TO NOD OFF OR FALL ASLEEP WHILE SITTING INACTIVE IN A PUBLIC PLACE: 0
HOW LIKELY ARE YOU TO NOD OFF OR FALL ASLEEP IN A CAR, WHILE STOPPED FOR A FEW MINUTES IN TRAFFIC: 0
NECK CIRCUMFERENCE (INCHES): 0

## 2021-06-17 NOTE — PROGRESS NOTES
Seda Fearing  6/17/2021    Chief Complaint   Patient presents with    Sleep Apnea     stated that she keeps having trouble with the hose on cpap machine         Kaci Banerjee has obstructive sleep apnea syndrome which is responding well to the use of CPAP with. She is using the CPAP regularly CPAP has been effective in relieving the apnea. During the last retitration we did increase his CPAP pressure to 11 cm which is tolerated which she is tolerating well. She however continues to have problem with tubing. I told her to not to use heated tubing and for the machine on the floor. That might help with humidity and collection of fluid in the tubing. I seems to me that the machine seem to area machine is working well. She did not give any compliance report. Patient a used to get syncopal episode which were thought to be due to neurocardiogenic syncope which. This is much improved now. She has allergic rhinitis causing postnasal discharge and nasal stuffiness. She also has esophageal flux disease. No pedal edema no thromboembolic process no thyroid dysfunction. She is on thyroid replacement therapy. She does have high glucose. Her hemoglobin A1c however was below 6. Review of Systems -system was conducted for all other system including upper and lower extremities. No additional information was obtained. General ROS: negative for - chills, fatigue, fever or weight loss  ENT ROS: negative for - headaches, oral lesions or sore throat  Cardiovascular ROS: no chest pain , orthopnea or pnd   Gastrointestinal ROS: no abdominal pain, change in bowel habits, or black or bloody stools  Skin - no rash   Neuro - no blurry vision , no loc .  No focal weakness   msk - no jt tenderness or swelling    Vascular - no claudication , rest completed and negative   Lymphatic - complete and negative   Hematology - oncology - complete and negative   Allergy immunology - complete and negative    no burning or bilateral legs    Environmental allergies     Frozen shoulder 4/27/2015    GERD (gastroesophageal reflux disease)     Glaucoma     Glucose intolerance (impaired glucose tolerance)     Headache(784.0)     History of bronchitis     Viral    HTN (hypertension)     Hyperlipidemia     Hypothyroid     hypothyroid state    Hypothyroidism     IBS (irritable bowel syndrome) 10/02/2012    Legally blind     due to glaucoma    Medication refill 6/1/2017    Mild intermittent asthma without complication     Morbid obesity with BMI of 45.0-49.9, adult (HCC)     MVP (mitral valve prolapse)     Neuropathy     OA (osteoarthritis)     EMILIA on CPAP     Osteopenia     Pancreatic cyst     Polyneuropathy     Raynaud disease     Rhinopharyngitis     Allergic rhinopharyngitis    Skin lesion of left leg 6/11/2021    Skin tag 7/9/2015    Stress fracture     Right 5th Metatarsal     Syncope     TIA (transient ischemic attack)     Urinary incontinence     Varicose vein of leg 6/1/2017    Vasodepressor syncope     Wears glasses        Family History:       Problem Relation Age of Onset    Diabetes Mother     Heart Disease Mother         multiple heart attacks    Arthritis Mother     High Blood Pressure Mother     High Cholesterol Mother     Substance Abuse Mother     Heart Disease Father     Arthritis Father     Depression Father     High Cholesterol Father     Mental Illness Father     Substance Abuse Father     Diabetes Sister     High Blood Pressure Sister     Cancer Paternal Uncle         esophageal cancer    Diabetes Maternal Aunt     Cancer Maternal Aunt         colorectal cancer    Diabetes Maternal Uncle     Cancer Maternal Grandmother         colorectal cancer    Arthritis Maternal Grandmother     Diabetes Maternal Grandmother     High Blood Pressure Maternal Grandmother     Stroke Maternal Grandmother     Arthritis Maternal Grandfather     Arthritis Paternal Grandmother     Cancer Paternal Grandmother     Depression Paternal Grandmother     Heart Disease Paternal Grandmother     High Blood Pressure Paternal Grandmother     High Cholesterol Paternal Grandmother     Mental Illness Paternal Grandmother     Arthritis Paternal Grandfather        SURGICAL HISTORY:   Past Surgical History:   Procedure Laterality Date    APPENDECTOMY  1978    CATARACT REMOVAL Left     CHOLECYSTECTOMY  1978    COLONOSCOPY      COLONOSCOPY N/A 12/9/2019    COLORECTAL CANCER SCREENING, NOT HIGH RISK performed by Tima Douglas MD at [address] [place] [address] Bilateral     right iridectomy, right laser, bilateral trabeculectomy, left drain    GLAUCOMA SURGERY      HAND SURGERY Right     HYSTERECTOMY  1982    KNEE SURGERY Right 2000    TUBAL LIGATION  1981    UPPER GASTROINTESTINAL ENDOSCOPY      UPPER GASTROINTESTINAL ENDOSCOPY  5/24/2018    EGD DILATION SAVORY performed by Rafael Galdamze MD at [address] [place] [address]  3/6/2019    EGD DILATION SAVORY performed by Tima Douglas MD at [facility]              Not in a hospital admission.   Allergies   Allergen Reactions    Latex Rash     blisters  blisters    Adhesive Tape Rash     blisters    Amlodipine Other (See Comments)     Facial numbness  Facial numbness  Facial numbness  Facial numbness  Facial numbness  Facial numbness  Facial numbness    Bextra [Valdecoxib] Rash     swelling    Brimonidine Itching     Burning eyes severe    Daypro [Oxaprozin] Anaphylaxis    Diclofenac Sodium Swelling and Shortness Of Breath    Famotidine Other (See Comments)     Blisters down her arms    Hydrocodone-Acetaminophen Nausea Only     Severe chest pain    Hydromorphone Other (See Comments)     Rapid heart beat,  Nausea, spent 3 days in cardiac unit    Ibuprofen      Other reaction(s): bleeding  Other reaction(s): Unknown  Black stools  \"rips up stomach\", black tarry stool  Black stools \"rips up stomach\", black tarry stool    Lisinopril Nausea Only, Nausea And Vomiting and Other (See Comments)     Other reaction(s): Hypotension    Metoprolol Other (See Comments)     Other reaction(s): Dizziness    Naproxen Other (See Comments)     Chest pain , swelling    Oxycodone-Acetaminophen      Chest pain severe    Rofecoxib Rash     swelling    Shellfish-Derived Products Anaphylaxis and Rash     shrimp  shrimp  shrimp    Simvastatin Nausea And Vomiting     Other reaction(s): muscle cramps    Statins      Other reaction(s): muscle cramps  Tolerates lovastatin. Pravachol caused numbness in head/face    Sulfa Antibiotics Hives    Tetracyclines & Related Itching and Rash    Timoptic [Timolol Maleate] Itching and Swelling     Eyes burning severely    Tramadol Itching and Rash    Fosamax [Alendronate] Nausea Only and Nausea And Vomiting    Nalbuphine Nausea And Vomiting    Alendronate Sodium     Alendronate Sodium     Alphagan [Brimonidine Tartrate]      Eye irritation    Dilaudid [Hydromorphone Hcl]     Doxycycline Monohydrate     Nsaids     Other Itching and Swelling     Eyes burning    Pepcid Complete [Famotidine-Ca Carb-Mag Hydrox] Hives and Other (See Comments)     blisters    Pilocarpine Itching and Swelling     Blurred vision  Eyes burning    Pravastatin Other (See Comments)     Facial numming    Red Dye Nausea Only     Pt states allergic to red coloring in crestor with CY on pill and senokot red pill. Feels sick to stomach and head feels foggy.  Shrimp Flavor Hives and Swelling    Statins Support Therapy      Tolerates lovastatin.  Pravachol caused numbness in head/face    Timoptic [Timolol Maleate]      Eye irritation      Tolectin [Tolmetin]      Unknown reaction  - as a child    Voltaren [Diclofenac Sodium]      Flu symptoms      Nubain [Nalbuphine Hcl] Nausea And Vomiting     Chest pain      Percocet [Oxycodone-Acetaminophen] Nausea And Vomiting     Sweating, chest pain  Tolectin [Tolmetin Sodium] Rash    Tolmetin Sodium Rash    Ultram [Tramadol Hcl] Itching and Rash     Rash on face    Vicodin [Hydrocodone-Acetaminophen] Nausea And Vomiting     swearing    Vioxx Rash     Social History     Tobacco Use   Smoking Status Never Smoker   Smokeless Tobacco Never Used     Prior to Admission medications    Medication Sig Start Date End Date Taking?  Authorizing Provider   Blood Glucose Monitoring Suppl ([address]) w/Device KIT  4/1/21  Yes Historical Provider, MD   Blood Glucose Calibration (ONETOUCH VERIO) SOLN  4/14/21  Yes Historical Provider, MD   levothyroxine (SYNTHROID) 100 MCG tablet Take 1 tablet by mouth Daily Indications: 112 mg 6/11/21  Yes Eddie Farrell MD   ASPIRIN LOW DOSE 81 MG EC tablet Take 1 tablet by mouth daily 6/11/21  Yes Eddie Farrell MD   Multiple Vitamins-Minerals (CERTAVITE/ANTIOXIDANTS) TABS Take 1 each by mouth daily 6/11/21  Yes Eddie Farrell MD   Cholecalciferol (VITAMIN D) 50 MCG (2000 UT) CAPS capsule Take 1,000 Units by mouth daily 6/11/21  Yes Eddie Farrell MD   calcium carb-cholecalciferol 600-400 MG-UNIT TABS per tab TAKE 1 TABLET BY MOUTH TWICE A DAY 6/11/21  Yes Eddie Farrell MD   rosuvastatin (CRESTOR) 5 MG tablet Take 1 tablet by mouth daily 6/11/21  Yes Eddie Farrell MD   acetaminophen (ACETAMINOPHEN EXTRA STRENGTH) 500 MG tablet Take 2 tablets by mouth every 6 hours as needed for Pain 6/11/21  Yes Eddie Farrell MD   albuterol sulfate  (90 Base) MCG/ACT inhaler INHALE 2 PUFFS INTO THE LUNGS EVERY SIX HOURS AS NEEDED 6/11/21  Yes Eddie Farrell MD   Accu-Chek FastClix Lancets MISC Use as directed 6/11/21  Yes Eddie Farrell MD   loratadine (ALLERGY RELIEF) 10 MG tablet TAKE 1 TABLET BY MOUTH DAILY 5/13/21  Yes Hawk Brown MD   pantoprazole (PROTONIX) 40 MG tablet Take 1 tablet by mouth 2 times daily (before meals) 2/5/21  Yes Seth Duran MD   FREESTYLE LITE strip  11/24/20  Yes Historical Provider, MD   Lancets Mis. (ACCU-CHEK FASTCLIX LANCET) KIT  11/25/20  Yes Historical Provider, MD   VITAMIN D-3 SUPER STRENGTH 50 MCG (2000 UT) TABS  12/3/20  Yes Historical Provider, MD   fluticasone (FLONASE) 50 MCG/ACT nasal spray USE 1 SPRAY IN EACH NOSTRIL TWICE A DAY 10/22/20  Yes Israel Hawkins MD   Psyllium (HM FIBER POWDER PO) Take by mouth   Yes Historical Provider, MD   aluminum & magnesium hydroxide-simethicone (MAALOX ADVANCED) 200-200-20 MG/5ML SUSP suspension Take 5 mLs by mouth as needed for Indigestion Actually  Citracel   Yes Historical Provider, MD   tolterodine (DETROL LA) 4 MG extended release capsule Take 1 capsule by mouth daily  Patient not taking: Reported on 6/11/2021 1/7/21   Emma Ngo MD         Physical Exam  General Appearance:    Alert, cooperative, no distress, appears stated age, morbid morbid obesity. No respiratory distress, not using any accessory muscles. No headaches at this time. Head:    Normocephalic, without obvious abnormality, atraumatic   Nose reveals no polyps. No sinus tenderness. Throat reveal no abnormality, oropharyngeal orifice is not compromised. Eye examination is normal, no jaundice or Darien syndrome. Ear examination normal.                   :    Neck:   Supple, symmetrical, trachea midline, no adenopathy;     thyroid:  no enlargement/tenderness/nodules; no carotid    bruit or JVD. Neck is short and obese    Back:     Symmetric, no curvature, ROM normal, no CVA tenderness   Lungs:       Not a good air entry on both side. Breathing is vesicular. Expiration is not prolonged. No rales or rhonchi are audible. AP diameter is not increased. No pleural friction rub is audible.        Chest Wall:    No tenderness or deformity      Heart:    Regular rate and rhythm, S1 and S2 normal, no murmur, rub        or gallop no rvh                           Abdomen:                                                 Pulses: Lymph nodes:                    Neurologic:                  Soft, non-tender, bowel sounds active all four quadrants,     no masses, no organomegaly         2+ and symmetric all extremities            Cervical, supraclavicular not enlarged or matted or tender      CNII-XII intact, normal strength 5/5 . Sensation grossly normal  and reflexes normal 2+  throughout     Clubbing No  Lower ext edema Yes1+   [x] , 2 +  [] , 3+   []  Upper ext edema No       Musculoskeletal - no joint swelling or tenderness or synovitis               /74 (Site: Left Upper Arm, Position: Sitting, Cuff Size: Large Adult)   Pulse 67   Temp 97.1 °F (36.2 °C) (Temporal)   Resp 16   Ht 5' 7\" (1.702 m)   Wt (!) 312 lb 9.6 oz (141.8 kg)   SpO2 98% Comment: room air at rest  BMI 48.96 kg/m²     CXR  No recent chest x-ray      CT Scans  No recent CT scan    Echo  No echocardiogram        Assessment  Obstructive sleep apnea syndrome  Morbid obesity  Neurocardiogenic syncope  \Glucose intolerance  Hypersomnia      Plan  Patient has obstructive sleep apnea syndrome that is responding well to the use of CPAP. Advised her to continue the use of CPAP as before. Advised her to put a CPAP machine on the floor so that there is less condensation in the tubing and will not bother her. She is not sleepy during the daytime. Advised her to keep a compliance report. She her hypersomnia is much improved. Glucose intolerance is under excellent control. She is not experiencing any neurocardiogenic syncope    I advised her to lose weight. She is a non-smoker    Advised her to get Covid vaccine.                 Fermin Palmer MD dictation over thank you

## 2021-06-28 DIAGNOSIS — Z76.0 MEDICATION REFILL: ICD-10-CM

## 2021-06-28 RX ORDER — FOLIC ACID/MV,IRON,MIN/LUTEIN 0.4-18-25
TABLET ORAL
Qty: 30 TABLET | Refills: 3 | Status: SHIPPED | OUTPATIENT
Start: 2021-06-28 | End: 2021-09-21 | Stop reason: SDUPTHER

## 2021-06-28 NOTE — TELEPHONE ENCOUNTER
E-scribe request for multi vitamins-minerals. Please review and e-scribe if applicable. Last Visit Date:  06/11/2021  Next Visit Date:  Visit date not found    Hemoglobin A1C (%)   Date Value   06/11/2021 5.5   12/03/2020 5.3   01/02/2020 5.4             ( goal A1C is < 7)   Microalb/Crt.  Ratio (mcg/mg creat)   Date Value   01/02/2020 CANNOT BE CALCULATED     LDL Cholesterol (mg/dL)   Date Value   12/14/2020 64     LDL Calculated (mg/dL)   Date Value   06/16/2017 68       (goal LDL is <100)   AST (U/L)   Date Value   01/20/2021 26     ALT (U/L)   Date Value   01/20/2021 35 (H)     BUN (mg/dL)   Date Value   01/20/2021 20     BP Readings from Last 3 Encounters:   06/17/21 126/74   06/11/21 139/68   06/09/21 136/76          (goal 120/80)        Patient Active Problem List:     Glaucoma     GERD (gastroesophageal reflux disease)     DJD (degenerative joint disease)     Obesity     Polyneuropathy     Migraine     Mitral prolapse     Low back pain     CTS (carpal tunnel syndrome)     Supraspinatus syndrome     Impaired fasting glucose     Acute sinus infection     IBS (irritable bowel syndrome)     Back pain, chronic     Stress fracture, right 5th metatarsal      Upper airway cough syndrome     Ear fullness     Perioral numbness     Screening for osteoporosis     Headache     Left eye injury     Medication reaction     Osteopenia     Lumbosacral pain     Flu vaccine need     Hypothyroid     Urinary incontinence     Anxiety     Sleep apnea     Depression     Chronic back pain     Carpal tunnel syndrome     Neuropathy     Mitral valve problem     Morbid obesity with BMI of 45.0-49.9, adult (HCC)     Frozen shoulder     Skin tag     Degenerative disc disease, lumbar     Bilateral edema of lower extremity     Medication refill     Varicose vein of leg     Dizziness     Dysphagia     Abdominal bloating     Mild intermittent asthma without complication     Skin lesion of left leg      ----Negro Aparicio

## 2021-07-07 ENCOUNTER — OFFICE VISIT (OUTPATIENT)
Dept: PODIATRY | Age: 61
End: 2021-07-07
Payer: COMMERCIAL

## 2021-07-07 ENCOUNTER — TELEPHONE (OUTPATIENT)
Dept: PODIATRY | Age: 61
End: 2021-07-07

## 2021-07-07 VITALS
DIASTOLIC BLOOD PRESSURE: 73 MMHG | HEART RATE: 68 BPM | BODY MASS INDEX: 49.02 KG/M2 | SYSTOLIC BLOOD PRESSURE: 121 MMHG | WEIGHT: 293 LBS

## 2021-07-07 DIAGNOSIS — R60.0 EDEMA OF LOWER EXTREMITY: ICD-10-CM

## 2021-07-07 DIAGNOSIS — M20.21 HALLUX RIGIDUS, RIGHT FOOT: ICD-10-CM

## 2021-07-07 DIAGNOSIS — E55.9 VITAMIN D DEFICIENCY: ICD-10-CM

## 2021-07-07 DIAGNOSIS — M20.5X2 HALLUX LIMITUS, LEFT: ICD-10-CM

## 2021-07-07 DIAGNOSIS — M77.41 METATARSALGIA OF RIGHT FOOT: ICD-10-CM

## 2021-07-07 DIAGNOSIS — E11.43 CONTROLLED TYPE 2 DIABETES MELLITUS WITH DIABETIC AUTONOMIC NEUROPATHY, WITHOUT LONG-TERM CURRENT USE OF INSULIN (HCC): Primary | ICD-10-CM

## 2021-07-07 PROCEDURE — 99212 OFFICE O/P EST SF 10 MIN: CPT

## 2021-07-07 PROCEDURE — 3044F HG A1C LEVEL LT 7.0%: CPT | Performed by: PODIATRIST

## 2021-07-07 PROCEDURE — G8417 CALC BMI ABV UP PARAM F/U: HCPCS | Performed by: PODIATRIST

## 2021-07-07 PROCEDURE — 3017F COLORECTAL CA SCREEN DOC REV: CPT | Performed by: PODIATRIST

## 2021-07-07 PROCEDURE — G8427 DOCREV CUR MEDS BY ELIG CLIN: HCPCS | Performed by: PODIATRIST

## 2021-07-07 PROCEDURE — 1036F TOBACCO NON-USER: CPT | Performed by: PODIATRIST

## 2021-07-07 PROCEDURE — 99213 OFFICE O/P EST LOW 20 MIN: CPT | Performed by: PODIATRIST

## 2021-07-07 PROCEDURE — 2022F DILAT RTA XM EVC RTNOPTHY: CPT | Performed by: PODIATRIST

## 2021-07-07 RX ORDER — VITAMIN B COMPLEX
TABLET ORAL
COMMUNITY
Start: 2021-07-06 | End: 2021-07-07

## 2021-07-07 NOTE — PROGRESS NOTES
Patient instructed to remove shoes and socks and instructed to sit in exam chair. Current PCP is Justin Marshall MD and date of last visit was 06/11/21. Do you have a follow up visit scheduled? No  If yes, the date is n/a  Diabetic visit information    Blood pressure (Control is BP <140/90)  BP Readings from Last 3 Encounters:   06/17/21 126/74   06/11/21 139/68   06/09/21 136/76       BP taken with correct size cuff? - Yes   Repeated if > 140/90 Yes      Tobacco use:  Patient  reports that she has never smoked. She has never used smokeless tobacco.  If Smoker - Cessation materials given? - Yes       Diabetic Health Maintenance Items due  Diabetes Management   Topic Date Due    Diabetic retinal exam  03/06/2021       Diabetic retinal exam done in last year? - Yes   If No: remind patient that it is due and they should schedule an exam    Medications  Is patient taking any medications for diabetes? -   Yes  Have blood sugars been controlled? Fasting blood sugars under 120   -   Yes   Random home sugars or today's POCT glucose is under 180 -   Yes   []  If No to the above then patient should schedule appt with PCP. Diabetic Plan    A1C Plan  Lab Results   Component Value Date    LABA1C 5.5 06/11/2021    LABA1C 5.3 12/03/2020    LABA1C 5.4 01/02/2020      []  If A1C over 8 and last result >3 months ago - Order A1C and refer to PCP   []  If last A1C over 6 months ago - Order A1C and refer to PCP for follow up   []  If elevated blood sugars > 180 - refer to PCP for follow up    []  Blood sugar controlled - A1C under 8 and last check was < 6 months      Cholesterol Plan   Lab Results   Component Value Date    LDLCALC 68 06/16/2017    LDLCHOLESTEROL 64 12/14/2020      []  If LDL > 100 and last result >3 months ago - order Fasting lipids and refer to PCP for follow up   []  If LDL < 100 and over 1 year ago - Order Fasting lipids and refer to PCP for follow up   [] LDL is controlled.   LDL < 100 and checked within the last year     Blood Pressure  BP Readings from Last 3 Encounters:   06/17/21 126/74   06/11/21 139/68   06/09/21 136/76      []  If SBP >140 mmhg - refer to PCP for follow up   []  If DBP > 90 mmhg - refer to PCP for follow up   [] BP is controlled <140/90     Order labs as PCP ordered.   (ie: Lipids, A1C, CMP)

## 2021-07-07 NOTE — PROGRESS NOTES
[address]  [address] [place] [address], [address]  Tel: [phone]   Fax: [phone]    Subjective     CC: Pain right foot    HPI:  Lesia Hutton is a 64 y.o. y who presents clinic today for follow-up of pain in her right foot she was instructed to utilize a surgical shoe and obtain x-rays. She obtain the x-rays however she is no longer ambulating in the surgical shoe. Notes dropping a can on foot 6 weeks ago with mild sharp localized pain that persists to 2nd and 3rd toes right foot. Relates that she normally wears compression stockings to bilateral lower extremities for edema however current ones aren't high enough and dig into skin. Denies any rest pain or claudication symptoms. Primary care physician is Laura Dillard MD.    ROS:    Constitutional: Denies nausea, vomiting, fever, chills. Neurologic: Denies numbness, tingling, and burning in the feet. Vascular: Denies symptoms of lower extremity claudication. Skin: Painful thickened toenails   Otherwise negative except as noted in the HPI.      PMH:  Past Medical History:   Diagnosis Date    Abdominal bloating 1/23/2019    Anxiety     Bilateral edema of lower extremity 12/16/2016    Bronchial asthma     mild, intermittent    Carpal tunnel syndrome     Cataract     Chronic back pain     Chronic sinusitis     Degenerative disc disease, lumbar 12/16/2016    Depression     Diabetes mellitus due to underlying condition with hyperosmolarity without coma ([facility] [address]     Dizziness 10/27/2017    DJD (degenerative joint disease)     S1, L3, L4, L5, T12    Dysphagia 1/23/2019    Edema of leg     bilateral legs    Environmental allergies     Frozen shoulder 4/27/2015    GERD (gastroesophageal reflux disease)     Glaucoma     Glucose intolerance (impaired glucose tolerance)     Headache(784.0)     History of bronchitis     Viral    HTN (hypertension)     Hyperlipidemia     Hypothyroid     hypothyroid state    Hypothyroidism     IBS (irritable bowel syndrome) 10/02/2012    Legally blind     due to glaucoma    Medication refill 6/1/2017    Mild intermittent asthma without complication     Morbid obesity with BMI of 45.0-49.9, adult (HCC)     MVP (mitral valve prolapse)     Neuropathy     OA (osteoarthritis)     EMILIA on CPAP     Osteopenia     Pancreatic cyst     Polyneuropathy     Raynaud disease     Rhinopharyngitis     Allergic rhinopharyngitis    Skin lesion of left leg 6/11/2021    Skin tag 7/9/2015    Stress fracture     Right 5th Metatarsal     Syncope     TIA (transient ischemic attack)     Urinary incontinence     Varicose vein of leg 6/1/2017    Vasodepressor syncope     Wears glasses        Surgical History:   Past Surgical History:   Procedure Laterality Date    APPENDECTOMY  1978    CATARACT REMOVAL Left     CHOLECYSTECTOMY  1978    COLONOSCOPY      COLONOSCOPY N/A 12/9/2019    COLORECTAL CANCER SCREENING, NOT HIGH RISK performed by Sander Lombardo MD at [address] [place] [address] Bilateral     right iridectomy, right laser, bilateral trabeculectomy, left drain    GLAUCOMA SURGERY      HAND SURGERY Right     HYSTERECTOMY  1982    KNEE SURGERY Right 2000    TUBAL LIGATION  1981    UPPER GASTROINTESTINAL ENDOSCOPY      UPPER GASTROINTESTINAL ENDOSCOPY  5/24/2018    EGD DILATION SAVORY performed by Sarah Dasilva MD at [address] [place] [address]  3/6/2019    EGD DILATION SAVORY performed by Sander Lombardo MD at [facility]       Social History:  Social History     Tobacco Use    Smoking status: Never Smoker    Smokeless tobacco: Never Used   Substance Use Topics    Alcohol use: No     Alcohol/week: 0.0 standard drinks    Drug use: No       Medications:  Prior to Admission medications    Medication Sig Start Date End Date Taking?  Authorizing Provider   Multiple Vitamins-Minerals (CERTAVITE/ANTIOXIDANTS) TABS TAKE 1 TABLET DAILY 6/28/21  Yes Yudelka Watson MD   Blood Glucose Monitoring Suppl ([address]) w/Device KIT  4/1/21  Yes Historical Provider, MD   Blood Glucose Calibration (ONETOUCH VERIO) SOLN  4/14/21  Yes Historical Provider, MD   levothyroxine (SYNTHROID) 100 MCG tablet Take 1 tablet by mouth Daily Indications: 112 mg 6/11/21  Yes Heath Apodaca MD   ASPIRIN LOW DOSE 81 MG EC tablet Take 1 tablet by mouth daily 6/11/21  Yes Heath Apodaca MD   Cholecalciferol (VITAMIN D) 50 MCG (2000 UT) CAPS capsule Take 1,000 Units by mouth daily 6/11/21  Yes Heath Apodaca MD   calcium carb-cholecalciferol 600-400 MG-UNIT TABS per tab TAKE 1 TABLET BY MOUTH TWICE A DAY 6/11/21  Yes Heath Apodaca MD   rosuvastatin (CRESTOR) 5 MG tablet Take 1 tablet by mouth daily 6/11/21  Yes Heath Apodaca MD   acetaminophen (ACETAMINOPHEN EXTRA STRENGTH) 500 MG tablet Take 2 tablets by mouth every 6 hours as needed for Pain 6/11/21  Yes Heath Apodaca MD   albuterol sulfate  (90 Base) MCG/ACT inhaler INHALE 2 PUFFS INTO THE LUNGS EVERY SIX HOURS AS NEEDED 6/11/21  Yes Heath Apodaca MD   Accu-Chek FastClix Lancets MISC Use as directed 6/11/21  Yes Heath Apodaca MD   loratadine (ALLERGY RELIEF) 10 MG tablet TAKE 1 TABLET BY MOUTH DAILY 5/13/21  Yes Rhona Mojica MD   pantoprazole (PROTONIX) 40 MG tablet Take 1 tablet by mouth 2 times daily (before meals) 2/5/21  Yes Delio Lawrence MD   tolterodine (DETROL LA) 4 MG extended release capsule Take 1 capsule by mouth daily 1/7/21  Yes Dylon Allen MD   FREESTYLE LITE strip  11/24/20  Yes Historical Provider, MD   Lancets Misc. (ACCU-CHEK FASTCLIX LANCET) KIT  11/25/20  Yes Historical Provider, MD   VITAMIN D-3 SUPER STRENGTH 50 MCG (2000 UT) TABS  12/3/20  Yes Historical Provider, MD   fluticasone (FLONASE) 50 MCG/ACT nasal spray USE 1 SPRAY IN EACH NOSTRIL TWICE A DAY 10/22/20  Yes Irais Newton MD   Psyllium (HM FIBER POWDER PO) Take by mouth Yes Historical Provider, MD   aluminum & magnesium hydroxide-simethicone (MAALOX ADVANCED) 200-200-20 MG/5ML SUSP suspension Take 5 mLs by mouth as needed for Indigestion Actually  Citracel   Yes Historical Provider, MD       Objective     Vitals:    07/07/21 1247   BP: 121/73   Pulse: 68       Lab Results   Component Value Date    LABA1C 5.5 06/11/2021       Physical Exam:  General:  Alert and oriented x3. In no acute distress. Lower Extremity Physical Exam:    Vascular: DP pulses are palpable, Bilateral. PT pulses non palpable, readily dopplerable bilateral. CFT <3 seconds to all digits, Bilateral.  Edema noted to the bilateral lower extremity, Bilateral.  Hair growth is absent to the level of the digits, Bilateral.     Neuro: Saph/sural/SP/DP/plantar sensation intact to light touch. Protective sensation is intact to 4/10 sites on the right foot and 3/10 sites on the left foot as tested with a 5.07g SWMF, Bilateral.     Musculoskeletal: EHL/FHL/GS/TA gross motor intact. Decreased ankle range of motion bilateral. Neg pain on calf squeeze. Reduced ROM 1st MTPJ R worse than mild pain noted with palpation of second interspace and dorsiflexion plantarflexion second and third digits. Dermatologic: Skin shiny and atrophic with no hair growth noted. Interdigital maceration absent, Bilateral. Nails 1-5 thickened, elongated with subungual debris noted b/l. Hair growth absent bilat. No noted hyperkeratotic lesion. No open wounds appreciated.     Sites of Onychomycosis Involvement (Check affected area)  [x] [x] [x] [x] [x] [x] [x] [x] [x] [x]  5 4 3 2 1 1 2 3 4 5                          Right                                        Left    Thickness  [x] [x] [x] [x] [x] [x] [x] [x] [x] [x]  5 4 3 2 1 1 2 3 4 5                         Right                                        Left    Dystrophic Changes pain control and other comorbidities managed by her PCP  · Patient will be called when her custom molded orthotics have arrived  · Please, call the office with any questions or concerns.       Sarai Mclean DPM   Podiatric Medicine & Surgery   7/7/2021 at 1:24 PM

## 2021-07-07 NOTE — TELEPHONE ENCOUNTER
E-scribe request for calcium carb-cholecalciferol. Please review and e-scribe if applicable. Last Visit Date: 6/11/2021  Next Visit Date:  Visit date not found    Hemoglobin A1C (%)   Date Value   06/11/2021 5.5   12/03/2020 5.3   01/02/2020 5.4             ( goal A1C is < 7)   Microalb/Crt.  Ratio (mcg/mg creat)   Date Value   01/02/2020 CANNOT BE CALCULATED     LDL Cholesterol (mg/dL)   Date Value   12/14/2020 64     LDL Calculated (mg/dL)   Date Value   06/16/2017 68       (goal LDL is <100)   AST (U/L)   Date Value   01/20/2021 26     ALT (U/L)   Date Value   01/20/2021 35 (H)     BUN (mg/dL)   Date Value   01/20/2021 20     BP Readings from Last 3 Encounters:   06/17/21 126/74   06/11/21 139/68   06/09/21 136/76          (goal 120/80)        Patient Active Problem List:     Glaucoma     GERD (gastroesophageal reflux disease)     DJD (degenerative joint disease)     Obesity     Polyneuropathy     Migraine     Mitral prolapse     Low back pain     CTS (carpal tunnel syndrome)     Supraspinatus syndrome     Impaired fasting glucose     Acute sinus infection     IBS (irritable bowel syndrome)     Back pain, chronic     Stress fracture, right 5th metatarsal      Upper airway cough syndrome     Ear fullness     Perioral numbness     Screening for osteoporosis     Headache     Left eye injury     Medication reaction     Osteopenia     Lumbosacral pain     Flu vaccine need     Hypothyroid     Urinary incontinence     Anxiety     Sleep apnea     Depression     Chronic back pain     Carpal tunnel syndrome     Neuropathy     Mitral valve problem     Morbid obesity with BMI of 45.0-49.9, adult (HCC)     Frozen shoulder     Skin tag     Degenerative disc disease, lumbar     Bilateral edema of lower extremity     Medication refill     Varicose vein of leg     Dizziness     Dysphagia     Abdominal bloating     Mild intermittent asthma without complication     Skin lesion of left leg

## 2021-07-07 NOTE — TELEPHONE ENCOUNTER
Spoke to [employer]. The factory is behind on orthotic processing but said her orthotics are  sheduled be shipped by the end of this week or beginning of next week. Pt was notified. I told her I would call her and make her a nurse appointment to  orthotics when they come in and also told her to wear the shoes she is going to place them in. Pt verbalized understanding.

## 2021-07-08 RX ORDER — CALCIUM CARBONATE/VITAMIN D3 600 MG-10
TABLET ORAL
Qty: 60 TABLET | Refills: 1 | Status: SHIPPED | OUTPATIENT
Start: 2021-07-08 | End: 2021-09-21 | Stop reason: SDUPTHER

## 2021-07-23 ENCOUNTER — TELEPHONE (OUTPATIENT)
Dept: PODIATRY | Age: 61
End: 2021-07-23

## 2021-07-23 NOTE — LETTER
[facility]  [address]  [facility] [address] [facility] [address]  Phone: [phone]  Fax: [facility] [address], DPM        July 23, 2021    Lenz Sandra  [address] [place] [address],[address]      Dear Viktor Jones:    YOUR ORTHOTICS HAVE ARRIVED. WE LEFT YOU A VM TODAY TO CALL AND SCHEDULE AN APPOINTMENT TO PICK THEM UP. THANK YOU! If you have any questions or concerns, please don't hesitate to call.     Sincerely,        Emani Hernandez, LAZARO

## 2021-08-10 ENCOUNTER — TELEPHONE (OUTPATIENT)
Dept: GASTROENTEROLOGY | Age: 61
End: 2021-08-10

## 2021-08-10 DIAGNOSIS — K21.9 GASTROESOPHAGEAL REFLUX DISEASE WITHOUT ESOPHAGITIS: Primary | ICD-10-CM

## 2021-08-10 RX ORDER — PANTOPRAZOLE SODIUM 40 MG/1
40 TABLET, DELAYED RELEASE ORAL
Qty: 60 TABLET | Refills: 5 | OUTPATIENT
Start: 2021-08-10 | End: 2021-08-11

## 2021-08-10 NOTE — TELEPHONE ENCOUNTER
Patient called and LVM regarding her medication. She states she needs to speak with nurse/ma regarding having problems with her medication. Please return her call to [phone] or her cell # is [phone].  Thank You

## 2021-08-10 NOTE — TELEPHONE ENCOUNTER
Writer called patients Pharmacy and there is no Problem with it. Instance will not pay for the mediation until August 11 th! Tomorrow. She picked up last refill on 0/14/2021. The pharmacy tried to let her know that and that she should not be out of it. Sander Anderson at the pharmacy will call her again and explain she can get it picked up tomorrow.

## 2021-08-11 RX ORDER — PANTOPRAZOLE SODIUM 40 MG
40 TABLET, DELAYED RELEASE (ENTERIC COATED) ORAL DAILY
Qty: 30 TABLET | Refills: 5 | Status: SHIPPED | OUTPATIENT
Start: 2021-08-11 | End: 2021-08-16 | Stop reason: SDUPTHER

## 2021-08-11 NOTE — PROGRESS NOTES
Due to patients many allergies and trial and failure to many medications She takes Protonix and it must be the name brand as that doesn't seem to bother her stomach but the generic  Gives her stomach pain and irritations. A prior auth will need to be done for the Name brand Protonix.

## 2021-08-16 ENCOUNTER — OFFICE VISIT (OUTPATIENT)
Dept: FAMILY MEDICINE CLINIC | Age: 61
End: 2021-08-16
Payer: COMMERCIAL

## 2021-08-16 VITALS
HEART RATE: 69 BPM | SYSTOLIC BLOOD PRESSURE: 152 MMHG | BODY MASS INDEX: 48.55 KG/M2 | WEIGHT: 293 LBS | DIASTOLIC BLOOD PRESSURE: 92 MMHG

## 2021-08-16 DIAGNOSIS — R13.10 DYSPHAGIA, UNSPECIFIED TYPE: ICD-10-CM

## 2021-08-16 DIAGNOSIS — K21.00 GASTROESOPHAGEAL REFLUX DISEASE WITH ESOPHAGITIS WITHOUT HEMORRHAGE: Primary | ICD-10-CM

## 2021-08-16 PROCEDURE — 3017F COLORECTAL CA SCREEN DOC REV: CPT | Performed by: STUDENT IN AN ORGANIZED HEALTH CARE EDUCATION/TRAINING PROGRAM

## 2021-08-16 PROCEDURE — 99213 OFFICE O/P EST LOW 20 MIN: CPT | Performed by: STUDENT IN AN ORGANIZED HEALTH CARE EDUCATION/TRAINING PROGRAM

## 2021-08-16 PROCEDURE — G8427 DOCREV CUR MEDS BY ELIG CLIN: HCPCS | Performed by: STUDENT IN AN ORGANIZED HEALTH CARE EDUCATION/TRAINING PROGRAM

## 2021-08-16 PROCEDURE — 1036F TOBACCO NON-USER: CPT | Performed by: STUDENT IN AN ORGANIZED HEALTH CARE EDUCATION/TRAINING PROGRAM

## 2021-08-16 PROCEDURE — G8417 CALC BMI ABV UP PARAM F/U: HCPCS | Performed by: STUDENT IN AN ORGANIZED HEALTH CARE EDUCATION/TRAINING PROGRAM

## 2021-08-16 RX ORDER — PANTOPRAZOLE SODIUM 40 MG
40 TABLET, DELAYED RELEASE (ENTERIC COATED) ORAL 2 TIMES DAILY
Qty: 30 TABLET | Refills: 5 | Status: SHIPPED | OUTPATIENT
Start: 2021-08-16 | End: 2021-08-27 | Stop reason: ALTCHOICE

## 2021-08-16 ASSESSMENT — ENCOUNTER SYMPTOMS
SINUS PAIN: 0
SHORTNESS OF BREATH: 0
ABDOMINAL PAIN: 0
COLOR CHANGE: 0
NAUSEA: 0
ABDOMINAL DISTENTION: 0
CHEST TIGHTNESS: 0
DIARRHEA: 0
WHEEZING: 0
SORE THROAT: 1
SINUS PRESSURE: 0
COUGH: 0
ANAL BLEEDING: 0

## 2021-08-16 NOTE — PROGRESS NOTES
Visit Information    Have you changed or started any medications since your last visit including any over-the-counter medicines, vitamins, or herbal medicines? no   Are you having any side effects from any of your medications? -  no  Have you stopped taking any of your medications? Is so, why? -  no    Have you seen any other physician or provider since your last visit? No  Have you had any other diagnostic tests since your last visit? No  Have you been seen in the emergency room and/or had an admission to a hospital since we last saw you? No  Have you had your routine dental cleaning in the past 6 months? no    Have you activated your [email or web address] account? If not, what are your barriers?  Yes     Patient Care Team:  Gifty Lopez MD as PCP - General (Family Medicine)  Vidal Mckeon DO as Surgeon (Orthopedic Surgery)  Argentina He MD as Consulting Physician (Cardiology)  Lelia Ochoa MD as Consulting Physician (Pulmonology)  Angela Manzano as Consulting Physician  Milana Artis MD as Consulting Physician (Endocrinology)  Ta Young MD as Consulting Physician (Obstetrics & Gynecology)  Angella Acosta MD as Consulting Physician (Gastroenterology)    Medical History Review  Past Medical, Family, and Social History reviewed and does not contribute to the patient presenting condition    Health Maintenance   Topic Date Due    COVID-19 Vaccine (1) Never done    Diabetic retinal exam  03/06/2021    Flu vaccine (1) 09/01/2021    Diabetic microalbuminuria test  09/30/2021    Lipid screen  12/14/2021    A1C test (Diabetic or Prediabetic)  06/11/2022    TSH testing  06/14/2022    Diabetic foot exam  07/07/2022    Breast cancer screen  12/14/2022    Pneumococcal 0-64 years Vaccine (2 of 2 - PPSV23) 03/09/2025    DTaP/Tdap/Td vaccine (2 - Td or Tdap) 06/01/2027    Colon cancer screen colonoscopy  12/09/2029    Shingles Vaccine  Completed    Hepatitis C screen  Completed    HIV screen Completed    Hepatitis A vaccine  Aged Out    Hib vaccine  Aged Out    Meningococcal (ACWY) vaccine  Aged Out

## 2021-08-16 NOTE — PROGRESS NOTES
Subjective:    Sandy Hartmann is a 64 y.o. female with  has a past medical history of Abdominal bloating, Anxiety, Bilateral edema of lower extremity, Bronchial asthma, Carpal tunnel syndrome, Cataract, Chronic back pain, Chronic sinusitis, Degenerative disc disease, lumbar, Depression, Diabetes mellitus due to underlying condition with hyperosmolarity without coma ([facility] [address] Dizziness, DJD (degenerative joint disease), Dysphagia, Edema of leg, Environmental allergies, Frozen shoulder, GERD (gastroesophageal reflux disease), Glaucoma, Glucose intolerance (impaired glucose tolerance), Headache(784.0), History of bronchitis, HTN (hypertension), Hyperlipidemia, Hypothyroid, Hypothyroidism, IBS (irritable bowel syndrome), Legally blind, Medication refill, Mild intermittent asthma without complication, Morbid obesity with BMI of 45.0-49.9, adult (HCC), MVP (mitral valve prolapse), Neuropathy, OA (osteoarthritis), EMILIA on CPAP, Osteopenia, Pancreatic cyst, Polyneuropathy, Raynaud disease, Rhinopharyngitis, Skin lesion of left leg, Skin tag, Stress fracture, Syncope, TIA (transient ischemic attack), Urinary incontinence, Varicose vein of leg, Vasodepressor syncope, and Wears glasses. Presented to the office today for:  Chief Complaint   Patient presents with    Follow-up     medication refills       HPI    Patient is a 22-year-old female with past medical history of COPD, sleep apnea, GERD. She is presenting today for complaints of sore throat going on for the last 3 to 4 days. Patient states that normally she experiences similar symptoms due to acid reflux. Patient has not been taking her Protonix for the last 3 weeks she was unable to get it from her pharmacy. Patient does have a history of esophageal stenosis for which she goes to Dr. Cece Messer to get savory dilation of the esophagus. Dilation of the esophagus was last done in March 2019.   Patient currently has no symptoms of congestion, sinus tenderness, ear pain or discharge, shortness of breath, chest pain or fevers or chills. She states that sometimes her sore throat is also associated with CPAP machine that she is on for sleep apnea. She does have an appointment coming up with GI in September. Review of Systems   Constitutional: Negative for fatigue and fever. HENT: Positive for sore throat. Negative for sinus pressure, sinus pain and tinnitus. Eyes: Negative for visual disturbance. Respiratory: Negative for cough, chest tightness, shortness of breath and wheezing. Cardiovascular: Negative for chest pain, palpitations and leg swelling. Gastrointestinal: Negative for abdominal distention, abdominal pain, anal bleeding, diarrhea and nausea. Genitourinary: Negative for enuresis, frequency and urgency. Musculoskeletal: Negative for arthralgias, gait problem and neck stiffness. Skin: Negative for color change and rash. Neurological: Negative for dizziness and headaches. Psychiatric/Behavioral: Negative for agitation and confusion.                  The patient has a   Family History   Problem Relation Age of Onset    Diabetes Mother     Heart Disease Mother         multiple heart attacks    Arthritis Mother     High Blood Pressure Mother     High Cholesterol Mother     Substance Abuse Mother     Heart Disease Father     Arthritis Father     Depression Father     High Cholesterol Father     Mental Illness Father     Substance Abuse Father     Diabetes Sister     High Blood Pressure Sister     Cancer Paternal Uncle         esophageal cancer    Diabetes Maternal Aunt     Cancer Maternal Aunt         colorectal cancer    Diabetes Maternal Uncle     Cancer Maternal Grandmother         colorectal cancer    Arthritis Maternal Grandmother     Diabetes Maternal Grandmother     High Blood Pressure Maternal Grandmother     Stroke Maternal Grandmother     Arthritis Maternal Grandfather     Arthritis Paternal Grandmother     Cancer note.

## 2021-08-16 NOTE — PROGRESS NOTES
Attending Physician Statement  I have discussed the care of Ashley W Vicki Hudson, 64 y.o. female,including pertinent history and exam findings,  with the resident Dr. Shanae Adams MD.  History:  Chief Complaint   Patient presents with    Follow-up     medication refills     Patient is here for follow up on GERD and esophageal strictures. I have reviewed the key elements of the encounter with the resident. Examination was done by resident as documented in residents note. BP Readings from Last 3 Encounters:   08/16/21 (!) 152/92   07/07/21 121/73   06/17/21 126/74     BP (!) 152/92 (Site: Right Lower Arm, Position: Sitting, Cuff Size: Medium Adult)   Pulse 69   Wt (!) 310 lb (140.6 kg)   BMI 48.55 kg/m²   Lab Results   Component Value Date    WBC 12.5 (H) 01/20/2021    HGB 15.6 (H) 01/20/2021    HCT 46.8 01/20/2021     01/20/2021    CHOL 132 12/14/2020    TRIG 114 12/14/2020    HDL 45 12/14/2020    ALT 35 (H) 01/20/2021    AST 26 01/20/2021     01/20/2021    K 3.9 01/20/2021     01/20/2021    CREATININE 0.79 01/20/2021    BUN 20 01/20/2021    CO2 33 (H) 01/20/2021    TSH 3.12 06/14/2021    INR 0.9 06/29/2013    LABA1C 5.5 06/11/2021    LABMICR CANNOT BE CALCULATED 01/02/2020     Lab Results   Component Value Date    CALCIUM 9.7 01/20/2021     Lab Results   Component Value Date    LDLCALC 68 06/16/2017    LDLCHOLESTEROL 64 12/14/2020     I agree with the assessment, plan and diagnosis of    Diagnosis Orders   1. Sore throat  PROTONIX 40 MG tablet   2. Gastroesophageal reflux disease with esophagitis without hemorrhage  PROTONIX 40 MG tablet   3. Dysphagia, unspecified type       I agree with  orders as documented by the resident. Recommendations:   Patient was counseled, admits to not taking PPI regimen due to need for prior authorization, restarted on Protonix and advised to keep scheduled appointment with gastroenterology for esophageal dilatation.   Routine follow-up as scheduled for recheck. Return in about 2 months (around 10/16/2021) for dysphagia.    (Isreal Villasenor ) Dr. Esequiel Landers MD

## 2021-08-17 ENCOUNTER — TELEPHONE (OUTPATIENT)
Dept: FAMILY MEDICINE CLINIC | Age: 61
End: 2021-08-17

## 2021-08-17 DIAGNOSIS — K21.9 GASTROESOPHAGEAL REFLUX DISEASE WITHOUT ESOPHAGITIS: Primary | ICD-10-CM

## 2021-08-17 RX ORDER — OMEPRAZOLE 40 MG/1
40 CAPSULE, DELAYED RELEASE ORAL DAILY
Qty: 30 CAPSULE | Refills: 1 | Status: SHIPPED | OUTPATIENT
Start: 2021-08-17 | End: 2021-10-11

## 2021-08-17 NOTE — TELEPHONE ENCOUNTER
Writer wanted name brand Protonix as patient is started to get an upset stomach with the Pantoprazole. Writer and patient are going to try Omeprazole 40 mg daily and an appointment made to talk about stretching there esophagus again.

## 2021-08-17 NOTE — TELEPHONE ENCOUNTER
Insurance company will not cover protonix packets - the covered formulary alternatives would be pantoprazole sodium, omeprazole, famotidine, or lansoprazole

## 2021-08-18 DIAGNOSIS — E11.9 TYPE 2 DIABETES MELLITUS WITHOUT COMPLICATION, WITHOUT LONG-TERM CURRENT USE OF INSULIN (HCC): ICD-10-CM

## 2021-08-18 RX ORDER — ASPIRIN 81 MG/1
81 TABLET, COATED ORAL DAILY
Qty: 30 TABLET | Refills: 5 | Status: SHIPPED | OUTPATIENT
Start: 2021-08-18 | End: 2022-04-28

## 2021-08-18 NOTE — TELEPHONE ENCOUNTER
[facility]  Lab Frequency Next Occurrence               Patient Active Problem List:     Glaucoma     GERD (gastroesophageal reflux disease)     DJD (degenerative joint disease)     Obesity     Polyneuropathy     Migraine     Mitral prolapse     Low back pain     CTS (carpal tunnel syndrome)     Supraspinatus syndrome     Impaired fasting glucose     Acute sinus infection     IBS (irritable bowel syndrome)     Back pain, chronic     Stress fracture, right 5th metatarsal      Upper airway cough syndrome     Ear fullness     Perioral numbness     Screening for osteoporosis     Headache     Left eye injury     Medication reaction     Osteopenia     Lumbosacral pain     Flu vaccine need     Hypothyroid     Urinary incontinence     Anxiety     Sleep apnea     Depression     Chronic back pain     Carpal tunnel syndrome     Neuropathy     Mitral valve problem     Morbid obesity with BMI of 45.0-49.9, adult (HCC)     Frozen shoulder     Skin tag     Degenerative disc disease, lumbar     Bilateral edema of lower extremity     Medication refill     Varicose vein of leg     Dizziness     Dysphagia     Abdominal bloating     Mild intermittent asthma without complication     Skin lesion of left leg           Please address the medication refill and close the encounter. If I can be of assistance, please route to the applicable pool. Thank you.

## 2021-08-20 RX ORDER — ALBUTEROL SULFATE 90 UG/1
2 AEROSOL, METERED RESPIRATORY (INHALATION) 4 TIMES DAILY PRN
Qty: 1 INHALER | Refills: 5 | Status: SHIPPED | OUTPATIENT
Start: 2021-08-20 | End: 2021-09-21

## 2021-08-25 ENCOUNTER — OFFICE VISIT (OUTPATIENT)
Dept: PODIATRY | Age: 61
End: 2021-08-25
Payer: COMMERCIAL

## 2021-08-25 VITALS — BODY MASS INDEX: 45.99 KG/M2 | HEIGHT: 67 IN | WEIGHT: 293 LBS

## 2021-08-25 DIAGNOSIS — Z47.89 ENCOUNTER FOR TRAINING IN USE OF ORTHOTIC DEVICE: ICD-10-CM

## 2021-08-25 PROCEDURE — 99211 OFF/OP EST MAY X REQ PHY/QHP: CPT | Performed by: PODIATRIST

## 2021-08-25 PROCEDURE — 99999 PR OFFICE/OUTPT VISIT,PROCEDURE ONLY: CPT | Performed by: PODIATRIST

## 2021-08-25 NOTE — PROGRESS NOTES
PATIENT WAS INSTRUCTED ON HOW TO INSERT AND WEAR ORTHOTICS. PT IS TO FOLLOW UP IN 1 MONTH WITH DPM FOR ORTHOTIC CHECK. PT DID NOT SEE DPM TODAY.

## 2021-08-27 ENCOUNTER — OFFICE VISIT (OUTPATIENT)
Dept: GASTROENTEROLOGY | Age: 61
End: 2021-08-27
Payer: COMMERCIAL

## 2021-08-27 VITALS
DIASTOLIC BLOOD PRESSURE: 67 MMHG | SYSTOLIC BLOOD PRESSURE: 136 MMHG | OXYGEN SATURATION: 97 % | BODY MASS INDEX: 48.71 KG/M2 | WEIGHT: 293 LBS | HEART RATE: 71 BPM

## 2021-08-27 DIAGNOSIS — R13.10 DYSPHAGIA, UNSPECIFIED TYPE: Primary | ICD-10-CM

## 2021-08-27 PROCEDURE — 99213 OFFICE O/P EST LOW 20 MIN: CPT | Performed by: INTERNAL MEDICINE

## 2021-08-27 PROCEDURE — 3017F COLORECTAL CA SCREEN DOC REV: CPT | Performed by: INTERNAL MEDICINE

## 2021-08-27 PROCEDURE — G8417 CALC BMI ABV UP PARAM F/U: HCPCS | Performed by: INTERNAL MEDICINE

## 2021-08-27 PROCEDURE — G8427 DOCREV CUR MEDS BY ELIG CLIN: HCPCS | Performed by: INTERNAL MEDICINE

## 2021-08-27 PROCEDURE — 1036F TOBACCO NON-USER: CPT | Performed by: INTERNAL MEDICINE

## 2021-08-27 ASSESSMENT — ENCOUNTER SYMPTOMS
VOMITING: 0
DIARRHEA: 0
ABDOMINAL PAIN: 0
NAUSEA: 0
ANAL BLEEDING: 0
ABDOMINAL DISTENTION: 1
RECTAL PAIN: 0
BLOOD IN STOOL: 0
CONSTIPATION: 1

## 2021-08-27 NOTE — PROGRESS NOTES
GI CLINIC FOLLOW UP    INTERVAL HISTORY:   No referring provider defined for this encounter. No chief complaint on file. HISTORY OF PRESENT ILLNESS: Carlie Seo is a 64 y.o. female with dysphagia. Sensation of food getting stuck in the esophagus. No vomiting. Some nausea. Past Medical,Family, and Social History reviewed and does not contribute to the patient presentingcondition. Patient's PMH/PSH,SH,PSYCH Hx, MEDs, ALLERGIES, and ROS were all reviewed and updated in the appropriate sections.     PAST MEDICAL HISTORY:  Past Medical History:   Diagnosis Date    Abdominal bloating 1/23/2019    Anxiety     Bilateral edema of lower extremity 12/16/2016    Bronchial asthma     mild, intermittent    Carpal tunnel syndrome     Cataract     Chronic back pain     Chronic sinusitis     Degenerative disc disease, lumbar 12/16/2016    Depression     Diabetes mellitus due to underlying condition with hyperosmolarity without coma ([facility] [address]     Dizziness 10/27/2017    DJD (degenerative joint disease)     S1, L3, L4, L5, T12    Dysphagia 1/23/2019    Edema of leg     bilateral legs    Environmental allergies     Frozen shoulder 4/27/2015    GERD (gastroesophageal reflux disease)     Glaucoma     Glucose intolerance (impaired glucose tolerance)     Headache(784.0)     History of bronchitis     Viral    HTN (hypertension)     Hyperlipidemia     Hypothyroid     hypothyroid state    Hypothyroidism     IBS (irritable bowel syndrome) 10/02/2012    Legally blind     due to glaucoma    Medication refill 6/1/2017    Mild intermittent asthma without complication     Morbid obesity with BMI of 45.0-49.9, adult (HCC)     MVP (mitral valve prolapse)     Neuropathy     OA (osteoarthritis)     EMILIA on CPAP     Osteopenia     Pancreatic cyst     Polyneuropathy     Raynaud disease     Rhinopharyngitis     Allergic rhinopharyngitis    Skin lesion of left leg 6/11/2021    Skin tag 7/9/2015    Stress fracture     Right 5th Metatarsal     Syncope     TIA (transient ischemic attack)     Urinary incontinence     Varicose vein of leg 6/1/2017    Vasodepressor syncope     Wears glasses        Past Surgical History:   Procedure Laterality Date    APPENDECTOMY  1978    CATARACT REMOVAL Left     CHOLECYSTECTOMY  1978    COLONOSCOPY      COLONOSCOPY N/A 12/9/2019    COLORECTAL CANCER SCREENING, NOT HIGH RISK performed by Melvin Araiza MD at [address] [place] [address] Bilateral     right iridectomy, right laser, bilateral trabeculectomy, left drain    GLAUCOMA SURGERY      HAND SURGERY Right     HYSTERECTOMY  1982    KNEE SURGERY Right 2000    TUBAL LIGATION  1981    UPPER GASTROINTESTINAL ENDOSCOPY      UPPER GASTROINTESTINAL ENDOSCOPY  5/24/2018    EGD DILATION SAVORY performed by Dl Yusuf MD at [address] [place] [address]  3/6/2019    EGD DILATION SAVORY performed by Melvin Araiza MD at [facility]       CURRENT MEDICATIONS:    Current Outpatient Medications:     albuterol sulfate HFA (VENTOLIN HFA) 108 (90 Base) MCG/ACT inhaler, Inhale 2 puffs into the lungs 4 times daily as needed for Wheezing, Disp: 1 Inhaler, Rfl: 5    ASPIRIN LOW DOSE 81 MG EC tablet, Take 1 tablet by mouth daily, Disp: 30 tablet, Rfl: 5    omeprazole (PRILOSEC) 40 MG delayed release capsule, Take 1 capsule by mouth daily, Disp: 30 capsule, Rfl: 1    calcium carb-cholecalciferol 600-400 MG-UNIT TABS per tab, TAKE 1 TABLET BY MOUTH TWICE A DAY, Disp: 60 tablet, Rfl: 1    Multiple Vitamins-Minerals (CERTAVITE/ANTIOXIDANTS) TABS, TAKE 1 TABLET DAILY, Disp: 30 tablet, Rfl: 3    Blood Glucose Monitoring Suppl (ONETOUCH VERIO FLEX SYSTEM) w/Device KIT, , Disp: , Rfl:     Blood Glucose Calibration (ONETOUCH VERIO) SOLN, , Disp: , Rfl:     levothyroxine (SYNTHROID) 100 MCG tablet, Take 1 tablet by mouth Daily Indications: 112 mg, Disp: 30 tablet, Rfl: 3    Cholecalciferol (VITAMIN D) 50 MCG (2000 UT) CAPS capsule, Take 1,000 Units by mouth daily, Disp: 30 capsule, Rfl: 3    rosuvastatin (CRESTOR) 5 MG tablet, Take 1 tablet by mouth daily, Disp: 30 tablet, Rfl: 3    acetaminophen (ACETAMINOPHEN EXTRA STRENGTH) 500 MG tablet, Take 2 tablets by mouth every 6 hours as needed for Pain, Disp: 120 tablet, Rfl: 3    albuterol sulfate  (90 Base) MCG/ACT inhaler, INHALE 2 PUFFS INTO THE LUNGS EVERY SIX HOURS AS NEEDED, Disp: 18 g, Rfl: 2    Accu-Chek FastClix Lancets MISC, Use as directed, Disp: 102 each, Rfl: 3    loratadine (ALLERGY RELIEF) 10 MG tablet, TAKE 1 TABLET BY MOUTH DAILY, Disp: 30 tablet, Rfl: 6    tolterodine (DETROL LA) 4 MG extended release capsule, Take 1 capsule by mouth daily, Disp: 30 capsule, Rfl: 5    FREESTYLE LITE strip, , Disp: , Rfl:     Lancets Misc. (ACCU-CHEK FASTCLIX LANCET) KIT, , Disp: , Rfl:     VITAMIN D-3 SUPER STRENGTH 50 MCG (2000 UT) TABS, , Disp: , Rfl:     fluticasone (FLONASE) 50 MCG/ACT nasal spray, USE 1 SPRAY IN EACH NOSTRIL TWICE A DAY, Disp: 16 g, Rfl: 5    Psyllium (HM FIBER POWDER PO), Take by mouth, Disp: , Rfl:     aluminum & magnesium hydroxide-simethicone (MAALOX ADVANCED) 200-200-20 MG/5ML SUSP suspension, Take 5 mLs by mouth as needed for Indigestion Actually  Citracel, Disp: , Rfl:     ALLERGIES:   Allergies   Allergen Reactions    Latex Rash     blisters  blisters    Adhesive Tape Rash     blisters    Amlodipine Other (See Comments)     Facial numbness  Facial numbness  Facial numbness  Facial numbness  Facial numbness  Facial numbness  Facial numbness    Bextra [Valdecoxib] Rash     swelling    Brimonidine Itching     Burning eyes severe    Daypro [Oxaprozin] Anaphylaxis    Diclofenac Sodium Swelling and Shortness Of Breath    Famotidine Other (See Comments)     Blisters down her arms    Hydrocodone-Acetaminophen Nausea Only     Severe chest pain    Hydromorphone Other (See Comments)     Rapid heart beat,  Nausea, spent 3 days in cardiac unit    Ibuprofen      Other reaction(s): bleeding  Other reaction(s): Unknown  Black stools  \"rips up stomach\", black tarry stool  Black stools  \"rips up stomach\", black tarry stool    Lisinopril Nausea Only, Nausea And Vomiting and Other (See Comments)     Other reaction(s): Hypotension    Metoprolol Other (See Comments)     Other reaction(s): Dizziness    Naproxen Other (See Comments)     Chest pain , swelling    Oxycodone-Acetaminophen      Chest pain severe    Rofecoxib Rash     swelling    Shellfish-Derived Products Anaphylaxis and Rash     shrimp  shrimp  shrimp    Simvastatin Nausea And Vomiting     Other reaction(s): muscle cramps    Statins      Other reaction(s): muscle cramps  Tolerates lovastatin. Pravachol caused numbness in head/face    Sulfa Antibiotics Hives    Tetracyclines & Related Itching and Rash    Timoptic [Timolol Maleate] Itching and Swelling     Eyes burning severely    Tramadol Itching and Rash    Fosamax [Alendronate] Nausea Only and Nausea And Vomiting    Nalbuphine Nausea And Vomiting    Alendronate Sodium     Alendronate Sodium     Alphagan [Brimonidine Tartrate]      Eye irritation    Dilaudid [Hydromorphone Hcl]     Doxycycline Monohydrate     Nsaids     Other Itching and Swelling     Eyes burning    Pepcid Complete [Famotidine-Ca Carb-Mag Hydrox] Hives and Other (See Comments)     blisters    Pilocarpine Itching and Swelling     Blurred vision  Eyes burning    Pravastatin Other (See Comments)     Facial numming    Red Dye Nausea Only     Pt states allergic to red coloring in crestor with CY on pill and senokot red pill. Feels sick to stomach and head feels foggy.  Shrimp Flavor Hives and Swelling    Statins Support Therapy      Tolerates lovastatin.  Pravachol caused numbness in head/face    Timoptic [Timolol Maleate]      Eye irritation      Tolectin [Tolmetin]      Unknown reaction  - as a child    Voltaren [Diclofenac Sodium]      Flu symptoms      Nubain [Nalbuphine Hcl] Nausea And Vomiting     Chest pain      Percocet [Oxycodone-Acetaminophen] Nausea And Vomiting     Sweating, chest pain    Tolectin [Tolmetin Sodium] Rash    Tolmetin Sodium Rash    Ultram [Tramadol Hcl] Itching and Rash     Rash on face    Vicodin [Hydrocodone-Acetaminophen] Nausea And Vomiting     swearing    Vioxx Rash       FAMILY HISTORY:       Problem Relation Age of Onset    Diabetes Mother     Heart Disease Mother         multiple heart attacks    Arthritis Mother     High Blood Pressure Mother     High Cholesterol Mother     Substance Abuse Mother     Heart Disease Father     Arthritis Father     Depression Father     High Cholesterol Father     Mental Illness Father     Substance Abuse Father     Diabetes Sister     High Blood Pressure Sister     Cancer Paternal Uncle         esophageal cancer    Diabetes Maternal Aunt     Cancer Maternal Aunt         colorectal cancer    Diabetes Maternal Uncle     Cancer Maternal Grandmother         colorectal cancer    Arthritis Maternal Grandmother     Diabetes Maternal Grandmother     High Blood Pressure Maternal Grandmother     Stroke Maternal Grandmother     Arthritis Maternal Grandfather     Arthritis Paternal Grandmother     Cancer Paternal Grandmother     Depression Paternal Grandmother     Heart Disease Paternal Grandmother     High Blood Pressure Paternal Grandmother     High Cholesterol Paternal Grandmother     Mental Illness Paternal Grandmother     Arthritis Paternal Grandfather          SOCIAL HISTORY:   Social History     Socioeconomic History    Marital status: Single     Spouse name: Not on file    Number of children: Not on file    Years of education: Not on file    Highest education level: Not on file   Occupational History    Not on file   Tobacco Use    Smoking status: Never Smoker    Smokeless tobacco: Never Used Substance and Sexual Activity    Alcohol use: No     Alcohol/week: 0.0 standard drinks    Drug use: No    Sexual activity: Never     Partners: Male   Other Topics Concern    Not on file   Social History Narrative    Not on file     Social Determinants of Health     Financial Resource Strain:     Difficulty of Paying Living Expenses:    Food Insecurity:     Worried About Running Out of Food in the Last Year:     [address] in the Last Year:    Transportation Needs:     Lack of Transportation (Medical):  Lack of Transportation (Non-Medical):    Physical Activity:     Days of Exercise per Week:     Minutes of Exercise per Session:    Stress:     Feeling of Stress :    Social Connections:     Frequency of Communication with Friends and Family:     Frequency of Social Gatherings with Friends and Family:     Attends [employer] Services:     Active Member of Clubs or Organizations:     Attends Club or Organization Meetings:     Marital Status:    Intimate Partner Violence:     Fear of Current or Ex-Partner:     Emotionally Abused:     Physically Abused:     Sexually Abused:        REVIEW OF SYSTEMS: A 12-point review of systemswas obtained and pertinent positives and negatives were enumerated above in the history of present illness. All other reviewed systems / symptoms were negative. Review of Systems   Gastrointestinal: Positive for abdominal distention and constipation. Negative for abdominal pain, anal bleeding, blood in stool, diarrhea, nausea, rectal pain and vomiting.            LABORATORY DATA: Reviewed  Lab Results   Component Value Date    WBC 12.5 (H) 01/20/2021    HGB 15.6 (H) 01/20/2021    HCT 46.8 01/20/2021    MCV 92.9 01/20/2021     01/20/2021     01/20/2021    K 3.9 01/20/2021     01/20/2021    CO2 33 (H) 01/20/2021    BUN 20 01/20/2021    CREATININE 0.79 01/20/2021    LABPROT 6.5 10/17/2012    LABALBU 4.4 01/20/2021    BILITOT 0.72 01/20/2021    ALKPHOS

## 2021-09-02 ENCOUNTER — HOSPITAL ENCOUNTER (OUTPATIENT)
Dept: PREADMISSION TESTING | Age: 61
Discharge: HOME | End: 2021-09-06
Payer: COMMERCIAL

## 2021-09-02 VITALS — BODY MASS INDEX: 45.99 KG/M2 | WEIGHT: 293 LBS | HEIGHT: 67 IN

## 2021-09-02 NOTE — PROGRESS NOTES
Pre-op Instructions For Out-Patient Surgery    Medication Instructions:  · Please stop herbs and any supplements now (includes vitamins and minerals). · Please contact your surgeon and prescribing physician for pre-op instructions for any blood thinners. Aspirin    · If you have inhalers/aerosol treatments at home, please use them the morning of your surgery and bring the inhalers with you to the hospital.    · Please take the following medications the morning of your surgery with a sip of water:    Inhaler and Synthroid     Surgery Instructions:  1. After midnight before surgery:  Do not eat or drink anything, including water, mints, gum, and hard candy. You may brush your teeth without swallowing. No smoking, chewing tobacco, or street drugs. 2. Please shower or bathe before surgery. 3. Please do not wear any cologne, lotion, powder, deodorant, jewelry, piercings, perfume, makeup, nail polish, hair accessories, or hair spray on the day of surgery. Wear loose comfortable clothing. 4. Leave your valuables at home. Bring a storage case for any glasses/contacts. 5. An adult who is responsible for you MUST drive you home and should be with you for the first 24 hours after surgery. 6. If having out-patient knee and foot surgeries, please arrange for planned crutches, walker, or wheelchair before arriving to the hospital.    The Day of Surgery:  · Arrive at [facility] Entr[facility] at the time directed by your surgeon and check in at the desk. · If you have a living will or healthcare power of , please bring a copy. · You will be taken to the pre-op holding area where you will be prepared for surgery. A physical assessment will be performed by a nurse practitioner or house officer.   Your IV will be started and you will meet your anesthesiologist.    · When you go to surgery, your family will be directed to the surgical waiting room, where the doctor should speak with them after your surgery. · After surgery, you will be taken to the recovery room then when you are awake and stable you will go to the short stay unit for preparation to be discharged. Only your one designated person is allowed to come to short stay for your discharge. · If you use a Bi-PAP or C-PAP machine, please bring it with you and leave it in the car in case it is needed in recovery room. Instructions read to [facility] and understanding verbalized.

## 2021-09-07 ENCOUNTER — ANESTHESIA EVENT (OUTPATIENT)
Dept: ENDOSCOPY | Age: 61
End: 2021-09-07
Payer: COMMERCIAL

## 2021-09-08 ENCOUNTER — ANESTHESIA (OUTPATIENT)
Dept: ENDOSCOPY | Age: 61
End: 2021-09-08
Payer: COMMERCIAL

## 2021-09-08 ENCOUNTER — HOSPITAL ENCOUNTER (OUTPATIENT)
Age: 61
Setting detail: OUTPATIENT SURGERY
Discharge: HOME | End: 2021-09-08
Attending: INTERNAL MEDICINE | Admitting: INTERNAL MEDICINE
Payer: COMMERCIAL

## 2021-09-08 VITALS
WEIGHT: 293 LBS | SYSTOLIC BLOOD PRESSURE: 145 MMHG | TEMPERATURE: 96.6 F | RESPIRATION RATE: 20 BRPM | BODY MASS INDEX: 45.99 KG/M2 | DIASTOLIC BLOOD PRESSURE: 82 MMHG | HEART RATE: 70 BPM | OXYGEN SATURATION: 98 % | HEIGHT: 67 IN

## 2021-09-08 VITALS — OXYGEN SATURATION: 100 % | DIASTOLIC BLOOD PRESSURE: 88 MMHG | SYSTOLIC BLOOD PRESSURE: 136 MMHG

## 2021-09-08 LAB
GLUCOSE BLD-MCNC: 102 MG/DL (ref 65–105)
GLUCOSE BLD-MCNC: 122 MG/DL (ref 65–105)
SARS-COV-2, RAPID: NOT DETECTED
SPECIMEN DESCRIPTION: NORMAL

## 2021-09-08 PROCEDURE — 2709999900 HC NON-CHARGEABLE SUPPLY: Performed by: INTERNAL MEDICINE

## 2021-09-08 PROCEDURE — C1769 GUIDE WIRE: HCPCS | Performed by: INTERNAL MEDICINE

## 2021-09-08 PROCEDURE — 3609012700 HC EGD DILATION SAVORY: Performed by: INTERNAL MEDICINE

## 2021-09-08 PROCEDURE — 87635 SARS-COV-2 COVID-19 AMP PRB: CPT

## 2021-09-08 PROCEDURE — 43248 EGD GUIDE WIRE INSERTION: CPT | Performed by: INTERNAL MEDICINE

## 2021-09-08 PROCEDURE — 7100000000 HC PACU RECOVERY - FIRST 15 MIN: Performed by: INTERNAL MEDICINE

## 2021-09-08 PROCEDURE — 7100000031 HC ASPR PHASE II RECOVERY - ADDTL 15 MIN: Performed by: INTERNAL MEDICINE

## 2021-09-08 PROCEDURE — 6360000002 HC RX W HCPCS

## 2021-09-08 PROCEDURE — 7100000011 HC PHASE II RECOVERY - ADDTL 15 MIN: Performed by: INTERNAL MEDICINE

## 2021-09-08 PROCEDURE — 7100000001 HC PACU RECOVERY - ADDTL 15 MIN: Performed by: INTERNAL MEDICINE

## 2021-09-08 PROCEDURE — 82947 ASSAY GLUCOSE BLOOD QUANT: CPT

## 2021-09-08 PROCEDURE — 2500000003 HC RX 250 WO HCPCS

## 2021-09-08 PROCEDURE — 3700000000 HC ANESTHESIA ATTENDED CARE: Performed by: INTERNAL MEDICINE

## 2021-09-08 PROCEDURE — 2580000003 HC RX 258: Performed by: ANESTHESIOLOGY

## 2021-09-08 PROCEDURE — 7100000010 HC PHASE II RECOVERY - FIRST 15 MIN: Performed by: INTERNAL MEDICINE

## 2021-09-08 PROCEDURE — 7100000030 HC ASPR PHASE II RECOVERY - FIRST 15 MIN: Performed by: INTERNAL MEDICINE

## 2021-09-08 PROCEDURE — 3700000001 HC ADD 15 MINUTES (ANESTHESIA): Performed by: INTERNAL MEDICINE

## 2021-09-08 RX ORDER — HYDROCODONE BITARTRATE AND ACETAMINOPHEN 5; 325 MG/1; MG/1
1 TABLET ORAL PRN
Status: DISCONTINUED | OUTPATIENT
Start: 2021-09-08 | End: 2021-09-08 | Stop reason: HOSPADM

## 2021-09-08 RX ORDER — PROPOFOL 10 MG/ML
INJECTION, EMULSION INTRAVENOUS PRN
Status: DISCONTINUED | OUTPATIENT
Start: 2021-09-08 | End: 2021-09-08 | Stop reason: SDUPTHER

## 2021-09-08 RX ORDER — DIPHENHYDRAMINE HYDROCHLORIDE 50 MG/ML
12.5 INJECTION INTRAMUSCULAR; INTRAVENOUS
Status: DISCONTINUED | OUTPATIENT
Start: 2021-09-08 | End: 2021-09-08 | Stop reason: HOSPADM

## 2021-09-08 RX ORDER — SODIUM CHLORIDE 0.9 % (FLUSH) 0.9 %
10 SYRINGE (ML) INJECTION EVERY 12 HOURS SCHEDULED
Status: DISCONTINUED | OUTPATIENT
Start: 2021-09-08 | End: 2021-09-08 | Stop reason: HOSPADM

## 2021-09-08 RX ORDER — SODIUM CHLORIDE 9 MG/ML
INJECTION, SOLUTION INTRAVENOUS CONTINUOUS
Status: DISCONTINUED | OUTPATIENT
Start: 2021-09-08 | End: 2021-09-08 | Stop reason: HOSPADM

## 2021-09-08 RX ORDER — FENTANYL CITRATE 50 UG/ML
50 INJECTION, SOLUTION INTRAMUSCULAR; INTRAVENOUS EVERY 5 MIN PRN
Status: DISCONTINUED | OUTPATIENT
Start: 2021-09-08 | End: 2021-09-08 | Stop reason: HOSPADM

## 2021-09-08 RX ORDER — ONDANSETRON 2 MG/ML
4 INJECTION INTRAMUSCULAR; INTRAVENOUS
Status: DISCONTINUED | OUTPATIENT
Start: 2021-09-08 | End: 2021-09-08 | Stop reason: HOSPADM

## 2021-09-08 RX ORDER — SODIUM CHLORIDE 0.9 % (FLUSH) 0.9 %
10 SYRINGE (ML) INJECTION PRN
Status: DISCONTINUED | OUTPATIENT
Start: 2021-09-08 | End: 2021-09-08 | Stop reason: HOSPADM

## 2021-09-08 RX ORDER — SODIUM CHLORIDE 9 MG/ML
25 INJECTION, SOLUTION INTRAVENOUS PRN
Status: DISCONTINUED | OUTPATIENT
Start: 2021-09-08 | End: 2021-09-08 | Stop reason: HOSPADM

## 2021-09-08 RX ORDER — LIDOCAINE HYDROCHLORIDE 10 MG/ML
INJECTION, SOLUTION EPIDURAL; INFILTRATION; INTRACAUDAL; PERINEURAL PRN
Status: DISCONTINUED | OUTPATIENT
Start: 2021-09-08 | End: 2021-09-08 | Stop reason: SDUPTHER

## 2021-09-08 RX ORDER — FENTANYL CITRATE 50 UG/ML
25 INJECTION, SOLUTION INTRAMUSCULAR; INTRAVENOUS EVERY 5 MIN PRN
Status: DISCONTINUED | OUTPATIENT
Start: 2021-09-08 | End: 2021-09-08 | Stop reason: HOSPADM

## 2021-09-08 RX ORDER — HYDROCODONE BITARTRATE AND ACETAMINOPHEN 5; 325 MG/1; MG/1
2 TABLET ORAL PRN
Status: DISCONTINUED | OUTPATIENT
Start: 2021-09-08 | End: 2021-09-08 | Stop reason: HOSPADM

## 2021-09-08 RX ORDER — 0.9 % SODIUM CHLORIDE 0.9 %
500 INTRAVENOUS SOLUTION INTRAVENOUS
Status: DISCONTINUED | OUTPATIENT
Start: 2021-09-08 | End: 2021-09-08 | Stop reason: HOSPADM

## 2021-09-08 RX ORDER — LIDOCAINE HYDROCHLORIDE 10 MG/ML
1 INJECTION, SOLUTION EPIDURAL; INFILTRATION; INTRACAUDAL; PERINEURAL
Status: DISCONTINUED | OUTPATIENT
Start: 2021-09-08 | End: 2021-09-08 | Stop reason: HOSPADM

## 2021-09-08 RX ORDER — HYDRALAZINE HYDROCHLORIDE 20 MG/ML
5 INJECTION INTRAMUSCULAR; INTRAVENOUS EVERY 10 MIN PRN
Status: DISCONTINUED | OUTPATIENT
Start: 2021-09-08 | End: 2021-09-08 | Stop reason: HOSPADM

## 2021-09-08 RX ADMIN — PROPOFOL 20 MG: 10 INJECTION, EMULSION INTRAVENOUS at 10:14

## 2021-09-08 RX ADMIN — PROPOFOL 20 MG: 10 INJECTION, EMULSION INTRAVENOUS at 10:13

## 2021-09-08 RX ADMIN — PROPOFOL 100 MG: 10 INJECTION, EMULSION INTRAVENOUS at 10:07

## 2021-09-08 RX ADMIN — SODIUM CHLORIDE: 9 INJECTION, SOLUTION INTRAVENOUS at 09:44

## 2021-09-08 RX ADMIN — LIDOCAINE HYDROCHLORIDE 80 MG: 10 INJECTION, SOLUTION EPIDURAL; INFILTRATION; INTRACAUDAL; PERINEURAL at 10:07

## 2021-09-08 RX ADMIN — PROPOFOL 40 MG: 10 INJECTION, EMULSION INTRAVENOUS at 10:09

## 2021-09-08 RX ADMIN — PROPOFOL 20 MG: 10 INJECTION, EMULSION INTRAVENOUS at 10:11

## 2021-09-08 ASSESSMENT — PAIN DESCRIPTION - DESCRIPTORS: DESCRIPTORS: ACHING

## 2021-09-08 ASSESSMENT — ENCOUNTER SYMPTOMS
SHORTNESS OF BREATH: 0
BACK PAIN: 1
SORE THROAT: 0
COUGH: 0
WHEEZING: 0

## 2021-09-08 ASSESSMENT — PAIN DESCRIPTION - ONSET: ONSET: ON-GOING

## 2021-09-08 ASSESSMENT — PULMONARY FUNCTION TESTS
PIF_VALUE: 1

## 2021-09-08 ASSESSMENT — PAIN DESCRIPTION - LOCATION
LOCATION: BACK
LOCATION: BACK

## 2021-09-08 ASSESSMENT — PAIN SCALES - GENERAL
PAINLEVEL_OUTOF10: 4
PAINLEVEL_OUTOF10: 4
PAINLEVEL_OUTOF10: 0

## 2021-09-08 ASSESSMENT — PAIN DESCRIPTION - PROGRESSION: CLINICAL_PROGRESSION: NOT CHANGED

## 2021-09-08 ASSESSMENT — PAIN DESCRIPTION - PAIN TYPE
TYPE: CHRONIC PAIN
TYPE: CHRONIC PAIN

## 2021-09-08 ASSESSMENT — PAIN - FUNCTIONAL ASSESSMENT: PAIN_FUNCTIONAL_ASSESSMENT: 0-10

## 2021-09-08 NOTE — OP NOTE
[facility]     PROCEDURE DATE: 09/08/21    REFERRING PHYSICIAN: No ref. provider found     PRIMARY CARE PROVIDER: Lu Parisi MD    ATTENDING PHYSICIAN: Melvin Araiza MD     HISTORY: Ms. Margarita Doherty is a 64 y.o. female who presents to the [place] [address] Endoscopy unit for upper endoscopy. The patient's clinical history is remarkable for dysphagia. She is currently medically stable and appropriate for the planned procedure. PREOPERATIVE DIAGNOSIS: Dysphagia. PROCEDURES:   1) Transoral Upper Endoscopy, Savary dilation. POSTOPERATIVE DIAGNOSIS:   Mildly tortuous esophagus    SPECIMENS: None    MEDICATIONS:   MAC per anesthesia    EBL: minimal    INSTRUMENT: Olympus GIF-H190 flexible Gastroscope. PREPARATION: The nature and character of the procedure as well as risks, benefits, and alternatives were discussed with the patient and informed consent was obtained. Complications were said to include, but were not limited to: medication allergy, medication reaction, cardiovascular and respiratory problems, bleeding, perforation, infection, and/or missed diagnosis. Following arrival in the endoscopy room, the patient was placed in the left lateral decubitus position and final time-out accomplished in the presence of the nursing staff. Baseline vital signs were obtained and reviewed, and IV sedation was subsequently initiated. FINDINGS:   Esophagus: The esophagus was inspected to the Z-line. The endoscopic exam showed highly tortuous esophagus. Esophagus was dilated to 17 then 18 mm using Savary dilater. Stomach: The stomach was inspected in both forward and retroflex fashion and was appropriately distensible. The cardia, fundus, incisura, antrum and pylorus were identified via direct visualization. The endoscopic exam showed no pathology. Duodenum: The proximal small bowel was inspected through the bulb, sweep, and second portion of the duodenum.  The endoscopic exam showed no pathology. RECOMMENDATIONS:   1) Follow up with referring provider, as previously scheduled. 2) Follow-up with me in the office in 6 months.       Sue Paulino

## 2021-09-08 NOTE — H&P
HISTORY and [facility] [address] [id number]       NAME:  Delfin Flores  MRN: [id number]   YOB: 1960   Date: 9/8/2021   Age: 64 y.o. Gender: female       COMPLAINT AND PRESENT HISTORY:              Delfin Flores is 64 y.o. female, undergoing EGD. Pt has had previous EGD with dilation last in 2019. Pt c/o dysphagia that was initially diagnosed many years ago. Pt has sensation that food is getting stuck in her esophagus. She does report good relief of symptoms with previous dilations. History of GERD. Takes omeprazole with good relief. She was on Protonix prior with intermittent relief. History of IBS. Pt has alternating diarrhea and constipation. She has more constipation than diarrhea. Denies abdominal pain, nausea, vomiting, hematochezia or melena. Pt has occasional decreased appetite but denies unintentional weight loss. Pt has family history of esophageal cancer in her paternal uncle. Pt has family history of colon cancer in her maternal aunt and grandmother. NPO. Took levothyroxine this am with sip of water. She used her albuterol inhaler this am. Stopped low dose aspirin on 09/01/2021. Stopped vitamins for three days. Denies taking any other blood thinning medications. Pt reports her CPAP machine \"has been acting up\". She denies SOB. She does have occasional left sided chest discomfort that is non-radiating that she attributes to acid reflux. She has had similar symptoms with reflux in the past. She is able to reproduce left chest pain by pressing on her left chest. Reports she has had a cardiac workup in the past when she was experiencing similar symptoms. Denies current chest pain/pressure, palpitations, SOB, recent URI, fever or chills.        PAST MEDICAL HISTORY     Past Medical History:   Diagnosis Date    Abdominal bloating 1/23/2019    Anxiety     Bilateral edema of lower extremity 12/16/2016    Bronchial asthma     mild, intermittent    Carpal tunnel syndrome     GASTROINTESTINAL ENDOSCOPY      UPPER GASTROINTESTINAL ENDOSCOPY  5/24/2018    EGD DILATION SAVORY [facility]  3/6/2019    EGD DILATION SAVORY performed by Karie Lao MD at [facility]       FAMILY HISTORY       Family History   Problem Relation Age of Onset    Diabetes Mother     Heart Disease Mother         multiple heart attacks    Arthritis Mother     High Blood Pressure Mother     High Cholesterol Mother     Substance Abuse Mother     Heart Disease Father     Arthritis Father     Depression Father     High Cholesterol Father     Mental Illness Father     Substance Abuse Father     Diabetes Sister     High Blood Pressure Sister     Cancer Paternal Uncle         esophageal cancer    Diabetes Maternal Aunt     Cancer Maternal Aunt         colorectal cancer    Diabetes Maternal Uncle     Cancer Maternal Grandmother         colorectal cancer    Arthritis Maternal Grandmother     Diabetes Maternal Grandmother     High Blood Pressure Maternal Grandmother     Stroke Maternal Grandmother     Arthritis Maternal Grandfather     Arthritis Paternal Grandmother     Cancer Paternal Grandmother     Depression Paternal Grandmother     Heart Disease Paternal Grandmother     High Blood Pressure Paternal Grandmother     High Cholesterol Paternal Grandmother     Mental Illness Paternal Grandmother     Arthritis Paternal Grandfather        SOCIAL HISTORY       Social History     Socioeconomic History    Marital status: Single     Spouse name: Not on file    Number of children: Not on file    Years of education: Not on file    Highest education level: Not on file   Occupational History    Not on file   Tobacco Use    Smoking status: Never Smoker    Smokeless tobacco: Never Used   Vaping Use    Vaping Use: Never used   Substance and Sexual Activity    Alcohol use: No     Alcohol/week: 0.0 standard drinks    Drug use: No    Sexual activity: Never     Partners: Male   Other Topics Concern    Not on file   Social History Narrative    Not on file     Social Determinants of Health     Financial Resource Strain:     Difficulty of Paying Living Expenses:    Food Insecurity:     Worried About Running Out of Food in the Last Year:     [address] in the Last Year:    Transportation Needs:     Lack of Transportation (Medical):      Lack of Transportation (Non-Medical):    Physical Activity:     Days of Exercise per Week:     Minutes of Exercise per Session:    Stress:     Feeling of Stress :    Social Connections:     Frequency of Communication with Friends and Family:     Frequency of Social Gatherings with Friends and Family:     Attends [employer] Services:     Active Member of Clubs or Organizations:     Attends Club or Organization Meetings:     Marital Status:    Intimate Partner Violence:     Fear of Current or Ex-Partner:     Emotionally Abused:     Physically Abused:     Sexually Abused:            REVIEW OF SYSTEMS      Allergies   Allergen Reactions    Latex Rash     blisters  blisters    Adhesive Tape Rash     blisters    Amlodipine Other (See Comments)     Facial numbness  Facial numbness  Facial numbness  Facial numbness  Facial numbness  Facial numbness  Facial numbness    Bextra [Valdecoxib] Rash     swelling    Brimonidine Itching     Burning eyes severe    Daypro [Oxaprozin] Anaphylaxis    Diclofenac Sodium Swelling and Shortness Of Breath    Famotidine Other (See Comments)     Blisters down her arms    Hydrocodone-Acetaminophen Nausea Only     Severe chest pain    Hydromorphone Other (See Comments)     Rapid heart beat,  Nausea, spent 3 days in cardiac unit    Ibuprofen      Other reaction(s): bleeding  Other reaction(s): Unknown  Black stools  \"rips up stomach\", black tarry stool  Black stools  \"rips up stomach\", black tarry stool    Lisinopril Nausea Only, Nausea And Vomiting and Other (See Comments)     Other reaction(s): Hypotension    Metoprolol Other (See Comments)     Other reaction(s): Dizziness    Naproxen Other (See Comments)     Chest pain , swelling    Oxycodone-Acetaminophen      Chest pain severe    Rofecoxib Rash     swelling    Shellfish-Derived Products Anaphylaxis and Rash     shrimp  shrimp  shrimp    Simvastatin Nausea And Vomiting     Other reaction(s): muscle cramps    Statins      Other reaction(s): muscle cramps  Tolerates lovastatin. Pravachol caused numbness in head/face    Sulfa Antibiotics Hives    Tetracyclines & Related Itching and Rash    Timoptic [Timolol Maleate] Itching and Swelling     Eyes burning severely    Tramadol Itching and Rash    Fosamax [Alendronate] Nausea Only and Nausea And Vomiting    Nalbuphine Nausea And Vomiting    Alendronate Sodium     Alendronate Sodium     Alphagan [Brimonidine Tartrate]      Eye irritation    Dilaudid [Hydromorphone Hcl]     Doxycycline Monohydrate     Nsaids     Other Itching and Swelling     Eyes burning    Pepcid Complete [Famotidine-Ca Carb-Mag Hydrox] Hives and Other (See Comments)     blisters    Pilocarpine Itching and Swelling     Blurred vision  Eyes burning    Pravastatin Other (See Comments)     Facial numming    Red Dye Nausea Only     Pt states allergic to red coloring in crestor with CY on pill and senokot red pill. Feels sick to stomach and head feels foggy.  Shrimp Flavor Hives and Swelling    Statins Support Therapy      Tolerates lovastatin.  Pravachol caused numbness in head/face    Timoptic [Timolol Maleate]      Eye irritation      Tolectin [Tolmetin]      Unknown reaction  - as a child    Voltaren [Diclofenac Sodium]      Flu symptoms      Nubain [Nalbuphine Hcl] Nausea And Vomiting     Chest pain      Percocet [Oxycodone-Acetaminophen] Nausea And Vomiting     Sweating, chest pain    Tolectin [Tolmetin Sodium] Rash    Tolmetin Sodium Rash    Ultram [Tramadol Hcl] Itching and Rash     Rash on face    Vicodin [Hydrocodone-Acetaminophen] Nausea And Vomiting     swearing    Vioxx Rash       No current facility-administered medications on file prior to encounter.      Current Outpatient Medications on File Prior to Encounter   Medication Sig Dispense Refill    albuterol sulfate HFA (VENTOLIN HFA) 108 (90 Base) MCG/ACT inhaler Inhale 2 puffs into the lungs 4 times daily as needed for Wheezing 1 Inhaler 5    ASPIRIN LOW DOSE 81 MG EC tablet Take 1 tablet by mouth daily 30 tablet 5    omeprazole (PRILOSEC) 40 MG delayed release capsule Take 1 capsule by mouth daily 30 capsule 1    calcium carb-cholecalciferol 600-400 MG-UNIT TABS per tab TAKE 1 TABLET BY MOUTH TWICE A DAY 60 tablet 1    Multiple Vitamins-Minerals (CERTAVITE/ANTIOXIDANTS) TABS TAKE 1 TABLET DAILY 30 tablet 3    Blood Glucose Monitoring Suppl (ONETOUCH VERIO FLEX SYSTEM) w/Device KIT       Blood Glucose Calibration (ONETOUCH VERIO) SOLN       levothyroxine (SYNTHROID) 100 MCG tablet Take 1 tablet by mouth Daily Indications: 112 mg 30 tablet 3    Cholecalciferol (VITAMIN D) 50 MCG (2000 UT) CAPS capsule Take 1,000 Units by mouth daily 30 capsule 3    rosuvastatin (CRESTOR) 5 MG tablet Take 1 tablet by mouth daily 30 tablet 3    acetaminophen (ACETAMINOPHEN EXTRA STRENGTH) 500 MG tablet Take 2 tablets by mouth every 6 hours as needed for Pain 120 tablet 3    albuterol sulfate  (90 Base) MCG/ACT inhaler INHALE 2 PUFFS INTO THE LUNGS EVERY SIX HOURS AS NEEDED 18 g 2    Accu-Chek FastClix Lancets MISC Use as directed 102 each 3    loratadine (ALLERGY RELIEF) 10 MG tablet TAKE 1 TABLET BY MOUTH DAILY 30 tablet 6    tolterodine (DETROL LA) 4 MG extended release capsule Take 1 capsule by mouth daily 30 capsule 5    FREESTYLE LITE strip       Lancets Misc. (ACCU-CHEK FASTCLIX LANCET) KIT       VITAMIN D-3 SUPER STRENGTH 50 MCG (2000 UT) TABS       fluticasone (FLONASE) 50 MCG/ACT nasal spray USE 1 SPRAY IN EACH NOSTRIL TWICE A DAY 16 g 5    Psyllium (HM FIBER POWDER PO) Take by mouth      aluminum & magnesium hydroxide-simethicone (MAALOX ADVANCED) 200-200-20 MG/5ML SUSP suspension Take 5 mLs by mouth as needed for Indigestion Actually  Citracel       Notation: Above medications are not currently reconciled at time of signing this H&P note, to be reconciled in pre-op per RN. Review of Systems   Constitutional: Negative for appetite change, chills, fever and unexpected weight change. HENT: Positive for congestion (Occasional related to allergies). Negative for sore throat. Eyes: Positive for visual disturbance (Glasses). Respiratory: Negative for cough, shortness of breath and wheezing. Cardiovascular: Positive for chest pain (Occasional). Negative for palpitations and leg swelling. Gastrointestinal:        See HPI   Genitourinary: Negative. Musculoskeletal: Positive for arthralgias and back pain (Chronic). Neurological: Positive for light-headedness (She reports this am). Negative for dizziness and headaches. Hematological: Bruises/bleeds easily. Psychiatric/Behavioral: Negative. GENERAL PHYSICAL EXAM     Vitals: Review vitals per RN flowsheet. Physical Exam  Constitutional:       General: She is not in acute distress. Appearance: She is obese. She is not ill-appearing, toxic-appearing or diaphoretic. HENT:      Head: Normocephalic and atraumatic. Nose: Nose normal. No congestion. Mouth/Throat:      Mouth: Mucous membranes are moist.      Pharynx: Oropharynx is clear. No oropharyngeal exudate or posterior oropharyngeal erythema. Eyes:      General: No scleral icterus. Conjunctiva/sclera: Conjunctivae normal.      Comments: Glasses   Cardiovascular:      Rate and Rhythm: Normal rate and regular rhythm. Heart sounds: Normal heart sounds. No murmur heard. No friction rub. No gallop.     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

## 2021-09-08 NOTE — ANESTHESIA PRE PROCEDURE
Department of Anesthesiology  Preprocedure Note       Name:  Carmelita Alston   Age:  64 y.o.  :  1960                                          MRN:  [id number]         Date:  2021      Surgeon: Lucinda Vasquez):  Tara Leyva MD    Procedure: Procedure(s):  EGD    Medications prior to admission:   Prior to Admission medications    Medication Sig Start Date End Date Taking?  Authorizing Provider   albuterol sulfate HFA (VENTOLIN HFA) 108 (90 Base) MCG/ACT inhaler Inhale 2 puffs into the lungs 4 times daily as needed for Wheezing 21 Yes Melissa Quinonez MD   ASPIRIN LOW DOSE 81 MG EC tablet Take 1 tablet by mouth daily 21  Yes Anabella Arnett MD   omeprazole (PRILOSEC) 40 MG delayed release capsule Take 1 capsule by mouth daily 21  Yes Tara Leyva MD   Multiple Vitamins-Minerals (CERTAVITE/ANTIOXIDANTS) TABS TAKE 1 TABLET DAILY 21  Yes Philly Perla MD   levothyroxine (SYNTHROID) 100 MCG tablet Take 1 tablet by mouth Daily Indications: 112 mg 21  Yes Joaquín Menjivar MD   Cholecalciferol (VITAMIN D) 50 MCG (2000 UT) CAPS capsule Take 1,000 Units by mouth daily 21  Yes Joaquín Menjivar MD   rosuvastatin (CRESTOR) 5 MG tablet Take 1 tablet by mouth daily 21  Yes Joaquín Menjivar MD   acetaminophen (ACETAMINOPHEN EXTRA STRENGTH) 500 MG tablet Take 2 tablets by mouth every 6 hours as needed for Pain 21  Yes Joaquín Menjivar MD   fluticasone (FLONASE) 50 MCG/ACT nasal spray USE 1 SPRAY IN EACH NOSTRIL TWICE A DAY 10/22/20  Yes Kimberlee Rizo MD   Psyllium (HM FIBER POWDER PO) Take by mouth   Yes Historical Provider, MD   aluminum & magnesium hydroxide-simethicone (MAALOX ADVANCED) 200-200-20 MG/5ML SUSP suspension Take 5 mLs by mouth as needed for Indigestion Actually  Citracel   Yes Historical Provider, MD   calcium carb-cholecalciferol 600-400 MG-UNIT TABS per tab TAKE 1 TABLET BY MOUTH TWICE A DAY 21   Shahla Phelps MD   Blood Glucose Monitoring Suppl ([address]) w/Device KIT  4/1/21   Historical Provider, MD   Blood Glucose Calibration (ONETOUCH VERIO) SOLN  4/14/21   Historical Provider, MD   albuterol sulfate  (90 Base) MCG/ACT inhaler INHALE 2 PUFFS INTO THE LUNGS EVERY SIX HOURS AS NEEDED 6/11/21   Marisol Bey MD   Accu-Chek FastClix Lancets MISC Use as directed 6/11/21   Marisol Bey MD   loratadine (ALLERGY RELIEF) 10 MG tablet TAKE 1 TABLET BY MOUTH DAILY 5/13/21   Tracy Baron MD   tolterodine (DETROL LA) 4 MG extended release capsule Take 1 capsule by mouth daily 1/7/21   Wang Benz MD   FREESTYLE LITE strip  11/24/20   Historical Provider, MD   Lancets Misc. (ACCU-CHEK FASTCLIX LANCET) KIT  11/25/20   Historical Provider, MD   VITAMIN D-3 SUPER STRENGTH 50 MCG (2000 UT) TABS  12/3/20   Historical Provider, MD       Current medications:    Current Facility-Administered Medications   Medication Dose Route Frequency Provider Last Rate Last Admin    sodium chloride flush 0.9 % injection 10 mL  10 mL IntraVENous 2 times per day Jan Langley MD        sodium chloride flush 0.9 % injection 10 mL  10 mL IntraVENous PRN Jan Langley MD        0.9 % sodium chloride infusion  25 mL IntraVENous PRN Jan Langley MD        lidocaine PF 1 % injection 1 mL  1 mL IntraDERmal Once PRN Jan Langley MD        0.9 % sodium chloride infusion   IntraVENous Continuous Jan Langley MD           Allergies:     Allergies   Allergen Reactions    Latex Rash     blisters  blisters    Adhesive Tape Rash     blisters    Amlodipine Other (See Comments)     Facial numbness  Facial numbness  Facial numbness  Facial numbness  Facial numbness  Facial numbness  Facial numbness    Bextra [Valdecoxib] Rash     swelling    Brimonidine Itching     Burning eyes severe    Daypro [Oxaprozin] Anaphylaxis    Diclofenac Sodium Swelling and Shortness Of Breath    Famotidine Other (See Comments)     Blisters down her arms    Hydrocodone-Acetaminophen Nausea Only     Severe chest pain    Hydromorphone Other (See Comments)     Rapid heart beat,  Nausea, spent 3 days in cardiac unit    Ibuprofen      Other reaction(s): bleeding  Other reaction(s): Unknown  Black stools  \"rips up stomach\", black tarry stool  Black stools  \"rips up stomach\", black tarry stool    Lisinopril Nausea Only, Nausea And Vomiting and Other (See Comments)     Other reaction(s): Hypotension    Metoprolol Other (See Comments)     Other reaction(s): Dizziness    Naproxen Other (See Comments)     Chest pain , swelling    Oxycodone-Acetaminophen      Chest pain severe    Rofecoxib Rash     swelling    Shellfish-Derived Products Anaphylaxis and Rash     shrimp  shrimp  shrimp    Simvastatin Nausea And Vomiting     Other reaction(s): muscle cramps    Statins      Other reaction(s): muscle cramps  Tolerates lovastatin. Pravachol caused numbness in head/face    Sulfa Antibiotics Hives    Tetracyclines & Related Itching and Rash    Timoptic [Timolol Maleate] Itching and Swelling     Eyes burning severely    Tramadol Itching and Rash    Fosamax [Alendronate] Nausea Only and Nausea And Vomiting    Nalbuphine Nausea And Vomiting    Alendronate Sodium     Alendronate Sodium     Alphagan [Brimonidine Tartrate]      Eye irritation    Dilaudid [Hydromorphone Hcl]     Doxycycline Monohydrate     Nsaids     Other Itching and Swelling     Eyes burning    Pepcid Complete [Famotidine-Ca Carb-Mag Hydrox] Hives and Other (See Comments)     blisters    Pilocarpine Itching and Swelling     Blurred vision  Eyes burning    Pravastatin Other (See Comments)     Facial numming    Red Dye Nausea Only     Pt states allergic to red coloring in crestor with CY on pill and senokot red pill. Feels sick to stomach and head feels foggy.  Shrimp Flavor Hives and Swelling    Statins Support Therapy      Tolerates lovastatin.  Pravachol caused numbness in head/face    Timoptic [Timolol Maleate]      Eye irritation      Tolectin [Tolmetin]      Unknown reaction  - as a child    Voltaren [Diclofenac Sodium]      Flu symptoms      Nubain [Nalbuphine Hcl] Nausea And Vomiting     Chest pain      Percocet [Oxycodone-Acetaminophen] Nausea And Vomiting     Sweating, chest pain    Tolectin [Tolmetin Sodium] Rash    Tolmetin Sodium Rash    Ultram [Tramadol Hcl] Itching and Rash     Rash on face    Vicodin [Hydrocodone-Acetaminophen] Nausea And Vomiting     swearing    Vioxx Rash       Problem List:    Patient Active Problem List   Diagnosis Code    Glaucoma H40.9    GERD (gastroesophageal reflux disease) K21.9    DJD (degenerative joint disease) M19.90    Obesity E66.9    Polyneuropathy G62.9    Migraine G43.909    Mitral prolapse I34.1    Low back pain M54.5    CTS (carpal tunnel syndrome) G56.00    Supraspinatus syndrome M75.100    Impaired fasting glucose R73.01    Acute sinus infection J01.90    IBS (irritable bowel syndrome) K58.9    Back pain, chronic M54.9, G89.29    Stress fracture, right 5th metatarsal  M84.30XA    Upper airway cough syndrome R05    Ear fullness H93.8X9    Perioral numbness R20.0    Screening for osteoporosis Z13.820    Headache R51.9    Left eye injury S05. 92XA    Medication reaction T50.905A    Osteopenia M85.80    Lumbosacral pain M54.5    Flu vaccine need Z22    Hypothyroid E03.9    Urinary incontinence R32    Anxiety F41.9    Sleep apnea G47.30    Depression F32.9    Chronic back pain M54.9, G89.29    Carpal tunnel syndrome G56.00    Neuropathy G62.9    Mitral valve problem I05.9    Morbid obesity with BMI of 45.0-49.9, adult ([facility]) E66.01, Z68.42    Frozen shoulder M75.00    Skin tag L91.8    Degenerative disc disease, lumbar M51.36    Bilateral edema of lower extremity R60.0    Medication refill Z76.0    Varicose vein of leg I83.90    Dizziness R42    Dysphagia R13.10    Abdominal bloating R14.0    Mild intermittent asthma without complication G08.82    Skin lesion of left leg L98.9       Past Medical History:        Diagnosis Date    Abdominal bloating 1/23/2019    Anxiety     Bilateral edema of lower extremity 12/16/2016    Bronchial asthma     mild, intermittent    Carpal tunnel syndrome     Cataract     Chronic back pain     Chronic sinusitis     Degenerative disc disease, lumbar 12/16/2016    Depression     Diabetes mellitus due to underlying condition with hyperosmolarity without coma ([facility] [address]     Dizziness 10/27/2017    DJD (degenerative joint disease)     S1, L3, L4, L5, T12    Dysphagia 1/23/2019    Edema of leg     bilateral legs    Environmental allergies     Frozen shoulder 4/27/2015    GERD (gastroesophageal reflux disease)     Glaucoma     Glucose intolerance (impaired glucose tolerance)     Headache(784.0)     History of bronchitis     Viral    HTN (hypertension)     Hyperlipidemia     Hypothyroid     hypothyroid state    Hypothyroidism     IBS (irritable bowel syndrome) 10/02/2012    Legally blind     due to glaucoma    Medication refill 6/1/2017    Mild intermittent asthma without complication     Morbid obesity with BMI of 45.0-49.9, adult (HCC)     MVP (mitral valve prolapse)     Neuropathy     OA (osteoarthritis)     EMILIA on CPAP     Osteopenia     Pancreatic cyst     Polyneuropathy     Raynaud disease     Rhinopharyngitis     Allergic rhinopharyngitis    Skin lesion of left leg 6/11/2021    Skin tag 7/9/2015    Stress fracture     Right 5th Metatarsal     Syncope     TIA (transient ischemic attack)     Urinary incontinence     Varicose vein of leg 6/1/2017    Vasodepressor syncope     Wears glasses        Past Surgical History:        Procedure Laterality Date    APPENDECTOMY  1978    CATARACT REMOVAL Left     CHOLECYSTECTOMY  1978    COLONOSCOPY      COLONOSCOPY N/A 12/9/2019    COLORECTAL CANCER SCREENING, NOT HIGH RISK performed by [employer] Larry Acevedo MD at [address] [place] [address] Bilateral     right iridectomy, right laser, bilateral trabeculectomy, left drain    GLAUCOMA SURGERY      HAND SURGERY Right     HYSTERECTOMY  1982    KNEE SURGERY Right 2000    TUBAL LIGATION  1981    UPPER GASTROINTESTINAL ENDOSCOPY      UPPER GASTROINTESTINAL ENDOSCOPY  5/24/2018    EGD DILATION SAVORY performed by Katerin Roper MD at [address] [facility] ENDOSCOPY  3/6/2019    EGD DILATION SAVORY performed by Sue Sanders MD at [facility]       Social History:    Social History     Tobacco Use    Smoking status: Never Smoker    Smokeless tobacco: Never Used   Substance Use Topics    Alcohol use: No     Alcohol/week: 0.0 standard drinks                                Counseling given: Not Answered      Vital Signs (Current):   Vitals:    09/08/21 0855 09/08/21 0911   BP:  (!) 166/78   Pulse:  74   Resp:  18   Temp:  97.3 °F (36.3 °C)   TempSrc:  Infrared   SpO2:  96%   Weight: (!) 311 lb (141.1 kg)    Height: 5' 7\" (1.702 m)                                               BP Readings from Last 3 Encounters:   09/08/21 (!) 166/78   08/27/21 136/67   08/16/21 (!) 152/92       NPO Status: Time of last liquid consumption: 2130                        Time of last solid consumption: 2130                        Date of last liquid consumption: 09/07/21                        Date of last solid food consumption: 09/07/21    BMI:   Wt Readings from Last 3 Encounters:   09/08/21 (!) 311 lb (141.1 kg)   09/02/21 (!) 311 lb (141.1 kg)   08/27/21 (!) 311 lb (141.1 kg)     Body mass index is 48.71 kg/m².     CBC:   Lab Results   Component Value Date    WBC 12.5 01/20/2021    RBC 5.04 01/20/2021    RBC 4.63 06/04/2012    HGB 15.6 01/20/2021    HCT 46.8 01/20/2021    MCV 92.9 01/20/2021    RDW 12.2 01/20/2021     01/20/2021     06/04/2012       CMP:   Lab Results   Component Value Date     01/20/2021    K 3.9 01/20/2021     01/20/2021    CO2 33 01/20/2021    BUN 20 01/20/2021    CREATININE 0.79 01/20/2021    GFRAA >60 01/20/2021    LABGLOM >60 01/20/2021    GLUCOSE 105 01/20/2021    GLUCOSE 101 06/04/2012    PROT 7.6 01/20/2021    CALCIUM 9.7 01/20/2021    BILITOT 0.72 01/20/2021    ALKPHOS 81 01/20/2021    AST 26 01/20/2021    ALT 35 01/20/2021       POC Tests: No results for input(s): POCGLU, POCNA, POCK, POCCL, POCBUN, POCHEMO, POCHCT in the last 72 hours. Coags:   Lab Results   Component Value Date    PROTIME 10.0 06/29/2013    INR 0.9 06/29/2013    APTT 24.1 06/29/2013       HCG (If Applicable): No results found for: PREGTESTUR, PREGSERUM, HCG, HCGQUANT     ABGs: No results found for: PHART, PO2ART, ZMS6LND, SXD4CDA, BEART, R5TELGJD     Type & Screen (If Applicable):  No results found for: LABABO, LABRH    Drug/Infectious Status (If Applicable):  Lab Results   Component Value Date    HEPCAB NONREACTIVE 12/15/2014       COVID-19 Screening (If Applicable):   Lab Results   Component Value Date    COVID19 Not Detected 09/08/2021    COVID19 Not Detected 11/06/2020           Anesthesia Evaluation  Patient summary reviewed and Nursing notes reviewed no history of anesthetic complications:   Airway: Mallampati: III  TM distance: >3 FB   Neck ROM: full  Mouth opening: > = 3 FB Dental: normal exam         Pulmonary:normal exam  breath sounds clear to auscultation  (+) sleep apnea: on CPAP,  asthma:                            Cardiovascular:    (+) hypertension:, valvular problems/murmurs: MVP, hyperlipidemia      ECG reviewed  Rhythm: regular  Rate: normal                 ROS comment: Patient has a condition where her BP suddenly drops - not sure of the name. Being followed by cardiology    Some chest pressure which she attributes to her EMILIA. Not related to exercise.  Mild at this time, usually does not radiate to the left arm or jaw - patient advised to follow up with cardiology or PCP     Neuro/Psych:   (+) neuromuscular disease:, TIA, headaches: migraine headaches, psychiatric history: stable with treatment             ROS comment: DDD - lumbar area  Polyneuropathy  Legally blind - from Glaucoma GI/Hepatic/Renal:   (+) GERD:, morbid obesity         ROS comment: Dysphagia  IBS. Endo/Other:    (+) DiabetesType II DM, , hypothyroidism::., .                  ROS comment: Uses cane for ambulation Abdominal:             Vascular: negative vascular ROS. Other Findings:           Anesthesia Plan      general     ASA 3       Induction: intravenous. MIPS: Prophylactic antiemetics administered. Anesthetic plan and risks discussed with patient. Plan discussed with CRNA.                   Gregorio Benton MD   9/8/2021

## 2021-09-15 NOTE — PROGRESS NOTES
Patient instructed to remove shoes and socks and instructed to sit in exam chair. Current PCP is Deon Yarbrough MD and date of last visit was 9/21/21. Do you have a follow up visit scheduled? No  If yes, the date is       Diabetic visit information    Blood pressure (Control is BP <140/90)  BP Readings from Last 3 Encounters:   09/08/21 136/88   09/08/21 (!) 145/82   08/27/21 136/67       BP taken with correct size cuff? - Yes   Repeated if > 140/90 NA      Tobacco use:  Patient  reports that she has never smoked. She has never used smokeless tobacco.  If Smoker - Cessation materials given? - Yes       Diabetic Health Maintenance Items due  Diabetes Management   Topic Date Due    Diabetic retinal exam  03/06/2021       Diabetic retinal exam done in last year? - Yes   If No: remind patient that it is due and they should schedule an exam    Medications  Is patient taking any medications for diabetes? -   Yes  Have blood sugars been controlled? Fasting blood sugars under 120   -   Yes   Random home sugars or today's POCT glucose is under 180 -   Yes   []  If No to the above then patient should schedule appt with PCP. Diabetic Plan    A1C Plan  Lab Results   Component Value Date    LABA1C 5.5 06/11/2021    LABA1C 5.3 12/03/2020    LABA1C 5.4 01/02/2020      []  If A1C over 8 and last result >3 months ago - Order A1C and refer to PCP   []  If last A1C over 6 months ago - Order A1C and refer to PCP for follow up   []  If elevated blood sugars > 180 - refer to PCP for follow up    []  Blood sugar controlled - A1C under 8 and last check was < 6 months      Cholesterol Plan   Lab Results   Component Value Date    LDLCALC 68 06/16/2017    LDLCHOLESTEROL 64 12/14/2020      []  If LDL > 100 and last result >3 months ago - order Fasting lipids and refer to PCP for follow up   []  If LDL < 100 and over 1 year ago - Order Fasting lipids and refer to PCP for follow up   [] LDL is controlled.   LDL < 100 and checked within the last year     Blood Pressure  BP Readings from Last 3 Encounters:   09/08/21 136/88   09/08/21 (!) 145/82   08/27/21 136/67      []  If SBP >140 mmhg - refer to PCP for follow up   []  If DBP > 90 mmhg - refer to PCP for follow up   [] BP is controlled <140/90     Order labs as PCP ordered.   (ie: Lipids, A1C, CMP)

## 2021-09-21 ENCOUNTER — OFFICE VISIT (OUTPATIENT)
Dept: FAMILY MEDICINE CLINIC | Age: 61
End: 2021-09-21
Payer: COMMERCIAL

## 2021-09-21 VITALS
HEIGHT: 67 IN | DIASTOLIC BLOOD PRESSURE: 84 MMHG | SYSTOLIC BLOOD PRESSURE: 143 MMHG | WEIGHT: 293 LBS | BODY MASS INDEX: 45.99 KG/M2 | HEART RATE: 64 BPM

## 2021-09-21 DIAGNOSIS — E78.5 HYPERLIPIDEMIA, UNSPECIFIED HYPERLIPIDEMIA TYPE: ICD-10-CM

## 2021-09-21 DIAGNOSIS — Z23 INFLUENZA VACCINATION ADMINISTERED AT CURRENT VISIT: ICD-10-CM

## 2021-09-21 DIAGNOSIS — J45.909 UNCOMPLICATED ASTHMA, UNSPECIFIED ASTHMA SEVERITY, UNSPECIFIED WHETHER PERSISTENT: ICD-10-CM

## 2021-09-21 DIAGNOSIS — E55.9 VITAMIN D DEFICIENCY: ICD-10-CM

## 2021-09-21 DIAGNOSIS — E03.9 HYPOTHYROIDISM, UNSPECIFIED TYPE: ICD-10-CM

## 2021-09-21 DIAGNOSIS — L02.215 ABSCESS, PERINEUM: Primary | ICD-10-CM

## 2021-09-21 DIAGNOSIS — Z76.0 MEDICATION REFILL: ICD-10-CM

## 2021-09-21 PROCEDURE — 1111F DSCHRG MED/CURRENT MED MERGE: CPT | Performed by: STUDENT IN AN ORGANIZED HEALTH CARE EDUCATION/TRAINING PROGRAM

## 2021-09-21 PROCEDURE — 99213 OFFICE O/P EST LOW 20 MIN: CPT | Performed by: STUDENT IN AN ORGANIZED HEALTH CARE EDUCATION/TRAINING PROGRAM

## 2021-09-21 PROCEDURE — 90686 IIV4 VACC NO PRSV 0.5 ML IM: CPT | Performed by: STUDENT IN AN ORGANIZED HEALTH CARE EDUCATION/TRAINING PROGRAM

## 2021-09-21 RX ORDER — ALBUTEROL SULFATE 90 UG/1
AEROSOL, METERED RESPIRATORY (INHALATION)
Qty: 18 G | Refills: 2 | Status: SHIPPED | OUTPATIENT
Start: 2021-09-21 | End: 2021-12-06

## 2021-09-21 RX ORDER — CALCIUM CARBONATE/VITAMIN D3 600 MG-10
TABLET ORAL
Qty: 60 TABLET | Refills: 5 | Status: SHIPPED | OUTPATIENT
Start: 2021-09-21 | End: 2022-01-31 | Stop reason: SDUPTHER

## 2021-09-21 RX ORDER — ROSUVASTATIN CALCIUM 5 MG/1
5 TABLET, COATED ORAL DAILY
Qty: 30 TABLET | Refills: 3 | Status: SHIPPED | OUTPATIENT
Start: 2021-09-21 | End: 2022-09-08 | Stop reason: SDUPTHER

## 2021-09-21 RX ORDER — FOLIC ACID/MV,IRON,MIN/LUTEIN 0.4-18-25
TABLET ORAL
Qty: 30 TABLET | Refills: 5 | Status: SHIPPED | OUTPATIENT
Start: 2021-09-21 | End: 2022-01-31 | Stop reason: SDUPTHER

## 2021-09-21 RX ORDER — LEVOTHYROXINE SODIUM 0.1 MG/1
100 TABLET ORAL DAILY
Qty: 30 TABLET | Refills: 5 | Status: SHIPPED | OUTPATIENT
Start: 2021-09-21

## 2021-09-21 RX ORDER — MULTIVIT-MIN/IRON/FOLIC ACID/K 18-600-40
1000 CAPSULE ORAL DAILY
Qty: 30 CAPSULE | Refills: 5 | Status: SHIPPED | OUTPATIENT
Start: 2021-09-21 | End: 2022-01-11

## 2021-09-21 NOTE — PROGRESS NOTES
Post-Discharge Transitional Care Management Services or Hospital Follow Up      Carmelita Alston   YOB: 1960    Date of Office Visit:  9/21/2021  Date of Hospital Admission: 9/8/21  Date of Hospital Discharge: 9/8/21  Risk of hospital readmission (high >=14%.  Medium >=10%) :No data recorded    Care management risk score Rising risk (score 2-5) and Complex Care (Scores >=6): 6     Non face to face  following discharge, date last encounter closed (first attempt may have been earlier): *No documented post hospital discharge outreach found in the last 14 days    Call initiated 2 business days of discharge: *No response recorded in the last 14 days    Patient Active Problem List   Diagnosis    Glaucoma    GERD (gastroesophageal reflux disease)    DJD (degenerative joint disease)    Obesity    Polyneuropathy    Migraine    Mitral prolapse    Low back pain    CTS (carpal tunnel syndrome)    Supraspinatus syndrome    Impaired fasting glucose    Acute sinus infection    IBS (irritable bowel syndrome)    Back pain, chronic    Stress fracture, right 5th metatarsal     Upper airway cough syndrome    Ear fullness    Perioral numbness    Screening for osteoporosis    Headache    Left eye injury    Medication reaction    Osteopenia    Lumbosacral pain    Flu vaccine need    Hypothyroid    Urinary incontinence    Anxiety    Sleep apnea    Depression    Chronic back pain    Carpal tunnel syndrome    Neuropathy    Mitral valve problem    Morbid obesity with BMI of 45.0-49.9, adult (HCC)    Frozen shoulder    Skin tag    Degenerative disc disease, lumbar    Bilateral edema of lower extremity    Medication refill    Varicose vein of leg    Dizziness    Dysphagia    Abdominal bloating    Mild intermittent asthma without complication    Skin lesion of left leg       Allergies   Allergen Reactions    Latex Rash     blisters  blisters    Adhesive Tape Rash     blisters    Amlodipine Other (See Comments)     Facial numbness  Facial numbness  Facial numbness  Facial numbness  Facial numbness  Facial numbness  Facial numbness    Bextra [Valdecoxib] Rash     swelling    Brimonidine Itching     Burning eyes severe    Daypro [Oxaprozin] Anaphylaxis    Diclofenac Sodium Swelling and Shortness Of Breath    Famotidine Other (See Comments)     Blisters down her arms    Hydrocodone-Acetaminophen Nausea Only     Severe chest pain    Hydromorphone Other (See Comments)     Rapid heart beat,  Nausea, spent 3 days in cardiac unit    Ibuprofen      Other reaction(s): bleeding  Other reaction(s): Unknown  Black stools  \"rips up stomach\", black tarry stool  Black stools  \"rips up stomach\", black tarry stool    Lisinopril Nausea Only, Nausea And Vomiting and Other (See Comments)     Other reaction(s): Hypotension    Metoprolol Other (See Comments)     Other reaction(s): Dizziness    Naproxen Other (See Comments)     Chest pain , swelling    Oxycodone-Acetaminophen      Chest pain severe    Rofecoxib Rash     swelling    Shellfish-Derived Products Anaphylaxis and Rash     shrimp  shrimp  shrimp    Simvastatin Nausea And Vomiting     Other reaction(s): muscle cramps    Statins      Other reaction(s): muscle cramps  Tolerates lovastatin.  Pravachol caused numbness in head/face    Sulfa Antibiotics Hives    Tetracyclines & Related Itching and Rash    Timoptic [Timolol Maleate] Itching and Swelling     Eyes burning severely    Tramadol Itching and Rash    Fosamax [Alendronate] Nausea Only and Nausea And Vomiting    Nalbuphine Nausea And Vomiting    Alendronate Sodium     Alendronate Sodium     Alphagan [Brimonidine Tartrate]      Eye irritation    Dilaudid [Hydromorphone Hcl]     Doxycycline Monohydrate     Nsaids     Other Itching and Swelling     Eyes burning    Pepcid Complete [Famotidine-Ca Carb-Mag Hydrox] Hives and Other (See Comments)     blisters    Pilocarpine Itching and Swelling     Blurred vision  Eyes burning    Pravastatin Other (See Comments)     Facial numming    Red Dye Nausea Only     Pt states allergic to red coloring in crestor with CY on pill and senokot red pill. Feels sick to stomach and head feels foggy.  Shrimp Flavor Hives and Swelling    Statins Support Therapy      Tolerates lovastatin.  Pravachol caused numbness in head/face    Timoptic [Timolol Maleate]      Eye irritation      Tolectin [Tolmetin]      Unknown reaction  - as a child    Voltaren [Diclofenac Sodium]      Flu symptoms      Nubain [Nalbuphine Hcl] Nausea And Vomiting     Chest pain      Percocet [Oxycodone-Acetaminophen] Nausea And Vomiting     Sweating, chest pain    Tolectin [Tolmetin Sodium] Rash    Tolmetin Sodium Rash    Ultram [Tramadol Hcl] Itching and Rash     Rash on face    Vicodin [Hydrocodone-Acetaminophen] Nausea And Vomiting     swearing    Vioxx Rash       Medications listed as ordered at the time of discharge from hospital   [facility], [address] [place] [address] Medication Instructions [id number]    Printed on:09/21/21 1150   Medication Information                      Accu-Chek FastClix Lancets MISC  Use as directed             acetaminophen (ACETAMINOPHEN EXTRA STRENGTH) 500 MG tablet  Take 2 tablets by mouth every 6 hours as needed for Pain             albuterol sulfate  (90 Base) MCG/ACT inhaler  INHALE 2 PUFFS INTO THE LUNGS EVERY SIX HOURS AS NEEDED             ASPIRIN LOW DOSE 81 MG EC tablet  Take 1 tablet by mouth daily             Blood Glucose Calibration (ONETOUCH VERIO) SOLN               Blood Glucose Monitoring Suppl (ONETOUCH VERIO FLEX SYSTEM) w/Device KIT               calcium carb-cholecalciferol 600-400 MG-UNIT TABS per tab  TAKE 1 TABLET BY MOUTH TWICE A DAY             Cholecalciferol (VITAMIN D) 50 MCG (2000 UT) CAPS capsule  Take 1,000 Units by mouth daily             fluticasone (FLONASE) 50 MCG/ACT nasal spray  USE 1 SPRAY IN EACH NOSTRIL D-3 SUPER STRENGTH 50 MCG (2000 UT) TABS       fluticasone (FLONASE) 50 MCG/ACT nasal spray USE 1 SPRAY IN EACH NOSTRIL TWICE A DAY 16 g 5        Medications patient taking as of now reconciled against medications ordered at time of hospital discharge: Yes    Chief Complaint   Patient presents with    Follow-Up from Hospital     er 9/8/21       History of Present illness - Follow up of Hospital diagnosis(es):   Left perineal abscess  -  Was at the ED 09/08/2021  - likely spontaneous drainage of the abscess, was discharged Keflex 500mg TID for 7 days. - finished course of medications. - discomfort currently 1/10, no more drainage  -Patient only complaining of mild burning sensation while she is using the restroom. Denies any fevers or chills. Inpatient course: Discharge summary reviewed- see chart. Interval history/Current status:     A comprehensive review of systems was negative except for what was noted in the HPI. Vitals:    09/21/21 0854 09/21/21 1004   BP: (!) 163/86 (!) 143/84   Site: Left Lower Arm Left Lower Arm   Position: Sitting Sitting   Cuff Size: Medium Adult Medium Adult   Pulse: 75 64   Weight: (!) 322 lb 12.8 oz (146.4 kg)    Height: 5' 7\" (1.702 m)      Body mass index is 50.56 kg/m².    Wt Readings from Last 3 Encounters:   09/21/21 (!) 322 lb 12.8 oz (146.4 kg)   09/08/21 (!) 311 lb (141.1 kg)   09/02/21 (!) 311 lb (141.1 kg)     BP Readings from Last 3 Encounters:   09/21/21 (!) 143/84   09/08/21 136/88   09/08/21 (!) 145/82        Physical Exam:  General Appearance: alert and oriented to person, place and time, well developed and well- nourished, in no acute distress  Skin: warm and dry, no rash or erythema  Head: normocephalic and atraumatic  Eyes: pupils equal, round, and reactive to light, extraocular eye movements intact, conjunctivae normal  ENT: tympanic membrane, external ear and ear canal normal bilaterally, nose without deformity, nasal mucosa and turbinates normal without polyps  Neck: supple and non-tender without mass, no thyromegaly or thyroid nodules, no cervical lymphadenopathy  Pulmonary/Chest: clear to auscultation bilaterally- no wheezes, rales or rhonchi, normal air movement, no respiratory distress  Cardiovascular: normal rate, regular rhythm, normal S1 and S2, no murmurs, rubs, clicks, or gallops, distal pulses intact, no carotid bruits  Abdomen: soft, non-tender, non-distended, normal bowel sounds, no masses or organomegaly  Pelvic: normal external genitalia, vulva, vagina, cervix, uterus and adnexa. Small 1 cm oval lesion noted in the right perineal region. Granulation tissue present, small pinpoint opening noted with no active drainage, no induration or erythema noted. Wound appears to be healing well. Extremities: no cyanosis, clubbing or edema  Musculoskeletal: normal range of motion, no joint swelling, deformity or tenderness  Neurologic: reflexes normal and symmetric, no cranial nerve deficit, gait, coordination and speech normal    Assessment/Plan:  1. Abscess, perineum  -Healing well  - RI DISCHARGE MEDS RECONCILED W/ CURRENT OUTPATIENT MED LIST    2. Influenza vaccination administered at current visit  - INFLUENZA, QUADV, 3 YRS AND OLDER, IM PF, PREFILL SYR OR SDV, 0.5ML (AFLURIA QUADV, PF)    3. Vitamin D deficiency  - calcium carb-cholecalciferol 600-400 MG-UNIT TABS per tab; TAKE 1 TABLET BY MOUTH TWICE A DAY  Dispense: 60 tablet; Refill: 5    4. Medication refill  - Multiple Vitamins-Minerals (CERTAVITE/ANTIOXIDANTS) TABS; TAKE 1 TABLET DAILY  Dispense: 30 tablet; Refill: 5  - levothyroxine (SYNTHROID) 100 MCG tablet; Take 1 tablet by mouth Daily Indications: 112 mg  Dispense: 30 tablet; Refill: 5    5. Hypothyroidism, unspecified type  - levothyroxine (SYNTHROID) 100 MCG tablet; Take 1 tablet by mouth Daily Indications: 112 mg  Dispense: 30 tablet; Refill: 5    6. Hyperlipidemia, unspecified hyperlipidemia type  - rosuvastatin (CRESTOR) 5 MG tablet;  Take 1 tablet by mouth daily  Dispense: 30 tablet; Refill: 3    7.  Uncomplicated asthma, unspecified asthma severity, unspecified whether persistent  - albuterol sulfate  (90 Base) MCG/ACT inhaler; INHALE 2 PUFFS INTO THE LUNGS EVERY SIX HOURS AS NEEDED  Dispense: 18 g; Refill: 2        Medical Decision Making: moderate complexity 36.7

## 2021-09-21 NOTE — PROGRESS NOTES
Attending Physician Statement  I have discussed the care of Kaci Banerjee 64 y.o. female, including pertinent history and exam findings, with the resident Dr. Rick Benz MD.    History and Exam:   Chief Complaint   Patient presents with    Follow-Up from Hospital     er 9/8/21       Past Medical History:   Diagnosis Date    Abdominal bloating 1/23/2019    Anxiety     Bilateral edema of lower extremity 12/16/2016    Bronchial asthma     mild, intermittent    Carpal tunnel syndrome     Cataract     Chronic back pain     Chronic sinusitis     Degenerative disc disease, lumbar 12/16/2016    Depression     Diabetes mellitus due to underlying condition with hyperosmolarity without coma ([facility] [address]     Dizziness 10/27/2017    DJD (degenerative joint disease)     S1, L3, L4, L5, T12    Dysphagia 1/23/2019    Edema of leg     bilateral legs    Environmental allergies     Frozen shoulder 4/27/2015    GERD (gastroesophageal reflux disease)     Glaucoma     Glucose intolerance (impaired glucose tolerance)     Headache(784.0)     History of bronchitis     Viral    HTN (hypertension)     Hyperlipidemia     Hypothyroid     hypothyroid state    Hypothyroidism     IBS (irritable bowel syndrome) 10/02/2012    Legally blind     due to glaucoma    Medication refill 6/1/2017    Mild intermittent asthma without complication     Morbid obesity with BMI of 45.0-49.9, adult (HCC)     MVP (mitral valve prolapse)     Neuropathy     OA (osteoarthritis)     EMILIA on CPAP     Osteopenia     Pancreatic cyst     Polyneuropathy     Raynaud disease     Rhinopharyngitis     Allergic rhinopharyngitis    Skin lesion of left leg 6/11/2021    Skin tag 7/9/2015    Stress fracture     Right 5th Metatarsal     Syncope     TIA (transient ischemic attack)     Urinary incontinence     Varicose vein of leg 6/1/2017    Vasodepressor syncope     Wears glasses      Allergies   Allergen Reactions    Latex Rash blisters  blisters    Adhesive Tape Rash     blisters    Amlodipine Other (See Comments)     Facial numbness  Facial numbness  Facial numbness  Facial numbness  Facial numbness  Facial numbness  Facial numbness    Bextra [Valdecoxib] Rash     swelling    Brimonidine Itching     Burning eyes severe    Daypro [Oxaprozin] Anaphylaxis    Diclofenac Sodium Swelling and Shortness Of Breath    Famotidine Other (See Comments)     Blisters down her arms    Hydrocodone-Acetaminophen Nausea Only     Severe chest pain    Hydromorphone Other (See Comments)     Rapid heart beat,  Nausea, spent 3 days in cardiac unit    Ibuprofen      Other reaction(s): bleeding  Other reaction(s): Unknown  Black stools  \"rips up stomach\", black tarry stool  Black stools  \"rips up stomach\", black tarry stool    Lisinopril Nausea Only, Nausea And Vomiting and Other (See Comments)     Other reaction(s): Hypotension    Metoprolol Other (See Comments)     Other reaction(s): Dizziness    Naproxen Other (See Comments)     Chest pain , swelling    Oxycodone-Acetaminophen      Chest pain severe    Rofecoxib Rash     swelling    Shellfish-Derived Products Anaphylaxis and Rash     shrimp  shrimp  shrimp    Simvastatin Nausea And Vomiting     Other reaction(s): muscle cramps    Statins      Other reaction(s): muscle cramps  Tolerates lovastatin.  Pravachol caused numbness in head/face    Sulfa Antibiotics Hives    Tetracyclines & Related Itching and Rash    Timoptic [Timolol Maleate] Itching and Swelling     Eyes burning severely    Tramadol Itching and Rash    Fosamax [Alendronate] Nausea Only and Nausea And Vomiting    Nalbuphine Nausea And Vomiting    Alendronate Sodium     Alendronate Sodium     Alphagan [Brimonidine Tartrate]      Eye irritation    Dilaudid [Hydromorphone Hcl]     Doxycycline Monohydrate     Nsaids     Other Itching and Swelling     Eyes burning    Pepcid Complete [Famotidine-Ca Carb-Mag Hydrox] Hives and Other (See Comments)     blisters    Pilocarpine Itching and Swelling     Blurred vision  Eyes burning    Pravastatin Other (See Comments)     Facial numming    Red Dye Nausea Only     Pt states allergic to red coloring in crestor with CY on pill and senokot red pill. Feels sick to stomach and head feels foggy.  Shrimp Flavor Hives and Swelling    Statins Support Therapy      Tolerates lovastatin. Pravachol caused numbness in head/face    Timoptic [Timolol Maleate]      Eye irritation      Tolectin [Tolmetin]      Unknown reaction  - as a child    Voltaren [Diclofenac Sodium]      Flu symptoms      Nubain [Nalbuphine Hcl] Nausea And Vomiting     Chest pain      Percocet [Oxycodone-Acetaminophen] Nausea And Vomiting     Sweating, chest pain    Tolectin [Tolmetin Sodium] Rash    Tolmetin Sodium Rash    Ultram [Tramadol Hcl] Itching and Rash     Rash on face    Vicodin [Hydrocodone-Acetaminophen] Nausea And Vomiting     swearing    Vioxx Rash      I have seen and examined the patient and the key elements of the encounter have been performed by me.   BP Readings from Last 3 Encounters:   09/21/21 (!) 143/84   09/08/21 136/88   09/08/21 (!) 145/82     BP (!) 143/84 (Site: Left Lower Arm, Position: Sitting, Cuff Size: Medium Adult) Comment: machine  Pulse 64   Ht 5' 7\" (1.702 m)   Wt (!) 322 lb 12.8 oz (146.4 kg)   BMI 50.56 kg/m²   Lab Results   Component Value Date    WBC 12.5 (H) 01/20/2021    HGB 15.6 (H) 01/20/2021    HCT 46.8 01/20/2021     01/20/2021    CHOL 132 12/14/2020    TRIG 114 12/14/2020    HDL 45 12/14/2020    ALT 35 (H) 01/20/2021    AST 26 01/20/2021     01/20/2021    K 3.9 01/20/2021     01/20/2021    CREATININE 0.79 01/20/2021    BUN 20 01/20/2021    CO2 33 (H) 01/20/2021    TSH 3.12 06/14/2021    INR 0.9 06/29/2013    LABA1C 5.5 06/11/2021    LABMICR CANNOT BE CALCULATED 01/02/2020     Lab Results   Component Value Date    LABPROT 6.5 10/17/2012    LABALBU 4.4 01/20/2021     No results found for: IRON, TIBC, FERRITIN  Lab Results   Component Value Date    LDLCALC 68 06/16/2017    LDLCHOLESTEROL 64 12/14/2020     I agree with the assessment, plan and the diagnosis of    Diagnosis Orders   1. Abscess, perineum  [facility] DISCHARGE MEDS RECONCILED W/ CURRENT OUTPATIENT MED LIST   2. Influenza vaccination administered at current visit  INFLUENZA, QUADV, 3 YRS AND OLDER, IM PF, PREFILL SYR OR SDV, 0.5ML (AFLURIA QUADV, PF)   3. Vitamin D deficiency  calcium carb-cholecalciferol 600-400 MG-UNIT TABS per tab   4. Medication refill  Multiple Vitamins-Minerals (CERTAVITE/ANTIOXIDANTS) TABS    levothyroxine (SYNTHROID) 100 MCG tablet   5. Hypothyroidism, unspecified type  levothyroxine (SYNTHROID) 100 MCG tablet   6. Hyperlipidemia, unspecified hyperlipidemia type  rosuvastatin (CRESTOR) 5 MG tablet   7. Uncomplicated asthma, unspecified asthma severity, unspecified whether persistent  albuterol sulfate  (90 Base) MCG/ACT inhaler    . I agree with orders as documented by the resident. More than 25 minutes spent  in face to face encounter with the patient and more than half in counseling. Patient's questions were answered. Patient Voiced understanding to the counseling. Return in 3 months (on 12/21/2021).    (GC Modifier)-Dr. Sybil Mora MD

## 2021-09-21 NOTE — PROGRESS NOTES
Visit Information    Have you changed or started any medications since your last visit including any over-the-counter medicines, vitamins, or herbal medicines? no   Have you stopped taking any of your medications? Is so, why? -  no  Are you having any side effects from any of your medications? - no    Have you seen any other physician or provider since your last visit?  no   Have you had any other diagnostic tests since your last visit? yes - labs   Have you been seen in the emergency room and/or had an admission in a hospital since we last saw you?  yes - [facility]   Have you had your routine dental cleaning in the past 6 months?  no     Do you have an active MyChart account? If no, what is the barrier?   Yes    Patient Care Team:  Dash Davis MD as PCP - General (Family Medicine)  Maylin Davenport DO as Surgeon (Orthopedic Surgery)  Shiela Figueredo MD as Consulting Physician (Cardiology)  Tracy Baron MD as Consulting Physician (Pulmonology)  Chas Troy as Consulting Physician  Robi oMrris MD as Consulting Physician (Endocrinology)  Wang Benz MD as Consulting Physician (Obstetrics & Gynecology)  Sue Sanders MD as Consulting Physician (Gastroenterology)    Medical History Review  Past Medical, Family, and Social History reviewed and does not contribute to the patient presenting condition    Health Maintenance   Topic Date Due    COVID-19 Vaccine (1) Never done    Diabetic retinal exam  03/06/2021    Flu vaccine (1) 09/01/2021    Diabetic microalbuminuria test  09/30/2021    Lipid screen  12/14/2021    A1C test (Diabetic or Prediabetic)  06/11/2022    TSH testing  06/14/2022    Diabetic foot exam  07/07/2022    Breast cancer screen  12/14/2022    Pneumococcal 0-64 years Vaccine (2 of 2 - PPSV23) 03/09/2025    DTaP/Tdap/Td vaccine (2 - Td or Tdap) 06/01/2027    Colon cancer screen colonoscopy  12/09/2029    Shingles Vaccine  Completed    Hepatitis C screen  Completed  HIV screen  Completed    Hepatitis A vaccine  Aged Out    Hib vaccine  Aged Out    Meningococcal (ACWY) vaccine  Aged Out

## 2021-09-22 ENCOUNTER — OFFICE VISIT (OUTPATIENT)
Dept: PODIATRY | Age: 61
End: 2021-09-22
Payer: COMMERCIAL

## 2021-09-22 VITALS
SYSTOLIC BLOOD PRESSURE: 142 MMHG | DIASTOLIC BLOOD PRESSURE: 73 MMHG | BODY MASS INDEX: 45.99 KG/M2 | HEART RATE: 72 BPM | WEIGHT: 293 LBS | HEIGHT: 67 IN

## 2021-09-22 DIAGNOSIS — E11.43 CONTROLLED TYPE 2 DIABETES MELLITUS WITH DIABETIC AUTONOMIC NEUROPATHY, WITHOUT LONG-TERM CURRENT USE OF INSULIN (HCC): Primary | ICD-10-CM

## 2021-09-22 DIAGNOSIS — I73.9 PERIPHERAL VASCULAR DISORDER (HCC): ICD-10-CM

## 2021-09-22 PROCEDURE — G8427 DOCREV CUR MEDS BY ELIG CLIN: HCPCS

## 2021-09-22 PROCEDURE — 3044F HG A1C LEVEL LT 7.0%: CPT

## 2021-09-22 PROCEDURE — G8417 CALC BMI ABV UP PARAM F/U: HCPCS

## 2021-09-22 PROCEDURE — 2022F DILAT RTA XM EVC RTNOPTHY: CPT

## 2021-09-22 PROCEDURE — 1036F TOBACCO NON-USER: CPT

## 2021-09-22 PROCEDURE — 99212 OFFICE O/P EST SF 10 MIN: CPT

## 2021-09-22 PROCEDURE — 3017F COLORECTAL CA SCREEN DOC REV: CPT

## 2021-09-22 NOTE — PROGRESS NOTES
[address], 9 [facility]  Tel: [phone]   Fax: [phone]    Subjective     CC: Pain in bilateral feet    HPI:  Spenser Prieto is a 64 y.o. female who presents to clinic today for follow-up of pain in her bilateral feet. She received orthotics 1 month ago and states she has attempted to wear them daily, however is unable to wear for more than 2 hours maximum duration. She also relates burning to bilateral lower extremity. Relates that she normally wears compression stockings to bilateral lower extremities for edema. Denies any rest pain or claudication symptoms. Primary care physician is Mary Nguyen MD.    ROS:    Constitutional: Denies nausea, vomiting, fever, chills. Neurologic: Denies numbness, tingling, and burning in the feet. Vascular: Denies symptoms of lower extremity claudication. Skin: Painful thickened toenails   Otherwise negative except as noted in the HPI.      PMH:  Past Medical History:   Diagnosis Date    Abdominal bloating 1/23/2019    Anxiety     Bilateral edema of lower extremity 12/16/2016    Bronchial asthma     mild, intermittent    Carpal tunnel syndrome     Cataract     Chronic back pain     Chronic sinusitis     Degenerative disc disease, lumbar 12/16/2016    Depression     Diabetes mellitus due to underlying condition with hyperosmolarity without coma ([facility] [address]     Dizziness 10/27/2017    DJD (degenerative joint disease)     S1, L3, L4, L5, T12    Dysphagia 1/23/2019    Edema of leg     bilateral legs    Environmental allergies     Frozen shoulder 4/27/2015    GERD (gastroesophageal reflux disease)     Glaucoma     Glucose intolerance (impaired glucose tolerance)     Headache(784.0)     History of bronchitis     Viral    HTN (hypertension)     Hyperlipidemia     Hypothyroid     hypothyroid state    Hypothyroidism     IBS (irritable bowel syndrome) 10/02/2012    Legally blind     due to glaucoma    Medication refill 6/1/2017    Mild intermittent asthma without complication     Morbid obesity with BMI of 45.0-49.9, adult (HCC)     MVP (mitral valve prolapse)     Neuropathy     OA (osteoarthritis)     EMILIA on CPAP     Osteopenia     Pancreatic cyst     Polyneuropathy     Raynaud disease     Rhinopharyngitis     Allergic rhinopharyngitis    Skin lesion of left leg 6/11/2021    Skin tag 7/9/2015    Stress fracture     Right 5th Metatarsal     Syncope     TIA (transient ischemic attack)     Urinary incontinence     Varicose vein of leg 6/1/2017    Vasodepressor syncope     Wears glasses        Surgical History:   Past Surgical History:   Procedure Laterality Date    APPENDECTOMY  1978    CATARACT REMOVAL Left     CHOLECYSTECTOMY  1978    COLONOSCOPY      COLONOSCOPY N/A 12/9/2019    COLORECTAL CANCER SCREENING, NOT HIGH RISK performed by Estefani Florentino MD at [facility] Bilateral     right iridectomy, right laser, bilateral trabeculectomy, left drain    GLAUCOMA SURGERY      HAND SURGERY Right     HYSTERECTOMY  1982    KNEE SURGERY Right 2000    TUBAL LIGATION  1981    UPPER GASTROINTESTINAL ENDOSCOPY      UPPER GASTROINTESTINAL ENDOSCOPY  5/24/2018    EGD DILATION SAVORY performed by Greg De La Rosa MD at [address] [facility]  3/6/2019    EGD DILATION SAVORY performed by Estefani Florentino MD at [address] [facility] ENDOSCOPY N/A 9/8/2021    EGD DILATION SAVORY performed by Estefani Floerntino MD at [address] [facility]       Social History:  Social History     Tobacco Use    Smoking status: Never Smoker    Smokeless tobacco: Never Used   Vaping Use    Vaping Use: Never used   Substance Use Topics    Alcohol use: No     Alcohol/week: 0.0 standard drinks    Drug use: No       Medications:  Prior to Admission medications    Medication Sig Start Date End Date Taking?  Authorizing Provider calcium carb-cholecalciferol 600-400 MG-UNIT TABS per tab TAKE 1 TABLET BY MOUTH TWICE A DAY 9/21/21  Yes Dylon Roth MD   Multiple Vitamins-Minerals (CERTAVITE/ANTIOXIDANTS) TABS TAKE 1 TABLET DAILY 9/21/21  Yes Dylon Roth MD   levothyroxine (SYNTHROID) 100 MCG tablet Take 1 tablet by mouth Daily Indications: 112 mg 9/21/21  Yes Dylon Roth MD   Cholecalciferol (VITAMIN D) 50 MCG (2000 UT) CAPS capsule Take 1,000 Units by mouth daily 9/21/21  Yes Dylon Roth MD   rosuvastatin (CRESTOR) 5 MG tablet Take 1 tablet by mouth daily 9/21/21  Yes Dylon Roth MD   albuterol sulfate  (90 Base) MCG/ACT inhaler INHALE 2 PUFFS INTO THE LUNGS EVERY SIX HOURS AS NEEDED 9/21/21  Yes Dylon Roth MD   ASPIRIN LOW DOSE 81 MG EC tablet Take 1 tablet by mouth daily 8/18/21  Yes Dylon Roth MD   omeprazole (PRILOSEC) 40 MG delayed release capsule Take 1 capsule by mouth daily 8/17/21  Yes Tristan Davis MD   Blood Glucose Monitoring Suppl ([employer]ell[employer] FLEX SYSTEM) w/Device KIT  4/1/21  Yes Historical Provider, MD   Blood Glucose Calibration (ONETOUCH VERIO) SOLN  4/14/21  Yes Historical Provider, MD   acetaminophen (ACETAMINOPHEN EXTRA STRENGTH) 500 MG tablet Take 2 tablets by mouth every 6 hours as needed for Pain 6/11/21  Yes Yasmine López MD   Accu-Chek FastClix Lancets MISC Use as directed 6/11/21  Yes Yasmine López MD   FREESTYLE LITE strip  11/24/20  Yes Historical Provider, MD   Lancets Misc. (ACCU-CHEK FASTCLIX LANCET) KIT  11/25/20  Yes Historical Provider, MD   VITAMIN D-3 SUPER STRENGTH 50 MCG (2000 UT) TABS  12/3/20  Yes Historical Provider, MD   fluticasone (FLONASE) 50 MCG/ACT nasal spray USE 1 SPRAY IN EACH NOSTRIL TWICE A DAY 10/22/20  Yes Rasheeda Farrell MD       Objective     Vitals:    09/22/21 1234   BP: (!) 142/73   Pulse: 72       Lab Results   Component Value Date    LABA1C 5.5 06/11/2021       Physical Exam:  General:  Alert and oriented x3.  In no acute distress. Lower Extremity Physical Exam:    Vascular: DP pulses are palpable, Bilateral. PT pulses non palpable bilaterally. Varicose veins noted bilaterally. CFT <3 seconds to all digits, Bilateral.  Non pitting Edema noted to the bilateral lower extremity, Bilateral.  Hair growth is absent to the level of the digits, Bilateral.     Neuro: Saph/sural/SP/DP/plantar sensation intact to light touch. Protective sensation is intact to 4/10 sites on the right foot and 3/10 sites on the left foot as tested with a 5.07g SWMF, Bilateral.     Musculoskeletal: EHL/FHL/GS/TA gross motor intact. Decreased ankle range of motion bilateral. Neg pain on calf squeeze. Reduced ROM 1st MTPJ R worse than mild pain noted with palpation of second interspace and dorsiflexion plantarflexion second and third digits. Dermatologic: Skin shiny and atrophic with no hair growth noted. Interdigital maceration absent, Bilateral. Nails 1-5 thickened, elongated with subungual debris noted b/l. Hair growth absent bilat. No noted hyperkeratotic lesion. No open wounds appreciated.     Sites of Onychomycosis Involvement (Check affected area)  [x] [x] [x] [x] [x] [x] [x] [x] [x] [x]  5 4 3 2 1 1 2 3 4 5                          Right                                        Left    Thickness  [x] [x] [x] [x] [x] [x] [x] [x] [x] [x]  5 4 3 2 1 1 2 3 4 5                         Right                                        Left    Dystrophic Changes                                                                 [x] [x] [x] [x] [x] [x] [x] [x] [x] [x]  5 4 3 2 1 1 2 3 4 5                         Right                                        Left    Color                                                                  [x] [x] [x] [x] [x] [x] [x] [x] [x] [x]  5 4 3 2 1 1 2 3 4 5                          Right                                        Left    Incurvation/Ingrowin [] [] [] [] [] [] [] [] [] []  5 4 3 2 1 1 2 3 4 5                         Right                                        Left    Inflammation/Pain                                                                   [x] [x] [x] [x] [x] [x] [x] [x] [x] [x]  5 4 3 2 1 1 2 3 4 5                         Right                                        Left        Assessment   Kaci Gant Granddaniel is a 64 y.o. female with     Diagnosis Orders   1. Controlled type 2 diabetes mellitus with diabetic autonomic neuropathy, without long-term current use of insulin (HCC)  HM DIABETES FOOT EXAM   2. Peripheral vascular disorder (HCC)  VL LOWER EXTREMITY ARTERIAL SEGMENTAL PRESSURES W PPG        Plan   Pt was evaluated and examined. Patient was given personalized discharge instructions. Diagnosis was discussed with the pt and all of their questions were answered in detail. Proper foot hygiene and care was discussed with the pt. Instructed patient to modify use of orthotics, limit to 1 hour per day and then gradually increase use. PVRs ordered bilateral LE   Informed patient on proper diabetic foot care and importance of tight glycemic control. Patient to check feet daily and contact the office with any questions/problems/concerns. Other comorbidity noted and will be managed by PCP. · Please, call the office with any questions or concerns.       Jose Longoria DPM   Podiatric Medicine & Surgery   9/22/2021 at 1:49 PM

## 2021-09-27 ENCOUNTER — HOSPITAL ENCOUNTER (OUTPATIENT)
Dept: VASCULAR LAB | Age: 61
Discharge: HOME | End: 2021-09-29
Payer: COMMERCIAL

## 2021-09-27 DIAGNOSIS — I73.9 PERIPHERAL VASCULAR DISORDER (HCC): ICD-10-CM

## 2021-09-27 PROCEDURE — 93923 UPR/LXTR ART STDY 3+ LVLS: CPT

## 2021-10-11 DIAGNOSIS — K21.9 GASTROESOPHAGEAL REFLUX DISEASE WITHOUT ESOPHAGITIS: ICD-10-CM

## 2021-10-11 RX ORDER — OMEPRAZOLE 40 MG/1
40 CAPSULE, DELAYED RELEASE ORAL DAILY
Qty: 30 CAPSULE | Refills: 1 | Status: SHIPPED | OUTPATIENT
Start: 2021-10-11 | End: 2021-10-22 | Stop reason: SDUPTHER

## 2021-10-22 ENCOUNTER — OFFICE VISIT (OUTPATIENT)
Dept: GASTROENTEROLOGY | Age: 61
End: 2021-10-22
Payer: COMMERCIAL

## 2021-10-22 VITALS
HEART RATE: 76 BPM | HEIGHT: 67 IN | BODY MASS INDEX: 45.99 KG/M2 | DIASTOLIC BLOOD PRESSURE: 67 MMHG | WEIGHT: 293 LBS | OXYGEN SATURATION: 98 % | SYSTOLIC BLOOD PRESSURE: 121 MMHG

## 2021-10-22 DIAGNOSIS — K21.9 GASTROESOPHAGEAL REFLUX DISEASE WITHOUT ESOPHAGITIS: ICD-10-CM

## 2021-10-22 DIAGNOSIS — R13.10 DYSPHAGIA, UNSPECIFIED TYPE: Primary | ICD-10-CM

## 2021-10-22 PROCEDURE — 3017F COLORECTAL CA SCREEN DOC REV: CPT | Performed by: INTERNAL MEDICINE

## 2021-10-22 PROCEDURE — 99213 OFFICE O/P EST LOW 20 MIN: CPT | Performed by: INTERNAL MEDICINE

## 2021-10-22 PROCEDURE — G8417 CALC BMI ABV UP PARAM F/U: HCPCS | Performed by: INTERNAL MEDICINE

## 2021-10-22 PROCEDURE — G8427 DOCREV CUR MEDS BY ELIG CLIN: HCPCS | Performed by: INTERNAL MEDICINE

## 2021-10-22 PROCEDURE — 1036F TOBACCO NON-USER: CPT | Performed by: INTERNAL MEDICINE

## 2021-10-22 PROCEDURE — G8482 FLU IMMUNIZE ORDER/ADMIN: HCPCS | Performed by: INTERNAL MEDICINE

## 2021-10-22 RX ORDER — OMEPRAZOLE 40 MG/1
40 CAPSULE, DELAYED RELEASE ORAL DAILY
Qty: 30 CAPSULE | Refills: 1 | Status: SHIPPED | OUTPATIENT
Start: 2021-10-22 | End: 2021-12-13 | Stop reason: SDUPTHER

## 2021-10-22 RX ORDER — SENNA PLUS 8.6 MG/1
1 TABLET ORAL 2 TIMES DAILY PRN
Qty: 60 TABLET | Refills: 2 | Status: SHIPPED | OUTPATIENT
Start: 2021-10-22 | End: 2022-01-18

## 2021-10-22 RX ORDER — LORATADINE 10 MG/1
TABLET ORAL
COMMUNITY
Start: 2021-10-05 | End: 2021-11-29

## 2021-10-22 ASSESSMENT — ENCOUNTER SYMPTOMS: CONSTIPATION: 1

## 2021-10-22 NOTE — PROGRESS NOTES
GI CLINIC FOLLOW UP    INTERVAL HISTORY:   No referring provider defined for this encounter. Chief Complaint   Patient presents with    EGD    Follow-up     IBS flare up     Constipation           HISTORY OF PRESENT ILLNESS: Tremaine Monroy is a 64 y.o. female with dysphagia. She feels better. She reports occasional sensation of some food particles getting stuck in the throat. She is having some constipation intermittently. Past Medical,Family, and Social History reviewed and does not contribute to the patient presentingcondition. Patient's PMH/PSH,SH,PSYCH Hx, MEDs, ALLERGIES, and ROS were all reviewed and updated in the appropriate sections.     PAST MEDICAL HISTORY:  Past Medical History:   Diagnosis Date    Abdominal bloating 1/23/2019    Anxiety     Bilateral edema of lower extremity 12/16/2016    Bronchial asthma     mild, intermittent    Carpal tunnel syndrome     Cataract     Chronic back pain     Chronic sinusitis     Degenerative disc disease, lumbar 12/16/2016    Depression     Diabetes mellitus due to underlying condition with hyperosmolarity without coma ([facility] [address]     Dizziness 10/27/2017    DJD (degenerative joint disease)     S1, L3, L4, L5, T12    Dysphagia 1/23/2019    Edema of leg     bilateral legs    Environmental allergies     Frozen shoulder 4/27/2015    GERD (gastroesophageal reflux disease)     Glaucoma     Glucose intolerance (impaired glucose tolerance)     Headache(784.0)     History of bronchitis     Viral    HTN (hypertension)     Hyperlipidemia     Hypothyroid     hypothyroid state    Hypothyroidism     IBS (irritable bowel syndrome) 10/02/2012    Legally blind     due to glaucoma    Medication refill 6/1/2017    Mild intermittent asthma without complication     Morbid obesity with BMI of 45.0-49.9, adult (HCC)     MVP (mitral valve prolapse)     Neuropathy     OA (osteoarthritis)     EMILIA on CPAP     Osteopenia     Pancreatic cyst  Polyneuropathy     Raynaud disease     Rhinopharyngitis     Allergic rhinopharyngitis    Skin lesion of left leg 6/11/2021    Skin tag 7/9/2015    Stress fracture     Right 5th Metatarsal     Syncope     TIA (transient ischemic attack)     Urinary incontinence     Varicose vein of leg 6/1/2017    Vasodepressor syncope     Wears glasses        Past Surgical History:   Procedure Laterality Date    APPENDECTOMY  1978    CATARACT REMOVAL Left     CHOLECYSTECTOMY  1978    COLONOSCOPY      COLONOSCOPY N/A 12/9/2019    COLORECTAL CANCER SCREENING, NOT HIGH RISK performed by Marj Li MD at [address] [place] [address] Bilateral     right iridectomy, right laser, bilateral trabeculectomy, left drain    GLAUCOMA SURGERY      HAND SURGERY Right     HYSTERECTOMY  1982    KNEE SURGERY Right 2000    TUBAL LIGATION  1981    UPPER GASTROINTESTINAL ENDOSCOPY      UPPER GASTROINTESTINAL ENDOSCOPY  5/24/2018    EGD DILATION SAVORY performed by Nola Landau, MD at [address] [place] [address]  3/6/2019    EGD DILATION SAVORY performed by Marj Li MD at [address] [place] [address] N/A 9/8/2021    EGD DILATION SAVORY performed by Marj Li MD at [address] [place] [address] Street:    Current Outpatient Medications:     ALLERGY RELIEF 10 MG tablet, , Disp: , Rfl:     omeprazole (PRILOSEC) 40 MG delayed release capsule, TAKE 1 CAPSULE BY MOUTH DAILY, Disp: 30 capsule, Rfl: 1    calcium carb-cholecalciferol 600-400 MG-UNIT TABS per tab, TAKE 1 TABLET BY MOUTH TWICE A DAY, Disp: 60 tablet, Rfl: 5    Multiple Vitamins-Minerals (CERTAVITE/ANTIOXIDANTS) TABS, TAKE 1 TABLET DAILY, Disp: 30 tablet, Rfl: 5    levothyroxine (SYNTHROID) 100 MCG tablet, Take 1 tablet by mouth Daily Indications: 112 mg, Disp: 30 tablet, Rfl: 5    Cholecalciferol (VITAMIN D) 50 MCG (2000 UT) CAPS capsule, Take 1,000 Units by mouth daily, Disp: 30 capsule, Rfl: 5    rosuvastatin (CRESTOR) 5 MG tablet, Take 1 tablet by mouth daily, Disp: 30 tablet, Rfl: 3    albuterol sulfate  (90 Base) MCG/ACT inhaler, INHALE 2 PUFFS INTO THE LUNGS EVERY SIX HOURS AS NEEDED, Disp: 18 g, Rfl: 2    ASPIRIN LOW DOSE 81 MG EC tablet, Take 1 tablet by mouth daily, Disp: 30 tablet, Rfl: 5    Blood Glucose Monitoring Suppl (ONETOUCH VERIO FLEX SYSTEM) w/Device KIT, , Disp: , Rfl:     Blood Glucose Calibration (ONETOUCH VERIO) SOLN, , Disp: , Rfl:     acetaminophen (ACETAMINOPHEN EXTRA STRENGTH) 500 MG tablet, Take 2 tablets by mouth every 6 hours as needed for Pain, Disp: 120 tablet, Rfl: 3    Accu-Chek FastClix Lancets MISC, Use as directed, Disp: 102 each, Rfl: 3    Lancets Misc. (ACCU-CHEK FASTCLIX LANCET) KIT, , Disp: , Rfl:     fluticasone (FLONASE) 50 MCG/ACT nasal spray, USE 1 SPRAY IN EACH NOSTRIL TWICE A DAY, Disp: 16 g, Rfl: 5    ALLERGIES:   Allergies   Allergen Reactions    Latex Rash     blisters  blisters    Adhesive Tape Rash     blisters    Amlodipine Other (See Comments)     Facial numbness  Facial numbness  Facial numbness  Facial numbness  Facial numbness  Facial numbness  Facial numbness    Bextra [Valdecoxib] Rash     swelling    Brimonidine Itching     Burning eyes severe    Daypro [Oxaprozin] Anaphylaxis    Diclofenac Sodium Swelling and Shortness Of Breath    Famotidine Other (See Comments)     Blisters down her arms    Hydrocodone-Acetaminophen Nausea Only     Severe chest pain    Hydromorphone Other (See Comments)     Rapid heart beat,  Nausea, spent 3 days in cardiac unit    Ibuprofen      Other reaction(s): bleeding  Other reaction(s): Unknown  Black stools  \"rips up stomach\", black tarry stool  Black stools  \"rips up stomach\", black tarry stool    Lisinopril Nausea Only, Nausea And Vomiting and Other (See Comments)     Other reaction(s): Hypotension    Metoprolol Other (See Comments)     Other reaction(s): Dizziness    Naproxen Other (See Comments)     Chest pain , swelling    Oxycodone-Acetaminophen      Chest pain severe    Rofecoxib Rash     swelling    Shellfish-Derived Products Anaphylaxis and Rash     shrimp  shrimp  shrimp    Simvastatin Nausea And Vomiting     Other reaction(s): muscle cramps    Statins      Other reaction(s): muscle cramps  Tolerates lovastatin. Pravachol caused numbness in head/face    Sulfa Antibiotics Hives    Tetracyclines & Related Itching and Rash    Timoptic [Timolol Maleate] Itching and Swelling     Eyes burning severely    Tramadol Itching and Rash    Fosamax [Alendronate] Nausea Only and Nausea And Vomiting    Nalbuphine Nausea And Vomiting    Alendronate Sodium     Alendronate Sodium     Alphagan [Brimonidine Tartrate]      Eye irritation    Dilaudid [Hydromorphone Hcl]     Doxycycline Monohydrate     Nsaids     Other Itching and Swelling     Eyes burning    Pepcid Complete [Famotidine-Ca Carb-Mag Hydrox] Hives and Other (See Comments)     blisters    Pilocarpine Itching and Swelling     Blurred vision  Eyes burning    Pravastatin Other (See Comments)     Facial numming    Red Dye Nausea Only     Pt states allergic to red coloring in crestor with CY on pill and senokot red pill. Feels sick to stomach and head feels foggy.  Shrimp Flavor Hives and Swelling    Statins Support Therapy      Tolerates lovastatin.  Pravachol caused numbness in head/face    Timoptic [Timolol Maleate]      Eye irritation      Tolectin [Tolmetin]      Unknown reaction  - as a child    Voltaren [Diclofenac Sodium]      Flu symptoms      Nubain [Nalbuphine Hcl] Nausea And Vomiting     Chest pain      Percocet [Oxycodone-Acetaminophen] Nausea And Vomiting     Sweating, chest pain    Tolectin [Tolmetin Sodium] Rash    Tolmetin Sodium Rash    Ultram [Tramadol Hcl] Itching and Rash     Rash on face    Vicodin [Hydrocodone-Acetaminophen] Nausea And Vomiting     swearing    Vioxx Rash FAMILY HISTORY:       Problem Relation Age of Onset    Diabetes Mother     Heart Disease Mother         multiple heart attacks    Arthritis Mother     High Blood Pressure Mother     High Cholesterol Mother     Substance Abuse Mother     Heart Disease Father     Arthritis Father     Depression Father     High Cholesterol Father     Mental Illness Father     Substance Abuse Father     Diabetes Sister     High Blood Pressure Sister     Cancer Paternal Uncle         esophageal cancer    Diabetes Maternal Aunt     Cancer Maternal Aunt         colorectal cancer    Diabetes Maternal Uncle     Cancer Maternal Grandmother         colorectal cancer    Arthritis Maternal Grandmother     Diabetes Maternal Grandmother     High Blood Pressure Maternal Grandmother     Stroke Maternal Grandmother     Arthritis Maternal Grandfather     Arthritis Paternal Grandmother     Cancer Paternal Grandmother     Depression Paternal Grandmother     Heart Disease Paternal Grandmother     High Blood Pressure Paternal Grandmother     High Cholesterol Paternal Grandmother     Mental Illness Paternal Grandmother     Arthritis Paternal Grandfather          SOCIAL HISTORY:   Social History     Socioeconomic History    Marital status: Single     Spouse name: Not on file    Number of children: Not on file    Years of education: Not on file    Highest education level: Not on file   Occupational History    Not on file   Tobacco Use    Smoking status: Never Smoker    Smokeless tobacco: Never Used   Vaping Use    Vaping Use: Never used   Substance and Sexual Activity    Alcohol use: No     Alcohol/week: 0.0 standard drinks    Drug use: No    Sexual activity: Never     Partners: Male   Other Topics Concern    Not on file   Social History Narrative    Not on file     Social Determinants of Health     Financial Resource Strain:     Difficulty of Paying Living Expenses:    Food Insecurity:     Worried About Running Out of Food in the Last Year:    [address] in the Last Year:    Transportation Needs:     Lack of Transportation (Medical):  Lack of Transportation (Non-Medical):    Physical Activity:     Days of Exercise per Week:     Minutes of Exercise per Session:    Stress:     Feeling of Stress :    Social Connections:     Frequency of Communication with Friends and Family:     Frequency of Social Gatherings with Friends and Family:     Attends Church Services:     Active Member of Clubs or Organizations:     Attends Club or Organization Meetings:     Marital Status:    Intimate Partner Violence:     Fear of Current or Ex-Partner:     Emotionally Abused:     Physically Abused:     Sexually Abused:        REVIEW OF SYSTEMS: A 12-point review of systemswas obtained and pertinent positives and negatives were enumerated above in the history of present illness. All other reviewed systems / symptoms were negative. Review of Systems   Gastrointestinal: Positive for constipation.            LABORATORY DATA: Reviewed  Lab Results   Component Value Date    WBC 12.5 (H) 01/20/2021    HGB 15.6 (H) 01/20/2021    HCT 46.8 01/20/2021    MCV 92.9 01/20/2021     01/20/2021     01/20/2021    K 3.9 01/20/2021     01/20/2021    CO2 33 (H) 01/20/2021    BUN 20 01/20/2021    CREATININE 0.79 01/20/2021    LABPROT 6.5 10/17/2012    LABALBU 4.4 01/20/2021    BILITOT 0.72 01/20/2021    ALKPHOS 81 01/20/2021    AST 26 01/20/2021    ALT 35 (H) 01/20/2021    INR 0.9 06/29/2013         Lab Results   Component Value Date    RBC 5.04 01/20/2021    HGB 15.6 (H) 01/20/2021    MCV 92.9 01/20/2021    MCH 31.0 01/20/2021    MCHC 33.3 01/20/2021    RDW 12.2 01/20/2021    MPV 9.5 01/20/2021    BASOPCT 1 01/20/2021    LYMPHSABS 3.66 01/20/2021    MONOSABS 0.88 01/20/2021    NEUTROABS 7.69 01/20/2021    EOSABS 0.11 01/20/2021    BASOSABS 0.07 01/20/2021         DIAGNOSTIC TESTING:     VL LOWER EXTREMITY ARTERIAL SEGMENTAL PRESSURES W PPG    Result Date: 9/27/2021    [facility]  Vascular Lower Arterial Plethysmography Procedure   Patient Name  Ludy Vásquez.       Date of Study           09/27/2021                PALMER GRANADOS   Date of Birth 1960   Gender                  Female   Age           64 year(s)   Race                       Room Number   Corporate ID  [id number]  #   Patient Maria Teresa  [de-identified]  #   MR #          [id number]       Pippa Camarillo, T   Accession #   [id number]  Interpreting Physician  Olivia Miller MD   Referring                  Referring Physician     Tammie Soto DPM  Nurse  Practitioner  Additional Comments Please send a copy of final results to Dr. Clinton Atkinson office. Procedure Type of Study:   Extremities Arteries: Lower Arterial Plethysmography, PVR Lower. Indications for Study:Peripheral Vascular Disease. Patient Status:Out Patient. Technical Quality:Adequate visualization. Conclusions   Summary   Normal bilateral ABIs. Signature   ----------------------------------------------------------------  Electronically signed by Olga Lopez RVT(Sonographer) on  09/27/2021 10:07 AM  ----------------------------------------------------------------   ----------------------------------------------------------------  Electronically signed by Olivia Miller MD(Interpreting  physician) on 09/27/2021 04:59 PM  ----------------------------------------------------------------  Findings:   Right Impression:                    Left Impression:  PVR waveforms are normal.            PVR waveforms are normal.   Doppler waveforms of the dorsalis    Doppler waveforms of the dorsalis  pedis and posterior tibial arteries  pedis and posterior tibial arteries  are normal.                          are normal.   Ankle pressures are normal with      Ankle pressures are normal with  resting ABIs of 1.32 and 1.20. resting ABIs of 1.23                                       and 1.10. First digit waveforms are normal  with TBI of 0.83. First digit waveforms are normal with                                       TBI of 0.92. Risk Factors History +---------+----+-----------------------------------------------------------+ ! Diagnosis! Date! Comments                                                   ! +---------+----+-----------------------------------------------------------+ ! Other    ! ! Back in 2007 pt had an episode of expressive aphasia and   ! !         !    !left facial tingling                                       ! +---------+----+-----------------------------------------------------------+ Velocities are measured in cm/s ; Diameters are measured in cm Pressures +--------------------------------------++--------+-----+----+--------+-----+ ! ! !Right   ! ! Left!        !     ! +--------------------------------------++--------+-----+----+--------+-----+ ! Location                              ! !Pressure! Ratio! !Pressure! Ratio! +--------------------------------------++--------+-----+----+--------+-----+ ! Ankle PT                              !!174     !1.32 !    !162     !1.23 ! +--------------------------------------++--------+-----+----+--------+-----+ ! Ankle DP                              !!158     !1.2  ! !145     !1.1  ! +--------------------------------------++--------+-----+----+--------+-----+ ! Great Toe                             !!110     !0.83 !    !121     !0.92 ! +--------------------------------------++--------+-----+----+--------+-----+   - Brachial Pressure:Right: 131. Left:132.   - BOBBY:Right: 1.32. Left: 1.23. Plethysmographic Digit Evaluation +---------++--------+-----+---------------++--------+-----+----------------+ ! ! !Right   ! ! Left           !!        !     !                ! +---------++--------+-----+---------------++--------+-----+----------------+ ! Location ! !Pressure! Ratio! PPG Wave Form !!Pressure! Ratio! PPG Wave Form   ! +---------++--------+-----+---------------++--------+-----+----------------+ ! Great Toe!!110     !0.83 !               !!121     !0.92 !                ! +---------++--------+-----+---------------++--------+-----+----------------+         PHYSICAL EXAMINATION: Vital signs reviewed per the nursing documentation. Ht 5' 7\" (1.702 m)   Wt (!) 312 lb (141.5 kg)   BMI 48.87 kg/m²   Body mass index is 48.87 kg/m². Physical Exam      I personally reviewed the nurse's notes and documentation and I agree with her notes. General: alert, appears stated age and cooperative Psych: Normal. and Alert and oriented, appropriate affect. . Normal affect. Mentation normal  HEENT: PERRLA. Clear conjunctivae and sclerae. Moist oral mucosae, no lesions or ulcers. The neck is supple, without lymphadenopathy or jugular venous distension. No masses. Normal thyroid. Cardiovascular: S1 S2 RRR no rubs or murmurs. Pulmonary: clear BL. No accessory muscle usage. Abdominal Exam: Soft, NT ND, no hepato or spleno megaly, +BS, no ascites. IMPRESSION: Ms. Maggy Sepulveda is a 64 y.o. female with dysphagia. Status post EGD with dilation. She feels better. Still having some issues clearing her throat frequently with sensation of food particles. Her swallow study couple of years ago showed some residual contents and oropharynx. We will repeat swallow study. In regards to constipation we will try senna in addition to Citrucel that she is already taking. Follow-up in 3 months. Thank you for allowing me to participate in the care of Ms. Banerjee. For any further questions please do not hesitate to contact me. I have reviewed and agree with the ROS entered by the MA/LPN. Note is dictated utilizing voice recognition software. Unfortunately this leads to occasional typographical errors. Please contact our office if you have any questions.     Jadon Jacobsen MD  [facility]  O: #[phone]

## 2021-11-01 ENCOUNTER — TELEPHONE (OUTPATIENT)
Dept: FAMILY MEDICINE CLINIC | Age: 61
End: 2021-11-01

## 2021-11-08 ENCOUNTER — TELEPHONE (OUTPATIENT)
Dept: FAMILY MEDICINE CLINIC | Age: 61
End: 2021-11-08

## 2021-11-08 NOTE — TELEPHONE ENCOUNTER
Patient called to see what does she do since off quarantine. Explain to patient could contact where she was diagnosed as, if no symptoms she can just return to normal activity. She asked if would need to get retested explain no. Patient was schedule for a post covid appt (VV) as pt requested.

## 2021-11-12 ENCOUNTER — VIRTUAL VISIT (OUTPATIENT)
Dept: FAMILY MEDICINE CLINIC | Age: 61
End: 2021-11-12
Payer: COMMERCIAL

## 2021-11-12 DIAGNOSIS — U09.9 POST COVID-19 CONDITION, UNSPECIFIED: Primary | ICD-10-CM

## 2021-11-12 PROCEDURE — 99442 PR PHYS/QHP TELEPHONE EVALUATION 11-20 MIN: CPT

## 2021-11-12 NOTE — PATIENT INSTRUCTIONS
Thank you for letting us take care of you today. We hope all your questions were addressed. If a question was overlooked or something else comes to mind after you return home, please contact a member of your Care Team listed below. Your Care Team at [place] [address] is Team #4  Kareem Jackson MD (Faculty)  Francoise Peres MD (Resident)  Ludin Bauer MD (Resident)  Daniel Yan MD (Resident)  Nacho Saunders MD (Resident)  RAJI Maynard RMA Arvis [address] [facility] office)  Sanjeev Zavala, [address] [facility] [facility] [address] [address] (Clinical Practice Manager)  Cate Rivera [facility] (Clinical Pharmacist)       Office phone number: [phone]    If you need to get in right away due to illness, please be advised we have \"Same Day\" appointments available Monday-Friday. Please call us at [phone] option #3 to schedule your \"Same Day\" appointment.

## 2021-11-12 NOTE — PROGRESS NOTES
I have reviewed and discussed key elements of [facility] with the resident including plan of care and follow up and agree with the care rodrigue plan. Diagnosis Orders   1.  Post covid-19 condition, unspecified

## 2021-11-12 NOTE — PROGRESS NOTES
Visit Information    Have you changed or started any medications since your last visit including any over-the-counter medicines, vitamins, or herbal medicines? no   Have you stopped taking any of your medications? Is so, why? -  no  Are you having any side effects from any of your medications? - no    Have you seen any other physician or provider since your last visit?  no   Have you had any other diagnostic tests since your last visit?  no   Have you been seen in the emergency room and/or had an admission in a hospital since we last saw you?  no   Have you had your routine dental cleaning in the past 6 months?  no     Do you have an active MyChart account? If no, what is the barrier?   No:     Patient Care Team:  Ramos Parada MD as PCP - General (Family Medicine)  Autumn Kee DO as Surgeon (Orthopedic Surgery)  Nano Dai MD as Consulting Physician (Cardiology)  Mary Wright MD as Consulting Physician (Pulmonology)  Cheryle Ade as Consulting Physician  Mahamed Mcclelland MD as Consulting Physician (Endocrinology)  Casandra Walls MD as Consulting Physician (Obstetrics & Gynecology)  Julianna Contreras MD as Consulting Physician (Gastroenterology)    Medical History Review  Past Medical, Family, and Social History reviewed and does not contribute to the patient presenting condition    Health Maintenance   Topic Date Due    COVID-19 Vaccine (1) Never done    Diabetic retinal exam  03/06/2021    Diabetic microalbuminuria test  09/30/2021    Lipid screen  12/14/2021    A1C test (Diabetic or Prediabetic)  06/11/2022    TSH testing  06/14/2022    Diabetic foot exam  09/22/2022    Breast cancer screen  12/14/2022    Pneumococcal 0-64 years Vaccine (2 of 2 - PPSV23) 03/09/2025    DTaP/Tdap/Td vaccine (2 - Td or Tdap) 06/01/2027    Colon cancer screen colonoscopy  12/09/2029    Flu vaccine  Completed    Shingles Vaccine  Completed    Hepatitis C screen  Completed    HIV screen Completed    Hepatitis A vaccine  Aged Out    Hib vaccine  Aged Out    Meningococcal (ACWY) vaccine  Aged Out           Visit Information    Have you changed or started any medications since your last visit including any over-the-counter medicines, vitamins, or herbal medicines? no   Have you stopped taking any of your medications? Is so, why? -  no  Are you having any side effects from any of your medications? - no    Have you seen any other physician or provider since your last visit?  no   Have you had any other diagnostic tests since your last visit?  no   Have you been seen in the emergency room and/or had an admission in a hospital since we last saw you?  no   Have you had your routine dental cleaning in the past 6 months?  no     Do you have an active MyChart account? If no, what is the barrier?   No:     Patient Care Team:  Trever Fiore MD as PCP - General (Family Medicine)  Todd Soliz DO as Surgeon (Orthopedic Surgery)  Elvis Leroy MD as Consulting Physician (Cardiology)  Mike Lopez MD as Consulting Physician (Pulmonology)  Delmer Ram as Consulting Physician  Gayathri Luna MD as Consulting Physician (Endocrinology)  Camryn Browne MD as Consulting Physician (Obstetrics & Gynecology)  Blayne Huber MD as Consulting Physician (Gastroenterology)    Medical History Review  Past Medical, Family, and Social History reviewed and does not contribute to the patient presenting condition    Health Maintenance   Topic Date Due    COVID-19 Vaccine (1) Never done    Diabetic retinal exam  03/06/2021    Diabetic microalbuminuria test  09/30/2021    Lipid screen  12/14/2021    A1C test (Diabetic or Prediabetic)  06/11/2022    TSH testing  06/14/2022    Diabetic foot exam  09/22/2022    Breast cancer screen  12/14/2022    Pneumococcal 0-64 years Vaccine (2 of 2 - PPSV23) 03/09/2025    DTaP/Tdap/Td vaccine (2 - Td or Tdap) 06/01/2027    Colon cancer screen colonoscopy 12/09/2029    Flu vaccine  Completed    Shingles Vaccine  Completed    Hepatitis C screen  Completed    HIV screen  Completed    Hepatitis A vaccine  Aged Out    Hib vaccine  Aged Out    Meningococcal (ACWY) vaccine  Aged Out

## 2021-11-12 NOTE — PROGRESS NOTES
Cherie Santiago is a 64 y.o. female evaluated via telephone on 11/12/2021. Consent:  She and/or health care decision maker is aware that that she may receive a bill for this telephone service, depending on her insurance coverage, and has provided verbal consent to proceed: Yes      Documentation:  I communicated with the patient and/or health care decision maker about . Details of this discussion including any medical advice provided:     60-year-old female had a virtual visit with me today at  Patient on October 30,021 was positive for COVID-19    Fever, 97.7F  Myalgias/headaches none except regular back pain  Chills none  Cough, from asthma, takes albuterol PRN  Diarrhea none reported  Skin changes not reported  Loss of smell/taste is getting better  Covid vaccine having gotten it yet  Patient did not get the antibodies    Feels fatigued and worn but was reassured that after Covid this can happen and presently should be getting better and if she has any concerns or questions regarding her health or if she develops shortness of breath, fever severe myalgias confusion to call the clinic or go directly to the emergency department. Had some questions about reinfection which were answered  Patient was also reassured and recommended to keep her regular follow-ups here in the clinic, to mask up when she goes outside and to keep social distancing as well. Otherwise she is not complaining of any questions or concerns at this time              I affirm this is a Patient Initiated Episode with a Patient who has not had a related appointment within my department in the past 7 days or scheduled within the next 24 hours.     Patient identification was verified at the start of the visit: Yes    Total Time: minutes: 11-20 minutes    The visit was conducted pursuant to the emergency declaration under the [address], [address]5 waiver authority and the [place] [employer] and Response Supplemental Appropriations Act. Patient identification was verified, and a caregiver was present when appropriate. The patient was located in a state where the provider was credentialed to provide care.     Note: not billable if this call serves to triage the patient into an appointment for the relevant concern      Radhika Berry MD

## 2021-11-29 NOTE — TELEPHONE ENCOUNTER
Dr Kimberly Martines, patient is current and is scheduled for follow up on 6/23/22. Please sign for refill if ok. Thank you.

## 2021-12-03 RX ORDER — LORATADINE 10 MG/1
TABLET ORAL
Qty: 30 TABLET | Refills: 7 | Status: SHIPPED | OUTPATIENT
Start: 2021-12-03 | End: 2022-07-19

## 2021-12-06 DIAGNOSIS — J45.909 UNCOMPLICATED ASTHMA, UNSPECIFIED ASTHMA SEVERITY, UNSPECIFIED WHETHER PERSISTENT: ICD-10-CM

## 2021-12-06 NOTE — TELEPHONE ENCOUNTER
Dr Jose Rdz, patient is current and is scheduled for follow up on 1/4/22. Please sign for refill if ok. Thank you.

## 2021-12-07 RX ORDER — ALBUTEROL SULFATE 90 UG/1
AEROSOL, METERED RESPIRATORY (INHALATION)
Qty: 1 EACH | Refills: 6 | Status: SHIPPED | OUTPATIENT
Start: 2021-12-07

## 2021-12-13 DIAGNOSIS — K21.9 GASTROESOPHAGEAL REFLUX DISEASE WITHOUT ESOPHAGITIS: ICD-10-CM

## 2021-12-13 RX ORDER — OMEPRAZOLE 40 MG/1
40 CAPSULE, DELAYED RELEASE ORAL DAILY
Qty: 30 CAPSULE | Refills: 1 | Status: SHIPPED | OUTPATIENT
Start: 2021-12-13 | End: 2022-04-04

## 2021-12-14 RX ORDER — LANCING DEVICE/LANCETS
1 KIT MISCELLANEOUS 2 TIMES DAILY
Qty: 1 KIT | Refills: 3 | Status: SHIPPED | OUTPATIENT
Start: 2021-12-14

## 2021-12-14 NOTE — TELEPHONE ENCOUNTER
Escribe Request for pending medication. Last Visit Date: 11/12/21  Next Visit Date:  Future Appointments   Date Time Provider Fabby Juarez   12/16/2021 10:00 AM [facility] MAMMOGRAPHY ROOM AT [facility]   1/4/2022 10:00 AM JULITA Stephenson - CNP Resp Spec [facility]   1/12/2022  1:00 PM Curly Mares MD [place] [facility]   1/28/2022  9:00 AM [facility]   6/23/2022 10:30 AM Jewel Langford MD Resp Spec [address] Maintenance   Topic Date Due    COVID-19 Vaccine (1) Never done    Diabetic retinal exam  03/06/2021    Diabetic microalbuminuria test  09/30/2021    Lipid screen  12/14/2021    TSH testing  06/14/2022    A1C test (Diabetic or Prediabetic)  06/16/2022    Diabetic foot exam  09/22/2022    Breast cancer screen  12/14/2022    Pneumococcal 0-64 years Vaccine (2 of 2 - PPSV23) 03/09/2025    DTaP/Tdap/Td vaccine (2 - Td or Tdap) 06/01/2027    Colon cancer screen colonoscopy  12/09/2029    Flu vaccine  Completed    Shingles Vaccine  Completed    Hepatitis C screen  Completed    HIV screen  Completed    Hepatitis A vaccine  Aged Out    Hib vaccine  Aged Out    Meningococcal (ACWY) vaccine  Aged Out       Hemoglobin A1C (%)   Date Value   06/11/2021 5.5   12/03/2020 5.3   01/02/2020 5.4             ( goal A1C is < 7)   Microalb/Crt.  Ratio (mcg/mg creat)   Date Value   01/02/2020 CANNOT BE CALCULATED     LDL Cholesterol (mg/dL)   Date Value   12/14/2020 64     LDL Calculated (mg/dL)   Date Value   06/16/2017 68       (goal LDL is <100)   AST (U/L)   Date Value   01/20/2021 26     ALT (U/L)   Date Value   01/20/2021 35 (H)     BUN (mg/dL)   Date Value   01/20/2021 20     BP Readings from Last 3 Encounters:   10/22/21 121/67   09/22/21 (!) 142/73   09/21/21 (!) 143/84          (goal 120/80)    All Future Testing planned in [facility]  Lab Frequency Next Occurrence   EGD Routine Once 08/27/2021   [place] MODIFIED BARIUM SWALLOW W VIDEO Once 10/22/2021 Next Visit Date:  Future Appointments   Date Time Provider Fabby Juarez   12/16/2021 10:00 AM [facility]   1/4/2022 10:00 AM JULITA Landaverde - CNP Resp Spec [facility]   1/12/2022  1:00 PM Marce Alves MD [place] [facility]   1/28/2022  9:00 AM Walt Gutierrez MD [facility]   6/23/2022 10:30 AM [place] MD Resp Spec [place]         Patient Active Problem List:     Glaucoma     GERD (gastroesophageal reflux disease)     DJD (degenerative joint disease)     Obesity     Polyneuropathy     Migraine     Mitral prolapse     Low back pain     CTS (carpal tunnel syndrome)     Supraspinatus syndrome     Impaired fasting glucose     Acute sinus infection     IBS (irritable bowel syndrome)     Back pain, chronic     Stress fracture, right 5th metatarsal      Upper airway cough syndrome     Ear fullness     Perioral numbness     Screening for osteoporosis     Headache     Left eye injury     Medication reaction     Osteopenia     Lumbosacral pain     Flu vaccine need     Hypothyroid     Urinary incontinence     Anxiety     Sleep apnea     Depression     Chronic back pain     Carpal tunnel syndrome     Neuropathy     Mitral valve problem     Morbid obesity with BMI of 45.0-49.9, adult (HCC)     Frozen shoulder     Skin tag     Degenerative disc disease, lumbar     Bilateral edema of lower extremity     Medication refill     Varicose vein of leg     Dizziness     Dysphagia     Abdominal bloating     Mild intermittent asthma without complication     Skin lesion of left leg

## 2021-12-16 ENCOUNTER — HOSPITAL ENCOUNTER (OUTPATIENT)
Dept: WOMENS IMAGING | Age: 61
Discharge: HOME | End: 2021-12-18
Payer: COMMERCIAL

## 2021-12-16 DIAGNOSIS — Z12.39 SCREENING BREAST EXAMINATION: ICD-10-CM

## 2021-12-16 PROCEDURE — 77063 BREAST TOMOSYNTHESIS BI: CPT

## 2021-12-21 NOTE — PROGRESS NOTES
Subjective:      Patient ID: Sotero Oh is a 64 y.o. female. HPI  Patient here for follow-up for EMILIA. Patient was last seen in the office in June 2021 with Dr. Jordin Falcon. Patient is on CPAP 11 cm H2O however still having a difficulty with machine. Asking about qualification for new machine. Patient is stating the machine is out of date- has had for more than 5 years. Patient is benefitting from the use of the machine with improvements in her EMILIA symptoms. Recommend ongoing PAP therapy for EMILIA. No compliance data avail for my review- patient states she uses everynight. [employer] is DME company. Patient reports a history of COVID-19 in October 2021. She states that she did receive monoclonal therapy. Discussed with patient that we would encourage her to receive her Covid vaccine 90 days after date of monoclonal antibody infusion. Patient is reluctant. Discussed risks and benefits with patient unclear if persuaded. Medications:   Omeprazole  Albuterol HFA    PRIOR WORKUP:  PFT:    CT Imaging:  CTA 10/30/2021: Negative for pulmonary emboli. Limited exam due to suboptimal bolus, subsegmental emboli cannot be excluded. Minimal peripheral groundglass opacities in the lower lobes. Sleep Study:  Titration study 11/10/2020: Patient was initiated on CPAP and ultimately titrated to 9 cm H2O with resolution of underlying apnea. Sleep study 1/8/2018: Patient was treated with CPAP during the study with resolution of her respiratory events at 10 cm H2O.     Laboratory Evaluation:    Immunizations:   Immunization History   Administered Date(s) Administered    Influenza 10/18/2012, 10/22/2013    Influenza A ([id number]) Vaccine PF IM 11/06/2009    Influenza Vaccine, unspecified formulation 10/22/2013    Influenza Virus Vaccine 10/22/2013, 09/28/2015, 10/04/2016, 10/27/2017, 10/04/2018    Influenza Whole 09/24/2010    Influenza, Quadv, IM, (6 mo and older Fluzone, Flulaval, Fluarix and 3 yrs and older Afluria) 10/04/2016, 10/27/2017, 10/04/2018    Influenza, [place] B[place], IM, PF (6 mo and older Fluzone, Flulaval, Fluarix, and 3 yrs and older Afluria) 09/25/2019, 10/23/2020, 09/21/2021    Pneumococcal Polysaccharide ([id number]) 12/08/2008    Tdap (Boostrix, Adacel) 06/01/2017    Zoster Recombinant (Shingrix) 01/15/2020, 03/19/2020        Sleep Medicine 6/17/2021 12/11/2020 10/8/2020 8/16/2019 3/22/2019 12/10/2018 6/15/2018   Sitting and reading 1 2 3 1 1 2 2   Watching TV 1 2 1 1 1 2 1   Sitting, inactive in a public place (e.g. a theatre or a meeting) 0 2 1 0 0 0 0   As a passenger in a car for an hour without a break 0 3 0 0 0 0 1   Lying down to rest in the afternoon when circumstances permit 1 3 3 3 3 3 3   Sitting and talking to someone 0 0 0 0 0 0 0   Sitting quietly after a lunch without alcohol 0 1 0 0 0 1 0   In a car, while stopped for a few minutes in traffic 0 0 0 0 0 0 0   Total score 3 13 8 5 5 8 7   Neck circumference 0 - - - - - -       /82 (Site: Right Lower Arm)   Pulse 77   Temp 97.8 °F (36.6 °C)   Resp 12   Ht 5' 7\" (1.702 m)   Wt (!) 309 lb (140.2 kg)   SpO2 97% Comment: Room air at rest  BMI 48.40 kg/m²     Past Medical History:   Diagnosis Date    Abdominal bloating 1/23/2019    Anxiety     Bilateral edema of lower extremity 12/16/2016    Bronchial asthma     mild, intermittent    Carpal tunnel syndrome     Cataract     Chronic back pain     Chronic sinusitis     Degenerative disc disease, lumbar 12/16/2016    Depression     Diabetes mellitus due to underlying condition with hyperosmolarity without coma ([facility] [address]     Dizziness 10/27/2017    DJD (degenerative joint disease)     S1, L3, L4, L5, T12    Dysphagia 1/23/2019    Edema of leg     bilateral legs    Environmental allergies     Frozen shoulder 4/27/2015    GERD (gastroesophageal reflux disease)     Glaucoma     Glucose intolerance (impaired glucose tolerance)     Headache(784.0)     History of bronchitis     Viral    HTN (hypertension)     Hyperlipidemia     Hypothyroid     hypothyroid state    Hypothyroidism     IBS (irritable bowel syndrome) 10/02/2012    Legally blind     due to glaucoma    Medication refill 6/1/2017    Mild intermittent asthma without complication     Morbid obesity with BMI of 45.0-49.9, adult (HCC)     MVP (mitral valve prolapse)     Neuropathy     OA (osteoarthritis)     EMILIA on CPAP     Osteopenia     Pancreatic cyst     Polyneuropathy     Raynaud disease     Rhinopharyngitis     Allergic rhinopharyngitis    Skin lesion of left leg 6/11/2021    Skin tag 7/9/2015    Stress fracture     Right 5th Metatarsal     Syncope     TIA (transient ischemic attack)     Urinary incontinence     Varicose vein of leg 6/1/2017    Vasodepressor syncope     Wears glasses      Past Surgical History:   Procedure Laterality Date    APPENDECTOMY  1978    CATARACT REMOVAL Left     CHOLECYSTECTOMY  1978    COLONOSCOPY      COLONOSCOPY N/A 12/9/2019    COLORECTAL CANCER SCREENING, NOT HIGH RISK performed by Trevor Casey MD at [address] [place] [address] Bilateral     right iridectomy, right laser, bilateral trabeculectomy, left drain    GLAUCOMA SURGERY      HAND SURGERY Right     HYSTERECTOMY  1982    KNEE SURGERY Right 2000    TUBAL LIGATION  1981    UPPER GASTROINTESTINAL ENDOSCOPY      UPPER GASTROINTESTINAL ENDOSCOPY  5/24/2018    EGD DILATION SAVORY performed by Lori Singer MD at [address] [place] [address]  3/6/2019    EGD DILATION SAVORY performed by Trevor Casey MD at [facility]    UPPER GASTROINTESTINAL ENDOSCOPY N/A 9/8/2021    EGD DILATION SAVORY performed by Trevor Casey MD at [facility]     Family History   Problem Relation Age of Onset    Diabetes Mother     Heart Disease Mother         multiple heart attacks    Arthritis Mother     High Blood Pressure Mother     High Cholesterol Mother     Substance Abuse Mother     Heart Disease Father     Arthritis Father     Depression Father     High Cholesterol Father     Mental Illness Father     Substance Abuse Father     Diabetes Sister     High Blood Pressure Sister     Cancer Paternal Uncle         esophageal cancer    Diabetes Maternal Aunt     Cancer Maternal Aunt         colorectal cancer    Diabetes Maternal Uncle     Cancer Maternal Grandmother         colorectal cancer    Arthritis Maternal Grandmother     Diabetes Maternal Grandmother     High Blood Pressure Maternal Grandmother     Stroke Maternal Grandmother     Arthritis Maternal Grandfather     Arthritis Paternal Grandmother     Cancer Paternal Grandmother     Depression Paternal Grandmother     Heart Disease Paternal Grandmother     High Blood Pressure Paternal Grandmother     High Cholesterol Paternal Grandmother     Mental Illness Paternal Grandmother     Arthritis Paternal Grandfather        Social History     Socioeconomic History    Marital status: Single     Spouse name: Not on file    Number of children: Not on file    Years of education: Not on file    Highest education level: Not on file   Occupational History    Not on file   Tobacco Use    Smoking status: Never Smoker    Smokeless tobacco: Never Used   Vaping Use    Vaping Use: Never used   Substance and Sexual Activity    Alcohol use: No     Alcohol/week: 0.0 standard drinks    Drug use: No    Sexual activity: Never     Partners: Male   Other Topics Concern    Not on file   Social History Narrative    Not on file     Social Determinants of Health     Financial Resource Strain:     Difficulty of Paying Living Expenses: Not on file   Food Insecurity:     Worried About Running Out of Food in the Last Year: Not on file    [place] of Food in the Last Year: Not on file   Transportation Needs:     Lack of Transportation (Medical): Not on file    Lack of Transportation (Non-Medical):  Not on file   Physical Activity:     Days of Exercise per Week: Not on file    Minutes of Exercise per Session: Not on file   Stress:     Feeling of Stress : Not on file   Social Connections:     Frequency of Communication with Friends and Family: Not on file    Frequency of Social Gatherings with Friends and Family: Not on file    Attends Denominational Services: Not on file    Active Member of [address] [place] [address] or Organizations: Not on file    Attends Club or Organization Meetings: Not on file    Marital Status: Not on file   Intimate Partner Violence:     Fear of Current or Ex-Partner: Not on file    Emotionally Abused: Not on file    Physically Abused: Not on file    Sexually Abused: Not on file   Housing Stability:     Unable to Pay for Housing in the Last Year: Not on file    Number of [place] in the Last Year: Not on file    Unstable Housing in the Last Year: Not on file       Review of Systems   Constitutional: Negative for fatigue and fever. HENT: Negative for postnasal drip, rhinorrhea, sinus pressure and sinus pain. Respiratory: Negative for cough, chest tightness, shortness of breath, wheezing and stridor. Cardiovascular: Negative for chest pain and leg swelling. Gastrointestinal: Negative for constipation, diarrhea, nausea and vomiting. Genitourinary: Negative for dysuria, frequency and urgency. Musculoskeletal: Negative for arthralgias, joint swelling and myalgias. Skin: Negative for rash. Neurological: Negative for dizziness, speech difficulty and headaches. Hematological: Does not bruise/bleed easily. Psychiatric/Behavioral: Negative for agitation, hallucinations, sleep disturbance and suicidal ideas.        Objective:       Physical Exam  General appearance - alert, well appearing, and in no distress, oriented to person, place, and time and overweight  Mental status - alert, oriented to person, place, and time, normal mood, behavior, speech, dress, motor activity, and thought processes  Eyes - pupils equal and reactive, extraocular eye movements intact  Ears -not examined  Nose - normal and patent, no erythema, discharge or polyps  Mouth - mucous membranes moist, pharynx normal without lesions  Neck - supple, no significant adenopathy  Chest - clear to auscultation, no wheezes, rales or rhonchi, symmetric air entry  Heart -normal rate, regular rhythm, normal S1, S2, no murmurs, rubs, clicks or gallops  Abdomen - soft, nontender, nondistended, no masses or organomegaly  Neuro- alert, oriented, normal speech, no focal findings or movement disorder noted}  Extremities - peripheral pulses normal, no pedal edema, no clubbing or cyanosis  Skin - normal coloration and turgor, no rashes, no suspicious skin lesions noted     Wt Readings from Last 3 Encounters:   01/04/22 (!) 309 lb (140.2 kg)   10/22/21 (!) 312 lb (141.5 kg)   09/22/21 (!) 315 lb (142.9 kg)       Results for orders placed or performed during the hospital encounter of 09/08/21   COVID-19, Rapid    Specimen: Nasopharyngeal Swab   Result Value Ref Range    Specimen Description . NASOPHARYNGEAL SWAB     SARS-CoV-2, Rapid Not Detected Not Detected   POC Glucose Fingerstick   Result Value Ref Range    POC Glucose 122 (H) 65 - 105 mg/dL   POC Glucose Fingerstick   Result Value Ref Range    POC Glucose 102 65 - 105 mg/dL       [facility] [place] DIGITAL SCREEN SELF REFERRAL W OR WO CAD BILATERAL    Result Date: 12/16/2021  EXAMINATION: SCREENING DIGITAL BILATERAL  MAMMOGRAM WITH TOMOSYNTHESIS, 12/16/2021 TECHNIQUE: Screening mammography of the bilateral breasts was performed with tomosynthesis. 2D standard and 3D tomosynthesis combination imaging performed through both breasts in the MLO and CC projection. Computer aided detection was utilized in the interpretation of this exam. COMPARISON: December 14, 2020 and December 12, 2019 HISTORY: Screening. FINDINGS: The breasts are almost entirely fatty.   No dominant mass, architectural distortion or concerning grouping of microcalcification in either breast.     No mammographic evidence of malignancy. BIRADS: BIRADS - CATEGORY 1 Negative. Normal interval follow-up is recommended in 12 months. OVERALL ASSESSMENT - NEGATIVE A letter of notification will be sent to the patient regarding the results. The [address] [place] [address] of Radiology recommends annual mammograms for women 40 years and older. Assessment:      1. Obstructive sleep apnea syndrome    2. Uncomplicated asthma, unspecified asthma severity, unspecified whether persistent    3. Morbid obesity with BMI of 45.0-49.9, adult ([address]    4. History of COVID-19    5. COVID-19 vaccination refused          Plan:      1. Medications reviewed, continue as ordered. 2. Educated and clarified the medication use. 3. Recommend flu vaccination in the fall annually. 4. Patient is up-to-date with pneumococcal vaccinations from pulmonary perspective. 5. Recommended Covid vaccine after she has completed 90 days from Regeneron infusion 10/30/2021. Discussed strategies to protect against Covid 19.   6. Maintain an active lifestyle. 7. Patient's questions were answered to her satisfaction. 8. Pulmonary function tests were reviewed   9. CT scan of the chest was reviewed  10. Compliance data not available for my review. Per patient report they use the machine faithfully every night, and report refreshing and restorative sleep without residual daytime fatigue  11. We'll see the patient back in 6 months with Dr. Sal Perez  12. New DME order for auto titrating CPAP 4-16 cm H2O completed today.           Electronically signed by JULITA Silverio CNP on 1/4/2022 at 9:53 AM

## 2022-01-04 ENCOUNTER — OFFICE VISIT (OUTPATIENT)
Dept: PULMONOLOGY | Age: 62
End: 2022-01-04
Payer: COMMERCIAL

## 2022-01-04 VITALS
HEIGHT: 67 IN | HEART RATE: 77 BPM | TEMPERATURE: 97.8 F | BODY MASS INDEX: 45.99 KG/M2 | OXYGEN SATURATION: 97 % | SYSTOLIC BLOOD PRESSURE: 135 MMHG | WEIGHT: 293 LBS | RESPIRATION RATE: 12 BRPM | DIASTOLIC BLOOD PRESSURE: 82 MMHG

## 2022-01-04 DIAGNOSIS — Z28.21 COVID-19 VACCINATION REFUSED: ICD-10-CM

## 2022-01-04 DIAGNOSIS — G47.33 OBSTRUCTIVE SLEEP APNEA SYNDROME: Primary | ICD-10-CM

## 2022-01-04 DIAGNOSIS — E66.01 MORBID OBESITY WITH BMI OF 45.0-49.9, ADULT (HCC): ICD-10-CM

## 2022-01-04 DIAGNOSIS — J45.909 UNCOMPLICATED ASTHMA, UNSPECIFIED ASTHMA SEVERITY, UNSPECIFIED WHETHER PERSISTENT: ICD-10-CM

## 2022-01-04 DIAGNOSIS — Z86.16 HISTORY OF COVID-19: ICD-10-CM

## 2022-01-04 PROCEDURE — G8482 FLU IMMUNIZE ORDER/ADMIN: HCPCS | Performed by: NURSE PRACTITIONER

## 2022-01-04 PROCEDURE — G8417 CALC BMI ABV UP PARAM F/U: HCPCS | Performed by: NURSE PRACTITIONER

## 2022-01-04 PROCEDURE — G8427 DOCREV CUR MEDS BY ELIG CLIN: HCPCS | Performed by: NURSE PRACTITIONER

## 2022-01-04 PROCEDURE — 1036F TOBACCO NON-USER: CPT | Performed by: NURSE PRACTITIONER

## 2022-01-04 PROCEDURE — 99213 OFFICE O/P EST LOW 20 MIN: CPT | Performed by: NURSE PRACTITIONER

## 2022-01-04 PROCEDURE — 3017F COLORECTAL CA SCREEN DOC REV: CPT | Performed by: NURSE PRACTITIONER

## 2022-01-04 ASSESSMENT — ENCOUNTER SYMPTOMS
SINUS PAIN: 0
SINUS PRESSURE: 0
CHEST TIGHTNESS: 0
RHINORRHEA: 0
WHEEZING: 0
SHORTNESS OF BREATH: 0
CONSTIPATION: 0
STRIDOR: 0
VOMITING: 0
DIARRHEA: 0
COUGH: 0
NAUSEA: 0

## 2022-01-04 NOTE — PATIENT INSTRUCTIONS
Faxing DME order & office note  Once available  to Micheal Cervantes provided Surinder fax number - [phone]  LS FAXED 1/5/22 1/7/22-Pt called, states they never received the fax. I confirmed the correct fax number and refaxed the office note with the DME order to #[phone]. Sonja Salvador

## 2022-01-07 NOTE — PROGRESS NOTES
Patient instructed to remove shoes and socks and instructed to sit in exam chair. Current PCP is West Almanza MD and date of last visit was 11/12/21. Do you have a follow up visit scheduled?   No  If yes, the date is

## 2022-01-11 RX ORDER — ACETAMINOPHEN 500 MG
TABLET ORAL
Qty: 30 CAPSULE | Refills: 5 | Status: SHIPPED | OUTPATIENT
Start: 2022-01-11 | End: 2022-04-20

## 2022-01-11 NOTE — TELEPHONE ENCOUNTER
Vitamin D3 pending for refill     Health Maintenance   Topic Date Due    COVID-19 Vaccine (1) Never done    Diabetic retinal exam  03/06/2021    Diabetic microalbuminuria test  09/30/2021    Lipid screen  12/14/2021    Depression Monitoring  06/11/2022    TSH testing  06/14/2022    Diabetic foot exam  09/22/2022    A1C test (Diabetic or Prediabetic)  12/29/2022    Breast cancer screen  12/16/2023    Pneumococcal 0-64 years Vaccine (2 of 2 - PPSV23) 03/09/2025    DTaP/Tdap/Td vaccine (2 - Td or Tdap) 06/01/2027    Colon cancer screen colonoscopy  12/09/2029    Flu vaccine  Completed    Shingles Vaccine  Completed    Hepatitis C screen  Completed    HIV screen  Completed    Hepatitis A vaccine  Aged Out    Hib vaccine  Aged Out    Meningococcal (ACWY) vaccine  Aged Out             (applicable per patient's age: Cancer Screenings, Depression Screening, Fall Risk Screening, Immunizations)    Hemoglobin A1C (%)   Date Value   06/11/2021 5.5   12/03/2020 5.3   01/02/2020 5.4     Microalb/Crt.  Ratio (mcg/mg creat)   Date Value   01/02/2020 CANNOT BE CALCULATED     LDL Cholesterol (mg/dL)   Date Value   12/14/2020 64     LDL Calculated (mg/dL)   Date Value   06/16/2017 68     AST (U/L)   Date Value   01/20/2021 26     ALT (U/L)   Date Value   01/20/2021 35 (H)     BUN (mg/dL)   Date Value   01/20/2021 20      (goal A1C is < 7)   (goal LDL is <100) need 30-50% reduction from baseline     BP Readings from Last 3 Encounters:   01/04/22 135/82   10/22/21 121/67   09/22/21 (!) 142/73    (goal /80)      All Future Testing planned in [facility]:  Lab Frequency Next Occurrence   EGD Routine Once 08/27/2021   [place] MODIFIED BARIUM SWALLOW W VIDEO Once 10/22/2021       Next Visit Date:  Future Appointments   Date Time Provider Fabby Juarez   1/19/2022 12:45 PM Oscar Calhoun DPM [facility] Pod[facility]ry [facility]   1/20/2022 10:30 AM Erick Torrez MD [facility]   1/28/2022  9:00 AM Angelique Bliss MD [facility]   6/23/2022 10:30 AM Kyle Nobles MD Resp Spec [facility]            Patient Active Problem List:     Glaucoma     GERD (gastroesophageal reflux disease)     DJD (degenerative joint disease)     Obesity     Polyneuropathy     Migraine     Mitral prolapse     Low back pain     CTS (carpal tunnel syndrome)     Supraspinatus syndrome     Impaired fasting glucose     Acute sinus infection     IBS (irritable bowel syndrome)     Back pain, chronic     Stress fracture, right 5th metatarsal      Upper airway cough syndrome     Ear fullness     Perioral numbness     Screening for osteoporosis     Headache     Left eye injury     Medication reaction     Osteopenia     Lumbosacral pain     Flu vaccine need     Hypothyroid     Urinary incontinence     Anxiety     Sleep apnea     Depression     Chronic back pain     Carpal tunnel syndrome     Neuropathy     Mitral valve problem     Morbid obesity with BMI of 45.0-49.9, adult (HCC)     Frozen shoulder     Skin tag     Degenerative disc disease, lumbar     Bilateral edema of lower extremity     Medication refill     Varicose vein of leg     Dizziness     Dysphagia     Abdominal bloating     Mild intermittent asthma without complication     Skin lesion of left leg

## 2022-01-18 RX ORDER — STANDARDIZED SENNA CONCENTRATE 8.6 MG/1
TABLET ORAL
Qty: 60 TABLET | Refills: 2 | Status: SHIPPED | OUTPATIENT
Start: 2022-01-18 | End: 2022-04-11

## 2022-01-19 ENCOUNTER — OFFICE VISIT (OUTPATIENT)
Dept: PODIATRY | Age: 62
End: 2022-01-19
Payer: COMMERCIAL

## 2022-01-19 VITALS
HEART RATE: 79 BPM | HEIGHT: 67 IN | WEIGHT: 293 LBS | DIASTOLIC BLOOD PRESSURE: 77 MMHG | SYSTOLIC BLOOD PRESSURE: 149 MMHG | BODY MASS INDEX: 45.99 KG/M2

## 2022-01-19 DIAGNOSIS — B35.1 ONYCHOMYCOSIS: ICD-10-CM

## 2022-01-19 DIAGNOSIS — I73.9 PERIPHERAL VASCULAR DISORDER (HCC): ICD-10-CM

## 2022-01-19 DIAGNOSIS — Z47.89 ENCOUNTER FOR TRAINING IN USE OF ORTHOTIC DEVICE: ICD-10-CM

## 2022-01-19 DIAGNOSIS — E11.43 CONTROLLED TYPE 2 DIABETES MELLITUS WITH DIABETIC AUTONOMIC NEUROPATHY, WITHOUT LONG-TERM CURRENT USE OF INSULIN (HCC): Primary | ICD-10-CM

## 2022-01-19 PROCEDURE — G8417 CALC BMI ABV UP PARAM F/U: HCPCS

## 2022-01-19 PROCEDURE — 2022F DILAT RTA XM EVC RTNOPTHY: CPT

## 2022-01-19 PROCEDURE — 3046F HEMOGLOBIN A1C LEVEL >9.0%: CPT

## 2022-01-19 PROCEDURE — 3017F COLORECTAL CA SCREEN DOC REV: CPT

## 2022-01-19 PROCEDURE — 11721 DEBRIDE NAIL 6 OR MORE: CPT | Performed by: PODIATRIST

## 2022-01-19 PROCEDURE — G8482 FLU IMMUNIZE ORDER/ADMIN: HCPCS

## 2022-01-19 PROCEDURE — 99212 OFFICE O/P EST SF 10 MIN: CPT

## 2022-01-19 PROCEDURE — 1036F TOBACCO NON-USER: CPT

## 2022-01-19 PROCEDURE — G8427 DOCREV CUR MEDS BY ELIG CLIN: HCPCS

## 2022-01-19 RX ORDER — OMEPRAZOLE 40 MG/1
40 CAPSULE, DELAYED RELEASE ORAL DAILY
COMMUNITY
End: 2022-01-19

## 2022-01-19 RX ORDER — BLOOD SUGAR DIAGNOSTIC
STRIP MISCELLANEOUS
COMMUNITY
Start: 2021-12-20

## 2022-01-19 RX ORDER — CHOLECALCIFEROL (VITAMIN D3) 125 MCG
CAPSULE ORAL
COMMUNITY
Start: 2021-12-16 | End: 2022-01-19

## 2022-01-19 NOTE — PROGRESS NOTES
[address],  [place]  Tel: [phone]   Fax: [phone]    Subjective     CC: Pain in bilateral feet    HPI:  Efraín Cruz is a 64 y.o. female who presents to clinic today for follow-up of pain in her bilateral feet. She received orthotics 2 month ago and states she has attempted to wear them daily, however is unable to wear for long duration as she has pain to the bottom of her feet. She is not able to point to the exact location of the pain. She also relates burning to bilateral lower extremity. Relates that she normally wears compression stockings to bilateral lower extremities for edema. Denies any rest pain or claudication symptoms. Primary care physician is Juliann Pantoja MD.    ROS:    Constitutional: Denies nausea, vomiting, fever, chills. Neurologic: Denies numbness, tingling, and burning in the feet. Vascular: Denies symptoms of lower extremity claudication. Skin: Painful thickened toenails   Otherwise negative except as noted in the HPI.      PMH:  Past Medical History:   Diagnosis Date    Abdominal bloating 1/23/2019    Anxiety     Bilateral edema of lower extremity 12/16/2016    Bronchial asthma     mild, intermittent    Carpal tunnel syndrome     Cataract     Chronic back pain     Chronic sinusitis     Degenerative disc disease, lumbar 12/16/2016    Depression     Diabetes mellitus due to underlying condition with hyperosmolarity without coma ([facility] [address]     Dizziness 10/27/2017    DJD (degenerative joint disease)     S1, L3, L4, L5, T12    Dysphagia 1/23/2019    Edema of leg     bilateral legs    Environmental allergies     Frozen shoulder 4/27/2015    GERD (gastroesophageal reflux disease)     Glaucoma     Glucose intolerance (impaired glucose tolerance)     Headache(784.0)     History of bronchitis     Viral    HTN (hypertension)     Hyperlipidemia     Hypothyroid     hypothyroid state    Hypothyroidism     IBS (irritable bowel syndrome) 10/02/2012    Legally blind     due to glaucoma    Medication refill 6/1/2017    Mild intermittent asthma without complication     Morbid obesity with BMI of 45.0-49.9, adult (HCC)     MVP (mitral valve prolapse)     Neuropathy     OA (osteoarthritis)     EMILIA on CPAP     Osteopenia     Pancreatic cyst     Polyneuropathy     Raynaud disease     Rhinopharyngitis     Allergic rhinopharyngitis    Skin lesion of left leg 6/11/2021    Skin tag 7/9/2015    Stress fracture     Right 5th Metatarsal     Syncope     TIA (transient ischemic attack)     Urinary incontinence     Varicose vein of leg 6/1/2017    Vasodepressor syncope     Wears glasses        Surgical History:   Past Surgical History:   Procedure Laterality Date    APPENDECTOMY  1978    CATARACT REMOVAL Left     CHOLECYSTECTOMY  1978    COLONOSCOPY      COLONOSCOPY N/A 12/9/2019    COLORECTAL CANCER SCREENING, NOT HIGH RISK performed by Adrianna Sanchez MD at [address] [place] [address] Bilateral     right iridectomy, right laser, bilateral trabeculectomy, left drain    GLAUCOMA SURGERY      HAND SURGERY Right     HYSTERECTOMY  1982    KNEE SURGERY Right 2000    TUBAL LIGATION  1981    UPPER GASTROINTESTINAL ENDOSCOPY      UPPER GASTROINTESTINAL ENDOSCOPY  5/24/2018    EGD DILATION SAVORY performed by Hattie Redding MD at [address] [place] [address]  3/6/2019    EGD DILATION SAVORY performed by Adrianna Sanchez MD at [facility] ENDOSCOPY N/A 9/8/2021    EGD DILATION SAVORY performed by Adrianna Sanchez MD at [facility]       Social History:  Social History     Tobacco Use    Smoking status: Never Smoker    Smokeless tobacco: Never Used   Vaping Use    Vaping Use: Never used   Substance Use Topics    Alcohol use: No     Alcohol/week: 0.0 standard drinks    Drug use: No       Medications:  Prior to Admission medications    Medication Sig Start Date End Date Taking?  Authorizing Provider   Sabine Catalan strip  12/20/21  Yes Historical Provider, MD POSADAS 8.6 MG tablet TAKE 1 TABLET BY MOUTH 2 TIMES DAILY AS NEEDED FOR CONSTIPATION 1/18/22  Yes José Estrada MD   SM VITAMIN D3 50 MCG CAPS TAKE 1 CAPSULE BY MOUTH DAILY 1/11/22  Yes Osorio Davila MD   Lancets Misc. (ACCU-CHEK FASTCLIX LANCET) KIT 1 box by Kristen Louise. (Med.Supl.;Non-Drugs) route 2 times daily Dispense 1 box of 100. 12/14/21  Yes Osorio Davila MD   omeprazole (PRILOSEC) 40 MG delayed release capsule Take 1 capsule by mouth daily 12/13/21  Yes José Estrada MD   albuterol sulfate  (90 Base) MCG/ACT inhaler INHALE 2 PUFFS INTO THE LUNGS 4 TIMES DAILY AS NEEDED FOR WHEEZING 12/7/21  Yes Marco A Hayward MD   ALLERGY RELIEF 10 MG tablet TAKE 1 TABLET BY MOUTH DAILY 12/3/21  Yes Marco A Hayward MD   calcium carb-cholecalciferol 600-400 MG-UNIT TABS per tab TAKE 1 TABLET BY MOUTH TWICE A DAY 9/21/21  Yes Gregorio Sanders MD   Multiple Vitamins-Minerals (CERTAVITE/ANTIOXIDANTS) TABS TAKE 1 TABLET DAILY 9/21/21  Yes Gregorio Sanders MD   levothyroxine (SYNTHROID) 100 MCG tablet Take 1 tablet by mouth Daily Indications: 112 mg 9/21/21  Yes Gregorio Sanders MD   rosuvastatin (CRESTOR) 5 MG tablet Take 1 tablet by mouth daily 9/21/21  Yes Gregorio Sanders MD   ASPIRIN LOW DOSE 81 MG EC tablet Take 1 tablet by mouth daily 8/18/21  Yes Gregorio Sanders MD   Blood Glucose Monitoring Suppl ([address]) w/Device KIT  4/1/21  Yes Historical Provider, MD   Blood Glucose Calibration (ONETOUCH VERIO) SOLN  4/14/21  Yes Historical Provider, MD   acetaminophen (ACETAMINOPHEN EXTRA STRENGTH) 500 MG tablet Take 2 tablets by mouth every 6 hours as needed for Pain 6/11/21  Yes Joyce Chen MD   Accu-Chek FastClix Lancets MISC Use as directed 6/11/21  Yes Joyce Chen MD   fluticasone (FLONASE) 50 MCG/ACT nasal spray USE 1 SPRAY IN [facility] NOSTRIL TWICE A DAY 10/22/20  Yes Clementina Multani MD       Objective     Vitals:    01/19/22 1240   BP: (!) 149/77   Pulse: 79       Lab Results   Component Value Date    LABA1C 5.5 06/11/2021       Physical Exam:  General:  Alert and oriented x3. In no acute distress. Lower Extremity Physical Exam:    Vascular: DP pulses are palpable, Bilateral. PT pulses non palpable bilaterally but audible on doppler. Varicose veins noted bilaterally. CFT <3 seconds to all digits, Bilateral.  Non pitting Edema noted to the bilateral lower extremity, Bilateral.  Hair growth is absent to the level of the digits, Bilateral.     Neuro: Saph/sural/SP/DP/plantar sensation intact to light touch. Protective sensation is intact to 4/10 sites on the right foot and 3/10 sites on the left foot as tested with a 5.07g SWMF, Bilateral.     Musculoskeletal: EHL/FHL/GS/TA gross motor intact. Decreased ankle range of motion bilateral. Neg pain on calf squeeze. Reduced ROM 1st MTPJ R worse than mild pain noted with palpation of second interspace and dorsiflexion plantarflexion second and third digits. Dermatologic: Skin shiny and atrophic with no hair growth noted. Interdigital maceration absent, Bilateral. Nails 1-5 thickened, elongated with subungual debris noted b/l. with yellowish discoloration. Hair growth absent bilat. No noted hyperkeratotic lesion. No open wounds appreciated.     Class A Findings (Q7) - One Class A finding  [] Non traumatic amputation of foot or integral skeletal portion thereof  Class B Findings (Q8) - Two Class B findings  [x]Absent posterior tibial pulse   []Absent dorsalis pedis pulse   []Advanced trophic changes; three of the following are required:   [x]hair growth (decrease or absence)   [x]nail changes (thickening)   []pigmentary changes (discoloration)   [x]skin texture (thin, shiny)   []skin color (rubor or redness)  Class C Findings (Q9) - One Class B and two Class C findings  []Claudication []Temperature changes   []Edema   []Paresthesia   []Burning    Q Modifier Met: Q8: Two Class B findings        Sites of Onychomycosis Involvement (Check affected area)  [x] [x] [x] [x] [x] [x] [x] [x] [x] [x]  5 4 3 2 1 1 2 3 4 5                          Right                                        Left    Thickness  [x] [x] [x] [x] [x] [x] [x] [x] [x] [x]  5 4 3 2 1 1 2 3 4 5                         Right                                        Left    Dystrophic Changes                                                                 [x] [x] [x] [x] [x] [x] [x] [x] [x] [x]  5 4 3 2 1 1 2 3 4 5                         Right                                        Left    Color                                                                  [x] [x] [x] [x] [x] [x] [x] [x] [x] [x]  5 4 3 2 1 1 2 3 4 5                          Right                                        Left    Incurvation/Ingrowin                                                                   [] [] [] [] [] [] [] [] [] []  5 4 3 2 1 1 2 3 4 5                         Right                                        Left    Inflammation/Pain                                                                   [x] [x] [x] [x] [x] [x] [x] [x] [x] [x]  5 4 3 2 1 1 2 3 4 5                         Right                                        Left        Assessment   Kaci Borjas is a 64 y.o. female with     Diagnosis Orders   1. Controlled type 2 diabetes mellitus with diabetic autonomic neuropathy, without long-term current use of insulin ([facility] [address]     2. Peripheral vascular disorder ([facility] [address]     3. Onychomycosis     4. Encounter for training in use of orthotic device          Plan   Pt was evaluated and examined. Patient was given personalized discharge instructions. Diagnosis was discussed with the pt and all of their questions were answered in detail. Proper foot hygiene and care was discussed with the pt.    Instructed patient to modify use of orthotics, limit to 1 hour per day and then gradually increase use. Pt advised to jo ann the location of pain with orthotics so they can be sent for modification in her next visit. Nails 1-10 trimmed without incident with a sterile nail nipper  Discussed with Dr. Ayana Ambrocio  · Please, call the office with any questions or concerns.       Carolyn Khoury DPM   Podiatric Medicine & Surgery   1/19/2022 at 6:55 PM

## 2022-01-20 ENCOUNTER — OFFICE VISIT (OUTPATIENT)
Dept: OBGYN | Age: 62
End: 2022-01-20
Payer: COMMERCIAL

## 2022-01-20 ENCOUNTER — HOSPITAL ENCOUNTER (OUTPATIENT)
Age: 62
Setting detail: SPECIMEN
Discharge: HOME | End: 2022-01-20
Payer: COMMERCIAL

## 2022-01-20 VITALS
SYSTOLIC BLOOD PRESSURE: 136 MMHG | HEIGHT: 67 IN | BODY MASS INDEX: 45.99 KG/M2 | DIASTOLIC BLOOD PRESSURE: 84 MMHG | WEIGHT: 293 LBS

## 2022-01-20 DIAGNOSIS — Z01.419 WOMEN'S ANNUAL ROUTINE GYNECOLOGICAL EXAMINATION: ICD-10-CM

## 2022-01-20 DIAGNOSIS — Z01.419 WOMEN'S ANNUAL ROUTINE GYNECOLOGICAL EXAMINATION: Primary | ICD-10-CM

## 2022-01-20 DIAGNOSIS — L30.9 DERMATITIS: ICD-10-CM

## 2022-01-20 PROCEDURE — 99396 PREV VISIT EST AGE 40-64: CPT | Performed by: OBSTETRICS & GYNECOLOGY

## 2022-01-20 PROCEDURE — G8482 FLU IMMUNIZE ORDER/ADMIN: HCPCS | Performed by: OBSTETRICS & GYNECOLOGY

## 2022-01-20 PROCEDURE — G0145 SCR C/V CYTO,THINLAYER,RESCR: HCPCS

## 2022-01-20 RX ORDER — DIAPER,BRIEF,INFANT-TODD,DISP
EACH MISCELLANEOUS
Qty: 30 G | Refills: 1 | Status: SHIPPED | OUTPATIENT
Start: 2022-01-20

## 2022-01-20 NOTE — PROGRESS NOTES
YEARLY PHYSICAL    Date of service: 2022    Urszula Og  Is a 64 y.o.  single female    PT's PCP is: Georgie Leach MD     : 1960                                             Subjective:       No LMP recorded. Patient has had a hysterectomy.      Are your menses regular: not applicable    OB History    Para Term  AB Living   1 1 1         SAB IAB Ectopic Molar Multiple Live Births                    # Outcome Date GA Lbr Panchito/2nd Weight Sex Delivery Anes PTL Lv   1 Term      Vag-Spont           Social History     Tobacco Use   Smoking Status Never Smoker   Smokeless Tobacco Never Used        Social History     Substance and Sexual Activity   Alcohol Use No    Alcohol/week: 0.0 standard drinks       Family History   Problem Relation Age of Onset    Diabetes Mother     Heart Disease Mother         multiple heart attacks    Arthritis Mother     High Blood Pressure Mother     High Cholesterol Mother     Substance Abuse Mother     Heart Disease Father     Arthritis Father     Depression Father     High Cholesterol Father     Mental Illness Father     Substance Abuse Father     Diabetes Sister     High Blood Pressure Sister     Cancer Paternal Uncle         esophageal cancer    Diabetes Maternal Aunt     Cancer Maternal Aunt         colorectal cancer    Diabetes Maternal Uncle     Cancer Maternal Grandmother         colorectal cancer    Arthritis Maternal Grandmother     Diabetes Maternal Grandmother     High Blood Pressure Maternal Grandmother     Stroke Maternal Grandmother     Arthritis Maternal Grandfather     Arthritis Paternal Grandmother     Cancer Paternal Grandmother     Depression Paternal Grandmother     Heart Disease Paternal Grandmother     High Blood Pressure Paternal Grandmother     High Cholesterol Paternal Grandmother     Mental Illness Paternal Grandmother     Arthritis Paternal Grandfather        Allergies: Latex, Adhesive tape, Amlodipine, Bextra [valdecoxib], Brimonidine, Daypro [oxaprozin], Diclofenac sodium, Famotidine, Hydrocodone-acetaminophen, Hydromorphone, Ibuprofen, Lisinopril, Metoprolol, Naproxen, Oxycodone-acetaminophen, Rofecoxib, Shellfish-derived products, Simvastatin, Statins, Sulfa antibiotics, Tetracyclines & related, Timoptic [timolol maleate], Tramadol, Fosamax [alendronate], Nalbuphine, Alendronate sodium, Alendronate sodium, Alphagan [brimonidine tartrate], Dilaudid [hydromorphone hcl], Doxycycline monohydrate, Nsaids, Other, Pepcid complete [famotidine-ca carb-mag hydrox], Pilocarpine, Pravastatin, Red dye, Shrimp flavor, Statins support therapy, Timoptic [timolol maleate], Tolectin [tolmetin], Voltaren [diclofenac sodium], Nubain [nalbuphine hcl], Percocet [oxycodone-acetaminophen], Tolectin [tolmetin sodium], Tolmetin sodium, Ultram [tramadol hcl], Vicodin [hydrocodone-acetaminophen], and Vioxx      Current Outpatient Medications:     hydrocortisone 1 % ointment, Apply topically 2 times daily as needed for skin itching, Disp: 30 g, Rfl: 1    ONETOUCH VERIO strip, , Disp: , Rfl:     JANAK-PATITO 8.6 MG tablet, TAKE 1 TABLET BY MOUTH 2 TIMES DAILY AS NEEDED FOR CONSTIPATION, Disp: 60 tablet, Rfl: 2    SM VITAMIN D3 50 MCG CAPS, TAKE 1 CAPSULE BY MOUTH DAILY, Disp: 30 capsule, Rfl: 5    Lancets Misc. (ACCU-CHEK FASTCLIX LANCET) KIT, 1 box by Miscell. (Med.Supl.;Non-Drugs) route 2 times daily Dispense 1 box of 100., Disp: 1 kit, Rfl: 3    omeprazole (PRILOSEC) 40 MG delayed release capsule, Take 1 capsule by mouth daily, Disp: 30 capsule, Rfl: 1    albuterol sulfate  (90 Base) MCG/ACT inhaler, INHALE 2 PUFFS INTO THE LUNGS 4 TIMES DAILY AS NEEDED FOR WHEEZING, Disp: 1 each, Rfl: 6    ALLERGY RELIEF 10 MG tablet, TAKE 1 TABLET BY MOUTH DAILY, Disp: 30 tablet, Rfl: 7    calcium carb-cholecalciferol 600-400 MG-UNIT TABS per tab, TAKE 1 TABLET BY MOUTH TWICE A DAY, Disp: 60 tablet, Rfl: 5    Multiple Vitamins-Minerals (CERTAVITE/ANTIOXIDANTS) TABS, TAKE 1 TABLET DAILY, Disp: 30 tablet, Rfl: 5    levothyroxine (SYNTHROID) 100 MCG tablet, Take 1 tablet by mouth Daily Indications: 112 mg, Disp: 30 tablet, Rfl: 5    rosuvastatin (CRESTOR) 5 MG tablet, Take 1 tablet by mouth daily, Disp: 30 tablet, Rfl: 3    ASPIRIN LOW DOSE 81 MG EC tablet, Take 1 tablet by mouth daily, Disp: 30 tablet, Rfl: 5    Blood Glucose Monitoring Suppl (ONETOUCH VERIO FLEX SYSTEM) w/Device KIT, , Disp: , Rfl:     Blood Glucose Calibration (ONETOUCH VERIO) SOLN, , Disp: , Rfl:     acetaminophen (ACETAMINOPHEN EXTRA STRENGTH) 500 MG tablet, Take 2 tablets by mouth every 6 hours as needed for Pain, Disp: 120 tablet, Rfl: 3    Accu-Chek FastClix Lancets MISC, Use as directed, Disp: 102 each, Rfl: 3    fluticasone (FLONASE) 50 MCG/ACT nasal spray, USE 1 SPRAY IN EACH NOSTRIL TWICE A DAY, Disp: 16 g, Rfl: 5    Social History     Substance and Sexual Activity   Sexual Activity Not Currently    Partners: Male       Any bleeding or pain with intercourse: No    Last Yearly:  1/7/21    Last pap: 1/7/21    Last HPV: never    Last Mammogram: 12/16/21    Last Dexascan 2/25/20    Do you do self breast exams: not recently    Past Medical History:   Diagnosis Date    Abdominal bloating 1/23/2019    Anxiety     Bilateral edema of lower extremity 12/16/2016    Bronchial asthma     mild, intermittent    Carpal tunnel syndrome     Cataract     Chronic back pain     Chronic sinusitis     Degenerative disc disease, lumbar 12/16/2016    Depression     Diabetes mellitus due to underlying condition with hyperosmolarity without coma (HCC)     Dizziness 10/27/2017    DJD (degenerative joint disease)     S1, L3, L4, L5, T12    Dysphagia 1/23/2019    Edema of leg     bilateral legs    Environmental allergies     Frozen shoulder 4/27/2015    GERD (gastroesophageal reflux disease)     Glaucoma     Glucose intolerance (impaired glucose tolerance)     Headache(784.0)     History of bronchitis     Viral    HTN (hypertension)     Hyperlipidemia     Hypothyroid     hypothyroid state    Hypothyroidism     IBS (irritable bowel syndrome) 10/02/2012    Legally blind     due to glaucoma    Medication refill 6/1/2017    Mild intermittent asthma without complication     Morbid obesity with BMI of 45.0-49.9, adult (HCC)     MVP (mitral valve prolapse)     Neuropathy     OA (osteoarthritis)     EMILIA on CPAP     Osteopenia     Pancreatic cyst     Polyneuropathy     Raynaud disease     Rhinopharyngitis     Allergic rhinopharyngitis    Skin lesion of left leg 6/11/2021    Skin tag 7/9/2015    Stress fracture     Right 5th Metatarsal     Syncope     TIA (transient ischemic attack)     Urinary incontinence     Varicose vein of leg 6/1/2017    Vasodepressor syncope     Wears glasses        Past Surgical History:   Procedure Laterality Date    APPENDECTOMY  1978    CATARACT REMOVAL Left     CHOLECYSTECTOMY  1978    COLONOSCOPY      COLONOSCOPY N/A 12/9/2019    COLORECTAL CANCER SCREENING, NOT HIGH RISK performed by Sunita Estrada MD at [address] [place] [address] Bilateral     right iridectomy, right laser, bilateral trabeculectomy, left drain    GLAUCOMA SURGERY      HAND SURGERY Right     HYSTERECTOMY  1982    KNEE SURGERY Right 2000    TUBAL LIGATION  1981    UPPER GASTROINTESTINAL ENDOSCOPY      UPPER GASTROINTESTINAL ENDOSCOPY  5/24/2018    EGD DILATION SAVORY performed by Lesia Lopez MD at [address] [place] [address]  3/6/2019    EGD DILATION SAVORY performed by Sun[facility] E[facility]ada MD at [facility]    UPPER GASTROINTESTINAL ENDOSCOPY N/A 9/8/2021    EGD DILATION SAVORY performed by Sunita Estrada MD at [facility]       Family History   Problem Relation Age of Onset    Diabetes Mother     Heart Disease Mother multiple heart attacks    Arthritis Mother     High Blood Pressure Mother     High Cholesterol Mother     Substance Abuse Mother     Heart Disease Father     Arthritis Father     Depression Father     High Cholesterol Father     Mental Illness Father     Substance Abuse Father     Diabetes Sister     High Blood Pressure Sister     Cancer Paternal Uncle         esophageal cancer    Diabetes Maternal Aunt     Cancer Maternal Aunt         colorectal cancer    Diabetes Maternal Uncle     Cancer Maternal Grandmother         colorectal cancer    Arthritis Maternal Grandmother     Diabetes Maternal Grandmother     High Blood Pressure Maternal Grandmother     Stroke Maternal Grandmother     Arthritis Maternal Grandfather     Arthritis Paternal Grandmother     Cancer Paternal Grandmother     Depression Paternal Grandmother     Heart Disease Paternal Grandmother     High Blood Pressure Paternal Grandmother     High Cholesterol Paternal Grandmother     Mental Illness Paternal Grandmother     Arthritis Paternal Grandfather        Any family history of breast or ovarian cancer: Yes, MAunt - ovarian    Any family history of blood clots: No      Chief Complaint   Patient presents with    Gynecologic Exam     pt presents for yearly - no issues          Nurse: jaylene     PE:  Vital Signs  Blood pressure 136/84, height 5' 7\" (1.702 m), weight (!) 311 lb (141.1 kg), not currently breastfeeding. Labs:    No results found for this visit on 01/20/22. HPI: The patient is here today for a yearly exam she prefers to have continuous yearly exams with Pap smears. In regards to her hernia she did not have it repaired and it is not bothering her at this time. I did remind her that if she develops significant pain nausea or vomiting that she would need immediate treatment. Her biggest complaint at this time is itching skin with occasionally raised lesions.   She cannot pinpoint the etiology of this condition    NoPT denies fever, chills, nausea and vomiting       Objective  Lymphatic:   no lymphadenopathy  Heent:   negative   Cor: regular rate and rhythm, no murmurs              Pul:clear to auscultation bilaterally- no wheezes, rales or rhonchi, normal air movement, no respiratory distress      GI: Abdomen soft, non-tender. BS normal. No masses,  No organomegaly, obesity noted, hernia area is nontender in the left abdomen           Extremities: normal strength, tone, and muscle mass   Breasts: Breast:normal appearance, no masses or tenderness, Inspection negative, No nipple retraction or dimpling, No nipple discharge or bleeding, No axillary or supraclavicular adenopathy, Normal to palpation without dominant masses   Pelvic Exam: GENITAL/URINARY:  External Genitalia:  General appearance; normal, Hair distribution; normal, Lesions absent  Bladder:  Fullness absent, Masses absent, Tenderness absent, mild to moderate cystocele present  Vagina:  General appearance normal, Discharge absent, Lesions absent  Uterus:  Hystory of hysterectomy  Adenexa: Masses absent, Tenderness absent, Enlargement absent, Nodularity absent                                    Vaginal discharge: no vaginal discharge                            She was also counseled on her preventative health maintenance recommendations and follow-up. Assessment and Plan: Patient cannot tolerate Ditropan. And she could not afford the Detrol so is not using any medications for her incontinence        Diagnosis Orders   1. Women's annual routine gynecological examination  PAP SMEAR   2. Dermatitis  hydrocortisone 1 % ointment             I am having Kaci Banerjee start on hydrocortisone.  I am also having her maintain her fluticasone, OneTouch Verio Flex System, OneTouch Verio, acetaminophen, Accu-Chek FastClix Lancets, Aspirin Low Dose, calcium carb-cholecalciferol, CertaVite/Antioxidants, levothyroxine, rosuvastatin, Allergy Relief, albuterol sulfate HFA, omeprazole, Accu-Chek FastClix Lancet, SM Vitamin D3, Yani-fátima, and OneTouch Verio. No follow-ups on file. There are no Patient Instructions on file for this visit.        Tammy Jamil MD,1/20/2022 11:14 AM

## 2022-01-28 ENCOUNTER — OFFICE VISIT (OUTPATIENT)
Dept: GASTROENTEROLOGY | Age: 62
End: 2022-01-28
Payer: COMMERCIAL

## 2022-01-28 VITALS — DIASTOLIC BLOOD PRESSURE: 67 MMHG | SYSTOLIC BLOOD PRESSURE: 133 MMHG | BODY MASS INDEX: 48.87 KG/M2 | WEIGHT: 293 LBS

## 2022-01-28 DIAGNOSIS — K59.00 CONSTIPATION, UNSPECIFIED CONSTIPATION TYPE: Primary | ICD-10-CM

## 2022-01-28 PROCEDURE — 1036F TOBACCO NON-USER: CPT | Performed by: INTERNAL MEDICINE

## 2022-01-28 PROCEDURE — 99213 OFFICE O/P EST LOW 20 MIN: CPT | Performed by: INTERNAL MEDICINE

## 2022-01-28 PROCEDURE — G8482 FLU IMMUNIZE ORDER/ADMIN: HCPCS | Performed by: INTERNAL MEDICINE

## 2022-01-28 PROCEDURE — 3017F COLORECTAL CA SCREEN DOC REV: CPT | Performed by: INTERNAL MEDICINE

## 2022-01-28 PROCEDURE — G8417 CALC BMI ABV UP PARAM F/U: HCPCS | Performed by: INTERNAL MEDICINE

## 2022-01-28 PROCEDURE — G8427 DOCREV CUR MEDS BY ELIG CLIN: HCPCS | Performed by: INTERNAL MEDICINE

## 2022-01-28 ASSESSMENT — ENCOUNTER SYMPTOMS
CONSTIPATION: 1
RESPIRATORY NEGATIVE: 1
ALLERGIC/IMMUNOLOGIC NEGATIVE: 1

## 2022-01-28 NOTE — PROGRESS NOTES
GI CLINIC FOLLOW UP    INTERVAL HISTORY:   No referring provider defined for this encounter. Chief Complaint   Patient presents with    Follow-up           HISTORY OF PRESENT ILLNESS: Anthony Kerr is a 64 y.o. female with altered bowel habits. Some constipation. Mild. Some dysphagia still. She was not able to get her swallow study due to COVID-19 infection. Past Medical,Family, and Social History reviewed and does not contribute to the patient presenting condition. Patient's PMH/PSH,SH,PSYCH Hx, MEDs, ALLERGIES, and ROS were all reviewed and updated in the appropriate sections.     PAST MEDICAL HISTORY:  Past Medical History:   Diagnosis Date    Abdominal bloating 1/23/2019    Anxiety     Bilateral edema of lower extremity 12/16/2016    Bronchial asthma     mild, intermittent    Carpal tunnel syndrome     Cataract     Chronic back pain     Chronic sinusitis     Degenerative disc disease, lumbar 12/16/2016    Depression     Diabetes mellitus due to underlying condition with hyperosmolarity without coma ([facility] [address]     Dizziness 10/27/2017    DJD (degenerative joint disease)     S1, L3, L4, L5, T12    Dysphagia 1/23/2019    Edema of leg     bilateral legs    Environmental allergies     Frozen shoulder 4/27/2015    GERD (gastroesophageal reflux disease)     Glaucoma     Glucose intolerance (impaired glucose tolerance)     Headache(784.0)     History of bronchitis     Viral    HTN (hypertension)     Hyperlipidemia     Hypothyroid     hypothyroid state    Hypothyroidism     IBS (irritable bowel syndrome) 10/02/2012    Legally blind     due to glaucoma    Medication refill 6/1/2017    Mild intermittent asthma without complication     Morbid obesity with BMI of 45.0-49.9, adult (HCC)     MVP (mitral valve prolapse)     Neuropathy     OA (osteoarthritis)     EMILIA on CPAP     Osteopenia     Pancreatic cyst     Polyneuropathy     Raynaud disease     Rhinopharyngitis Allergic rhinopharyngitis    Skin lesion of left leg 6/11/2021    Skin tag 7/9/2015    Stress fracture     Right 5th Metatarsal     Syncope     TIA (transient ischemic attack)     Urinary incontinence     Varicose vein of leg 6/1/2017    Vasodepressor syncope     Wears glasses        Past Surgical History:   Procedure Laterality Date    APPENDECTOMY  1978    CATARACT REMOVAL Left     CHOLECYSTECTOMY  1978    COLONOSCOPY      COLONOSCOPY N/A 12/9/2019    COLORECTAL CANCER SCREENING, NOT HIGH RISK performed by Gisselle Aggarwal MD at [address] [place] [address] Bilateral     right iridectomy, right laser, bilateral trabeculectomy, left drain    GLAUCOMA SURGERY      HAND SURGERY Right     HYSTERECTOMY  1982    KNEE SURGERY Right 2000    TUBAL LIGATION  1981    UPPER GASTROINTESTINAL ENDOSCOPY      UPPER GASTROINTESTINAL ENDOSCOPY  5/24/2018    EGD DILATION SAVORY performed by Imani Najera MD at [address] [place] [address]  3/6/2019    EGD DILATION SAVORY performed by Gisselle Aggarwal MD at [address] [place] [address] N/A 9/8/2021    EGD DILATION SAVORY performed by Gisselle Aggarwal MD at [address] [place] [address] St:    Current Outpatient Medications:     hydrocortisone 1 % ointment, Apply topically 2 times daily as needed for skin itching, Disp: 30 g, Rfl: 1    ONETOUCH VERIO strip, , Disp: , Rfl:     JANAK-PATITO 8.6 MG tablet, TAKE 1 TABLET BY MOUTH 2 TIMES DAILY AS NEEDED FOR CONSTIPATION, Disp: 60 tablet, Rfl: 2    SM VITAMIN D3 50 MCG CAPS, TAKE 1 CAPSULE BY MOUTH DAILY, Disp: 30 capsule, Rfl: 5    Lancets Misc. (ACCU-CHEK FASTCLIX LANCET) KIT, 1 box by Miscell. (Med.Supl.;Non-Drugs) route 2 times daily Dispense 1 box of 100., Disp: 1 kit, Rfl: 3    omeprazole (PRILOSEC) 40 MG delayed release capsule, Take 1 capsule by mouth daily, Disp: 30 capsule, Rfl: 1    albuterol sulfate  (90 Base) MCG/ACT inhaler, INHALE 2 PUFFS INTO THE LUNGS 4 TIMES DAILY AS NEEDED FOR WHEEZING, Disp: 1 each, Rfl: 6    ALLERGY RELIEF 10 MG tablet, TAKE 1 TABLET BY MOUTH DAILY, Disp: 30 tablet, Rfl: 7    calcium carb-cholecalciferol 600-400 MG-UNIT TABS per tab, TAKE 1 TABLET BY MOUTH TWICE A DAY, Disp: 60 tablet, Rfl: 5    Multiple Vitamins-Minerals (CERTAVITE/ANTIOXIDANTS) TABS, TAKE 1 TABLET DAILY, Disp: 30 tablet, Rfl: 5    levothyroxine (SYNTHROID) 100 MCG tablet, Take 1 tablet by mouth Daily Indications: 112 mg, Disp: 30 tablet, Rfl: 5    rosuvastatin (CRESTOR) 5 MG tablet, Take 1 tablet by mouth daily, Disp: 30 tablet, Rfl: 3    ASPIRIN LOW DOSE 81 MG EC tablet, Take 1 tablet by mouth daily, Disp: 30 tablet, Rfl: 5    Blood Glucose Monitoring Suppl (ONETOUCH VERIO FLEX SYSTEM) w/Device KIT, , Disp: , Rfl:     Blood Glucose Calibration (ONETOUCH VERIO) SOLN, , Disp: , Rfl:     acetaminophen (ACETAMINOPHEN EXTRA STRENGTH) 500 MG tablet, Take 2 tablets by mouth every 6 hours as needed for Pain, Disp: 120 tablet, Rfl: 3    Accu-Chek FastClix Lancets MISC, Use as directed, Disp: 102 each, Rfl: 3    fluticasone (FLONASE) 50 MCG/ACT nasal spray, USE 1 SPRAY IN EACH NOSTRIL TWICE A DAY, Disp: 16 g, Rfl: 5    ALLERGIES:   Allergies   Allergen Reactions    Latex Rash     blisters  blisters    Adhesive Tape Rash     blisters    Amlodipine Other (See Comments)     Facial numbness  Facial numbness  Facial numbness  Facial numbness  Facial numbness  Facial numbness  Facial numbness    Bextra [Valdecoxib] Rash     swelling    Brimonidine Itching     Burning eyes severe    Daypro [Oxaprozin] Anaphylaxis    Diclofenac Sodium Swelling and Shortness Of Breath    Famotidine Other (See Comments)     Blisters down her arms    Hydrocodone-Acetaminophen Nausea Only     Severe chest pain    Hydromorphone Other (See Comments)     Rapid heart beat,  Nausea, spent 3 days in cardiac unit    Ibuprofen      Other reaction(s): bleeding  Other reaction(s): Unknown  Black stools  \"rips up stomach\", black tarry stool  Black stools  \"rips up stomach\", black tarry stool    Lisinopril Nausea Only, Nausea And Vomiting and Other (See Comments)     Other reaction(s): Hypotension    Metoprolol Other (See Comments)     Other reaction(s): Dizziness    Naproxen Other (See Comments)     Chest pain , swelling    Oxycodone-Acetaminophen      Chest pain severe    Rofecoxib Rash     swelling    Shellfish-Derived Products Anaphylaxis and Rash     shrimp  shrimp  shrimp    Simvastatin Nausea And Vomiting     Other reaction(s): muscle cramps    Statins      Other reaction(s): muscle cramps  Tolerates lovastatin. Pravachol caused numbness in head/face    Sulfa Antibiotics Hives    Tetracyclines & Related Itching and Rash    Timoptic [Timolol Maleate] Itching and Swelling     Eyes burning severely    Tramadol Itching and Rash    Fosamax [Alendronate] Nausea Only and Nausea And Vomiting    Nalbuphine Nausea And Vomiting    Alendronate Sodium     Alendronate Sodium     Alphagan [Brimonidine Tartrate]      Eye irritation    Dilaudid [Hydromorphone Hcl]     Doxycycline Monohydrate     Nsaids     Other Itching and Swelling     Eyes burning    Pepcid Complete [Famotidine-Ca Carb-Mag Hydrox] Hives and Other (See Comments)     blisters    Pilocarpine Itching and Swelling     Blurred vision  Eyes burning    Pravastatin Other (See Comments)     Facial numming    Red Dye Nausea Only     Pt states allergic to red coloring in crestor with CY on pill and senokot red pill. Feels sick to stomach and head feels foggy.  Shrimp Flavor Hives and Swelling    Statins Support Therapy      Tolerates lovastatin.  Pravachol caused numbness in head/face    Timoptic [Timolol Maleate]      Eye irritation      Tolectin [Tolmetin]      Unknown reaction  - as a child    Voltaren [Diclofenac Sodium]      Flu symptoms      Nubain [Nalbuphine Hcl] Nausea And Vomiting     Chest pain      Percocet [Oxycodone-Acetaminophen] Nausea And Vomiting     Sweating, chest pain    Tolectin [Tolmetin Sodium] Rash    Tolmetin Sodium Rash    Ultram [Tramadol Hcl] Itching and Rash     Rash on face    Vicodin [Hydrocodone-Acetaminophen] Nausea And Vomiting     swearing    Vioxx Rash       FAMILY HISTORY:       Problem Relation Age of Onset    Diabetes Mother     Heart Disease Mother         multiple heart attacks    Arthritis Mother     High Blood Pressure Mother     High Cholesterol Mother     Substance Abuse Mother     Heart Disease Father     Arthritis Father     Depression Father     High Cholesterol Father     Mental Illness Father     Substance Abuse Father     Diabetes Sister     High Blood Pressure Sister     Cancer Paternal Uncle         esophageal cancer    Diabetes Maternal Aunt     Cancer Maternal Aunt         colorectal cancer    Diabetes Maternal Uncle     Cancer Maternal Grandmother         colorectal cancer    Arthritis Maternal Grandmother     Diabetes Maternal Grandmother     High Blood Pressure Maternal Grandmother     Stroke Maternal Grandmother     Arthritis Maternal Grandfather     Arthritis Paternal Grandmother     Cancer Paternal Grandmother     Depression Paternal Grandmother     Heart Disease Paternal Grandmother     High Blood Pressure Paternal Grandmother     High Cholesterol Paternal Grandmother     Mental Illness Paternal Grandmother     Arthritis Paternal Grandfather          SOCIAL HISTORY:   Social History     Socioeconomic History    Marital status: Single     Spouse name: Not on file    Number of children: Not on file    Years of education: Not on file    Highest education level: Not on file   Occupational History    Not on file   Tobacco Use    Smoking status: Never Smoker    Smokeless tobacco: Never Used   Vaping Use    Vaping Use: Never used   Substance and Sexual Activity    Alcohol use:  No Alcohol/week: 0.0 standard drinks    Drug use: No    Sexual activity: Not Currently     Partners: Male   Other Topics Concern    Not on file   Social History Narrative    Not on file     Social Determinants of Health     Financial Resource Strain:     Difficulty of Paying Living Expenses: Not on file   Food Insecurity:     Worried About Running Out of Food in the Last Year: Not on file    [place] of Food in the Last Year: Not on file   Transportation Needs:     Lack of Transportation (Medical): Not on file    Lack of Transportation (Non-Medical): Not on file   Physical Activity:     Days of Exercise per Week: Not on file    Minutes of Exercise per Session: Not on file   Stress:     Feeling of Stress : Not on file   Social Connections:     Frequency of Communication with Friends and Family: Not on file    Frequency of Social Gatherings with Friends and Family: Not on file    Attends Sikh Services: Not on file    Active Member of [address] [place] [address] or Organizations: Not on file    Attends Club or Organization Meetings: Not on file    Marital Status: Not on file   Intimate Partner Violence:     Fear of Current or Ex-Partner: Not on file    Emotionally Abused: Not on file    Physically Abused: Not on file    Sexually Abused: Not on file   Housing Stability:     Unable to Pay for Housing in the Last Year: Not on file    Number of [place] in the Last Year: Not on file    Unstable Housing in the Last Year: Not on file       REVIEW OF SYSTEMS: A 12-point review of systemswas obtained and pertinent positives and negatives were enumerated above in the history of present illness. All other reviewed systems / symptoms were negative. Review of Systems   Constitutional: Negative. HENT: Negative. Eyes: Positive for visual disturbance (glasses ). Respiratory: Negative. Cardiovascular: Negative. Gastrointestinal: Positive for constipation. Endocrine: Negative. Genitourinary: Negative. Musculoskeletal: Negative. Skin: Negative. Allergic/Immunologic: Negative. Neurological: Negative. Hematological: Negative. Psychiatric/Behavioral: Negative. LABORATORY DATA: Reviewed  Lab Results   Component Value Date    WBC 12.5 (H) 01/20/2021    HGB 15.6 (H) 01/20/2021    HCT 46.8 01/20/2021    MCV 92.9 01/20/2021     01/20/2021     01/20/2021    K 3.9 01/20/2021     01/20/2021    CO2 33 (H) 01/20/2021    BUN 20 01/20/2021    CREATININE 0.79 01/20/2021    LABPROT 6.5 10/17/2012    LABALBU 4.4 01/20/2021    BILITOT 0.72 01/20/2021    ALKPHOS 81 01/20/2021    AST 26 01/20/2021    ALT 35 (H) 01/20/2021    INR 0.9 06/29/2013         Lab Results   Component Value Date    RBC 5.04 01/20/2021    HGB 15.6 (H) 01/20/2021    MCV 92.9 01/20/2021    MCH 31.0 01/20/2021    MCHC 33.3 01/20/2021    RDW 12.2 01/20/2021    MPV 9.5 01/20/2021    BASOPCT 1 01/20/2021    LYMPHSABS 3.66 01/20/2021    MONOSABS 0.88 01/20/2021    NEUTROABS 7.69 01/20/2021    EOSABS 0.11 01/20/2021    BASOSABS 0.07 01/20/2021         DIAGNOSTIC TESTING:     No results found. PHYSICAL EXAMINATION: Vital signs reviewed per the nursing documentation. There were no vitals taken for this visit. There is no height or weight on file to calculate BMI. Physical Exam      I personally reviewed the nurse's notes and documentation and I agree with her notes. General: alert, appears stated age and cooperative Psych: Normal. and Alert and oriented, appropriate affect. . Normal affect. Mentation normal  HEENT: PERRLA. Clear conjunctivae and sclerae. Moist oral mucosae, no lesions or ulcers. The neck is supple, without lymphadenopathy or jugular venous distension. No masses. Normal thyroid. Cardiovascular: S1 S2 RRR no rubs or murmurs. Pulmonary: clear BL. No accessory muscle usage. Abdominal Exam: Soft, NT ND, no hepato or spleno megaly, +BS, no ascites. IMPRESSION: Ms. Isael Morales is a 64 y.o. female with dysphagia. Very likely functional.  She is already had EGD with dilation. We will await swallow study. Mild constipation. Add senna. Titrate dose to effect. Follow-up in 3 months. Thank you for allowing me to participate in the care of Ms. Banerjee. For any further questions please do not hesitate to contact me. I have reviewed and agree with the ROS entered by the MA/LPN. Note is dictated utilizing voice recognition software. Unfortunately this leads to occasional typographical errors. Please contact our office if you have any questions.     Pacheco Rivera MD  [facility]  O: #[phone]

## 2022-01-31 DIAGNOSIS — M54.50 CHRONIC BILATERAL LOW BACK PAIN, UNSPECIFIED WHETHER SCIATICA PRESENT: ICD-10-CM

## 2022-01-31 DIAGNOSIS — G89.29 CHRONIC BILATERAL LOW BACK PAIN, UNSPECIFIED WHETHER SCIATICA PRESENT: ICD-10-CM

## 2022-01-31 DIAGNOSIS — E55.9 VITAMIN D DEFICIENCY: ICD-10-CM

## 2022-01-31 DIAGNOSIS — Z76.0 MEDICATION REFILL: ICD-10-CM

## 2022-01-31 DIAGNOSIS — M51.36 DEGENERATIVE DISC DISEASE, LUMBAR: ICD-10-CM

## 2022-01-31 RX ORDER — CALCIUM CARBONATE/VITAMIN D3 600 MG-10
TABLET ORAL
Qty: 60 TABLET | Refills: 5 | Status: SHIPPED | OUTPATIENT
Start: 2022-01-31 | End: 2022-04-20

## 2022-01-31 RX ORDER — FOLIC ACID/MV,IRON,MIN/LUTEIN 0.4-18-25
TABLET ORAL
Qty: 30 TABLET | Refills: 5 | Status: SHIPPED | OUTPATIENT
Start: 2022-01-31 | End: 2022-06-06

## 2022-01-31 RX ORDER — ACETAMINOPHEN 500 MG
1000 TABLET ORAL EVERY 6 HOURS PRN
Qty: 120 TABLET | Refills: 3 | Status: SHIPPED | OUTPATIENT
Start: 2022-01-31 | End: 2022-09-08 | Stop reason: SDUPTHER

## 2022-01-31 NOTE — TELEPHONE ENCOUNTER
Please address the medication refill and close the encounter. If I can be of assistance, please route to the applicable pool. Thank you. Last visit: 11/12/21  Last Med refill: 09/21/21  Does patient have enough medication for 72 hours: No:     Next Visit Date:  Future Appointments   Date Time Provider Fabby Juarez   2/8/2022  9:45 AM Carmelita Villalobos MD [facility]   4/8/2022  9:00 AM Robert Thornton MD [facility]   4/20/2022 12:45 PM Sven Becker DPM [facility]   6/23/2022 10:30 AM Syeda Tom MD Resp Spec [facility]   12/19/2022 11:30 AM [facility] MAMMOGRAPHY ROOM AT [facility]   1/23/2023  9:00 AM Magali Morocho MD [facility]       Health Maintenance   Topic Date Due    COVID-19 Vaccine (1) Never done    Diabetic retinal exam  03/06/2021    Diabetic microalbuminuria test  09/30/2021    Lipid screen  12/14/2021    Depression Monitoring  06/11/2022    TSH testing  06/14/2022    Diabetic foot exam  09/22/2022    A1C test (Diabetic or Prediabetic)  12/29/2022    Breast cancer screen  12/16/2023    Pneumococcal 0-64 years Vaccine (2 of 2 - PPSV23) 03/09/2025    DTaP/Tdap/Td vaccine (2 - Td or Tdap) 06/01/2027    Colon cancer screen colonoscopy  12/09/2029    Flu vaccine  Completed    Shingles Vaccine  Completed    Hepatitis C screen  Completed    HIV screen  Completed    Hepatitis A vaccine  Aged Out    Hib vaccine  Aged Out    Meningococcal (ACWY) vaccine  Aged Out       Hemoglobin A1C (%)   Date Value   06/11/2021 5.5   12/03/2020 5.3   01/02/2020 5.4             ( goal A1C is < 7)   Microalb/Crt.  Ratio (mcg/mg creat)   Date Value   01/02/2020 CANNOT BE CALCULATED     LDL Cholesterol (mg/dL)   Date Value   12/14/2020 64   07/08/2019 97     LDL Calculated (mg/dL)   Date Value   06/16/2017 68   05/19/2014 104       (goal LDL is <100)   AST (U/L)   Date Value   01/20/2021 26     ALT (U/L)   Date Value   01/20/2021 35 (H)     BUN (mg/dL) Date Value   01/20/2021 20     BP Readings from Last 3 Encounters:   01/28/22 133/67   01/20/22 136/84   01/19/22 (!) 149/77          (goal 120/80)    All Future Testing planned in [facility]  Lab Frequency Next Occurrence   EGD Routine Once 08/27/2021   FL MODIFIED BARIUM SWALLOW W VIDEO Once 10/22/2021               Patient Active Problem List:     Glaucoma     GERD (gastroesophageal reflux disease)     DJD (degenerative joint disease)     Obesity     Polyneuropathy     Migraine     Mitral prolapse     Low back pain     CTS (carpal tunnel syndrome)     Supraspinatus syndrome     Impaired fasting glucose     Acute sinus infection     IBS (irritable bowel syndrome)     Back pain, chronic     Stress fracture, right 5th metatarsal      Upper airway cough syndrome     Ear fullness     Perioral numbness     Screening for osteoporosis     Headache     Left eye injury     Medication reaction     Osteopenia     Lumbosacral pain     Flu vaccine need     Hypothyroid     Urinary incontinence     Anxiety     Sleep apnea     Depression     Chronic back pain     Carpal tunnel syndrome     Neuropathy     Mitral valve problem     Morbid obesity with BMI of 45.0-49.9, adult (HCC)     Frozen shoulder     Skin tag     Degenerative disc disease, lumbar     Bilateral edema of lower extremity     Medication refill     Varicose vein of leg     Dizziness     Dysphagia     Abdominal bloating     Mild intermittent asthma without complication     Skin lesion of left leg

## 2022-02-07 ENCOUNTER — TELEPHONE (OUTPATIENT)
Dept: PULMONOLOGY | Age: 62
End: 2022-02-07

## 2022-02-07 DIAGNOSIS — G47.33 OBSTRUCTIVE SLEEP APNEA SYNDROME: Primary | ICD-10-CM

## 2022-02-07 NOTE — TELEPHONE ENCOUNTER
Patient called and stated that Jean-Paul Webb asked to try and auto pressure on cpap patient said she did not like it and wants to go back to her original setting of 11. Patient stated she isnt sleeping well she is anxious and wants her pressure to be change. Sending order to Jose Dunham.  Pt provided me with fax number [phone]

## 2022-02-08 LAB — CYTOLOGY REPORT: NORMAL

## 2022-03-07 DIAGNOSIS — K21.9 GASTROESOPHAGEAL REFLUX DISEASE WITHOUT ESOPHAGITIS: ICD-10-CM

## 2022-03-07 RX ORDER — OMEPRAZOLE 40 MG/1
40 CAPSULE, DELAYED RELEASE ORAL DAILY
Qty: 30 CAPSULE | Refills: 1 | OUTPATIENT
Start: 2022-03-07

## 2022-04-04 DIAGNOSIS — K21.9 GASTROESOPHAGEAL REFLUX DISEASE WITHOUT ESOPHAGITIS: ICD-10-CM

## 2022-04-04 RX ORDER — OMEPRAZOLE 40 MG/1
40 CAPSULE, DELAYED RELEASE ORAL DAILY
Qty: 30 CAPSULE | Refills: 1 | Status: SHIPPED | OUTPATIENT
Start: 2022-04-04 | End: 2022-05-31

## 2022-04-07 NOTE — PROGRESS NOTES
Patient instructed to remove shoes and socks and instructed to sit in exam chair. Current PCP is Salas Goodman MD and date of last visit was 9/21/21. Do you have a follow up visit scheduled?   No  If yes, the date is

## 2022-04-08 ENCOUNTER — OFFICE VISIT (OUTPATIENT)
Dept: GASTROENTEROLOGY | Age: 62
End: 2022-04-08
Payer: COMMERCIAL

## 2022-04-08 VITALS — WEIGHT: 293 LBS | DIASTOLIC BLOOD PRESSURE: 76 MMHG | BODY MASS INDEX: 48.87 KG/M2 | SYSTOLIC BLOOD PRESSURE: 138 MMHG

## 2022-04-08 DIAGNOSIS — K59.00 CONSTIPATION, UNSPECIFIED CONSTIPATION TYPE: Primary | ICD-10-CM

## 2022-04-08 PROCEDURE — G8427 DOCREV CUR MEDS BY ELIG CLIN: HCPCS | Performed by: INTERNAL MEDICINE

## 2022-04-08 PROCEDURE — 3017F COLORECTAL CA SCREEN DOC REV: CPT | Performed by: INTERNAL MEDICINE

## 2022-04-08 PROCEDURE — 99213 OFFICE O/P EST LOW 20 MIN: CPT | Performed by: INTERNAL MEDICINE

## 2022-04-08 PROCEDURE — G8417 CALC BMI ABV UP PARAM F/U: HCPCS | Performed by: INTERNAL MEDICINE

## 2022-04-08 PROCEDURE — 1036F TOBACCO NON-USER: CPT | Performed by: INTERNAL MEDICINE

## 2022-04-08 ASSESSMENT — ENCOUNTER SYMPTOMS
RESPIRATORY NEGATIVE: 1
CONSTIPATION: 1
ALLERGIC/IMMUNOLOGIC NEGATIVE: 1
NAUSEA: 1

## 2022-04-08 NOTE — PROGRESS NOTES
GI CLINIC FOLLOW UP    INTERVAL HISTORY:   No referring provider defined for this encounter. Chief Complaint   Patient presents with    Follow-up     constipation f/u           HISTORY OF PRESENT ILLNESS: Tiki Encinas is a 58 y.o. female with constipation and dysphagia. She reports a bowel movement every 1 to 2 days. She has been taking Citrucel or senna on as-needed basis. This seems to be working well for her. She does report intermittent dysphagia with sensation of food getting stuck in her chest.  She has not had her swallow study yet. After recent EGD with dilation she felt better for a few months. Past Medical,Family, and Social History reviewed and does not contribute to the patient presentingcondition. Patient's PMH/PSH,SH,PSYCH Hx, MEDs, ALLERGIES, and ROS were all reviewed and updated in the appropriate sections.     PAST MEDICAL HISTORY:  Past Medical History:   Diagnosis Date    Abdominal bloating 1/23/2019    Anxiety     Bilateral edema of lower extremity 12/16/2016    Bronchial asthma     mild, intermittent    Carpal tunnel syndrome     Cataract     Chronic back pain     Chronic sinusitis     Degenerative disc disease, lumbar 12/16/2016    Depression     Diabetes mellitus due to underlying condition with hyperosmolarity without coma ([facility] [address]     Dizziness 10/27/2017    DJD (degenerative joint disease)     S1, L3, L4, L5, T12    Dysphagia 1/23/2019    Edema of leg     bilateral legs    Environmental allergies     Frozen shoulder 4/27/2015    GERD (gastroesophageal reflux disease)     Glaucoma     Glucose intolerance (impaired glucose tolerance)     Headache(784.0)     History of bronchitis     Viral    HTN (hypertension)     Hyperlipidemia     Hypothyroid     hypothyroid state    Hypothyroidism     IBS (irritable bowel syndrome) 10/02/2012    Legally blind     due to glaucoma    Medication refill 6/1/2017    Mild intermittent asthma without complication     Morbid obesity with BMI of 45.0-49.9, adult (HCC)     MVP (mitral valve prolapse)     Neuropathy     OA (osteoarthritis)     EMILIA on CPAP     Osteopenia     Pancreatic cyst     Polyneuropathy     Raynaud disease     Rhinopharyngitis     Allergic rhinopharyngitis    Skin lesion of left leg 6/11/2021    Skin tag 7/9/2015    Stress fracture     Right 5th Metatarsal     Syncope     TIA (transient ischemic attack)     Urinary incontinence     Varicose vein of leg 6/1/2017    Vasodepressor syncope     Wears glasses        Past Surgical History:   Procedure Laterality Date    APPENDECTOMY  1978    CATARACT REMOVAL Left     CHOLECYSTECTOMY  1978    COLONOSCOPY      COLONOSCOPY N/A 12/9/2019    COLORECTAL CANCER SCREENING, NOT HIGH RISK performed by Ant Galvan MD at [address] [place] [address] Bilateral     right iridectomy, right laser, bilateral trabeculectomy, left drain    GLAUCOMA SURGERY      HAND SURGERY Right     HYSTERECTOMY  1982    KNEE SURGERY Right 2000    TUBAL LIGATION  1981    UPPER GASTROINTESTINAL ENDOSCOPY      UPPER GASTROINTESTINAL ENDOSCOPY  5/24/2018    EGD DILATION SAVORY performed by Angella Mora MD at [address] [place] [address]  3/6/2019    EGD DILATION SAVORY performed by Ant Galvan MD at [address] [place] [address] N/A 9/8/2021    EGD DILATION SAVORY performed by Ant Galvan MD at [address] [place] [address] Street:    Current Outpatient Medications:     omeprazole (PRILOSEC) 40 MG delayed release capsule, TAKE 1 CAPSULE BY MOUTH DAILY, Disp: 30 capsule, Rfl: 1    calcium carb-cholecalciferol 600-400 MG-UNIT TABS per tab, TAKE 1 TABLET BY MOUTH TWICE A DAY, Disp: 60 tablet, Rfl: 5    acetaminophen (ACETAMINOPHEN EXTRA STRENGTH) 500 MG tablet, Take 2 tablets by mouth every 6 hours as needed for Pain, Disp: 120 tablet, Rfl: 3    Multiple Vitamins-Minerals (CERTAVITE/ANTIOXIDANTS) TABS, TAKE 1 TABLET DAILY, Disp: 30 tablet, Rfl: 5    hydrocortisone 1 % ointment, Apply topically 2 times daily as needed for skin itching, Disp: 30 g, Rfl: 1    ONETOUCH VERIO strip, , Disp: , Rfl:     JANAK-PATITO 8.6 MG tablet, TAKE 1 TABLET BY MOUTH 2 TIMES DAILY AS NEEDED FOR CONSTIPATION, Disp: 60 tablet, Rfl: 2    SM VITAMIN D3 50 MCG CAPS, TAKE 1 CAPSULE BY MOUTH DAILY, Disp: 30 capsule, Rfl: 5    Lancets Misc. (ACCU-CHEK FASTCLIX LANCET) KIT, 1 box by [employer]. (Med.Supl.;Non-Drugs) route 2 times daily Dispense 1 box of 100., Disp: 1 kit, Rfl: 3    albuterol sulfate  (90 Base) MCG/ACT inhaler, INHALE 2 PUFFS INTO THE LUNGS 4 TIMES DAILY AS NEEDED FOR WHEEZING, Disp: 1 each, Rfl: 6    ALLERGY RELIEF 10 MG tablet, TAKE 1 TABLET BY MOUTH DAILY, Disp: 30 tablet, Rfl: 7    levothyroxine (SYNTHROID) 100 MCG tablet, Take 1 tablet by mouth Daily Indications: 112 mg, Disp: 30 tablet, Rfl: 5    rosuvastatin (CRESTOR) 5 MG tablet, Take 1 tablet by mouth daily, Disp: 30 tablet, Rfl: 3    ASPIRIN LOW DOSE 81 MG EC tablet, Take 1 tablet by mouth daily, Disp: 30 tablet, Rfl: 5    Blood Glucose Monitoring Suppl (ONETOUCH VERIO FLEX SYSTEM) w/Device KIT, , Disp: , Rfl:     Blood Glucose Calibration (ONETOUCH VERIO) SOLN, , Disp: , Rfl:     Accu-Chek FastClix Lancets MISC, Use as directed, Disp: 102 each, Rfl: 3    fluticasone (FLONASE) 50 MCG/ACT nasal spray, USE 1 SPRAY IN EACH NOSTRIL TWICE A DAY, Disp: 16 g, Rfl: 5    ALLERGIES:   Allergies   Allergen Reactions    Latex Rash     blisters  blisters    Adhesive Tape Rash     blisters    Amlodipine Other (See Comments)     Facial numbness  Facial numbness  Facial numbness  Facial numbness  Facial numbness  Facial numbness  Facial numbness    Bextra [Valdecoxib] Rash     swelling    Brimonidine Itching     Burning eyes severe    Daypro [Oxaprozin] Anaphylaxis    Diclofenac Sodium Swelling and Shortness Of Breath    Famotidine Other (See Comments)     Blisters down her arms    Hydrocodone-Acetaminophen Nausea Only     Severe chest pain    Hydromorphone Other (See Comments)     Rapid heart beat,  Nausea, spent 3 days in cardiac unit    Ibuprofen      Other reaction(s): bleeding  Other reaction(s): Unknown  Black stools  \"rips up stomach\", black tarry stool  Black stools  \"rips up stomach\", black tarry stool    Lisinopril Nausea Only, Nausea And Vomiting and Other (See Comments)     Other reaction(s): Hypotension    Metoprolol Other (See Comments)     Other reaction(s): Dizziness    Naproxen Other (See Comments)     Chest pain , swelling    Oxycodone-Acetaminophen      Chest pain severe    Rofecoxib Rash     swelling    Shellfish-Derived Products Anaphylaxis and Rash     shrimp  shrimp  shrimp    Simvastatin Nausea And Vomiting     Other reaction(s): muscle cramps    Statins      Other reaction(s): muscle cramps  Tolerates lovastatin. Pravachol caused numbness in head/face    Sulfa Antibiotics Hives    Tetracyclines & Related Itching and Rash    Timoptic [Timolol Maleate] Itching and Swelling     Eyes burning severely    Tramadol Itching and Rash    Fosamax [Alendronate] Nausea Only and Nausea And Vomiting    Nalbuphine Nausea And Vomiting    Alendronate Sodium     Alendronate Sodium     Alphagan [Brimonidine Tartrate]      Eye irritation    Dilaudid [Hydromorphone Hcl]     Doxycycline Monohydrate     Nsaids     Other Itching and Swelling     Eyes burning    Pepcid Complete [Famotidine-Ca Carb-Mag Hydrox] Hives and Other (See Comments)     blisters    Pilocarpine Itching and Swelling     Blurred vision  Eyes burning    Pravastatin Other (See Comments)     Facial numming    Red Dye Nausea Only     Pt states allergic to red coloring in crestor with CY on pill and senokot red pill. Feels sick to stomach and head feels foggy.  Shrimp Flavor Hives and Swelling    Statins Support Therapy      Tolerates lovastatin. Pravachol caused numbness in head/face    Timoptic [Timolol Maleate]      Eye irritation      Tolectin [Tolmetin]      Unknown reaction  - as a child    Voltaren [Diclofenac Sodium]      Flu symptoms      Nubain [Nalbuphine Hcl] Nausea And Vomiting     Chest pain      Percocet [Oxycodone-Acetaminophen] Nausea And Vomiting     Sweating, chest pain    Tolectin [Tolmetin Sodium] Rash    Tolmetin Sodium Rash    Ultram [Tramadol Hcl] Itching and Rash     Rash on face    Vicodin [Hydrocodone-Acetaminophen] Nausea And Vomiting     swearing    Vioxx Rash       FAMILY HISTORY:       Problem Relation Age of Onset    Diabetes Mother     Heart Disease Mother         multiple heart attacks    Arthritis Mother     High Blood Pressure Mother     High Cholesterol Mother     Substance Abuse Mother     Heart Disease Father     Arthritis Father     Depression Father     High Cholesterol Father     Mental Illness Father     Substance Abuse Father     Diabetes Sister     High Blood Pressure Sister     Cancer Paternal Uncle         esophageal cancer    Diabetes Maternal Aunt     Cancer Maternal Aunt         colorectal cancer    Diabetes Maternal Uncle     Cancer Maternal Grandmother         colorectal cancer    Arthritis Maternal Grandmother     Diabetes Maternal Grandmother     High Blood Pressure Maternal Grandmother     Stroke Maternal Grandmother     Arthritis Maternal Grandfather     Arthritis Paternal Grandmother     Cancer Paternal Grandmother     Depression Paternal Grandmother     Heart Disease Paternal Grandmother     High Blood Pressure Paternal Grandmother     High Cholesterol Paternal Grandmother     Mental Illness Paternal Grandmother     Arthritis Paternal Grandfather          SOCIAL HISTORY:   Social History     Socioeconomic History    Marital status: Single     Spouse name: Not on file    Number of children: Not on file    Years of education: Not on file    Highest education level: Not on file   Occupational History    Not on file   Tobacco Use    Smoking status: Never Smoker    Smokeless tobacco: Never Used   Vaping Use    Vaping Use: Never used   Substance and Sexual Activity    Alcohol use: No     Alcohol/week: 0.0 standard drinks    Drug use: No    Sexual activity: Not Currently     Partners: Male   Other Topics Concern    Not on file   Social History Narrative    Not on file     Social Determinants of Health     Financial Resource Strain:     Difficulty of Paying Living Expenses: Not on file   Food Insecurity:     Worried About Running Out of Food in the Last Year: Not on file    [place] of Food in the Last Year: Not on file   Transportation Needs:     Lack of Transportation (Medical): Not on file    Lack of Transportation (Non-Medical): Not on file   Physical Activity:     Days of Exercise per Week: Not on file    Minutes of Exercise per Session: Not on file   Stress:     Feeling of Stress : Not on file   Social Connections:     Frequency of Communication with Friends and Family: Not on file    Frequency of Social Gatherings with Friends and Family: Not on file    Attends [employer] Services: Not on file    Active Member of [address] [place] [address] or Organizations: Not on file    Attends Club or Organization Meetings: Not on file    Marital Status: Not on file   Intimate Partner Violence:     Fear of Current or Ex-Partner: Not on file    Emotionally Abused: Not on file    Physically Abused: Not on file    Sexually Abused: Not on file   Housing Stability:     Unable to Pay for Housing in the Last Year: Not on file    Number of [place] in the Last Year: Not on file    Unstable Housing in the Last Year: Not on file       REVIEW OF SYSTEMS: A 12-point review of systemswas obtained and pertinent positives and negatives were enumerated above in the history of present illness. All other reviewed systems / symptoms were negative.     Review of Systems   Constitutional: Negative. HENT: Negative. Eyes: Positive for visual disturbance (glasses ). Respiratory: Negative. Cardiovascular: Negative. Gastrointestinal: Positive for constipation and nausea. Endocrine: Negative. Genitourinary: Negative. Musculoskeletal: Negative. Skin: Negative. Allergic/Immunologic: Negative. Neurological: Negative. Hematological: Negative. Psychiatric/Behavioral: Negative. LABORATORY DATA: Reviewed  Lab Results   Component Value Date    WBC 12.5 (H) 01/20/2021    HGB 15.6 (H) 01/20/2021    HCT 46.8 01/20/2021    MCV 92.9 01/20/2021     01/20/2021     01/20/2021    K 3.9 01/20/2021     01/20/2021    CO2 33 (H) 01/20/2021    BUN 20 01/20/2021    CREATININE 0.79 01/20/2021    LABPROT 6.5 10/17/2012    LABALBU 4.4 01/20/2021    BILITOT 0.72 01/20/2021    ALKPHOS 81 01/20/2021    AST 26 01/20/2021    ALT 35 (H) 01/20/2021    INR 0.9 06/29/2013         Lab Results   Component Value Date    RBC 5.04 01/20/2021    HGB 15.6 (H) 01/20/2021    MCV 92.9 01/20/2021    MCH 31.0 01/20/2021    MCHC 33.3 01/20/2021    RDW 12.2 01/20/2021    MPV 9.5 01/20/2021    BASOPCT 1 01/20/2021    LYMPHSABS 3.66 01/20/2021    MONOSABS 0.88 01/20/2021    NEUTROABS 7.69 01/20/2021    EOSABS 0.11 01/20/2021    BASOSABS 0.07 01/20/2021         DIAGNOSTIC TESTING:     No results found. PHYSICAL EXAMINATION: Vital signs reviewed per the nursing documentation. There were no vitals taken for this visit. There is no height or weight on file to calculate BMI. Physical Exam      I personally reviewed the nurse's notes and documentation and I agree with her notes. General: alert, appears stated age and cooperative Psych: Normal. and Alert and oriented, appropriate affect. . Normal affect. Mentation normal  HEENT: PERRLA. Clear conjunctivae and sclerae. Moist oral mucosae, no lesions or ulcers.   The neck is supple, without lymphadenopathy or jugular venous distension. No masses. Normal thyroid. Cardiovascular: S1 S2 RRR no rubs or murmurs. Pulmonary: clear BL. No accessory muscle usage. Abdominal Exam: Soft, NT ND, no hepato or spleno megaly, +BS, no ascites. IMPRESSION: Ms. Romel Soto is a 58 y.o. female with the patient. She is doing well currently on Citrucel and senna on as-needed basis. Dysphagia. Today's plan for swallow study this month. Follow-up after. Thank you for allowing me to participate in the care of Ms. Banerjee. For any further questions please do not hesitate to contact me. I have reviewed and agree with the ROS entered by the MA/LPN. Note is dictated utilizing voice recognition software. Unfortunately this leads to occasional typographical errors. Please contact our office if you have any questions.     Kurt Dillard MD  [facility]  O: #[phone]

## 2022-04-11 RX ORDER — SENNA 8.6 MG/1
TABLET, FILM COATED ORAL
Qty: 60 TABLET | Refills: 2 | Status: SHIPPED | OUTPATIENT
Start: 2022-04-11 | End: 2022-07-05

## 2022-04-20 ENCOUNTER — OFFICE VISIT (OUTPATIENT)
Dept: PODIATRY | Age: 62
End: 2022-04-20
Payer: COMMERCIAL

## 2022-04-20 VITALS
WEIGHT: 293 LBS | BODY MASS INDEX: 45.99 KG/M2 | SYSTOLIC BLOOD PRESSURE: 141 MMHG | HEIGHT: 67 IN | HEART RATE: 70 BPM | DIASTOLIC BLOOD PRESSURE: 66 MMHG

## 2022-04-20 DIAGNOSIS — B35.1 DERMATOPHYTOSIS OF NAIL: ICD-10-CM

## 2022-04-20 DIAGNOSIS — E08.42 DIABETIC POLYNEUROPATHY ASSOCIATED WITH DIABETES MELLITUS DUE TO UNDERLYING CONDITION (HCC): ICD-10-CM

## 2022-04-20 PROCEDURE — G8427 DOCREV CUR MEDS BY ELIG CLIN: HCPCS | Performed by: PODIATRIST

## 2022-04-20 PROCEDURE — 3017F COLORECTAL CA SCREEN DOC REV: CPT | Performed by: PODIATRIST

## 2022-04-20 PROCEDURE — G8417 CALC BMI ABV UP PARAM F/U: HCPCS | Performed by: PODIATRIST

## 2022-04-20 PROCEDURE — 11721 DEBRIDE NAIL 6 OR MORE: CPT | Performed by: PODIATRIST

## 2022-04-20 PROCEDURE — 99212 OFFICE O/P EST SF 10 MIN: CPT | Performed by: PODIATRIST

## 2022-04-20 PROCEDURE — 2022F DILAT RTA XM EVC RTNOPTHY: CPT | Performed by: PODIATRIST

## 2022-04-20 PROCEDURE — 1036F TOBACCO NON-USER: CPT | Performed by: PODIATRIST

## 2022-04-20 RX ORDER — CHOLECALCIFEROL (VITAMIN D3) 125 MCG
CAPSULE ORAL
COMMUNITY
Start: 2022-03-14 | End: 2022-07-05 | Stop reason: SDUPTHER

## 2022-04-20 NOTE — PROGRESS NOTES
[address],  [address]  Tel: [phone]   Fax: [phone]    Subjective     CC: Pain in bilateral feet    HPI:  Delmer Tariq is a 58 y.o. female who presents to clinic today for follow-up of pain in her left foot she recently received orthotics however she has not obtained shoes to put them in outside of worn shoes understands importance of doing so. She is not able to point to the exact location of the pain. She also relates burning to bilateral lower extremity. Relates that she normally wears compression stockings to bilateral lower extremities for edema. Denies any rest pain or claudication symptoms. Primary care physician is Marla Morales MD.    ROS:    Constitutional: Denies nausea, vomiting, fever, chills. Neurologic: Denies numbness, tingling, and burning in the feet. Vascular: Denies symptoms of lower extremity claudication. Skin: Painful thickened toenails   Otherwise negative except as noted in the HPI.      PMH:  Past Medical History:   Diagnosis Date    Abdominal bloating 1/23/2019    Anxiety     Bilateral edema of lower extremity 12/16/2016    Bronchial asthma     mild, intermittent    Carpal tunnel syndrome     Cataract     Chronic back pain     Chronic sinusitis     Degenerative disc disease, lumbar 12/16/2016    Depression     Diabetes mellitus due to underlying condition with hyperosmolarity without coma ([facility] [address]     Dizziness 10/27/2017    DJD (degenerative joint disease)     S1, L3, L4, L5, T12    Dysphagia 1/23/2019    Edema of leg     bilateral legs    Environmental allergies     Frozen shoulder 4/27/2015    GERD (gastroesophageal reflux disease)     Glaucoma     Glucose intolerance (impaired glucose tolerance)     Headache(784.0)     History of bronchitis     Viral    HTN (hypertension)     Hyperlipidemia     Hypothyroid     hypothyroid state    Hypothyroidism     IBS (irritable bowel syndrome) 10/02/2012    Legally blind     due to glaucoma    Medication refill 6/1/2017    Mild intermittent asthma without complication     Morbid obesity with BMI of 45.0-49.9, adult (HCC)     MVP (mitral valve prolapse)     Neuropathy     OA (osteoarthritis)     EMILIA on CPAP     Osteopenia     Pancreatic cyst     Polyneuropathy     Raynaud disease     Rhinopharyngitis     Allergic rhinopharyngitis    Skin lesion of left leg 6/11/2021    Skin tag 7/9/2015    Stress fracture     Right 5th Metatarsal     Syncope     TIA (transient ischemic attack)     Urinary incontinence     Varicose vein of leg 6/1/2017    Vasodepressor syncope     Wears glasses        Surgical History:   Past Surgical History:   Procedure Laterality Date    APPENDECTOMY  1978    CATARACT REMOVAL Left     CHOLECYSTECTOMY  1978    COLONOSCOPY      COLONOSCOPY N/A 12/9/2019    COLORECTAL CANCER SCREENING, NOT HIGH RISK performed by Ant Galvan MD at [address] [place] [address] Bilateral     right iridectomy, right laser, bilateral trabeculectomy, left drain    GLAUCOMA SURGERY      HAND SURGERY Right     HYSTERECTOMY  1982    KNEE SURGERY Right 2000    TUBAL LIGATION  1981    UPPER GASTROINTESTINAL ENDOSCOPY      UPPER GASTROINTESTINAL ENDOSCOPY  5/24/2018    EGD DILATION SAVORY performed by Angella Mora MD at [address] [facility]  3/6/2019    EGD DILATION SAVORY performed by Ant Galvan MD at [facility] ENDOSCOPY N/A 9/8/2021    EGD DILATION SAVORY performed by Ant Galvan MD at [facility]       Social History:  Social History     Tobacco Use    Smoking status: Never Smoker    Smokeless tobacco: Never Used   Vaping Use    Vaping Use: Never used   Substance Use Topics    Alcohol use: No     Alcohol/week: 0.0 standard drinks    Drug use: No       Medications:  Prior to Admission medications    Medication Sig Start Date End Date Taking?  Authorizing Provider   Cholecalciferol (VITAMIN D3) 50 MCG (2000 UT) TABS  3/14/22  Yes Historical Provider, MD   calcium carbonate-vitamin D (CALTRATE) 600-400 MG-UNIT TABS per tab  3/25/22  Yes Historical Provider, MD   SENNA-TABS 8.6 MG tablet TAKE 1 TABLET BY MOUTH 2 TIMES DAILY AS NEEDED FOR CONSTIPATION 4/11/22  Yes Ant Galvan MD   omeprazole (PRILOSEC) 40 MG delayed release capsule TAKE 1 CAPSULE BY MOUTH DAILY 4/4/22  Yes Ant Galvan MD   acetaminophen (ACETAMINOPHEN EXTRA STRENGTH) 500 MG tablet Take 2 tablets by mouth every 6 hours as needed for Pain 1/31/22  Yes Jessica Momin MD   Multiple Vitamins-Minerals (CERTAVITE/ANTIOXIDANTS) TABS TAKE 1 TABLET DAILY 1/31/22  Yes Jessica Mmoin MD   hydrocortisone 1 % ointment Apply topically 2 times daily as needed for skin itching 1/20/22  Yes Tang Servin MD   ONETOUCH VERIO strip  12/20/21  Yes Historical Provider, MD   Lancets Misc. (ACCU-CHEK FASTCLIX LANCET) KIT 1 box by Mika Ríos. (Med.Supl.;Non-Drugs) route 2 times daily Dispense 1 box of 100. 12/14/21  Yes Osorio Davila MD   albuterol sulfate  (90 Base) MCG/ACT inhaler INHALE 2 PUFFS INTO THE LUNGS 4 TIMES DAILY AS NEEDED FOR WHEEZING 12/7/21  Yes Eliazar Vela MD   ALLERGY RELIEF 10 MG tablet TAKE 1 TABLET BY MOUTH DAILY 12/3/21  Yes Eliazar Vela MD   levothyroxine (SYNTHROID) 100 MCG tablet Take 1 tablet by mouth Daily Indications: 112 mg 9/21/21  Yes Salas Goodman MD   rosuvastatin (CRESTOR) 5 MG tablet Take 1 tablet by mouth daily 9/21/21  Yes Salas Goodman MD   ASPIRIN LOW DOSE 81 MG EC tablet Take 1 tablet by mouth daily 8/18/21  Yes Salas Goodman MD   Blood Glucose Monitoring Suppl ([address]) w/Device KIT  4/1/21  Yes Historical Provider, MD   Blood Glucose Calibration (ONETOUCH VERIO) SOLN  4/14/21  Yes Historical Provider, MD   Accu-Chek FastClix Lancets MISC Use as directed 6/11/21  Yes Cole Sanz MD   fluticasone (FLONASE) 50 MCG/ACT nasal spray USE 1 SPRAY IN EACH NOSTRIL TWICE A DAY 10/22/20  Yes Ramila Pierce MD       Objective     Vitals:    04/20/22 1234   BP: (!) 141/66   Pulse: 70       Lab Results   Component Value Date    LABA1C 5.5 06/11/2021       Physical Exam:  General:  Alert and oriented x3. In no acute distress. Lower Extremity Physical Exam:    Vascular: DP pulses are palpable, Bilateral. PT pulses non palpable bilaterally but audible on doppler. Varicose veins noted bilaterally. CFT <3 seconds to all digits, Bilateral.  Non pitting Edema noted to the bilateral lower extremity, Bilateral.  Hair growth is absent to the level of the digits, Bilateral.     Neuro: Saph/sural/SP/DP/plantar sensation intact to light touch. Protective sensation is intact to 4/10 sites on the right foot and 3/10 sites on the left foot as tested with a 5.07g SWMF, Bilateral.     Musculoskeletal: EHL/FHL/GS/TA gross motor intact. Decreased ankle range of motion bilateral. Neg pain on calf squeeze. Reduced ROM 1st MTPJ R worse than mild pain noted with palpation of second interspace and dorsiflexion plantarflexion second and third digits. Dermatologic: Skin shiny and atrophic with no hair growth noted. Interdigital maceration absent, Bilateral. Nails 1-5 thickened, elongated with subungual debris noted b/l. with yellowish discoloration. Hair growth absent bilat. No noted hyperkeratotic lesion. No open wounds appreciated.     Class A Findings (Q7) - One Class A finding  [] Non traumatic amputation of foot or integral skeletal portion thereof  Class B Findings (Q8) - Two Class B findings  [x]Absent posterior tibial pulse   []Absent dorsalis pedis pulse   []Advanced trophic changes; three of the following are required:   [x]hair growth (decrease or absence)   [x]nail changes (thickening)   []pigmentary changes (discoloration)   [x]skin texture (thin, shiny)   []skin color (rubor or redness)  Class C Findings (Q9) - One Class B and two Class C findings  []Claudication   []Temperature changes   []Edema   []Paresthesia   []Burning    Q Modifier Met: Q8: Two Class B findings        Sites of Onychomycosis Involvement (Check affected area)  [x] [x] [x] [x] [x] [x] [x] [x] [x] [x]  5 4 3 2 1 1 2 3 4 5                          Right                                        Left    Thickness  [x] [x] [x] [x] [x] [x] [x] [x] [x] [x]  5 4 3 2 1 1 2 3 4 5                         Right                                        Left    Dystrophic Changes                                                                 [x] [x] [x] [x] [x] [x] [x] [x] [x] [x]  5 4 3 2 1 1 2 3 4 5                         Right                                        Left    Color                                                                  [x] [x] [x] [x] [x] [x] [x] [x] [x] [x]  5 4 3 2 1 1 2 3 4 5                          Right                                        Left    Incurvation/Ingrowin                                                                   [] [] [] [] [] [] [] [] [] []  5 4 3 2 1 1 2 3 4 5                         Right                                        Left    Inflammation/Pain                                                                   [x] [x] [x] [x] [x] [x] [x] [x] [x] [x]  5 4 3 2 1 1 2 3 4 5                         Right                                        Left        Assessment   Kaci Hughes is a 58 y.o. female with     Diagnosis Orders   1. Diabetic polyneuropathy associated with diabetes mellitus due to underlying condition ([facility] [address]     2. Dermatophytosis of nail          Plan   Pt was evaluated and examined. Patient was given personalized discharge instructions. Diagnosis was discussed with the pt and all of their questions were answered in detail. Proper foot hygiene and care was discussed with the pt. Instructed patient to modify use of orthotics, limit to 1 hour per day in new shoes.   Nails 1-10 trimmed without incident with a sterile nail nipper  Discussed with Dr. Laly Mei  · Please, call the office with any questions or concerns.       Brigido Greene DPM   Podiatric Medicine & Surgery   4/20/2022 at 1:14 PM

## 2022-04-22 ENCOUNTER — HOSPITAL ENCOUNTER (OUTPATIENT)
Age: 62
Setting detail: SPECIMEN
Discharge: HOME | End: 2022-04-22
Payer: COMMERCIAL

## 2022-04-22 ENCOUNTER — HOSPITAL ENCOUNTER (OUTPATIENT)
Dept: GENERAL RADIOLOGY | Age: 62
Discharge: HOME | End: 2022-04-24
Payer: COMMERCIAL

## 2022-04-22 DIAGNOSIS — R13.10 DYSPHAGIA, UNSPECIFIED TYPE: ICD-10-CM

## 2022-04-22 DIAGNOSIS — Z01.818 PREOP TESTING: ICD-10-CM

## 2022-04-22 DIAGNOSIS — Z01.818 PREOP TESTING: Primary | ICD-10-CM

## 2022-04-22 LAB
SARS-COV-2, RAPID: NOT DETECTED
SPECIMEN DESCRIPTION: NORMAL

## 2022-04-22 PROCEDURE — 6360000004 HC RX CONTRAST MEDICATION: Performed by: INTERNAL MEDICINE

## 2022-04-22 PROCEDURE — C9803 HOPD COVID-19 SPEC COLLECT: HCPCS

## 2022-04-22 PROCEDURE — 92611 MOTION FLUOROSCOPY/SWALLOW: CPT

## 2022-04-22 PROCEDURE — 74230 X-RAY XM SWLNG FUNCJ C+: CPT

## 2022-04-22 PROCEDURE — A4641 RADIOPHARM DX AGENT NOC: HCPCS | Performed by: INTERNAL MEDICINE

## 2022-04-22 PROCEDURE — 87635 SARS-COV-2 COVID-19 AMP PRB: CPT

## 2022-04-22 RX ADMIN — BARIUM SULFATE 355 ML: 0.6 SUSPENSION ORAL at 10:15

## 2022-04-22 NOTE — PROCEDURES
INSTRUMENTAL SWALLOW REPORT  MODIFIED BARIUM SWALLOW    NAME: Aramis Hernandez   : 1960  MRN: [id number]       Date of Eval: 2022     Ordering Physician: Juany Buck        Referring Diagnosis(es): Referring Diagnosis: Dysphagia    Past Medical History:  has a past medical history of Abdominal bloating, Anxiety, Bilateral edema of lower extremity, Bronchial asthma, Carpal tunnel syndrome, Cataract, Chronic back pain, Chronic sinusitis, Degenerative disc disease, lumbar, Depression, Diabetes mellitus due to underlying condition with hyperosmolarity without coma ([facility] [address] Dizziness, DJD (degenerative joint disease), Dysphagia, Edema of leg, Environmental allergies, Frozen shoulder, GERD (gastroesophageal reflux disease), Glaucoma, Glucose intolerance (impaired glucose tolerance), Headache(784.0), History of bronchitis, HTN (hypertension), Hyperlipidemia, Hypothyroid, Hypothyroidism, IBS (irritable bowel syndrome), Legally blind, Medication refill, Mild intermittent asthma without complication, Morbid obesity with BMI of 45.0-49.9, adult ([facility] [address] MVP (mitral valve prolapse), Neuropathy, OA (osteoarthritis), EMILIA on CPAP, Osteopenia, Pancreatic cyst, Polyneuropathy, Raynaud disease, Rhinopharyngitis, Skin lesion of left leg, Skin tag, Stress fracture, Syncope, TIA (transient ischemic attack), Urinary incontinence, Varicose vein of leg, Vasodepressor syncope, and Wears glasses. Past Surgical History:  has a past surgical history that includes knee surgery (Right, ); eye surgery (Bilateral); Hand surgery (Right); Cholecystectomy (); Appendectomy (); Tubal ligation (); Hysterectomy (); Cataract removal (Left); Colonoscopy; Upper gastrointestinal endoscopy; Esophagus dilation; Glaucoma surgery; Upper gastrointestinal endoscopy (2018); Upper gastrointestinal endoscopy (3/6/2019); Colonoscopy (N/A, 2019); and Upper gastrointestinal endoscopy (N/A, 2021).     Date of Prior Study: 2/11/2019  Type of Study: Repeat MBS  Results of Prior Study: Patient presents with a mild oropharyngeal dysphagia at this time. Oral phase of swallow was characterized by reduced tongue base retraction noted on initial swallow of thin liquids via cup and via straw. With subsequent swallows, tongue base retraction improved. Pharyngeal phase of swallow was characterized by  premature spillage of liquid barium during mixed consistency trial of mechanical soft solids (banana covered with liquid barium to the valleculae. Also noted was mildly reduced tongue base retraction noted on initial swallow of thin liquids via cup and via straw. With subsequent swallows, tongue base retraction improved. Patient demonstrated mild base of tongue residues with regular solids and mechanical soft solids. Residue was able to be cleared with subsequent swallows. Patient demonstrated adequate airway closure and demonstrated no overt aspiration or penetration with any consistencies. Base of tongue appeared to extend inferiorly and into the vallecular space with uneven appearance. With thin liquids and mechanical soft solids, bolus passed through the vallecular space. However, with regular solid and puree solids, the bolus bypassed the vallecular area to the UES. ST recommends diet of regular solids and thin liquids at this time. Compensatory strategies should include small bites/sips, pacing/slow rate, alternate bites/sips, sit upright at a 90 degree angle, and sit upright 30-45 minutes after eating and drinking. ST recommends follow-up with GI for GERD management as well as follow-up with primary care physician for noted structural differences with base of tongue. Further Dysphagia therapy is not warranted at this time.       Patient Complaints/Reason for Referral:  Angus Shepherd was referred for a MBS to assess the efficiency of his/her swallow function, assess for aspiration, and to make recommendations regarding safe dietary consistencies, effective compensatory strategies, and safe eating environment. Patient complaints: patient reported the following complaints: globus sensation at rest, feeling of food poeckting on the right side of her throat and difficulty swallowing at time. This is not consistent and can happen on liquids and or solids. Items such as peanute, muffins, ground beef and salad ate difficult     Subjective  Subjective: pt engaged well during MBS. She reported the cfollowing complaints: globus sensation at rest, feeling of food poeckting on the right side of her throat and difficulty swallowing at time. This is not consistent and can happen on liquids and or solids. Items such as peanute, muffins, ground beef and salad ate difficult    Behavior/Cognition/Vision/Hearing:  Behavior/Cognition: Alert; Cooperative  Vision: Impaired  Hearing: Within functional limits    Impressions:     Patient Position: Lateral        Consistencies Administered: Regular; Soft & Bite Sized;Pureed; Thin cup    Compensatory Swallowing Strategies Attempted: Alternate solids and liquids; Remain upright for 30-45 minutes after meals;Upright as possible for all oral intake;Small bites/sips  Postural Changes and/or Swallow Maneuvers Trialed: Upright 30 min after meal      Oral Phase: oral phase appears WFl on all trials. Pt demonstrated a mild A-P chewing pattern but was able to demonstrate functional oral prep and movement into the pharynx with all trials. Pt was able to obtaine good bilabial seal on spoon and sup. no lingual residue observed    Pharyngeal: Pharyngeal phase appears WFL on all trials. There is functional strength and ROM observed during the swallow. There is no stasis, residue, aspiration or penetration with any trial. Pt reported \"food stuck\" sensation with all solid trials which also resulted in a cough, however, the pharynx and upper esophageal areas were clear of stasis.     Dysphagia Outcome Severity Scale: Level 6: Within functional limits/Modified independence  Penetration-Aspiration Scale (PAS): 1 - Material does not enter the airway    Recommended Diet:  Solid consistency: Regular  Liquid consistency: Thin  Liquid administration via:  (per preference)    Medication administration:  (per preference)    Safe Swallow Protocol:  Supervision: Independent  Compensatory Swallowing Strategies : Alternate solids and liquids;Upright as possible for all oral intake;Remain upright for 30-45 minutes after meals;Small bites/sips    Recommendations/Treatment  Requires SLP Intervention: No     Postural Changes and/or Swallow Maneuvers: Upright 30 min after meal    Referral To: GI (to re-evaluate GERD management and or complete esophagram)    Education: Images and recommendations were reviewed with patient following this exam.   Patient Education: extensive education completed with pt re: results of MBS and results being [facility]. Discussed clinical symptoms in comparison to what is observed on MBS. Discussed mapping out and jounaling foods pt has trouble with before returning to GI. Also discussed additional GERD symptoms to monitor for  Patient Education Response: Verbalizes understanding    Prognosis  Prognosis for safe diet advancement: excellent    Pharyngeal Phase  Pharyngeal Phase: WFL      Esophageal Phase  Esophageal Screen: [facility]    Therapy Time:   Individual   Time In 0935   Time Out 1000   Minutes 25        Pt presented for an outpatient MBS with the following complaints: globus sensation at rest, feeling of food poeckting on the right side of her throat and difficulty swallowing at time. This is not consistent and can happen on liquids and or solids. Items such as peanuts, muffins, ground beef and salad ate difficult. oral phase appears WFl on all trials. Pt demonstrated a mild A-P chewing pattern but was able to demonstrate functional oral prep and movement into the pharynx with all trials.  Pt was able to obtaine good bilabial seal on spoon and sup. no lingual residue observed. Pharyngeal phase appears WFL on all trials. There is functional strength and ROM observed during the swallow. There is no stasis, residue, aspiration or penetration with any trial. Pt reported \"food stuck\" sensation with all solid trials which also resulted in a cough, however, the pharynx and upper esophageal areas were clear of stasis. Based on clinical presentation, Recommend re-evaluation by GI for GERD management and possible esophagram. Pt reported after dilation of esophagus she had some relief. Pharyngeal complaints appear to be secondary to esophageal concerns. Rec continue with current diet and no therapy is warranted.        Margarita Hollis M.S.,CCC-SLP   4/22/2022, 11:29 AM

## 2022-04-28 DIAGNOSIS — E11.9 TYPE 2 DIABETES MELLITUS WITHOUT COMPLICATION, WITHOUT LONG-TERM CURRENT USE OF INSULIN (HCC): ICD-10-CM

## 2022-04-28 RX ORDER — ASPIRIN 81 MG/1
81 TABLET, COATED ORAL DAILY
Qty: 30 TABLET | Refills: 5 | Status: SHIPPED | OUTPATIENT
Start: 2022-04-28 | End: 2022-07-20

## 2022-04-28 NOTE — TELEPHONE ENCOUNTER
E-scribe request for med refill. Please review and e-scribe if applicable. Last Visit Date:  04/08/2022  Next Visit Date:  Visit date not found    Hemoglobin A1C (%)   Date Value   06/11/2021 5.5   12/03/2020 5.3   01/02/2020 5.4             ( goal A1C is < 7)   Microalb/Crt.  Ratio (mcg/mg creat)   Date Value   01/02/2020 CANNOT BE CALCULATED     LDL Cholesterol (mg/dL)   Date Value   12/14/2020 64     LDL Calculated (mg/dL)   Date Value   06/16/2017 68       (goal LDL is <100)   AST (U/L)   Date Value   01/20/2021 26     ALT (U/L)   Date Value   01/20/2021 35 (H)     BUN (mg/dL)   Date Value   01/20/2021 20     BP Readings from Last 3 Encounters:   04/20/22 (!) 141/66   04/08/22 138/76   01/28/22 133/67          (goal 120/80)        Patient Active Problem List:     Glaucoma     GERD (gastroesophageal reflux disease)     DJD (degenerative joint disease)     Obesity     Polyneuropathy     Migraine     Mitral prolapse     Low back pain     CTS (carpal tunnel syndrome)     Supraspinatus syndrome     Impaired fasting glucose     Acute sinus infection     IBS (irritable bowel syndrome)     Back pain, chronic     Stress fracture, right 5th metatarsal      Upper airway cough syndrome     Ear fullness     Perioral numbness     Screening for osteoporosis     Headache     Left eye injury     Medication reaction     Osteopenia     Lumbosacral pain     Flu vaccine need     Hypothyroid     Urinary incontinence     Anxiety     Sleep apnea     Depression     Chronic back pain     Carpal tunnel syndrome     Neuropathy     Mitral valve problem     Morbid obesity with BMI of 45.0-49.9, adult (HCC)     Frozen shoulder     Skin tag     Degenerative disc disease, lumbar     Bilateral edema of lower extremity     Medication refill     Varicose vein of leg     Dizziness     Dysphagia     Abdominal bloating     Mild intermittent asthma without complication     Skin lesion of left leg      ----Frances Gay

## 2022-05-10 RX ORDER — FLUTICASONE PROPIONATE 50 MCG
SPRAY, SUSPENSION (ML) NASAL
Qty: 1 EACH | Refills: 7 | Status: SHIPPED | OUTPATIENT
Start: 2022-05-10

## 2022-05-10 NOTE — TELEPHONE ENCOUNTER
LAST VISIT: 1/4/22 with NP Solitario Holcomb  NEXT VISIT: 6/23/22    No mention of this medication in NP [id number] last dictation but this has been prescribed by providers at our office in the past. Please sign for refill if ok. Thank you.

## 2022-05-20 ENCOUNTER — OFFICE VISIT (OUTPATIENT)
Dept: GASTROENTEROLOGY | Age: 62
End: 2022-05-20
Payer: COMMERCIAL

## 2022-05-20 VITALS
BODY MASS INDEX: 49.02 KG/M2 | WEIGHT: 293 LBS | DIASTOLIC BLOOD PRESSURE: 70 MMHG | SYSTOLIC BLOOD PRESSURE: 136 MMHG | HEART RATE: 65 BPM

## 2022-05-20 DIAGNOSIS — K59.00 CONSTIPATION, UNSPECIFIED CONSTIPATION TYPE: Primary | ICD-10-CM

## 2022-05-20 PROCEDURE — 99213 OFFICE O/P EST LOW 20 MIN: CPT | Performed by: INTERNAL MEDICINE

## 2022-05-20 PROCEDURE — G8417 CALC BMI ABV UP PARAM F/U: HCPCS | Performed by: INTERNAL MEDICINE

## 2022-05-20 PROCEDURE — G8427 DOCREV CUR MEDS BY ELIG CLIN: HCPCS | Performed by: INTERNAL MEDICINE

## 2022-05-20 PROCEDURE — 1036F TOBACCO NON-USER: CPT | Performed by: INTERNAL MEDICINE

## 2022-05-20 PROCEDURE — 3017F COLORECTAL CA SCREEN DOC REV: CPT | Performed by: INTERNAL MEDICINE

## 2022-05-20 ASSESSMENT — ENCOUNTER SYMPTOMS
ALLERGIC/IMMUNOLOGIC NEGATIVE: 1
RESPIRATORY NEGATIVE: 1
NAUSEA: 1
CONSTIPATION: 1

## 2022-05-20 NOTE — PROGRESS NOTES
GI CLINIC FOLLOW UP    INTERVAL HISTORY:   No referring provider defined for this encounter. Chief Complaint   Patient presents with    Other     pt is here for swallow study f/u            HISTORY OF PRESENT ILLNESS: Emilia Lindquist is a 58 y.o. female with constipation. She has been taking Citrucel. This seems to be helping very well. She reports normal bowel movements when she takes it. She has not had any more issues with dysphagia. She attributes dysphagia to discomfort in her throat from using CPAP. Swallow study was normal.    Past Medical,Family, and Social History reviewed and does not contribute to the patient presenting condition. Patient's PMH/PSH,SH,PSYCH Hx, MEDs, ALLERGIES, and ROS were all reviewed and updated in the appropriate sections.     PAST MEDICAL HISTORY:  Past Medical History:   Diagnosis Date    Abdominal bloating 1/23/2019    Anxiety     Bilateral edema of lower extremity 12/16/2016    Bronchial asthma     mild, intermittent    Carpal tunnel syndrome     Cataract     Chronic back pain     Chronic sinusitis     Degenerative disc disease, lumbar 12/16/2016    Depression     Diabetes mellitus due to underlying condition with hyperosmolarity without coma ([facility] [address]     Dizziness 10/27/2017    DJD (degenerative joint disease)     S1, L3, L4, L5, T12    Dysphagia 1/23/2019    Edema of leg     bilateral legs    Environmental allergies     Frozen shoulder 4/27/2015    GERD (gastroesophageal reflux disease)     Glaucoma     Glucose intolerance (impaired glucose tolerance)     Headache(784.0)     History of bronchitis     Viral    HTN (hypertension)     Hyperlipidemia     Hypothyroid     hypothyroid state    Hypothyroidism     IBS (irritable bowel syndrome) 10/02/2012    Legally blind     due to glaucoma    Medication refill 6/1/2017    Mild intermittent asthma without complication     Morbid obesity with BMI of 45.0-49.9, adult ([address]     MVP (mitral valve prolapse)     Neuropathy     OA (osteoarthritis)     EMILIA on CPAP     Osteopenia     Pancreatic cyst     Polyneuropathy     Raynaud disease     Rhinopharyngitis     Allergic rhinopharyngitis    Skin lesion of left leg 6/11/2021    Skin tag 7/9/2015    Stress fracture     Right 5th Metatarsal     Syncope     TIA (transient ischemic attack)     Urinary incontinence     Varicose vein of leg 6/1/2017    Vasodepressor syncope     Wears glasses        Past Surgical History:   Procedure Laterality Date    APPENDECTOMY  1978    CATARACT REMOVAL Left     CHOLECYSTECTOMY  1978    COLONOSCOPY      COLONOSCOPY N/A 12/9/2019    COLORECTAL CANCER SCREENING, NOT HIGH RISK performed by Melvin Araiza MD at [address] [place] [address] Bilateral     right iridectomy, right laser, bilateral trabeculectomy, left drain    GLAUCOMA SURGERY      HAND SURGERY Right     HYSTERECTOMY  1982    KNEE SURGERY Right 2000    TUBAL LIGATION  1981    UPPER GASTROINTESTINAL ENDOSCOPY      UPPER GASTROINTESTINAL ENDOSCOPY  5/24/2018    EGD DILATION SAVORY performed by Dl Yusuf MD at [address] [place] [address]  3/6/2019    EGD DILATION SAVORY performed by Melvin Araiza MD at [address] [place] [address] N/A 9/8/2021    EGD DILATION SAVORY performed by Melvin Araiza MD at [address] [place] [address] Street:    Current Outpatient Medications:     fluticasone (FLONASE) 50 MCG/ACT nasal spray, INHALE 1 SPRAY INTO EACH NOSTRIL 2 TIMES A DAY, Disp: 1 each, Rfl: 7    ASPIRIN LOW DOSE 81 MG EC tablet, TAKE 1 TABLET BY MOUTH DAILY, Disp: 30 tablet, Rfl: 5    Cholecalciferol (VITAMIN D3) 50 MCG (2000 UT) TABS, , Disp: , Rfl:     calcium carbonate-vitamin D (CALTRATE) 600-400 MG-UNIT TABS per tab, , Disp: , Rfl:     SENNA-TABS 8.6 MG tablet, TAKE 1 TABLET BY MOUTH 2 TIMES DAILY AS NEEDED FOR CONSTIPATION, Disp: 60 tablet, Rfl: 2   omeprazole (PRILOSEC) 40 MG delayed release capsule, TAKE 1 CAPSULE BY MOUTH DAILY, Disp: 30 capsule, Rfl: 1    acetaminophen (ACETAMINOPHEN EXTRA STRENGTH) 500 MG tablet, Take 2 tablets by mouth every 6 hours as needed for Pain, Disp: 120 tablet, Rfl: 3    Multiple Vitamins-Minerals (CERTAVITE/ANTIOXIDANTS) TABS, TAKE 1 TABLET DAILY, Disp: 30 tablet, Rfl: 5    hydrocortisone 1 % ointment, Apply topically 2 times daily as needed for skin itching, Disp: 30 g, Rfl: 1    ONETOUCH VERIO strip, , Disp: , Rfl:     Lancets Misc. (ACCU-CHEK FASTCLIX LANCET) KIT, 1 box by [employer] (Med.Supl.;Non-Drugs) route 2 times daily Dispense 1 box of 100., Disp: 1 kit, Rfl: 3    albuterol sulfate  (90 Base) MCG/ACT inhaler, INHALE 2 PUFFS INTO THE LUNGS 4 TIMES DAILY AS NEEDED FOR WHEEZING, Disp: 1 each, Rfl: 6    ALLERGY RELIEF 10 MG tablet, TAKE 1 TABLET BY MOUTH DAILY, Disp: 30 tablet, Rfl: 7    levothyroxine (SYNTHROID) 100 MCG tablet, Take 1 tablet by mouth Daily Indications: 112 mg, Disp: 30 tablet, Rfl: 5    rosuvastatin (CRESTOR) 5 MG tablet, Take 1 tablet by mouth daily, Disp: 30 tablet, Rfl: 3    Blood Glucose Monitoring Suppl (ONETOUCH VERIO FLEX SYSTEM) w/Device KIT, , Disp: , Rfl:     Blood Glucose Calibration (ONETOUCH VERIO) SOLN, , Disp: , Rfl:     Accu-Chek FastClix Lancets MISC, Use as directed, Disp: 102 each, Rfl: 3    ALLERGIES:   Allergies   Allergen Reactions    Latex Rash     blisters  blisters    Adhesive Tape Rash     blisters    Amlodipine Other (See Comments)     Facial numbness  Facial numbness  Facial numbness  Facial numbness  Facial numbness  Facial numbness  Facial numbness    Bextra [Valdecoxib] Rash     swelling    Brimonidine Itching     Burning eyes severe    Daypro [Oxaprozin] Anaphylaxis    Diclofenac Sodium Swelling and Shortness Of Breath    Famotidine Other (See Comments)     Blisters down her arms    Hydrocodone-Acetaminophen Nausea Only     Severe chest pain    Hydromorphone Other (See Comments)     Rapid heart beat,  Nausea, spent 3 days in cardiac unit    Ibuprofen      Other reaction(s): bleeding  Other reaction(s): Unknown  Black stools  \"rips up stomach\", black tarry stool  Black stools  \"rips up stomach\", black tarry stool    Lisinopril Nausea Only, Nausea And Vomiting and Other (See Comments)     Other reaction(s): Hypotension    Metoprolol Other (See Comments)     Other reaction(s): Dizziness    Naproxen Other (See Comments)     Chest pain , swelling    Oxycodone-Acetaminophen      Chest pain severe    Rofecoxib Rash     swelling    Shellfish-Derived Products Anaphylaxis and Rash     shrimp  shrimp  shrimp    Simvastatin Nausea And Vomiting     Other reaction(s): muscle cramps    Statins      Other reaction(s): muscle cramps  Tolerates lovastatin. Pravachol caused numbness in head/face    Sulfa Antibiotics Hives    Tetracyclines & Related Itching and Rash    Timoptic [Timolol Maleate] Itching and Swelling     Eyes burning severely    Tramadol Itching and Rash    Fosamax [Alendronate] Nausea Only and Nausea And Vomiting    Nalbuphine Nausea And Vomiting    Alendronate Sodium     Alendronate Sodium     Alphagan [Brimonidine Tartrate]      Eye irritation    Dilaudid [Hydromorphone Hcl]     Doxycycline Monohydrate     Nsaids     Other Itching and Swelling     Eyes burning    Pepcid Complete [Famotidine-Ca Carb-Mag Hydrox] Hives and Other (See Comments)     blisters    Pilocarpine Itching and Swelling     Blurred vision  Eyes burning    Pravastatin Other (See Comments)     Facial numming    Red Dye Nausea Only     Pt states allergic to red coloring in crestor with CY on pill and senokot red pill. Feels sick to stomach and head feels foggy.  Shrimp Flavor Hives and Swelling    Statins Support Therapy      Tolerates lovastatin.  Pravachol caused numbness in head/face    Timoptic [Timolol Maleate]      Eye irritation      Tolectin [Tolmetin] Unknown reaction  - as a child    Voltaren [Diclofenac Sodium]      Flu symptoms      Nubain [Nalbuphine Hcl] Nausea And Vomiting     Chest pain      Percocet [Oxycodone-Acetaminophen] Nausea And Vomiting     Sweating, chest pain    Tolectin [Tolmetin Sodium] Rash    Tolmetin Sodium Rash    Ultram [Tramadol Hcl] Itching and Rash     Rash on face    Vicodin [Hydrocodone-Acetaminophen] Nausea And Vomiting     swearing    Vioxx Rash       FAMILY HISTORY:       Problem Relation Age of Onset    Diabetes Mother     Heart Disease Mother         multiple heart attacks    Arthritis Mother     High Blood Pressure Mother     High Cholesterol Mother     Substance Abuse Mother     Heart Disease Father     Arthritis Father     Depression Father     High Cholesterol Father     Mental Illness Father     Substance Abuse Father     Diabetes Sister     High Blood Pressure Sister     Cancer Paternal Uncle         esophageal cancer    Diabetes Maternal Aunt     Cancer Maternal Aunt         colorectal cancer    Diabetes Maternal Uncle     Cancer Maternal Grandmother         colorectal cancer    Arthritis Maternal Grandmother     Diabetes Maternal Grandmother     High Blood Pressure Maternal Grandmother     Stroke Maternal Grandmother     Arthritis Maternal Grandfather     Arthritis Paternal Grandmother     Cancer Paternal Grandmother     Depression Paternal Grandmother     Heart Disease Paternal Grandmother     High Blood Pressure Paternal Grandmother     High Cholesterol Paternal Grandmother     Mental Illness Paternal Grandmother     Arthritis Paternal Grandfather          SOCIAL HISTORY:   Social History     Socioeconomic History    Marital status: Single     Spouse name: Not on file    Number of children: Not on file    Years of education: Not on file    Highest education level: Not on file   Occupational History    Not on file   Tobacco Use    Smoking status: Never Smoker    Smokeless tobacco: Never Used   Vaping Use    Vaping Use: Never used   Substance and Sexual Activity    Alcohol use: No     Alcohol/week: 0.0 standard drinks    Drug use: No    Sexual activity: Not Currently     Partners: Male   Other Topics Concern    Not on file   Social History Narrative    Not on file     Social Determinants of Health     Financial Resource Strain:     Difficulty of Paying Living Expenses: Not on file   Food Insecurity:     Worried About Running Out of Food in the Last Year: Not on file    [place] of Food in the Last Year: Not on file   Transportation Needs:     Lack of Transportation (Medical): Not on file    Lack of Transportation (Non-Medical): Not on file   Physical Activity:     Days of Exercise per Week: Not on file    Minutes of Exercise per Session: Not on file   Stress:     Feeling of Stress : Not on file   Social Connections:     Frequency of Communication with Friends and Family: Not on file    Frequency of Social Gatherings with Friends and Family: Not on file    Attends Holiness Services: Not on file    Active Member of [address] [place] [address] or Organizations: Not on file    Attends Club or Organization Meetings: Not on file    Marital Status: Not on file   Intimate Partner Violence:     Fear of Current or Ex-Partner: Not on file    Emotionally Abused: Not on file    Physically Abused: Not on file    Sexually Abused: Not on file   Housing Stability:     Unable to Pay for Housing in the Last Year: Not on file    Number of [place] in the Last Year: Not on file    Unstable Housing in the Last Year: Not on file       REVIEW OF SYSTEMS: A 12-point review of systemswas obtained and pertinent positives and negatives were enumerated above in the history of present illness. All other reviewed systems / symptoms were negative. Review of Systems   Constitutional: Negative. HENT: Negative. Eyes: Positive for visual disturbance (glasses ). Respiratory: Negative. Cardiovascular: Negative. Gastrointestinal: Positive for constipation and nausea. Endocrine: Negative. Genitourinary: Negative. Musculoskeletal: Negative. Skin: Negative. Allergic/Immunologic: Negative. Neurological: Negative. Hematological: Negative. Psychiatric/Behavioral: Negative. LABORATORY DATA: Reviewed  Lab Results   Component Value Date    WBC 12.5 (H) 01/20/2021    HGB 15.6 (H) 01/20/2021    HCT 46.8 01/20/2021    MCV 92.9 01/20/2021     01/20/2021     01/20/2021    K 3.9 01/20/2021     01/20/2021    CO2 33 (H) 01/20/2021    BUN 20 01/20/2021    CREATININE 0.79 01/20/2021    LABPROT 6.5 10/17/2012    LABALBU 4.4 01/20/2021    BILITOT 0.72 01/20/2021    ALKPHOS 81 01/20/2021    AST 26 01/20/2021    ALT 35 (H) 01/20/2021    INR 0.9 06/29/2013         Lab Results   Component Value Date    RBC 5.04 01/20/2021    HGB 15.6 (H) 01/20/2021    MCV 92.9 01/20/2021    MCH 31.0 01/20/2021    MCHC 33.3 01/20/2021    RDW 12.2 01/20/2021    MPV 9.5 01/20/2021    BASOPCT 1 01/20/2021    LYMPHSABS 3.66 01/20/2021    MONOSABS 0.88 01/20/2021    NEUTROABS 7.69 01/20/2021    EOSABS 0.11 01/20/2021    BASOSABS 0.07 01/20/2021         DIAGNOSTIC TESTING:     FL MODIFIED BARIUM SWALLOW W VIDEO    Result Date: 4/22/2022  EXAMINATION: MODIFIED BARIUM SWALLOW WAS PERFORMED IN CONJUNCTION WITH SPEECH PATHOLOGY SERVICES TECHNIQUE: Fluoroscopic evaluation of the swallowing mechanism was performed using cineradiography with multiple consistency of barium product in conjunction with speech pathology services. FLUOROSCOPY DOSE AND TYPE OR TIME AND EXPOSURES: 2.1 minutes fluoroscopy utilized. COMPARISON: None HISTORY: ORDERING SYSTEM PROVIDED HISTORY: Dysphagia, unspecified type TECHNOLOGIST PROVIDED HISTORY: dysphagia FINDINGS: Oral phase of swallowing was grossly within normal limits. No evidence of laryngeal penetration or aspiration.      Swallowing mechanism grossly within normal limits without evidence of aspiration. Please see separate speech pathology report for full discussion of findings and recommendations. RECOMMENDATIONS: Unavailable          PHYSICAL EXAMINATION: Vital signs reviewed per the nursing documentation. There were no vitals taken for this visit. There is no height or weight on file to calculate BMI. Physical Exam      I personally reviewed the nurse's notes and documentation and I agree with her notes. General: alert, appears stated age and cooperative Psych: Normal. and Alert and oriented, appropriate affect. . Normal affect. Mentation normal  HEENT: PERRLA. Clear conjunctivae and sclerae. Moist oral mucosae, no lesions or ulcers. The neck is supple, without lymphadenopathy or jugular venous distension. No masses. Normal thyroid. Cardiovascular: S1 S2 RRR no rubs or murmurs. Pulmonary: clear BL. No accessory muscle usage. Abdominal Exam: Soft, NT ND, no hepato or spleno megaly, +BS, no ascites. Obese      IMPRESSION: Ms. Angélica Barbosa is a 58 y.o. female with constipation. Mild. Much better with Citrucel. Dysphagiavery like related to CPAP use and dryness in the mouth. Follow-up in 6 months. Thank you for allowing me to participate in the care of Ms. Banerjee. For any further questions please do not hesitate to contact me. I have reviewed and agree with the ROS entered by the MA/LPN. Note is dictated utilizing voice recognition software. Unfortunately this leads to occasional typographical errors. Please contact our office if you have any questions.     Mika Burrell MD  [facility]  O: #[phone]

## 2022-05-26 ENCOUNTER — TELEPHONE (OUTPATIENT)
Dept: PULMONOLOGY | Age: 62
End: 2022-05-26

## 2022-05-26 DIAGNOSIS — G47.33 OBSTRUCTIVE SLEEP APNEA SYNDROME: Primary | ICD-10-CM

## 2022-05-26 NOTE — TELEPHONE ENCOUNTER
Patient called, needs supplies for her Cpap machine.  Please sign off on the order and I will fax to [address]

## 2022-05-31 DIAGNOSIS — K21.9 GASTROESOPHAGEAL REFLUX DISEASE WITHOUT ESOPHAGITIS: ICD-10-CM

## 2022-05-31 RX ORDER — LANCETS
EACH MISCELLANEOUS
Qty: 30 EACH | Refills: 3 | Status: SHIPPED | OUTPATIENT
Start: 2022-05-31 | End: 2022-09-20

## 2022-05-31 RX ORDER — OMEPRAZOLE 40 MG/1
40 CAPSULE, DELAYED RELEASE ORAL DAILY
Qty: 30 CAPSULE | Refills: 5 | Status: SHIPPED | OUTPATIENT
Start: 2022-05-31

## 2022-05-31 NOTE — TELEPHONE ENCOUNTER
Last visit: 11/12/2021  Last Med refill: 3/14/22  Does patient have enough medication for 72 hours: No:     Next Visit Date:  Future Appointments   Date Time Provider Fabby Juarez   6/2/2022  9:45 AM Kurt Hampton MD [facility]   6/23/2022 10:30 AM Eliazar Vela MD Resp Spec [facility]   7/20/2022 12:45 PM Enma Longo DPM [facility]   [facility]   12/19/2022 11:30 AM [facility] AT [facility]   1/23/2023  9:00 AM Tang Servin MD [facility]       Health Maintenance   Topic Date Due    COVID-19 Vaccine (1) Never done    Pneumococcal 0-64 years Vaccine (2 - PCV) 12/08/2009    Diabetic retinal exam  03/06/2021    Depression Monitoring  06/11/2022    Diabetic foot exam  09/22/2022    A1C test (Diabetic or Prediabetic)  04/21/2023    Diabetic microalbuminuria test  04/21/2023    Lipids  04/21/2023    Breast cancer screen  12/16/2023    DTaP/Tdap/Td vaccine (2 - Td or Tdap) 06/01/2027    Colorectal Cancer Screen  12/09/2029    Flu vaccine  Completed    Shingles vaccine  Completed    Hepatitis C screen  Completed    HIV screen  Completed    Hepatitis A vaccine  Aged Out    Hib vaccine  Aged Out    Meningococcal (ACWY) vaccine  Aged Out       Hemoglobin A1C (%)   Date Value   06/11/2021 5.5   12/03/2020 5.3   01/02/2020 5.4             ( goal A1C is < 7)   Microalb/Crt.  Ratio (mcg/mg creat)   Date Value   01/02/2020 CANNOT BE CALCULATED     LDL Cholesterol (mg/dL)   Date Value   12/14/2020 64   07/08/2019 97     LDL Calculated (mg/dL)   Date Value   06/16/2017 68   05/19/2014 104       (goal LDL is <100)   AST (U/L)   Date Value   01/20/2021 26     ALT (U/L)   Date Value   01/20/2021 35 (H)     BUN (mg/dL)   Date Value   01/20/2021 20     BP Readings from Last 3 Encounters:   05/20/22 136/70   04/20/22 (!) 141/66   04/08/22 138/76          (goal 120/80)    All Future Testing planned in [facility]  Lab Frequency Next Occurrence               Patient Active Problem List:     Glaucoma     GERD (gastroesophageal reflux disease)     DJD (degenerative joint disease)     Obesity     Polyneuropathy     Migraine     Mitral prolapse     Low back pain     CTS (carpal tunnel syndrome)     Supraspinatus syndrome     Impaired fasting glucose     Acute sinus infection     IBS (irritable bowel syndrome)     Back pain, chronic     Stress fracture, right 5th metatarsal      Upper airway cough syndrome     Ear fullness     Perioral numbness     Screening for osteoporosis     Headache     Left eye injury     Medication reaction     Osteopenia     Lumbosacral pain     Flu vaccine need     Hypothyroid     Urinary incontinence     Anxiety     Sleep apnea     Depression     Chronic back pain     Carpal tunnel syndrome     Neuropathy     Mitral valve problem     Morbid obesity with BMI of 45.0-49.9, adult (HCC)     Frozen shoulder     Skin tag     Degenerative disc disease, lumbar     Bilateral edema of lower extremity     Medication refill     Varicose vein of leg     Dizziness     Dysphagia     Abdominal bloating     Mild intermittent asthma without complication     Skin lesion of left leg

## 2022-06-02 ENCOUNTER — OFFICE VISIT (OUTPATIENT)
Dept: FAMILY MEDICINE CLINIC | Age: 62
End: 2022-06-02
Payer: COMMERCIAL

## 2022-06-02 VITALS
BODY MASS INDEX: 45.99 KG/M2 | DIASTOLIC BLOOD PRESSURE: 77 MMHG | SYSTOLIC BLOOD PRESSURE: 131 MMHG | HEIGHT: 67 IN | TEMPERATURE: 97.2 F | WEIGHT: 293 LBS | HEART RATE: 69 BPM

## 2022-06-02 DIAGNOSIS — I87.2 VENOUS STASIS DERMATITIS OF LEFT LOWER EXTREMITY: ICD-10-CM

## 2022-06-02 DIAGNOSIS — M75.102 NONTRAUMATIC TEAR OF LEFT ROTATOR CUFF, UNSPECIFIED TEAR EXTENT: Primary | ICD-10-CM

## 2022-06-02 PROCEDURE — G8417 CALC BMI ABV UP PARAM F/U: HCPCS

## 2022-06-02 PROCEDURE — 3017F COLORECTAL CA SCREEN DOC REV: CPT

## 2022-06-02 PROCEDURE — G8427 DOCREV CUR MEDS BY ELIG CLIN: HCPCS

## 2022-06-02 PROCEDURE — 1036F TOBACCO NON-USER: CPT

## 2022-06-02 PROCEDURE — 99213 OFFICE O/P EST LOW 20 MIN: CPT

## 2022-06-02 RX ORDER — TRIAMCINOLONE ACETONIDE 1 MG/G
CREAM TOPICAL
Qty: 80 G | Refills: 0 | Status: SHIPPED | OUTPATIENT
Start: 2022-06-02

## 2022-06-02 SDOH — ECONOMIC STABILITY: FOOD INSECURITY: WITHIN THE PAST 12 MONTHS, THE FOOD YOU BOUGHT JUST DIDN'T LAST AND YOU DIDN'T HAVE MONEY TO GET MORE.: NEVER TRUE

## 2022-06-02 SDOH — ECONOMIC STABILITY: FOOD INSECURITY: WITHIN THE PAST 12 MONTHS, YOU WORRIED THAT YOUR FOOD WOULD RUN OUT BEFORE YOU GOT MONEY TO BUY MORE.: NEVER TRUE

## 2022-06-02 ASSESSMENT — ENCOUNTER SYMPTOMS
SORE THROAT: 0
ABDOMINAL PAIN: 0
COUGH: 0
SHORTNESS OF BREATH: 0
COLOR CHANGE: 1
SINUS PRESSURE: 0
WHEEZING: 0

## 2022-06-02 ASSESSMENT — PATIENT HEALTH QUESTIONNAIRE - PHQ9
SUM OF ALL RESPONSES TO PHQ QUESTIONS 1-9: 3
SUM OF ALL RESPONSES TO PHQ QUESTIONS 1-9: 3
10. IF YOU CHECKED OFF ANY PROBLEMS, HOW DIFFICULT HAVE THESE PROBLEMS MADE IT FOR YOU TO DO YOUR WORK, TAKE CARE OF THINGS AT HOME, OR GET ALONG WITH OTHER PEOPLE: 0
2. FEELING DOWN, DEPRESSED OR HOPELESS: 0
1. LITTLE INTEREST OR PLEASURE IN DOING THINGS: 0
5. POOR APPETITE OR OVEREATING: 1
3. TROUBLE FALLING OR STAYING ASLEEP: 0
SUM OF ALL RESPONSES TO PHQ QUESTIONS 1-9: 3
6. FEELING BAD ABOUT YOURSELF - OR THAT YOU ARE A FAILURE OR HAVE LET YOURSELF OR YOUR FAMILY DOWN: 0
SUM OF ALL RESPONSES TO PHQ QUESTIONS 1-9: 3
SUM OF ALL RESPONSES TO PHQ9 QUESTIONS 1 & 2: 0
7. TROUBLE CONCENTRATING ON THINGS, SUCH AS READING THE NEWSPAPER OR WATCHING TELEVISION: 0
8. MOVING OR SPEAKING SO SLOWLY THAT OTHER PEOPLE COULD HAVE NOTICED. OR THE OPPOSITE, BEING SO FIGETY OR RESTLESS THAT YOU HAVE BEEN MOVING AROUND A LOT MORE THAN USUAL: 0
9. THOUGHTS THAT YOU WOULD BE BETTER OFF DEAD, OR OF HURTING YOURSELF: 0
4. FEELING TIRED OR HAVING LITTLE ENERGY: 2

## 2022-06-02 ASSESSMENT — SOCIAL DETERMINANTS OF HEALTH (SDOH): HOW HARD IS IT FOR YOU TO PAY FOR THE VERY BASICS LIKE FOOD, HOUSING, MEDICAL CARE, AND HEATING?: NOT HARD AT ALL

## 2022-06-02 NOTE — PROGRESS NOTES
Subjective:    Delmer Tariq is a 58 y.o. female with  has a past medical history of Abdominal bloating, Anxiety, Bilateral edema of lower extremity, Bronchial asthma, Carpal tunnel syndrome, Cataract, Chronic back pain, Chronic sinusitis, Degenerative disc disease, lumbar, Depression, Diabetes mellitus due to underlying condition with hyperosmolarity without coma ([facility] [address] Dizziness, DJD (degenerative joint disease), Dysphagia, Edema of leg, Environmental allergies, Frozen shoulder, GERD (gastroesophageal reflux disease), Glaucoma, Glucose intolerance (impaired glucose tolerance), Headache(784.0), History of bronchitis, HTN (hypertension), Hyperlipidemia, Hypothyroid, Hypothyroidism, IBS (irritable bowel syndrome), Legally blind, Medication refill, Mild intermittent asthma without complication, Morbid obesity with BMI of 45.0-49.9, adult (HCC), MVP (mitral valve prolapse), Neuropathy, OA (osteoarthritis), EMILIA on CPAP, Osteopenia, Pancreatic cyst, Polyneuropathy, Raynaud disease, Rhinopharyngitis, Skin lesion of left leg, Skin tag, Stress fracture, Syncope, TIA (transient ischemic attack), Urinary incontinence, Varicose vein of leg, Vasodepressor syncope, and Wears glasses.     Presented to the office today for:  Chief Complaint   Patient presents with    Follow-up     was at [facility] for coughing, sneezing feeling better now       HPI    70-year-old female came to the clinic after a visit to the ED for fever, cough, runny nose myalgias and was  Concerned for covid which was negative   Patient was reassured and sent home  Today at this visit patient is no more febrile, no more runny nose or other symtoms reported today    Has stasis dermatitis  Was seen dermatology in the past and  Needs refill on triamcinolone  Patient does not want to go back to the dermatologist and just wants a refill    Hypothyroid  Takes synthroid   Symptoms controlled    Left shoulder pain without trauma  Thinks she may have rotator cuff tear  Has pain and tenderness and decreased range of motion    Review of Systems   Constitutional: Negative for activity change and fever. HENT: Negative for sinus pressure, sneezing and sore throat. Respiratory: Negative for cough, shortness of breath and wheezing. Cardiovascular: Negative for chest pain. Gastrointestinal: Negative for abdominal pain. Musculoskeletal: Positive for arthralgias. Left shoulder tenderness and pain   Skin: Positive for color change and rash. Left-sided lower extremity stasis dermatitis   Neurological: Negative for dizziness and weakness. Hematological: Negative for adenopathy. Psychiatric/Behavioral: Negative for agitation, behavioral problems and confusion.                  The patient has a   Family History   Problem Relation Age of Onset    Diabetes Mother     Heart Disease Mother         multiple heart attacks    Arthritis Mother     High Blood Pressure Mother     High Cholesterol Mother     Substance Abuse Mother     Heart Disease Father     Arthritis Father     Depression Father     High Cholesterol Father     Mental Illness Father     Substance Abuse Father     Diabetes Sister     High Blood Pressure Sister     Cancer Paternal Uncle         esophageal cancer    Diabetes Maternal Aunt     Cancer Maternal Aunt         colorectal cancer    Diabetes Maternal Uncle     Cancer Maternal Grandmother         colorectal cancer    Arthritis Maternal Grandmother     Diabetes Maternal Grandmother     High Blood Pressure Maternal Grandmother     Stroke Maternal Grandmother     Arthritis Maternal Grandfather     Arthritis Paternal Grandmother     Cancer Paternal Grandmother     Depression Paternal Grandmother     Heart Disease Paternal Grandmother     High Blood Pressure Paternal Grandmother     High Cholesterol Paternal Grandmother     Mental Illness Paternal Grandmother     Arthritis Paternal Grandfather        Objective:    BP 131/77 (Site: Left Lower Arm, Position: Sitting, Cuff Size: Medium Adult)   Pulse 69   Temp 97.2 °F (36.2 °C) (Temporal)   Ht 5' 7.01\" (1.702 m)   Wt (!) 315 lb 9.6 oz (143.2 kg)   BMI 49.42 kg/m²    BP Readings from Last 3 Encounters:   06/02/22 131/77   05/20/22 136/70   04/20/22 (!) 141/66       Physical Exam  Constitutional:       General: She is not in acute distress. Appearance: She is obese. She is not ill-appearing, toxic-appearing or diaphoretic. Cardiovascular:      Rate and Rhythm: Normal rate. Pulses: Normal pulses. Heart sounds: No murmur heard. Pulmonary:      Effort: Pulmonary effort is normal.      Breath sounds: Normal breath sounds. No wheezing. Musculoskeletal:         General: Tenderness present. No signs of injury. Left shoulder: Tenderness present. No swelling, deformity, effusion, laceration or crepitus. Decreased range of motion. Decreased strength. Normal pulse. Comments: Likely subscapularis rotator cuff tendinitis/tear   Skin:     General: Skin is warm. Findings: Erythema present. Comments: Stasis dermatitis in the left lower extremity   Neurological:      Mental Status: She is alert and oriented to person, place, and time. Motor: No weakness. Lab Results   Component Value Date    WBC 12.5 (H) 01/20/2021    HGB 15.6 (H) 01/20/2021    HCT 46.8 01/20/2021     01/20/2021    CHOL 132 12/14/2020    TRIG 114 12/14/2020    HDL 45 12/14/2020    ALT 35 (H) 01/20/2021    AST 26 01/20/2021     01/20/2021    K 3.9 01/20/2021     01/20/2021    CREATININE 0.79 01/20/2021    BUN 20 01/20/2021    CO2 33 (H) 01/20/2021    TSH 3.12 06/14/2021    INR 0.9 06/29/2013    LABA1C 5.5 06/11/2021    LABMICR CANNOT BE CALCULATED 01/02/2020     Lab Results   Component Value Date    CALCIUM 9.7 01/20/2021     Lab Results   Component Value Date    LDLCALC 68 06/16/2017    LDLCHOLESTEROL 64 12/14/2020       Assessment and Plan:    1. Nontraumatic tear of left rotator cuff, unspecified tear extent  - XR SHOULDER LEFT (MIN 2 VIEWS); Future  - diclofenac sodium (VOLTAREN) 1 % GEL; Apply 2 g topically 4 times daily  Dispense: 2 g; Refill: 0  - Na Dave Nascimento, Yoselin Doshi DO, Orthopedic Surgery, [facility]  - External Referral To Physical Therapy  - External Referral To Orthopedic Surgery    2. Venous stasis dermatitis of left lower extremity  - triamcinolone (KENALOG) 0.1 % cream; Apply topically 2 times daily. Dispense: 80 g; Refill: 0          Requested Prescriptions     Signed Prescriptions Disp Refills    diclofenac sodium (VOLTAREN) 1 % GEL 2 g 0     Sig: Apply 2 g topically 4 times daily    triamcinolone (KENALOG) 0.1 % cream 80 g 0     Sig: Apply topically 2 times daily. There are no discontinued medications. Kaci received counseling on the following healthy behaviors: nutrition, exercise and medication adherence    Discussed use,benefit, and side effects of prescribed medications. Barriers to medication compliance addressed. All patient questions answered. Pt voiced understanding. Return in about 3 months (around 9/2/2022) for rotator cuff injury. Disclaimer: Some orall of this note was transcribed using voice-recognition software. This may cause typographical errors occasionally. Although all effort is made to fix these errors, please do not hesitate to contact our office if there Sophie Herberty concern with the understanding of this note.

## 2022-06-02 NOTE — PROGRESS NOTES
Visit Information    Have you changed or started any medications since your last visit including any over-the-counter medicines, vitamins, or herbal medicines? no   Have you stopped taking any of your medications? Is so, why? -  no  Are you having any side effects from any of your medications? - no    Have you seen any other physician or provider since your last visit? yes - Pulmonology, Podiatry, OBGYN, Gastro   Have you had any other diagnostic tests since your last visit? yes - Labs, Mammo   Have you been seen in the emergency room and/or had an admission in a hospital since we last saw you?  yes - [facility]   Have you had your routine dental cleaning in the past 6 months?  no     Do you have an active MyChart account? If no, what is the barrier?   Yes    Patient Care Team:  Carlos Macdonald MD as PCP - General (Family Medicine)  Rebecca Colbert DO as Surgeon (Orthopedic Surgery)  Hong Moe MD as Consulting Physician (Cardiology)  Neeru Guzmán MD as Consulting Physician (Pulmonology)  Jenaro Brice as Consulting Physician  Hugo Quinonez MD as Consulting Physician (Endocrinology)  Chrissy Acevedo MD as Consulting Physician (Obstetrics & Gynecology)  Radha Peng MD as Consulting Physician (Gastroenterology)    Medical History Review  Past Medical, Family, and Social History reviewed and does contribute to the patient presenting condition    Health Maintenance   Topic Date Due    COVID-19 Vaccine (1) Never done    Pneumococcal 0-64 years Vaccine (2 - PCV) 12/08/2009    Diabetic retinal exam  03/06/2021    Depression Monitoring  06/11/2022    Diabetic foot exam  09/22/2022    A1C test (Diabetic or Prediabetic)  04/21/2023    Diabetic microalbuminuria test  04/21/2023    Lipids  04/21/2023    Breast cancer screen  12/16/2023    DTaP/Tdap/Td vaccine (2 - Td or Tdap) 06/01/2027    Colorectal Cancer Screen  12/09/2029    Flu vaccine  Completed    Shingles vaccine Completed    Hepatitis C screen  Completed    HIV screen  Completed    Hepatitis A vaccine  Aged Out    Hib vaccine  Aged Out    Meningococcal (ACWY) vaccine  Aged Out

## 2022-06-02 NOTE — PROGRESS NOTES
Attending Physician Statement  I  have discussed the care of Viridiana Weldon including pertinent history and exam findings with the resident. I agree with the assessment, plan and orders as documented by the resident. /77 (Site: Left Lower Arm, Position: Sitting, Cuff Size: Medium Adult)   Pulse 69   Temp 97.2 °F (36.2 °C) (Temporal)   Ht 5' 7.01\" (1.702 m)   Wt (!) 315 lb 9.6 oz (143.2 kg)   BMI 49.42 kg/m²    BP Readings from Last 3 Encounters:   06/02/22 131/77   05/20/22 136/70   04/20/22 (!) 141/66     Wt Readings from Last 3 Encounters:   06/02/22 (!) 315 lb 9.6 oz (143.2 kg)   05/20/22 (!) 313 lb (142 kg)   04/20/22 (!) 313 lb (142 kg)          Diagnosis Orders   1. Nontraumatic tear of left rotator cuff, unspecified tear extent  XR SHOULDER LEFT (MIN 2 VIEWS)    diclofenac sodium (VOLTAREN) 1 % GEL    [facility] - [address], Lashell Ragland DO, Orthopedic Surgery, [facility]   2.  Venous stasis dermatitis of left lower extremity  triamcinolone (KENALOG) 0.1 % cream       Natanael Thibodeaux DO 6/2/2022 10:34 AM

## 2022-06-06 DIAGNOSIS — Z76.0 MEDICATION REFILL: ICD-10-CM

## 2022-06-06 RX ORDER — MULTIVITAMIN/IRON/FOLIC ACID 18MG-0.4MG
TABLET ORAL
Qty: 30 TABLET | Refills: 3 | Status: SHIPPED | OUTPATIENT
Start: 2022-06-06 | End: 2022-10-13 | Stop reason: SDUPTHER

## 2022-06-06 NOTE — TELEPHONE ENCOUNTER
E-scribe request for med refill. Please review and e-scribe if applicable. Last Visit Date:  06/02/2022  Next Visit Date:  Visit date not found    Hemoglobin A1C (%)   Date Value   06/11/2021 5.5   12/03/2020 5.3   01/02/2020 5.4             ( goal A1C is < 7)   Microalb/Crt.  Ratio (mcg/mg creat)   Date Value   01/02/2020 CANNOT BE CALCULATED     LDL Cholesterol (mg/dL)   Date Value   12/14/2020 64     LDL Calculated (mg/dL)   Date Value   06/16/2017 68       (goal LDL is <100)   AST (U/L)   Date Value   01/20/2021 26     ALT (U/L)   Date Value   01/20/2021 35 (H)     BUN (mg/dL)   Date Value   01/20/2021 20     BP Readings from Last 3 Encounters:   06/02/22 131/77   05/20/22 136/70   04/20/22 (!) 141/66          (goal 120/80)        Patient Active Problem List:     Glaucoma     GERD (gastroesophageal reflux disease)     DJD (degenerative joint disease)     Obesity     Polyneuropathy     Migraine     Mitral prolapse     Low back pain     CTS (carpal tunnel syndrome)     Supraspinatus syndrome     Impaired fasting glucose     Acute sinus infection     IBS (irritable bowel syndrome)     Back pain, chronic     Stress fracture, right 5th metatarsal      Upper airway cough syndrome     Ear fullness     Perioral numbness     Screening for osteoporosis     Headache     Left eye injury     Medication reaction     Osteopenia     Lumbosacral pain     Flu vaccine need     Hypothyroid     Urinary incontinence     Anxiety     Sleep apnea     Depression     Chronic back pain     Carpal tunnel syndrome     Neuropathy     Mitral valve problem     Morbid obesity with BMI of 45.0-49.9, adult (HCC)     Frozen shoulder     Skin tag     Degenerative disc disease, lumbar     Bilateral edema of lower extremity     Medication refill     Varicose vein of leg     Dizziness     Dysphagia     Abdominal bloating     Mild intermittent asthma without complication     Skin lesion of left leg      ----Jae Wood

## 2022-06-23 ENCOUNTER — OFFICE VISIT (OUTPATIENT)
Dept: PULMONOLOGY | Age: 62
End: 2022-06-23
Payer: COMMERCIAL

## 2022-06-23 VITALS
BODY MASS INDEX: 57.52 KG/M2 | OXYGEN SATURATION: 97 % | SYSTOLIC BLOOD PRESSURE: 138 MMHG | TEMPERATURE: 97.5 F | DIASTOLIC BLOOD PRESSURE: 83 MMHG | HEIGHT: 60 IN | WEIGHT: 293 LBS | HEART RATE: 70 BPM

## 2022-06-23 DIAGNOSIS — G47.33 OBSTRUCTIVE SLEEP APNEA SYNDROME: Primary | ICD-10-CM

## 2022-06-23 PROCEDURE — 99214 OFFICE O/P EST MOD 30 MIN: CPT | Performed by: INTERNAL MEDICINE

## 2022-06-23 PROCEDURE — 1036F TOBACCO NON-USER: CPT | Performed by: INTERNAL MEDICINE

## 2022-06-23 PROCEDURE — G8427 DOCREV CUR MEDS BY ELIG CLIN: HCPCS | Performed by: INTERNAL MEDICINE

## 2022-06-23 PROCEDURE — G8417 CALC BMI ABV UP PARAM F/U: HCPCS | Performed by: INTERNAL MEDICINE

## 2022-06-23 PROCEDURE — 3017F COLORECTAL CA SCREEN DOC REV: CPT | Performed by: INTERNAL MEDICINE

## 2022-06-23 ASSESSMENT — SLEEP AND FATIGUE QUESTIONNAIRES
HOW LIKELY ARE YOU TO NOD OFF OR FALL ASLEEP WHEN YOU ARE A PASSENGER IN A CAR FOR AN HOUR WITHOUT A BREAK: 0
HOW LIKELY ARE YOU TO NOD OFF OR FALL ASLEEP IN A CAR, WHILE STOPPED FOR A FEW MINUTES IN TRAFFIC: 0
HOW LIKELY ARE YOU TO NOD OFF OR FALL ASLEEP WHILE WATCHING TV: 1
ESS TOTAL SCORE: 4
HOW LIKELY ARE YOU TO NOD OFF OR FALL ASLEEP WHILE SITTING INACTIVE IN A PUBLIC PLACE: 0
HOW LIKELY ARE YOU TO NOD OFF OR FALL ASLEEP WHILE SITTING AND READING: 1
HOW LIKELY ARE YOU TO NOD OFF OR FALL ASLEEP WHILE LYING DOWN TO REST IN THE AFTERNOON WHEN CIRCUMSTANCES PERMIT: 2
HOW LIKELY ARE YOU TO NOD OFF OR FALL ASLEEP WHILE SITTING AND TALKING TO SOMEONE: 0
HOW LIKELY ARE YOU TO NOD OFF OR FALL ASLEEP WHILE SITTING QUIETLY AFTER LUNCH WITHOUT ALCOHOL: 0

## 2022-06-23 NOTE — PATIENT INSTRUCTIONS
Pt stated she is not receiving her supplies when an order is being sent to [facility]. Called [facility], stated they need a CMN signed by Dr Prince Lockwood and are faxing it over.  Once CMN is faxed back to [facility] patients supplies will be released to her -LUC

## 2022-06-23 NOTE — PROGRESS NOTES
Margarita Doherty  6/23/2022    Chief Complaint   Patient presents with    Sleep Apnea     1 year follow up         Margarita Doherty has obstructive sleep apnea syndrome and responding well to the use of CPAP patient uses very regularly. CPAP has been effective in relieving the apnea improving daytime symptoms except that she does complain of dry mouth. No morning headaches no reflux she is on PPI. She admits she has not been able to lose any weight she has not noted any pedal edema no thromboembolic process. Patient was given a prescription for new supplies for the CPAP machine. He used to have neurocardiogenic syncope which is much improved now. Symptoms of allergic rhinitis are under control. She is on thyroid replacement therapy thyroid function has been stable. Her blood sugar also has been stable A1c has been under good control. She does not have any hypertension heart diseaseReview of Systems -system was conducted for all other system including upper and lower extremities. No additional information was obtained. General ROS: negative for - chills, fatigue, fever or weight loss  ENT ROS: negative for - headaches, oral lesions or sore throat  Cardiovascular ROS: no chest pain , orthopnea or pnd   Gastrointestinal ROS: no abdominal pain, change in bowel habits, or black or bloody stools  Skin - no rash   Neuro - no blurry vision , no loc . No focal weakness   msk - no jt tenderness or swelling    Vascular - no claudication , rest completed and negative   Lymphatic - complete and negative   Hematology - oncology - complete and negative   Allergy immunology - complete and negative    no burning or hematuria       LUNG CANCER SCREENING     1. CRITERIA MET    []     CT ORDERED  []      2. CRITERIA NOT MET   [x]      3. REFUSED                    []        REASON CRITERIA NOT MET     1. SMOKING LESS THAN 30 PY  [x]      2. AGE LESS THAN 55 or GREATER 77 YEARS  []      3.  QUIT SMOKING 15 YEARS OR GREATER   []      4. RECENT CT WITH IN 11 MONTHS    []      5. LIFE EXPECTANCY < 5 YEARS   []      6.  SIGNS  AND SYMPTOMS OF LUNG CANCER   []           Immunization   Immunization History   Administered Date(s) Administered    Influenza 10/18/2012, 10/22/2013    Influenza A ([id number]) Vaccine PF IM 11/06/2009    Influenza Vaccine, unspecified formulation 10/22/2013    Influenza Virus Vaccine 10/22/2013, 09/28/2015, 10/04/2016, 10/27/2017, 10/04/2018    Influenza Whole 09/24/2010    Influenza, Quadv, IM, (6 mo and older Fluzone, Flulaval, Fluarix and 3 yrs and older Afluria) 10/04/2016, 10/27/2017, 10/04/2018    Influenza, Harlen Kilts, IM, PF (6 mo and older Fluzone, Flulaval, Fluarix, and 3 yrs and older Afluria) 09/25/2019, 10/23/2020, 09/21/2021    Pneumococcal Polysaccharide ([id number]) 12/08/2008    Tdap (Boostrix, Adacel) 06/01/2017    Zoster Recombinant (Shingrix) 01/15/2020, 03/19/2020        Pneumococcal Vaccine     [x] Up to date    [] Indicated   [] Refused  [] Contraindicated       Influenza Vaccine   [x] Up to date    [] Indicated   [] Refused  [] Contraindicated       PAST MEDICAL HISTORY:         Diagnosis Date    Abdominal bloating 1/23/2019    Anxiety     Bilateral edema of lower extremity 12/16/2016    Bronchial asthma     mild, intermittent    Carpal tunnel syndrome     Cataract     Chronic back pain     Chronic sinusitis     Degenerative disc disease, lumbar 12/16/2016    Depression     Diabetes mellitus due to underlying condition with hyperosmolarity without coma ([facility] [address]     Dizziness 10/27/2017    DJD (degenerative joint disease)     S1, L3, L4, L5, T12    Dysphagia 1/23/2019    Edema of leg     bilateral legs    Environmental allergies     Frozen shoulder 4/27/2015    GERD (gastroesophageal reflux disease)     Glaucoma     Glucose intolerance (impaired glucose tolerance)     Headache(784.0)     History of bronchitis     Viral    HTN (hypertension)     Hyperlipidemia     Hypothyroid     hypothyroid state    Hypothyroidism     IBS (irritable bowel syndrome) 10/02/2012    Legally blind     due to glaucoma    Medication refill 6/1/2017    Mild intermittent asthma without complication     Morbid obesity with BMI of 45.0-49.9, adult (HCC)     MVP (mitral valve prolapse)     Neuropathy     OA (osteoarthritis)     EMILIA on CPAP     Osteopenia     Pancreatic cyst     Polyneuropathy     Raynaud disease     Rhinopharyngitis     Allergic rhinopharyngitis    Skin lesion of left leg 6/11/2021    Skin tag 7/9/2015    Stress fracture     Right 5th Metatarsal     Syncope     TIA (transient ischemic attack)     Urinary incontinence     Varicose vein of leg 6/1/2017    Vasodepressor syncope     Wears glasses        Family History:       Problem Relation Age of Onset    Diabetes Mother     Heart Disease Mother         multiple heart attacks    Arthritis Mother     High Blood Pressure Mother     High Cholesterol Mother     Substance Abuse Mother     Heart Disease Father     Arthritis Father     Depression Father     High Cholesterol Father     Mental Illness Father     Substance Abuse Father     Diabetes Sister     High Blood Pressure Sister     Cancer Paternal Uncle         esophageal cancer    Diabetes Maternal Aunt     Cancer Maternal Aunt         colorectal cancer    Diabetes Maternal Uncle     Cancer Maternal Grandmother         colorectal cancer    Arthritis Maternal Grandmother     Diabetes Maternal Grandmother     High Blood Pressure Maternal Grandmother     Stroke Maternal Grandmother     Arthritis Maternal Grandfather     Arthritis Paternal Grandmother     Cancer Paternal Grandmother     Depression Paternal Grandmother     Heart Disease Paternal Grandmother     High Blood Pressure Paternal Grandmother     High Cholesterol Paternal Grandmother     Mental Illness Paternal Grandmother     Arthritis Paternal Grandfather SURGICAL HISTORY:   Past Surgical History:   Procedure Laterality Date    APPENDECTOMY  1978    CATARACT REMOVAL Left     CHOLECYSTECTOMY  1978    COLONOSCOPY      COLONOSCOPY N/A 12/9/2019    COLORECTAL CANCER SCREENING, NOT HIGH RISK performed by Heloise Peabody, MD at [address] [place] [address] Bilateral     right iridectomy, right laser, bilateral trabeculectomy, left drain    GLAUCOMA SURGERY      HAND SURGERY Right     HYSTERECTOMY (CERVIX STATUS UNKNOWN)  1982    KNEE SURGERY Right 2000    TUBAL LIGATION  1981    UPPER GASTROINTESTINAL ENDOSCOPY      UPPER GASTROINTESTINAL ENDOSCOPY  5/24/2018    EGD DILATION SAVORY performed by Beck Peterson MD at [address] [place] [address] Rd  3/6/2019    EGD DILATION SAVORY performed by Heloise Peabody, MD at [address] [place] [address] Rd N/A 9/8/2021    EGD DILATION SAVORY performed by Heloise Peabody, MD at [facility]              Not in a hospital admission.   Allergies   Allergen Reactions    Latex Rash     blisters  blisters    Adhesive Tape Rash     blisters    Amlodipine Other (See Comments)     Facial numbness  Facial numbness  Facial numbness  Facial numbness  Facial numbness  Facial numbness  Facial numbness    Bextra [Valdecoxib] Rash     swelling    Brimonidine Itching     Burning eyes severe    Daypro [Oxaprozin] Anaphylaxis    Diclofenac Sodium Swelling and Shortness Of Breath    Famotidine Other (See Comments)     Blisters down her arms    Hydrocodone-Acetaminophen Nausea Only     Severe chest pain    Hydromorphone Other (See Comments)     Rapid heart beat,  Nausea, spent 3 days in cardiac unit    Ibuprofen      Other reaction(s): bleeding  Other reaction(s): Unknown  Black stools  \"rips up stomach\", black tarry stool  Black stools  \"rips up stomach\", black tarry stool    Lisinopril Nausea Only, Nausea And Vomiting and Other (See Comments)     Other reaction(s): Hypotension    Metoprolol Other (See Comments)     Other reaction(s): Dizziness    Naproxen Other (See Comments)     Chest pain , swelling    Oxycodone-Acetaminophen      Chest pain severe    Rofecoxib Rash     swelling    Shellfish-Derived Products Anaphylaxis and Rash     shrimp  shrimp  shrimp    Simvastatin Nausea And Vomiting     Other reaction(s): muscle cramps    Statins      Other reaction(s): muscle cramps  Tolerates lovastatin. Pravachol caused numbness in head/face    Sulfa Antibiotics Hives    Tetracyclines & Related Itching and Rash    Timoptic [Timolol Maleate] Itching and Swelling     Eyes burning severely    Tramadol Itching and Rash    Fosamax [Alendronate] Nausea Only and Nausea And Vomiting    Nalbuphine Nausea And Vomiting    Alendronate Sodium     Alendronate Sodium     Alphagan [Brimonidine Tartrate]      Eye irritation    Dilaudid [Hydromorphone Hcl]     Doxycycline Monohydrate     Nsaids     Other Itching and Swelling     Eyes burning    Pepcid Complete [Famotidine-Ca Carb-Mag Hydrox] Hives and Other (See Comments)     blisters    Pilocarpine Itching and Swelling     Blurred vision  Eyes burning    Pravastatin Other (See Comments)     Facial numming    Red Dye Nausea Only     Pt states allergic to red coloring in crestor with CY on pill and senokot red pill. Feels sick to stomach and head feels foggy.  Shrimp Flavor Hives and Swelling    Statins Support Therapy      Tolerates lovastatin.  Pravachol caused numbness in head/face    Timoptic [Timolol Maleate]      Eye irritation      Tolectin [Tolmetin]      Unknown reaction  - as a child    Voltaren [Diclofenac Sodium]      Flu symptoms      Nubain [Nalbuphine Hcl] Nausea And Vomiting     Chest pain      Percocet [Oxycodone-Acetaminophen] Nausea And Vomiting     Sweating, chest pain    Tolectin [Tolmetin Sodium] Rash    Tolmetin Sodium Rash    Ultram [Tramadol Hcl] Itching and Rash     Rash on face    Vicodin [Hydrocodone-Acetaminophen] Nausea And Vomiting     swearing    Vioxx Rash     Social History     Tobacco Use   Smoking Status Never Smoker   Smokeless Tobacco Never Used     Prior to Admission medications    Medication Sig Start Date End Date Taking? Authorizing Provider   Multiple Vitamins-Minerals (CENTROVITE) TABS TAKE 1 TABLET BY MOUTH DAILY 6/6/22  Yes Muna Mcdonald MD   diclofenac sodium (VOLTAREN) 1 % GEL Apply 2 g topically 4 times daily 6/2/22  Yes Brittany Guevara MD   triamcinolone (KENALOG) 0.1 % cream Apply topically 2 times daily.  6/2/22  Yes Brittany Guevara MD   Accu-Chek FastClix Lancets MISC USE TO CHECK BLOOD SUGAR TWICE A DAY 5/31/22  Yes Gonzalez López MD   omeprazole (PRILOSEC) 40 MG delayed release capsule TAKE 1 CAPSULE BY MOUTH DAILY 5/31/22  Yes Mika Burrell MD   fluticasone (FLONASE) 50 MCG/ACT nasal spray INHALE 1 SPRAY INTO EACH NOSTRIL 2 TIMES A DAY 5/10/22  Yes Chip Marks MD   ASPIRIN LOW DOSE 81 MG EC tablet TAKE 1 TABLET BY MOUTH DAILY 4/28/22  Yes Christy Dunham MD   Cholecalciferol (VITAMIN D3) 50 MCG (2000 UT) TABS  3/14/22  Yes Historical Provider, MD   calcium carbonate-vitamin D (CALTRATE) 600-400 MG-UNIT TABS per tab  3/25/22  Yes Historical Provider, MD   SENNA-TABS 8.6 MG tablet TAKE 1 TABLET BY MOUTH 2 TIMES DAILY AS NEEDED FOR CONSTIPATION 4/11/22  Yes Mika Burrell MD   acetaminophen (ACETAMINOPHEN EXTRA STRENGTH) 500 MG tablet Take 2 tablets by mouth every 6 hours as needed for Pain 1/31/22  Yes Georgie Lui MD   hydrocortisone 1 % ointment Apply topically 2 times daily as needed for skin itching 1/20/22  Yes Leticia Pardo MD   ONETOUCH VERIO strip  12/20/21  Yes Historical Provider, MD   Lancets Misc. (ACCU-CHEK FASTCLIX LANCET) KIT 1 box by Barbara Riddle. (Med.Supl.;Non-Drugs) route 2 times daily Dispense 1 box of 100. 12/14/21  Yes Osorio Davila MD   albuterol sulfate  (90 Base) MCG/ACT inhaler INHALE 2 PUFFS INTO THE LUNGS 4 TIMES DAILY AS NEEDED FOR WHEEZING 12/7/21  Yes Tracy Baron MD   ALLERGY RELIEF 10 MG tablet TAKE 1 TABLET BY MOUTH DAILY 12/3/21  Yes Tracy Baron MD   levothyroxine (SYNTHROID) 100 MCG tablet Take 1 tablet by mouth Daily Indications: 112 mg 9/21/21  Yes Dash Davis MD   rosuvastatin (CRESTOR) 5 MG tablet Take 1 tablet by mouth daily 9/21/21  Yes Dash Davis MD   Blood Glucose Monitoring Suppl ([address]) w/Device KIT  4/1/21  Yes Historical Provider, MD   Blood Glucose Calibration (Bowen Serum) SOLN  4/14/21  Yes Historical Provider, MD         Physical Exam  General Appearance:    Alert, cooperative, no distress, appears stated age, morbid morbid obesity. No respiratory distress, not using any accessory muscles. No headaches at this time. Head:    Normocephalic, without obvious abnormality, atraumatic   Nose reveals no polyps. No sinus tenderness. Throat reveal no abnormality, oropharyngeal orifice is not compromised. Eye examination is normal, no jaundice or Darien syndrome. Ear examination normal.                   :    Neck:   Supple, symmetrical, trachea midline, no adenopathy;     thyroid:  no enlargement/tenderness/nodules; no carotid    bruit or JVD. Neck is short and obese    Back:     Symmetric, no curvature, ROM normal, no CVA tenderness   Lungs:       Not a good air entry on both side. Breathing is vesicular. Expiration is not prolonged. No rales or rhonchi are audible. AP diameter is not increased. No pleural friction rub is audible.        Chest Wall:    No tenderness or deformity      Heart:    Regular rate and rhythm, S1 and S2 normal, no murmur, rub        or gallop no rvh                           Abdomen:                                                 Pulses:                                            Lymph nodes:                    Neurologic:                  Soft, non-tender, bowel sounds active all four quadrants,     no masses, no organomegaly         2+ and symmetric all extremities            Cervical, supraclavicular not enlarged or matted or tender      CNII-XII intact, normal strength 5/5 . Sensation grossly normal  and reflexes normal 2+  throughout     Clubbing No  Lower ext edema Yes1+   [] , 2 +  [] , 3+   []  Upper ext edema No       Musculoskeletal - no joint swelling or tenderness or synovitis               /83 (Site: Left Lower Arm, Position: Sitting, Cuff Size: Large Adult)   Pulse 70   Temp 97.5 °F (36.4 °C)   Ht 5' (1.524 m)   Wt (!) 313 lb 6.4 oz (142.2 kg)   SpO2 97%   BMI 61.21 kg/m²     CXR  No recent chest x-ray      CT Scans  No recent CT scan    Echo  No echocardiogram        Assessment  Obstructive sleep apnea syndrome  Morbid obesity  Neurocardiogenic syncope  \Glucose intolerance  Hypersomnia      Plan  Sleep apnea responding well to the use of CPAP. Advised her to continue use of CPAP as before. I gave her prescription for prescription for her CPAP supplies. She is not sleepy or tired during the daytime. Patient advised to lose weight. He has had neurocardiogenic syncope which is much improved. Blood sugar is stable. He is on thyroid replacement therapy thyroid function has been stable. Questions were answered.   We will see her follow-up in 6 months she already had COVID-vaccine    Dictated by Dr. Mallika Sanchez

## 2022-06-28 ENCOUNTER — TELEPHONE (OUTPATIENT)
Dept: PULMONOLOGY | Age: 62
End: 2022-06-28

## 2022-06-28 NOTE — TELEPHONE ENCOUNTER
Have not received CMN for Ector to sign per Etienne. Called again, was told by Diaz Ansari to fax face sheet, order and last office note since CMN has not been received.  All were faxed

## 2022-07-01 ENCOUNTER — OFFICE VISIT (OUTPATIENT)
Dept: ORTHOPEDIC SURGERY | Age: 62
End: 2022-07-01
Payer: COMMERCIAL

## 2022-07-01 VITALS — BODY MASS INDEX: 57.52 KG/M2 | HEIGHT: 60 IN | WEIGHT: 293 LBS

## 2022-07-01 DIAGNOSIS — M75.102 ROTATOR CUFF SYNDROME OF LEFT SHOULDER: Primary | ICD-10-CM

## 2022-07-01 DIAGNOSIS — M75.22 BICEPS TENDINITIS OF LEFT SHOULDER: ICD-10-CM

## 2022-07-01 DIAGNOSIS — R52 PAIN: Primary | ICD-10-CM

## 2022-07-01 PROCEDURE — G8427 DOCREV CUR MEDS BY ELIG CLIN: HCPCS | Performed by: PHYSICIAN ASSISTANT

## 2022-07-01 PROCEDURE — 1036F TOBACCO NON-USER: CPT | Performed by: PHYSICIAN ASSISTANT

## 2022-07-01 PROCEDURE — G8417 CALC BMI ABV UP PARAM F/U: HCPCS | Performed by: PHYSICIAN ASSISTANT

## 2022-07-01 PROCEDURE — 99213 OFFICE O/P EST LOW 20 MIN: CPT | Performed by: PHYSICIAN ASSISTANT

## 2022-07-01 PROCEDURE — 20611 DRAIN/INJ JOINT/BURSA W/US: CPT | Performed by: PHYSICIAN ASSISTANT

## 2022-07-01 PROCEDURE — 3017F COLORECTAL CA SCREEN DOC REV: CPT | Performed by: PHYSICIAN ASSISTANT

## 2022-07-01 RX ORDER — METHYLPREDNISOLONE ACETATE 40 MG/ML
40 INJECTION, SUSPENSION INTRA-ARTICULAR; INTRALESIONAL; INTRAMUSCULAR; SOFT TISSUE ONCE
Status: SHIPPED | OUTPATIENT
Start: 2022-07-01

## 2022-07-01 RX ORDER — LIDOCAINE HYDROCHLORIDE 10 MG/ML
2 INJECTION, SOLUTION INFILTRATION; PERINEURAL ONCE
Status: SHIPPED | OUTPATIENT
Start: 2022-07-01

## 2022-07-01 NOTE — PROGRESS NOTES
tolerance)     Headache(784.0)     History of bronchitis     Viral    HTN (hypertension)     Hyperlipidemia     Hypothyroid     hypothyroid state    Hypothyroidism     IBS (irritable bowel syndrome) 10/02/2012    Legally blind     due to glaucoma    Medication refill 6/1/2017    Mild intermittent asthma without complication     Morbid obesity with BMI of 45.0-49.9, adult (HCC)     MVP (mitral valve prolapse)     Neuropathy     OA (osteoarthritis)     EMILIA on CPAP     Osteopenia     Pancreatic cyst     Polyneuropathy     Raynaud disease     Rhinopharyngitis     Allergic rhinopharyngitis    Skin lesion of left leg 6/11/2021    Skin tag 7/9/2015    Stress fracture     Right 5th Metatarsal     Syncope     TIA (transient ischemic attack)     Urinary incontinence     Varicose vein of leg 6/1/2017    Vasodepressor syncope     Wears glasses        Past Surgical History:    Past Surgical History:   Procedure Laterality Date    APPENDECTOMY  1978    CATARACT REMOVAL Left     CHOLECYSTECTOMY  1978    COLONOSCOPY      COLONOSCOPY N/A 12/9/2019    COLORECTAL CANCER SCREENING, NOT HIGH RISK performed by Darwin Mattson MD at [address] [place] [address] Bilateral     right iridectomy, right laser, bilateral trabeculectomy, left drain    GLAUCOMA SURGERY      HAND SURGERY Right     HYSTERECTOMY (CERVIX STATUS UNKNOWN)  1982    KNEE SURGERY Right 2000    TUBAL LIGATION  1981    UPPER GASTROINTESTINAL ENDOSCOPY      UPPER GASTROINTESTINAL ENDOSCOPY  5/24/2018    EGD DILATION SAVORY performed by Tyrone Ingram MD at [facility]  3/6/2019    EGD DILATION SAVORY performed by Darwin Mattson MD at [facility] N/A 9/8/2021    EGD DILATION SAVORY performed by Darwin Mattson MD at [facility]       CurrentMedications:   Current Outpatient Medications   Medication Sig Dispense Refill    Multiple Serita Spurling, PA-C        methylPREDNISolone acetate (DEPO-MEDROL) injection 40 mg  40 mg Intra-artICUlar Once Jos Griffin PA-C           Allergies:    Latex, Adhesive tape, Amlodipine, Bextra [valdecoxib], Brimonidine, Daypro [oxaprozin], Diclofenac sodium, Famotidine, Hydrocodone-acetaminophen, Hydromorphone, Ibuprofen, Lisinopril, Metoprolol, Naproxen, Oxycodone-acetaminophen, Rofecoxib, Shellfish-derived products, Simvastatin, Statins, Sulfa antibiotics, Tetracyclines & related, Timoptic [timolol maleate], Tramadol, Fosamax [alendronate], Nalbuphine, Alendronate sodium, Alendronate sodium, Alphagan [brimonidine tartrate], Dilaudid [hydromorphone hcl], Doxycycline monohydrate, Nsaids, Other, Pepcid complete [famotidine-ca carb-mag hydrox], Pilocarpine, Pravastatin, Red dye, Shrimp flavor, Statins support therapy, Timoptic [timolol maleate], Tolectin [tolmetin], Voltaren [diclofenac sodium], Nubain [nalbuphine hcl], Percocet [oxycodone-acetaminophen], Tolectin [tolmetin sodium], Tolmetin sodium, Ultram [tramadol hcl], Vicodin [hydrocodone-acetaminophen], and Vioxx    Social History:   Social History     Socioeconomic History    Marital status: Single     Spouse name: None    Number of children: None    Years of education: None    Highest education level: None   Occupational History    None   Tobacco Use    Smoking status: Never Smoker    Smokeless tobacco: Never Used   Vaping Use    Vaping Use: Never used   Substance and Sexual Activity    Alcohol use: No     Alcohol/week: 0.0 standard drinks    Drug use: No    Sexual activity: Not Currently     Partners: Male   Other Topics Concern    None   Social History Narrative    None     Social Determinants of Health     Financial Resource Strain: Low Risk     Difficulty of Paying Living Expenses: Not hard at all   Food Insecurity: No Food Insecurity    Worried About Running Out of Food in the Last Year: Never true    [place] of Food in the Last Year: Never true   Transportation Needs:     Lack of Transportation (Medical): Not on file    Lack of Transportation (Non-Medical):  Not on file   Physical Activity:     Days of Exercise per Week: Not on file    Minutes of Exercise per Session: Not on file   Stress:     Feeling of Stress : Not on file   Social Connections:     Frequency of Communication with Friends and Family: Not on file    Frequency of Social Gatherings with Friends and Family: Not on file    Attends Denominational Services: Not on file    Active Member of Clubs or Organizations: Not on file    Attends Club or Organization Meetings: Not on file    Marital Status: Not on file   Intimate Partner Violence:     Fear of Current or Ex-Partner: Not on file    Emotionally Abused: Not on file    Physically Abused: Not on file    Sexually Abused: Not on file   Housing Stability:     Unable to Pay for Housing in the Last Year: Not on file    Number of [place] in the Last Year: Not on file    Unstable Housing in the Last Year: Not on file       Family History:  Family History   Problem Relation Age of Onset    Diabetes Mother     Heart Disease Mother         multiple heart attacks    Arthritis Mother     High Blood Pressure Mother     High Cholesterol Mother     Substance Abuse Mother     Heart Disease Father     Arthritis Father     Depression Father     High Cholesterol Father     Mental Illness Father     Substance Abuse Father     Diabetes Sister     High Blood Pressure Sister     Cancer Paternal Uncle         esophageal cancer    Diabetes Maternal Aunt     Cancer Maternal Aunt         colorectal cancer    Diabetes Maternal Uncle     Cancer Maternal Grandmother         colorectal cancer    Arthritis Maternal Grandmother     Diabetes Maternal Grandmother     High Blood Pressure Maternal Grandmother     Stroke Maternal Grandmother     Arthritis Maternal Grandfather     Arthritis Paternal Grandmother     Cancer Paternal Grandmother     Depression Paternal Grandmother     Heart Disease Paternal Grandmother     High Blood Pressure Paternal Grandmother     High Cholesterol Paternal Grandmother     Mental Illness Paternal Grandmother     Arthritis Paternal Grandfather        I have reviewed the CC, HPI, ROS, PMH, FHX, Social History, and if not present in this note, I have reviewed in the patient's chart. I agree with the documentation provided by other staff and have reviewed their documentation prior to providing my signature indicating agreement. Vitals:   Ht 5' (1.524 m)   Wt (!) 313 lb (142 kg)   BMI 61.13 kg/m²  Body mass index is 61.13 kg/m². Physical Examination:     Orthopedics:    GENERAL: Alert and oriented X3 in no acute distress. SKIN: Intact without lesions or ulcerations. NEURO: Musculoskeletal and axillary nerves intact to sensory and motor testing. VASC: Capillary refill is less than 3 seconds. Right Shoulder Exam    GEN: Alert and oriented X 3, in no acute distress. SKIN: Intact without rashes, lesions, or ulcerations. NEURO: Musculoskeletal ans axillary nerves intact to sensory and motor testing. VASC: Cap refill less than than 3 secs. Negative Adson's test, Negative Priyanka's test.  ROM: 140 degrees of forward elevation, 10 degrees of external rotation in neutral, 70 degrees of external rotation in abduction, internal rotation to L1.    STRENGTH: Supraspinatus 4+/5, external rotators 5/5. MUSC: No atrophy, negative subscap lift off or belly press test.  IMP: + Neer's sign, + Hawkin's sign, no Coracoid impingement, + painful arc, + pain with cross body abduction. PALP: mild pain over anterolateral acromion, + pain over AC joint, no pain over traps/rhomboids. INST: + Robert Lee's test, + Speed's test  Assessment:     1. Rotator cuff syndrome of left shoulder    2.  Biceps tendinitis of left shoulder      Procedures:    Procedure: yes    Subacromial Bursa Injection    Location: Left Shoulder  Procedure: I discussed in detail the risks, benefits and complications of an injection which included but are not limited to infection, skin reactions, hot swollen joint and anaphylaxis with the patient. The patient verbalized understanding and gave informed consent for the injection. The skin was prepped with betadine in a sterile fashion. Under these sterile conditions, a [employer] ultrasound unit with a variable frequency linear transducer was used for precise placement of a 22-gauge needle into the subacromial bursa. A clean technique was utilized using sterile gloves and after prepping the patient under the stated sterile conditions, the patient was placed in the Seated position on the exam table. The posterior soft spot approximately 2 cm distal and 2 cm medial to the posterior acromial edge was identified and marked and a 3 cc solution containing 2 cc of 1% Lidocainewith 1 cc containing 80 mg of Depomedrol was injected into the subacromial space with the 22-gauge needle. The needled was withdrawn and the injection site was cleansed. A Band-Aid was placed over the injection site. There was no resistance to the injection and the patient tolerated the procedure well without difficulty. Adverse reactions of the injection was discussed with the patient including signs of infection, increasing pain, redness, swelling, warmth, fever, chills and the patient was instructed to call immediately with any of these symptoms. Successful needle placement was achieved and final images were taken and saved in the patient's chart for the permanent record. The images are stored on SD card in the machine until downloaded to the patient's chart. Radiology:   SHOULDER X-RAY    Two views of the left shoulder and 2 views of the scapula, including AP, scapular Y, outlet and axillary views reveal: The glenohumeral joint is well reduced without arthritic changes. Proximal migration of the humeral head has not occurred.   Acromion is a type II. The acromioclavicular joint shows moderate degenerative changes. Impression: Moderate arthritic change of the [facility] joint of the left shoulder   Plan:   Treatment : I reviewed the X-ray with the patient and I informed them that the x-ray shows some moderate degenerative changes of [facility]. We discussed the etiologies and natural histories of left rotator cuff syndrome. We discussed the various treatment alternatives including anti-inflammatory medications, physical therapy, injections, further imaging studies and as a last result surgery. During today's visit, we discussed that she does have some rotator cuff syndrome but her strength overall is pretty good. I do feel that a subacromial injection today and going to physical therapy would be helpful. I did explain that she needs to keep moving her shoulder so she does not develop a frozen shoulder. If she is not better in 6 weeks we could consider a bicep tendon injection if she still having anterior shoulder pain. At this time, the patient has opted for a cortisone injection into the left subacromial space to help reduce inflammation and pain. The injection site should never get red, hot, or swollen and if it does the patient will contact our office right away. The patient may experience a increase in soreness the first 24-48 hours due to a cortisone flair and can take anti-inflammatories for a short period of time to reduce that soreness. The patient should not submerge the injection site in water for a minimum of 24 hours to avoid infection. This means no lakes, pools, ponds, or hot tubs for 24 hours. If the patient is diabetic the injection may increase their blood sugar for up to one week. The patient can do this cortisone injection once every 3 months as needed. If the injections stop working and do not give the patient relief the patient should consider surgical interventions to produce long term relief.   A physical therapy prescription was given. She can continue taking Tylenol for pain. Patient should return to the clinic in 6 weeks to follow up with Tasha Dutta PA-C at the [facility] office. The patient will call the office immediately with any problems. Orders Placed This Encounter   Medications    lidocaine 1 % injection 2 mL    methylPREDNISolone acetate (DEPO-MEDROL) injection 40 mg       Orders Placed This Encounter   Procedures    External Referral To Physical Therapy     Referral Priority:   Routine     Referral Type:   Eval and Treat     Referral Reason:   Specialty Services Required     Requested Specialty:   Physical Therapist     Number of Visits Requested:   1    OR ARTHROCENTESIS ASPIR&/INJ MAJOR JT/BURSA W/US       This note is created with the assistance of a speech recognition program.  While intending to generate a document that actually reflects the content of the visit, the document can still have some errors including those of syntax and sound a like substitutions which may escape proof reading.   In such instances, actual meaning can be extrapolated by contextual diversion     Electronically signed by Luana Tinajero PA-C, on 7/1/2022 at 12:55 PM

## 2022-07-05 RX ORDER — SENNOSIDES 8.6 MG/1
TABLET, COATED ORAL
Qty: 60 TABLET | Refills: 2 | Status: SHIPPED | OUTPATIENT
Start: 2022-07-05

## 2022-07-05 RX ORDER — ACETAMINOPHEN 160 MG
TABLET,DISINTEGRATING ORAL
Qty: 30 CAPSULE | Refills: 5 | Status: SHIPPED | OUTPATIENT
Start: 2022-07-05

## 2022-07-05 NOTE — TELEPHONE ENCOUNTER
Last visit: 6/2/22  Last Med refill: 6/6/22  Does patient have enough medication for 72 hours: Yes    Next Visit Date:  Future Appointments   Date Time Provider Fabby Juarez   7/20/2022 12:45 PM Ghislaine [facility] Podiatry [facility]   8/12/2022 10:00 AM Christine Rivas PA-C Sports Med [facility]   12/6/2022 10:30 AM [facility]   12/19/2022 11:30 AM [facility]AMMOGRAPHY ROOM AT [facility]   1/5/2023 10:30 AM Alvaro Clarke MD Resp Spec [facility]   1/23/2023  9:00 AM Elizabeth Stallworth MD [facility]       Health Maintenance   Topic Date Due    COVID-19 Vaccine (1) Never done    Pneumococcal 0-64 years Vaccine (2 - PCV) 12/08/2009    Diabetic retinal exam  03/06/2021    Flu vaccine (1) 09/01/2022    Diabetic foot exam  09/22/2022    A1C test (Diabetic or Prediabetic)  04/21/2023    Diabetic microalbuminuria test  04/21/2023    Lipids  04/21/2023    Depression Monitoring  06/02/2023    Breast cancer screen  12/16/2023    DTaP/Tdap/Td vaccine (2 - Td or Tdap) 06/01/2027    Colorectal Cancer Screen  12/09/2029    Shingles vaccine  Completed    Hepatitis C screen  Completed    HIV screen  Completed    Hepatitis A vaccine  Aged Out    Hib vaccine  Aged Out    Meningococcal (ACWY) vaccine  Aged Out       Hemoglobin A1C (%)   Date Value   06/11/2021 5.5   12/03/2020 5.3   01/02/2020 5.4             ( goal A1C is < 7)   Microalb/Crt.  Ratio (mcg/mg creat)   Date Value   01/02/2020 CANNOT BE CALCULATED     LDL Cholesterol (mg/dL)   Date Value   12/14/2020 64   07/08/2019 97     LDL Calculated (mg/dL)   Date Value   06/16/2017 68   05/19/2014 104       (goal LDL is <100)   AST (U/L)   Date Value   01/20/2021 26     ALT (U/L)   Date Value   01/20/2021 35 (H)     BUN (mg/dL)   Date Value   01/20/2021 20     BP Readings from Last 3 Encounters:   06/23/22 138/83   06/02/22 131/77   05/20/22 136/70          (goal 120/80)    All Future Testing planned in [facility]  Lab Frequency Next Occurrence   XR SHOULDER LEFT (MIN 2 VIEWS) Once 06/02/2022               Patient Active Problem List:     Glaucoma     GERD (gastroesophageal reflux disease)     DJD (degenerative joint disease)     Obesity     Polyneuropathy     Migraine     Mitral prolapse     Low back pain     CTS (carpal tunnel syndrome)     Supraspinatus syndrome     Impaired fasting glucose     Acute sinus infection     IBS (irritable bowel syndrome)     Back pain, chronic     Stress fracture, right 5th metatarsal      Upper airway cough syndrome     Ear fullness     Perioral numbness     Screening for osteoporosis     Headache     Left eye injury     Medication reaction     Osteopenia     Lumbosacral pain     Flu vaccine need     Hypothyroid     Urinary incontinence     Anxiety     Sleep apnea     Depression     Chronic back pain     Carpal tunnel syndrome     Neuropathy     Mitral valve problem     Morbid obesity with BMI of 45.0-49.9, adult (HCC)     Frozen shoulder     Skin tag     Degenerative disc disease, lumbar     Bilateral edema of lower extremity     Medication refill     Varicose vein of leg     Dizziness     Dysphagia     Abdominal bloating     Mild intermittent asthma without complication     Skin lesion of left leg

## 2022-07-18 NOTE — TELEPHONE ENCOUNTER
LAST VISIT: 6/23/22  NEXT VISIT: 1/5/23    No mention of this medication in your last dictation but you have prescribed in the past. Please sign for refill if ok. Thank you.

## 2022-07-19 RX ORDER — LORATADINE 10 MG/1
TABLET ORAL
Qty: 30 TABLET | Refills: 10 | Status: SHIPPED | OUTPATIENT
Start: 2022-07-19

## 2022-07-20 DIAGNOSIS — E11.9 TYPE 2 DIABETES MELLITUS WITHOUT COMPLICATION, WITHOUT LONG-TERM CURRENT USE OF INSULIN (HCC): ICD-10-CM

## 2022-07-20 DIAGNOSIS — M75.102 NONTRAUMATIC TEAR OF LEFT ROTATOR CUFF, UNSPECIFIED TEAR EXTENT: ICD-10-CM

## 2022-07-20 RX ORDER — ASPIRIN 81 MG/1
81 TABLET, COATED ORAL DAILY
Qty: 30 TABLET | Refills: 5 | Status: SHIPPED | OUTPATIENT
Start: 2022-07-20 | End: 2022-09-08 | Stop reason: SDUPTHER

## 2022-07-20 NOTE — TELEPHONE ENCOUNTER
E-scribe request for med refills. Please review and e-scribe if applicable. Last Visit Date:  6/2/22  Next Visit Date:  Visit date not found    Hemoglobin A1C (%)   Date Value   06/11/2021 5.5   12/03/2020 5.3   01/02/2020 5.4             ( goal A1C is < 7)   Microalb/Crt.  Ratio (mcg/mg creat)   Date Value   01/02/2020 CANNOT BE CALCULATED     LDL Cholesterol (mg/dL)   Date Value   12/14/2020 64     LDL Calculated (mg/dL)   Date Value   06/16/2017 68       (goal LDL is <100)   AST (U/L)   Date Value   01/20/2021 26     ALT (U/L)   Date Value   01/20/2021 35 (H)     BUN (mg/dL)   Date Value   01/20/2021 20     BP Readings from Last 3 Encounters:   06/23/22 138/83   06/02/22 131/77   05/20/22 136/70          (goal 120/80)        Patient Active Problem List:     Glaucoma     GERD (gastroesophageal reflux disease)     DJD (degenerative joint disease)     Obesity     Polyneuropathy     Migraine     Mitral prolapse     Low back pain     CTS (carpal tunnel syndrome)     Supraspinatus syndrome     Impaired fasting glucose     Acute sinus infection     IBS (irritable bowel syndrome)     Back pain, chronic     Stress fracture, right 5th metatarsal      Upper airway cough syndrome     Ear fullness     Perioral numbness     Screening for osteoporosis     Headache     Left eye injury     Medication reaction     Osteopenia     Lumbosacral pain     Flu vaccine need     Hypothyroid     Urinary incontinence     Anxiety     Sleep apnea     Depression     Chronic back pain     Carpal tunnel syndrome     Neuropathy     Mitral valve problem     Morbid obesity with BMI of 45.0-49.9, adult (HCC)     Frozen shoulder     Skin tag     Degenerative disc disease, lumbar     Bilateral edema of lower extremity     Medication refill     Varicose vein of leg     Dizziness     Dysphagia     Abdominal bloating     Mild intermittent asthma without complication     Skin lesion of left leg      ----Shonna Chavarria

## 2022-07-20 NOTE — TELEPHONE ENCOUNTER
E-scribe request for med refill. Please review and e-scribe if applicable. Last Visit Date:  06/02/2022  Next Visit Date:  Visit date not found    Hemoglobin A1C (%)   Date Value   06/11/2021 5.5   12/03/2020 5.3   01/02/2020 5.4             ( goal A1C is < 7)   Microalb/Crt.  Ratio (mcg/mg creat)   Date Value   01/02/2020 CANNOT BE CALCULATED     LDL Cholesterol (mg/dL)   Date Value   12/14/2020 64     LDL Calculated (mg/dL)   Date Value   06/16/2017 68       (goal LDL is <100)   AST (U/L)   Date Value   01/20/2021 26     ALT (U/L)   Date Value   01/20/2021 35 (H)     BUN (mg/dL)   Date Value   01/20/2021 20     BP Readings from Last 3 Encounters:   06/23/22 138/83   06/02/22 131/77   05/20/22 136/70          (goal 120/80)        Patient Active Problem List:     Glaucoma     GERD (gastroesophageal reflux disease)     DJD (degenerative joint disease)     Obesity     Polyneuropathy     Migraine     Mitral prolapse     Low back pain     CTS (carpal tunnel syndrome)     Supraspinatus syndrome     Impaired fasting glucose     Acute sinus infection     IBS (irritable bowel syndrome)     Back pain, chronic     Stress fracture, right 5th metatarsal      Upper airway cough syndrome     Ear fullness     Perioral numbness     Screening for osteoporosis     Headache     Left eye injury     Medication reaction     Osteopenia     Lumbosacral pain     Flu vaccine need     Hypothyroid     Urinary incontinence     Anxiety     Sleep apnea     Depression     Chronic back pain     Carpal tunnel syndrome     Neuropathy     Mitral valve problem     Morbid obesity with BMI of 45.0-49.9, adult (HCC)     Frozen shoulder     Skin tag     Degenerative disc disease, lumbar     Bilateral edema of lower extremity     Medication refill     Varicose vein of leg     Dizziness     Dysphagia     Abdominal bloating     Mild intermittent asthma without complication     Skin lesion of left leg      ----Angus Garcia

## 2022-08-11 DIAGNOSIS — M25.512 LEFT SHOULDER PAIN, UNSPECIFIED CHRONICITY: Primary | ICD-10-CM

## 2022-08-11 DIAGNOSIS — M25.512 LEFT SHOULDER PAIN, UNSPECIFIED CHRONICITY: ICD-10-CM

## 2022-08-12 ENCOUNTER — OFFICE VISIT (OUTPATIENT)
Dept: ORTHOPEDIC SURGERY | Age: 62
End: 2022-08-12
Payer: COMMERCIAL

## 2022-08-12 VITALS — HEIGHT: 60 IN | WEIGHT: 293 LBS | BODY MASS INDEX: 57.52 KG/M2

## 2022-08-12 DIAGNOSIS — M75.102 ROTATOR CUFF SYNDROME OF LEFT SHOULDER: Primary | ICD-10-CM

## 2022-08-12 DIAGNOSIS — M75.22 BICEPS TENDINITIS OF LEFT SHOULDER: ICD-10-CM

## 2022-08-12 PROCEDURE — G8427 DOCREV CUR MEDS BY ELIG CLIN: HCPCS | Performed by: PHYSICIAN ASSISTANT

## 2022-08-12 PROCEDURE — 1036F TOBACCO NON-USER: CPT | Performed by: PHYSICIAN ASSISTANT

## 2022-08-12 PROCEDURE — 99213 OFFICE O/P EST LOW 20 MIN: CPT | Performed by: PHYSICIAN ASSISTANT

## 2022-08-12 PROCEDURE — 3017F COLORECTAL CA SCREEN DOC REV: CPT | Performed by: PHYSICIAN ASSISTANT

## 2022-08-12 PROCEDURE — G8417 CALC BMI ABV UP PARAM F/U: HCPCS | Performed by: PHYSICIAN ASSISTANT

## 2022-08-12 PROCEDURE — 20550 NJX 1 TENDON SHEATH/LIGAMENT: CPT | Performed by: PHYSICIAN ASSISTANT

## 2022-08-12 RX ORDER — METHYLPREDNISOLONE ACETATE 80 MG/ML
80 INJECTION, SUSPENSION INTRA-ARTICULAR; INTRALESIONAL; INTRAMUSCULAR; SOFT TISSUE ONCE
Status: COMPLETED | OUTPATIENT
Start: 2022-08-12 | End: 2022-08-12

## 2022-08-12 RX ORDER — LIDOCAINE HYDROCHLORIDE 10 MG/ML
2 INJECTION, SOLUTION INFILTRATION; PERINEURAL ONCE
Status: COMPLETED | OUTPATIENT
Start: 2022-08-12 | End: 2022-08-12

## 2022-08-12 RX ADMIN — METHYLPREDNISOLONE ACETATE 80 MG: 80 INJECTION, SUSPENSION INTRA-ARTICULAR; INTRALESIONAL; INTRAMUSCULAR; SOFT TISSUE at 10:48

## 2022-08-12 RX ADMIN — LIDOCAINE HYDROCHLORIDE 2 ML: 10 INJECTION, SOLUTION INFILTRATION; PERINEURAL at 10:48

## 2022-08-12 ASSESSMENT — ENCOUNTER SYMPTOMS
RESPIRATORY NEGATIVE: 1
ABDOMINAL PAIN: 0
NAUSEA: 0
ABDOMINAL DISTENTION: 0
DIARRHEA: 0
VOMITING: 0
CONSTIPATION: 0
COUGH: 0
APNEA: 0
COLOR CHANGE: 0
SHORTNESS OF BREATH: 0
CHEST TIGHTNESS: 0

## 2022-08-12 NOTE — PROGRESS NOTES
[address] [place] [address] AND SPORTS MEDICINE  [facility] Chris Justice  [address] [facility] [address]  Dept: [phone]  Dept Fax: [phone]        Ambulatory Follow Up      Subjective:   Champ Wright is a 58y.o. year old female who presents to our office today for routine followup regarding her   1. Rotator cuff syndrome of left shoulder    2. Biceps tendinitis of left shoulder    . Chief Complaint   Patient presents with    Shoulder Pain     Left follow up. HPI Champ Wright  is a 58 y.o. female who presents today in follow for left shoulder pain. The patient was last seen on 7/1/2022 and underwent treatment in the form of a subacromial cortisone injection and the physical therapy. She states that she had her initial evaluation for physical therapy on 7/22/2022 because they were waiting for clearance from the insurance. She is going to [facility] for physical therapy and has attended 7 visits. The therapy seems to be helping. The patient notes 50% improvement with the previous treatment. The shoulder is not waking her up as much at night. Review of Systems   Constitutional:  Positive for activity change. Negative for appetite change, fatigue and fever. Respiratory: Negative. Negative for apnea, cough, chest tightness and shortness of breath. Cardiovascular: Negative. Negative for chest pain, palpitations and leg swelling. Gastrointestinal:  Negative for abdominal distention, abdominal pain, constipation, diarrhea, nausea and vomiting. Genitourinary:  Negative for difficulty urinating, dysuria and hematuria. Musculoskeletal:  Positive for arthralgias. Negative for gait problem, joint swelling and myalgias. Skin:  Negative for color change and rash. Neurological:  Negative for dizziness, weakness, numbness and headaches. Psychiatric/Behavioral:  Positive for sleep disturbance.         Objective : resistance to injection. The wound was cleansed and a Band-Aid was placed. The patient tolerated the procedure without difficulty. Adverse reactions of the injection was discussed with the patient including signs of infection (increasing pain, redness, swelling) and the patient was instructed to call immediately with any of these symptoms. Successful needle placement was achieved and final images were taken and saved in the patient's chart for the permanent record. The images are stored on SD card in the machine until downloaded to the patient's chart. Assessment:      1. Rotator cuff syndrome of left shoulder    2. Biceps tendinitis of left shoulder       Plan:      Etiologies of rotator cuff syndrome and biceps tendinitis. I would like to do an injection into the bicep tendon sheath today since she is better after doing the subacromial injection. I think her shoulder pain increased when she started doing therapy. I would like her to hold therapy until next Thursday so the injection has a chance to work. The patient states understanding. The patient is opted for a cortisone injection into the left bicep tendon sheath to help reduce inflammation and pain. The injection site should never get red, hot, or swollen and if it does the patient will contact our office right away. The patient may experience a increase in soreness the first 24-48 hours due to a cortisone flair and can take anti-inflammatories for a short period of time to reduce that soreness. The patient should not submerge the injection site in water for a minimum of 24 hours to avoid infection. This means no lakes, pools, ponds, or hot tubs for 24 hours. If the patient is diabetic the injection may increase their blood sugar for up to one week. The patient can do this cortisone injection once every 3 months as needed.  If the injections stop working and do not give the patient relief the patient should consider surgical interventions to produce long term relief. Follow up:No follow-ups on file. Orders Placed This Encounter   Medications    methylPREDNISolone acetate (DEPO-MEDROL) injection 80 mg    lidocaine 1 % injection 2 mL           No orders of the defined types were placed in this encounter. This note is created with the assistance of a speech recognition program.  While intending to generate a document that actually reflects the content of the visit, the document can still have some errors including those of syntax and sound a like substitutions which may escape proof reading. In such instances, actual meaning can be extrapolated by contextual diversion.      Electronically signed by Gomez Rodriguez PA-C on 8/12/2022 at 2:00 PM

## 2022-09-02 ENCOUNTER — OFFICE VISIT (OUTPATIENT)
Dept: FAMILY MEDICINE CLINIC | Age: 62
End: 2022-09-02
Payer: COMMERCIAL

## 2022-09-02 VITALS
OXYGEN SATURATION: 99 % | WEIGHT: 293 LBS | BODY MASS INDEX: 57.52 KG/M2 | HEART RATE: 67 BPM | TEMPERATURE: 96.8 F | DIASTOLIC BLOOD PRESSURE: 88 MMHG | HEIGHT: 60 IN | SYSTOLIC BLOOD PRESSURE: 128 MMHG

## 2022-09-02 DIAGNOSIS — M12.812 ROTATOR CUFF ARTHROPATHY OF LEFT SHOULDER: ICD-10-CM

## 2022-09-02 DIAGNOSIS — M75.02 ADHESIVE CAPSULITIS OF LEFT SHOULDER: ICD-10-CM

## 2022-09-02 DIAGNOSIS — R73.01 IMPAIRED FASTING GLUCOSE: Primary | ICD-10-CM

## 2022-09-02 LAB — HBA1C MFR BLD: 5.6 %

## 2022-09-02 PROCEDURE — 83036 HEMOGLOBIN GLYCOSYLATED A1C: CPT

## 2022-09-02 PROCEDURE — 99213 OFFICE O/P EST LOW 20 MIN: CPT

## 2022-09-02 PROCEDURE — 3017F COLORECTAL CA SCREEN DOC REV: CPT

## 2022-09-02 PROCEDURE — G8427 DOCREV CUR MEDS BY ELIG CLIN: HCPCS

## 2022-09-02 PROCEDURE — 1036F TOBACCO NON-USER: CPT

## 2022-09-02 PROCEDURE — G8417 CALC BMI ABV UP PARAM F/U: HCPCS

## 2022-09-02 ASSESSMENT — ENCOUNTER SYMPTOMS
NAUSEA: 0
SHORTNESS OF BREATH: 0
ABDOMINAL PAIN: 0
WHEEZING: 0
TROUBLE SWALLOWING: 0
VOMITING: 0
RHINORRHEA: 0
DIARRHEA: 0
CONSTIPATION: 0

## 2022-09-02 NOTE — PROGRESS NOTES
Visit Information    Have you changed or started any medications since your last visit including any over-the-counter medicines, vitamins, or herbal medicines? no   Have you stopped taking any of your medications? Is so, why? -  no  Are you having any side effects from any of your medications? - no    Have you seen any other physician or provider since your last visit? yes - Ortho , Pulmonary , PT. Have you had any other diagnostic tests since your last visit? yes - Labs , xr- LT. Shoulder   Have you been seen in the emergency room and/or had an admission in a hospital since we last saw you?  yes - [facility]    Have you had your routine dental cleaning in the past 6 months?  no     Do you have an active MyChart account? If no, what is the barrier?   Yes    Patient Care Team:  Jose Rose MD as PCP - General (Family Medicine)  Lauren Ayala DO as Surgeon (Orthopedic Surgery)  Emily Redd MD as Consulting Physician (Cardiology)  Faviola Vidal MD as Consulting Physician (Pulmonology)  Mandy Jimenez as Consulting Physician  Mohan العراقي MD as Consulting Physician (Endocrinology)  Herny Flores MD as Consulting Physician (Obstetrics & Gynecology)  Garcia Fernandez MD as Consulting Physician (Gastroenterology)    Medical History Review  Past Medical, Family, and Social History reviewed and does not contribute to the patient presenting condition    Health Maintenance   Topic Date Due    COVID-19 Vaccine (1) Never done    Pneumococcal 0-64 years Vaccine (2 - PCV) 12/08/2009    Diabetic retinal exam  03/06/2021    Flu vaccine (1) 09/01/2022    Diabetic foot exam  09/22/2022    A1C test (Diabetic or Prediabetic)  04/21/2023    Diabetic microalbuminuria test  04/21/2023    Lipids  04/21/2023    Depression Monitoring  06/02/2023    Breast cancer screen  12/16/2023    DTaP/Tdap/Td vaccine (2 - Td or Tdap) 06/01/2027    Colorectal Cancer Screen  12/09/2029    Shingles vaccine  Completed Hepatitis C screen  Completed    HIV screen  Completed    Hepatitis A vaccine  Aged Out    Hib vaccine  Aged Out    Meningococcal (ACWY) vaccine  Aged Out

## 2022-09-02 NOTE — PATIENT INSTRUCTIONS
Thank you for letting us take care of you today. We hope all your questions were addressed. If a question was overlooked or something else comes to mind after you return home, please contact a member of your Care Team listed below. Your Care Team at [place] [address] is Team #5  Raheel Delatorre MD (Faculty)  Kristin Ellison MD (Resident)  Conraod Bal MD (Resident)  Hazel Law MD (Resident)  Katherine Hernandez MD, (Resident)  Antonette Moreno., FAITH Linares., RMA  Christina Hawkins.,  SANAMN  Bari Torres., Vickie Hernandez., Valdez Mckenna [facility] office)  Roby Pelayo, [address] (Clinical Practice Manager)  Nimo Dejesus [facility] (Clinical Pharmacist)       Office phone number: [phone]    If you need to get in right away due to illness, please be advised we have \"Same Day\" appointments available Monday-Friday. Please call us at [phone] option #3 to schedule your \"Same Day\" appointment.

## 2022-09-02 NOTE — PROGRESS NOTES
Attending Physician Statement  I have discussed the care of Eliana Mer[age] y.o.  female, including pertinent history and exam findings,  with the resident Dr. Lillie Coley MD.  History and Exam:   Chief Complaint   Patient presents with    Follow-up     Patient here to follow up from ortho - Left shoulder     Medication Refill     Patient would like to have refills on all her medications      Patient is here for follow up on left shoulder pain status post intra-articular steroid injection.   Past Medical History:   Diagnosis Date    Abdominal bloating 1/23/2019    Anxiety     Bilateral edema of lower extremity 12/16/2016    Bronchial asthma     mild, intermittent    Carpal tunnel syndrome     Cataract     Chronic back pain     Chronic sinusitis     Degenerative disc disease, lumbar 12/16/2016    Depression     Diabetes mellitus due to underlying condition with hyperosmolarity without coma ([facility] [address]     Dizziness 10/27/2017    DJD (degenerative joint disease)     S1, L3, L4, L5, T12    Dysphagia 1/23/2019    Edema of leg     bilateral legs    Environmental allergies     Frozen shoulder 4/27/2015    GERD (gastroesophageal reflux disease)     Glaucoma     Glucose intolerance (impaired glucose tolerance)     Headache(784.0)     History of bronchitis     Viral    HTN (hypertension)     Hyperlipidemia     Hypothyroid     hypothyroid state    Hypothyroidism     IBS (irritable bowel syndrome) 10/02/2012    Legally blind     due to glaucoma    Medication refill 6/1/2017    Mild intermittent asthma without complication     Morbid obesity with BMI of 45.0-49.9, adult (HCC)     MVP (mitral valve prolapse)     Neuropathy     OA (osteoarthritis)     EMILIA on CPAP     Osteopenia     Pancreatic cyst     Polyneuropathy     Raynaud disease     Rhinopharyngitis     Allergic rhinopharyngitis    Skin lesion of left leg 6/11/2021    Skin tag 7/9/2015    Stress fracture     Right 5th Metatarsal     Syncope     TIA (transient ischemic attack) Nsaids     Other Itching and Swelling     Eyes burning    Pepcid Complete [Famotidine-Ca Carb-Mag Hydrox] Hives and Other (See Comments)     blisters    Pilocarpine Itching and Swelling     Blurred vision  Eyes burning    Pravastatin Other (See Comments)     Facial numming    Red Dye Nausea Only     Pt states allergic to red coloring in crestor with CY on pill and senokot red pill. Feels sick to stomach and head feels foggy. Shrimp Flavor Hives and Swelling    Statins Support Therapy      Tolerates lovastatin. Pravachol caused numbness in head/face    Timoptic [Timolol Maleate]      Eye irritation      Tolectin [Tolmetin]      Unknown reaction  - as a child    Voltaren [Diclofenac Sodium]      Flu symptoms      Nubain [Nalbuphine Hcl] Nausea And Vomiting     Chest pain      Percocet [Oxycodone-Acetaminophen] Nausea And Vomiting     Sweating, chest pain    Tolectin [Tolmetin Sodium] Rash    Tolmetin Sodium Rash    Ultram [Tramadol Hcl] Itching and Rash     Rash on face    Vicodin [Hydrocodone-Acetaminophen] Nausea And Vomiting     swearing    Vioxx Rash    I have seen and examined the patient and the key elements of the encounter have been performed by me.   BP Readings from Last 3 Encounters:   09/02/22 128/88   06/23/22 138/83   06/02/22 131/77     /88 (Site: Left Lower Arm, Position: Sitting, Cuff Size: Large Adult) Comment: manual  Pulse 67   Temp 96.8 °F (36 °C)   Ht 5' (1.524 m)   Wt (!) 313 lb 3.2 oz (142.1 kg)   SpO2 99%   BMI 61.17 kg/m²   Lab Results   Component Value Date    WBC 12.5 (H) 01/20/2021    HGB 15.6 (H) 01/20/2021    HCT 46.8 01/20/2021     01/20/2021    CHOL 132 12/14/2020    TRIG 114 12/14/2020    HDL 45 12/14/2020    ALT 35 (H) 01/20/2021    AST 26 01/20/2021     01/20/2021    K 3.9 01/20/2021     01/20/2021    CREATININE 0.79 01/20/2021    BUN 20 01/20/2021    CO2 33 (H) 01/20/2021    TSH 3.12 06/14/2021    INR 0.9 06/29/2013    LABA1C 5.5 06/11/2021 LABMICR CANNOT BE CALCULATED 01/02/2020     Lab Results   Component Value Date    LABPROT 6.5 10/17/2012    LABALBU 4.4 01/20/2021     No results found for: IRON, TIBC, FERRITIN  Lab Results   Component Value Date    LDLCALC 68 06/16/2017    LDLCHOLESTEROL 64 12/14/2020     I agree with the assessment, plan and the diagnosis of      I agree with  orders as documented by the resident. Recommendations:   Patient was counseled, advised to continue supportive treatment with NSAID, added topical Menthol, advised to continue PT and schedule follow up. More than 25 minutes spent  in face to face encounter with the patient and more than half in counseling. Patient's questions were answered. Patient Voiced understanding to the counseling. Return in about 2 months (around 11/2/2022).    (GC Modifier)-Dr. Yanira Chacon MD

## 2022-09-02 NOTE — PROGRESS NOTES
Subjective:    Tony Anaya is a 58 y.o. female with  has a past medical history of Abdominal bloating, Anxiety, Bilateral edema of lower extremity, Bronchial asthma, Carpal tunnel syndrome, Cataract, Chronic back pain, Chronic sinusitis, Degenerative disc disease, lumbar, Depression, Diabetes mellitus due to underlying condition with hyperosmolarity without coma ([facility] [address] Dizziness, DJD (degenerative joint disease), Dysphagia, Edema of leg, Environmental allergies, Frozen shoulder, GERD (gastroesophageal reflux disease), Glaucoma, Glucose intolerance (impaired glucose tolerance), Headache(784.0), History of bronchitis, HTN (hypertension), Hyperlipidemia, Hypothyroid, Hypothyroidism, IBS (irritable bowel syndrome), Legally blind, Medication refill, Mild intermittent asthma without complication, Morbid obesity with BMI of 45.0-49.9, adult (HCC), MVP (mitral valve prolapse), Neuropathy, OA (osteoarthritis), EMILIA on CPAP, Osteopenia, Pancreatic cyst, Polyneuropathy, Raynaud disease, Rhinopharyngitis, Skin lesion of left leg, Skin tag, Stress fracture, Syncope, TIA (transient ischemic attack), Urinary incontinence, Varicose vein of leg, Vasodepressor syncope, and Wears glasses. Presented to the office today for:  Chief Complaint   Patient presents with    Follow-up     Patient here to follow up from ortho - Left shoulder     Medication Refill     Patient would like to have refills on all her medications        HPI    Patient presents today for follow-up of left rotator cuff nontraumatic injury. Pt [facility] which helped her L shoulder pain. Currently rates pain 04/10. ROM has significantly improved. Pt is undergoing PT 2x/week which helps. Pt is taking Tylenol 1000mg PRN. Review of Systems   Constitutional:  Negative for chills and fever. HENT:  Negative for rhinorrhea, sneezing and trouble swallowing.     Respiratory:  Negative for shortness of breath and wheezing. Cardiovascular:  Negative for chest pain, palpitations and leg swelling. Gastrointestinal:  Negative for abdominal pain, constipation, diarrhea, nausea and vomiting. Genitourinary:  Negative for dysuria and flank pain. Musculoskeletal:  Positive for arthralgias (L shoulder). Neurological:  Negative for light-headedness and headaches. Psychiatric/Behavioral:  Negative for agitation and behavioral problems.                 The patient has a   Family History   Problem Relation Age of Onset    Diabetes Mother     Heart Disease Mother         multiple heart attacks    Arthritis Mother     High Blood Pressure Mother     High Cholesterol Mother     Substance Abuse Mother     Heart Disease Father     Arthritis Father     Depression Father     High Cholesterol Father     Mental Illness Father     Substance Abuse Father     Diabetes Sister     High Blood Pressure Sister     Cancer Paternal Uncle         esophageal cancer    Diabetes Maternal Aunt     Cancer Maternal Aunt         colorectal cancer    Diabetes Maternal Uncle     Cancer Maternal Grandmother         colorectal cancer    Arthritis Maternal Grandmother     Diabetes Maternal Grandmother     High Blood Pressure Maternal Grandmother     Stroke Maternal Grandmother     Arthritis Maternal Grandfather     Arthritis Paternal Grandmother     Cancer Paternal Grandmother     Depression Paternal Grandmother     Heart Disease Paternal Grandmother     High Blood Pressure Paternal Grandmother     High Cholesterol Paternal Grandmother     Mental Illness Paternal Grandmother     Arthritis Paternal Grandfather        Objective:    /88 (Site: Left Lower Arm, Position: Sitting, Cuff Size: Large Adult) Comment: manual  Pulse 67   Temp 96.8 °F (36 °C)   Ht 5' (1.524 m)   Wt (!) 313 lb 3.2 oz (142.1 kg)   SpO2 99%   BMI 61.17 kg/m²    BP Readings from Last 3 Encounters:   09/02/22 128/88   06/23/22 138/83   06/02/22 131/77       Physical Exam  Constitutional:       General: She is not in acute distress. Appearance: She is obese. Cardiovascular:      Rate and Rhythm: Normal rate and regular rhythm. Heart sounds: Normal heart sounds. Pulmonary:      Effort: Pulmonary effort is normal.      Breath sounds: Normal breath sounds. Abdominal:      General: There is no distension. Palpations: Abdomen is soft. Tenderness: There is no abdominal tenderness. There is no guarding. Musculoskeletal:         General: Tenderness (L shoulder but ROM significantly improved) present. Right lower leg: No edema. Left lower leg: No edema. Skin:     General: Skin is warm and dry. Psychiatric:         Mood and Affect: Mood normal.         Behavior: Behavior normal.       Lab Results   Component Value Date    WBC 12.5 (H) 01/20/2021    HGB 15.6 (H) 01/20/2021    HCT 46.8 01/20/2021     01/20/2021    CHOL 132 12/14/2020    TRIG 114 12/14/2020    HDL 45 12/14/2020    ALT 35 (H) 01/20/2021    AST 26 01/20/2021     01/20/2021    K 3.9 01/20/2021     01/20/2021    CREATININE 0.79 01/20/2021    BUN 20 01/20/2021    CO2 33 (H) 01/20/2021    TSH 3.12 06/14/2021    INR 0.9 06/29/2013    LABA1C 5.5 06/11/2021    LABMICR CANNOT BE CALCULATED 01/02/2020     Lab Results   Component Value Date    CALCIUM 9.7 01/20/2021     Lab Results   Component Value Date    LDLCALC 68 06/16/2017    LDLCHOLESTEROL 64 12/14/2020       Assessment and Plan:    1. Impaired fasting glucose  - POCT glycosylated hemoglobin (Hb A1C); 5.6% (09/2/22)  -Following up with endocrinology Dr. Seema Mcnair  -Last A1c 5.5% (0611/21). 2. Adhesive capsulitis of left shoulder  -Currently rates pain 4 out of 10. Consult to PT 2 times per week. Received bicep tendon sheath injection 08/12/2022. Shoulder pain and range of motion significantly improved. Advised to continue PT and compliant small amounts of Voltaren gel. Also advised to use Biofreeze.     3. Rotator cuff arthropathy of left shoulder  -Currently rates pain 4 out of 10. Consult to PT 2 times per week. Received bicep tendon sheath injection 08/12/2022. Shoulder pain and range of motion significantly improved. Advised to continue PT and compliant small amounts of Voltaren gel. Also advised to use Biofreeze. Requested Prescriptions      No prescriptions requested or ordered in this encounter       There are no discontinued medications. Kaci received counseling on the following healthy behaviors: nutrition, exercise and medication adherence    Discussed use,benefit, and side effects of prescribed medications. Barriers to medication compliance addressed. All patient questions answered. Pt voiced understanding. Return in about 2 months (around 11/2/2022). Disclaimer: Some orall of this note was transcribed using voice-recognition software. This may cause typographical errors occasionally. Although all effort is made to fix these errors, please do not hesitate to contact our office if there [place] concern with the understanding of this note.

## 2022-09-08 DIAGNOSIS — E78.5 HYPERLIPIDEMIA, UNSPECIFIED HYPERLIPIDEMIA TYPE: ICD-10-CM

## 2022-09-08 DIAGNOSIS — E11.9 TYPE 2 DIABETES MELLITUS WITHOUT COMPLICATION, WITHOUT LONG-TERM CURRENT USE OF INSULIN (HCC): ICD-10-CM

## 2022-09-08 DIAGNOSIS — G89.29 CHRONIC BILATERAL LOW BACK PAIN, UNSPECIFIED WHETHER SCIATICA PRESENT: ICD-10-CM

## 2022-09-08 DIAGNOSIS — M54.50 CHRONIC BILATERAL LOW BACK PAIN, UNSPECIFIED WHETHER SCIATICA PRESENT: ICD-10-CM

## 2022-09-08 DIAGNOSIS — M51.36 DEGENERATIVE DISC DISEASE, LUMBAR: ICD-10-CM

## 2022-09-08 RX ORDER — ACETAMINOPHEN 500 MG
1000 TABLET ORAL EVERY 6 HOURS PRN
Qty: 120 TABLET | Refills: 3 | Status: SHIPPED | OUTPATIENT
Start: 2022-09-08

## 2022-09-08 RX ORDER — ASPIRIN 81 MG/1
81 TABLET, COATED ORAL DAILY
Qty: 30 TABLET | Refills: 5 | Status: SHIPPED | OUTPATIENT
Start: 2022-09-08

## 2022-09-08 RX ORDER — ROSUVASTATIN CALCIUM 5 MG/1
5 TABLET, COATED ORAL DAILY
Qty: 30 TABLET | Refills: 3 | Status: SHIPPED | OUTPATIENT
Start: 2022-09-08

## 2022-09-08 NOTE — TELEPHONE ENCOUNTER
E-scribe request for med refills. Please review and e-scribe if applicable. Last Visit Date:  9/2/22  Next Visit Date:  Visit date not found    Hemoglobin A1C (%)   Date Value   09/02/2022 5.6   06/11/2021 5.5   12/03/2020 5.3             ( goal A1C is < 7)   Microalb/Crt.  Ratio (mcg/mg creat)   Date Value   01/02/2020 CANNOT BE CALCULATED     LDL Cholesterol (mg/dL)   Date Value   12/14/2020 64     LDL Calculated (mg/dL)   Date Value   06/16/2017 68       (goal LDL is <100)   AST (U/L)   Date Value   01/20/2021 26     ALT (U/L)   Date Value   01/20/2021 35 (H)     BUN (mg/dL)   Date Value   01/20/2021 20     BP Readings from Last 3 Encounters:   09/02/22 128/88   06/23/22 138/83   06/02/22 131/77          (goal 120/80)        Patient Active Problem List:     Glaucoma     GERD (gastroesophageal reflux disease)     DJD (degenerative joint disease)     Obesity     Polyneuropathy     Migraine     Mitral prolapse     Low back pain     CTS (carpal tunnel syndrome)     Supraspinatus syndrome     Impaired fasting glucose     Acute sinus infection     IBS (irritable bowel syndrome)     Back pain, chronic     Stress fracture, right 5th metatarsal      Upper airway cough syndrome     Ear fullness     Perioral numbness     Screening for osteoporosis     Headache     Left eye injury     Medication reaction     Osteopenia     Lumbosacral pain     Flu vaccine need     Hypothyroid     Urinary incontinence     Anxiety     Sleep apnea     Depression     Chronic back pain     Carpal tunnel syndrome     Neuropathy     Mitral valve problem     Morbid obesity with BMI of 45.0-49.9, adult (HCC)     Frozen shoulder     Skin tag     Degenerative disc disease, lumbar     Bilateral edema of lower extremity     Medication refill     Varicose vein of leg     Dizziness     Dysphagia     Abdominal bloating     Mild intermittent asthma without complication     Skin lesion of left leg     Rotator cuff arthropathy of left shoulder      ----Pat Beaulieu

## 2022-09-12 DIAGNOSIS — M75.102 NONTRAUMATIC TEAR OF LEFT ROTATOR CUFF, UNSPECIFIED TEAR EXTENT: ICD-10-CM

## 2022-09-12 NOTE — TELEPHONE ENCOUNTER
Last visit: 9/2/22  Last Med refill: 7/20/22  Does patient have enough medication for 72 hours: No:     Next Visit Date:  Future Appointments   Date Time Provider Fabby Juarez   9/23/2022 10:30 AM Barbi Moore PA-C Sports Med [facility]   9/30/2022 12:45 PM MITRA GoodmanM [facility] Pod[facility]   12/6/2022 10:30 AM Adin Noel MD [facility]   12/19/2022 11:30 AM [facility] MAMMOGRAPHY ROOM AT [facility]   1/5/2023 10:30 AM Alba Fong MD Resp Spec [facility] Rosen   1/23/2023  9:00 AM Kenna Nath MD [facility]       Health Maintenance   Topic Date Due    COVID-19 Vaccine (1) Never done    Pneumococcal 0-64 years Vaccine (2 - PCV) 12/08/2009    Diabetic retinal exam  03/06/2021    Flu vaccine (1) 09/01/2022    Diabetic foot exam  09/22/2022    Diabetic microalbuminuria test  04/21/2023    Lipids  04/21/2023    Depression Monitoring  06/02/2023    A1C test (Diabetic or Prediabetic)  09/02/2023    Breast cancer screen  12/16/2023    DTaP/Tdap/Td vaccine (2 - Td or Tdap) 06/01/2027    Colorectal Cancer Screen  12/09/2029    Shingles vaccine  Completed    Hepatitis C screen  Completed    HIV screen  Completed    Hepatitis A vaccine  Aged Out    Hib vaccine  Aged Out    Meningococcal (ACWY) vaccine  Aged Out       Hemoglobin A1C (%)   Date Value   09/02/2022 5.6   06/11/2021 5.5   12/03/2020 5.3             ( goal A1C is < 7)   Microalb/Crt.  Ratio (mcg/mg creat)   Date Value   01/02/2020 CANNOT BE CALCULATED     LDL Cholesterol (mg/dL)   Date Value   12/14/2020 64   07/08/2019 97     LDL Calculated (mg/dL)   Date Value   06/16/2017 68   05/19/2014 104       (goal LDL is <100)   AST (U/L)   Date Value   01/20/2021 26     ALT (U/L)   Date Value   01/20/2021 35 (H)     BUN (mg/dL)   Date Value   01/20/2021 20     BP Readings from Last 3 Encounters:   09/02/22 128/88   06/23/22 138/83   06/02/22 131/77          (goal 120/80)    All Future Testing planned in [facility]  Lab Frequency Next Occurrence               Patient Active Problem List:     Glaucoma     GERD (gastroesophageal reflux disease)     DJD (degenerative joint disease)     Obesity     Polyneuropathy     Migraine     Mitral prolapse     Low back pain     CTS (carpal tunnel syndrome)     Supraspinatus syndrome     Impaired fasting glucose     Acute sinus infection     IBS (irritable bowel syndrome)     Back pain, chronic     Stress fracture, right 5th metatarsal      Upper airway cough syndrome     Ear fullness     Perioral numbness     Screening for osteoporosis     Headache     Left eye injury     Medication reaction     Osteopenia     Lumbosacral pain     Flu vaccine need     Hypothyroid     Urinary incontinence     Anxiety     Sleep apnea     Depression     Chronic back pain     Carpal tunnel syndrome     Neuropathy     Mitral valve problem     Morbid obesity with BMI of 45.0-49.9, adult (HCC)     Frozen shoulder     Skin tag     Degenerative disc disease, lumbar     Bilateral edema of lower extremity     Medication refill     Varicose vein of leg     Dizziness     Dysphagia     Abdominal bloating     Mild intermittent asthma without complication     Skin lesion of left leg     Rotator cuff arthropathy of left shoulder

## 2022-09-20 RX ORDER — LANCETS
EACH MISCELLANEOUS
Qty: 100 EACH | Refills: 3 | Status: SHIPPED | OUTPATIENT
Start: 2022-09-20

## 2022-09-20 NOTE — TELEPHONE ENCOUNTER
Last visit: 9/2/22  Last Med refill: 8/2022  Does patient have enough medication for 72 hours: No:     Next Visit Date:  Future Appointments   Date Time Provider Fabby Juarez   9/23/2022 10:30 AM Madan Lima PA-C Sports Med [facility]   9/30/2022 12:45 PM Lili Astudillo DPM [facility] Podiatry [facility]   11/1/2022 10:30 AM [facility]   12/6/2022 10:30 [facility]   12/19/2022 11:30 AM [facility] MAMMOGRAPHY ROOM AT [facility]   1/5/2023 10:30 AM Dustin Hanks MD Resp Spec [facility]   1/23/2023  9:00 AM Ramos Maki MD [facility]       Health Maintenance   Topic Date Due    COVID-19 Vaccine (1) Never done    Pneumococcal 0-64 years Vaccine (2 - PCV) 12/08/2009    Diabetic retinal exam  03/06/2021    Flu vaccine (1) 09/01/2022    Diabetic foot exam  09/22/2022    Diabetic microalbuminuria test  04/21/2023    Lipids  04/21/2023    Depression Monitoring  06/02/2023    A1C test (Diabetic or Prediabetic)  09/02/2023    Breast cancer screen  12/16/2023    DTaP/Tdap/Td vaccine (2 - Td or Tdap) 06/01/2027    Colorectal Cancer Screen  12/09/2029    Shingles vaccine  Completed    Hepatitis C screen  Completed    HIV screen  Completed    Hepatitis A vaccine  Aged Out    Hib vaccine  Aged Out    Meningococcal (ACWY) vaccine  Aged Out       Hemoglobin A1C (%)   Date Value   09/02/2022 5.6   06/11/2021 5.5   12/03/2020 5.3             ( goal A1C is < 7)   Microalb/Crt.  Ratio (mcg/mg creat)   Date Value   01/02/2020 CANNOT BE CALCULATED     LDL Cholesterol (mg/dL)   Date Value   12/14/2020 64   07/08/2019 97     LDL Calculated (mg/dL)   Date Value   06/16/2017 68   05/19/2014 104       (goal LDL is <100)   AST (U/L)   Date Value   01/20/2021 26     ALT (U/L)   Date Value   01/20/2021 35 (H)     BUN (mg/dL)   Date Value   01/20/2021 20     BP Readings from Last 3 Encounters:   09/02/22 128/88   06/23/22 138/83   06/02/22 131/77          (goal 120/80)    All Future Testing planned in [facility]  Lab Frequency Next Occurrence               Patient Active Problem List:     Glaucoma     GERD (gastroesophageal reflux disease)     DJD (degenerative joint disease)     Obesity     Polyneuropathy     Migraine     Mitral prolapse     Low back pain     CTS (carpal tunnel syndrome)     Supraspinatus syndrome     Impaired fasting glucose     Acute sinus infection     IBS (irritable bowel syndrome)     Back pain, chronic     Stress fracture, right 5th metatarsal      Upper airway cough syndrome     Ear fullness     Perioral numbness     Screening for osteoporosis     Headache     Left eye injury     Medication reaction     Osteopenia     Lumbosacral pain     Flu vaccine need     Hypothyroid     Urinary incontinence     Anxiety     Sleep apnea     Depression     Chronic back pain     Carpal tunnel syndrome     Neuropathy     Mitral valve problem     Morbid obesity with BMI of 45.0-49.9, adult (HCC)     Frozen shoulder     Skin tag     Degenerative disc disease, lumbar     Bilateral edema of lower extremity     Medication refill     Varicose vein of leg     Dizziness     Dysphagia     Abdominal bloating     Mild intermittent asthma without complication     Skin lesion of left leg     Rotator cuff arthropathy of left shoulder

## 2022-09-23 ENCOUNTER — OFFICE VISIT (OUTPATIENT)
Dept: ORTHOPEDIC SURGERY | Age: 62
End: 2022-09-23
Payer: COMMERCIAL

## 2022-09-23 VITALS
SYSTOLIC BLOOD PRESSURE: 151 MMHG | TEMPERATURE: 97.3 F | RESPIRATION RATE: 16 BRPM | WEIGHT: 293 LBS | HEIGHT: 60 IN | DIASTOLIC BLOOD PRESSURE: 86 MMHG | OXYGEN SATURATION: 100 % | HEART RATE: 66 BPM | BODY MASS INDEX: 57.52 KG/M2

## 2022-09-23 DIAGNOSIS — M75.102 NONTRAUMATIC TEAR OF LEFT ROTATOR CUFF, UNSPECIFIED TEAR EXTENT: ICD-10-CM

## 2022-09-23 DIAGNOSIS — M75.22 BICEPS TENDINITIS OF LEFT SHOULDER: Primary | ICD-10-CM

## 2022-09-23 PROCEDURE — G8427 DOCREV CUR MEDS BY ELIG CLIN: HCPCS | Performed by: PHYSICIAN ASSISTANT

## 2022-09-23 PROCEDURE — 99213 OFFICE O/P EST LOW 20 MIN: CPT | Performed by: PHYSICIAN ASSISTANT

## 2022-09-23 PROCEDURE — 3017F COLORECTAL CA SCREEN DOC REV: CPT | Performed by: PHYSICIAN ASSISTANT

## 2022-09-23 PROCEDURE — 1036F TOBACCO NON-USER: CPT | Performed by: PHYSICIAN ASSISTANT

## 2022-09-23 PROCEDURE — G8417 CALC BMI ABV UP PARAM F/U: HCPCS | Performed by: PHYSICIAN ASSISTANT

## 2022-09-23 ASSESSMENT — ENCOUNTER SYMPTOMS
CONSTIPATION: 0
ABDOMINAL DISTENTION: 0
NAUSEA: 0
CHEST TIGHTNESS: 0
ABDOMINAL PAIN: 0
RESPIRATORY NEGATIVE: 1
APNEA: 0
SHORTNESS OF BREATH: 0
DIARRHEA: 0
VOMITING: 0
COUGH: 0
COLOR CHANGE: 0

## 2022-09-23 NOTE — PROGRESS NOTES
[address] [place] [address] AND SPORTS MEDICINE  [facility]  [address] [address]  Dept: [phone]  Dept Fax: [phone]        Ambulatory Follow Up      Subjective:   Daniella Valdez is a 58y.o. year old female who presents to our office today for routine followup regarding her   1. Biceps tendinitis of left shoulder    2. Nontraumatic tear of left rotator cuff, unspecified tear extent    . Chief Complaint   Patient presents with    Shoulder Pain     Left Shoulder (- 8/12/22)         HPI Daniella Valdez  is a 58 y.o. Right hand dominant  female who presents today in follow for left shoulder pain. The patient was last seen on 8/12/2022 and underwent treatment in the form of a bicep tendon injection. She had a subacromial bursa injection on 7/1/2022 with 50% relief. The patient notes 90% improvement with the previous treatment. She is going to physical therapy in [place]. Review of Systems   Constitutional:  Positive for activity change. Negative for appetite change, fatigue and fever. Respiratory: Negative. Negative for apnea, cough, chest tightness and shortness of breath. Cardiovascular: Negative. Negative for chest pain, palpitations and leg swelling. Gastrointestinal:  Negative for abdominal distention, abdominal pain, constipation, diarrhea, nausea and vomiting. Genitourinary:  Negative for difficulty urinating, dysuria and hematuria. Musculoskeletal:  Negative for arthralgias, gait problem, joint swelling and myalgias. Skin:  Negative for color change and rash. Neurological:  Negative for dizziness, weakness, numbness and headaches. Psychiatric/Behavioral:  Negative for sleep disturbance.         Objective :   BP (!) 151/86 (Site: Left Upper Arm, Position: Sitting)   Pulse 66   Temp 97.3 °F (36.3 °C) (Temporal)   Resp 16   Ht 5' (1.524 m)   Wt (!) 317 lb (143.8 kg)   SpO2 100%   BMI 61.91 kg/m²  Body mass index is 61.91 kg/m². General: Cherie Santiago is a 58 y.o. female who is alert and oriented and sitting comfortably in our office. Orthopedics:     GENERAL: Alert and oriented X3 in no acute distress. SKIN: Intact without lesions or ulcerations. NEURO: Musculoskeletal and axillary nerves intact to sensory and motor testing. VASC: Capillary refill is less than 3 seconds. Left shoulder Exam     GEN: Alert and oriented X 3, in no acute distress. SKIN: Intact without rashes, lesions, or ulcerations. NEURO: Musculoskeletal ans axillary nerves intact to sensory and motor testing. VASC: Cap refill less than than 3 secs. Negative Adson's test, Negative Priyanka's test.  ROM: 160 degrees of forward elevation, 40 degrees of external rotation in neutral, 90 degrees of external rotation in abduction, internal rotation to T8. STRENGTH: Supraspinatus 4+/5, external rotators 5/5. MUSC: No atrophy, negative subscap lift off or belly press test.  IMP: negative Neer's sign, negative Hawkin's sign, no Coracoid impingement, no painful arc, no pain with cross body abduction. PALP: mild pain over anterolateral acromion, no pain over AC joint, no pain over traps/rhomboids. INST: mild Coryell's test, mild Speed's test    Radiology: X-ray reviewed    Procedure: None    Assessment:      1. Biceps tendinitis of left shoulder    2. Nontraumatic tear of left rotator cuff, unspecified tear extent       Plan:      We discussed that she is feeling much better after physical therapy and the 2 cortisone injections. The bicep tendon injection helped significantly. We did discuss that injection into the bicep tendon could cause weakness of the tendon itself. If the tendon does rupture, that is what we do in surgery and it may relieve most of her pain. The patient states she is willing to take that risk. We can do injections every 3 or 4 months if needed.   I would like her to finish up physical therapy and then continue exercises on her own. The patient will follow-up with me as needed. She will call if she has any questions or concerns. Follow up:Return if symptoms worsen or fail to improve. No orders of the defined types were placed in this encounter. No orders of the defined types were placed in this encounter. This note is created with the assistance of a speech recognition program.  While intending to generate a document that actually reflects the content of the visit, the document can still have some errors including those of syntax and sound a like substitutions which may escape proof reading. In such instances, actual meaning can be extrapolated by contextual diversion.      Electronically signed by Juliann Christianson PA-C on 9/23/2022 at 10:31 AM

## 2022-10-05 NOTE — PROGRESS NOTES
Patient instructed to remove shoes and socks and instructed to sit in exam chair. Current PCP is Yasmine Medellin MD and date of last visit was 9/2/22. Do you have a follow up visit scheduled?   No  If yes, the date is

## 2022-10-07 ENCOUNTER — OFFICE VISIT (OUTPATIENT)
Dept: PODIATRY | Age: 62
End: 2022-10-07
Payer: COMMERCIAL

## 2022-10-07 VITALS
WEIGHT: 293 LBS | BODY MASS INDEX: 45.99 KG/M2 | SYSTOLIC BLOOD PRESSURE: 160 MMHG | DIASTOLIC BLOOD PRESSURE: 86 MMHG | HEART RATE: 74 BPM | HEIGHT: 67 IN

## 2022-10-07 DIAGNOSIS — M21.42 PES PLANUS OF BOTH FEET: ICD-10-CM

## 2022-10-07 DIAGNOSIS — M76.821 POSTERIOR TIBIAL TENDON DYSFUNCTION (PTTD) OF BOTH LOWER EXTREMITIES: ICD-10-CM

## 2022-10-07 DIAGNOSIS — E08.42 DIABETIC POLYNEUROPATHY ASSOCIATED WITH DIABETES MELLITUS DUE TO UNDERLYING CONDITION (HCC): Primary | ICD-10-CM

## 2022-10-07 DIAGNOSIS — M79.672 BILATERAL FOOT PAIN: ICD-10-CM

## 2022-10-07 DIAGNOSIS — M21.41 PES PLANUS OF BOTH FEET: ICD-10-CM

## 2022-10-07 DIAGNOSIS — B35.1 ONYCHOMYCOSIS: ICD-10-CM

## 2022-10-07 DIAGNOSIS — M79.671 BILATERAL FOOT PAIN: ICD-10-CM

## 2022-10-07 DIAGNOSIS — M76.822 POSTERIOR TIBIAL TENDON DYSFUNCTION (PTTD) OF BOTH LOWER EXTREMITIES: ICD-10-CM

## 2022-10-07 PROCEDURE — 11721 DEBRIDE NAIL 6 OR MORE: CPT

## 2022-10-07 PROCEDURE — 99999 PR OFFICE/OUTPT VISIT,PROCEDURE ONLY: CPT

## 2022-10-10 NOTE — PROGRESS NOTES
(degenerative joint disease)     S1, L3, L4, L5, T12    Dysphagia 1/23/2019    Edema of leg     bilateral legs    Environmental allergies     Frozen shoulder 4/27/2015    GERD (gastroesophageal reflux disease)     Glaucoma     Glucose intolerance (impaired glucose tolerance)     Headache(784.0)     History of bronchitis     Viral    HTN (hypertension)     Hyperlipidemia     Hypothyroid     hypothyroid state    Hypothyroidism     IBS (irritable bowel syndrome) 10/02/2012    Legally blind     due to glaucoma    Medication refill 6/1/2017    Mild intermittent asthma without complication     Morbid obesity with BMI of 45.0-49.9, adult (HCC)     MVP (mitral valve prolapse)     Neuropathy     OA (osteoarthritis)     EMILIA on CPAP     Osteopenia     Pancreatic cyst     Polyneuropathy     Raynaud disease     Rhinopharyngitis     Allergic rhinopharyngitis    Skin lesion of left leg 6/11/2021    Skin tag 7/9/2015    Stress fracture     Right 5th Metatarsal     Syncope     TIA (transient ischemic attack)     Urinary incontinence     Varicose vein of leg 6/1/2017    Vasodepressor syncope     Wears glasses        Surgical History:   Past Surgical History:   Procedure Laterality Date    APPENDECTOMY  1978    CATARACT REMOVAL Left     CHOLECYSTECTOMY  1978    COLONOSCOPY      COLONOSCOPY N/A 12/9/2019    COLORECTAL CANCER SCREENING, NOT HIGH RISK performed by Glenwood Frankel, MD at [address] [place] [address] Bilateral     right iridectomy, right laser, bilateral trabeculectomy, left drain    GLAUCOMA SURGERY      HAND SURGERY Right     HYSTERECTOMY (CERVIX STATUS UNKNOWN)  1982    KNEE SURGERY Right 2000    TUBAL LIGATION  1981    UPPER GASTROINTESTINAL ENDOSCOPY      UPPER GASTROINTESTINAL ENDOSCOPY  5/24/2018    EGD DILATION SAVORY performed by Corey Dumont MD at [address] [place] [address]  3/6/2019    EGD DILATION SAVORY performed by Glenwood Frankel, MD at [address] [address] GASTROINTESTINAL ENDOSCOPY N/A 9/8/2021    EGD DILATION SAVORY performed by Eliel Charles MD at [facility]       Social History:  Social History     Tobacco Use    Smoking status: Never     Passive exposure: Never    Smokeless tobacco: Never   Vaping Use    Vaping Use: Never used   Substance Use Topics    Alcohol use: No     Alcohol/week: 0.0 standard drinks    Drug use: No       Medications:  Prior to Admission medications    Medication Sig Start Date End Date Taking? Authorizing Provider   Accu-Chek FastClix Lancets MISC USE TO CHECK BLOOD SUGAR TWICE A DAY 9/20/22   Lali Winlsow MD   diclofenac sodium (VOLTAREN) 1 % GEL APPLY 2 GRAMS TOPICALLY FOUR TIMES A DAY 9/12/22   Salina Morales MD   calcium carbonate-vitamin D (CALTRATE) 600-400 MG-UNIT TABS per tab Take 1 tablet by mouth daily 9/8/22   Mel Session, MD   acetaminophen (ACETAMINOPHEN EXTRA STRENGTH) 500 MG tablet Take 2 tablets by mouth every 6 hours as needed for Pain 9/8/22   Mel Session, MD   ASPIRIN LOW DOSE 81 MG EC tablet Take 1 tablet by mouth daily 9/8/22   Mel Session, MD   rosuvastatin (CRESTOR) 5 MG tablet Take 1 tablet by mouth daily 9/8/22   Mel Session, MD   ALLERGY RELIEF 10 MG tablet TAKE 1 TABLET BY MOUTH DAILY 7/19/22   En Zamorano MD   Cholecalciferol (VITAMIN D3) 50 MCG (2000 UT) CAPS TAKE 1 CAPSULE BY MOUTH DAILY 7/5/22   Rj Regalado MD   SENNA-TIME 8.6 MG tablet TAKE 1 TABLET BY MOUTH 2 TIMES DAILY AS NEEDED FOR CONSTIPATION 7/5/22   Eliel Charles MD   Multiple Vitamins-Minerals (CENTROVITE) TABS TAKE 1 TABLET BY MOUTH DAILY 6/6/22   Camilla Jones MD   triamcinolone (KENALOG) 0.1 % cream Apply topically 2 times daily.  6/2/22   Frank Cuevas MD   omeprazole (PRILOSEC) 40 MG delayed release capsule TAKE 1 CAPSULE BY MOUTH DAILY 5/31/22   Sekou Guidry MD   fluticasone (FLONASE) 50 MCG/ACT nasal spray INHALE 1 SPRAY INTO EACH NOSTRIL 2 TIMES A DAY 5/10/22   En Zamorano MD hydrocortisone 1 % ointment Apply topically 2 times daily as needed for skin itching 1/20/22   Louise Mckeon MD   [facility] VERIO strip  12/20/21   Historical Provider, MD   Lancets Misc. (ACCU-CHEK FASTCLIX LANCET) KIT 1 box by Dolores Fregoso. (Med.Supl.;Non-Drugs) route 2 times daily Dispense 1 box of 100. 12/14/21   Osorio Davila MD   albuterol sulfate  (90 Base) MCG/ACT inhaler INHALE 2 PUFFS INTO THE LUNGS 4 TIMES DAILY AS NEEDED FOR WHEEZING 12/7/21   Tico Barrios MD   levothyroxine (SYNTHROID) 100 MCG tablet Take 1 tablet by mouth Daily Indications: 112 mg 9/21/21   Carline Peguero MD   Blood Glucose Monitoring Suppl ([address]) w/Device KIT  4/1/21   Historical Provider, MD   Blood Glucose Calibration (ONETOUCH VERIO) SOLN  4/14/21   Historical Provider, MD       Objective     Vitals:    10/07/22 1250   BP: (!) 160/86   Pulse: 74       Lab Results   Component Value Date    LABA1C 5.6 09/02/2022       Physical Exam:  General:  Alert and oriented x3. In no acute distress. Lower Extremity Physical Exam:    Vascular: DP pulses are palpable, Bilateral. PT pulses non palpable bilaterally but audible on doppler. Varicose veins noted bilaterally. CFT <3 seconds to all digits, Bilateral.  Non pitting Edema noted to the bilateral lower extremity, Bilateral.  Hair growth is absent to the level of the digits, Bilateral.     Neuro: Saph/sural/SP/DP/plantar sensation diminished to light touch. Protective sensation is intact to 4/10 sites on the right foot and 3/10 sites on the left foot as tested with a 5.07g SWMF, Bilateral.     Musculoskeletal: EHL/FHL/GS/TA gross motor intact. Decreased ankle range of motion bilateral. Neg pain on calf squeeze. Reduced ROM 1st MTPJ R worse than mild pain noted with palpation of second interspace and dorsiflexion plantarflexion second and third digits.   Pain on palpation to the navicular tuberosity and along the posterior tibial tendon proximally, bilaterally. Dermatologic: Skin shiny and atrophic with no hair growth noted. Interdigital maceration absent, Bilateral. Nails 1-5 thickened, elongated with subungual debris noted b/l. with yellowish discoloration. Hair growth absent bilat. No noted hyperkeratotic lesion. No open wounds appreciated. Class A Findings (Q7) - One Class A finding  [] Non traumatic amputation of foot or integral skeletal portion thereof  Class B Findings (Q8) - Two Class B findings  [x]Absent posterior tibial pulse   []Absent dorsalis pedis pulse   []Advanced trophic changes; three of the following are required:   [x]hair growth (decrease or absence)   [x]nail changes (thickening)   []pigmentary changes (discoloration)   [x]skin texture (thin, shiny)   []skin color (rubor or redness)  Class C Findings (Q9) - One Class B and two Class C findings  []Claudication   []Temperature changes   []Edema   []Paresthesia   []Burning    Q Modifier Met: Q8:  Two Class B findings        Sites of Onychomycosis Involvement (Check affected area)  [x] [x] [x] [x] [x] [x] [x] [x] [x] [x]  5 4 3 2 1 1 2 3 4 5                          Right                                        Left    Thickness  [x] [x] [x] [x] [x] [x] [x] [x] [x] [x]  5 4 3 2 1 1 2 3 4 5                         Right                                        Left    Dystrophic Changes                                                                 [x] [x] [x] [x] [x] [x] [x] [x] [x] [x]  5 4 3 2 1 1 2 3 4 5                         Right                                        Left    Color                                                                  [x] [x] [x] [x] [x] [x] [x] [x] [x] [x]  5 4 3 2 1 1 2 3 4 5                          Right                                        Left    Incurvation/Ingrowin                                                                   [] [] [] [] [] [] [] [] [] []  5 4 3 2 1 1 2 3 4 5                         Right Left    Inflammation/Pain                                                                   [x] [x] [x] [x] [x] [x] [x] [x] [x] [x]  5 4 3 2 1 1 2 3 4 5                         Right                                        Left        Assessment   Kaci Bradford is a 58 y.o. female with     Diagnosis Orders   1. Diabetic polyneuropathy associated with diabetes mellitus due to underlying condition ([facility] [address]  Diabetic Shoe      2. Onychomycosis [B35.1 (ICD-10-CM)]        3. Posterior tibial tendon dysfunction (PTTD) of both lower extremities        4. Bilateral foot pain        5. Pes planus of both feet             Plan   Pt was evaluated and examined. Patient was given personalized discharge instructions. Diagnosis was discussed with the pt and all of their questions were answered in detail. Proper foot hygiene and care was discussed with the pt. Instructed patient to work into the orthotics, wearing 1 hour on the first day, 2 hours on second day etc.  Discussed with patient wearing shoes with soles if she chooses not to wear the orthotics. Also discussed with patient getting fitted for diabetic shoes. Rx for diabetic shoes given. Also discussed with patient that regular exercise and losing weight can help relieve pressure to her feet and her falling arches. Nails 1-10 trimmed without incident with a sterile nail nipper  RTC in 3 months   discussed with Dr. Tami Navarrete  Please, call the office with any questions or concerns.       Natasha Sage DPM   Podiatric Medicine & Surgery   10/10/2022 at 11:54 AM

## 2022-10-13 DIAGNOSIS — J45.20 MILD INTERMITTENT ASTHMA WITHOUT COMPLICATION: ICD-10-CM

## 2022-10-13 DIAGNOSIS — Z76.0 MEDICATION REFILL: ICD-10-CM

## 2022-10-13 RX ORDER — MULTIVITAMIN/IRON/FOLIC ACID 18MG-0.4MG
TABLET ORAL
Qty: 30 TABLET | Refills: 3 | Status: SHIPPED | OUTPATIENT
Start: 2022-10-13

## 2022-10-13 RX ORDER — ALBUTEROL SULFATE 90 UG/1
AEROSOL, METERED RESPIRATORY (INHALATION)
Qty: 18 G | Refills: 2 | Status: SHIPPED | OUTPATIENT
Start: 2022-10-13

## 2022-10-13 NOTE — TELEPHONE ENCOUNTER
E-scribe request for med refill. Please review and e-scribe if applicable. Last Visit Date:  09/02/2022  Next Visit Date:  11/1/2022    Hemoglobin A1C (%)   Date Value   09/02/2022 5.6   06/11/2021 5.5   12/03/2020 5.3             ( goal A1C is < 7)   Microalb/Crt.  Ratio (mcg/mg creat)   Date Value   01/02/2020 CANNOT BE CALCULATED     LDL Cholesterol (mg/dL)   Date Value   12/14/2020 64     LDL Calculated (mg/dL)   Date Value   06/16/2017 68       (goal LDL is <100)   AST (U/L)   Date Value   01/20/2021 26     ALT (U/L)   Date Value   01/20/2021 35 (H)     BUN (mg/dL)   Date Value   01/20/2021 20     BP Readings from Last 3 Encounters:   10/07/22 (!) 160/86   09/23/22 (!) 151/86   09/02/22 128/88          (goal 120/80)        Patient Active Problem List:     Glaucoma     GERD (gastroesophageal reflux disease)     DJD (degenerative joint disease)     Obesity     Polyneuropathy     Migraine     Mitral prolapse     Low back pain     CTS (carpal tunnel syndrome)     Supraspinatus syndrome     Impaired fasting glucose     Acute sinus infection     IBS (irritable bowel syndrome)     Back pain, chronic     Stress fracture, right 5th metatarsal      Upper airway cough syndrome     Ear fullness     Perioral numbness     Screening for osteoporosis     Headache     Left eye injury     Medication reaction     Osteopenia     Lumbosacral pain     Flu vaccine need     Hypothyroid     Urinary incontinence     Anxiety     Sleep apnea     Depression     Chronic back pain     Carpal tunnel syndrome     Neuropathy     Mitral valve problem     Morbid obesity with BMI of 45.0-49.9, adult (HCC)     Frozen shoulder     Skin tag     Degenerative disc disease, lumbar     Bilateral edema of lower extremity     Medication refill     Varicose vein of leg     Dizziness     Dysphagia     Abdominal bloating     Mild intermittent asthma without complication     Skin lesion of left leg     Rotator cuff arthropathy of left shoulder      ----Nell Srinivasa

## 2022-10-20 NOTE — PROGRESS NOTES
[address] [place] [address] AND SPORTS MEDICINE  [facility] Josh Cherry  [address] [address]  Dept: [phone]  Dept Fax: [phone]        Ambulatory Follow Up      Subjective:   Stephanie Curry is a 58y.o. year old female who presents to our office today for routine followup regarding her   1. Rotator cuff syndrome of left shoulder    2. Biceps tendinitis of left shoulder    . Chief Complaint   Patient presents with    Shoulder Pain     L Shoulder Pain (LI 8/12/22)         HPI Stephanie Curry  is a 58 y.o. right hand dominant  female who presents today in follow for left shoulder pain. The patient was last seen on 9/23/2022 and underwent treatment in the form of no formal treatment since she was doing really well. The patient had had a subacromial bursa injection on 7/1/2022 with 50% relief. She then had a bicep tendon injection on 8/12/2022 which allowed her improvement up to 90%. She completed physical therapy. The patient notes 90% improvement with the previous treatment until 2 weeks ago. Review of Systems   Constitutional:  Positive for activity change. Negative for appetite change, fatigue and fever. Respiratory: Negative. Negative for apnea, cough, chest tightness and shortness of breath. Cardiovascular: Negative. Negative for chest pain, palpitations and leg swelling. Gastrointestinal:  Negative for abdominal distention, abdominal pain, constipation, diarrhea, nausea and vomiting. Genitourinary:  Negative for difficulty urinating, dysuria and hematuria. Musculoskeletal:  Positive for arthralgias. Negative for gait problem, joint swelling and myalgias. Skin:  Negative for color change and rash. Neurological:  Negative for dizziness, weakness, numbness and headaches. Psychiatric/Behavioral:  Positive for sleep disturbance.         Objective :   Resp 14   Ht 5' 7\" (1.702 m)   Wt (!) 320 lb (145.2 kg) BMI 50.12 kg/m²  Body mass index is 50.12 kg/m². General: Slim Cruz is a 58 y.o. female who is alert and oriented and sitting comfortably in our office. Orthopedics:     GENERAL: Alert and oriented X3 in no acute distress. SKIN: Intact without lesions or ulcerations. NEURO: Musculoskeletal and axillary nerves intact to sensory and motor testing. VASC: Capillary refill is less than 3 seconds. Left shoulder Exam     GEN: Alert and oriented X 3, in no acute distress. SKIN: Intact without rashes, lesions, or ulcerations. NEURO: Musculoskeletal ans axillary nerves intact to sensory and motor testing. VASC: Cap refill less than than 3 secs. Negative Adson's test, Negative Priyanka's test.  ROM: 160 degrees of forward elevation, 40 degrees of external rotation in neutral, 90 degrees of external rotation in abduction, internal rotation to T8. STRENGTH: Supraspinatus 5/5, external rotators 5/5. MUSC: No atrophy, negative subscap lift off or belly press test.  IMP: + Neer's sign, +Hawkin's sign, no Coracoid impingement, no painful arc, no pain with cross body abduction. PALP: mild pain over anterolateral acromion, mild pain over AC joint, no pain over traps/rhomboids. INST: mild Bernardston's test, negative Speed's test    Radiology: Previous x-ray reviewed    Procedure:     Subacromial Bursa Injection    Location: Left Shoulder  Procedure: I discussed in detail the risks, benefits and complications of an injection which included but are not limited to infection, skin reactions, hot swollen joint and anaphylaxis with the patient. The patient verbalized understanding and gave informed consent for the injection. The skin was prepped with betadine in a sterile fashion. Under these sterile conditions, a Admittedly ultrasound unit with a variable frequency linear transducer was used for precise placement of a 22-gauge needle into the subacromial bursa.  A clean technique was utilized using sterile gloves and after prepping the patient under the stated sterile conditions, the patient was placed in the Seated position on the exam table. The posterior soft spot approximately 2 cm distal and 2 cm medial to the posterior acromial edge was identified and marked and a 3 cc solution containing 2 cc of 1% Lidocainewith 1 cc containing 40 mg of Kenolog was injected into the subacromial space with the 22-gauge needle. The needled was withdrawn and the injection site was cleansed. A Band-Aid was placed over the injection site. There was no resistance to the injection and the patient tolerated the procedure well without difficulty. Adverse reactions of the injection was discussed with the patient including signs of infection, increasing pain, redness, swelling, warmth, fever, chills and the patient was instructed to call immediately with any of these symptoms. Successful needle placement was achieved and final images were taken and saved in the patient's chart for the permanent record. The images are stored on SD card in the machine until downloaded to the patient's chart. Assessment:      1. Rotator cuff syndrome of left shoulder    2. Biceps tendinitis of left shoulder       Plan:      We discussed that she is still having shoulder pain. She was doing really well until approximately 2 weeks ago. I do think she can continue exercises on her own. If she is not better in 6 weeks she will call and we will order an MRI prior to her next visit since she travels so far for her visits with me. She would also need some Valium prior to her MRI because she is claustrophobic. Today the patient opted for a cortisone injection into the left subacromial space to help reduce inflammation and pain. The injection site should never get red, hot, or swollen and if it does the patient will contact our office right away.  The patient may experience a increase in soreness the first 24-48 hours due to a cortisone flair and can take anti-inflammatories for a short period of time to reduce that soreness. The patient should not submerge the injection site in water for a minimum of 24 hours to avoid infection. This means no lakes, pools, ponds, or hot tubs for 24 hours. If the patient is diabetic the injection may increase their blood sugar for up to one week. The patient can do this cortisone injection once every 3 months as needed. If the injections stop working and do not give the patient relief the patient should consider surgical interventions to produce long term relief. The patient will call the office if she still having pain in approximately 4 to 6 weeks and we will order an MRI prior to her next visit. The patient will call if she has any questions or concerns. Follow up:Return if symptoms worsen or fail to improve. Orders Placed This Encounter   Medications    lidocaine 1 % injection 2 mL    triamcinolone acetonide (KENALOG-40) injection 40 mg           No orders of the defined types were placed in this encounter. This note is created with the assistance of a speech recognition program.  While intending to generate a document that actually reflects the content of the visit, the document can still have some errors including those of syntax and sound a like substitutions which may escape proof reading. In such instances, actual meaning can be extrapolated by contextual diversion.      Electronically signed by Rachele Davis PA-C on 10/21/2022 at 1:46 PM

## 2022-10-21 ENCOUNTER — OFFICE VISIT (OUTPATIENT)
Dept: ORTHOPEDIC SURGERY | Age: 62
End: 2022-10-21
Payer: COMMERCIAL

## 2022-10-21 VITALS — BODY MASS INDEX: 45.99 KG/M2 | HEIGHT: 67 IN | WEIGHT: 293 LBS | RESPIRATION RATE: 14 BRPM

## 2022-10-21 DIAGNOSIS — M75.102 ROTATOR CUFF SYNDROME OF LEFT SHOULDER: Primary | ICD-10-CM

## 2022-10-21 DIAGNOSIS — M75.22 BICEPS TENDINITIS OF LEFT SHOULDER: ICD-10-CM

## 2022-10-21 PROCEDURE — 20611 DRAIN/INJ JOINT/BURSA W/US: CPT | Performed by: PHYSICIAN ASSISTANT

## 2022-10-21 RX ORDER — LIDOCAINE HYDROCHLORIDE 10 MG/ML
2 INJECTION, SOLUTION INFILTRATION; PERINEURAL ONCE
Status: COMPLETED | OUTPATIENT
Start: 2022-10-21 | End: 2022-10-21

## 2022-10-21 RX ORDER — TRIAMCINOLONE ACETONIDE 40 MG/ML
40 INJECTION, SUSPENSION INTRA-ARTICULAR; INTRAMUSCULAR ONCE
Status: COMPLETED | OUTPATIENT
Start: 2022-10-21 | End: 2022-10-21

## 2022-10-21 RX ADMIN — LIDOCAINE HYDROCHLORIDE 2 ML: 10 INJECTION, SOLUTION INFILTRATION; PERINEURAL at 12:55

## 2022-10-21 RX ADMIN — TRIAMCINOLONE ACETONIDE 40 MG: 40 INJECTION, SUSPENSION INTRA-ARTICULAR; INTRAMUSCULAR at 12:55

## 2022-10-21 ASSESSMENT — ENCOUNTER SYMPTOMS
NAUSEA: 0
CONSTIPATION: 0
SHORTNESS OF BREATH: 0
VOMITING: 0
CHEST TIGHTNESS: 0
RESPIRATORY NEGATIVE: 1
COUGH: 0
COLOR CHANGE: 0
APNEA: 0
ABDOMINAL DISTENTION: 0
ABDOMINAL PAIN: 0
DIARRHEA: 0

## 2022-10-21 NOTE — PATIENT INSTRUCTIONS
Before the patient comes back if she still having shoulder pain I would like her to have an MRI. She will call the office and we will order an MRI prior to her follow-up visit with me of her left shoulder. She will need some Valium which I will be happy to prescribe prior to her MRI. She does need an open MRI due to her claustrophobia. It would still need the medication though. I will have her hold physical therapy and do exercises on her own.

## 2022-11-01 ENCOUNTER — OFFICE VISIT (OUTPATIENT)
Dept: FAMILY MEDICINE CLINIC | Age: 62
End: 2022-11-01
Payer: COMMERCIAL

## 2022-11-01 VITALS
TEMPERATURE: 97.8 F | SYSTOLIC BLOOD PRESSURE: 137 MMHG | HEART RATE: 64 BPM | BODY MASS INDEX: 45.99 KG/M2 | DIASTOLIC BLOOD PRESSURE: 77 MMHG | HEIGHT: 67 IN | WEIGHT: 293 LBS

## 2022-11-01 DIAGNOSIS — Z23 IMMUNIZATION DUE: ICD-10-CM

## 2022-11-01 DIAGNOSIS — R43.2 DYSGEUSIA: Primary | ICD-10-CM

## 2022-11-01 PROCEDURE — G8484 FLU IMMUNIZE NO ADMIN: HCPCS

## 2022-11-01 PROCEDURE — 90677 PCV20 VACCINE IM: CPT | Performed by: STUDENT IN AN ORGANIZED HEALTH CARE EDUCATION/TRAINING PROGRAM

## 2022-11-01 PROCEDURE — 99213 OFFICE O/P EST LOW 20 MIN: CPT

## 2022-11-01 PROCEDURE — 3017F COLORECTAL CA SCREEN DOC REV: CPT

## 2022-11-01 PROCEDURE — G8427 DOCREV CUR MEDS BY ELIG CLIN: HCPCS

## 2022-11-01 PROCEDURE — G8417 CALC BMI ABV UP PARAM F/U: HCPCS

## 2022-11-01 PROCEDURE — 1036F TOBACCO NON-USER: CPT

## 2022-11-01 RX ORDER — ONDANSETRON 4 MG/1
4 TABLET, FILM COATED ORAL DAILY PRN
Qty: 7 TABLET | Refills: 0 | Status: SHIPPED | OUTPATIENT
Start: 2022-11-01

## 2022-11-01 ASSESSMENT — ENCOUNTER SYMPTOMS
ABDOMINAL PAIN: 0
WHEEZING: 0
DIARRHEA: 0
CONSTIPATION: 0
VOMITING: 0
SHORTNESS OF BREATH: 0
NAUSEA: 0

## 2022-11-01 NOTE — PROGRESS NOTES
Attending Physician Statement  I have seen and discussed the care of Kaci Banerjee 58 y.o. female, including pertinent history and exam findings, with the resident Dr. Reyna Lizarraga MD.    History and Exam:   Chief Complaint   Patient presents with    Annual Exam     Annual exam, when eating cheese and milk, she states it taste spoiled        Past Medical History:   Diagnosis Date    Abdominal bloating 1/23/2019    Anxiety     Bilateral edema of lower extremity 12/16/2016    Bronchial asthma     mild, intermittent    Carpal tunnel syndrome     Cataract     Chronic back pain     Chronic sinusitis     Degenerative disc disease, lumbar 12/16/2016    Depression     Diabetes mellitus due to underlying condition with hyperosmolarity without coma ([facility] [address]     Dizziness 10/27/2017    DJD (degenerative joint disease)     S1, L3, L4, L5, T12    Dysphagia 1/23/2019    Edema of leg     bilateral legs    Environmental allergies     Frozen shoulder 4/27/2015    GERD (gastroesophageal reflux disease)     Glaucoma     Glucose intolerance (impaired glucose tolerance)     Headache(784.0)     History of bronchitis     Viral    HTN (hypertension)     Hyperlipidemia     Hypothyroid     hypothyroid state    Hypothyroidism     IBS (irritable bowel syndrome) 10/02/2012    Legally blind     due to glaucoma    Medication refill 6/1/2017    Mild intermittent asthma without complication     Morbid obesity with BMI of 45.0-49.9, adult (HCC)     MVP (mitral valve prolapse)     Neuropathy     OA (osteoarthritis)     EMILIA on CPAP     Osteopenia     Pancreatic cyst     Polyneuropathy     Raynaud disease     Rhinopharyngitis     Allergic rhinopharyngitis    Skin lesion of left leg 6/11/2021    Skin tag 7/9/2015    Stress fracture     Right 5th Metatarsal     Syncope     TIA (transient ischemic attack)     Urinary incontinence     Varicose vein of leg 6/1/2017    Vasodepressor syncope     Wears glasses      Allergies   Allergen Reactions    Latex Rash blisters  blisters    Adhesive Tape Rash     blisters    Amlodipine Other (See Comments)     Facial numbness  Facial numbness  Facial numbness  Facial numbness  Facial numbness  Facial numbness  Facial numbness    Bextra [Valdecoxib] Rash     swelling    Brimonidine Itching     Burning eyes severe    Daypro [Oxaprozin] Anaphylaxis    Diclofenac Sodium Swelling and Shortness Of Breath    Famotidine Other (See Comments)     Blisters down her arms    Hydrocodone-Acetaminophen Nausea Only     Severe chest pain    Hydromorphone Other (See Comments)     Rapid heart beat,  Nausea, spent 3 days in cardiac unit    Ibuprofen      Other reaction(s): bleeding  Other reaction(s): Unknown  Black stools  \"rips up stomach\", black tarry stool  Black stools  \"rips up stomach\", black tarry stool    Lisinopril Nausea Only, Nausea And Vomiting and Other (See Comments)     Other reaction(s): Hypotension    Metoprolol Other (See Comments)     Other reaction(s): Dizziness    Naproxen Other (See Comments)     Chest pain , swelling    Oxycodone-Acetaminophen      Chest pain severe    Rofecoxib Rash     swelling    Shellfish-Derived Products Anaphylaxis and Rash     shrimp  shrimp  shrimp    Simvastatin Nausea And Vomiting     Other reaction(s): muscle cramps    Statins      Other reaction(s): muscle cramps  Tolerates lovastatin.  Pravachol caused numbness in head/face    Sulfa Antibiotics Hives    Tetracyclines & Related Itching and Rash    Timoptic [Timolol Maleate] Itching and Swelling     Eyes burning severely    Tramadol Itching and Rash    Fosamax [Alendronate] Nausea Only and Nausea And Vomiting    Nalbuphine Nausea And Vomiting    Alendronate Sodium     Alendronate Sodium     Alphagan [Brimonidine Tartrate]      Eye irritation    Dilaudid [Hydromorphone Hcl]     Doxycycline Monohydrate     Nsaids     Other Itching and Swelling     Eyes burning    Pepcid Complete [Famotidine-Ca Carb-Mag Hydrox] Hives and Other (See Comments) blisters    Pilocarpine Itching and Swelling     Blurred vision  Eyes burning    Pravastatin Other (See Comments)     Facial numming    Red Dye Nausea Only     Pt states allergic to red coloring in crestor with CY on pill and senokot red pill. Feels sick to stomach and head feels foggy. Shrimp Flavor Hives and Swelling    Statins Support Therapy      Tolerates lovastatin. Pravachol caused numbness in head/face    Timoptic [Timolol Maleate]      Eye irritation      Tolectin [Tolmetin]      Unknown reaction  - as a child    Voltaren [Diclofenac Sodium]      Flu symptoms      Nubain [Nalbuphine Hcl] Nausea And Vomiting     Chest pain      Percocet [Oxycodone-Acetaminophen] Nausea And Vomiting     Sweating, chest pain    Tolectin [Tolmetin Sodium] Rash    Tolmetin Sodium Rash    Ultram [Tramadol Hcl] Itching and Rash     Rash on face    Vicodin [Hydrocodone-Acetaminophen] Nausea And Vomiting     swearing    Vioxx Rash      I have seen and examined the patient and the key elements of the encounter have been performed by me.   BP Readings from Last 3 Encounters:   11/01/22 137/77   10/07/22 (!) 160/86   09/23/22 (!) 151/86     /77 (Site: Right Upper Arm, Position: Sitting, Cuff Size: Large Adult)   Pulse 64   Temp 97.8 °F (36.6 °C) (Oral)   Ht 5' 7.01\" (1.702 m)   Wt (!) 315 lb 9.6 oz (143.2 kg)   BMI 49.42 kg/m²   Lab Results   Component Value Date    WBC 12.5 (H) 01/20/2021    HGB 15.6 (H) 01/20/2021    HCT 46.8 01/20/2021     01/20/2021    CHOL 132 12/14/2020    TRIG 114 12/14/2020    HDL 45 12/14/2020    ALT 35 (H) 01/20/2021    AST 26 01/20/2021     01/20/2021    K 3.9 01/20/2021     01/20/2021    CREATININE 0.79 01/20/2021    BUN 20 01/20/2021    CO2 33 (H) 01/20/2021    TSH 3.12 06/14/2021    INR 0.9 06/29/2013    LABA1C 5.6 09/02/2022    LABMICR CANNOT BE CALCULATED 01/02/2020     Lab Results   Component Value Date    LABPROT 6.5 10/17/2012    LABALBU 4.4 01/20/2021     No results found for: IRON, TIBC, FERRITIN  Lab Results   Component Value Date    LDLCALC 68 06/16/2017    LDLCHOLESTEROL 64 12/14/2020     I agree with the assessment, plan and the diagnosis of    Diagnosis Orders   1. Dysgeusia  ondansetron (ZOFRAN) 4 MG tablet      2. Immunization due  Pneumococcal, PCV20, PREVNAR 20, (age 25 yrs+), IM, PF       . I agree with orders as documented by the resident. More than 25 minutes spent  in face to face encounter with the patient and more than half in counseling. Patient's questions were answered. Patient Voiced understanding to the counseling. Return in about 3 months (around 2/1/2023) for Follow up visit .    (GC Modifier)-Dr. Lacey Fuentes MD

## 2022-11-01 NOTE — PATIENT INSTRUCTIONS
Thank you for letting us take care of you today. We hope all your questions were addressed. If a question was overlooked or something else comes to mind after you return home, please contact a member of your Care Team listed below. Your Care Team at [place] [address] is Team #5  Shirley Mustafa MD (Faculty)  Javier Christianson MD (Resident)  Svetlana Lopez MD (Resident)  Demetra Berry MD (Resident)  Nishi Santos MD, (Resident)  Litzy Roe., FAITH Ashby., SHWETHAA  Jennifer Stack.,  RAJI Edwards., Jose R Mahoney., [facility]ely [facility] office)  Carmela Mcarthur, [address] (Clinical Practice Manager)  Isaura Sweeney [facility] (Clinical Pharmacist)       Office phone number: [phone]    If you need to get in right away due to illness, please be advised we have \"Same Day\" appointments available Monday-Friday. Please call us at [phone] option #3 to schedule your \"Same Day\" appointment.

## 2022-11-01 NOTE — PROGRESS NOTES
Subjective:    Agusto Sanon is a 58 y.o. female with  has a past medical history of Abdominal bloating, Anxiety, Bilateral edema of lower extremity, Bronchial asthma, Carpal tunnel syndrome, Cataract, Chronic back pain, Chronic sinusitis, Degenerative disc disease, lumbar, Depression, Diabetes mellitus due to underlying condition with hyperosmolarity without coma ([facility] [address] Dizziness, DJD (degenerative joint disease), Dysphagia, Edema of leg, Environmental allergies, Frozen shoulder, GERD (gastroesophageal reflux disease), Glaucoma, Glucose intolerance (impaired glucose tolerance), Headache(784.0), History of bronchitis, HTN (hypertension), Hyperlipidemia, Hypothyroid, Hypothyroidism, IBS (irritable bowel syndrome), Legally blind, Medication refill, Mild intermittent asthma without complication, Morbid obesity with BMI of 45.0-49.9, adult (HCC), MVP (mitral valve prolapse), Neuropathy, OA (osteoarthritis), EMILIA on CPAP, Osteopenia, Pancreatic cyst, Polyneuropathy, Raynaud disease, Rhinopharyngitis, Skin lesion of left leg, Skin tag, Stress fracture, Syncope, TIA (transient ischemic attack), Urinary incontinence, Varicose vein of leg, Vasodepressor syncope, and Wears glasses. Presented to the office today for:  Chief Complaint   Patient presents with    Annual Exam     Annual exam, when eating cheese and milk, she states it taste spoiled        HPI  Patient mentions having a rancid taste in her mouth after consuming dairy products since past 1 month. She denies any diarrhea or increased flatulence but mentions having nausea without vomiting. L shoulder still locks up but ROM has improved with L shoulder injections. Last injection was 10/21/2022 and patient was advised to hold PT. If pain persists, she will undergo MRI. Patient has an upcoming appt with opt\[facility]\"" (Dr Cesar Hathaway).  Diabetic foot exam performed in 10/2022 at speciality clinic at [address] [place] [address] [place]:          Rancid taste in mouth after consuming dairy   Respiratory:  Negative for shortness of breath and wheezing. Cardiovascular:  Negative for chest pain, palpitations and leg swelling. Gastrointestinal:  Negative for abdominal pain, constipation, diarrhea, nausea and vomiting. Neurological:  Negative for light-headedness and headaches. Psychiatric/Behavioral:  Negative for agitation and behavioral problems.         The patient has a   Family History   Problem Relation Age of Onset    Diabetes Mother     Heart Disease Mother         multiple heart attacks    Arthritis Mother     High Blood Pressure Mother     High Cholesterol Mother     Substance Abuse Mother     Heart Disease Father     Arthritis Father     Depression Father     High Cholesterol Father     Mental Illness Father     Substance Abuse Father     Diabetes Sister     High Blood Pressure Sister     Cancer Paternal Uncle         esophageal cancer    Diabetes Maternal Aunt     Cancer Maternal Aunt         colorectal cancer    Diabetes Maternal Uncle     Cancer Maternal Grandmother         colorectal cancer    Arthritis Maternal Grandmother     Diabetes Maternal Grandmother     High Blood Pressure Maternal Grandmother     Stroke Maternal Grandmother     Arthritis Maternal Grandfather     Arthritis Paternal Grandmother     Cancer Paternal Grandmother     Depression Paternal Grandmother     Heart Disease Paternal Grandmother     High Blood Pressure Paternal Grandmother     High Cholesterol Paternal Grandmother     Mental Illness Paternal Grandmother     Arthritis Paternal Grandfather        Objective:    /77 (Site: Right Upper Arm, Position: Sitting, Cuff Size: Large Adult)   Pulse 64   Temp 97.8 °F (36.6 °C) (Oral)   Ht 5' 7.01\" (1.702 m)   Wt (!) 315 lb 9.6 oz (143.2 kg)   BMI 49.42 kg/m²    BP Readings from Last 3 Encounters:   11/01/22 137/77   10/07/22 (!) 160/86   09/23/22 (!) 151/86       Physical Exam  Constitutional:       Comments: Pt is using a seated walker Cardiovascular:      Rate and Rhythm: Normal rate and regular rhythm. Heart sounds: Normal heart sounds. Pulmonary:      Effort: Pulmonary effort is normal.      Breath sounds: Normal breath sounds. Abdominal:      General: There is no distension. Palpations: Abdomen is soft. Tenderness: There is no abdominal tenderness. There is no guarding. Musculoskeletal:      Right lower leg: Edema present. Left lower leg: Edema present. Skin:     General: Skin is warm and dry. Psychiatric:         Mood and Affect: Mood normal.         Behavior: Behavior normal.       Lab Results   Component Value Date    WBC 12.5 (H) 01/20/2021    HGB 15.6 (H) 01/20/2021    HCT 46.8 01/20/2021     01/20/2021    CHOL 132 12/14/2020    TRIG 114 12/14/2020    HDL 45 12/14/2020    ALT 35 (H) 01/20/2021    AST 26 01/20/2021     01/20/2021    K 3.9 01/20/2021     01/20/2021    CREATININE 0.79 01/20/2021    BUN 20 01/20/2021    CO2 33 (H) 01/20/2021    TSH 3.12 06/14/2021    INR 0.9 06/29/2013    LABA1C 5.6 09/02/2022    LABMICR CANNOT BE CALCULATED 01/02/2020     Lab Results   Component Value Date    CALCIUM 9.7 01/20/2021     Lab Results   Component Value Date    LDLCALC 68 06/16/2017    LDLCHOLESTEROL 64 12/14/2020       Assessment and Plan:    1. Immunization due  - Received PCV 20    2. Dysgeusia/ Nausea  - Rancid taste with raw dairy products with nausea  - Will trial Zofran 4mg qd PRN   - Reassess if symptoms persist     Requested Prescriptions     Signed Prescriptions Disp Refills    ondansetron (ZOFRAN) 4 MG tablet 7 tablet 0     Sig: Take 1 tablet by mouth daily as needed for Nausea or Vomiting       There are no discontinued medications. Kaci received counseling on the following healthy behaviors: nutrition, exercise and medication adherence    Discussed use,benefit, and side effects of prescribed medications. Barriers to medication compliance addressed.      All patient questions answered. Pt voiced understanding. Return in about 3 months (around 2/1/2023) for Follow up visit . Disclaimer: Some orall of this note was transcribed using voice-recognition software. This may cause typographical errors occasionally. Although all effort is made to fix these errors, please do not hesitate to contact our office if there Marilin Bones concern with the understanding of this note.

## 2022-11-01 NOTE — PROGRESS NOTES
Visit Information    Have you changed or started any medications since your last visit including any over-the-counter medicines, vitamins, or herbal medicines? no   Have you stopped taking any of your medications? Is so, why? -  no  Are you having any side effects from any of your medications? - no    Have you seen any other physician or provider since your last visit? yes - Ortho, Podiatry   Have you had any other diagnostic tests since your last visit? yes - Labs   Have you been seen in the emergency room and/or had an admission in a hospital since we last saw you?  no   Have you had your routine dental cleaning in the past 6 months?  no     Do you have an active MyChart account? If no, what is the barrier?   Yes    Patient Care Team:  Edith Mojica MD as PCP - General (Family Medicine)  Esther Mckenna DO as Surgeon (Orthopedic Surgery)  Gray Serra MD as Consulting Physician (Cardiology)  Beth Merritt MD as Consulting Physician (Pulmonology)  Estela Rodriguez as Consulting Physician  Consuelo Acosta MD as Consulting Physician (Endocrinology)  Gaby Amaya MD as Consulting Physician (Obstetrics & Gynecology)  Ara Pace MD as Consulting Physician (Gastroenterology)    Medical History Review  Past Medical, Family, and Social History reviewed and does not contribute to the patient presenting condition    Health Maintenance   Topic Date Due    COVID-19 Vaccine (1) Never done    Diabetic retinal exam  03/06/2021    Diabetic foot exam  09/22/2022    Diabetic microalbuminuria test  04/21/2023    Lipids  04/21/2023    Depression Monitoring  06/02/2023    A1C test (Diabetic or Prediabetic)  10/19/2023    Breast cancer screen  12/16/2023    DTaP/Tdap/Td vaccine (2 - Td or Tdap) 06/01/2027    Colorectal Cancer Screen  12/09/2029    Flu vaccine  Completed    Shingles vaccine  Completed    Pneumococcal 0-64 years Vaccine  Completed    Hepatitis C screen  Completed    HIV screen  Completed    Hepatitis A vaccine  Aged Out    Hib vaccine  Aged Out    Meningococcal (ACWY) vaccine  Aged Out

## 2022-11-10 NOTE — TELEPHONE ENCOUNTER
E-scribe request for med refills. Please review and e-scribe if applicable. Last Visit Date:  11/01/22  Next Visit Date:  Visit date not found    Hemoglobin A1C (%)   Date Value   09/02/2022 5.6   06/11/2021 5.5   12/03/2020 5.3             ( goal A1C is < 7)   Microalb/Crt.  Ratio (mcg/mg creat)   Date Value   01/02/2020 CANNOT BE CALCULATED     LDL Cholesterol (mg/dL)   Date Value   12/14/2020 64     LDL Calculated (mg/dL)   Date Value   06/16/2017 68       (goal LDL is <100)   AST (U/L)   Date Value   01/20/2021 26     ALT (U/L)   Date Value   01/20/2021 35 (H)     BUN (mg/dL)   Date Value   01/20/2021 20     BP Readings from Last 3 Encounters:   11/01/22 137/77   10/07/22 (!) 160/86   09/23/22 (!) 151/86          (goal 120/80)        Patient Active Problem List:     Glaucoma     GERD (gastroesophageal reflux disease)     DJD (degenerative joint disease)     Obesity     Polyneuropathy     Migraine     Mitral prolapse     Low back pain     CTS (carpal tunnel syndrome)     Supraspinatus syndrome     Impaired fasting glucose     Acute sinus infection     IBS (irritable bowel syndrome)     Back pain, chronic     Stress fracture, right 5th metatarsal      Upper airway cough syndrome     Ear fullness     Perioral numbness     Screening for osteoporosis     Headache     Left eye injury     Medication reaction     Osteopenia     Lumbosacral pain     Flu vaccine need     Hypothyroid     Urinary incontinence     Anxiety     Sleep apnea     Depression     Chronic back pain     Carpal tunnel syndrome     Neuropathy     Mitral valve problem     Morbid obesity with BMI of 45.0-49.9, adult (HCC)     Frozen shoulder     Skin tag     Degenerative disc disease, lumbar     Bilateral edema of lower extremity     Medication refill     Varicose vein of leg     Dizziness     Dysphagia     Abdominal bloating     Mild intermittent asthma without complication     Skin lesion of left leg     Rotator cuff arthropathy of left shoulder Dysgeusia      ----Joanie Elo

## 2022-11-21 ENCOUNTER — TELEPHONE (OUTPATIENT)
Dept: GASTROENTEROLOGY | Age: 62
End: 2022-11-21

## 2022-11-21 NOTE — TELEPHONE ENCOUNTER
Patient called as she had a missed call from this number. Writer did not see a message in the chart so patient was ok with that and said is her appointment still good ij December and writer let her know yes it was.

## 2022-11-30 ENCOUNTER — TELEPHONE (OUTPATIENT)
Dept: GASTROENTEROLOGY | Age: 62
End: 2022-11-30

## 2022-11-30 NOTE — TELEPHONE ENCOUNTER
Pt is a former [facility] pt. Pt is switching to [facility] due to only being able to do morning appts.

## 2022-12-02 DIAGNOSIS — K21.9 GASTROESOPHAGEAL REFLUX DISEASE WITHOUT ESOPHAGITIS: ICD-10-CM

## 2022-12-02 RX ORDER — GINGER ROOT/GINGER ROOT EXT 262.5 MG
2 CAPSULE ORAL DAILY
Qty: 30 TABLET | Refills: 0 | Status: SHIPPED | OUTPATIENT
Start: 2022-12-02

## 2022-12-02 NOTE — TELEPHONE ENCOUNTER
Medication pending       Health Maintenance   Topic Date Due    COVID-19 Vaccine (1) Never done    Diabetic retinal exam  03/06/2021    Diabetic foot exam  09/22/2022    Diabetic microalbuminuria test  04/21/2023    Lipids  04/21/2023    Depression Monitoring  06/02/2023    A1C test (Diabetic or Prediabetic)  10/19/2023    Breast cancer screen  12/16/2023    DTaP/Tdap/Td vaccine (2 - Td or Tdap) 06/01/2027    Colorectal Cancer Screen  12/09/2029    Flu vaccine  Completed    Shingles vaccine  Completed    Pneumococcal 0-64 years Vaccine  Completed    Hepatitis C screen  Completed    HIV screen  Completed    Hepatitis A vaccine  Aged Out    Hib vaccine  Aged Out    Meningococcal (ACWY) vaccine  Aged Out             (applicable per patient's age: Cancer Screenings, Depression Screening, Fall Risk Screening, Immunizations)    Hemoglobin A1C (%)   Date Value   09/02/2022 5.6   06/11/2021 5.5   12/03/2020 5.3     Microalb/Crt.  Ratio (mcg/mg creat)   Date Value   01/02/2020 CANNOT BE CALCULATED     LDL Cholesterol (mg/dL)   Date Value   12/14/2020 64     LDL Calculated (mg/dL)   Date Value   06/16/2017 68     AST (U/L)   Date Value   01/20/2021 26     ALT (U/L)   Date Value   01/20/2021 35 (H)     BUN (mg/dL)   Date Value   01/20/2021 20      (goal A1C is < 7)   (goal LDL is <100) need 30-50% reduction from baseline     BP Readings from Last 3 Encounters:   11/01/22 137/77   10/07/22 (!) 160/86   09/23/22 (!) 151/86    (goal /80)      All Future Testing planned in [facility]:  Lab Frequency Next Occurrence       Next Visit Date:  Future Appointments   Date Time Provider Fabby Juarez   12/19/2022 11:30 AM [facility] MAMMOGRAPHY ROOM AT [facility]   1/5/2023 10:30 AM Belle Winston MD Resp Spec [facility]   1/13/2023 12:45 PM Blanca Pruett DPM [facility]   1/23/2023  9:00 AM Jefferson Wagner MD [facility]   3/3/2023  9:30 AM Bowen Hastings MD [facility]            Patient Active Problem List:     Glaucoma     GERD (gastroesophageal reflux disease)     DJD (degenerative joint disease)     Obesity     Polyneuropathy     Migraine     Mitral prolapse     Low back pain     CTS (carpal tunnel syndrome)     Supraspinatus syndrome     Impaired fasting glucose     Acute sinus infection     IBS (irritable bowel syndrome)     Back pain, chronic     Stress fracture, right 5th metatarsal      Upper airway cough syndrome     Ear fullness     Perioral numbness     Screening for osteoporosis     Headache     Left eye injury     Medication reaction     Osteopenia     Lumbosacral pain     Flu vaccine need     Hypothyroid     Urinary incontinence     Anxiety     Sleep apnea     Depression     Chronic back pain     Carpal tunnel syndrome     Neuropathy     Mitral valve problem     Morbid obesity with BMI of 45.0-49.9, adult (HCC)     Frozen shoulder     Skin tag     Degenerative disc disease, lumbar     Bilateral edema of lower extremity     Medication refill     Varicose vein of leg     Dizziness     Dysphagia     Abdominal bloating     Mild intermittent asthma without complication     Skin lesion of left leg     Rotator cuff arthropathy of left shoulder     Dysgeusia

## 2022-12-05 RX ORDER — OMEPRAZOLE 40 MG/1
40 CAPSULE, DELAYED RELEASE ORAL DAILY
Qty: 30 CAPSULE | Refills: 5 | Status: SHIPPED | OUTPATIENT
Start: 2022-12-05

## 2022-12-06 ENCOUNTER — TELEPHONE (OUTPATIENT)
Dept: FAMILY MEDICINE CLINIC | Age: 62
End: 2022-12-06

## 2022-12-06 NOTE — TELEPHONE ENCOUNTER
Patient called requesting refill on         calcium carbonate-vitamin D (CALTRATE) 600-400 MG-UNIT TABS per tab Take 1 tablet by mouth daily, and send it to her pharmacy.

## 2022-12-14 ENCOUNTER — TELEPHONE (OUTPATIENT)
Dept: FAMILY MEDICINE CLINIC | Age: 62
End: 2022-12-14

## 2022-12-14 RX ORDER — GINGER ROOT/GINGER ROOT EXT 262.5 MG
1 CAPSULE ORAL 2 TIMES DAILY
Qty: 60 TABLET | Refills: 3 | Status: SHIPPED | OUTPATIENT
Start: 2022-12-14 | End: 2022-12-15

## 2022-12-14 NOTE — PROGRESS NOTES
Dr Kat Alejandro, Patient called office stating that the wrong Calcium was sent to pharmacy, Patient states that she takes Calcium carbonate-vitamin D (Caltrate)600-400 Mg PO BID, and needs to be ordered with refills, please advise. Last visit: 11/1/22  Last Med refill: 10/13/22  Does patient have enough medication for 72 hours: Yes    Next Visit Date:  Future Appointments   Date Time Provider Fabby Artisi   12/19/2022 11:30 AM [facility] MAMMOGRAPHY ROOM AT [facility]   1/5/2023 10:30 AM Brett Moe MD Resp Spec [address] [facility]   1/13/2023 12:45 PM Josefa Barthel, DPM [facility] [address] [facility]   1/23/2023  9:00 AM Harper Mckenna MD [facility]   3/3/2023  9:30 AM Aminta Elizabeth MD [facility] [address] Maintenance   Topic Date Due    COVID-19 Vaccine (1) Never done    Diabetic retinal exam  03/06/2021    Diabetic foot exam  09/22/2022    Diabetic microalbuminuria test  04/21/2023    Lipids  04/21/2023    Depression Monitoring  06/02/2023    A1C test (Diabetic or Prediabetic)  10/19/2023    Breast cancer screen  12/16/2023    DTaP/Tdap/Td vaccine (2 - Td or Tdap) 06/01/2027    Colorectal Cancer Screen  12/09/2029    Flu vaccine  Completed    Shingles vaccine  Completed    Pneumococcal 0-64 years Vaccine  Completed    Hepatitis C screen  Completed    HIV screen  Completed    Hepatitis A vaccine  Aged Out    Hib vaccine  Aged Out    Meningococcal (ACWY) vaccine  Aged Out       Hemoglobin A1C (%)   Date Value   09/02/2022 5.6   06/11/2021 5.5   12/03/2020 5.3             ( goal A1C is < 7)   Microalb/Crt.  Ratio (mcg/mg creat)   Date Value   01/02/2020 CANNOT BE CALCULATED     LDL Cholesterol (mg/dL)   Date Value   12/14/2020 64   07/08/2019 97     LDL Calculated (mg/dL)   Date Value   06/16/2017 68   05/19/2014 104       (goal LDL is <100)   AST (U/L)   Date Value   01/20/2021 26     ALT (U/L)   Date Value   01/20/2021 35 (H)     BUN (mg/dL)   Date Value   01/20/2021 20     BP Readings from Last 3 Encounters:   11/01/22 137/77   10/07/22 (!) 160/86   09/23/22 (!) 151/86          (goal 120/80)    All Future Testing planned in [facility]  Lab Frequency Next Occurrence               Patient Active Problem List:     Glaucoma     GERD (gastroesophageal reflux disease)     DJD (degenerative joint disease)     Obesity     Polyneuropathy     Migraine     Mitral prolapse     Low back pain     CTS (carpal tunnel syndrome)     Supraspinatus syndrome     Impaired fasting glucose     Acute sinus infection     IBS (irritable bowel syndrome)     Back pain, chronic     Stress fracture, right 5th metatarsal      Upper airway cough syndrome     Ear fullness     Perioral numbness     Screening for osteoporosis     Headache     Left eye injury     Medication reaction     Osteopenia     Lumbosacral pain     Flu vaccine need     Hypothyroid     Urinary incontinence     Anxiety     Sleep apnea     Depression     Chronic back pain     Carpal tunnel syndrome     Neuropathy     Mitral valve problem     Morbid obesity with BMI of 45.0-49.9, adult (HCC)     Frozen shoulder     Skin tag     Degenerative disc disease, lumbar     Bilateral edema of lower extremity     Medication refill     Varicose vein of leg     Dizziness     Dysphagia     Abdominal bloating     Mild intermittent asthma without complication     Skin lesion of left leg     Rotator cuff arthropathy of left shoulder     Dysgeusia

## 2022-12-14 NOTE — TELEPHONE ENCOUNTER
Writer reached out to the patient per previous message, patient was unavailable no way to leave a message.

## 2022-12-14 NOTE — TELEPHONE ENCOUNTER
----- Message from Jackson Ambriz sent at 12/14/2022  9:48 AM EST -----  Subject: Message to Provider    QUESTIONS  Information for Provider? patient would like to have a different DrParish in   this practice! She does not like Dr. Tony Jose at all. She is very upset that he   can not even get her medications correct. Please call her and set   something up.  ---------------------------------------------------------------------------  --------------  [id number]  [phone]; OK to leave message on voicemail  ---------------------------------------------------------------------------  --------------  SCRIPT ANSWERS  Relationship to Patient?  Self

## 2022-12-15 ENCOUNTER — TELEPHONE (OUTPATIENT)
Dept: FAMILY MEDICINE CLINIC | Age: 62
End: 2022-12-15

## 2022-12-15 NOTE — TELEPHONE ENCOUNTER
Dr Bryna Halsted patient called office this morning upset that her script for calcium was sent in as calcium with D3 instead of calcium 600-400 vitamin D, please advise, thanks.

## 2022-12-19 ENCOUNTER — HOSPITAL ENCOUNTER (OUTPATIENT)
Dept: WOMENS IMAGING | Age: 62
Discharge: HOME | End: 2022-12-21
Payer: COMMERCIAL

## 2022-12-19 DIAGNOSIS — Z12.31 BREAST CANCER SCREENING BY MAMMOGRAM: ICD-10-CM

## 2022-12-19 PROCEDURE — 77063 BREAST TOMOSYNTHESIS BI: CPT

## 2022-12-26 DIAGNOSIS — Z76.0 MEDICATION REFILL: ICD-10-CM

## 2022-12-26 DIAGNOSIS — J45.20 MILD INTERMITTENT ASTHMA WITHOUT COMPLICATION: ICD-10-CM

## 2023-01-04 DIAGNOSIS — M54.50 CHRONIC BILATERAL LOW BACK PAIN, UNSPECIFIED WHETHER SCIATICA PRESENT: ICD-10-CM

## 2023-01-04 DIAGNOSIS — M51.36 DEGENERATIVE DISC DISEASE, LUMBAR: ICD-10-CM

## 2023-01-04 DIAGNOSIS — G89.29 CHRONIC BILATERAL LOW BACK PAIN, UNSPECIFIED WHETHER SCIATICA PRESENT: ICD-10-CM

## 2023-01-04 RX ORDER — PSEUDOEPHED/ACETAMINOPH/DIPHEN 30MG-500MG
TABLET ORAL
Qty: 120 TABLET | Refills: 3 | OUTPATIENT
Start: 2023-01-04

## 2023-01-04 RX ORDER — PSEUDOEPHED/ACETAMINOPH/DIPHEN 30MG-500MG
TABLET ORAL
Qty: 120 TABLET | Refills: 3 | Status: SHIPPED | OUTPATIENT
Start: 2023-01-04

## 2023-01-04 NOTE — TELEPHONE ENCOUNTER
Acetaminophen pending refill        Health Maintenance   Topic Date Due    COVID-19 Vaccine (1) Never done    Diabetic retinal exam  03/06/2021    GFR test (Diabetes, CKD 3-4, OR last GFR 15-59)  01/20/2022    Diabetic foot exam  09/22/2022    Diabetic Alb to Cr ratio (uACR) test  04/21/2023    Lipids  04/21/2023    Depression Monitoring  06/02/2023    A1C test (Diabetic or Prediabetic)  10/19/2023    Breast cancer screen  12/19/2024    DTaP/Tdap/Td vaccine (2 - Td or Tdap) 06/01/2027    Colorectal Cancer Screen  12/09/2029    Flu vaccine  Completed    Shingles vaccine  Completed    Pneumococcal 0-64 years Vaccine  Completed    Hepatitis C screen  Completed    HIV screen  Completed    Hepatitis A vaccine  Aged Out    Hib vaccine  Aged Out    Meningococcal (ACWY) vaccine  Aged Out             (applicable per patient's age: Cancer Screenings, Depression Screening, Fall Risk Screening, Immunizations)    Hemoglobin A1C (%)   Date Value   09/02/2022 5.6   06/11/2021 5.5   12/03/2020 5.3     Microalb/Crt.  Ratio (mcg/mg creat)   Date Value   01/02/2020 CANNOT BE CALCULATED     LDL Cholesterol (mg/dL)   Date Value   12/14/2020 64     LDL Calculated (mg/dL)   Date Value   06/16/2017 68     AST (U/L)   Date Value   01/20/2021 26     ALT (U/L)   Date Value   01/20/2021 35 (H)     BUN (mg/dL)   Date Value   01/20/2021 20      (goal A1C is < 7)   (goal LDL is <100) need 30-50% reduction from baseline     BP Readings from Last 3 Encounters:   11/01/22 137/77   10/07/22 (!) 160/86   09/23/22 (!) 151/86    (goal /80)      All Future Testing planned in [facility]:  Lab Frequency Next Occurrence       Next Visit Date:  Future Appointments   Date Time Provider Fabby Juarez   1/13/2023 12:45 PM Fred Brooke DPM [facility]   1/23/2023  9:00 AM Virginia Shahid MD [facility]   3/3/2023  9:30 AM Dolores Mireles MD [facility]   3/17/2023 11:15 AM Hannah Rome MD Resp Spec [facility] Patient Active Problem List:     Glaucoma     GERD (gastroesophageal reflux disease)     DJD (degenerative joint disease)     Obesity     Polyneuropathy     Migraine     Mitral prolapse     Low back pain     CTS (carpal tunnel syndrome)     Supraspinatus syndrome     Impaired fasting glucose     Acute sinus infection     IBS (irritable bowel syndrome)     Back pain, chronic     Stress fracture, right 5th metatarsal      Upper airway cough syndrome     Ear fullness     Perioral numbness     Screening for osteoporosis     Headache     Left eye injury     Medication reaction     Osteopenia     Lumbosacral pain     Flu vaccine need     Hypothyroid     Urinary incontinence     Anxiety     Sleep apnea     Depression     Chronic back pain     Carpal tunnel syndrome     Neuropathy     Mitral valve problem     Morbid obesity with BMI of 45.0-49.9, adult (HCC)     Frozen shoulder     Skin tag     Degenerative disc disease, lumbar     Bilateral edema of lower extremity     Medication refill     Varicose vein of leg     Dizziness     Dysphagia     Abdominal bloating     Mild intermittent asthma without complication     Skin lesion of left leg     Rotator cuff arthropathy of left shoulder     Dysgeusia

## 2023-01-04 NOTE — TELEPHONE ENCOUNTER
Patient is out of her inhaler, would you be able to sign this for her, in Dr Isidro Schumacher absence?

## 2023-01-09 RX ORDER — ALBUTEROL SULFATE 90 UG/1
AEROSOL, METERED RESPIRATORY (INHALATION)
Qty: 18 G | Refills: 5 | Status: SHIPPED | OUTPATIENT
Start: 2023-01-09

## 2023-01-18 ENCOUNTER — TELEPHONE (OUTPATIENT)
Dept: FAMILY MEDICINE CLINIC | Age: 63
End: 2023-01-18

## 2023-01-18 NOTE — TELEPHONE ENCOUNTER
Patient contacted office for a follow up appointment but does not want to be seen by Dr. Juan Manuel Butts stating she has no jalen in PCP. Patient scheduled for 2/2/23 with Dr. Rosio Clark.

## 2023-01-23 ENCOUNTER — HOSPITAL ENCOUNTER (OUTPATIENT)
Age: 63
Setting detail: SPECIMEN
Discharge: HOME | End: 2023-01-23
Payer: COMMERCIAL

## 2023-01-23 ENCOUNTER — OFFICE VISIT (OUTPATIENT)
Dept: OBGYN | Age: 63
End: 2023-01-23
Payer: COMMERCIAL

## 2023-01-23 VITALS
WEIGHT: 293 LBS | HEIGHT: 67 IN | BODY MASS INDEX: 45.99 KG/M2 | SYSTOLIC BLOOD PRESSURE: 130 MMHG | DIASTOLIC BLOOD PRESSURE: 78 MMHG

## 2023-01-23 DIAGNOSIS — Z91.89 ENCOUNTER FOR GYNECOLOGIC EXAMINATION FOR HIGH-RISK PATIENT COVERED BY MEDICARE: ICD-10-CM

## 2023-01-23 DIAGNOSIS — Z91.89 ENCOUNTER FOR GYNECOLOGIC EXAMINATION FOR HIGH-RISK PATIENT COVERED BY MEDICARE: Primary | ICD-10-CM

## 2023-01-23 DIAGNOSIS — Z01.419 WOMEN'S ANNUAL ROUTINE GYNECOLOGICAL EXAMINATION: ICD-10-CM

## 2023-01-23 DIAGNOSIS — K59.01 CONSTIPATION BY DELAYED COLONIC TRANSIT: ICD-10-CM

## 2023-01-23 PROCEDURE — G8484 FLU IMMUNIZE NO ADMIN: HCPCS | Performed by: OBSTETRICS & GYNECOLOGY

## 2023-01-23 PROCEDURE — G0145 SCR C/V CYTO,THINLAYER,RESCR: HCPCS

## 2023-01-23 PROCEDURE — 99396 PREV VISIT EST AGE 40-64: CPT | Performed by: OBSTETRICS & GYNECOLOGY

## 2023-01-23 RX ORDER — SENNA PLUS 8.6 MG/1
TABLET ORAL
Qty: 60 TABLET | Refills: 5 | Status: SHIPPED | OUTPATIENT
Start: 2023-01-23

## 2023-01-23 NOTE — PROGRESS NOTES
MEDICARE PHYSICAL    Date of service: 2023    Migdalia Villalba  Is a 58 y.o. single, female    PT's PCP is: Seda Melgoza MD     : 1960     HIGH RISK ASSESSMENT    Maternal JAEL Exposure (in utero): No    Sexual activity < 16 yrs of age: No    Greater than 5 sexual partners: Yes    3 abnormal paps in the last 7 years: No    History of any STD (including HIV): No                                        Subjective:       No LMP recorded. Patient has had a hysterectomy.      Are your menses regular: not applicable    OB History    Para Term  AB Living   1 1 1         SAB IAB Ectopic Molar Multiple Live Births                    # Outcome Date GA Lbr Panchito/2nd Weight Sex Delivery Anes PTL Lv   1 Term      Vag-Spont           Social History     Tobacco Use   Smoking Status Never    Passive exposure: Never   Smokeless Tobacco Never        Social History     Substance and Sexual Activity   Alcohol Use No    Alcohol/week: 0.0 standard drinks       Allergies: Latex, Adhesive tape, Amlodipine, Bextra [valdecoxib], Brimonidine, Daypro [oxaprozin], Diclofenac sodium, Famotidine, Hydrocodone-acetaminophen, Hydromorphone, Ibuprofen, Lisinopril, Metoprolol, Naproxen, Oxycodone-acetaminophen, Rofecoxib, Shellfish-derived products, Simvastatin, Statins, Sulfa antibiotics, Tetracyclines & related, Timoptic [timolol maleate], Tramadol, Fosamax [alendronate], Nalbuphine, Alendronate sodium, Alendronate sodium, Alphagan [brimonidine tartrate], Dilaudid [hydromorphone hcl], Doxycycline monohydrate, Nsaids, Other, Pepcid complete [famotidine-ca carb-mag hydrox], Pilocarpine, Pravastatin, Red dye, Shrimp flavor, Statins support therapy, Timoptic [timolol maleate], Tolectin [tolmetin], Voltaren [diclofenac sodium], Nubain [nalbuphine hcl], Percocet [oxycodone-acetaminophen], Tolectin [tolmetin sodium], Tolmetin sodium, Ultram [tramadol hcl], Vicodin [hydrocodone-acetaminophen], and Vioxx      Current Outpatient Medications:     albuterol sulfate HFA (PROVENTIL;VENTOLIN;PROAIR) 108 (90 Base) MCG/ACT inhaler, INHALE 2 PUFFS INTO THE LUNGS EVERY 6 HOURS AS NEEDED, Disp: 18 g, Rfl: 5    ACETAMINOPHEN EXTRA STRENGTH 500 MG tablet, TAKE 2 TABLETS BY MOUTH EVERY 6 HOURS AS NEEDED FOR PAIN, Disp: 120 tablet, Rfl: 3    Calcium Carbonate-Vitamin D 600-10 MG-MCG TABS, Take 1 tablet by mouth in the morning and at bedtime, Disp: 60 tablet, Rfl: 2    omeprazole (PRILOSEC) 40 MG delayed release capsule, Take 1 capsule by mouth daily, Disp: 30 capsule, Rfl: 5    ondansetron (ZOFRAN) 4 MG tablet, Take 1 tablet by mouth daily as needed for Nausea or Vomiting, Disp: 7 tablet, Rfl: 0    Multiple Vitamins-Minerals (CENTROVITE) TABS, TAKE 1 TABLET BY MOUTH DAILY, Disp: 30 tablet, Rfl: 3    ASPIRIN LOW DOSE 81 MG EC tablet, Take 1 tablet by mouth daily, Disp: 30 tablet, Rfl: 5    rosuvastatin (CRESTOR) 5 MG tablet, Take 1 tablet by mouth daily, Disp: 30 tablet, Rfl: 3    ALLERGY RELIEF 10 MG tablet, TAKE 1 TABLET BY MOUTH DAILY, Disp: 30 tablet, Rfl: 10    Cholecalciferol (VITAMIN D3) 50 MCG (2000 UT) CAPS, TAKE 1 CAPSULE BY MOUTH DAILY, Disp: 30 capsule, Rfl: 5    SENNA-TIME 8.6 MG tablet, TAKE 1 TABLET BY MOUTH 2 TIMES DAILY AS NEEDED FOR CONSTIPATION, Disp: 60 tablet, Rfl: 2    fluticasone (FLONASE) 50 MCG/ACT nasal spray, INHALE 1 SPRAY INTO EACH NOSTRIL 2 TIMES A DAY, Disp: 1 each, Rfl: 7    albuterol sulfate  (90 Base) MCG/ACT inhaler, INHALE 2 PUFFS INTO THE LUNGS 4 TIMES DAILY AS NEEDED FOR WHEEZING, Disp: 1 each, Rfl: 6    levothyroxine (SYNTHROID) 100 MCG tablet, Take 1 tablet by mouth Daily Indications: 112 mg, Disp: 30 tablet, Rfl: 5    Accu-Chek FastClix Lancets MISC, USE TO CHECK BLOOD SUGAR TWICE A DAY, Disp: 100 each, Rfl: 3    diclofenac sodium (VOLTAREN) 1 % GEL, APPLY 2 GRAMS TOPICALLY FOUR TIMES A DAY (Patient not taking: Reported on 1/23/2023), Disp: 100 g, Rfl: 0    triamcinolone (KENALOG) 0.1 % cream, Apply topically 2 times daily.  (Patient not taking: Reported on 1/23/2023), Disp: 80 g, Rfl: 0    hydrocortisone 1 % ointment, Apply topically 2 times daily as needed for skin itching (Patient not taking: Reported on 1/23/2023), Disp: 30 g, Rfl: 1    ONETOUCH VERIO strip, , Disp: , Rfl:     Lancets Misc. (ACCU-CHEK FASTCLIX LANCET) KIT, 1 box by Erica Wilkes. (Med.Supl.;Non-Drugs) route 2 times daily Dispense 1 box of 100., Disp: 1 kit, Rfl: 3    Blood Glucose Monitoring Suppl ([address]) w/Device KIT, , Disp: , Rfl:     Blood Glucose Calibration (ONETOUCH VERIO) SOLN, , Disp: , Rfl:     Social History     Substance and Sexual Activity   Sexual Activity Not Currently    Partners: Male       Any bleeding or pain with intercourse: No    Last Yearly:  1/20/22    Last Mammogram: 12/19/22    Last Dexascan 2/25/20    Do you do self breast exams: No    Past Medical History:   Diagnosis Date    Abdominal bloating 1/23/2019    Anxiety     Bilateral edema of lower extremity 12/16/2016    Bronchial asthma     mild, intermittent    Carpal tunnel syndrome     Cataract     Chronic back pain     Chronic sinusitis     Degenerative disc disease, lumbar 12/16/2016    Depression     Diabetes mellitus due to underlying condition with hyperosmolarity without coma ([facility] [address]     Dizziness 10/27/2017    DJD (degenerative joint disease)     S1, L3, L4, L5, T12    Dysphagia 1/23/2019    Edema of leg     bilateral legs    Environmental allergies     Frozen shoulder 4/27/2015    GERD (gastroesophageal reflux disease)     Glaucoma     Glucose intolerance (impaired glucose tolerance)     Headache(784.0)     History of bronchitis     Viral    HTN (hypertension)     Hyperlipidemia     Hypothyroid     hypothyroid state    Hypothyroidism     IBS (irritable bowel syndrome) 10/02/2012    Legally blind     due to glaucoma    Medication refill 6/1/2017    Mild intermittent asthma without complication     Morbid obesity with BMI of 45.0-49.9, adult ([address]     MVP (mitral valve prolapse)     Neuropathy     OA (osteoarthritis)     EMILIA on CPAP     Osteopenia     Pancreatic cyst     Polyneuropathy     Raynaud disease     Rhinopharyngitis     Allergic rhinopharyngitis    Skin lesion of left leg 6/11/2021    Skin tag 7/9/2015    Stress fracture     Right 5th Metatarsal     Syncope     TIA (transient ischemic attack)     Urinary incontinence     Varicose vein of leg 6/1/2017    Vasodepressor syncope     Wears glasses        Past Surgical History:   Procedure Laterality Date    APPENDECTOMY  1978    CATARACT REMOVAL Left     CHOLECYSTECTOMY  1978    COLONOSCOPY      COLONOSCOPY N/A 12/9/2019    COLORECTAL CANCER SCREENING, NOT HIGH RISK performed by Celeste Mathew MD at [address] [place] [address] Bilateral     right iridectomy, right laser, bilateral trabeculectomy, left drain    GLAUCOMA SURGERY      HAND SURGERY Right     HYSTERECTOMY (CERVIX STATUS UNKNOWN)  1982    KNEE SURGERY Right 2000    TUBAL LIGATION  1981    UPPER GASTROINTESTINAL ENDOSCOPY      UPPER GASTROINTESTINAL ENDOSCOPY  5/24/2018    EGD DILATION SAVORY performed by Dallis Boast, MD at [facility] 1  3/6/2019    EGD DILATION SAVORY performed by Celeste Mathew MD at [facility] 1 N/A 9/8/2021    EGD DILATION SAVORY performed by [facility]w MD at [facility]       Family History   Problem Relation Age of Onset    Diabetes Mother     Heart Disease Mother         multiple heart attacks    Arthritis Mother     High Blood Pressure Mother     High Cholesterol Mother     Substance Abuse Mother     Heart Disease Father     Arthritis Father     Depression Father     High Cholesterol Father     Mental Illness Father     Substance Abuse Father     Diabetes Sister     High Blood Pressure Sister     Cancer Paternal Uncle         esophageal cancer    Diabetes Maternal Aunt     Cancer Maternal Aunt colorectal cancer    Diabetes Maternal Uncle     Cancer Maternal Grandmother         colorectal cancer    Arthritis Maternal Grandmother     Diabetes Maternal Grandmother     High Blood Pressure Maternal Grandmother     Stroke Maternal Grandmother     Arthritis Maternal Grandfather     Arthritis Paternal Grandmother     Cancer Paternal Grandmother     Depression Paternal Grandmother     Heart Disease Paternal Grandmother     High Blood Pressure Paternal Grandmother     High Cholesterol Paternal Grandmother     Mental Illness Paternal Grandmother     Arthritis Paternal Grandfather        Any family history of breast or ovarian cancer: No    Any family history of blood clots: No    No data recorded    Chief Complaint   Patient presents with    Gynecologic Exam     Pt is here today for yearly gyn exam. Previous pap was 1/20/22(-). Nurse:LMD  PE:  Vital Signs  Blood pressure 130/78, height 5' 7\" (1.702 m), weight (!) 320 lb (145.2 kg), not currently breastfeeding. Labs:    No results found for this visit on 01/23/23. HPI: The patient is here today for a yearly exam.  She is not having any problems or concerns she would like her senna refilled    YesPT denies fever, chills, nausea and vomiting       Objective  Lymphatic:   no lymphadenopathy  Heent:   negative   Cor: regular rate and rhythm, no murmurs              Pul:clear to auscultation bilaterally- no wheezes, rales or rhonchi, normal air movement, no respiratory distress      GI: Abdomen soft, non-tender.  BS normal. No masses,  No organomegaly, obesity noted old scar noted           Extremities: normal strength, tone, and muscle mass   Breasts: Breast:normal appearance, no masses or tenderness, Inspection negative, No nipple retraction or dimpling, No nipple discharge or bleeding, No axillary or supraclavicular adenopathy, Normal to palpation without dominant masses   Pelvic Exam: GENITAL/URINARY:  External Genitalia:  General appearance; normal, Hair distribution; normal, Lesions absent  Vagina:  General appearance normal, Estrogen effect normal, Discharge absent, Lesions absent, mild cystocele and rectocele noted  Uterus:  Hystory of hysterectomy  Adenexa: Masses absent, Tenderness absent, Enlargement absent, Nodularity absent                                    Vaginal discharge: no vaginal discharge                            She was also counseled on her preventative health maintenance recommendations and follow-up. Assessment and Plan: Routine care. Patient wishes to have repeat Pap smears yearly. There is no significant change in her prolapse noted        Diagnosis Orders   1. Women's annual routine gynecological examination                  I have discontinued Kaci Banerjee's triamcinolone and diclofenac sodium. I am also having her maintain her [employer], [employer], levothyroxine, albuterol sulfate HFA, Accu-Chek FastClix Lancet, OneTouch Verio, fluticasone, Vitamin D3, Senna-Time, Allergy Relief, Aspirin Low Dose, rosuvastatin, Accu-Chek FastClix Lancets, CENTROVITE, ondansetron, omeprazole, Calcium Carbonate-Vitamin D, albuterol sulfate HFA, and Acetaminophen Extra Strength. We will continue to administer lidocaine and methylPREDNISolone acetate. No follow-ups on file. There are no Patient Instructions on file for this visit.        Pooja Longo MD,1/23/2023 8:39 AM

## 2023-01-24 NOTE — PROGRESS NOTES
Patient instructed to remove shoes and socks and instructed to sit in exam chair.  Current PCP is Gavi Campos MD and date of last visit was 02/02/2023.   Do you have a follow up visit scheduled?  No  If yes, the date is unknown

## 2023-01-27 DIAGNOSIS — Z76.0 MEDICATION REFILL: ICD-10-CM

## 2023-01-27 RX ORDER — FLUTICASONE PROPIONATE 50 MCG
SPRAY, SUSPENSION (ML) NASAL
Qty: 1 EACH | Refills: 4 | Status: SHIPPED | OUTPATIENT
Start: 2023-01-27

## 2023-01-27 RX ORDER — FOLIC ACID/MV,IRON,MIN/LUTEIN 0.4-18-25
TABLET ORAL
Qty: 30 TABLET | Refills: 3 | Status: SHIPPED | OUTPATIENT
Start: 2023-01-27

## 2023-01-27 RX ORDER — ACETAMINOPHEN 160 MG
TABLET,DISINTEGRATING ORAL
Qty: 30 CAPSULE | Refills: 5 | Status: SHIPPED | OUTPATIENT
Start: 2023-01-27

## 2023-01-27 NOTE — TELEPHONE ENCOUNTER
LAST VISIT: 6/23/22  NEXT VISIT: 3/17/23    No mention of this medication in your last dictation but you have prescribed in the past. Please sign for refill if ok. Thank you.

## 2023-01-27 NOTE — TELEPHONE ENCOUNTER
Last visit: 11/1/22  Last Med refill: 12/2022  Does patient have enough medication for 72 hours: No:     Next Visit Date:  Future Appointments   Date Time Provider Fabby Juarez   2/2/2023  9:30 AM Kamron Mendoza MD [address] [facility] FP [facility]   2/8/2023 12:45 PM Terrell Rose, DPM [facility] Podiatry [facility]   3/3/2023  9:30 AM Andre[facility]ahn [facility]   3/17/2023 11:15 AM Brayan Mason MD Resp Spec [facility] [place]   1/29/2024  9:00 AM Timoteo Jarrett MD [facility]       Health Maintenance   Topic Date Due    COVID-19 Vaccine (1) Never done    Diabetic retinal exam  03/06/2021    GFR test (Diabetes, CKD 3-4, OR last GFR 15-59)  01/20/2022    Diabetic foot exam  09/22/2022    Diabetic Alb to Cr ratio (uACR) test  04/21/2023    Lipids  04/21/2023    Depression Monitoring  06/02/2023    A1C test (Diabetic or Prediabetic)  10/19/2023    Breast cancer screen  12/19/2024    DTaP/Tdap/Td vaccine (2 - Td or Tdap) 06/01/2027    Colorectal Cancer Screen  12/09/2029    Flu vaccine  Completed    Shingles vaccine  Completed    Pneumococcal 0-64 years Vaccine  Completed    Hepatitis C screen  Completed    HIV screen  Completed    Hepatitis A vaccine  Aged Out    Hib vaccine  Aged Out    Meningococcal (ACWY) vaccine  Aged Out       Hemoglobin A1C (%)   Date Value   09/02/2022 5.6   06/11/2021 5.5   12/03/2020 5.3             ( goal A1C is < 7)   Microalb/Crt.  Ratio (mcg/mg creat)   Date Value   01/02/2020 CANNOT BE CALCULATED     LDL Cholesterol (mg/dL)   Date Value   12/14/2020 64   07/08/2019 97     LDL Calculated (mg/dL)   Date Value   06/16/2017 68   05/19/2014 104       (goal LDL is <100)   AST (U/L)   Date Value   01/20/2021 26     ALT (U/L)   Date Value   01/20/2021 35 (H)     BUN (mg/dL)   Date Value   01/20/2021 20     BP Readings from Last 3 Encounters:   01/23/23 130/78   11/01/22 137/77   10/07/22 (!) 160/86          (goal 120/80)    All Future Testing planned in [facility]  Lab Frequency Next Occurrence Patient Active Problem List:     Glaucoma     GERD (gastroesophageal reflux disease)     DJD (degenerative joint disease)     Obesity     Polyneuropathy     Migraine     Mitral prolapse     Low back pain     CTS (carpal tunnel syndrome)     Supraspinatus syndrome     Impaired fasting glucose     Acute sinus infection     IBS (irritable bowel syndrome)     Back pain, chronic     Stress fracture, right 5th metatarsal      Upper airway cough syndrome     Ear fullness     Perioral numbness     Screening for osteoporosis     Headache     Left eye injury     Medication reaction     Osteopenia     Lumbosacral pain     Flu vaccine need     Hypothyroid     Urinary incontinence     Anxiety     Sleep apnea     Depression     Chronic back pain     Carpal tunnel syndrome     Neuropathy     Mitral valve problem     Morbid obesity with BMI of 45.0-49.9, adult (HCC)     Frozen shoulder     Skin tag     Degenerative disc disease, lumbar     Bilateral edema of lower extremity     Medication refill     Varicose vein of leg     Dizziness     Dysphagia     Abdominal bloating     Mild intermittent asthma without complication     Skin lesion of left leg     Rotator cuff arthropathy of left shoulder     Dysgeusia

## 2023-02-02 ENCOUNTER — OFFICE VISIT (OUTPATIENT)
Dept: FAMILY MEDICINE CLINIC | Age: 63
End: 2023-02-02
Payer: COMMERCIAL

## 2023-02-02 VITALS
BODY MASS INDEX: 50.12 KG/M2 | HEART RATE: 78 BPM | DIASTOLIC BLOOD PRESSURE: 74 MMHG | TEMPERATURE: 98.3 F | WEIGHT: 293 LBS | SYSTOLIC BLOOD PRESSURE: 141 MMHG

## 2023-02-02 DIAGNOSIS — I87.2 CHRONIC VENOUS INSUFFICIENCY: Primary | ICD-10-CM

## 2023-02-02 DIAGNOSIS — R43.2 DYSGEUSIA: ICD-10-CM

## 2023-02-02 PROCEDURE — 1036F TOBACCO NON-USER: CPT | Performed by: STUDENT IN AN ORGANIZED HEALTH CARE EDUCATION/TRAINING PROGRAM

## 2023-02-02 PROCEDURE — G8427 DOCREV CUR MEDS BY ELIG CLIN: HCPCS | Performed by: STUDENT IN AN ORGANIZED HEALTH CARE EDUCATION/TRAINING PROGRAM

## 2023-02-02 PROCEDURE — G8484 FLU IMMUNIZE NO ADMIN: HCPCS | Performed by: STUDENT IN AN ORGANIZED HEALTH CARE EDUCATION/TRAINING PROGRAM

## 2023-02-02 PROCEDURE — G8417 CALC BMI ABV UP PARAM F/U: HCPCS | Performed by: STUDENT IN AN ORGANIZED HEALTH CARE EDUCATION/TRAINING PROGRAM

## 2023-02-02 PROCEDURE — 99213 OFFICE O/P EST LOW 20 MIN: CPT | Performed by: STUDENT IN AN ORGANIZED HEALTH CARE EDUCATION/TRAINING PROGRAM

## 2023-02-02 PROCEDURE — 3017F COLORECTAL CA SCREEN DOC REV: CPT | Performed by: STUDENT IN AN ORGANIZED HEALTH CARE EDUCATION/TRAINING PROGRAM

## 2023-02-02 ASSESSMENT — ENCOUNTER SYMPTOMS
SHORTNESS OF BREATH: 0
ABDOMINAL PAIN: 0
VOMITING: 0
NAUSEA: 0
WHEEZING: 0

## 2023-02-02 NOTE — PROGRESS NOTES
Visit Information    Have you changed or started any medications since your last visit including any over-the-counter medicines, vitamins, or herbal medicines? no   Have you stopped taking any of your medications? Is so, why? -  no  Are you having any side effects from any of your medications? - no    Have you seen any other physician or provider since your last visit?  no   Have you had any other diagnostic tests since your last visit?  no   Have you been seen in the emergency room and/or had an admission in a hospital since we last saw you?  no   Have you had your routine dental cleaning in the past 6 months?  no     Do you have an active MyChart account? If no, what is the barrier?   No:     Patient Care Team:  Adrian Alegria MD as PCP - General (Family Medicine)  Adrianna Casper DO as Surgeon (Orthopedic Surgery)  Yunior Ram MD as Consulting Physician (Cardiology)  Kee Singletary MD as Consulting Physician (Pulmonology)  David Fuentes as Consulting Physician  Armida Oleary MD as Consulting Physician (Endocrinology)  Kimani Candelario MD as Consulting Physician (Obstetrics & Gynecology)  Ronni Abdi MD as Consulting Physician (Gastroenterology)    Medical History Review  Past Medical, Family, and Social History reviewed and does not contribute to the patient presenting condition    Health Maintenance   Topic Date Due    COVID-19 Vaccine (1) Never done    Diabetic retinal exam  03/06/2021    GFR test (Diabetes, CKD 3-4, OR last GFR 15-59)  01/20/2022    Diabetic foot exam  09/22/2022    Diabetic Alb to Cr ratio (uACR) test  04/21/2023    Lipids  04/21/2023    Depression Monitoring  06/02/2023    A1C test (Diabetic or Prediabetic)  10/19/2023    Breast cancer screen  12/19/2024    DTaP/Tdap/Td vaccine (2 - Td or Tdap) 06/01/2027    Colorectal Cancer Screen  12/09/2029    Flu vaccine  Completed    Shingles vaccine  Completed    Pneumococcal 0-64 years Vaccine  Completed    Hepatitis C screen Completed    HIV screen  Completed    Hepatitis A vaccine  Aged Out    Hib vaccine  Aged Out    Meningococcal (ACWY) vaccine  Aged Out

## 2023-02-02 NOTE — PATIENT INSTRUCTIONS
Thank you for letting us take care of you today. We hope all your questions were addressed. If a question was overlooked or something else comes to mind after you return home, please contact a member of your Care Team listed below. Your Care Team at [place] [address] is Team #3  Jean Epps MD (Faculty)  Jakub Sams MD (Faculty  Sahra Wahl MD (Resident)  Cabrera Singletary (Resident)   Natali Ríos MD (Resident)  RAJI Daniels., HARRIS Malik., HARRIS Almanza ([facility])  Natalia Griffin, [address] [facility] ([address] [facility])    Sierra Watson, [facility] (Clinical Pharmacist)     Office phone number: [phone]    If you need to get in right away due to illness, please be advised we have \"Same Day\" appointments available Monday-Friday. Please call us at [phone] option #3 to schedule your \"Same Day\" appointment.

## 2023-02-02 NOTE — PROGRESS NOTES
Attending Physician Statement  I have discussed the care of [address], 58 y.o. female,including pertinent history and exam findings,  with the resident Dr. Peace Taylor MD.  History:  Chief Complaint   Patient presents with    Leg Pain     Patient is having left lower leg pain for months. Left leg is swelling, discolored and some pain. Follow-up     Patient is still having bad taste in mouth after eat or drinking dairy      Per resident report patient has b/l leg swelling and no difference between each lower limb in setting of stasis dermatitis findings and history of chronic venous insufficiency. I have reviewed the key elements of the encounter with the resident. Examination was done by resident as documented in residents note. BP Readings from Last 3 Encounters:   02/02/23 (!) 141/74   01/23/23 130/78   11/01/22 137/77     BP (!) 141/74 (Site: Right Lower Arm, Position: Sitting, Cuff Size: Medium Adult)   Pulse 78   Temp 98.3 °F (36.8 °C) (Oral)   Wt (!) 320 lb (145.2 kg)   BMI 50.12 kg/m²   Lab Results   Component Value Date    WBC 12.5 (H) 01/20/2021    HGB 15.6 (H) 01/20/2021    HCT 46.8 01/20/2021     01/20/2021    CHOL 132 12/14/2020    TRIG 114 12/14/2020    HDL 45 12/14/2020    ALT 35 (H) 01/20/2021    AST 26 01/20/2021     01/20/2021    K 3.9 01/20/2021     01/20/2021    CREATININE 0.79 01/20/2021    BUN 20 01/20/2021    CO2 33 (H) 01/20/2021    TSH 3.12 06/14/2021    INR 0.9 06/29/2013    LABA1C 5.6 09/02/2022    LABMICR CANNOT BE CALCULATED 01/02/2020     Lab Results   Component Value Date    CALCIUM 9.7 01/20/2021     Lab Results   Component Value Date    LDLCALC 68 06/16/2017    LDLCHOLESTEROL 64 12/14/2020     I agree with the assessment, plan and diagnosis of    Diagnosis Orders   1. Chronic venous insufficiency  VL DUP LOWER EXTREMITY VENOUS LEFT    Monica Amador MD, Vascular Surgery, [place]      2.  Dysgeusia          I agree with  orders as documented by the resident. Recommendations: Agree with resident A&P. Will continue work up for dysgeusia at future visit. Return in about 3 months (around 5/2/2023) for F/u with Dr. Meg Bravo only.    (Nilsa Loots ) Dr. Santana Lopez MD

## 2023-02-02 NOTE — PROGRESS NOTES
Subjective:    Sharyle Parma is a 58 y.o. female with  has a past medical history of Abdominal bloating, Anxiety, Bilateral edema of lower extremity, Bronchial asthma, Carpal tunnel syndrome, Cataract, Chronic back pain, Chronic sinusitis, Degenerative disc disease, lumbar, Depression, Diabetes mellitus due to underlying condition with hyperosmolarity without coma ([facility] [address] Dizziness, DJD (degenerative joint disease), Dysphagia, Edema of leg, Environmental allergies, Frozen shoulder, GERD (gastroesophageal reflux disease), Glaucoma, Glucose intolerance (impaired glucose tolerance), Headache(784.0), History of bronchitis, HTN (hypertension), Hyperlipidemia, Hypothyroid, Hypothyroidism, IBS (irritable bowel syndrome), Legally blind, Medication refill, Mild intermittent asthma without complication, Morbid obesity with BMI of 45.0-49.9, adult (HCC), MVP (mitral valve prolapse), Neuropathy, OA (osteoarthritis), EMILIA on CPAP, Osteopenia, Pancreatic cyst, Polyneuropathy, Raynaud disease, Rhinopharyngitis, Skin lesion of left leg, Skin tag, Stress fracture, Syncope, TIA (transient ischemic attack), Urinary incontinence, Varicose vein of leg, Vasodepressor syncope, and Wears glasses.     Family History   Problem Relation Age of Onset    Diabetes Mother     Heart Disease Mother         multiple heart attacks    Arthritis Mother     High Blood Pressure Mother     High Cholesterol Mother     Substance Abuse Mother     Heart Disease Father     Arthritis Father     Depression Father     High Cholesterol Father     Mental Illness Father     Substance Abuse Father     Diabetes Sister     High Blood Pressure Sister     Cancer Paternal Uncle         esophageal cancer    Diabetes Maternal Aunt     Cancer Maternal Aunt         colorectal cancer    Diabetes Maternal Uncle     Cancer Maternal Grandmother         colorectal cancer    Arthritis Maternal Grandmother     Diabetes Maternal Grandmother     High Blood Pressure Maternal Grandmother     Stroke Maternal Grandmother     Arthritis Maternal Grandfather     Arthritis Paternal Grandmother     Cancer Paternal Grandmother     Depression Paternal Grandmother     Heart Disease Paternal Grandmother     High Blood Pressure Paternal Grandmother     High Cholesterol Paternal Grandmother     Mental Illness Paternal Grandmother     Arthritis Paternal Grandfather        Presented tot office today for:  Chief Complaint   Patient presents with    Leg Pain     Patient is having left lower leg pain for months. Left leg is swelling, discolored and some pain. Follow-up     Patient is still having bad taste in mouth after eat or drinking dairy        HPI Michael Almazan is a 63-year-old female with a PMH significant for mild intermittent asthma, mitral valve prolapse, irritable bowel syndrome, and frozen shoulder. Patient is here today with multiple complaints including left lower extremity pain and subjective dysgeusia. Left leg pain: Patient has a history of chronic venous insufficiency. Patient states that she normally wears compression stockings. Patient reports that she has noticed skin discoloration involving her left lower extremity. Patient also reports subjective left calf tenderness. Patient states this has been ongoing for the past 3 years. Patient denies any history of DVTs or PEs in the past.  Patient is not a smoker and currently not on any hormonal replacement therapy. Nuys any fever, chills, headaches, dizziness, chest pain, SOB, or palpitations. Dysgeusia: Patient is also here today for follow-up of abnormal sensation involving her taste buds. Patient was last seen in the office back in November 2022. At that time, patient was given Zofran. Patient states that her is a dysgeusia has not improved. Patient reports subjective \"rancid\" taste in her mouth after milk ingestion. Associated with nausea but no vomiting.   Patient also states that bread and raisins also appeared weird to her. Patient states that she had COVID about a year ago. Reports that the symptoms were not present prior to COVID. Denies any fever, chills, dysphagia, worsening heartburn, odynophagia, chest pain, SOB, palpitations, or vomiting. Review of Systems   Constitutional:  Negative for chills, fatigue and fever. Respiratory:  Negative for shortness of breath and wheezing. Cardiovascular:  Negative for chest pain and palpitations. Gastrointestinal:  Negative for abdominal pain, nausea and vomiting. Musculoskeletal:  Positive for arthralgias. Neurological:  Negative for syncope, weakness, numbness and headaches. Objective:    BP (!) 141/74 (Site: Right Lower Arm, Position: Sitting, Cuff Size: Medium Adult)   Pulse 78   Temp 98.3 °F (36.8 °C) (Oral)   Wt (!) 320 lb (145.2 kg)   BMI 50.12 kg/m²    BP Readings from Last 3 Encounters:   02/02/23 (!) 141/74   01/23/23 130/78   11/01/22 137/77     Physical Exam  Constitutional:       General: She is not in acute distress. Appearance: She is obese. She is not ill-appearing. HENT:      Head: Normocephalic and atraumatic. Eyes:      Extraocular Movements: Extraocular movements intact. Cardiovascular:      Rate and Rhythm: Normal rate and regular rhythm. Pulses: Normal pulses. Pulmonary:      Effort: Pulmonary effort is normal. No respiratory distress. Breath sounds: No wheezing, rhonchi or rales. Abdominal:      General: Bowel sounds are normal. There is no distension. Palpations: Abdomen is soft. Tenderness: There is no abdominal tenderness. There is no guarding. Musculoskeletal:      Right lower leg: Edema present. Left lower leg: Edema present. Comments: Left lower extremity- + Edema. mild TTP posterior aspect of calf. Homans negative   Skin:     General: Skin is warm. Neurological:      General: No focal deficit present. Mental Status: She is alert and oriented to person, place, and time. Psychiatric:         Mood and Affect: Mood normal.         Lab Results   Component Value Date    WBC 12.5 (H) 01/20/2021    HGB 15.6 (H) 01/20/2021    HCT 46.8 01/20/2021     01/20/2021    CHOL 132 12/14/2020    TRIG 114 12/14/2020    HDL 45 12/14/2020    ALT 35 (H) 01/20/2021    AST 26 01/20/2021     01/20/2021    K 3.9 01/20/2021     01/20/2021    CREATININE 0.79 01/20/2021    BUN 20 01/20/2021    CO2 33 (H) 01/20/2021    TSH 3.12 06/14/2021    INR 0.9 06/29/2013    LABA1C 5.6 09/02/2022    LABMICR CANNOT BE CALCULATED 01/02/2020     Lab Results   Component Value Date    CALCIUM 9.7 01/20/2021     Lab Results   Component Value Date    LDLCALC 68 06/16/2017    LDLCHOLESTEROL 64 12/14/2020       Assessment and Plan:    1. Chronic venous insufficiency  -We will order duplex of left lower extremity to rule out clot  -We will also refer patient to vascular surgery for further  -I also instructed patient to continue wearing compression stockings  - VL DUP LOWER EXTREMITY VENOUS LEFT; Future  - Rian Morillo MD, Vascular Surgery, [place]    2. Dysgeusia  -No red flags such as dysphagia, odynophagia, choking, halitosis, hemoptysis, agitation, or unintentional weight loss  -Continue omeprazole daily        Requested Prescriptions      No prescriptions requested or ordered in this encounter       There are no discontinued medications. Return in about 3 months (around 5/2/2023) for F/u with Dr. Darrian Crain only. Kaci received counseling on the following healthy behaviors:   Reviewed prior labs and health maintenance  Continue current medications, diet and exercise. Discussed use, benefit, and side effects of prescribed medications. Barriers to medication compliance addressed. Patient given educational materials - see patient instructions  Was a self-tracking handout given in paper form or via [employer]?  No:     Requested Prescriptions      No prescriptions requested or ordered in this encounter       All patient questions answered. Patient voiced understanding. Quality Measures    Body mass index is 50.12 kg/m². Elevated. Weight control planned discussed Healthy diet and regular exercise. BP: (!) 141/74 Blood pressure is normal. Treatment plan consists of No treatment change needed.     Lab Results   Component Value Date    LDLCALC 68 06/16/2017    LDLCHOLESTEROL 64 12/14/2020    (goal LDL reduction with dx if diabetes is 50% LDL reduction)      PHQ Scores 6/2/2022 6/11/2021 10/23/2020 11/5/2018 10/3/2018 7/28/2017   PHQ2 Score 0 0 0 0 0 0   PHQ9 Score 3 0 0 0 0 0     Interpretation of Total Score Depression Severity: 1-4 = Minimal depression, 5-9 = Mild depression, 10-14 = Moderate depression, 15-19 = Moderately severe depression, 20-27 = Severe depression       2

## 2023-02-06 DIAGNOSIS — Z01.818 PREOP EXAMINATION: Primary | ICD-10-CM

## 2023-02-08 ENCOUNTER — OFFICE VISIT (OUTPATIENT)
Dept: PODIATRY | Age: 63
End: 2023-02-08
Payer: COMMERCIAL

## 2023-02-08 VITALS
HEIGHT: 67 IN | DIASTOLIC BLOOD PRESSURE: 86 MMHG | SYSTOLIC BLOOD PRESSURE: 158 MMHG | WEIGHT: 293 LBS | BODY MASS INDEX: 45.99 KG/M2 | HEART RATE: 80 BPM

## 2023-02-08 DIAGNOSIS — E08.42 DIABETIC POLYNEUROPATHY ASSOCIATED WITH DIABETES MELLITUS DUE TO UNDERLYING CONDITION (HCC): Primary | ICD-10-CM

## 2023-02-08 DIAGNOSIS — I73.9 PVD (PERIPHERAL VASCULAR DISEASE) (HCC): ICD-10-CM

## 2023-02-08 DIAGNOSIS — M79.675 PAIN DUE TO ONYCHOMYCOSIS OF TOENAILS OF BOTH FEET: ICD-10-CM

## 2023-02-08 DIAGNOSIS — B35.1 PAIN DUE TO ONYCHOMYCOSIS OF TOENAILS OF BOTH FEET: ICD-10-CM

## 2023-02-08 DIAGNOSIS — M79.672 BILATERAL FOOT PAIN: ICD-10-CM

## 2023-02-08 DIAGNOSIS — M79.671 BILATERAL FOOT PAIN: ICD-10-CM

## 2023-02-08 DIAGNOSIS — M79.674 PAIN DUE TO ONYCHOMYCOSIS OF TOENAILS OF BOTH FEET: ICD-10-CM

## 2023-02-08 PROCEDURE — 11721 DEBRIDE NAIL 6 OR MORE: CPT

## 2023-02-08 NOTE — PROGRESS NOTES
[employer]  [address] [facility] [address] , [place]  Tel: [phone]   Fax: [phone]    Subjective     CC: Pain in bilateral feet    Interval history (10/7/2022): Patient returns to clinic for regular diabetic foot care. She explains that her nails cause her pain while ambulating and they become significantly elongated and thickened. She states that she gets relief from professional debridement is interested in having them debridement at this time. She also explains that she misplaced her prescription for her diabetic shoes and is interested in obtaining a new prescription today. Patient's last hemoglobin A1c was 5.6 on 9/2/2022. Patient complains of claudication symptoms and states that she regularly follows up with her vascular doctor. Patient states that she last saw her PCP at the beginning of this month. Patient denies any other pedal complaints at this time    HPI:  Elana Fuller is a 58 y.o. female who presents to clinic today for follow-up of pain in her left foot she recently received orthotics however she has not obtained shoes to put them in outside of worn shoes understands importance of doing so. She is not able to point to the exact location of the pain. She also relates burning to bilateral lower extremity. Relates that she normally wears compression stockings to bilateral lower extremities for edema. Denies any rest pain or claudication symptoms. Primary care physician is Aggie Montoya MD.    ROS:    Constitutional: Denies nausea, vomiting, fever, chills. Neurologic: Denies numbness, tingling, and burning in the feet. Vascular: Denies symptoms of lower extremity claudication. Skin: Painful thickened toenails   Otherwise negative except as noted in the HPI.      PMH:  Past Medical History:   Diagnosis Date    Abdominal bloating 1/23/2019    Anxiety     Bilateral edema of lower extremity 12/16/2016    Bronchial asthma     mild, intermittent Carpal tunnel syndrome     Cataract     Chronic back pain     Chronic sinusitis     Degenerative disc disease, lumbar 12/16/2016    Depression     Diabetes mellitus due to underlying condition with hyperosmolarity without coma ([facility] [address]     Dizziness 10/27/2017    DJD (degenerative joint disease)     S1, L3, L4, L5, T12    Dysphagia 1/23/2019    Edema of leg     bilateral legs    Environmental allergies     Frozen shoulder 4/27/2015    GERD (gastroesophageal reflux disease)     Glaucoma     Glucose intolerance (impaired glucose tolerance)     Headache(784.0)     History of bronchitis     Viral    HTN (hypertension)     Hyperlipidemia     Hypothyroid     hypothyroid state    Hypothyroidism     IBS (irritable bowel syndrome) 10/02/2012    Legally blind     due to glaucoma    Medication refill 6/1/2017    Mild intermittent asthma without complication     Morbid obesity with BMI of 45.0-49.9, adult (HCC)     MVP (mitral valve prolapse)     Neuropathy     OA (osteoarthritis)     EMILIA on CPAP     Osteopenia     Pancreatic cyst     Polyneuropathy     Raynaud disease     Rhinopharyngitis     Allergic rhinopharyngitis    Skin lesion of left leg 6/11/2021    Skin tag 7/9/2015    Stress fracture     Right 5th Metatarsal     Syncope     TIA (transient ischemic attack)     Urinary incontinence     Varicose vein of leg 6/1/2017    Vasodepressor syncope     Wears glasses        Surgical History:   Past Surgical History:   Procedure Laterality Date    APPENDECTOMY  1978    CATARACT REMOVAL Left     CHOLECYSTECTOMY  1978    COLONOSCOPY      COLONOSCOPY N/A 12/9/2019    COLORECTAL CANCER SCREENING, NOT HIGH RISK performed by Haleigh Redding MD at [address] [place] [address] Bilateral     right iridectomy, right laser, bilateral trabeculectomy, left drain    GLAUCOMA SURGERY      HAND SURGERY Right     HYSTERECTOMY (CERVIX STATUS UNKNOWN)  1982    KNEE SURGERY Right [address] GASTROINTESTINAL ENDOSCOPY      UPPER GASTROINTESTINAL ENDOSCOPY  5/24/2018    EGD DILATION SAVORY performed by Carmen Villa MD at [place] [address]  3/6/2019    EGD DILATION SAVORY performed by Jaz Alegria MD at [facility] N/A 9/8/2021    EGD DILATION SAVORY performed by Jaz Alegria MD at [address] [place] [address] History:  Social History     Tobacco Use    Smoking status: Never     Passive exposure: Never    Smokeless tobacco: Never   Vaping Use    Vaping Use: Never used   Substance Use Topics    Alcohol use: No     Alcohol/week: 0.0 standard drinks    Drug use: No       Medications:  Prior to Admission medications    Medication Sig Start Date End Date Taking?  Authorizing Provider   Multiple Vitamins-Minerals (CERTAVITE/ANTIOXIDANTS) TABS TAKE 1 TABLET BY MOUTH EVERY DAY 1/27/23   Valentin Wren MD   fluticasone Francesca Mccauley) 50 MCG/ACT nasal spray USE 1 SPRAY INTO EACH NOSTRIL TWICE DAILY 1/27/23   Gayle Hernandez MD   Cholecalciferol (VITAMIN D3) 50 MCG (2000 UT) CAPS TAKE 1 CAPSULE BY MOUTH EVERY DAY 1/27/23   Valentin Wren MD   senna (SENNA-TIME) 8.6 MG tablet TAKE 1 TABLET BY MOUTH 2 TIMES DAILY AS NEEDED FOR CONSTIPATION 1/23/23   Mic Mendoza MD   albuterol sulfate HFA (PROVENTIL;VENTOLIN;PROAIR) 108 (90 Base) MCG/ACT inhaler INHALE 2 PUFFS INTO THE LUNGS EVERY 6 HOURS AS NEEDED 1/9/23   Gayle Hernandez MD   ACETAMINOPHEN EXTRA STRENGTH 500 MG tablet TAKE 2 TABLETS BY MOUTH EVERY 6 HOURS AS NEEDED FOR PAIN 1/4/23   Lloyd Arriaga MD   Calcium Carbonate-Vitamin D 600-10 MG-MCG TABS Take 1 tablet by mouth in the morning and at bedtime 12/15/22 3/15/23  Daija Najera MD   omeprazole (PRILOSEC) 40 MG delayed release capsule Take 1 capsule by mouth daily 12/5/22   Abhay Borjas MD   ondansetron [facility]) 4 MG tablet Take 1 tablet by mouth daily as needed for Nausea or Vomiting 11/1/22   Daija Najera MD   Accu-Chek FastClix Lancets MISC USE TO CHECK BLOOD SUGAR TWICE A DAY 9/20/22   Eligah Runner, MD   ASPIRIN LOW DOSE 81 MG EC tablet Take 1 tablet by mouth daily 9/8/22   Cathie Babinski, MD   rosuvastatin (CRESTOR) 5 MG tablet Take 1 tablet by mouth daily 9/8/22   Cathie Babinski, MD   ALLERGY RELIEF 10 MG tablet TAKE 1 TABLET BY MOUTH DAILY 7/19/22   Vidhya Reed MD   Delbra Sparrow strip  12/20/21   Historical Provider, MD   Lancets Misc. (ACCU-CHEK FASTCLIX LANCET) KIT 1 box by Joesph Stern. (Med.Supl.;Non-Drugs) route 2 times daily Dispense 1 box of 100. 12/14/21   Osorio Davila MD   albuterol sulfate  (90 Base) MCG/ACT inhaler INHALE 2 PUFFS INTO THE LUNGS 4 TIMES DAILY AS NEEDED FOR WHEEZING 12/7/21   Vidhya Reed MD   levothyroxine (SYNTHROID) 100 MCG tablet Take 1 tablet by mouth Daily Indications: 112 mg 9/21/21   Junaid Mir MD   Blood Glucose Monitoring Suppl ([employer] Sparrow FLEX SYSTEM) w/Device KIT  4/1/21   Historical Provider, MD   Blood Glucose Calibration (ONETOUCH VERIO) SOLN  4/14/21   Historical Provider, MD       Objective     Vitals:    02/08/23 1246   BP: (!) 158/86   Pulse: 80       Lab Results   Component Value Date    LABA1C 5.6 09/02/2022       Physical Exam:  General:  Alert and oriented x3. In no acute distress. Lower Extremity Physical Exam:    Vascular: DP pulses are palpable, Bilateral. PT pulses non palpable bilaterally but audible on doppler. Varicose veins noted bilaterally. CFT <3 seconds to all digits, Bilateral.  Non pitting Edema noted to the bilateral lower extremity, Bilateral.  Hair growth is absent to the level of the digits, Bilateral.     Neuro: Saph/sural/SP/DP/plantar sensation diminished to light touch. Protective sensation is intact to 4/10 sites on the right foot and 3/10 sites on the left foot as tested with a 5.07g SWMF, Bilateral.     Musculoskeletal: EHL/FHL/GS/TA gross motor intact. Decreased ankle range of motion bilateral. Neg pain on calf squeeze.  Reduced ROM 1st MTPJ R worse than mild pain noted with palpation of second interspace and dorsiflexion plantarflexion second and third digits. Pain on palpation to the navicular tuberosity and along the posterior tibial tendon proximally, bilaterally. Dermatologic: Skin shiny and atrophic with no hair growth noted. Interdigital maceration absent, Bilateral. Nails 1-5 thickened, elongated with subungual debris noted b/l. No noted hyperkeratotic lesion. No open wounds appreciated. Class A Findings (Q7) - One Class A finding  [] Non traumatic amputation of foot or integral skeletal portion thereof  Class B Findings (Q8) - Two Class B findings  [x]Absent posterior tibial pulse   []Absent dorsalis pedis pulse   []Advanced trophic changes; three of the following are required:   [x]hair growth (decrease or absence)   [x]nail changes (thickening)   []pigmentary changes (discoloration)   [x]skin texture (thin, shiny)   []skin color (rubor or redness)  Class C Findings (Q9) - One Class B and two Class C findings  []Claudication   []Temperature changes   []Edema   []Paresthesia   []Burning    Q Modifier Met: Q8:  Two Class B findings        Sites of Onychomycosis Involvement (Check affected area)  [x] [x] [x] [x] [x] [x] [x] [x] [x] [x]  5 4 3 2 1 1 2 3 4 5                          Right                                        Left    Thickness  [x] [x] [x] [x] [x] [x] [x] [x] [x] [x]  5 4 3 2 1 1 2 3 4 5                         Right                                        Left    Dystrophic Changes                                                                 [x] [x] [x] [x] [x] [x] [x] [x] [x] [x]  5 4 3 2 1 1 2 3 4 5                         Right                                        Left    Color                                                                  [x] [x] [x] [x] [x] [x] [x] [x] [x] [x]  5 4 3 2 1 1 2 3 4 5                          Right                                        Left    Incurvation/Ingrowin [] [] [] [] [] [] [] [] [] []  5 4 3 2 1 1 2 3 4 5                         Right                                        Left    Inflammation/Pain                                                                   [x] [x] [x] [x] [x] [x] [x] [x] [x] [x]  5 4 3 2 1 1 2 3 4 5                         Right                                        Left        Assessment   Kaci Benz is a 58 y.o. female with     Diagnosis Orders   1. Diabetic polyneuropathy associated with diabetes mellitus due to underlying condition ([facility]  Diabetic Shoe      2. Pain due to onychomycosis of toenails of both feet        3. Bilateral foot pain        4. PVD (peripheral vascular disease) ([facility]             Plan   Pt was evaluated and examined. Patient was given personalized discharge instructions. Diagnosis was discussed with the pt and all of their questions were answered in detail. Proper foot hygiene and care was discussed with the pt. Nails 1-10 were divided with sterile nail nipper without incident. New prescription for diabetic shoes was written and dispensed to the patient. List of Distributors was also given to the patient. Discussion was had with the patient about regular exercise and diet. RTC in 3 months   Discussed with Dr. Hsu Locus  Please, call the office with any questions or concerns. Nely Burris DPM   Podiatric Medicine & Surgery   2/8/2023 at 3:41 PM      I performed a history and physical examination of the patient and discussed management with the resident. I reviewed the residents note and agree with the documented findings and plan of care. Any areas of disagreement are noted on the chart. I was personally present for the key portions of any procedures. I have documented in the chart those procedures where I was not present during the key portions. I have reviewed the Podiatry Resident progress note.  I agree with the chief complaint, past medical history, past surgical history, allergies, medications, social and family history as documented unless otherwise noted below. Documentation of the HPI, Physical Exam and Medical Decision Making performed by medical students or scribes is based on my personal performance of the HPI, PE and MDM. I have personally evaluated this patient and have completed at least one if not all key elements of the E/M (history, physical exam, and MDM). Additional findings are as noted.      Electronically signed by Navjot Edwards DPM on 2/9/2023 at 12:11 PM

## 2023-02-13 ENCOUNTER — HOSPITAL ENCOUNTER (OUTPATIENT)
Dept: VASCULAR LAB | Age: 63
Discharge: HOME | End: 2023-02-15
Payer: COMMERCIAL

## 2023-02-13 DIAGNOSIS — M79.605 PAIN OF LEFT LOWER EXTREMITY: ICD-10-CM

## 2023-02-13 DIAGNOSIS — I87.2 CHRONIC VENOUS INSUFFICIENCY: ICD-10-CM

## 2023-02-13 PROCEDURE — 93971 EXTREMITY STUDY: CPT

## 2023-02-14 ENCOUNTER — INITIAL CONSULT (OUTPATIENT)
Dept: VASCULAR SURGERY | Age: 63
End: 2023-02-14
Payer: COMMERCIAL

## 2023-02-14 VITALS
BODY MASS INDEX: 45.99 KG/M2 | RESPIRATION RATE: 16 BRPM | WEIGHT: 293 LBS | HEIGHT: 67 IN | OXYGEN SATURATION: 97 % | SYSTOLIC BLOOD PRESSURE: 155 MMHG | TEMPERATURE: 97.3 F | DIASTOLIC BLOOD PRESSURE: 83 MMHG | HEART RATE: 80 BPM

## 2023-02-14 DIAGNOSIS — I83.813 VARICOSE VEINS OF BOTH LOWER EXTREMITIES WITH PAIN: Primary | ICD-10-CM

## 2023-02-14 PROCEDURE — G8417 CALC BMI ABV UP PARAM F/U: HCPCS | Performed by: STUDENT IN AN ORGANIZED HEALTH CARE EDUCATION/TRAINING PROGRAM

## 2023-02-14 PROCEDURE — 1036F TOBACCO NON-USER: CPT | Performed by: STUDENT IN AN ORGANIZED HEALTH CARE EDUCATION/TRAINING PROGRAM

## 2023-02-14 PROCEDURE — 99203 OFFICE O/P NEW LOW 30 MIN: CPT | Performed by: STUDENT IN AN ORGANIZED HEALTH CARE EDUCATION/TRAINING PROGRAM

## 2023-02-14 PROCEDURE — G8427 DOCREV CUR MEDS BY ELIG CLIN: HCPCS | Performed by: STUDENT IN AN ORGANIZED HEALTH CARE EDUCATION/TRAINING PROGRAM

## 2023-02-14 PROCEDURE — G8484 FLU IMMUNIZE NO ADMIN: HCPCS | Performed by: STUDENT IN AN ORGANIZED HEALTH CARE EDUCATION/TRAINING PROGRAM

## 2023-02-14 PROCEDURE — 3017F COLORECTAL CA SCREEN DOC REV: CPT | Performed by: STUDENT IN AN ORGANIZED HEALTH CARE EDUCATION/TRAINING PROGRAM

## 2023-02-14 ASSESSMENT — ENCOUNTER SYMPTOMS
CHEST TIGHTNESS: 0
EYE PAIN: 0
ABDOMINAL DISTENTION: 0
COLOR CHANGE: 0
ABDOMINAL PAIN: 0
COUGH: 0
VOMITING: 0
VOICE CHANGE: 0
TROUBLE SWALLOWING: 0

## 2023-02-14 NOTE — PROGRESS NOTES
[facility]  [address] [facility] [address] [place] [address]  Dept: [phone]     Patient: Gris Birch  : 1960  MRN: [id number]  DOS: 2023    HPI:  Gris Birch is a 58 y.o. female who comes to the office regarding bilateral leg swelling and discomfort. This has been going for several years. No wounds present. Denies tobacco use. She underwent a venous duplex study on 23 which revealed no evidence of DVT. She already has compression stockings. Able to ambulate.     Past Medical History:   Diagnosis Date    Abdominal bloating 2019    Anxiety     Bilateral edema of lower extremity 2016    Bronchial asthma     mild, intermittent    Carpal tunnel syndrome     Cataract     Chronic back pain     Chronic sinusitis     Degenerative disc disease, lumbar 2016    Depression     Diabetes mellitus due to underlying condition with hyperosmolarity without coma ([facility] [address]     Dizziness 10/27/2017    DJD (degenerative joint disease)     S1, L3, L4, L5, T12    Dysphagia 2019    Edema of leg     bilateral legs    Environmental allergies     Frozen shoulder 2015    GERD (gastroesophageal reflux disease)     Glaucoma     Glucose intolerance (impaired glucose tolerance)     Headache(784.0)     History of bronchitis     Viral    HTN (hypertension)     Hyperlipidemia     Hypothyroid     hypothyroid state    Hypothyroidism     IBS (irritable bowel syndrome) 10/02/2012    Legally blind     due to glaucoma    Medication refill 2017    Mild intermittent asthma without complication     Morbid obesity with BMI of 45.0-49.9, adult (HCC)     MVP (mitral valve prolapse)     Neuropathy     OA (osteoarthritis)     EMILIA on CPAP     Osteopenia     Pancreatic cyst     Polyneuropathy     Raynaud disease     Rhinopharyngitis     Allergic rhinopharyngitis    Skin lesion of left leg 2021    Skin tag 2015 Stress fracture     Right 5th Metatarsal     Syncope     TIA (transient ischemic attack)     Urinary incontinence     Varicose vein of leg 6/1/2017    Vasodepressor syncope     Wears glasses      Family History   Problem Relation Age of Onset    Diabetes Mother     Heart Disease Mother         multiple heart attacks    Arthritis Mother     High Blood Pressure Mother     High Cholesterol Mother     Substance Abuse Mother     Heart Disease Father     Arthritis Father     Depression Father     High Cholesterol Father     Mental Illness Father     Substance Abuse Father     Diabetes Sister     High Blood Pressure Sister     Cancer Paternal Uncle         esophageal cancer    Diabetes Maternal Aunt     Cancer Maternal Aunt         colorectal cancer    Diabetes Maternal Uncle     Cancer Maternal Grandmother         colorectal cancer    Arthritis Maternal Grandmother     Diabetes Maternal Grandmother     High Blood Pressure Maternal Grandmother     Stroke Maternal Grandmother     Arthritis Maternal Grandfather     Arthritis Paternal Grandmother     Cancer Paternal Grandmother     Depression Paternal Grandmother     Heart Disease Paternal Grandmother     High Blood Pressure Paternal Grandmother     High Cholesterol Paternal Grandmother     Mental Illness Paternal Grandmother     Arthritis Paternal Grandfather       Social History     Socioeconomic History    Marital status: Single     Spouse name: Not on file    Number of children: Not on file    Years of education: Not on file    Highest education level: Not on file   Occupational History    Not on file   Tobacco Use    Smoking status: Never     Passive exposure: Never    Smokeless tobacco: Never   Vaping Use    Vaping Use: Never used   Substance and Sexual Activity    Alcohol use: No     Alcohol/week: 0.0 standard drinks    Drug use: No    Sexual activity: Not Currently     Partners: Male   Other Topics Concern    Not on file   Social History Narrative    Not on file Social Determinants of Health     Financial Resource Strain: Low Risk     Difficulty of Paying Living Expenses: Not hard at all   Food Insecurity: No Food Insecurity    Worried About Running Out of Food in the Last Year: Never true    Ran Out of Food in the Last Year: Never true   Transportation Needs: Not on file   Physical Activity: Not on file   Stress: Not on file   Social Connections: Not on file   Intimate Partner Violence: Not on file   Housing Stability: Not on file      Past Surgical History:   Procedure Laterality Date    APPENDECTOMY  1978    CATARACT REMOVAL Left     CHOLECYSTECTOMY  1978    COLONOSCOPY      COLONOSCOPY N/A 12/9/2019    COLORECTAL CANCER SCREENING, NOT HIGH RISK performed by Lexa Benton MD at [address] [place] [address] Bilateral     right iridectomy, right laser, bilateral trabeculectomy, left drain    GLAUCOMA SURGERY      HAND SURGERY Right     HYSTERECTOMY (CERVIX STATUS UNKNOWN)  [facility] ENDOSCOPY  5/24/2018    EGD DILATION SAVORY performed by Preethi Macario MD at [place] [address]  3/6/2019    EGD DILATION SAVORY performed by Lexa Benton MD at [facility] N/A 9/8/2021    EGD DILATION SAVORY performed by Lexa Benton MD at [facility] ENDO      Review of Systems   Constitutional:  Negative for activity change, fever and unexpected weight change. HENT:  Negative for trouble swallowing and voice change. Eyes:  Negative for pain and visual disturbance. Respiratory:  Negative for cough and chest tightness. Cardiovascular:  Positive for leg swelling. Negative for chest pain and palpitations. Gastrointestinal:  Negative for abdominal distention, abdominal pain and vomiting. Endocrine: Negative for cold intolerance and heat intolerance.    Genitourinary:  Negative for dysuria, flank pain and hematuria. Musculoskeletal:  Negative for joint swelling and neck pain. Skin:  Negative for color change and rash. Allergic/Immunologic: Negative for immunocompromised state. Neurological:  Negative for syncope, speech difficulty, weakness, numbness and headaches. Hematological:  Negative for adenopathy. Psychiatric/Behavioral:  Negative for behavioral problems and suicidal ideas. Vitals:    02/14/23 1002   BP: (!) 155/83   Site: Right Upper Arm   Position: Sitting   Cuff Size: Large Adult   Pulse: 80   Resp: 16   Temp: 97.3 °F (36.3 °C)   TempSrc: Temporal   SpO2: 97%   Weight: (!) 314 lb (142.4 kg)   Height: 5' 7\" (1.702 m)          Physical Exam  Constitutional:       General: She is not in acute distress. HENT:      Mouth/Throat:      Mouth: Mucous membranes are moist.      Pharynx: Oropharynx is clear. Eyes:      General: No scleral icterus. Extraocular Movements: Extraocular movements intact. Conjunctiva/sclera: Conjunctivae normal.   Cardiovascular:      Rate and Rhythm: Normal rate and regular rhythm. Heart sounds: No murmur heard. Comments: Bilateral leg swelling, no wounds. Motor/sensation intact. Pulmonary:      Effort: No respiratory distress. Breath sounds: No rales. Abdominal:      General: There is no distension. Palpations: There is no mass. Tenderness: There is no abdominal tenderness. There is no guarding. Musculoskeletal:      Cervical back: No rigidity or tenderness. Lymphadenopathy:      Cervical: No cervical adenopathy. Skin:     Coloration: Skin is not jaundiced. Findings: No rash. Neurological:      General: No focal deficit present. Mental Status: She is alert and oriented to person, place, and time. Cranial Nerves: No cranial nerve deficit. Psychiatric:         Mood and Affect: Mood normal.       Assessment:  1.  Varicose veins of both lower extremities with pain      Plan:  Ms. Chriss will need to continue compression and elevation of her legs. I will order a venous reflux study. I will also put in a referral for lymphedema clinic to see if they can also help with her leg swelling. I will see her back in one month.     Electronically signed by:  Williemae Cheadle, MD

## 2023-02-15 DIAGNOSIS — I83.12 VARICOSE VEINS OF BOTH LOWER EXTREMITIES WITH INFLAMMATION: Primary | ICD-10-CM

## 2023-02-15 DIAGNOSIS — Z01.818 PREOP EXAMINATION: ICD-10-CM

## 2023-02-15 DIAGNOSIS — I83.11 VARICOSE VEINS OF BOTH LOWER EXTREMITIES WITH INFLAMMATION: Primary | ICD-10-CM

## 2023-02-23 ENCOUNTER — HOSPITAL ENCOUNTER (OUTPATIENT)
Dept: VASCULAR LAB | Age: 63
Discharge: HOME | End: 2023-02-25
Payer: COMMERCIAL

## 2023-02-23 DIAGNOSIS — Z01.818 PREOP EXAMINATION: ICD-10-CM

## 2023-02-23 DIAGNOSIS — I83.12 VARICOSE VEINS OF BOTH LOWER EXTREMITIES WITH INFLAMMATION: ICD-10-CM

## 2023-02-23 DIAGNOSIS — I83.11 VARICOSE VEINS OF BOTH LOWER EXTREMITIES WITH INFLAMMATION: ICD-10-CM

## 2023-02-23 PROCEDURE — 93970 EXTREMITY STUDY: CPT

## 2023-02-28 ENCOUNTER — HOSPITAL ENCOUNTER (OUTPATIENT)
Dept: OCCUPATIONAL THERAPY | Age: 63
Setting detail: THERAPIES SERIES
Discharge: HOME | End: 2023-02-28
Payer: COMMERCIAL

## 2023-02-28 PROCEDURE — 97535 SELF CARE MNGMENT TRAINING: CPT

## 2023-02-28 PROCEDURE — 97165 OT EVAL LOW COMPLEX 30 MIN: CPT

## 2023-02-28 NOTE — CONSULTS
TREATMENT LOCATION:   [] [facility] [address]  P: [phone]  F: [phone] [x] [address]: [phone]  F: [phone]      Lymphedema Services - Initial Evaluation for B LE    Date:  2023  Patient: Rima Breen  : 1960             MRN: [id number]  Referring Provider: Minoo Benz, *       Phone: [phone]  Fax: [phone]  Insurance: Vidya Gabrielhouse ([id number]) - [insurance restrictions/#of visits/etc here 27 VPCY then auth]  Medical Diagnosis: Varicose veins of both legs with pain I83.813  Rehab Codes: I89.0,    Onset Date: 2023  Visit# / total visits:  scheduled; Progress note due at visit 10 per POC.   ( Certification Dates: 2023 - 2023)    Info for garment VOB: if needed  [address] [place] [address],[address]  [phone]  2023 benefits checked  with [facility] and [employer] 100% covered for a pump  [employer] out of network    Allergies:  Medications EXTENSIVE LIST Latex Allergy  Medications: See charted information in Epic    Past Medical History: See charted information in [facility]   Active Ambulatory Problems     Diagnosis Date Noted    Glaucoma     GERD (gastroesophageal reflux disease)     DJD (degenerative joint disease)     Obesity     Polyneuropathy     Migraine 2011    Mitral prolapse 2011    Low back pain 2011    CTS (carpal tunnel syndrome) 2011    Supraspinatus syndrome 2012    Impaired fasting glucose 2012    Acute sinus infection 2012    IBS (irritable bowel syndrome) 10/02/2012    Back pain, chronic 2013    Stress fracture, right 5th metatarsal  2013    Upper airway cough syndrome 03/15/2013    Ear fullness 03/15/2013    Perioral numbness 2013    Screening for osteoporosis 2013    Headache 2013    Left eye injury 2013    Medication reaction 07/19/2013    Osteopenia 07/19/2013    Lumbosacral pain 10/22/2013    Flu vaccine need 10/22/2013    Hypothyroid 12/15/2014    Urinary incontinence     Anxiety     Sleep apnea     Depression     Chronic back pain     Carpal tunnel syndrome     Neuropathy     Mitral valve problem     Morbid obesity with BMI of 45.0-49.9, adult ([facility] [address] 04/27/2015    Frozen shoulder 04/27/2015    Skin tag 07/09/2015    Degenerative disc disease, lumbar 12/16/2016    Bilateral edema of lower extremity 12/16/2016    Medication refill 06/01/2017    Varicose vein of leg 06/01/2017    Dizziness 10/27/2017    Dysphagia 01/23/2019    Abdominal bloating 01/23/2019    Mild intermittent asthma without complication 50/29/6418    Skin lesion of left leg 06/11/2021    Rotator cuff arthropathy of left shoulder 09/02/2022    Dysgeusia 11/01/2022     Resolved Ambulatory Problems     Diagnosis Date Noted    Hyperlipidemia     HTN (hypertension)     Hypothyroidism 03/25/2020    DM (diabetes mellitus) 12/14/2011    HIGH CHOLESTEROL 12/14/2011    Diet-controlled type 2 diabetes mellitus 10/18/2012    Dyslipidemia 07/19/2013    Need for hepatitis C screening test 12/15/2014    Screening for breast cancer 04/27/2015    Prediabetes 07/09/2015    Healthcare maintenance 06/01/2017     Past Medical History:   Diagnosis Date    Bronchial asthma     Cataract     Chronic sinusitis     Diabetes mellitus due to underlying condition with hyperosmolarity without coma (HCC)     Edema of leg     Environmental allergies     Glucose intolerance (impaired glucose tolerance)     Headache(784.0)     History of bronchitis     Legally blind     MVP (mitral valve prolapse)     OA (osteoarthritis)     EMILIA on CPAP     Pancreatic cyst     Raynaud disease     Rhinopharyngitis     Syncope     TIA (transient ischemic attack)     Vasodepressor syncope     Wears glasses          Restrictions: None  Fall Risk:   [] No    [x] Yes   If yes, intervention: Used 4WW walked slowly      Overview: Patient is a 58year old female referred to the Lymphedema Clinic with a diagnosis of bilateral Lower Extremity Lymphedema. Onset past 5 years. Pt wears compression stockings and elevates her legs. She sleeps in bed. Co-morbidities include varicose veins, obesity, signs of lipedema. Barriers to care include pt is low vision, no one available to assist with home program, relies on community transportation making visits difficult. Relevant provider notes from Usman Erazo MD: 2/14/2023  Zabrina Fernandez is a 58 y.o. female who comes to the office regarding bilateral leg swelling and discomfort. This has been going for several years. No wounds present. Denies tobacco use. She underwent a venous duplex study on 2/13/23 which revealed no evidence of DVT. She already has compression stockings. Able to ambulate    SUBJECTIVE:  Per pt  Mitral valve prolapse  HTN  Asthma  No kidney problems  No LB sx  Hx Phlebitis in L LE  No hx trauma to LB  Back issue  leading to neuropathy in B LE   Swelling in B LE at least 5 years   Varicose vein medial left leg with burning sensation. Worked as an aide  Glaucoma resulted in loss of vision. Dx at 40 with glaucoma. Difficulty reading.   Sleeps in a bed  Cleared of bed bugs 2/9/2023    Hx of Complete Decongestive Therapy:[]Yes - Date:        [x]No   Rate of onset:   [x] Slow   [] Rapid     [] Acute   Progression: [] Improving   [x]  Worsening  [] Consistent   Conservative Treatments Utilized in the Past:  [] None  [x] Compression Garments   [] Bandaging  [] Elevation  [] Exercise   []Self MLD  [] Other:    Comments: wears daily when they fit   Current Lymphedmea Treatments:   [] None  [x] Compression Garments   [] Bandaging  [x] Elevation  [] Exercise   []Self MLD  [] Other:    Comments:   Aggravating factors:  [] None   [x] Activity  [] Stress  [x] Dependent Position [] Travel   [] Hot Temperatures [] Diet     [] Sleeping Position      [] Other:  Comments: walking   Relieving factors:   [] None [] Elevation  [] Activity  [x] Compression    [] Rest  [] Cold Temperatures [] Diet   []  Other:  Comments:   Additional Comments:        Pain:  [] YES    [x] NO   Location: na     Pain Rating: ( 0-10 scale): 0/10  Comments:     History of Cancer: []Yes [x]No       Comorbidities:   [x] Obesity [] Dialysis  [] Other:   [x] Asthma/COPD [] Dementia [] Other:   [x] Stroke   (TIA in 1997) [x] Sleep apnea  (Treated) [] Other:   [x] Vascular disease [] Rheumatic disease [] Other:       Absolute Lymphedema Contraindications - treatement [] NONE    [] CHF (only if patient is un-medicated or edema is solely d/t cardiac failure)    [] DVT- Acute, No MLD to limb      [] Advanced Renal Disease - Need Physician Clearance  [] Acute Skin Infection ( e.g. Cellulitis, Ersipelas)  [x] Thyroid condition (caution with MLD to neck)  [] Cardiac Arrhythmia  [] Hypersensitive Carotid Sinus    Absolute Contraindications Regarding the Deep Abdomen: [x] NONE   [] Pregnancy   [] Abdominal Aortic Aneurism - Current or history of   [] Inflammatory Disease of bowel or intestines  [] Severe Arteriosclerosis   [] Intra-abdominal scar formations following surgery  [] Recent abdominal surgery  [] Pelvic DVT- Current or history of  [] Presence of clot prevention devices ( e.g. - [place] Filter)     Relative Lymphedema Contraindications [] NONE     [] Malignant Lymphedema - Edema is being caused by active cancer. MLD and CDT considered palliative during active cancer treatments. Physician approval required.    [x] Age 61+   [] Limb paralysis     Home/Work Environment  Patient lives with:  Alone   In what type of home []  One story   [] Two story   [] Split level  [x] Apartment   Number of stairs to enter  elevator   With handrail on the []  Right to enter   [] Left to enter   Bathroom has a []  Tub only  [x] Tub/shower combo   [] Walk in shower    [x]  Grab bars   Washing machine is on []  Main level   [] Second level   [] Basement   Employer  Retired disabled d/t vision and back   Job Status []  Normal duty   [] Light duty   [] Off due to condition    []  Retired   [] Not employed   [] Disability  [] Other:  []  Return to work: Work activities/duties        ADL/IADL [x] Previously independent with all [x] Currently independent with all Who currently assists the patient with task     [] Previously independent with all except: [] Currently independent with all except:     Bathing  [] Assist [] Assist     Dress/grooming [] Assist [] Assist     Transfer/mobility [] Assist [] Assist     Feeding [] Assist [] Assist     Toileting [] Assist [] Assist     Driving [x] Assist [x] Assist  d/t low vision   Housekeeping [] Assist [] Assist     Grocery shop/meal prep [] Assist [] Assist         Gait Prior level of function Current level of function    [x] Independent  [] Assist [x] Independent  [] Assist   Device: [] Independent [] Independent    [] Straight Cane [] Quad cane [x] Straight Cane [] Quad cane    [] Standard walker [] Rolling walker   [x] 4 wheeled walker [] Standard walker [] Rolling walker   [x] 4 wheeled walker    [] Wheelchair [] Wheelchair         OBJECTIVE  Physical Status:   Range of motion: [x] WFL [] Deficits         Comments:  Strength: [] WFL [x] Deficits          Comments: Weaker left LB  Balance: [] WFL [x] Deficits       Comments: d/t low vision and neuropathy    Presentation of Affected Areas  Location: bilateral Lower Extremity    Description: Stemmer sign negative, foot sparing with sings of lipedema, CVI varicose veins worse in left ankle and foot, nail fungus, vascular staining full thighs moderate tissue density, decreased L LE strength     Scars no   Wounds None   Sensation neuropathy   Additional Comments:        Circumferential Measurements  Measurements taken from nail base of D2 digit.   Measurements (cm) cm Right Left   Dorsum    9 22.5 22.5   Supramalleolar  (ankle)   17 29.0 28.5   Distal Calf    25 35.0 34.0   Mid Calf    35 47.0 43.0   Proximal Calf   45 54.0 51.5   Knee         Distal thigh        Mid thigh      Proximal Thigh      Initial Total 2/28/2023:  NA              ASSESSMENT:   Patient is a 58year old female referred to the Lymphedema Clinic with a diagnosis of bilateral Lower Extremity Lymphedema. Onset past 5 years. Pt wears compression stockings and elevates her legs. She sleeps in bed. Co-morbidities include varicose veins, obesity, signs of lipedema. Barriers to care include pt is low vision, no one available to assist with home program, relies on community transportation making visits difficult. Patient would benefit from skilled occupational therapy services in order to:  Decrease edema that has accumulated in the extremity, decrease risk of infection, improve limb ROM, increase ease and independence with ADL, educate on long term management of condition, and improve overall quality of life. Pt is educated on the basics of lymphedema, program details and what to expect during treatment for further review at home. Pt advised given difficulty coming to clinic OTR/L this date checked benefits for pump and velcro compression and measured for velcro compression. OTR/L to place order for items then pt to come to visit with velcro compression with appt yet to be made for home program training. Pt is edu on a POC that would be of benefit to them given findings made during evaluation, including the items checked below.      Treatment may include the following:     [x]Bandaging   []Self-Bandaging/Caregiver Training   [x]MLD    [x]Self-MLD   [x] Therapeutic Exercise    [x] Education/Instruction in home management program  [x] Education and trial of a Vasopneumatic Pump    [x] Education and transition to long term management devices such as velcro compression garments and/or daytime compression sleeve/stocking(s)   [x]Discharge to Home Program     Response to treatment: Pt verbalizes and is agreeable to the instructions/ POC established at today's evaluation. PLAN FOR NEXT VISIT:   Ortho fit and train velcro compression  2/28/2023 benefit check for velcro and pump  Order pump  Ready Wrap fits tall XXL Fusion to check with pt for color black or tan if covered      Lymphedema Stage per ISL Guidelines    [] 0: Subclinical with no evidence on physical exam  [] 1:  Early onset, swelling/heaviness, pitting edema, subsides with limb elevation  [x] 2:  More advanced, fibrosis resulting in non-pitting edema, does not respond to elevation, thickening of the tissues  [] 3: Elephantiasis, pitting absent, huge limbs with dry/scaly, papillomatosis, hyperkeratosis, fluid may be leaking with recurrent cellulitis. Acanthosis (deep body folds). Elevation &   diuretics ineffective. Therapy Goals    STG - To be addressed within 10 visits    Pt will demonstrate compliance of maintaining lymphedema precautions to reduce the risks of infection and further exacerbations. Pt will demonstrate independence with decongestive exercise program in order to expedite fluid rerouting. LTG To be addressed within 10 visits     Pt will demonstrate competence with SELF MLD (Manual lymphatic drainage) in order to reroute lymphatic pathways for decreased swelling. Pt/Caregiver will demonstrate independence with donning/doffing and wearing schedule for compression garments/ devices to maintain decreased size upon discharge. Pt to compliant with CDT in order to reduce edema in the B LE by 10+ cm total per limb. Patient's Goal: To get swelling in B LE reduced to reduce pain for improved functional mobility.      Response to Education Provided:  [x] Verbalized/demonstrated understanding   [] Demonstrates/verbalizes HEP/Education previously given      [] Needs continued review            [] No understanding   Learner(s): [x] Patient  [] Significant Other [] Family       [] Other: Method(s): [x] Verbal   [] Demonstration [] Handouts [] Other:   Person(s) available to assist with home program if needed: [] Yes:  [x] No    FREQUENCY: Pt will be seen 2 x/ week for 10 visits    Rehabilitation Potential/Commitment: [x] Good [] Fair     [] Poor    Functional Outcome Measure(s):  Lymphedema Life Impact Scale (LLIS) Score: 38% functionally impaired      Clearance needed:  []Yes    [x]No     Physicians/specialties giving clearance:    Referral needed to: [] Physical Therapy   [] Speech Therapy                 Treatment Charges   Minutes   Units   Evaluation                                                             $97.64/ $76.35            Low   (04403) 45 1          Moderate   (22024)            High   (39269)     Manual Therapy (52846):                                      $26.27 / $20.83     Therapeutic activities ():                             $35.63/ $25.68     Therapeutic Exercise (41821)                              $28.50/$ 22.29     Self care/home mgmt (99647)                              $31.61/ $23.84 15 1   Vasopneumatic Device (42885)                           $8.99     Total Treatment Time    60          Time In:  0900  Time Out: 1000      Electronically signed by BOUBACAR Sheehan, ILIA, JAZLYN/L on 2/28/2023 at 9:04 AM      Physician Signature: _________________________        Date: _______________  By signing above or cosigning this note, I have reviewed this plan of care and certify a need to continue medically necessary rehabilitation services.       *PLEASE SIGN ABOVE AND FAX BACK [phone] WITH ALL PAGES FOR CONSENT TO CONTINUE LYMPHEDEMA TREATMENT*

## 2023-03-17 ENCOUNTER — OFFICE VISIT (OUTPATIENT)
Dept: PULMONOLOGY | Age: 63
End: 2023-03-17
Payer: COMMERCIAL

## 2023-03-17 VITALS
RESPIRATION RATE: 16 BRPM | WEIGHT: 293 LBS | DIASTOLIC BLOOD PRESSURE: 85 MMHG | OXYGEN SATURATION: 98 % | BODY MASS INDEX: 45.99 KG/M2 | SYSTOLIC BLOOD PRESSURE: 150 MMHG | HEIGHT: 67 IN | HEART RATE: 74 BPM

## 2023-03-17 DIAGNOSIS — G47.33 OBSTRUCTIVE SLEEP APNEA SYNDROME: Primary | ICD-10-CM

## 2023-03-17 PROCEDURE — 1036F TOBACCO NON-USER: CPT | Performed by: INTERNAL MEDICINE

## 2023-03-17 PROCEDURE — 3017F COLORECTAL CA SCREEN DOC REV: CPT | Performed by: INTERNAL MEDICINE

## 2023-03-17 PROCEDURE — 99214 OFFICE O/P EST MOD 30 MIN: CPT | Performed by: INTERNAL MEDICINE

## 2023-03-17 PROCEDURE — G8427 DOCREV CUR MEDS BY ELIG CLIN: HCPCS | Performed by: INTERNAL MEDICINE

## 2023-03-17 PROCEDURE — G8484 FLU IMMUNIZE NO ADMIN: HCPCS | Performed by: INTERNAL MEDICINE

## 2023-03-17 PROCEDURE — G8417 CALC BMI ABV UP PARAM F/U: HCPCS | Performed by: INTERNAL MEDICINE

## 2023-03-17 ASSESSMENT — SLEEP AND FATIGUE QUESTIONNAIRES
HOW LIKELY ARE YOU TO NOD OFF OR FALL ASLEEP WHILE SITTING QUIETLY AFTER LUNCH WITHOUT ALCOHOL: 1
HOW LIKELY ARE YOU TO NOD OFF OR FALL ASLEEP WHILE SITTING AND READING: 0
HOW LIKELY ARE YOU TO NOD OFF OR FALL ASLEEP WHILE SITTING INACTIVE IN A PUBLIC PLACE: 1
HOW LIKELY ARE YOU TO NOD OFF OR FALL ASLEEP WHEN YOU ARE A PASSENGER IN A CAR FOR AN HOUR WITHOUT A BREAK: 1
HOW LIKELY ARE YOU TO NOD OFF OR FALL ASLEEP WHILE LYING DOWN TO REST IN THE AFTERNOON WHEN CIRCUMSTANCES PERMIT: 2
HOW LIKELY ARE YOU TO NOD OFF OR FALL ASLEEP WHILE SITTING AND TALKING TO SOMEONE: 2
ESS TOTAL SCORE: 9
HOW LIKELY ARE YOU TO NOD OFF OR FALL ASLEEP IN A CAR, WHILE STOPPED FOR A FEW MINUTES IN TRAFFIC: 0
HOW LIKELY ARE YOU TO NOD OFF OR FALL ASLEEP WHILE WATCHING TV: 2

## 2023-03-17 NOTE — PATIENT INSTRUCTIONS
@[facility]@        YOU ARE BEING REFERRED TO:    JEWEL PIERCE (a [facility])    [address] [place] [address] [address], [place]    Main Phone Number: [phone]    Phone number for scheduling sleep study: [phone]      Please contact the above numbers to schedule your sleep study. Once you have completed the FIRST NIGHT you may be asked to return for a SECOND NIGHT if necessary. After the SECOND NIGHT the sleep center will order a CPAP/BiPAP machine for you. An order for the machine will be sent to a durable medical equipment company ([employer]). The sleep center will provide you with the [employer] companies information. Once you have your machine please contact our office to schedule a follow up appointment. Your follow up will be scheduled for a date 30-90 days after you have received your machine. At that follow up visit we will need to have a compliance download from your DME company. Please contact your [employer] company to have the compliance download faxed to our office at   [phone] for your follow up appointment. IF YOU HAVE ANY QUESTIONS ABOUT YOUR SLEEP STUDY   PLEASE CONTACT THE SLEEP CENTER.

## 2023-03-17 NOTE — PROGRESS NOTES
Wendy Marrufo  3/17/2023    Chief Complaint   Patient presents with    Sleep Apnea     Patient has had a lot of tightness in chest.  Using rescue more often but it is not always helping         Wendy Marrufo is known to have obstructive sleep apnea syndrome that is responding well to the use of CPAP that she uses regularly every night 6 to 8 hours. However in spite of the use of CPAP she wakes up every morning with headache. Headache is throbbing. Sleep is not very refreshing does not take frequent naps during the daytime. She has no symptoms of sinusitis. The headaches are mostly frontal.  And these are throbbing. She has hypothyroid state and is on replacement therapy her thyroid function has been stable. Her blood sugar is also stable and under good control. She is not known to have any hypertension heart disease   She continues to be obese and has not been able to lose any weight. She does use the Ventolin inhaler maybe 3-4 times every month but has some cough which is short-lived without any sputum production or hemoptysis. She has neurocardiogenic syncope which is under good control is better than before. He has not noted any pedal edema. She already had COVID-vaccine flu vaccine and Pneumovax. General ROS: negative for - chills, fatigue, fever or weight loss  ENT ROS: negative for - headaches, oral lesions or sore throat  Cardiovascular ROS: no chest pain , orthopnea or pnd   Gastrointestinal ROS: no abdominal pain, change in bowel habits, or black or bloody stools  Skin - no rash   Neuro - no blurry vision , no loc .  No focal weakness   msk - no jt tenderness or swelling    Vascular - no claudication , rest completed and negative   Lymphatic - complete and negative   Hematology - oncology - complete and negative   Allergy immunology - complete and negative    no burning or hematuria       LUNG CANCER SCREENING     CRITERIA MET    []     CT ORDERED  []      CRITERIA NOT MET [x]      REFUSED                    []        REASON CRITERIA NOT MET     SMOKING LESS THAN 30 PY  [x]      AGE LESS THAN 55 or GREATER 77 YEARS  []      QUIT SMOKING 15 YEARS OR GREATER   []      RECENT CT WITH IN 11 MONTHS    []      LIFE EXPECTANCY < 5 YEARS   []      SIGNS  AND SYMPTOMS OF LUNG CANCER   []           Immunization   Immunization History   Administered Date(s) Administered    Influenza 10/18/2012, 10/22/2013    Influenza A ([id number]) Vaccine PF IM 11/06/2009    Influenza Vaccine, unspecified formulation 10/22/2013    Influenza Virus Vaccine 10/22/2013, 09/28/2015, 10/04/2016, 10/27/2017, 10/04/2018    Influenza Whole 09/24/2010    Influenza, AFLURIA (age 1 yrs+), FLUZONE, (age 10 mo+), MDV, 0.5mL 10/04/2016, 10/27/2017, 10/04/2018    Influenza, FLUARIX, FLULAVAL, FLUZONE (age 10 mo+) AND AFLURIA, (age 1 y+), PF, 0.5mL 09/25/2019, 10/23/2020, 09/21/2021    Pneumococcal Polysaccharide (Lowuxuxoy35) 12/08/2008    Pneumococcal conjugate PCV20, PF (Prevnar 20) 11/01/2022    Tdap (Boostrix, Adacel) 06/01/2017    Zoster Recombinant (Shingrix) 01/15/2020, 03/19/2020        Pneumococcal Vaccine     [x] Up to date    [] Indicated   [] Refused  [] Contraindicated       Influenza Vaccine   [x] Up to date    [] Indicated   [] Refused  [] Contraindicated       PAST MEDICAL HISTORY:         Diagnosis Date    Abdominal bloating 1/23/2019    Anxiety     Bilateral edema of lower extremity 12/16/2016    Bronchial asthma     mild, intermittent    Carpal tunnel syndrome     Cataract     Chronic back pain     Chronic sinusitis     Degenerative disc disease, lumbar 12/16/2016    Depression     Diabetes mellitus due to underlying condition with hyperosmolarity without coma ([facility] [address]     Dizziness 10/27/2017    DJD (degenerative joint disease)     S1, L3, L4, L5, T12    Dysphagia 1/23/2019    Edema of leg     bilateral legs    Environmental allergies     Frozen shoulder 4/27/2015    GERD (gastroesophageal reflux disease) Glaucoma     Glucose intolerance (impaired glucose tolerance)     Headache(784.0)     History of bronchitis     Viral    HTN (hypertension)     Hyperlipidemia     Hypothyroid     hypothyroid state    Hypothyroidism     IBS (irritable bowel syndrome) 10/02/2012    Legally blind     due to glaucoma    Medication refill 6/1/2017    Mild intermittent asthma without complication     Morbid obesity with BMI of 45.0-49.9, adult (HCC)     MVP (mitral valve prolapse)     Neuropathy     OA (osteoarthritis)     EMILIA on CPAP     Osteopenia     Pancreatic cyst     Polyneuropathy     Raynaud disease     Rhinopharyngitis     Allergic rhinopharyngitis    Skin lesion of left leg 6/11/2021    Skin tag 7/9/2015    Stress fracture     Right 5th Metatarsal     Syncope     TIA (transient ischemic attack)     Urinary incontinence     Varicose vein of leg 6/1/2017    Vasodepressor syncope     Wears glasses        Family History:       Problem Relation Age of Onset    Diabetes Mother     Heart Disease Mother         multiple heart attacks    Arthritis Mother     High Blood Pressure Mother     High Cholesterol Mother     Substance Abuse Mother     Heart Disease Father     Arthritis Father     Depression Father     High Cholesterol Father     Mental Illness Father     Substance Abuse Father     Diabetes Sister     High Blood Pressure Sister     Cancer Paternal Uncle         esophageal cancer    Diabetes Maternal Aunt     Cancer Maternal Aunt         colorectal cancer    Diabetes Maternal Uncle     Cancer Maternal Grandmother         colorectal cancer    Arthritis Maternal Grandmother     Diabetes Maternal Grandmother     High Blood Pressure Maternal Grandmother     Stroke Maternal Grandmother     Arthritis Maternal Grandfather     Arthritis Paternal Grandmother     Cancer Paternal Grandmother     Depression Paternal Grandmother     Heart Disease Paternal Grandmother     High Blood Pressure Paternal Grandmother     High Cholesterol Paternal Grandmother     Mental Illness Paternal Grandmother     Arthritis Paternal Grandfather        SURGICAL HISTORY:   Past Surgical History:   Procedure Laterality Date    APPENDECTOMY  1978    CATARACT REMOVAL Left     CHOLECYSTECTOMY  1978    COLONOSCOPY      COLONOSCOPY N/A 12/9/2019    COLORECTAL CANCER SCREENING, NOT HIGH RISK performed by Meena Sanford MD at [address] [place] [address] Bilateral     right iridectomy, right laser, bilateral trabeculectomy, left drain    GLAUCOMA SURGERY      HAND SURGERY Right     HYSTERECTOMY (CERVIX STATUS UNKNOWN)  1982    KNEE SURGERY Right 2000    TUBAL LIGATION  1981    UPPER GASTROINTESTINAL ENDOSCOPY      UPPER GASTROINTESTINAL ENDOSCOPY  5/24/2018    EGD DILATION SAVORY performed by Lili Lovelace MD at [facility] 1  3/6/2019    EGD DILATION SAVORY performed by Meena Sanford MD at [facility] 1 N/A 9/8/2021    EGD DILATION SAVORY performed by Meena [facility]ford MD at [facility]              Not in a hospital admission.   Allergies   Allergen Reactions    Latex Rash     blisters  blisters    Adhesive Tape Rash     blisters    Amlodipine Other (See Comments)     Facial numbness  Facial numbness  Facial numbness  Facial numbness  Facial numbness  Facial numbness  Facial numbness    Bextra [Valdecoxib] Rash     swelling    Brimonidine Itching     Burning eyes severe    Daypro [Oxaprozin] Anaphylaxis    Diclofenac Sodium Swelling and Shortness Of Breath    Famotidine Other (See Comments)     Blisters down her arms    Hydrocodone-Acetaminophen Nausea Only     Severe chest pain    Hydromorphone Other (See Comments)     Rapid heart beat,  Nausea, spent 3 days in cardiac unit    Ibuprofen      Other reaction(s): bleeding  Other reaction(s): Unknown  Black stools  \"rips up stomach\", black tarry stool  Black stools  \"rips up stomach\", black tarry stool    Lisinopril Nausea Only, Nausea And Vomiting and Other (See Comments)     Other reaction(s): Hypotension    Metoprolol Other (See Comments)     Other reaction(s): Dizziness    Naproxen Other (See Comments)     Chest pain , swelling    Oxycodone-Acetaminophen      Chest pain severe    Rofecoxib Rash     swelling    Shellfish-Derived Products Anaphylaxis and Rash     shrimp  shrimp  shrimp    Simvastatin Nausea And Vomiting     Other reaction(s): muscle cramps    Statins      Other reaction(s): muscle cramps  Tolerates lovastatin. Pravachol caused numbness in head/face    Sulfa Antibiotics Hives    Tetracyclines & Related Itching and Rash    Timoptic [Timolol Maleate] Itching and Swelling     Eyes burning severely    Tramadol Itching and Rash    Fosamax [Alendronate] Nausea Only and Nausea And Vomiting    Nalbuphine Nausea And Vomiting    Alendronate Sodium     Alendronate Sodium     Alphagan [Brimonidine Tartrate]      Eye irritation    Dilaudid [Hydromorphone Hcl]     Doxycycline Monohydrate     Nsaids     Other Itching and Swelling     Eyes burning    Pepcid Complete [Famotidine-Ca Carb-Mag Hydrox] Hives and Other (See Comments)     blisters    Pilocarpine Itching and Swelling     Blurred vision  Eyes burning    Pravastatin Other (See Comments)     Facial numming    Red Dye Nausea Only     Pt states allergic to red coloring in crestor with CY on pill and senokot red pill. Feels sick to stomach and head feels foggy. Shrimp Flavor Hives and Swelling    Statins Support Therapy      Tolerates lovastatin.  Pravachol caused numbness in head/face    Timoptic [Timolol Maleate]      Eye irritation      Tolectin [Tolmetin]      Unknown reaction  - as a child    Voltaren [Diclofenac Sodium]      Flu symptoms      Nubain [Nalbuphine Hcl] Nausea And Vomiting     Chest pain      Percocet [Oxycodone-Acetaminophen] Nausea And Vomiting     Sweating, chest pain    Tolectin [Tolmetin Sodium] Rash    Tolmetin Sodium Rash    Ultram [Tramadol Hcl] Itching and Rash Rash on face    Vicodin [Hydrocodone-Acetaminophen] Nausea And Vomiting     swearing    Vioxx Rash     Social History     Tobacco Use   Smoking Status Never    Passive exposure: Never   Smokeless Tobacco Never     Prior to Admission medications    Medication Sig Start Date End Date Taking? Authorizing Provider   Multiple Vitamins-Minerals (CERTAVITE/ANTIOXIDANTS) TABS TAKE 1 TABLET BY MOUTH EVERY DAY 1/27/23  Yes Sree Ricardo MD   fluticasone Kena Banana) 50 MCG/ACT nasal spray USE 1 SPRAY INTO EACH NOSTRIL TWICE DAILY 1/27/23  Yes Martin Soto MD   Cholecalciferol (VITAMIN D3) 50 MCG (2000 UT) CAPS TAKE 1 CAPSULE BY MOUTH EVERY DAY 1/27/23  Yes Sree Ricardo MD   senna (SENNA-TIME) 8.6 MG tablet TAKE 1 TABLET BY MOUTH 2 TIMES DAILY AS NEEDED FOR CONSTIPATION 1/23/23  Yes Rashid Morgan MD   albuterol sulfate HFA (PROVENTIL;VENTOLIN;PROAIR) 108 (90 Base) MCG/ACT inhaler INHALE 2 PUFFS INTO THE LUNGS EVERY 6 HOURS AS NEEDED 1/9/23  Yes Martin Soto MD   ACETAMINOPHEN EXTRA STRENGTH 500 MG tablet TAKE 2 TABLETS BY MOUTH EVERY 6 HOURS AS NEEDED FOR PAIN 1/4/23  Yes Marcellus Wilson MD   omeprazole (PRILOSEC) 40 MG delayed release capsule Take 1 capsule by mouth daily 12/5/22  Yes Mariel Rutherford MD   ondansetron (ZOFRAN) 4 MG tablet Take 1 tablet by mouth daily as needed for Nausea or Vomiting 11/1/22  Yes Nishi Santos MD   Accu-Chek FastClix Lancets MISC USE TO CHECK BLOOD SUGAR TWICE A DAY 9/20/22  Yes Toro Oliveira MD   ASPIRIN LOW DOSE 81 MG EC tablet Take 1 tablet by mouth daily 9/8/22  Yes Jonathan Adams MD   rosuvastatin (CRESTOR) 5 MG tablet Take 1 tablet by mouth daily 9/8/22  Yes Jonathan Adams MD   ALLERGY RELIEF 10 MG tablet TAKE 1 TABLET BY MOUTH DAILY 7/19/22  Yes Martin Soto MD   ONETOUCH VERIO strip  12/20/21  Yes Historical Provider, MD   Lancets Misc. (ACCU-CHEK FASTCLIX LANCET) KIT 1 box by Miscell. (Med.Supl.;Non-Drugs) route 2 times daily Dispense 1 box of 100. 12/14/21  Yes Osorio Davila MD   albuterol sulfate  (90 Base) MCG/ACT inhaler INHALE 2 PUFFS INTO THE LUNGS 4 TIMES DAILY AS NEEDED FOR WHEEZING 12/7/21  Yes Sidney Fletcher MD   levothyroxine (SYNTHROID) 100 MCG tablet Take 1 tablet by mouth Daily Indications: 112 mg 9/21/21  Yes Margoth Da Silva MD   Blood Glucose Monitoring Suppl ([address]) w/Device KIT  4/1/21  Yes Historical Provider, MD   Blood Glucose Calibration (ONETOUCH VERIO) SOLN  4/14/21  Yes Historical Provider, MD   Calcium Carbonate-Vitamin D 600-10 MG-MCG TABS Take 1 tablet by mouth in the morning and at bedtime 12/15/22 3/15/23  Odalis Edmondson MD         Physical Exam  General Appearance:    Alert, cooperative, no distress, appears stated age, morbid morbid obesity. No respiratory distress, not using any accessory muscles. No headaches at this time. Her weight has been unchanged. Head:    Normocephalic, without obvious abnormality, atraumatic   Nose reveals no polyps. No sinus tenderness. Throat reveal no abnormality, oropharyngeal orifice is not compromised. Eye examination is normal, no jaundice or Darien syndrome. Ear examination normal.                   :    Neck:   Supple, symmetrical, trachea midline, no adenopathy;     thyroid:  no enlargement/tenderness/nodules; no carotid    bruit or JVD. Neck is short and obese    Back:     Symmetric, no curvature, ROM normal, no CVA tenderness   Lungs:       Not a good air entry on both side. Breathing is vesicular. Expiration is not prolonged. No rales or rhonchi are audible. AP diameter is not increased. No pleural friction rub is audible.        Chest Wall:    No tenderness or deformity      Heart:    Regular rate and rhythm, S1 and S2 normal, no murmur, rub        or gallop no rvh                           Abdomen:                                                 Pulses:                                            Lymph nodes:                    Neurologic: Soft, non-tender, bowel sounds active all four quadrants,     no masses, no organomegaly         2+ and symmetric all extremities            Cervical, supraclavicular not enlarged or matted or tender      CNII-XII intact, normal strength 5/5 . Sensation grossly normal  and reflexes normal 2+  throughout     Clubbing No  Lower ext edema absent  Upper ext edema No       Musculoskeletal - no joint swelling or tenderness or synovitis               BP (!) 150/85   Pulse 74   Resp 16   Ht 5' 7\" (1.702 m)   Wt (!) 317 lb (143.8 kg)   SpO2 98%   BMI 49.65 kg/m²     CXR  No recent chest x-ray      CT Scans  No recent CT scan    Echo  No echocardiogram        Assessment  Obstructive sleep apnea syndrome  Morbid obesity  Neurocardiogenic syncope  \Glucose intolerance    Early morning headaches    Plan  She wakes up with a headache every morning in spite of the use of CPAP 6 to 8 hours. I wonder if her CPAP pressures are adequate. I took the liberty of arranging for her to have a CPAP titration. Retitration of pressures may take care of the headache. I encouraged her to lose weight. Her blood pressure has been under good control. Blood sugars have stable. Neurocardiogenic syncope also under good control. She will follow-up after the retitration has been performed. She was concerned about the use of Ventolin. I reassured her that using the inhaler 3-4 times a is nothing to worry about. She can continue to use that if she needs it. She has not noted any wheezing she has not noted any chest tightness there is no persistent cough is only minimal occasional cough.         Dictated by Dr. Cali Beasley

## 2023-03-21 ENCOUNTER — OFFICE VISIT (OUTPATIENT)
Dept: VASCULAR SURGERY | Age: 63
End: 2023-03-21
Payer: COMMERCIAL

## 2023-03-21 VITALS
HEIGHT: 67 IN | TEMPERATURE: 98.1 F | OXYGEN SATURATION: 100 % | BODY MASS INDEX: 45.99 KG/M2 | WEIGHT: 293 LBS | DIASTOLIC BLOOD PRESSURE: 77 MMHG | HEART RATE: 66 BPM | SYSTOLIC BLOOD PRESSURE: 127 MMHG | RESPIRATION RATE: 20 BRPM

## 2023-03-21 DIAGNOSIS — I83.813 VARICOSE VEINS OF BOTH LOWER EXTREMITIES WITH PAIN: Primary | ICD-10-CM

## 2023-03-21 PROCEDURE — 3017F COLORECTAL CA SCREEN DOC REV: CPT | Performed by: STUDENT IN AN ORGANIZED HEALTH CARE EDUCATION/TRAINING PROGRAM

## 2023-03-21 PROCEDURE — 99214 OFFICE O/P EST MOD 30 MIN: CPT | Performed by: STUDENT IN AN ORGANIZED HEALTH CARE EDUCATION/TRAINING PROGRAM

## 2023-03-21 PROCEDURE — 1036F TOBACCO NON-USER: CPT | Performed by: STUDENT IN AN ORGANIZED HEALTH CARE EDUCATION/TRAINING PROGRAM

## 2023-03-21 PROCEDURE — G8427 DOCREV CUR MEDS BY ELIG CLIN: HCPCS | Performed by: STUDENT IN AN ORGANIZED HEALTH CARE EDUCATION/TRAINING PROGRAM

## 2023-03-21 PROCEDURE — G8484 FLU IMMUNIZE NO ADMIN: HCPCS | Performed by: STUDENT IN AN ORGANIZED HEALTH CARE EDUCATION/TRAINING PROGRAM

## 2023-03-21 PROCEDURE — G8417 CALC BMI ABV UP PARAM F/U: HCPCS | Performed by: STUDENT IN AN ORGANIZED HEALTH CARE EDUCATION/TRAINING PROGRAM

## 2023-03-21 ASSESSMENT — ENCOUNTER SYMPTOMS
COLOR CHANGE: 0
COUGH: 0
ABDOMINAL DISTENTION: 0
CHEST TIGHTNESS: 0
VOICE CHANGE: 0
TROUBLE SWALLOWING: 0
EYE PAIN: 0
ABDOMINAL PAIN: 0
VOMITING: 0

## 2023-03-21 NOTE — PROGRESS NOTES
[facility]  [address] [facility] [address] [place] [address]  Dept: [phone]     Patient: Kevan Ayala  : 1960  MRN: [id number]  DOS: 3/21/2023    HPI:  23: Kevan Ayala is a 61 y.o. female who comes to the office regarding bilateral leg swelling and discomfort. This has been going for several years. No wounds present. Denies tobacco use. She underwent a venous duplex study on 23 which revealed no evidence of DVT. She already has compression stockings. Able to ambulate. 3/21/23: Here today for follow up. Had reflux study on 23, I reviewed these images myself. She has reflux in the left GSV at the [facility] down to the calf (2140 - 3156 ms) as well as the AASV (2320 ms). The right side she has reflux in GSV calf (1783 m) and AASV (1160 - 2267 ms). Still waiting to get her lymphedema pumps. Is wearing compression stockings since last visit.     Past Medical History:   Diagnosis Date    Abdominal bloating 2019    Anxiety     Bilateral edema of lower extremity 2016    Bronchial asthma     mild, intermittent    Carpal tunnel syndrome     Cataract     Chronic back pain     Chronic sinusitis     Degenerative disc disease, lumbar 2016    Depression     Diabetes mellitus due to underlying condition with hyperosmolarity without coma ([address]     Dizziness 10/27/2017    DJD (degenerative joint disease)     S1, L3, L4, L5, T12    Dysphagia 2019    Edema of leg     bilateral legs    Environmental allergies     Frozen shoulder 2015    GERD (gastroesophageal reflux disease)     Glaucoma     Glucose intolerance (impaired glucose tolerance)     Headache(784.0)     History of bronchitis     Viral    HTN (hypertension)     Hyperlipidemia     Hypothyroid     hypothyroid state    Hypothyroidism     IBS (irritable bowel syndrome) 10/02/2012    Legally blind     due to glaucoma    Medication

## 2023-03-31 ENCOUNTER — OFFICE VISIT (OUTPATIENT)
Dept: GASTROENTEROLOGY | Age: 63
End: 2023-03-31
Payer: COMMERCIAL

## 2023-03-31 VITALS
DIASTOLIC BLOOD PRESSURE: 80 MMHG | WEIGHT: 293 LBS | SYSTOLIC BLOOD PRESSURE: 154 MMHG | TEMPERATURE: 97.9 F | BODY MASS INDEX: 49.18 KG/M2

## 2023-03-31 DIAGNOSIS — K21.9 GASTROESOPHAGEAL REFLUX DISEASE WITHOUT ESOPHAGITIS: Primary | ICD-10-CM

## 2023-03-31 DIAGNOSIS — R13.19 ESOPHAGEAL DYSPHAGIA: ICD-10-CM

## 2023-03-31 DIAGNOSIS — F41.9 ANXIETY: ICD-10-CM

## 2023-03-31 DIAGNOSIS — E66.01 MORBID OBESITY WITH BMI OF 45.0-49.9, ADULT (HCC): ICD-10-CM

## 2023-03-31 DIAGNOSIS — R14.0 ABDOMINAL BLOATING: ICD-10-CM

## 2023-03-31 DIAGNOSIS — K58.8 OTHER IRRITABLE BOWEL SYNDROME: ICD-10-CM

## 2023-03-31 PROCEDURE — G8417 CALC BMI ABV UP PARAM F/U: HCPCS | Performed by: INTERNAL MEDICINE

## 2023-03-31 PROCEDURE — 99214 OFFICE O/P EST MOD 30 MIN: CPT | Performed by: INTERNAL MEDICINE

## 2023-03-31 PROCEDURE — G8427 DOCREV CUR MEDS BY ELIG CLIN: HCPCS | Performed by: INTERNAL MEDICINE

## 2023-03-31 PROCEDURE — 3017F COLORECTAL CA SCREEN DOC REV: CPT | Performed by: INTERNAL MEDICINE

## 2023-03-31 PROCEDURE — G8484 FLU IMMUNIZE NO ADMIN: HCPCS | Performed by: INTERNAL MEDICINE

## 2023-03-31 PROCEDURE — 1036F TOBACCO NON-USER: CPT | Performed by: INTERNAL MEDICINE

## 2023-03-31 RX ORDER — OMEPRAZOLE 40 MG/1
40 CAPSULE, DELAYED RELEASE ORAL DAILY
Qty: 30 CAPSULE | Refills: 5 | Status: SHIPPED | OUTPATIENT
Start: 2023-03-31

## 2023-03-31 ASSESSMENT — ENCOUNTER SYMPTOMS
CHOKING: 0
BLOOD IN STOOL: 0
CONSTIPATION: 1
WHEEZING: 0
ABDOMINAL PAIN: 0
DIARRHEA: 0
RECTAL PAIN: 0
ANAL BLEEDING: 0
NAUSEA: 1
SHORTNESS OF BREATH: 0
SORE THROAT: 1
ABDOMINAL DISTENTION: 0
VOICE CHANGE: 0
TROUBLE SWALLOWING: 1
VOMITING: 0
COUGH: 0

## 2023-03-31 NOTE — PROGRESS NOTES
GI CLINIC FOLLOW UP    NTERVAL HISTORY:   No referring provider defined for this encounter. Chief Complaint   Patient presents with    Gastroesophageal Reflux     Pt is here today for a f/u prev Dr Jneae barber seen on 05/20/22 for constipation, GERD, IBS, & dysphagia. 1. Gastroesophageal reflux disease without esophagitis    2. Abdominal bloating    3. Esophageal dysphagia    4. Morbid obesity with BMI of 45.0-49.9, adult ([address]    5. Anxiety    6. Other irritable bowel syndrome      This patient seen my office as a follow-up  She been evaluated by my partner in the past was left the practice  This 72-year-old female patient history significant for multiple medical issues history for morbid obesity history for acid reflux  She has history for nonspecific dysphagia she had an endoscopy done with empiric dilation of the esophagus in the past which helped her to some extent she has been taking 40 mg of omeprazole every day  She is not the healthiest eater admits to drinking ice tea and some soda  Patient has been complaining of some abdominal pains, off and on cramping  Also complains of abdominal bloating and gas  Has off and on nausea without any sig vomiting  Has some alternating constipation and diarrhea  Has no weight loss  Has some anxiety issues  Had colonoscopy done in 2019  No known family history for colon cancer  His records were reviewed with her  No smoking alcohol abuse illicit drug usage    HISTORY OF PRESENT ILLNESS: Chi Santos is a 61 y.o. female with a past history remarkable for , referred for evaluation of   Chief Complaint   Patient presents with    Gastroesophageal Reflux     Pt is here today for a f/u prev Dr Jenae barber seen on 05/20/22 for constipation, GERD, IBS, & dysphagia. .    Past Medical,Family, and Social History reviewed and does contribute to the patient presenting condition.     Patient's PMH/PSH,SH,PSYCH Hx, MEDs, ALLERGIES, and ROS were all

## 2023-04-03 ENCOUNTER — TELEPHONE (OUTPATIENT)
Dept: GASTROENTEROLOGY | Age: 63
End: 2023-04-03

## 2023-04-03 RX ORDER — DIAPER,BRIEF,INFANT-TODD,DISP
EACH MISCELLANEOUS
Qty: 60 TABLET | Refills: 2 | Status: SHIPPED | OUTPATIENT
Start: 2023-04-03

## 2023-04-03 NOTE — TELEPHONE ENCOUNTER
Pt states she was told to take OTC probiotics, align was suggested. Pt states it is too expensive for her. Pt wants something that can be prescribed so her insurance can cover it.

## 2023-04-18 ENCOUNTER — TELEPHONE (OUTPATIENT)
Dept: VASCULAR SURGERY | Age: 63
End: 2023-04-18

## 2023-05-02 ENCOUNTER — OFFICE VISIT (OUTPATIENT)
Dept: FAMILY MEDICINE CLINIC | Age: 63
End: 2023-05-02
Payer: COMMERCIAL

## 2023-05-02 VITALS
HEART RATE: 76 BPM | BODY MASS INDEX: 45.99 KG/M2 | WEIGHT: 293 LBS | DIASTOLIC BLOOD PRESSURE: 74 MMHG | OXYGEN SATURATION: 94 % | SYSTOLIC BLOOD PRESSURE: 139 MMHG | HEIGHT: 67 IN

## 2023-05-02 DIAGNOSIS — S89.91XA INJURY OF RIGHT LOWER EXTREMITY, INITIAL ENCOUNTER: ICD-10-CM

## 2023-05-02 DIAGNOSIS — G56.03 BILATERAL CARPAL TUNNEL SYNDROME: ICD-10-CM

## 2023-05-02 DIAGNOSIS — M51.36 DEGENERATIVE DISC DISEASE, LUMBAR: Primary | ICD-10-CM

## 2023-05-02 PROCEDURE — 99213 OFFICE O/P EST LOW 20 MIN: CPT

## 2023-05-02 RX ORDER — LIDOCAINE 50 MG/G
1 PATCH TOPICAL DAILY
Qty: 30 PATCH | Refills: 0 | Status: SHIPPED | OUTPATIENT
Start: 2023-05-02 | End: 2023-06-01

## 2023-05-02 ASSESSMENT — PATIENT HEALTH QUESTIONNAIRE - PHQ9
9. THOUGHTS THAT YOU WOULD BE BETTER OFF DEAD, OR OF HURTING YOURSELF: 0
8. MOVING OR SPEAKING SO SLOWLY THAT OTHER PEOPLE COULD HAVE NOTICED. OR THE OPPOSITE, BEING SO FIGETY OR RESTLESS THAT YOU HAVE BEEN MOVING AROUND A LOT MORE THAN USUAL: 0
5. POOR APPETITE OR OVEREATING: 0
SUM OF ALL RESPONSES TO PHQ QUESTIONS 1-9: 3
SUM OF ALL RESPONSES TO PHQ QUESTIONS 1-9: 3
10. IF YOU CHECKED OFF ANY PROBLEMS, HOW DIFFICULT HAVE THESE PROBLEMS MADE IT FOR YOU TO DO YOUR WORK, TAKE CARE OF THINGS AT HOME, OR GET ALONG WITH OTHER PEOPLE: 0
7. TROUBLE CONCENTRATING ON THINGS, SUCH AS READING THE NEWSPAPER OR WATCHING TELEVISION: 0
6. FEELING BAD ABOUT YOURSELF - OR THAT YOU ARE A FAILURE OR HAVE LET YOURSELF OR YOUR FAMILY DOWN: 0
3. TROUBLE FALLING OR STAYING ASLEEP: 0
2. FEELING DOWN, DEPRESSED OR HOPELESS: 0
4. FEELING TIRED OR HAVING LITTLE ENERGY: 2
1. LITTLE INTEREST OR PLEASURE IN DOING THINGS: 1
SUM OF ALL RESPONSES TO PHQ QUESTIONS 1-9: 3
SUM OF ALL RESPONSES TO PHQ9 QUESTIONS 1 & 2: 1
SUM OF ALL RESPONSES TO PHQ QUESTIONS 1-9: 3

## 2023-05-02 ASSESSMENT — ENCOUNTER SYMPTOMS
SHORTNESS OF BREATH: 0
COLOR CHANGE: 1
VOMITING: 0
DIARRHEA: 0
NAUSEA: 0
ABDOMINAL PAIN: 0
CONSTIPATION: 0
SORE THROAT: 0
RHINORRHEA: 0

## 2023-05-03 ENCOUNTER — TELEPHONE (OUTPATIENT)
Dept: FAMILY MEDICINE CLINIC | Age: 63
End: 2023-05-03

## 2023-05-10 ENCOUNTER — OFFICE VISIT (OUTPATIENT)
Dept: PODIATRY | Age: 63
End: 2023-05-10
Payer: COMMERCIAL

## 2023-05-10 VITALS
HEIGHT: 67 IN | DIASTOLIC BLOOD PRESSURE: 70 MMHG | SYSTOLIC BLOOD PRESSURE: 158 MMHG | BODY MASS INDEX: 45.99 KG/M2 | HEART RATE: 74 BPM | WEIGHT: 293 LBS

## 2023-05-10 DIAGNOSIS — E08.42 DIABETIC POLYNEUROPATHY ASSOCIATED WITH DIABETES MELLITUS DUE TO UNDERLYING CONDITION (HCC): Primary | ICD-10-CM

## 2023-05-10 DIAGNOSIS — I73.9 PVD (PERIPHERAL VASCULAR DISEASE) (HCC): ICD-10-CM

## 2023-05-10 DIAGNOSIS — B35.1 ONYCHOMYCOSIS: ICD-10-CM

## 2023-05-10 PROCEDURE — 99212 OFFICE O/P EST SF 10 MIN: CPT | Performed by: PODIATRIST

## 2023-05-10 PROCEDURE — 11721 DEBRIDE NAIL 6 OR MORE: CPT | Performed by: PODIATRIST

## 2023-05-10 PROCEDURE — 99999 PR OFFICE/OUTPT VISIT,PROCEDURE ONLY: CPT | Performed by: PODIATRIST

## 2023-05-10 NOTE — PROGRESS NOTES
[address]  [address] [place] [address], [place]  Tel: [phone]   Fax: [phone]    Subjective     CC: Pain in bilateral feet    Interval history (10/7/2022): Patient returns to clinic for regular diabetic foot care and no changes to her pedal statu. She explains that her nails cause her pain while ambulating and they become significantly elongated and thickened. She states that she gets relief from professional debridement is interested in having them debridement at this time. She also explains that she misplaced her prescription for her diabetic shoes and is interested in obtaining a new prescription today. Patient's last hemoglobin A1c was 5.6 on 9/2/2022. Patient complains of claudication symptoms and states that she regularly follows up with her vascular doctor. Patient states that she last saw her PCP at the beginning of this month. Patient denies any other pedal complaints at this time    HPI:  Merari Cha is a 61 y.o. female who presents to clinic today for follow-up of pain in her left foot she recently received orthotics however she has not obtained shoes to put them in outside of worn shoes understands importance of doing so. She is not able to point to the exact location of the pain. She also relates burning to bilateral lower extremity. Relates that she normally wears compression stockings to bilateral lower extremities for edema. Denies any rest pain or claudication symptoms. Primary care physician is Terrell Gentile MD.    ROS:    Constitutional: Denies nausea, vomiting, fever, chills. Neurologic: Denies numbness, tingling, and burning in the feet. Vascular: Denies symptoms of lower extremity claudication. Skin: Thick, elongated toenails  Otherwise negative except as noted in the HPI.      PMH:  Past Medical History:   Diagnosis Date    Abdominal bloating 1/23/2019    Anxiety     Bilateral edema of lower extremity 12/16/2016    Bronchial

## 2023-05-12 ENCOUNTER — OFFICE VISIT (OUTPATIENT)
Dept: FAMILY MEDICINE CLINIC | Age: 63
End: 2023-05-12
Payer: COMMERCIAL

## 2023-05-12 ENCOUNTER — TELEPHONE (OUTPATIENT)
Dept: GASTROENTEROLOGY | Age: 63
End: 2023-05-12

## 2023-05-12 VITALS
HEIGHT: 67 IN | HEART RATE: 85 BPM | DIASTOLIC BLOOD PRESSURE: 70 MMHG | SYSTOLIC BLOOD PRESSURE: 133 MMHG | BODY MASS INDEX: 45.99 KG/M2 | WEIGHT: 293 LBS | OXYGEN SATURATION: 98 %

## 2023-05-12 DIAGNOSIS — L03.818 CELLULITIS OF OTHER SPECIFIED SITE: Primary | ICD-10-CM

## 2023-05-12 DIAGNOSIS — R79.89 ELEVATED D-DIMER: ICD-10-CM

## 2023-05-12 PROCEDURE — G8427 DOCREV CUR MEDS BY ELIG CLIN: HCPCS

## 2023-05-12 PROCEDURE — 3017F COLORECTAL CA SCREEN DOC REV: CPT

## 2023-05-12 PROCEDURE — 99213 OFFICE O/P EST LOW 20 MIN: CPT

## 2023-05-12 PROCEDURE — G8417 CALC BMI ABV UP PARAM F/U: HCPCS

## 2023-05-12 PROCEDURE — 1036F TOBACCO NON-USER: CPT

## 2023-05-12 ASSESSMENT — ENCOUNTER SYMPTOMS
COLOR CHANGE: 1
VOMITING: 0
SORE THROAT: 0
ABDOMINAL PAIN: 0
NAUSEA: 0
DIARRHEA: 0
RHINORRHEA: 0
SHORTNESS OF BREATH: 0
CONSTIPATION: 0

## 2023-05-14 DIAGNOSIS — Z76.0 MEDICATION REFILL: ICD-10-CM

## 2023-05-15 DIAGNOSIS — M51.36 DEGENERATIVE DISC DISEASE, LUMBAR: Primary | ICD-10-CM

## 2023-05-15 RX ORDER — ACETAMINOPHEN 160 MG
TABLET,DISINTEGRATING ORAL
Qty: 30 CAPSULE | Refills: 5 | Status: SHIPPED | OUTPATIENT
Start: 2023-05-15

## 2023-05-15 RX ORDER — ASPIRIN 81 MG
TABLET, DELAYED RELEASE (ENTERIC COATED) ORAL
Qty: 30 TABLET | Refills: 3 | Status: SHIPPED | OUTPATIENT
Start: 2023-05-15

## 2023-05-15 NOTE — TELEPHONE ENCOUNTER
Refill request is coming over for Loratadine from [employer]. Patient is current with us. Please sign the attached script or make any changes necessary.

## 2023-05-15 NOTE — PROGRESS NOTES
Attending Physician Statement  I  have discussed the care of Petros Ferreira including pertinent history and exam findings with the resident. I agree with the assessment, plan and orders as documented by the resident. /70 (Site: Right Lower Arm, Position: Sitting, Cuff Size: Large Adult)   Pulse 85   Ht 5' 7\" (1.702 m)   Wt (!) 309 lb 9.6 oz (140.4 kg)   SpO2 98%   BMI 48.49 kg/m²    BP Readings from Last 3 Encounters:   05/12/23 133/70   05/10/23 (!) 158/70   05/02/23 139/74     Wt Readings from Last 3 Encounters:   05/12/23 (!) 309 lb 9.6 oz (140.4 kg)   05/10/23 (!) 308 lb 3.2 oz (139.8 kg)   05/02/23 (!) 312 lb 3.2 oz (141.6 kg)          Diagnosis Orders   1. Cellulitis of other specified site        2.  Elevated d-dimer  US DUP LOWER EXTREMITY RIGHT LEWIS          Brigido Dao DO 5/15/2023 9:47 AM

## 2023-05-15 NOTE — TELEPHONE ENCOUNTER
Please address the medication refill and close the encounter. If I can be of assistance, please route to the applicable pool. Thank you. Last visit: 5-12-23  Last Med refill: 1-27-23  Does patient have enough medication for 72 hours: No:     Next Visit Date:  Future Appointments   Date Time Provider Fabby Ann   5/16/2023  8:00  [address]  1 [facility] SLEEP [place] HOD   5/23/2023  9:30 AM Jace Burleson MD heartvasc TOLPP   5/26/2023  9:30 AM [facility] VASCULAR IMAGING ROOM [facility] VASC LA [facility] Rad   6/16/2023 10:15 AM Bambi Song MD Resp Spec [place] Lay   8/16/2023 12:15 PM Yarelis Erickson DPM [facility] Podiatry TOLP   10/6/2023 10:30 AM Ashley Joseph MD GRT [place] [facility]   10/24/2023 10:00 AM Bertrand Kam MD AFL TCC TOLE AFL [place] C   12/20/2023 10:30 AM [facility]GRAP[facility]   1/29/2024  9:00 AM Kimberly Pulido MD [facility] MHTPP       Health Maintenance   Topic Date Due    COVID-19 Vaccine (1) Never done    Diabetic retinal exam  03/06/2021    Diabetic Alb to Cr ratio (uACR) test  09/30/2021    Lipids  12/14/2021    GFR test (Diabetes, CKD 3-4, OR last GFR 15-59)  01/20/2022    Diabetic foot exam  09/22/2022    A1C test (Diabetic or Prediabetic)  09/02/2023    Depression Monitoring  05/02/2024    Breast cancer screen  12/19/2024    DTaP/Tdap/Td vaccine (2 - Td or Tdap) 06/01/2027    Colorectal Cancer Screen  12/09/2029    Flu vaccine  Completed    Shingles vaccine  Completed    Pneumococcal 0-64 years Vaccine  Completed    Hepatitis C screen  Completed    HIV screen  Completed    Hepatitis A vaccine  Aged Out    Hib vaccine  Aged Out    Meningococcal (ACWY) vaccine  Aged Out    Cervical cancer screen  Discontinued       Hemoglobin A1C (%)   Date Value   09/02/2022 5.6   06/11/2021 5.5   12/03/2020 5.3             ( goal A1C is < 7)   Microalb/Crt.  Ratio (mcg/mg creat)   Date Value   01/02/2020 CANNOT BE CALCULATED     LDL Cholesterol (mg/dL)   Date Value

## 2023-05-16 ENCOUNTER — HOSPITAL ENCOUNTER (OUTPATIENT)
Dept: SLEEP CENTER | Age: 63
Discharge: HOME | End: 2023-05-16
Payer: COMMERCIAL

## 2023-05-16 VITALS — BODY MASS INDEX: 45.99 KG/M2 | WEIGHT: 293 LBS | HEIGHT: 67 IN

## 2023-05-16 DIAGNOSIS — G47.33 OBSTRUCTIVE SLEEP APNEA SYNDROME: ICD-10-CM

## 2023-05-16 PROCEDURE — 95811 POLYSOM 6/>YRS CPAP 4/> PARM: CPT

## 2023-05-16 ASSESSMENT — SLEEP AND FATIGUE QUESTIONNAIRES
DO YOU HAVE HIGH BLOOD PRESSURE: YES
WHAT TIME DO YOU USUALLY WAKE UP: 23400
HOW LIKELY ARE YOU TO NOD OFF OR FALL ASLEEP WHILE SITTING AND TALKING TO SOMEONE: 1
HOW LIKELY ARE YOU TO NOD OFF OR FALL ASLEEP WHILE LYING DOWN TO REST IN THE AFTERNOON WHEN CIRCUMSTANCES PERMIT: 3
HOW LIKELY ARE YOU TO NOD OFF OR FALL ASLEEP WHILE SITTING INACTIVE IN A PUBLIC PLACE: 0
NUMBER OF TIMES YOU WAKE PER NIGHT: 2
HOW LIKELY ARE YOU TO NOD OFF OR FALL ASLEEP WHILE SITTING AND READING: 2
DO YOU SNORE: NO
NORMAL AMOUNT OF SLEEP PER NIGHT: 7
ESS TOTAL SCORE: 14
HOW LIKELY ARE YOU TO NOD OFF OR FALL ASLEEP WHILE WATCHING TV: 3
USUAL AMOUNT OF TIME TO FALL ASLEEP (MIN): 45
HOW LIKELY ARE YOU TO NOD OFF OR FALL ASLEEP IN A CAR, WHILE STOPPED FOR A FEW MINUTES IN TRAFFIC: 0
WHAT TIME DO YOU USUALLY GO TO BED: 82800
HOW LIKELY ARE YOU TO NOD OFF OR FALL ASLEEP WHEN YOU ARE A PASSENGER IN A CAR FOR AN HOUR WITHOUT A BREAK: 2
HOW MANY NAPS DO YOU TAKE PER WEEK: 7
HOW LIKELY ARE YOU TO NOD OFF OR FALL ASLEEP WHILE SITTING QUIETLY AFTER LUNCH WITHOUT ALCOHOL: 3
FUNCTION BEST IN: MORNING
FOR THE FIRST 30 MINUTES AFTER WAKING, I AM: SOMEWHAT DROWSY
I SLEEP WELL: NO

## 2023-05-17 NOTE — PROGRESS NOTES
Patient arrived for titrated sleep study 5/16/23 at 7:40pm  [place] in [place] for DME  [employer] full face size small

## 2023-05-18 RX ORDER — LORATADINE 10 MG/1
TABLET ORAL
Qty: 30 TABLET | Refills: 10 | Status: SHIPPED | OUTPATIENT
Start: 2023-05-18

## 2023-05-23 LAB — STATUS: NORMAL

## 2023-05-26 ENCOUNTER — HOSPITAL ENCOUNTER (OUTPATIENT)
Dept: VASCULAR LAB | Age: 63
End: 2023-05-26
Payer: COMMERCIAL

## 2023-05-26 DIAGNOSIS — R79.89 ELEVATED D-DIMER: ICD-10-CM

## 2023-05-26 PROCEDURE — 93971 EXTREMITY STUDY: CPT

## 2023-07-12 NOTE — TELEPHONE ENCOUNTER
Last Visit Date:  7-13-20  Next Visit Date:  10/23/2020    Hemoglobin A1C (%)   Date Value   01/02/2020 5.4   11/05/2018 5.5   07/10/2018 5.4             ( goal A1C is < 7)   Microalb/Crt.  Ratio (mcg/mg creat)   Date Value   01/02/2020 CANNOT BE CALCULATED     LDL Cholesterol (mg/dL)   Date Value   07/08/2019 97     LDL Calculated (mg/dL)   Date Value   06/16/2017 68       (goal LDL is <100)   AST (U/L)   Date Value   05/13/2015 18     ALT (U/L)   Date Value   05/13/2015 23     BUN (mg/dL)   Date Value   04/27/2018 13     BP Readings from Last 3 Encounters:   10/08/20 137/79   08/26/20 (!) 140/70   07/13/20 139/76          (goal 120/80)        Patient Active Problem List:     Glaucoma     GERD (gastroesophageal reflux disease)     DJD (degenerative joint disease)     Obesity     Polyneuropathy     Migraine     Mitral prolapse     Low back pain     CTS (carpal tunnel syndrome)     Supraspinatus syndrome     Impaired fasting glucose     Acute sinus infection     IBS (irritable bowel syndrome)     Back pain, chronic     Stress fracture, right 5th metatarsal      Upper airway cough syndrome     Ear fullness     Perioral numbness     Screening for osteoporosis     Headache     Left eye injury     Medication reaction     Osteopenia     Lumbosacral pain     Flu vaccine need     Hypothyroid     Urinary incontinence     Anxiety     Sleep apnea     Depression     Chronic back pain     Carpal tunnel syndrome     Neuropathy     Mitral valve problem     Morbid obesity with BMI of 45.0-49.9, adult (HCC)     Frozen shoulder     Skin tag     Degenerative disc disease, lumbar     Bilateral edema of lower extremity     Medication refill     Varicose vein of leg     Dizziness     Dysphagia     Abdominal bloating     Mild intermittent asthma without complication      ----[facility] [Negative] : Genitourinary

## 2023-08-01 RX ORDER — MULTIVITAMIN
TABLET ORAL
Qty: 60 TABLET | Refills: 1 | Status: SHIPPED | OUTPATIENT
Start: 2023-08-01

## 2023-08-01 NOTE — TELEPHONE ENCOUNTER
120/80)    All Future Testing planned in [facility]  Lab Frequency Next Occurrence   XR TIBIA FIBULA RIGHT (2 VIEWS) Once 05/04/2023               Patient Active Problem List:     Glaucoma     GERD (gastroesophageal reflux disease)     DJD (degenerative joint disease)     Obesity     Polyneuropathy     Migraine     Mitral prolapse     Low back pain     CTS (carpal tunnel syndrome)     Supraspinatus syndrome     Impaired fasting glucose     Acute sinus infection     IBS (irritable bowel syndrome)     Back pain, chronic     Stress fracture, right 5th metatarsal      Upper airway cough syndrome     Ear fullness     Perioral numbness     Screening for osteoporosis     Headache     Left eye injury     Medication reaction     Osteopenia     Lumbosacral pain     Flu vaccine need     Hypothyroid     Urinary incontinence     Anxiety     Sleep apnea     Depression     Chronic back pain     Carpal tunnel syndrome     Neuropathy     Mitral valve problem     Morbid obesity with BMI of 45.0-49.9, adult (HCC)     Frozen shoulder     Skin tag     Degenerative disc disease, lumbar     Bilateral edema of lower extremity     Medication refill     Varicose vein of leg     Dizziness     Dysphagia     Abdominal bloating     Mild intermittent asthma without complication     Skin lesion of left leg     Rotator cuff arthropathy of left shoulder     Dysgeusia

## 2023-08-02 DIAGNOSIS — E11.9 TYPE 2 DIABETES MELLITUS WITHOUT COMPLICATION, WITHOUT LONG-TERM CURRENT USE OF INSULIN (HCC): ICD-10-CM

## 2023-08-02 NOTE — TELEPHONE ENCOUNTER
Date Value   01/20/2021 35 (H)     BUN (mg/dL)   Date Value   01/20/2021 20     BP Readings from Last 3 Encounters:   06/12/23 139/70   05/12/23 133/70   05/10/23 (!) 158/70          (goal 120/80)    All Future Testing planned in [facility]  Lab Frequency Next Occurrence   XR TIBIA FIBULA RIGHT (2 VIEWS) Once 05/04/2023               Patient Active Problem List:     Glaucoma     GERD (gastroesophageal reflux disease)     DJD (degenerative joint disease)     Obesity     Polyneuropathy     Migraine     Mitral prolapse     Low back pain     CTS (carpal tunnel syndrome)     Supraspinatus syndrome     Impaired fasting glucose     Acute sinus infection     IBS (irritable bowel syndrome)     Back pain, chronic     Stress fracture, right 5th metatarsal      Upper airway cough syndrome     Ear fullness     Perioral numbness     Screening for osteoporosis     Headache     Left eye injury     Medication reaction     Osteopenia     Lumbosacral pain     Flu vaccine need     Hypothyroid     Urinary incontinence     Anxiety     Sleep apnea     Depression     Chronic back pain     Carpal tunnel syndrome     Neuropathy     Mitral valve problem     Morbid obesity with BMI of 45.0-49.9, adult (HCC)     Frozen shoulder     Skin tag     Degenerative disc disease, lumbar     Bilateral edema of lower extremity     Medication refill     Varicose vein of leg     Dizziness     Dysphagia     Abdominal bloating     Mild intermittent asthma without complication     Skin lesion of left leg     Rotator cuff arthropathy of left shoulder     Dysgeusia

## 2023-08-03 RX ORDER — ASPIRIN 81 MG/1
81 TABLET, COATED ORAL DAILY
Qty: 30 TABLET | Refills: 5 | Status: SHIPPED | OUTPATIENT
Start: 2023-08-03

## 2023-08-10 DIAGNOSIS — M54.50 CHRONIC BILATERAL LOW BACK PAIN, UNSPECIFIED WHETHER SCIATICA PRESENT: ICD-10-CM

## 2023-08-10 DIAGNOSIS — M51.36 DEGENERATIVE DISC DISEASE, LUMBAR: ICD-10-CM

## 2023-08-10 DIAGNOSIS — G89.29 CHRONIC BILATERAL LOW BACK PAIN, UNSPECIFIED WHETHER SCIATICA PRESENT: ICD-10-CM

## 2023-08-10 NOTE — TELEPHONE ENCOUNTER
E-scribe request for TYLENOL 500 MG. Please review and e-scribe if applicable.      Last Visit Date:  5/12/2023  Next Visit Date:  8/15/2023    Hemoglobin A1C (%)   Date Value   09/02/2022 5.6   06/11/2021 5.5   12/03/2020 5.3             ( goal A1C is < 7)   No components found for: LABMICR  LDL Cholesterol (mg/dL)   Date Value   12/14/2020 64     LDL Calculated (mg/dL)   Date Value   06/16/2017 68       (goal LDL is <100)   AST (U/L)   Date Value   01/20/2021 26     ALT (U/L)   Date Value   01/20/2021 35 (H)     BUN (mg/dL)   Date Value   01/20/2021 20     BP Readings from Last 3 Encounters:   06/12/23 139/70   05/12/23 133/70   05/10/23 (!) 158/70          (goal 120/80)        Patient Active Problem List:     Glaucoma     GERD (gastroesophageal reflux disease)     DJD (degenerative joint disease)     Obesity     Polyneuropathy     Migraine     Mitral prolapse     Low back pain     CTS (carpal tunnel syndrome)     Supraspinatus syndrome     Impaired fasting glucose     Acute sinus infection     IBS (irritable bowel syndrome)     Back pain, chronic     Stress fracture, right 5th metatarsal      Upper airway cough syndrome     Ear fullness     Perioral numbness     Screening for osteoporosis     Headache     Left eye injury     Medication reaction     Osteopenia     Lumbosacral pain     Flu vaccine need     Hypothyroid     Urinary incontinence     Anxiety     Sleep apnea     Depression     Chronic back pain     Carpal tunnel syndrome     Neuropathy     Mitral valve problem     Morbid obesity with BMI of 45.0-49.9, adult (HCC)     Frozen shoulder     Skin tag     Degenerative disc disease, lumbar     Bilateral edema of lower extremity     Medication refill     Varicose vein of leg     Dizziness     Dysphagia     Abdominal bloating     Mild intermittent asthma without complication     Skin lesion of left leg     Rotator cuff arthropathy of left shoulder     Dysgeusia      ----JF

## 2023-08-11 RX ORDER — PSEUDOEPHED/ACETAMINOPH/DIPHEN 30MG-500MG
TABLET ORAL
Qty: 120 TABLET | Refills: 3 | Status: SHIPPED | OUTPATIENT
Start: 2023-08-11

## 2023-08-15 ENCOUNTER — OFFICE VISIT (OUTPATIENT)
Dept: FAMILY MEDICINE CLINIC | Age: 63
End: 2023-08-15
Payer: COMMERCIAL

## 2023-08-15 VITALS
OXYGEN SATURATION: 97 % | HEART RATE: 74 BPM | WEIGHT: 293 LBS | BODY MASS INDEX: 45.99 KG/M2 | SYSTOLIC BLOOD PRESSURE: 147 MMHG | HEIGHT: 67 IN | DIASTOLIC BLOOD PRESSURE: 86 MMHG

## 2023-08-15 DIAGNOSIS — Z00.00 HEALTHCARE MAINTENANCE: ICD-10-CM

## 2023-08-15 DIAGNOSIS — M19.011 OSTEOARTHRITIS OF RIGHT SHOULDER, UNSPECIFIED OSTEOARTHRITIS TYPE: Primary | ICD-10-CM

## 2023-08-15 DIAGNOSIS — I87.8 VENOUS STASIS OF LOWER EXTREMITY: ICD-10-CM

## 2023-08-15 DIAGNOSIS — M54.2 NECK PAIN, ACUTE: ICD-10-CM

## 2023-08-15 LAB — HBA1C MFR BLD: 5.5 %

## 2023-08-15 PROCEDURE — G8427 DOCREV CUR MEDS BY ELIG CLIN: HCPCS

## 2023-08-15 PROCEDURE — G8417 CALC BMI ABV UP PARAM F/U: HCPCS

## 2023-08-15 PROCEDURE — 1036F TOBACCO NON-USER: CPT

## 2023-08-15 PROCEDURE — 83037 HB GLYCOSYLATED A1C HOME DEV: CPT

## 2023-08-15 PROCEDURE — 3017F COLORECTAL CA SCREEN DOC REV: CPT

## 2023-08-15 PROCEDURE — 99213 OFFICE O/P EST LOW 20 MIN: CPT

## 2023-08-15 ASSESSMENT — ENCOUNTER SYMPTOMS
VOMITING: 0
RHINORRHEA: 0
CONSTIPATION: 0
SORE THROAT: 0
NAUSEA: 0
ABDOMINAL PAIN: 0
DIARRHEA: 0
SHORTNESS OF BREATH: 0

## 2023-08-15 NOTE — PROGRESS NOTES
[facility] Medicine Residency Program - Outpatient Note      Subjective:    Jarrett De Los Santos is a 61 y.o. female with  has a past medical history of Abdominal bloating, Anxiety, Bilateral edema of lower extremity, Bronchial asthma, Carpal tunnel syndrome, Cataract, Chronic back pain, Chronic sinusitis, Degenerative disc disease, lumbar, Depression, Diabetes mellitus due to underlying condition with hyperosmolarity without coma ([address]), Dizziness, DJD (degenerative joint disease), Dysphagia, Edema of leg, Environmental allergies, Frozen shoulder, GERD (gastroesophageal reflux disease), Glaucoma, Glucose intolerance (impaired glucose tolerance), Headache(784.0), History of bronchitis, HTN (hypertension), Hyperlipidemia, Hypothyroid, Hypothyroidism, IBS (irritable bowel syndrome), Legally blind, Medication refill, Mild intermittent asthma without complication, Morbid obesity with BMI of 45.0-49.9, adult ([address]), MVP (mitral valve prolapse), Neuropathy, OA (osteoarthritis), EMILIA on CPAP, Osteopenia, Pancreatic cyst, Polyneuropathy, Raynaud disease, Rhinopharyngitis, Skin lesion of left leg, Skin tag, Stress fracture, Syncope, TIA (transient ischemic attack), Urinary incontinence, Varicose vein of leg, Vasodepressor syncope, and Wears glasses. Presented to the office today for:  Chief Complaint   Patient presents with    Norm Grade back in April, right leg and foot are still swollen        HPI  Patient is a 58-year-old female with past medical history asthma, GERD, venous stasis seen today for chronic right lower extremity swelling and acute right-sided neck pain. Right leg swelling  -Patient was last seen on 5/12/2023 with swelling and erythema of her left lower extremity. She was treated for cellulitis with cephalexin 500 mg. She had an elevated D-dimer at the time and and an ultrasound Doppler showed no evidence of DVT.   Patient has been wearing compression stockings daily but reports

## 2023-08-15 NOTE — PROGRESS NOTES
Visit Information    Have you changed or started any medications since your last visit including any over-the-counter medicines, vitamins, or herbal medicines? no   Have you stopped taking any of your medications? Is so, why? -  no  Are you having any side effects from any of your medications? - no    Have you seen any other physician or provider since your last visit? yes - Pulmonology   Have you had any other diagnostic tests since your last visit? yes - VL DUP Lower, SS   Have you been seen in the emergency room and/or had an admission in a hospital since we last saw you?  no   Have you had your routine dental cleaning in the past 6 months?  no     Do you have an active MyChart account? If no, what is the barrier?   Yes    Patient Care Team:  Nicki Coffey MD as PCP - General (Family Medicine)  Zondra Denver, DO as Surgeon (Orthopedic Surgery)  Melo Pastor MD as Consulting Physician (Cardiology)  Samantha Cherry MD as Consulting Physician (Pulmonology)  Tristan Collado as Consulting Physician  Duglas Jones MD as Consulting Physician (Endocrinology)  Parrish Crespo MD as Consulting Physician (Obstetrics & Gynecology)  Dillon Cortés MD as Consulting Physician (Gastroenterology)  Dillon Cortés MD as Consulting Physician (Gastroenterology)    Medical History Review  Past Medical, Family, and Social History reviewed and does not contribute to the patient presenting condition    Health Maintenance   Topic Date Due    COVID-19 Vaccine (1) Never done    Diabetic retinal exam  03/06/2021    Diabetic Alb to Cr ratio (uACR) test  09/30/2021    Lipids  12/14/2021    GFR test (Diabetes, CKD 3-4, OR last GFR 15-59)  01/20/2022    Diabetic foot exam  09/22/2022    Flu vaccine (1) 08/01/2023    A1C test (Diabetic or Prediabetic)  09/02/2023    Depression Monitoring  05/02/2024    Breast cancer screen  12/19/2024    DTaP/Tdap/Td vaccine (2 - Td or Tdap) 06/01/2027    Colorectal Cancer Screen

## 2023-08-15 NOTE — PROGRESS NOTES
Attending Physician Statement  I have discussed the care of [place], including pertinent history and exam findings,  with the resident. I have reviewed the key elements of all parts of the encounter with the resident. I agree with the assessment, plan and orders as documented by the resident.   (Theresa Traore)    Junior Duron MD

## 2023-08-15 NOTE — PATIENT INSTRUCTIONS
Thank you for letting us take care of you today. We hope all your questions were addressed. If a question was overlooked or something else comes to mind after you return home, please contact a member of your Care Team listed below. Your Care Team at [facility] is Team #4  Daniella Lau (Faculty)  Jonelle Matos (Resident)  Maritza Soliz (Resident)  Lloyd Niño (Resident)  No Owens (Resident)  Charly Jimenez (Resident)  Maile Khan, [address] [place] [address], [facility]  Mortimer Gentile, [facility], [facility]  Carlos Ny, [facility]  Nitza Patton, [facility]  Nora Perez, [facility], [facility]  Talon Walden) [place], [place] (Clinical Practice Manager)  Michelle Grimm, [facility] (Clinical Pharmacist)       Office phone number: [phone]    If you need to get in right away due to illness, please be advised we have \"Same Day\" appointments available Monday-Friday. Please call us at [phone] option #3 to schedule your \"Same Day\" appointment.

## 2023-08-16 ENCOUNTER — OFFICE VISIT (OUTPATIENT)
Dept: PODIATRY | Age: 63
End: 2023-08-16
Payer: COMMERCIAL

## 2023-08-16 VITALS
HEART RATE: 72 BPM | DIASTOLIC BLOOD PRESSURE: 78 MMHG | WEIGHT: 293 LBS | HEIGHT: 67 IN | SYSTOLIC BLOOD PRESSURE: 140 MMHG | BODY MASS INDEX: 45.99 KG/M2

## 2023-08-16 DIAGNOSIS — M79.675 PAIN DUE TO ONYCHOMYCOSIS OF TOENAILS OF BOTH FEET: ICD-10-CM

## 2023-08-16 DIAGNOSIS — E08.42 DIABETIC POLYNEUROPATHY ASSOCIATED WITH DIABETES MELLITUS DUE TO UNDERLYING CONDITION (HCC): ICD-10-CM

## 2023-08-16 DIAGNOSIS — I73.9 PVD (PERIPHERAL VASCULAR DISEASE) (HCC): ICD-10-CM

## 2023-08-16 DIAGNOSIS — B35.1 ONYCHOMYCOSIS: Primary | ICD-10-CM

## 2023-08-16 DIAGNOSIS — B35.1 PAIN DUE TO ONYCHOMYCOSIS OF TOENAILS OF BOTH FEET: ICD-10-CM

## 2023-08-16 DIAGNOSIS — M21.41 PES PLANUS OF BOTH FEET: ICD-10-CM

## 2023-08-16 DIAGNOSIS — M79.674 PAIN DUE TO ONYCHOMYCOSIS OF TOENAILS OF BOTH FEET: ICD-10-CM

## 2023-08-16 DIAGNOSIS — M21.42 PES PLANUS OF BOTH FEET: ICD-10-CM

## 2023-08-16 PROCEDURE — 1036F TOBACCO NON-USER: CPT

## 2023-08-16 PROCEDURE — 3017F COLORECTAL CA SCREEN DOC REV: CPT

## 2023-08-16 PROCEDURE — 99213 OFFICE O/P EST LOW 20 MIN: CPT | Performed by: PODIATRIST

## 2023-08-16 PROCEDURE — 11721 DEBRIDE NAIL 6 OR MORE: CPT

## 2023-08-16 PROCEDURE — 2022F DILAT RTA XM EVC RTNOPTHY: CPT

## 2023-08-16 PROCEDURE — 99213 OFFICE O/P EST LOW 20 MIN: CPT

## 2023-08-16 PROCEDURE — G8427 DOCREV CUR MEDS BY ELIG CLIN: HCPCS

## 2023-08-16 PROCEDURE — G8417 CALC BMI ABV UP PARAM F/U: HCPCS

## 2023-08-16 NOTE — PROGRESS NOTES
Patient instructed to remove shoes and socks and instructed to sit in exam chair. Current PCP is Eb Monk MD and date of last visit was 5/12/23. Do you have a follow up visit scheduled? Yes  If yes, the date is 8/15/23    Diabetic visit information    Blood pressure (Control is BP <140/90)  BP Readings from Last 3 Encounters:   06/12/23 139/70   05/12/23 133/70   05/10/23 (!) 158/70       BP taken with correct size cuff? - Yes   Repeated if > 140/90 Yes      Tobacco use:  Patient  reports that she has never smoked. She has never been exposed to tobacco smoke. She has never used smokeless tobacco.  If Smoker - Cessation materials given? - Yes       Diabetic Health Maintenance Items due  Diabetes Management   Topic Date Due    Diabetic retinal exam  03/06/2021    Diabetic Alb to Cr ratio (uACR) test  09/30/2021    Lipids  12/14/2021    Diabetic foot exam  09/22/2022    A1C test (Diabetic or Prediabetic)  09/02/2023       Diabetic retinal exam done in last year? - Yes   If No: remind patient that it is due and they should schedule an exam    Medications  Is patient taking any medications for diabetes? -   Yes  Have blood sugars been controlled? Fasting blood sugars under 120   -   Yes   Random home sugars or today's POCT glucose is under 180 -   Yes   []  If No to the above then patient should schedule appt with PCP.      Diabetic Plan    A1C Plan  Lab Results   Component Value Date    LABA1C 5.6 09/02/2022    LABA1C 5.5 06/11/2021    LABA1C 5.3 12/03/2020      []  If A1C over 8 and last result >3 months ago - Order A1C and refer to PCP   []  If last A1C over 6 months ago - Order A1C and refer to PCP for follow up   []  If elevated blood sugars > 180 - refer to PCP for follow up    []  Blood sugar controlled - A1C under 8 and last check was < 6 months      Cholesterol Plan   Lab Results   Component Value Date    LDLCALC 68 06/16/2017    LDLCHOLESTEROL 64 12/14/2020      []  If LDL > 100 and last result >3
BEDTIME 4/3/23   Nelson Patel MD   omeprazole (PRILOSEC) 40 MG delayed release capsule Take 1 capsule by mouth daily 3/31/23   Osito Albrecht MD   fluticasone [facility]) 50 MCG/ACT nasal spray USE 1 SPRAY INTO EACH NOSTRIL TWICE DAILY 1/27/23   Ari Jay MD   senna (SENNA-TIME) 8.6 MG tablet TAKE 1 TABLET BY MOUTH 2 TIMES DAILY AS NEEDED FOR CONSTIPATION 1/23/23   Shasha Soto MD   ondansetron [facility]) 4 MG tablet Take 1 tablet by mouth daily as needed for Nausea or Vomiting 11/1/22   Cory Galindo MD   Accu-Chek FastClix Lancets MISC USE TO CHECK BLOOD SUGAR TWICE A DAY 9/20/22   Vince Soto MD   rosuvastatin (CRESTOR) 5 MG tablet Take 1 tablet by mouth daily 9/8/22   Celso Partida MD   Roylene Sos strip  12/20/21   Historical Provider, MD   Lancets Misc. (ACCU-CHEK FASTCLIX LANCET) KIT 1 box by Beatris Luna. (Med.Supl.;Non-Drugs) route 2 times daily Dispense 1 box of 100. 12/14/21   Osorio Davila MD   albuterol sulfate  (90 Base) MCG/ACT inhaler INHALE 2 PUFFS INTO THE LUNGS 4 TIMES DAILY AS NEEDED FOR WHEEZING 12/7/21   Ari Jay MD   levothyroxine (SYNTHROID) 100 MCG tablet Take 1 tablet by mouth Daily Indications: 112 mg 9/21/21   Juarez Ayala MD   Blood Glucose Monitoring Suppl (Roylene Sos FLEX SYSTEM) w/Device KIT  4/1/21   Historical Provider, MD   Blood Glucose Calibration (ONETOUCH VERIO) SOLN  4/14/21   Historical Provider, MD       Objective     Vitals:    08/16/23 1212   BP: (!) 140/78   Pulse: 72       Lab Results   Component Value Date    LABA1C 5.5 08/15/2023       Physical Exam:  General:  Alert and oriented x3. In no acute distress. Lower Extremity Physical Exam:    Vascular: DP pulses are palpable, Bilateral. PT pulses non palpable bilaterally but audible on doppler. Varicose veins noted bilaterally.  CFT <3 seconds to all digits, Bilateral.  Non pitting Edema noted to the bilateral lower extremity, Bilateral.  Hair growth is absent to the level of the

## 2023-08-31 ENCOUNTER — HOSPITAL ENCOUNTER (OUTPATIENT)
Age: 63
Discharge: HOME | End: 2023-09-02
Payer: COMMERCIAL

## 2023-08-31 ENCOUNTER — TELEPHONE (OUTPATIENT)
Dept: FAMILY MEDICINE CLINIC | Age: 63
End: 2023-08-31

## 2023-08-31 ENCOUNTER — HOSPITAL ENCOUNTER (OUTPATIENT)
Dept: VASCULAR LAB | Age: 63
Discharge: HOME | End: 2023-09-02
Payer: COMMERCIAL

## 2023-08-31 DIAGNOSIS — I73.9 PVD (PERIPHERAL VASCULAR DISEASE) (HCC): ICD-10-CM

## 2023-08-31 LAB
VAS LEFT ABI: 1.14
VAS LEFT ARM BP: 144 MMHG
VAS LEFT DORSALIS PEDIS BP: 140 MMHG
VAS LEFT PTA BP: 164 MMHG
VAS RIGHT ABI: 1.17
VAS RIGHT ARM BP: 140 MMHG
VAS RIGHT DORSALIS PEDIS BP: 138 MMHG
VAS RIGHT PTA BP: 168 MMHG

## 2023-08-31 PROCEDURE — 93923 UPR/LXTR ART STDY 3+ LVLS: CPT

## 2023-08-31 PROCEDURE — 93923 UPR/LXTR ART STDY 3+ LVLS: CPT | Performed by: SURGERY

## 2023-09-01 DIAGNOSIS — M54.2 NECK PAIN: Primary | ICD-10-CM

## 2023-09-08 ENCOUNTER — HOSPITAL ENCOUNTER (OUTPATIENT)
Age: 63
End: 2023-09-08
Payer: COMMERCIAL

## 2023-09-08 ENCOUNTER — HOSPITAL ENCOUNTER (OUTPATIENT)
Dept: GENERAL RADIOLOGY | Age: 63
End: 2023-09-08
Payer: COMMERCIAL

## 2023-09-08 DIAGNOSIS — M54.2 NECK PAIN: ICD-10-CM

## 2023-09-08 PROCEDURE — 72040 X-RAY EXAM NECK SPINE 2-3 VW: CPT

## 2023-09-11 ENCOUNTER — TELEPHONE (OUTPATIENT)
Dept: FAMILY MEDICINE CLINIC | Age: 63
End: 2023-09-11

## 2023-09-11 NOTE — TELEPHONE ENCOUNTER
Patient called office asking about her XR results. She also wants to know if she can start her PT. She was uncertain if she should / could start.  Please advise

## 2023-09-14 PROBLEM — Z00.00 HEALTHCARE MAINTENANCE: Status: RESOLVED | Noted: 2017-06-01 | Resolved: 2023-09-14

## 2023-09-18 NOTE — TELEPHONE ENCOUNTER
PC from pt following up regarding results, notified of age related degenerative changes and arthritis as well as instruction to start PT right away. Pt expressed understanding and gratitude before disconnecting the call.

## 2023-09-20 ENCOUNTER — TELEPHONE (OUTPATIENT)
Dept: FAMILY MEDICINE CLINIC | Age: 63
End: 2023-09-20

## 2023-09-20 NOTE — TELEPHONE ENCOUNTER
Patient calling stating she was in the ED and was dx with covid - she was given PAKLOVID and she has taken 1 dose and has had a really bad headache, nausea, and throwing up yellow bile. She states she read the packet the  pharmacy gave her when she picked up the medication and it states that it is not FDA approved and for emergency use only, patient is asking if she can just not finish the medication or will it cause her any harm? She states she is concerned with it not being approved by FDA and really does not want to take it any more if not needed.

## 2023-10-04 NOTE — PROGRESS NOTES
Dermatology Patient Note  [facility] DERMATOLOGY  [address] [facility]  [address] [place] [address]  Dept: [phone]  Dept Fax: [phone]      VISITDATE: 7/3/2019   REFERRING PROVIDER: Anthony Almeida, [address] is a 61 y.o. female  who presents today in the office for:    New Patient (Patient states she was told she has eczema on her left lower leg. She states she has been dealing with it for over 1 year. She uses Betamethasone Dipropionate Cream 0.05% off and on. She states it is a deep ache, but sometimes itches.)      HISTORY OF PRESENT ILLNESS:  61 y.o. female presenting for rash  Location: left leg  Duration: 1 year  Symptoms: itching  Course: worsening, but improves with treatment  Exacerbating factors: unsure  Prior treatments: betamethasone.  Has compression socks but doesn't use them much because it's hot      CURRENT MEDICATIONS:   Current Outpatient Medications   Medication Sig Dispense Refill    betamethasone dipropionate (DIPROLENE) 0.05 % ointment Apply topically twice daily most stubborn areas of rash 50 g 2    triamcinolone (KENALOG) 0.1 % ointment Apply to rash twice daily (not face, armpit or groin) 80 g 2    loratadine (CLARITIN) 10 MG tablet TAKE 1 TABLET BY MOUTH DAILY 30 tablet 5    atorvastatin (LIPITOR) 10 MG tablet   3    aspirin EC 81 MG EC tablet Take 1 tablet by mouth daily 90 tablet 1    calcium carbonate-vitamin D (CALTRATE) 600-400 MG-UNIT TABS per tab TAKE 1 TABLET BY MOUTH TWICE A DAY 90 tablet 1    pantoprazole (PROTONIX) 40 MG tablet TAKE 1 TABLET BY MOUTH EVERY MORNING BEFORE BREAKFAST 90 tablet 1    albuterol sulfate HFA (VENTOLIN HFA) 108 (90 Base) MCG/ACT inhaler INHALE 2 PUFFS INTO THE LUNGS EVERY SIX HOURS AS NEEDED 18 g 2    fluticasone (FLONASE) 50 MCG/ACT nasal spray USE 1 SPRAY IN EACH NOSTRIL TWICE A DAY 32 Bottle 0    ACETAMINOPHEN EXTRA STRENGTH 500 MG tablet TAKE 2 TABLETS BY MOUTH EVERY 6 HOURS AS NEEDED FOR PAIN 120 pain , swelling    Oxycodone-Acetaminophen      Chest pain severe    Rofecoxib Rash     swelling    Shellfish-Derived Products Anaphylaxis and Rash     shrimp  shrimp  shrimp    Simvastatin Nausea And Vomiting     Other reaction(s): muscle cramps    Statins      Other reaction(s): muscle cramps  Tolerates lovastatin. Pravachol caused numbness in head/face    Sulfa Antibiotics Hives    Tetracyclines & Related Itching and Rash    Timoptic [Timolol Maleate] Itching and Swelling     Eyes burning severely    Tramadol Itching and Rash    Fosamax [Alendronate] Nausea Only and Nausea And Vomiting    Nalbuphine Nausea And Vomiting    Alendronate Sodium     Alendronate Sodium     Alphagan [Brimonidine Tartrate]      Eye irritation    Dilaudid [Hydromorphone Hcl]     Doxycycline Monohydrate     Nsaids     Other Itching and Swelling     Eyes burning    Pepcid Complete [Famotidine-Ca Carb-Mag Hydrox] Hives and Other (See Comments)     blisters    Pilocarpine Itching and Swelling     Blurred vision  Eyes burning    Pravastatin Other (See Comments)     Facial numming    Shrimp Flavor Hives and Swelling    Statins Support Therapy      Tolerates lovastatin.  Pravachol caused numbness in head/face    Timoptic [Timolol Maleate]      Eye irritation      Tolectin [Tolmetin]      Unknown reaction  - as a child    Voltaren [Diclofenac Sodium]      Flu symptoms      Nubain [Nalbuphine Hcl] Nausea And Vomiting     Chest pain      Percocet [Oxycodone-Acetaminophen] Nausea And Vomiting     Sweating, chest pain    Tolectin [Tolmetin Sodium] Rash    Tolmetin Sodium Rash    Ultram [Tramadol Hcl] Itching and Rash     Rash on face    Vicodin [Hydrocodone-Acetaminophen] Nausea And Vomiting     swearing    Vioxx Rash       SOCIAL HISTORY:  Social History     Tobacco Use    Smoking status: Never Smoker    Smokeless tobacco: Never Used   Substance Use Topics    Alcohol use: No     Alcohol/week: 0.0 oz       REVIEW OF SYSTEMS:  Review of Systems  Skin: Denies any new changing, growing orbleeding lesions or rashes except as described in the HPI   Constitutional: Denies fevers, chills, and malaise. PHYSICAL EXAM:   /78 (Site: Left Lower Arm, Position: Sitting, Cuff Size: Medium Adult)   Pulse 71   Ht 5' 7\" (1.702 m)   Wt (!) 307 lb (139.3 kg)   SpO2 97%   BMI 48.08 kg/m²     General Exam:  General Appearance: No acute distress, Well nourished     Neuro: Alert and oriented to person, place and time  Psych: Normal affect   Lymph Node: Not performed    Cutaneous Exam: Performed as documented in clinic note below. Head/face,neck, both arms, digits and/or nails, and limited lower extremities (that which is visible with pants/shorts and shoes/socks on) was examined. Pertinent Physical Exam Findings:  Physical Exam  L>R LE with varicosities and pitting edema  Left inner lower leg with brawny eczematous plaques    Photo surveillance performed: No    Medical Necessity of Exam Performed:   Distribution of patient concerns    Additional Diagnostic Testing performed during exam: Not performed ,  Not performed    ASSESSMENT:   Diagnosis Orders   1. Venous stasis dermatitis of left lower extremity  betamethasone dipropionate (DIPROLENE) 0.05 % ointment    triamcinolone (KENALOG) 0.1 % ointment       Plan of Action is as Follows:  Assessment   1. Venous stasis dermatitis of left lower extremity  - discussed diagnosis, etiology, natural course, and treatment options  - compression socks daily  - Apply diprolene ointment twice daily to rough, raised rash on lower leg  - Apply triamcinolone ointment twice daily when rash is smooth  - betamethasone dipropionate (DIPROLENE) 0.05 % ointment; Apply topically twice daily most stubborn areas of rash  Dispense: 50 g; Refill: 2  - triamcinolone (KENALOG) 0.1 % ointment;  Apply to rash twice daily (not face, armpit or groin)  Dispense: 80 g; Refill: 2    RTC 3 months            Patient 97

## 2023-10-06 ENCOUNTER — OFFICE VISIT (OUTPATIENT)
Dept: GASTROENTEROLOGY | Age: 63
End: 2023-10-06
Payer: COMMERCIAL

## 2023-10-06 VITALS
TEMPERATURE: 97.3 F | BODY MASS INDEX: 49.34 KG/M2 | WEIGHT: 293 LBS | SYSTOLIC BLOOD PRESSURE: 168 MMHG | DIASTOLIC BLOOD PRESSURE: 80 MMHG

## 2023-10-06 DIAGNOSIS — E66.01 MORBID OBESITY WITH BMI OF 45.0-49.9, ADULT (HCC): ICD-10-CM

## 2023-10-06 DIAGNOSIS — R14.0 ABDOMINAL BLOATING: ICD-10-CM

## 2023-10-06 DIAGNOSIS — Z83.79 FAMILY HISTORY OF CROHN'S DISEASE: ICD-10-CM

## 2023-10-06 DIAGNOSIS — R13.19 ESOPHAGEAL DYSPHAGIA: ICD-10-CM

## 2023-10-06 DIAGNOSIS — K21.9 GASTROESOPHAGEAL REFLUX DISEASE, UNSPECIFIED WHETHER ESOPHAGITIS PRESENT: Primary | ICD-10-CM

## 2023-10-06 PROCEDURE — G8427 DOCREV CUR MEDS BY ELIG CLIN: HCPCS | Performed by: INTERNAL MEDICINE

## 2023-10-06 PROCEDURE — 99214 OFFICE O/P EST MOD 30 MIN: CPT | Performed by: INTERNAL MEDICINE

## 2023-10-06 PROCEDURE — G8484 FLU IMMUNIZE NO ADMIN: HCPCS | Performed by: INTERNAL MEDICINE

## 2023-10-06 PROCEDURE — 1036F TOBACCO NON-USER: CPT | Performed by: INTERNAL MEDICINE

## 2023-10-06 PROCEDURE — G8417 CALC BMI ABV UP PARAM F/U: HCPCS | Performed by: INTERNAL MEDICINE

## 2023-10-06 PROCEDURE — 3017F COLORECTAL CA SCREEN DOC REV: CPT | Performed by: INTERNAL MEDICINE

## 2023-10-06 ASSESSMENT — ENCOUNTER SYMPTOMS
ANAL BLEEDING: 0
SORE THROAT: 0
WHEEZING: 0
RECTAL PAIN: 0
ABDOMINAL PAIN: 1
CONSTIPATION: 0
COUGH: 0
VOMITING: 0
CHOKING: 0
DIARRHEA: 1
NAUSEA: 0
SHORTNESS OF BREATH: 0
TROUBLE SWALLOWING: 0
BLOOD IN STOOL: 0
ABDOMINAL DISTENTION: 1
VOICE CHANGE: 0

## 2023-10-06 NOTE — PROGRESS NOTES
right iridectomy, right laser, bilateral trabeculectomy, left drain    GLAUCOMA SURGERY      HAND SURGERY Right     HYSTERECTOMY (CERVIX STATUS UNKNOWN)  1982    KNEE SURGERY Right 2000    TUBAL LIGATION  1981    UPPER GASTROINTESTINAL ENDOSCOPY      UPPER GASTROINTESTINAL ENDOSCOPY  5/24/2018    EGD DILATION SAVORY performed by Sari Harrell MD at [address] [place] [address]  3/6/2019    EGD DILATION SAVORY performed by Jaylen Stout MD at [address] [facility] N/A 9/8/2021    EGD DILATION SAVORY performed by Jaylen Stout MD at [address]:    Current Outpatient Medications:     ACETAMINOPHEN EXTRA STRENGTH 500 MG tablet, TAKE 2 TABLETS BY MOUTH EVERY 6 HOURS AS NEEDED FOR PAIN, Disp: 120 tablet, Rfl: 3    ASPIRIN LOW DOSE 81 MG EC tablet, Take 1 tablet by mouth daily, Disp: 30 tablet, Rfl: 5    CALCIUM 600+D3 600-10 MG-MCG TABS per tab, TAKE 1 TABLET BY MOUTH EVERY MORNING AND AT BEDTIME, Disp: 60 tablet, Rfl: 1    albuterol sulfate HFA (PROVENTIL;VENTOLIN;PROAIR) 108 (90 Base) MCG/ACT inhaler, INHALE 2 PUFFS INTO THE LUNGS EVERY 6 HOURS AS NEEDED, Disp: 18 g, Rfl: 5    loratadine (CLARITIN) 10 MG tablet, TAKE 1 TABLET BY MOUTH EVERY DAY, Disp: 30 tablet, Rfl: 10    Multiple Vitamins-Minerals (MULTIVITAMIN-MINERALS) TABS tablet, TAKE 1 TABLET BY MOUTH EVERY DAY, Disp: 30 tablet, Rfl: 3    Cholecalciferol (VITAMIN D3) 50 MCG (2000 UT) CAPS, TAKE 1 CAPSULE BY MOUTH EVERY DAY, Disp: 30 capsule, Rfl: 5    CALCIUM 600 +D HIGH POTENCY 600-10 MG-MCG TABS, TAKE 1 TABLET BY MOUTH EVERY MORNING AND 1 TABLET EVERY NIGHT AT BEDTIME, Disp: 60 tablet, Rfl: 2    omeprazole (PRILOSEC) 40 MG delayed release capsule, Take 1 capsule by mouth daily, Disp: 30 capsule, Rfl: 5    fluticasone (FLONASE) 50 MCG/ACT nasal spray, USE 1 SPRAY INTO EACH NOSTRIL TWICE DAILY, Disp: 1 each, Rfl: 4    senna (SENNA-TIME) 8.6 MG tablet, TAKE 1 TABLET BY MOUTH 2

## 2023-10-12 ENCOUNTER — OFFICE VISIT (OUTPATIENT)
Dept: PULMONOLOGY | Age: 63
End: 2023-10-12
Payer: COMMERCIAL

## 2023-10-12 VITALS
SYSTOLIC BLOOD PRESSURE: 136 MMHG | HEART RATE: 69 BPM | BODY MASS INDEX: 45.99 KG/M2 | RESPIRATION RATE: 20 BRPM | WEIGHT: 293 LBS | HEIGHT: 67 IN | DIASTOLIC BLOOD PRESSURE: 84 MMHG | OXYGEN SATURATION: 96 %

## 2023-10-12 DIAGNOSIS — G47.33 OBSTRUCTIVE SLEEP APNEA SYNDROME: Primary | ICD-10-CM

## 2023-10-12 DIAGNOSIS — E66.01 MORBID OBESITY WITH BMI OF 45.0-49.9, ADULT (HCC): ICD-10-CM

## 2023-10-12 PROCEDURE — 1036F TOBACCO NON-USER: CPT | Performed by: INTERNAL MEDICINE

## 2023-10-12 PROCEDURE — G8484 FLU IMMUNIZE NO ADMIN: HCPCS | Performed by: INTERNAL MEDICINE

## 2023-10-12 PROCEDURE — G8427 DOCREV CUR MEDS BY ELIG CLIN: HCPCS | Performed by: INTERNAL MEDICINE

## 2023-10-12 PROCEDURE — 3017F COLORECTAL CA SCREEN DOC REV: CPT | Performed by: INTERNAL MEDICINE

## 2023-10-12 PROCEDURE — G8417 CALC BMI ABV UP PARAM F/U: HCPCS | Performed by: INTERNAL MEDICINE

## 2023-10-12 PROCEDURE — 99214 OFFICE O/P EST MOD 30 MIN: CPT | Performed by: INTERNAL MEDICINE

## 2023-10-12 ASSESSMENT — SLEEP AND FATIGUE QUESTIONNAIRES
HOW LIKELY ARE YOU TO NOD OFF OR FALL ASLEEP WHILE SITTING AND READING: 0
HOW LIKELY ARE YOU TO NOD OFF OR FALL ASLEEP WHILE SITTING AND TALKING TO SOMEONE: 1
HOW LIKELY ARE YOU TO NOD OFF OR FALL ASLEEP WHILE WATCHING TV: 1
HOW LIKELY ARE YOU TO NOD OFF OR FALL ASLEEP WHEN YOU ARE A PASSENGER IN A CAR FOR AN HOUR WITHOUT A BREAK: 1
ESS TOTAL SCORE: 8
HOW LIKELY ARE YOU TO NOD OFF OR FALL ASLEEP WHILE SITTING QUIETLY AFTER LUNCH WITHOUT ALCOHOL: 1
HOW LIKELY ARE YOU TO NOD OFF OR FALL ASLEEP WHILE LYING DOWN TO REST IN THE AFTERNOON WHEN CIRCUMSTANCES PERMIT: 3
HOW LIKELY ARE YOU TO NOD OFF OR FALL ASLEEP IN A CAR, WHILE STOPPED FOR A FEW MINUTES IN TRAFFIC: 0
HOW LIKELY ARE YOU TO NOD OFF OR FALL ASLEEP WHILE SITTING INACTIVE IN A PUBLIC PLACE: 1

## 2023-10-12 NOTE — PROGRESS NOTES
(hypertension)     Hyperlipidemia     Hypothyroid     hypothyroid state    Hypothyroidism     IBS (irritable bowel syndrome) 10/02/2012    Legally blind     due to glaucoma    Medication refill 6/1/2017    Mild intermittent asthma without complication     Morbid obesity with BMI of 45.0-49.9, adult (HCC)     MVP (mitral valve prolapse)     Neuropathy     OA (osteoarthritis)     EMILIA on CPAP     Osteopenia     Pancreatic cyst     Polyneuropathy     Raynaud disease     Rhinopharyngitis     Allergic rhinopharyngitis    Skin lesion of left leg 6/11/2021    Skin tag 7/9/2015    Stress fracture     Right 5th Metatarsal     Syncope     TIA (transient ischemic attack)     Urinary incontinence     Varicose vein of leg 6/1/2017    Vasodepressor syncope     Wears glasses        Family History:       Problem Relation Age of Onset    Diabetes Mother     Heart Disease Mother         multiple heart attacks    Arthritis Mother     High Blood Pressure Mother     High Cholesterol Mother     Substance Abuse Mother     Heart Disease Father     Arthritis Father     Depression Father     High Cholesterol Father     Mental Illness Father     Substance Abuse Father     Diabetes Sister     High Blood Pressure Sister     Cancer Paternal Uncle         esophageal cancer    Diabetes Maternal Aunt     Cancer Maternal Aunt         colorectal cancer    Diabetes Maternal Uncle     Cancer Maternal Grandmother         colorectal cancer    Arthritis Maternal Grandmother     Diabetes Maternal Grandmother     High Blood Pressure Maternal Grandmother     Stroke Maternal Grandmother     Arthritis Maternal Grandfather     Arthritis Paternal Grandmother     Cancer Paternal Grandmother     Depression Paternal Grandmother     Heart Disease Paternal Grandmother     High Blood Pressure Paternal Grandmother     High Cholesterol Paternal Grandmother     Mental Illness Paternal Grandmother     Arthritis Paternal Grandfather        SURGICAL HISTORY:   Past Surgical

## 2023-10-17 ENCOUNTER — HOSPITAL ENCOUNTER (OUTPATIENT)
Dept: CT IMAGING | Age: 63
Discharge: HOME | End: 2023-10-19
Attending: INTERNAL MEDICINE
Payer: COMMERCIAL

## 2023-10-17 DIAGNOSIS — K21.9 GASTROESOPHAGEAL REFLUX DISEASE, UNSPECIFIED WHETHER ESOPHAGITIS PRESENT: ICD-10-CM

## 2023-10-17 DIAGNOSIS — Z83.79 FAMILY HISTORY OF CROHN'S DISEASE: ICD-10-CM

## 2023-10-17 DIAGNOSIS — E66.01 MORBID OBESITY WITH BMI OF 45.0-49.9, ADULT (HCC): ICD-10-CM

## 2023-10-17 DIAGNOSIS — R14.0 ABDOMINAL BLOATING: ICD-10-CM

## 2023-10-17 DIAGNOSIS — R13.19 ESOPHAGEAL DYSPHAGIA: ICD-10-CM

## 2023-10-17 LAB
EGFR, POC: >60 ML/MIN/1.73M2
POC CREATININE: 0.6 MG/DL (ref 0.51–1.19)

## 2023-10-17 PROCEDURE — 74177 CT ABD & PELVIS W/CONTRAST: CPT

## 2023-10-17 PROCEDURE — 82565 ASSAY OF CREATININE: CPT

## 2023-10-17 PROCEDURE — 2500000003 HC RX 250 WO HCPCS: Performed by: INTERNAL MEDICINE

## 2023-10-17 PROCEDURE — 2580000003 HC RX 258: Performed by: INTERNAL MEDICINE

## 2023-10-17 PROCEDURE — 6360000004 HC RX CONTRAST MEDICATION: Performed by: INTERNAL MEDICINE

## 2023-10-17 RX ORDER — 0.9 % SODIUM CHLORIDE 0.9 %
100 INTRAVENOUS SOLUTION INTRAVENOUS ONCE
Status: COMPLETED | OUTPATIENT
Start: 2023-10-17 | End: 2023-10-17

## 2023-10-17 RX ORDER — SODIUM CHLORIDE 0.9 % (FLUSH) 0.9 %
10 SYRINGE (ML) INJECTION PRN
Status: DISCONTINUED | OUTPATIENT
Start: 2023-10-17 | End: 2023-10-20 | Stop reason: HOSPADM

## 2023-10-17 RX ADMIN — IOPAMIDOL 75 ML: 755 INJECTION, SOLUTION INTRAVENOUS at 10:43

## 2023-10-17 RX ADMIN — BARIUM SULFATE 1350 ML: 1 SUSPENSION ORAL at 10:42

## 2023-10-17 RX ADMIN — SODIUM CHLORIDE 100 ML: 9 INJECTION, SOLUTION INTRAVENOUS at 10:43

## 2023-10-17 RX ADMIN — SODIUM CHLORIDE, PRESERVATIVE FREE 10 ML: 5 INJECTION INTRAVENOUS at 10:43

## 2023-10-18 DIAGNOSIS — K21.9 GASTROESOPHAGEAL REFLUX DISEASE WITHOUT ESOPHAGITIS: ICD-10-CM

## 2023-10-18 DIAGNOSIS — Z76.0 MEDICATION REFILL: ICD-10-CM

## 2023-10-19 RX ORDER — OMEPRAZOLE 40 MG/1
40 CAPSULE, DELAYED RELEASE ORAL DAILY
Qty: 30 CAPSULE | Refills: 5 | Status: SHIPPED | OUTPATIENT
Start: 2023-10-19

## 2023-10-19 NOTE — TELEPHONE ENCOUNTER
E-scribe request for PENDED MEDICATIONS. Please review and e-scribe if applicable.      Last Visit Date:  8/15/2023  Next Visit Date:  Visit date not found    Hemoglobin A1C (%)   Date Value   08/15/2023 5.5   09/02/2022 5.6   06/11/2021 5.5             ( goal A1C is < 7)   No components found for: \"LABMICR\"  LDL Cholesterol (mg/dL)   Date Value   12/14/2020 64     LDL Calculated (mg/dL)   Date Value   06/16/2017 68       (goal LDL is <100)   AST (U/L)   Date Value   01/20/2021 26     ALT (U/L)   Date Value   01/20/2021 35 (H)     BUN (mg/dL)   Date Value   01/20/2021 20     BP Readings from Last 3 Encounters:   10/12/23 136/84   10/06/23 (!) 168/80   08/16/23 (!) 140/78          (goal 120/80)        Patient Active Problem List:     Glaucoma     GERD (gastroesophageal reflux disease)     DJD (degenerative joint disease)     Obesity     Polyneuropathy     Migraine     Mitral prolapse     Low back pain     CTS (carpal tunnel syndrome)     Supraspinatus syndrome     Impaired fasting glucose     Acute sinus infection     IBS (irritable bowel syndrome)     Back pain, chronic     Stress fracture, right 5th metatarsal      Upper airway cough syndrome     Ear fullness     Perioral numbness     Screening for osteoporosis     Headache     Left eye injury     Medication reaction     Osteopenia     Lumbosacral pain     Flu vaccine need     Hypothyroid     Urinary incontinence     Anxiety     Sleep apnea     Depression     Chronic back pain     Carpal tunnel syndrome     Neuropathy     Mitral valve problem     Morbid obesity with BMI of 45.0-49.9, adult (HCC)     Frozen shoulder     Skin tag     Degenerative disc disease, lumbar     Bilateral edema of lower extremity     Medication refill     Venous stasis of lower extremity     Dizziness     Dysphagia     Abdominal bloating     Mild intermittent asthma without complication     Skin lesion of left leg     Rotator cuff arthropathy of left shoulder     Dysgeusia     Neck pain,

## 2023-10-21 RX ORDER — FOLIC ACID/MV,IRON,MIN/LUTEIN 0.4-18-25
TABLET ORAL
Qty: 30 TABLET | Refills: 3 | Status: SHIPPED | OUTPATIENT
Start: 2023-10-21

## 2023-10-21 RX ORDER — LYSINE HCL 500 MG
TABLET ORAL
Qty: 60 TABLET | Refills: 1 | Status: SHIPPED | OUTPATIENT
Start: 2023-10-21

## 2023-10-24 ENCOUNTER — TELEPHONE (OUTPATIENT)
Dept: GASTROENTEROLOGY | Age: 63
End: 2023-10-24

## 2023-10-24 NOTE — TELEPHONE ENCOUNTER
Pt lvm regarding CT results, writer reviewed pt chart CT results did come back normal, also reviewed pt [facility] lab, also came back in normal range, writer called pt discussed results, thanked writer john hoyos. English

## 2023-11-29 ENCOUNTER — OFFICE VISIT (OUTPATIENT)
Dept: PODIATRY | Age: 63
End: 2023-11-29
Payer: COMMERCIAL

## 2023-11-29 VITALS
SYSTOLIC BLOOD PRESSURE: 152 MMHG | HEIGHT: 67 IN | DIASTOLIC BLOOD PRESSURE: 80 MMHG | HEART RATE: 84 BPM | WEIGHT: 293 LBS | BODY MASS INDEX: 45.99 KG/M2

## 2023-11-29 DIAGNOSIS — M21.41 PES PLANUS OF BOTH FEET: ICD-10-CM

## 2023-11-29 DIAGNOSIS — I73.9 PVD (PERIPHERAL VASCULAR DISEASE) (HCC): ICD-10-CM

## 2023-11-29 DIAGNOSIS — B35.1 ONYCHOMYCOSIS: Primary | ICD-10-CM

## 2023-11-29 DIAGNOSIS — M21.42 PES PLANUS OF BOTH FEET: ICD-10-CM

## 2023-11-29 DIAGNOSIS — L98.9 SKIN LESION OF LEFT LOWER LIMB: ICD-10-CM

## 2023-11-29 DIAGNOSIS — E08.42 DIABETIC POLYNEUROPATHY ASSOCIATED WITH DIABETES MELLITUS DUE TO UNDERLYING CONDITION (HCC): ICD-10-CM

## 2023-11-29 PROCEDURE — 11721 DEBRIDE NAIL 6 OR MORE: CPT

## 2023-11-29 PROCEDURE — G8484 FLU IMMUNIZE NO ADMIN: HCPCS

## 2023-11-29 PROCEDURE — G8427 DOCREV CUR MEDS BY ELIG CLIN: HCPCS

## 2023-11-29 PROCEDURE — 1036F TOBACCO NON-USER: CPT

## 2023-11-29 PROCEDURE — 99213 OFFICE O/P EST LOW 20 MIN: CPT

## 2023-11-29 PROCEDURE — G8417 CALC BMI ABV UP PARAM F/U: HCPCS

## 2023-11-29 PROCEDURE — 2022F DILAT RTA XM EVC RTNOPTHY: CPT

## 2023-11-29 PROCEDURE — 3017F COLORECTAL CA SCREEN DOC REV: CPT

## 2023-11-29 RX ORDER — TRIAMCINOLONE ACETONIDE 0.25 MG/G
CREAM TOPICAL
Qty: 80 G | Refills: 5 | Status: SHIPPED | OUTPATIENT
Start: 2023-11-29

## 2023-11-29 NOTE — PROGRESS NOTES
Patient instructed to remove shoes and socks and instructed to sit in exam chair. Current PCP is Laura Peace MD and date of last visit was 08/15/2023. Do you have a follow up visit scheduled?   No  If yes, the date is
Mild intermittent asthma without complication     Morbid obesity with BMI of 45.0-49.9, adult (HCC)     MVP (mitral valve prolapse)     Neuropathy     OA (osteoarthritis)     EMILIA on CPAP     Osteopenia     Pancreatic cyst     Polyneuropathy     Raynaud disease     Rhinopharyngitis     Allergic rhinopharyngitis    Skin lesion of left leg 6/11/2021    Skin tag 7/9/2015    Stress fracture     Right 5th Metatarsal     Syncope     TIA (transient ischemic attack)     Urinary incontinence     Varicose vein of leg 6/1/2017    Vasodepressor syncope     Wears glasses        Surgical History:   Past Surgical History:   Procedure Laterality Date    APPENDECTOMY  1978    CATARACT REMOVAL Left     CHOLECYSTECTOMY  1978    COLONOSCOPY      COLONOSCOPY N/A 12/9/2019    COLORECTAL CANCER SCREENING, NOT HIGH RISK performed by Sage River MD at [address] [facility] Bilateral     right iridectomy, right laser, bilateral trabeculectomy, left drain    GLAUCOMA SURGERY      HAND SURGERY Right     HYSTERECTOMY (CERVIX STATUS UNKNOWN)  1982    KNEE SURGERY Right 2000    TUBAL LIGATION  1981    UPPER GASTROINTESTINAL ENDOSCOPY      UPPER GASTROINTESTINAL ENDOSCOPY  5/24/2018    EGD DILATION SAVORY performed by Kam Jarrett MD at [address] [facility]  3/6/2019    EGD DILATION SAVORY performed by Sage River MD at [address] [facility] N/A 9/8/2021    EGD DILATION SAVORY performed by Sage River MD at [facility]       Social History:  Social History     Tobacco Use    Smoking status: Never     Passive exposure: Never    Smokeless tobacco: Never   Vaping Use    Vaping Use: Never used   Substance Use Topics    Alcohol use: No     Alcohol/week: 0.0 standard drinks of alcohol    Drug use: No       Medications:  Prior to Admission medications    Medication Sig Start Date End Date Taking?  Authorizing Provider   triamcinolone (KENALOG) 0.025 % cream

## 2023-12-07 DIAGNOSIS — J45.20 MILD INTERMITTENT ASTHMA WITHOUT COMPLICATION: ICD-10-CM

## 2023-12-07 RX ORDER — ALBUTEROL SULFATE 90 UG/1
AEROSOL, METERED RESPIRATORY (INHALATION)
Qty: 1 EACH | Refills: 9 | Status: SHIPPED | OUTPATIENT
Start: 2023-12-07

## 2023-12-07 NOTE — TELEPHONE ENCOUNTER
LAST VISIT: 10/12/23  NEXT VISIT: 2/15/24    Per last dictation patient is on albuterol. Please sign for refill if ok. Thank you.

## 2023-12-15 DIAGNOSIS — M51.36 DEGENERATIVE DISC DISEASE, LUMBAR: ICD-10-CM

## 2023-12-15 DIAGNOSIS — G89.29 CHRONIC BILATERAL LOW BACK PAIN, UNSPECIFIED WHETHER SCIATICA PRESENT: ICD-10-CM

## 2023-12-15 DIAGNOSIS — M54.50 CHRONIC BILATERAL LOW BACK PAIN, UNSPECIFIED WHETHER SCIATICA PRESENT: ICD-10-CM

## 2023-12-15 RX ORDER — PSEUDOEPHED/ACETAMINOPH/DIPHEN 30MG-500MG
TABLET ORAL
Qty: 120 TABLET | Refills: 3 | Status: SHIPPED | OUTPATIENT
Start: 2023-12-15

## 2023-12-15 RX ORDER — CALCIUM CARBONATE/VITAMIN D3 600 MG-10
TABLET ORAL
Qty: 60 TABLET | Refills: 1 | Status: SHIPPED | OUTPATIENT
Start: 2023-12-15

## 2023-12-15 NOTE — TELEPHONE ENCOUNTER
Please address the medication refill and close the encounter. If I can be of assistance, please route to the applicable pool. Thank you.     Last visit: 8-15-23  Last Med refill: 8-11-23  Does patient have enough medication for 72 hours: No:     Next Visit Date:  Future Appointments   Date Time Provider [address]   12/20/2023 10:30 AM [facility]   1/3/2024 12:15 PM Diane Blackman [facility] Podiatry [facility]   1/9/2024  8:20 AM Hector Richards MD [facility] FP [facility]   1/25/2024  9:45 AM Bry Cruz DO AFL TCC [facility]   1/29/2024  9:00 AM Deja Ayala MD TIFF OB/GYN [facility]   2/15/2024  9:00 AM Jackson Cruz MD Resp Spec [facility]iel Copper   2/23/2024 10:00 AM Agatha Cadena MD [facility] [address] Maintenance   Topic Date Due    COVID-19 Vaccine (1) Never done    Hepatitis B vaccine (1 of 3 - Risk 3-dose series) Never done    Respiratory Syncytial Virus (RSV) age 61 yrs+ (3 - 1-dose 60+ series) Never done    Diabetic Alb to Cr ratio (uACR) test  01/02/2021    Diabetic retinal exam  03/06/2021    Lipids  12/14/2021    GFR test (Diabetes, CKD 3-4, OR last GFR 15-59)  01/20/2022    Diabetic foot exam  09/22/2022    Depression Monitoring  05/02/2024    A1C test (Diabetic or Prediabetic)  08/15/2024    Breast cancer screen  12/19/2024    DTaP/Tdap/Td vaccine (2 - Td or Tdap) 06/01/2027    Colorectal Cancer Screen  12/09/2029    Flu vaccine  Completed    Shingles vaccine  Completed    Pneumococcal 0-64 years Vaccine  Completed    Hepatitis C screen  Completed    HIV screen  Completed    Hepatitis A vaccine  Aged Out    Hib vaccine  Aged Out    Meningococcal (ACWY) vaccine  Aged Out    Cervical cancer screen  Discontinued       Hemoglobin A1C (%)   Date Value   08/15/2023 5.5   09/02/2022 5.6   06/11/2021 5.5             ( goal A1C is < 7)   No components found for: \"LABMICR\"  LDL Cholesterol (mg/dL)   Date Value   12/14/2020 64   07/08/2019 97     LDL

## 2024-01-03 ENCOUNTER — OFFICE VISIT (OUTPATIENT)
Dept: PODIATRY | Age: 64
End: 2024-01-03
Payer: COMMERCIAL

## 2024-01-03 VITALS
HEART RATE: 70 BPM | SYSTOLIC BLOOD PRESSURE: 152 MMHG | WEIGHT: 293 LBS | HEIGHT: 67 IN | DIASTOLIC BLOOD PRESSURE: 80 MMHG | BODY MASS INDEX: 45.99 KG/M2

## 2024-01-03 DIAGNOSIS — L98.9 SKIN LESION OF LEFT LOWER LIMB: Primary | ICD-10-CM

## 2024-01-03 DIAGNOSIS — M79.671 BILATERAL FOOT PAIN: ICD-10-CM

## 2024-01-03 DIAGNOSIS — M79.672 BILATERAL FOOT PAIN: ICD-10-CM

## 2024-01-03 PROBLEM — B35.1 ONYCHOMYCOSIS: Status: ACTIVE | Noted: 2024-01-03

## 2024-01-03 PROCEDURE — 3017F COLORECTAL CA SCREEN DOC REV: CPT

## 2024-01-03 PROCEDURE — G8484 FLU IMMUNIZE NO ADMIN: HCPCS

## 2024-01-03 PROCEDURE — G8417 CALC BMI ABV UP PARAM F/U: HCPCS

## 2024-01-03 PROCEDURE — 1036F TOBACCO NON-USER: CPT

## 2024-01-03 PROCEDURE — 99213 OFFICE O/P EST LOW 20 MIN: CPT | Performed by: PODIATRIST

## 2024-01-03 PROCEDURE — 99213 OFFICE O/P EST LOW 20 MIN: CPT

## 2024-01-03 PROCEDURE — G8427 DOCREV CUR MEDS BY ELIG CLIN: HCPCS

## 2024-01-08 NOTE — PROGRESS NOTES
Patient instructed to remove shoes and socks and instructed to sit in exam chair.  Current PCP is Trinity Rebollar MD and date of last visit was 08/15/2023.   Do you have a follow up visit scheduled?  Yes  If yes, the date is 01/09/2024.    
diabetic and her last known hemoglobin A1c was 5.5% on 8/15/2023.  Patient states that she regularly follows up with her primary care physician. Patient does complain of claudication-like symptoms and states that she has not followed up with her vascular doctor in quite some time.  Patient has not obtained her diabetic shoes and is interested in having a new prescription filled during today's visit.  No other pedal complaints at this time    HPI:  Kaci Banerjee is a 63 y.o. female who presents to clinic today for follow-up of pain in her left foot she recently received orthotics however she has not obtained shoes to put them in outside of worn shoes understands importance of doing so.  She is not able to point to the exact location of the pain. She also relates burning to bilateral lower extremity. Relates that she normally wears compression stockings to bilateral lower extremities for edema.  Denies any rest pain or claudication symptoms.    Primary care physician is Trinity Rebollar MD.    ROS:    Constitutional: Denies nausea, vomiting, fever, chills.  Neurologic: Denies numbness, tingling, and burning in the feet.    Vascular: Denies symptoms of lower extremity claudication.    Skin: Thick, elongated toenails  Otherwise negative except as noted in the HPI.     PMH:  Past Medical History:   Diagnosis Date    Abdominal bloating 1/23/2019    Anxiety     Bilateral edema of lower extremity 12/16/2016    Bronchial asthma     mild, intermittent    Carpal tunnel syndrome     Cataract     Chronic back pain     Chronic sinusitis     Degenerative disc disease, lumbar 12/16/2016    Depression     Diabetes mellitus due to underlying condition with hyperosmolarity without coma (HCC)     Dizziness 10/27/2017    DJD (degenerative joint disease)     S1, L3, L4, L5, T12    Dysphagia 1/23/2019    Edema of leg     bilateral legs    Environmental allergies     Frozen shoulder 4/27/2015    GERD (gastroesophageal reflux disease)

## 2024-01-09 ENCOUNTER — HOSPITAL ENCOUNTER (OUTPATIENT)
Age: 64
Setting detail: SPECIMEN
Discharge: HOME | End: 2024-01-09

## 2024-01-09 ENCOUNTER — OFFICE VISIT (OUTPATIENT)
Dept: FAMILY MEDICINE CLINIC | Age: 64
End: 2024-01-09
Payer: COMMERCIAL

## 2024-01-09 VITALS
WEIGHT: 293 LBS | BODY MASS INDEX: 45.99 KG/M2 | SYSTOLIC BLOOD PRESSURE: 139 MMHG | HEIGHT: 67 IN | DIASTOLIC BLOOD PRESSURE: 87 MMHG | HEART RATE: 84 BPM

## 2024-01-09 DIAGNOSIS — R41.3 MEMORY DISTURBANCE: ICD-10-CM

## 2024-01-09 DIAGNOSIS — E66.01 MORBID OBESITY WITH BMI OF 45.0-49.9, ADULT (HCC): Primary | ICD-10-CM

## 2024-01-09 LAB
ANION GAP SERPL CALCULATED.3IONS-SCNC: 10 MMOL/L (ref 9–17)
BASOPHILS # BLD: 0.04 K/UL (ref 0–0.2)
BASOPHILS NFR BLD: 1 % (ref 0–2)
BUN SERPL-MCNC: 17 MG/DL (ref 8–23)
CALCIUM SERPL-MCNC: 9.2 MG/DL (ref 8.6–10.4)
CHLORIDE SERPL-SCNC: 103 MMOL/L (ref 98–107)
CO2 SERPL-SCNC: 27 MMOL/L (ref 20–31)
CREAT SERPL-MCNC: 0.7 MG/DL (ref 0.5–0.9)
EOSINOPHIL # BLD: 0.16 K/UL (ref 0–0.44)
EOSINOPHILS RELATIVE PERCENT: 2 % (ref 1–4)
ERYTHROCYTE [DISTWIDTH] IN BLOOD BY AUTOMATED COUNT: 12.4 % (ref 11.8–14.4)
FOLATE SERPL-MCNC: >20 NG/ML
GFR SERPL CREATININE-BSD FRML MDRD: >60 ML/MIN/1.73M2
GLUCOSE SERPL-MCNC: 114 MG/DL (ref 70–99)
HCT VFR BLD AUTO: 41.9 % (ref 36.3–47.1)
HGB BLD-MCNC: 14 G/DL (ref 11.9–15.1)
IMM GRANULOCYTES # BLD AUTO: 0.03 K/UL (ref 0–0.3)
IMM GRANULOCYTES NFR BLD: 0 %
LYMPHOCYTES NFR BLD: 2.69 K/UL (ref 1.1–3.7)
LYMPHOCYTES RELATIVE PERCENT: 37 % (ref 24–43)
MCH RBC QN AUTO: 31.4 PG (ref 25.2–33.5)
MCHC RBC AUTO-ENTMCNC: 33.4 G/DL (ref 28.4–34.8)
MCV RBC AUTO: 93.9 FL (ref 82.6–102.9)
MONOCYTES NFR BLD: 0.47 K/UL (ref 0.1–1.2)
MONOCYTES NFR BLD: 7 % (ref 3–12)
NEUTROPHILS NFR BLD: 53 % (ref 36–65)
NEUTS SEG NFR BLD: 3.86 K/UL (ref 1.5–8.1)
NRBC BLD-RTO: 0 PER 100 WBC
PLATELET # BLD AUTO: 216 K/UL (ref 138–453)
PMV BLD AUTO: 10.7 FL (ref 8.1–13.5)
POTASSIUM SERPL-SCNC: 4 MMOL/L (ref 3.7–5.3)
RBC # BLD AUTO: 4.46 M/UL (ref 3.95–5.11)
SODIUM SERPL-SCNC: 140 MMOL/L (ref 135–144)
TSH SERPL DL<=0.05 MIU/L-ACNC: 4.18 UIU/ML (ref 0.3–5)
VIT B12 SERPL-MCNC: 480 PG/ML (ref 232–1245)
WBC OTHER # BLD: 7.3 K/UL (ref 3.5–11.3)

## 2024-01-09 PROCEDURE — G8484 FLU IMMUNIZE NO ADMIN: HCPCS

## 2024-01-09 PROCEDURE — 3017F COLORECTAL CA SCREEN DOC REV: CPT

## 2024-01-09 PROCEDURE — 99213 OFFICE O/P EST LOW 20 MIN: CPT

## 2024-01-09 PROCEDURE — 1036F TOBACCO NON-USER: CPT

## 2024-01-09 PROCEDURE — G8427 DOCREV CUR MEDS BY ELIG CLIN: HCPCS

## 2024-01-09 PROCEDURE — G8417 CALC BMI ABV UP PARAM F/U: HCPCS

## 2024-01-09 SDOH — ECONOMIC STABILITY: FOOD INSECURITY: WITHIN THE PAST 12 MONTHS, THE FOOD YOU BOUGHT JUST DIDN'T LAST AND YOU DIDN'T HAVE MONEY TO GET MORE.: NEVER TRUE

## 2024-01-09 SDOH — ECONOMIC STABILITY: INCOME INSECURITY: HOW HARD IS IT FOR YOU TO PAY FOR THE VERY BASICS LIKE FOOD, HOUSING, MEDICAL CARE, AND HEATING?: PATIENT DECLINED

## 2024-01-09 SDOH — ECONOMIC STABILITY: FOOD INSECURITY: WITHIN THE PAST 12 MONTHS, YOU WORRIED THAT YOUR FOOD WOULD RUN OUT BEFORE YOU GOT MONEY TO BUY MORE.: NEVER TRUE

## 2024-01-09 SDOH — ECONOMIC STABILITY: HOUSING INSECURITY
IN THE LAST 12 MONTHS, WAS THERE A TIME WHEN YOU DID NOT HAVE A STEADY PLACE TO SLEEP OR SLEPT IN A SHELTER (INCLUDING NOW)?: NO

## 2024-01-09 ASSESSMENT — ENCOUNTER SYMPTOMS
SHORTNESS OF BREATH: 0
RHINORRHEA: 0
ABDOMINAL PAIN: 0
NAUSEA: 0
CONSTIPATION: 0
VOMITING: 0
SORE THROAT: 0
DIARRHEA: 0

## 2024-01-09 ASSESSMENT — PATIENT HEALTH QUESTIONNAIRE - PHQ9
3. TROUBLE FALLING OR STAYING ASLEEP: 0
2. FEELING DOWN, DEPRESSED OR HOPELESS: 0
5. POOR APPETITE OR OVEREATING: 1
4. FEELING TIRED OR HAVING LITTLE ENERGY: 0
9. THOUGHTS THAT YOU WOULD BE BETTER OFF DEAD, OR OF HURTING YOURSELF: 0
SUM OF ALL RESPONSES TO PHQ QUESTIONS 1-9: 1
SUM OF ALL RESPONSES TO PHQ QUESTIONS 1-9: 1
1. LITTLE INTEREST OR PLEASURE IN DOING THINGS: 0
7. TROUBLE CONCENTRATING ON THINGS, SUCH AS READING THE NEWSPAPER OR WATCHING TELEVISION: 0
6. FEELING BAD ABOUT YOURSELF - OR THAT YOU ARE A FAILURE OR HAVE LET YOURSELF OR YOUR FAMILY DOWN: 0
SUM OF ALL RESPONSES TO PHQ QUESTIONS 1-9: 1
DEPRESSION UNABLE TO ASSESS: PT REFUSES
SUM OF ALL RESPONSES TO PHQ9 QUESTIONS 1 & 2: 0
SUM OF ALL RESPONSES TO PHQ QUESTIONS 1-9: 1
8. MOVING OR SPEAKING SO SLOWLY THAT OTHER PEOPLE COULD HAVE NOTICED. OR THE OPPOSITE, BEING SO FIGETY OR RESTLESS THAT YOU HAVE BEEN MOVING AROUND A LOT MORE THAN USUAL: 0
10. IF YOU CHECKED OFF ANY PROBLEMS, HOW DIFFICULT HAVE THESE PROBLEMS MADE IT FOR YOU TO DO YOUR WORK, TAKE CARE OF THINGS AT HOME, OR GET ALONG WITH OTHER PEOPLE: 0

## 2024-01-09 NOTE — PROGRESS NOTES
Attending Physician Statement  I have discussed the care of Kaci Banerjee, 63 y.o. female,including pertinent history and exam findings,  with the resident Trinity Osei MD.  History:  Chief Complaint   Patient presents with    Memory Loss     They are just starting      I have reviewed the key elements of the encounter with the resident. Examination was done by resident as documented in residents note.  BP Readings from Last 3 Encounters:   01/09/24 139/87   01/03/24 (!) 152/80   11/29/23 (!) 152/80     /87 (Site: Left Lower Arm, Position: Sitting, Cuff Size: Medium Adult)   Pulse 84   Ht 1.702 m (5' 7.01\")   Wt (!) 144.4 kg (318 lb 6.4 oz)   BMI 49.86 kg/m²   Lab Results   Component Value Date    WBC 12.5 (H) 01/20/2021    HGB 15.6 (H) 01/20/2021    HCT 46.8 01/20/2021     01/20/2021    CHOL 132 12/14/2020    TRIG 114 12/14/2020    HDL 45 12/14/2020    ALT 35 (H) 01/20/2021    AST 26 01/20/2021     01/20/2021    K 3.9 01/20/2021     01/20/2021    CREATININE 0.6 10/17/2023    BUN 20 01/20/2021    CO2 33 (H) 01/20/2021    TSH 3.12 06/14/2021    INR 0.9 06/29/2013    LABA1C 5.5 08/15/2023     Lab Results   Component Value Date    CALCIUM 9.7 01/20/2021     Lab Results   Component Value Date    LDLCALC 68 06/16/2017    LDLCHOLESTEROL 64 12/14/2020     I agree with the assessment, plan and diagnosis of    Diagnosis Orders   1. Morbid obesity with BMI of 45.0-49.9, adult (HCC)  [facility]      2. Memory disturbance  CBC with Auto Differential    Basic Metabolic Panel    TSH With Reflex Ft4    Vitamin B12 & Folate        I agree with  orders as documented by the resident.  Recommendations: Agree with resident assessment and plan.  Patient likely to benefit from further discussion regarding patient's mild to moderate depression (though PHQ9 is 1) as reported by resident team and consider further treatment of this.  Patient also may be having 
screen  12/20/2025    DTaP/Tdap/Td vaccine (2 - Td or Tdap) 06/01/2027    Colorectal Cancer Screen  12/09/2029    Flu vaccine  Completed    Shingles vaccine  Completed    Pneumococcal 0-64 years Vaccine  Completed    Hepatitis C screen  Completed    HIV screen  Completed    Hepatitis A vaccine  Aged Out    Hepatitis B vaccine  Aged Out    Hib vaccine  Aged Out    Polio vaccine  Aged Out    Meningococcal (ACWY) vaccine  Aged Out    Cervical cancer screen  Discontinued             
3.12 06/14/2021    INR 0.9 06/29/2013    LABA1C 5.5 08/15/2023     Lab Results   Component Value Date    CALCIUM 9.7 01/20/2021     Lab Results   Component Value Date    LDLCALC 68 06/16/2017    LDLCHOLESTEROL 64 12/14/2020       Assessment and Plan:    1. Memory disturbance  -Patient reports that she has been noticing some memory disturbance in the past few months. Medication list reviewed and no medications likely the cause.  PHQ-9 1 today.  Discussed proper sleep hygiene with patient and given a handout for sleep hygiene.  Will repeat basic labs and follow-up with patient in 3 months.  Informed patient to have a journal to note down things that she has been forgetting.  - CBC with Auto Differential; Future  - Basic Metabolic Panel; Future  - TSH With Reflex Ft4; Future  - Vitamin B12 & Folate; Future    2. Morbid obesity with BMI of 45.0-49.9, adult (HCC)  - [facility]          Requested Prescriptions      No prescriptions requested or ordered in this encounter       There are no discontinued medications.    Kaci received counseling on the following healthy behaviors: nutrition, exercise and medication adherence    Discussed use,benefit, and side effects of prescribed medications.  Barriers to medication compliance addressed.      All patient questions answered.  Pt voiced understanding.     Return in about 3 months (around 4/9/2024) for memory disturbance.        Disclaimer: Some orall of this note was transcribed using voice-recognition software.This may cause typographical errors occasionally. Although all effort is made to fix these errors, please do not hesitate to contact our office if there isany concern with the understanding of this note.

## 2024-01-09 NOTE — PATIENT INSTRUCTIONS
Thank you for letting us take care of you today. We hope all your questions were addressed. If a question was overlooked or something else comes to mind after you return home, please contact a member of your Care Team listed below.      Your Care Team at [facility] is Team #2  Misael Leal M.D. (Faculty)  Rachel Cooper, (Resident)  Shanna Jon, (Resident)  Miguel Schrader, (Resident)  Chase Quiles, (Resident)  Osorio Blackmon, (Resident)  Luna Peterson, [facility]  Noel Holloway, [facility]  Adriana Lopez, [facility]  Savita Rice, [facility]  Lila Zambrano,  [facility]  Guera Huggins, [facility]  Yumiko Davis, [facility]  Yazmin Basilio, [facility]  Talon (LJ) Javier DARWIN (Clinical Practice Manager)  Maryjo Burton [facility] (Clinical Pharmacist)     Office phone number: [phone]    If you need to get in right away due to illness, please be advised we have \"Same Day\" appointments available Monday-Friday. Please call us at [phone] option #3 to schedule your \"Same Day\" appointment.

## 2024-01-29 ENCOUNTER — HOSPITAL ENCOUNTER (OUTPATIENT)
Age: 64
Setting detail: SPECIMEN
Discharge: HOME | End: 2024-01-29
Payer: COMMERCIAL

## 2024-01-29 ENCOUNTER — OFFICE VISIT (OUTPATIENT)
Dept: OBGYN | Age: 64
End: 2024-01-29
Payer: COMMERCIAL

## 2024-01-29 VITALS
DIASTOLIC BLOOD PRESSURE: 76 MMHG | BODY MASS INDEX: 45.99 KG/M2 | WEIGHT: 293 LBS | SYSTOLIC BLOOD PRESSURE: 130 MMHG | HEIGHT: 67 IN

## 2024-01-29 DIAGNOSIS — Z01.419 WOMEN'S ANNUAL ROUTINE GYNECOLOGICAL EXAMINATION: ICD-10-CM

## 2024-01-29 DIAGNOSIS — Z01.419 WOMEN'S ANNUAL ROUTINE GYNECOLOGICAL EXAMINATION: Primary | ICD-10-CM

## 2024-01-29 PROCEDURE — G8484 FLU IMMUNIZE NO ADMIN: HCPCS | Performed by: OBSTETRICS & GYNECOLOGY

## 2024-01-29 PROCEDURE — G0145 SCR C/V CYTO,THINLAYER,RESCR: HCPCS

## 2024-01-29 PROCEDURE — 99396 PREV VISIT EST AGE 40-64: CPT | Performed by: OBSTETRICS & GYNECOLOGY

## 2024-01-29 NOTE — PROGRESS NOTES
YEARLY PHYSICAL    Date of service: 2024    Kaci Banerjee  Is a 63 y.o.  single female    PT's PCP is: Trinity Rebollar MD     : 1960                                             Subjective:       No LMP recorded. Patient has had a hysterectomy.     Are your menses regular: not applicable    OB History    Para Term  AB Living   1 1 1         SAB IAB Ectopic Molar Multiple Live Births                    # Outcome Date GA Lbr Panchito/2nd Weight Sex Delivery Anes PTL Lv   1 Term      Vag-Spont           Social History     Tobacco Use   Smoking Status Never    Passive exposure: Never   Smokeless Tobacco Never        Social History     Substance and Sexual Activity   Alcohol Use No    Alcohol/week: 0.0 standard drinks of alcohol       Family History   Problem Relation Age of Onset    Diabetes Mother     Heart Disease Mother         multiple heart attacks    Arthritis Mother     High Blood Pressure Mother     High Cholesterol Mother     Substance Abuse Mother     Heart Disease Father     Arthritis Father     Depression Father     High Cholesterol Father     Mental Illness Father     Substance Abuse Father     Diabetes Sister     High Blood Pressure Sister     Cancer Paternal Uncle         esophageal cancer    Diabetes Maternal Aunt     Cancer Maternal Aunt         colorectal cancer    Diabetes Maternal Uncle     Cancer Maternal Grandmother         colorectal cancer    Arthritis Maternal Grandmother     Diabetes Maternal Grandmother     High Blood Pressure Maternal Grandmother     Stroke Maternal Grandmother     Arthritis Maternal Grandfather     Arthritis Paternal Grandmother     Cancer Paternal Grandmother     Depression Paternal Grandmother     Heart Disease Paternal Grandmother     High Blood Pressure Paternal Grandmother     High Cholesterol Paternal Grandmother     Mental Illness Paternal Grandmother     Arthritis Paternal

## 2024-02-05 ENCOUNTER — TELEPHONE (OUTPATIENT)
Dept: GASTROENTEROLOGY | Age: 64
End: 2024-02-05

## 2024-02-05 NOTE — TELEPHONE ENCOUNTER
Called patient and confirmed procedure for 2-6-24 @ [facility].  Informed that due to provider conflict the procedure would need to be moved to 11 a.m. and arrive at 9:30 a.m.  The patient was agreeable.  Writer thanked and call ended.

## 2024-02-06 ENCOUNTER — ANESTHESIA EVENT (OUTPATIENT)
Dept: OPERATING ROOM | Age: 64
End: 2024-02-06
Payer: COMMERCIAL

## 2024-02-06 ENCOUNTER — HOSPITAL ENCOUNTER (OUTPATIENT)
Age: 64
Setting detail: OUTPATIENT SURGERY
Discharge: HOME | End: 2024-02-06
Attending: INTERNAL MEDICINE | Admitting: INTERNAL MEDICINE
Payer: COMMERCIAL

## 2024-02-06 ENCOUNTER — ANESTHESIA (OUTPATIENT)
Dept: OPERATING ROOM | Age: 64
End: 2024-02-06
Payer: COMMERCIAL

## 2024-02-06 VITALS
SYSTOLIC BLOOD PRESSURE: 141 MMHG | DIASTOLIC BLOOD PRESSURE: 71 MMHG | OXYGEN SATURATION: 98 % | BODY MASS INDEX: 45.99 KG/M2 | HEIGHT: 67 IN | WEIGHT: 293 LBS | RESPIRATION RATE: 17 BRPM | TEMPERATURE: 97 F | HEART RATE: 66 BPM

## 2024-02-06 DIAGNOSIS — E66.01 MORBID OBESITY WITH BMI OF 45.0-49.9, ADULT (HCC): ICD-10-CM

## 2024-02-06 DIAGNOSIS — Z83.79 FAMILY HISTORY OF CROHN'S DISEASE: ICD-10-CM

## 2024-02-06 DIAGNOSIS — R13.19 ESOPHAGEAL DYSPHAGIA: ICD-10-CM

## 2024-02-06 DIAGNOSIS — R14.0 ABDOMINAL BLOATING: ICD-10-CM

## 2024-02-06 DIAGNOSIS — K21.9 GASTROESOPHAGEAL REFLUX DISEASE, UNSPECIFIED WHETHER ESOPHAGITIS PRESENT: ICD-10-CM

## 2024-02-06 LAB — GLUCOSE BLD-MCNC: 105 MG/DL (ref 65–105)

## 2024-02-06 PROCEDURE — 43239 EGD BIOPSY SINGLE/MULTIPLE: CPT | Performed by: INTERNAL MEDICINE

## 2024-02-06 PROCEDURE — 2500000003 HC RX 250 WO HCPCS: Performed by: NURSE ANESTHETIST, CERTIFIED REGISTERED

## 2024-02-06 PROCEDURE — 2580000003 HC RX 258: Performed by: ANESTHESIOLOGY

## 2024-02-06 PROCEDURE — 3700000001 HC ADD 15 MINUTES (ANESTHESIA): Performed by: INTERNAL MEDICINE

## 2024-02-06 PROCEDURE — 82947 ASSAY GLUCOSE BLOOD QUANT: CPT

## 2024-02-06 PROCEDURE — 6360000002 HC RX W HCPCS: Performed by: NURSE ANESTHETIST, CERTIFIED REGISTERED

## 2024-02-06 PROCEDURE — 3700000000 HC ANESTHESIA ATTENDED CARE: Performed by: INTERNAL MEDICINE

## 2024-02-06 PROCEDURE — 2709999900 HC NON-CHARGEABLE SUPPLY: Performed by: INTERNAL MEDICINE

## 2024-02-06 PROCEDURE — 88342 IMHCHEM/IMCYTCHM 1ST ANTB: CPT

## 2024-02-06 PROCEDURE — 7100000010 HC PHASE II RECOVERY - FIRST 15 MIN: Performed by: INTERNAL MEDICINE

## 2024-02-06 PROCEDURE — 88305 TISSUE EXAM BY PATHOLOGIST: CPT

## 2024-02-06 PROCEDURE — 3609012400 HC EGD TRANSORAL BIOPSY SINGLE/MULTIPLE: Performed by: INTERNAL MEDICINE

## 2024-02-06 PROCEDURE — 7100000011 HC PHASE II RECOVERY - ADDTL 15 MIN: Performed by: INTERNAL MEDICINE

## 2024-02-06 RX ORDER — LIDOCAINE HYDROCHLORIDE 10 MG/ML
1 INJECTION, SOLUTION EPIDURAL; INFILTRATION; INTRACAUDAL; PERINEURAL
Status: DISCONTINUED | OUTPATIENT
Start: 2024-02-07 | End: 2024-02-06 | Stop reason: HOSPADM

## 2024-02-06 RX ORDER — SODIUM CHLORIDE 0.9 % (FLUSH) 0.9 %
5-40 SYRINGE (ML) INJECTION EVERY 12 HOURS SCHEDULED
Status: DISCONTINUED | OUTPATIENT
Start: 2024-02-06 | End: 2024-02-06 | Stop reason: HOSPADM

## 2024-02-06 RX ORDER — SODIUM CHLORIDE, SODIUM LACTATE, POTASSIUM CHLORIDE, CALCIUM CHLORIDE 600; 310; 30; 20 MG/100ML; MG/100ML; MG/100ML; MG/100ML
INJECTION, SOLUTION INTRAVENOUS CONTINUOUS
Status: DISCONTINUED | OUTPATIENT
Start: 2024-02-06 | End: 2024-02-06 | Stop reason: HOSPADM

## 2024-02-06 RX ORDER — SODIUM CHLORIDE 0.9 % (FLUSH) 0.9 %
5-40 SYRINGE (ML) INJECTION PRN
Status: DISCONTINUED | OUTPATIENT
Start: 2024-02-06 | End: 2024-02-06 | Stop reason: HOSPADM

## 2024-02-06 RX ORDER — SODIUM CHLORIDE 9 MG/ML
INJECTION, SOLUTION INTRAVENOUS PRN
Status: DISCONTINUED | OUTPATIENT
Start: 2024-02-06 | End: 2024-02-06 | Stop reason: HOSPADM

## 2024-02-06 RX ORDER — SODIUM CHLORIDE 9 MG/ML
INJECTION, SOLUTION INTRAVENOUS CONTINUOUS
Status: DISCONTINUED | OUTPATIENT
Start: 2024-02-06 | End: 2024-02-06 | Stop reason: HOSPADM

## 2024-02-06 RX ORDER — LIDOCAINE HYDROCHLORIDE 20 MG/ML
INJECTION, SOLUTION INFILTRATION; PERINEURAL PRN
Status: DISCONTINUED | OUTPATIENT
Start: 2024-02-06 | End: 2024-02-06 | Stop reason: SDUPTHER

## 2024-02-06 RX ORDER — PROPOFOL 10 MG/ML
INJECTION, EMULSION INTRAVENOUS CONTINUOUS PRN
Status: DISCONTINUED | OUTPATIENT
Start: 2024-02-06 | End: 2024-02-06 | Stop reason: SDUPTHER

## 2024-02-06 RX ADMIN — SODIUM CHLORIDE, POTASSIUM CHLORIDE, SODIUM LACTATE AND CALCIUM CHLORIDE: 600; 310; 30; 20 INJECTION, SOLUTION INTRAVENOUS at 09:55

## 2024-02-06 RX ADMIN — LIDOCAINE HYDROCHLORIDE 80 MG: 20 INJECTION, SOLUTION INFILTRATION; PERINEURAL at 10:54

## 2024-02-06 RX ADMIN — PROPOFOL 50 MG: 10 INJECTION, EMULSION INTRAVENOUS at 10:56

## 2024-02-06 RX ADMIN — PROPOFOL 125 MCG/KG/MIN: 10 INJECTION, EMULSION INTRAVENOUS at 10:55

## 2024-02-06 ASSESSMENT — PAIN DESCRIPTION - DESCRIPTORS: DESCRIPTORS: ACHING

## 2024-02-06 ASSESSMENT — PAIN - FUNCTIONAL ASSESSMENT: PAIN_FUNCTIONAL_ASSESSMENT: 0-10

## 2024-02-06 NOTE — H&P
Interval H&P Note    Pt Name: Kaci Banerjee  MRN: [id number]  YOB: 1960  Date of evaluation: 2/6/2024      [x] I have reviewed in [facility] the primary care progress note by Dr. Rebollar dated 01/09/2024, attached below for an Interval History and Physical note.     [x] I have examined  Kaci Banerjee, a 63 y.o. female.There are no changes to the patient who is scheduled for EGD ESOPHAGOGASTRODUODENOSCOPY by Reno Lenz MD for Gastroesophageal reflux disease, unspecified whether esophagitis present; *. Patient has intermittent left upper quadrant abdominal pain. She denies bloody tarry stools, diarrhea alternating with constipation, nausea, vomiting,  or unintentional weight loss. Has had previous colonoscopy and EGD. No FH colon cancer or polyps.The patient denies new health changes, fever, chills, wheezing, cough, increased SOB, chest pain, open sores or wounds. Hx of diabetes, POC . Last aspirin 01/30/2024.    Vital signs: BP (!) 152/81   Pulse 75   Temp 97.7 °F (36.5 °C)   Resp 16   Ht 1.702 m (5' 7\")   Wt (!) 143.8 kg (317 lb)   SpO2 98%   BMI 49.65 kg/m²     Allergies:  Latex, Adhesive tape, Amlodipine, Bextra [valdecoxib], Brimonidine, Daypro [oxaprozin], Diclofenac sodium, Famotidine, Hydrocodone-acetaminophen, Hydromorphone, Ibuprofen, Lisinopril, Metoprolol, Naproxen, Oxycodone-acetaminophen, Rofecoxib, Shellfish-derived products, Simvastatin, Statins, Sulfa antibiotics, Tetracyclines & related, Timoptic [timolol maleate], Tramadol, Fosamax [alendronate], Nalbuphine, Alendronate sodium, Alendronate sodium, Alphagan [brimonidine tartrate], Dilaudid [hydromorphone hcl], Doxycycline monohydrate, Nsaids, Other, Pepcid complete [famotidine-ca carb-mag hydrox], Pilocarpine, Pravastatin, Red dye, Shrimp flavor, Statins support therapy, Timoptic [timolol maleate], Tolectin [tolmetin], Voltaren [diclofenac sodium], Nubain [nalbuphine hcl], Percocet [oxycodone-acetaminophen], Tolectin  Edema present.      Comments: Chronic LE edema   Skin:     General: Skin is warm and dry.   Neurological:      Mental Status: She is alert.         Lab Results   Component Value Date    WBC 12.5 (H) 01/20/2021    HGB 15.6 (H) 01/20/2021    HCT 46.8 01/20/2021     01/20/2021    CHOL 132 12/14/2020    TRIG 114 12/14/2020    HDL 45 12/14/2020    ALT 35 (H) 01/20/2021    AST 26 01/20/2021     01/20/2021    K 3.9 01/20/2021     01/20/2021    CREATININE 0.6 10/17/2023    BUN 20 01/20/2021    CO2 33 (H) 01/20/2021    TSH 3.12 06/14/2021    INR 0.9 06/29/2013    LABA1C 5.5 08/15/2023     Lab Results   Component Value Date    CALCIUM 9.7 01/20/2021     Lab Results   Component Value Date    LDLCALC 68 06/16/2017    LDLCHOLESTEROL 64 12/14/2020       Assessment and Plan:    1. Memory disturbance  -Patient reports that she has been noticing some memory disturbance in the past few months. Medication list reviewed and no medications likely the cause.  PHQ-9 1 today.  Discussed proper sleep hygiene with patient and given a handout for sleep hygiene.  Will repeat basic labs and follow-up with patient in 3 months.  Informed patient to have a journal to note down things that she has been forgetting.  - CBC with Auto Differential; Future  - Basic Metabolic Panel; Future  - TSH With Reflex Ft4; Future  - Vitamin B12 & Folate; Future    2. Morbid obesity with BMI of 45.0-49.9, adult (HCC)  - [facility] Starting [employer] Rx Program          Requested Prescriptions      No prescriptions requested or ordered in this encounter       There are no discontinued medications.    Kaci received counseling on the following healthy behaviors: nutrition, exercise and medication adherence    Discussed use,benefit, and side effects of prescribed medications.  Barriers to medication compliance addressed.      All patient questions answered.  Pt voiced understanding.     Return in about 3 months (around 4/9/2024) for

## 2024-02-06 NOTE — OP NOTE
PROCEDURE NOTE    DATE OF PROCEDURE: 2/6/2024     SURGEON: Reno Lenz MD    ASSISTANT: None    PREOPERATIVE DIAGNOSIS: GERD  ABDOMINAL PAINS    POSTOPERATIVE DIAGNOSIS: As described below    OPERATION: Upper GI endoscopy with Biopsy    ANESTHESIA: MAC PER ANESTHESIA     ESTIMATED BLOOD LOSS: Less than 50 ml    COMPLICATIONS: None.     SPECIMENS:  Was Obtained:     HISTORY: The patient is a 63 y.o. year old female with history of above preop diagnosis.  I recommended esophagogastroduodenoscopy with possible biopsy and I explained the risk, benefits, expected outcome, and alternatives to the procedure.  Risks included but are not limited to bleeding, infection, respiratory distress, hypotension, and perforation of the esophagus, stomach, or duodenum.  Patient understands and is in agreement.    PROCEDURE: The patient was given IV conscious sedation.  The patient's SPO2 remained above 90% throughout the procedure. The gastroscope was inserted orally and advanced under direct vision through the esophagus, through the stomach, through the pylorus, and into the descending duodenum.      Findings:    Retropharyngeal area was grossly normal appearing    Esophagus: normal    Stomach:    Fundus: abnormal: MULTIPLE POLYPS SMALLER THAN ONE CM   BIOPSIES TAKEN     Body: abnormal: SMALL POLYPS    Antrum: abnormal: MILD GASTRITIS WAS BIOPSIED FOR H PYLORI    Duodenum:     Descending: normal   RANDOM BIOPSIES WERE TAKEN     Bulb: normal    The scope was removed and the patient tolerated the procedure well.     Recommendations/Plan:   F/U Biopsies  F/U In Office in 3-4 weeks  Discussed with the family  Post sedation patient was stable with stable vital signs and stable O2 saturations    Electronically signed by Reno Lenz MD  on 2/6/2024 at 11:03 AM

## 2024-02-06 NOTE — ANESTHESIA POSTPROCEDURE EVALUATION
Department of Anesthesiology  Postprocedure Note    Patient: Kaci Banerjee  MRN: [id number]  YOB: 1960  Date of evaluation: 2/6/2024    Procedure Summary       Date: 02/06/24 Room / Location: [address]    Anesthesia Start: 1054 Anesthesia Stop: 1114    Procedure: EGD ESOPHAGOGASTRODUODENOSCOPY WITH BIOPSIES Diagnosis:       Gastroesophageal reflux disease, unspecified whether esophagitis present      Abdominal bloating      Esophageal dysphagia      Morbid obesity with BMI of 45.0-49.9, adult ([facility])      Family history of Crohn's disease      (Gastroesophageal reflux disease, unspecified whether esophagitis present [K21.9])      (Abdominal bloating [R14.0])      (Esophageal dysphagia [R13.19])      (Morbid obesity with BMI of 45.0-49.9, adult (HCC) [E66.01, Z68.42])      (Family history of Crohn's disease [Z83.79])    Surgeons: Reno Lenz MD Responsible Provider: Víctor Miller DO    Anesthesia Type: MAC, general ASA Status: 3            Anesthesia Type: No value filed.    Stefani Phase I:      Stefani Phase II: Stefani Score: 10    Anesthesia Post Evaluation    Patient location during evaluation: PACU  Patient participation: complete - patient participated  Level of consciousness: awake and alert  Airway patency: patent  Nausea & Vomiting: no nausea and no vomiting  Cardiovascular status: hemodynamically stable  Respiratory status: acceptable  Hydration status: stable  Pain management: adequate    No notable events documented.

## 2024-02-06 NOTE — ANESTHESIA PRE PROCEDURE
syndrome G56.00    Neuropathy G62.9    Mitral valve problem I05.9    Morbid obesity with BMI of 45.0-49.9, adult ([facility]) E66.01, Z68.42    Frozen shoulder M75.00    Skin tag L91.8    Degenerative disc disease, lumbar M51.36    Bilateral edema of lower extremity R60.0    Medication refill Z76.0    Venous stasis of lower extremity I87.8    Dizziness R42    Dysphagia R13.10    Abdominal bloating R14.0    Mild intermittent asthma without complication J45.20    Skin lesion of left leg L98.9    Rotator cuff arthropathy of left shoulder M12.812    Dysgeusia R43.2    Neck pain, acute M54.2    Family history of Crohn's disease Z83.79    Onychomycosis [B35.1 (ICD-10-CM)] B35.1       Past Medical History:        Diagnosis Date    Abdominal bloating 1/23/2019    Anxiety     Bilateral edema of lower extremity 12/16/2016    Bronchial asthma     mild, intermittent    Carpal tunnel syndrome     Cataract     Chronic back pain     Chronic sinusitis     Degenerative disc disease, lumbar 12/16/2016    Depression     Diabetes mellitus due to underlying condition with hyperosmolarity without coma ([facility])     Dizziness 10/27/2017    DJD (degenerative joint disease)     S1, L3, L4, L5, T12    Dysphagia 1/23/2019    Edema of leg     bilateral legs    Environmental allergies     Frozen shoulder 4/27/2015    GERD (gastroesophageal reflux disease)     Glaucoma     Glucose intolerance (impaired glucose tolerance)     Headache(784.0)     History of bronchitis     Viral    HTN (hypertension)     Hyperlipidemia     Hypothyroid     hypothyroid state    Hypothyroidism     IBS (irritable bowel syndrome) 10/02/2012    Legally blind     due to glaucoma    Medication refill 6/1/2017    Mild intermittent asthma without complication     Morbid obesity with BMI of 45.0-49.9, adult ([facility])     MVP (mitral valve prolapse)     Neuropathy     OA (osteoarthritis)     EMILIA on CPAP     Osteopenia     Pancreatic cyst     Polyneuropathy     Raynaud disease

## 2024-02-09 LAB — SURGICAL PATHOLOGY REPORT: NORMAL

## 2024-02-14 DIAGNOSIS — Z76.0 MEDICATION REFILL: ICD-10-CM

## 2024-02-14 DIAGNOSIS — E11.9 TYPE 2 DIABETES MELLITUS WITHOUT COMPLICATION, WITHOUT LONG-TERM CURRENT USE OF INSULIN (HCC): ICD-10-CM

## 2024-02-15 ENCOUNTER — OFFICE VISIT (OUTPATIENT)
Dept: PULMONOLOGY | Age: 64
End: 2024-02-15
Payer: COMMERCIAL

## 2024-02-15 VITALS
HEIGHT: 67 IN | SYSTOLIC BLOOD PRESSURE: 137 MMHG | RESPIRATION RATE: 18 BRPM | WEIGHT: 293 LBS | HEART RATE: 79 BPM | OXYGEN SATURATION: 96 % | BODY MASS INDEX: 45.99 KG/M2 | DIASTOLIC BLOOD PRESSURE: 74 MMHG

## 2024-02-15 DIAGNOSIS — J45.20 MILD INTERMITTENT ASTHMA WITHOUT COMPLICATION: ICD-10-CM

## 2024-02-15 DIAGNOSIS — G47.33 OBSTRUCTIVE SLEEP APNEA SYNDROME: Primary | ICD-10-CM

## 2024-02-15 PROCEDURE — 99214 OFFICE O/P EST MOD 30 MIN: CPT | Performed by: INTERNAL MEDICINE

## 2024-02-15 PROCEDURE — 1036F TOBACCO NON-USER: CPT | Performed by: INTERNAL MEDICINE

## 2024-02-15 PROCEDURE — G8427 DOCREV CUR MEDS BY ELIG CLIN: HCPCS | Performed by: INTERNAL MEDICINE

## 2024-02-15 PROCEDURE — G8484 FLU IMMUNIZE NO ADMIN: HCPCS | Performed by: INTERNAL MEDICINE

## 2024-02-15 PROCEDURE — 3017F COLORECTAL CA SCREEN DOC REV: CPT | Performed by: INTERNAL MEDICINE

## 2024-02-15 PROCEDURE — G8417 CALC BMI ABV UP PARAM F/U: HCPCS | Performed by: INTERNAL MEDICINE

## 2024-02-15 RX ORDER — ASPIRIN 81 MG/1
81 TABLET, COATED ORAL DAILY
Qty: 30 TABLET | Refills: 5 | Status: SHIPPED | OUTPATIENT
Start: 2024-02-15

## 2024-02-15 RX ORDER — CALCIUM CARBONATE/VITAMIN D3 600 MG-10
TABLET ORAL
Qty: 60 TABLET | Refills: 1 | Status: SHIPPED | OUTPATIENT
Start: 2024-02-15

## 2024-02-15 RX ORDER — FOLIC ACID/MV,IRON,MIN/LUTEIN 0.4-18-25
TABLET ORAL
Qty: 30 TABLET | Refills: 3 | Status: SHIPPED | OUTPATIENT
Start: 2024-02-15

## 2024-02-15 NOTE — TELEPHONE ENCOUNTER
E-scribe request for aspirin  calcium and multivitamin. Please review and e-scribe if applicable.     Last Visit Date:  1/9/24  Next Visit Date:  Visit date not found    Hemoglobin A1C (%)   Date Value   08/15/2023 5.5   09/02/2022 5.6   06/11/2021 5.5             ( goal A1C is < 7)   No components found for: \"LABMICR\"  LDL Cholesterol (mg/dL)   Date Value   12/14/2020 64     LDL Calculated (mg/dL)   Date Value   06/16/2017 68       (goal LDL is <100)   AST (U/L)   Date Value   01/20/2021 26     ALT (U/L)   Date Value   01/20/2021 35 (H)     BUN (mg/dL)   Date Value   01/09/2024 17     BP Readings from Last 3 Encounters:   02/06/24 (!) 141/71   01/29/24 130/76   01/09/24 139/87          (goal 120/80)        Patient Active Problem List:     Glaucoma     GERD (gastroesophageal reflux disease)     DJD (degenerative joint disease)     Obesity     Polyneuropathy     Migraine     Mitral prolapse     Low back pain     CTS (carpal tunnel syndrome)     Supraspinatus syndrome     Impaired fasting glucose     Acute sinus infection     IBS (irritable bowel syndrome)     Back pain, chronic     Stress fracture, right 5th metatarsal      Upper airway cough syndrome     Ear fullness     Perioral numbness     Screening for osteoporosis     Headache     Left eye injury     Medication reaction     Osteopenia     Lumbosacral pain     Flu vaccine need     Hypothyroid     Urinary incontinence     Anxiety     Sleep apnea     Depression     Chronic back pain     Carpal tunnel syndrome     Neuropathy     Mitral valve problem     Morbid obesity with BMI of 45.0-49.9, adult (HCC)     Frozen shoulder     Skin tag     Degenerative disc disease, lumbar     Bilateral edema of lower extremity     Medication refill     Venous stasis of lower extremity     Dizziness     Dysphagia     Abdominal bloating     Mild intermittent asthma without complication     Skin lesion of left leg     Rotator cuff arthropathy of left shoulder     Dysgeusia     Neck

## 2024-02-15 NOTE — PROGRESS NOTES
Subjective:      Patient ID: Kaci Banerjee is a 63 y.o. female.     HPI  Patient here for sleep apnea.   Hypertension  Obesity  Esophageal flux disease  Hypothyroid state      This patient known to have obstructive sleep apnea syndrome which is being treated with CPAP at a pressure of 12 cm water.  She uses a CPAP regularly every night.  Chinstrap has been helpful in tolerating the CPAP.  Leave in the face mask also seem to be fitting better.  She does not feel sleepy tired fatigue during the daytime.    She has not been able to lose much weight.    Blood pressure is under good control.  She is taking PPIs and her symptoms of reflux are improved as well.  For the dyspnea she uses albuterol on as-needed basis and it has been very helpful.    She already taken COVID-vaccine flu vaccine and Pneumovax.    She is also on thyroid replacement therapy.  Plan [place] Sleepiness Scale given to the patient the office revealed a score of 8.    Medications:   Omeprazole  Albuterol HFA     PRIOR WORKUP:  PFT:     CT Imaging:  CTA 10/30/2021: Negative for pulmonary emboli.  Limited exam due to suboptimal bolus, subsegmental emboli cannot be excluded.  Minimal peripheral groundglass opacities in the lower lobes.     Sleep Study:  Titration study 5/16/2023: Patient titrated onto CPAP with a final setting of 12 cm H2O.  At this setting patient had 0 residual apneas or hypopneas.  SPO2 92%.  Patient had 0 leg movements.    Titration study 11/10/2020: Patient was initiated on CPAP and ultimately titrated to 9 cm H2O with resolution of underlying apnea.     Sleep study 1/8/2018: Patient was treated with CPAP during the study with resolution of her respiratory events at 10 cm H2O.     Laboratory Evaluation:       Immunizations:   Immunization History   Administered Date(s) Administered    Influenza 10/18/2012, 10/22/2013    Influenza A ([id number]) Vaccine PF IM 11/06/2009    Influenza Vaccine, unspecified formulation 10/22/2013

## 2024-02-23 ENCOUNTER — OFFICE VISIT (OUTPATIENT)
Dept: GASTROENTEROLOGY | Age: 64
End: 2024-02-23
Payer: COMMERCIAL

## 2024-02-23 VITALS
DIASTOLIC BLOOD PRESSURE: 86 MMHG | BODY MASS INDEX: 48.85 KG/M2 | WEIGHT: 293 LBS | TEMPERATURE: 97 F | SYSTOLIC BLOOD PRESSURE: 144 MMHG

## 2024-02-23 DIAGNOSIS — K58.2 IRRITABLE BOWEL SYNDROME WITH BOTH CONSTIPATION AND DIARRHEA: ICD-10-CM

## 2024-02-23 DIAGNOSIS — E66.01 MORBID OBESITY WITH BMI OF 45.0-49.9, ADULT (HCC): ICD-10-CM

## 2024-02-23 DIAGNOSIS — Z83.79 FAMILY HISTORY OF CROHN'S DISEASE: Primary | ICD-10-CM

## 2024-02-23 DIAGNOSIS — R14.0 ABDOMINAL BLOATING: ICD-10-CM

## 2024-02-23 DIAGNOSIS — R07.89 ATYPICAL CHEST PAIN: ICD-10-CM

## 2024-02-23 PROCEDURE — G8427 DOCREV CUR MEDS BY ELIG CLIN: HCPCS | Performed by: INTERNAL MEDICINE

## 2024-02-23 PROCEDURE — G8484 FLU IMMUNIZE NO ADMIN: HCPCS | Performed by: INTERNAL MEDICINE

## 2024-02-23 PROCEDURE — 3017F COLORECTAL CA SCREEN DOC REV: CPT | Performed by: INTERNAL MEDICINE

## 2024-02-23 PROCEDURE — G8417 CALC BMI ABV UP PARAM F/U: HCPCS | Performed by: INTERNAL MEDICINE

## 2024-02-23 PROCEDURE — 1036F TOBACCO NON-USER: CPT | Performed by: INTERNAL MEDICINE

## 2024-02-23 PROCEDURE — 99214 OFFICE O/P EST MOD 30 MIN: CPT | Performed by: INTERNAL MEDICINE

## 2024-02-23 RX ORDER — BACILLUS COAGULANS 1B CELL
1 CAPSULE ORAL ONCE
Qty: 90 EACH | Refills: 3 | Status: SHIPPED | OUTPATIENT
Start: 2024-02-23 | End: 2024-02-23

## 2024-02-23 ASSESSMENT — ENCOUNTER SYMPTOMS
NAUSEA: 1
ABDOMINAL PAIN: 1
RECTAL PAIN: 0
SORE THROAT: 0
ABDOMINAL DISTENTION: 0
TROUBLE SWALLOWING: 0
DIARRHEA: 0
BLOOD IN STOOL: 0
VOMITING: 0
VOICE CHANGE: 0
SHORTNESS OF BREATH: 0
ANAL BLEEDING: 0
CONSTIPATION: 0
COUGH: 0
WHEEZING: 0
CHOKING: 0

## 2024-02-23 NOTE — PROGRESS NOTES
acid indigestion and heartburns. She was discussed  in detail about some possible life style and dietary modifications. She was stressed about the maintenance  of appropriate weight and effect of obesity contributing to reflux symptoms. Routine exercise was streesed.     Avoidance of Caffeine, nicotine and chocolate were explained. Pt was asked to avoid spices grease and fried food. Advices were also given about avoidance of any kind of fast foods, soda pops and high energy drinks.    Pt was advised to place two small block under the head end of the bed which may help with night time reflux. Was advised not to eat any thin at least 2-3 hrs before going to bed and walk especially after dinner    Pt has verbalized understanding and agreement to this plan.    More than half of patient's clinic visit time was spent in counseling about lifestyle and dietary modifications  Patient's  questions were answered in this regard as well  The patient has verbalized understanding and agreement           Thank you for allowing me to participate in the care of Ms. Banerjee. For any further questions please do not hesitate to contact me.    I have reviewed and agree with the ROS entered by the MA/Nurse.     This note is created with the assistance of the speech recognition program.  While intending to generate a document that actually reflects the content of the visit, document can still have some errors including those of syntax and sound like substitutions which may escape proof reading.  Actual meaning can be extrapolated by contextual diversion.     Reno Lenz MD, FACG  Board Certified in Gastroenterology and Internal Medicine  [facility]  Office #: [phone]

## 2024-02-27 DIAGNOSIS — K21.9 GASTROESOPHAGEAL REFLUX DISEASE, UNSPECIFIED WHETHER ESOPHAGITIS PRESENT: ICD-10-CM

## 2024-02-27 DIAGNOSIS — R14.0 ABDOMINAL BLOATING: ICD-10-CM

## 2024-02-27 DIAGNOSIS — E66.01 MORBID OBESITY WITH BMI OF 45.0-49.9, ADULT (HCC): ICD-10-CM

## 2024-02-27 DIAGNOSIS — Z83.79 FAMILY HISTORY OF CROHN'S DISEASE: ICD-10-CM

## 2024-02-27 DIAGNOSIS — R13.19 ESOPHAGEAL DYSPHAGIA: ICD-10-CM

## 2024-02-28 LAB — CYTOLOGY REPORT: NORMAL

## 2024-03-06 ENCOUNTER — OFFICE VISIT (OUTPATIENT)
Dept: PODIATRY | Age: 64
End: 2024-03-06
Payer: COMMERCIAL

## 2024-03-06 VITALS — HEIGHT: 67 IN | WEIGHT: 293 LBS | BODY MASS INDEX: 45.99 KG/M2

## 2024-03-06 DIAGNOSIS — M79.671 BILATERAL FOOT PAIN: ICD-10-CM

## 2024-03-06 DIAGNOSIS — M79.672 BILATERAL FOOT PAIN: ICD-10-CM

## 2024-03-06 DIAGNOSIS — B35.1 ONYCHOMYCOSIS: ICD-10-CM

## 2024-03-06 DIAGNOSIS — L98.9 SKIN LESION OF LEFT LOWER LIMB: Primary | ICD-10-CM

## 2024-03-06 PROCEDURE — 99214 OFFICE O/P EST MOD 30 MIN: CPT

## 2024-03-06 PROCEDURE — 11104 PUNCH BX SKIN SINGLE LESION: CPT

## 2024-03-06 PROCEDURE — 1036F TOBACCO NON-USER: CPT

## 2024-03-06 PROCEDURE — G8417 CALC BMI ABV UP PARAM F/U: HCPCS

## 2024-03-06 PROCEDURE — G8484 FLU IMMUNIZE NO ADMIN: HCPCS

## 2024-03-06 PROCEDURE — 3017F COLORECTAL CA SCREEN DOC REV: CPT

## 2024-03-06 PROCEDURE — 11721 DEBRIDE NAIL 6 OR MORE: CPT

## 2024-03-06 PROCEDURE — 99213 OFFICE O/P EST LOW 20 MIN: CPT | Performed by: PODIATRIST

## 2024-03-06 PROCEDURE — G8427 DOCREV CUR MEDS BY ELIG CLIN: HCPCS

## 2024-03-06 RX ORDER — LIDOCAINE HYDROCHLORIDE AND EPINEPHRINE 10; 10 MG/ML; UG/ML
5 INJECTION, SOLUTION INFILTRATION; PERINEURAL ONCE
Status: COMPLETED | OUTPATIENT
Start: 2024-03-06 | End: 2024-03-06

## 2024-03-06 RX ADMIN — LIDOCAINE HYDROCHLORIDE AND EPINEPHRINE 5 ML: 10; 10 INJECTION, SOLUTION INFILTRATION; PERINEURAL at 13:00

## 2024-03-06 NOTE — PROGRESS NOTES
[facility]  [address]   [address] [place] [address]  Tel: [phone]   Fax: [phone]    Subjective     CC: Left lower leg lesion, pain in bilateral feet    Interval History:    Patient was seen and examined clinic today. Patient states that she has completed her treatment of triamcinolone for 1 month for left leg lesion. She states that she has not noticed any drainage to the area, however reports the lesion grew back \"darker\" since she stopped using the cream. She also returns for diabetic foot evaluation. She states her toenails are very elongated and thickened, is unable to care for themselves. Patient denies any other pedal complaints this time.    HPI:  Kaci Banerjee is a 63 y.o. female who presents to clinic today for follow-up of pain in her left foot she recently received orthotics however she has not obtained shoes to put them in outside of worn shoes understands importance of doing so.  She is not able to point to the exact location of the pain. She also relates burning to bilateral lower extremity. Relates that she normally wears compression stockings to bilateral lower extremities for edema.  Denies any rest pain or claudication symptoms.    Primary care physician is Trinity Rebollar MD.    ROS:    Constitutional: Denies nausea, vomiting, fever, chills.  Neurologic: Denies numbness, tingling, and burning in the feet.    Vascular: Denies symptoms of lower extremity claudication.    Skin: Thick, elongated toenails  Otherwise negative except as noted in the HPI.     PMH:  Past Medical History:   Diagnosis Date    Abdominal bloating 1/23/2019    Anxiety     Bilateral edema of lower extremity 12/16/2016    Bronchial asthma     mild, intermittent    Carpal tunnel syndrome     Cataract     Chronic back pain     Chronic sinusitis     Degenerative disc disease, lumbar 12/16/2016    Depression     Diabetes mellitus due to underlying condition with hyperosmolarity without coma 
ago - Order Fasting lipids and refer to PCP for follow up   [] LDL is controlled.  LDL < 100 and checked within the last year     Blood Pressure  BP Readings from Last 3 Encounters:   02/23/24 (!) 144/86   02/15/24 137/74   02/06/24 (!) 141/71      []  If SBP >140 mmhg - refer to PCP for follow up   []  If DBP > 90 mmhg - refer to PCP for follow up   [] BP is controlled <140/90     Order labs as PCP ordered.  (ie: Lipids, A1C, CMP)

## 2024-03-13 NOTE — PROGRESS NOTES
Patient instructed to remove shoes and socks, instructed to sit in exam chair. Current PCP name is  and date of last visit 11/15/18. Do you have a follow up visit scheduled? No    Diabetic visit information    Blood pressure (Control is BP <140/90)  BP Readings from Last 3 Encounters:   03/22/19 136/78   03/20/19 129/89   03/06/19 120/60       BP taken with correct size cuff? - Yes   Repeated if > 140/90 No      Tobacco use:  Patient  reports that she has never smoked. She has never used smokeless tobacco.  If Smoker - Cessation materials given?- NA       Diabetic Health Maintenance Items due  Diabetes Management   Topic Date Due    Lipid screen  06/16/2018       Diabetic retinal exam done in last year? - Yes   If No: remind patient that it is due and they should schedule an exam    Medications  Is patient taking any medications for diabetes? -   Yes  Have blood sugars been controlled? Fasting blood sugars under 120   -   Yes   Random home sugars or today's POCT glucose is under 180 -   Yes   []  If No to the above then patient should schedule appt with PCP. Diabetic Plan    A1C Plan  Lab Results   Component Value Date    LABA1C 5.5 11/05/2018    LABA1C 5.4 07/10/2018    LABA1C 5.3 09/07/2017      []  If A1C over 8 and last result >3 months ago - Order A1C and refer to PCP   []  If last A1C over 6 months ago - Order A1C and refer to PCP for follow up   []  If elevated blood sugars > 180 - refer to PCP for follow up    []  Blood sugar controlled - A1C under 8 and last check was < 6 months      Cholesterol Plan   Lab Results   Component Value Date    LDLCALC 68 06/16/2017    LDLCHOLESTEROL 78 05/07/2013      []  If LDL > 100 and last result >3 months ago - order Fasting lipids and refer to PCP for follow up   []  If LDL < 100 and over 1 year ago - Order Fasting lipids and refer to PCP for follow up   [] LDL is controlled.   LDL < 100 and checked within the last year     Blood Pressure  BP Readings from Last 3 Encounters:   03/22/19 136/78   03/20/19 129/89   03/06/19 120/60      []  If SBP >140 mmhg - refer to PCP for follow up   []  If DBP > 90 mmhg - refer to PCP for follow up   [] BP is controlled <140/90     Order labs as PCP ordered.   (ie: Lipids, A1C, CMP) [address] [facility] [address]

## 2024-03-18 NOTE — PROGRESS NOTES
Patient instructed to remove shoes and socks and instructed to sit in exam chair.  Current PCP is Trinity Rebollar MD and date of last visit was 01/09/2024.   Do you have a follow up visit scheduled?  Yes  If yes, the date is 04/05/2024.      Diabetic visit information    Blood pressure (Control is BP <140/90)  BP Readings from Last 3 Encounters:   02/23/24 (!) 144/86   02/15/24 137/74   02/06/24 (!) 141/71       BP taken with correct size cuff? - Yes   Repeated if > 140/90 Yes      Tobacco use:  Patient  reports that she has never smoked. She has never been exposed to tobacco smoke. She has never used smokeless tobacco.  If Smoker - Cessation materials given?- Yes       Diabetic Health Maintenance Items due  Diabetes Management   Topic Date Due    Diabetic retinal exam  03/06/2021    Lipids  12/14/2021    Diabetic foot exam  09/22/2022       Diabetic retinal exam done in last year? - Yes   If No: remind patient that it is due and they should schedule an exam    Medications  Is patient taking any medications for diabetes? -   Yes  Have blood sugars been controlled?   Fasting blood sugars under 120   -   Yes   Random home sugars or today's POCT glucose is under 180 -   Yes   []  If No to the above then patient should schedule appt with PCP.     Diabetic Plan    A1C Plan  Lab Results   Component Value Date    LABA1C 5.5 08/15/2023    LABA1C 5.6 09/02/2022    LABA1C 5.5 06/11/2021      []  If A1C over 8 and last result >3 months ago - Order A1C and refer to PCP   []  If last A1C over 6 months ago - Order A1C and refer to PCP for follow up   []  If elevated blood sugars > 180 - refer to PCP for follow up    []  Blood sugar controlled - A1C under 8 and last check was < 6 months      Cholesterol Plan   Lab Results   Component Value Date    LDLCALC 68 06/16/2017    LDLCHOLESTEROL 64 12/14/2020      []  If LDL > 100 and last result >3 months ago - order Fasting lipids and refer to PCP for follow up   []  If LDL < 100 and

## 2024-03-20 ENCOUNTER — OFFICE VISIT (OUTPATIENT)
Dept: PODIATRY | Age: 64
End: 2024-03-20
Payer: COMMERCIAL

## 2024-03-20 VITALS
HEIGHT: 67 IN | HEART RATE: 68 BPM | BODY MASS INDEX: 45.99 KG/M2 | SYSTOLIC BLOOD PRESSURE: 142 MMHG | DIASTOLIC BLOOD PRESSURE: 78 MMHG | WEIGHT: 293 LBS

## 2024-03-20 DIAGNOSIS — L98.9 SKIN LESION OF LEFT LOWER LIMB: Primary | ICD-10-CM

## 2024-03-20 PROCEDURE — G8484 FLU IMMUNIZE NO ADMIN: HCPCS

## 2024-03-20 PROCEDURE — G8417 CALC BMI ABV UP PARAM F/U: HCPCS

## 2024-03-20 PROCEDURE — 1036F TOBACCO NON-USER: CPT

## 2024-03-20 PROCEDURE — 99213 OFFICE O/P EST LOW 20 MIN: CPT

## 2024-03-20 PROCEDURE — G8427 DOCREV CUR MEDS BY ELIG CLIN: HCPCS

## 2024-03-20 PROCEDURE — 3017F COLORECTAL CA SCREEN DOC REV: CPT

## 2024-03-20 PROCEDURE — 99213 OFFICE O/P EST LOW 20 MIN: CPT | Performed by: PODIATRIST

## 2024-03-20 RX ORDER — TRIAMCINOLONE ACETONIDE 0.25 MG/G
CREAM TOPICAL
Qty: 80 G | Refills: 1 | Status: SHIPPED | OUTPATIENT
Start: 2024-03-20 | End: 2024-04-17

## 2024-03-20 NOTE — PROGRESS NOTES
[facility]  [address]   [address] [place] [address]  Tel: [phone]   Fax: [phone]    Subjective     CC: Left lower leg lesion, pain in bilateral feet    Interval History:    Patient was seen and examined clinic today. Patient reports pain to left lower leg biopsy sites, states she has been compliant with keeping the area dry. She was notified of the issue with specimen being lost, reports she would like to wait some time before repeat biopsy. Patient does report when she was using the topical cream to left leg, did notice some improvement. Patient denies any other pedal complaints this time.    HPI:  Kaci Banerjee is a 64 y.o. female who presents to clinic today for follow-up of pain in her left foot she recently received orthotics however she has not obtained shoes to put them in outside of worn shoes understands importance of doing so.  She is not able to point to the exact location of the pain. She also relates burning to bilateral lower extremity. Relates that she normally wears compression stockings to bilateral lower extremities for edema.  Denies any rest pain or claudication symptoms.    Primary care physician is Trinity Rebollar MD.    ROS:    Constitutional: Denies nausea, vomiting, fever, chills.  Neurologic: Denies numbness, tingling, and burning in the feet.    Vascular: Denies symptoms of lower extremity claudication.    Skin: Thick, elongated toenails  Otherwise negative except as noted in the HPI.     PMH:  Past Medical History:   Diagnosis Date    Abdominal bloating 1/23/2019    Anxiety     Bilateral edema of lower extremity 12/16/2016    Bronchial asthma     mild, intermittent    Carpal tunnel syndrome     Cataract     Chronic back pain     Chronic sinusitis     Degenerative disc disease, lumbar 12/16/2016    Depression     Diabetes mellitus due to underlying condition with hyperosmolarity without coma (HCC)     Dizziness 10/27/2017    DJD (degenerative joint

## 2024-04-05 ENCOUNTER — OFFICE VISIT (OUTPATIENT)
Dept: FAMILY MEDICINE CLINIC | Age: 64
End: 2024-04-05

## 2024-04-05 ENCOUNTER — HOSPITAL ENCOUNTER (OUTPATIENT)
Age: 64
Setting detail: SPECIMEN
Discharge: HOME | End: 2024-04-05

## 2024-04-05 VITALS
WEIGHT: 293 LBS | BODY MASS INDEX: 45.99 KG/M2 | HEIGHT: 67 IN | HEART RATE: 67 BPM | SYSTOLIC BLOOD PRESSURE: 136 MMHG | DIASTOLIC BLOOD PRESSURE: 81 MMHG

## 2024-04-05 DIAGNOSIS — M85.852 OSTEOPENIA OF NECK OF LEFT FEMUR: ICD-10-CM

## 2024-04-05 DIAGNOSIS — Z00.00 HEALTHCARE MAINTENANCE: ICD-10-CM

## 2024-04-05 DIAGNOSIS — R41.3 MEMORY DISTURBANCE: Primary | ICD-10-CM

## 2024-04-05 DIAGNOSIS — G62.9 NEUROPATHY: ICD-10-CM

## 2024-04-05 LAB
CHOLEST SERPL-MCNC: 145 MG/DL (ref 0–199)
CHOLESTEROL/HDL RATIO: 3
HDLC SERPL-MCNC: 46 MG/DL
LDLC SERPL CALC-MCNC: 75 MG/DL (ref 0–100)
TRIGL SERPL-MCNC: 120 MG/DL
VLDLC SERPL CALC-MCNC: 24 MG/DL

## 2024-04-05 RX ORDER — ACETAMINOPHEN 160 MG
TABLET,DISINTEGRATING ORAL
Qty: 30 CAPSULE | Refills: 2 | Status: SHIPPED | OUTPATIENT
Start: 2024-04-05

## 2024-04-05 RX ORDER — PHENOL 1.4 %
1 AEROSOL, SPRAY (ML) MUCOUS MEMBRANE DAILY
Qty: 30 TABLET | Refills: 3 | Status: SHIPPED | OUTPATIENT
Start: 2024-04-05

## 2024-04-05 ASSESSMENT — PATIENT HEALTH QUESTIONNAIRE - PHQ9
10. IF YOU CHECKED OFF ANY PROBLEMS, HOW DIFFICULT HAVE THESE PROBLEMS MADE IT FOR YOU TO DO YOUR WORK, TAKE CARE OF THINGS AT HOME, OR GET ALONG WITH OTHER PEOPLE: SOMEWHAT DIFFICULT
8. MOVING OR SPEAKING SO SLOWLY THAT OTHER PEOPLE COULD HAVE NOTICED. OR THE OPPOSITE, BEING SO FIGETY OR RESTLESS THAT YOU HAVE BEEN MOVING AROUND A LOT MORE THAN USUAL: SEVERAL DAYS
6. FEELING BAD ABOUT YOURSELF - OR THAT YOU ARE A FAILURE OR HAVE LET YOURSELF OR YOUR FAMILY DOWN: NOT AT ALL
SUM OF ALL RESPONSES TO PHQ QUESTIONS 1-9: 5
SUM OF ALL RESPONSES TO PHQ QUESTIONS 1-9: 5
7. TROUBLE CONCENTRATING ON THINGS, SUCH AS READING THE NEWSPAPER OR WATCHING TELEVISION: NOT AT ALL
9. THOUGHTS THAT YOU WOULD BE BETTER OFF DEAD, OR OF HURTING YOURSELF: NOT AT ALL
3. TROUBLE FALLING OR STAYING ASLEEP: SEVERAL DAYS
SUM OF ALL RESPONSES TO PHQ QUESTIONS 1-9: 5
2. FEELING DOWN, DEPRESSED OR HOPELESS: NOT AT ALL
4. FEELING TIRED OR HAVING LITTLE ENERGY: MORE THAN HALF THE DAYS
5. POOR APPETITE OR OVEREATING: SEVERAL DAYS
SUM OF ALL RESPONSES TO PHQ QUESTIONS 1-9: 5
1. LITTLE INTEREST OR PLEASURE IN DOING THINGS: NOT AT ALL
SUM OF ALL RESPONSES TO PHQ9 QUESTIONS 1 & 2: 0

## 2024-04-05 ASSESSMENT — ENCOUNTER SYMPTOMS
RHINORRHEA: 0
VOMITING: 0
ABDOMINAL PAIN: 0
DIARRHEA: 0
SORE THROAT: 0
CONSTIPATION: 0
SHORTNESS OF BREATH: 0
NAUSEA: 0

## 2024-04-05 NOTE — PROGRESS NOTES
Attending Physician Statement  I have discussed the care of RobertaPrebleincluding pertinent history and exam findings,  with the resident. I have reviewed the key elements of all parts of the encounter with the resident.  I agree with the assessment, plan and orders as documented by the resident.  (GE Modifier)    Antegrade memory loss- MMSE 30. Refer to Neurology  HM- Dexa scan  
01/09/2025    Breast cancer screen  12/20/2025    DTaP/Tdap/Td vaccine (2 - Td or Tdap) 06/01/2027    Colorectal Cancer Screen  12/09/2029    Flu vaccine  Completed    Shingles vaccine  Completed    Pneumococcal 0-64 years Vaccine  Completed    Hepatitis C screen  Completed    HIV screen  Completed    Hepatitis A vaccine  Aged Out    Hepatitis B vaccine  Aged Out    Hib vaccine  Aged Out    Polio vaccine  Aged Out    Meningococcal (ACWY) vaccine  Aged Out    Cervical cancer screen  Discontinued             
Stroke Maternal Grandmother     Arthritis Maternal Grandfather     Heart Disease Father     Arthritis Father     Depression Father     High Cholesterol Father     Mental Illness Father     Substance Abuse Father     Diabetes Mother     Heart Disease Mother         multiple heart attacks    Arthritis Mother     High Blood Pressure Mother     High Cholesterol Mother     Substance Abuse Mother     Diabetes Sister     High Blood Pressure Sister     Diabetes Maternal Aunt     Cancer Maternal Aunt         colorectal cancer    Ovarian Cancer Maternal Aunt     Diabetes Maternal Uncle     Cancer Paternal Uncle         esophageal cancer       Objective:    /81 (Site: Left Wrist, Position: Sitting, Cuff Size: Medium Adult)   Pulse 67   Ht 1.702 m (5' 7\")   Wt (!) 141.5 kg (312 lb)   BMI 48.87 kg/m²    BP Readings from Last 3 Encounters:   04/05/24 136/81   03/20/24 (!) 142/78   02/23/24 (!) 144/86       Physical Exam  Constitutional:       General: She is not in acute distress.  Cardiovascular:      Rate and Rhythm: Normal rate and regular rhythm.      Pulses: Normal pulses.      Heart sounds: Normal heart sounds. No murmur heard.  Pulmonary:      Effort: Pulmonary effort is normal. No respiratory distress.      Breath sounds: Normal breath sounds. No wheezing.   Abdominal:      General: There is no distension.      Palpations: Abdomen is soft.      Tenderness: There is no abdominal tenderness.   Musculoskeletal:      Right lower leg: No edema.      Left lower leg: No edema.   Skin:     General: Skin is warm and dry.   Neurological:      Mental Status: She is alert.     Visual inspection:  Deformity/amputation: absent  Skin lesions/pre-ulcerative calluses: absent  Edema: right- 1+, left- 1+    Sensory exam:  Monofilament sensation: abnormal -   (minimum of 5 random plantar locations tested, avoiding callused areas - > 1 area with absence of sensation is + for neuropathy)    Plus at least one of the

## 2024-04-07 DIAGNOSIS — M54.50 CHRONIC BILATERAL LOW BACK PAIN, UNSPECIFIED WHETHER SCIATICA PRESENT: ICD-10-CM

## 2024-04-07 DIAGNOSIS — K21.9 GASTROESOPHAGEAL REFLUX DISEASE WITHOUT ESOPHAGITIS: ICD-10-CM

## 2024-04-07 DIAGNOSIS — M51.36 DEGENERATIVE DISC DISEASE, LUMBAR: ICD-10-CM

## 2024-04-07 DIAGNOSIS — G89.29 CHRONIC BILATERAL LOW BACK PAIN, UNSPECIFIED WHETHER SCIATICA PRESENT: ICD-10-CM

## 2024-04-08 ENCOUNTER — TELEPHONE (OUTPATIENT)
Dept: FAMILY MEDICINE CLINIC | Age: 64
End: 2024-04-08

## 2024-04-08 DIAGNOSIS — E55.9 VITAMIN D DEFICIENCY: Primary | ICD-10-CM

## 2024-04-08 RX ORDER — CALCIUM CARBONATE/VITAMIN D3 600 MG-10
TABLET ORAL
Qty: 60 TABLET | Refills: 1 | Status: SHIPPED | OUTPATIENT
Start: 2024-04-08

## 2024-04-08 RX ORDER — PSEUDOEPHED/ACETAMINOPH/DIPHEN 30MG-500MG
TABLET ORAL
Qty: 120 TABLET | Refills: 3 | Status: SHIPPED | OUTPATIENT
Start: 2024-04-08

## 2024-04-08 RX ORDER — OMEPRAZOLE 40 MG/1
40 CAPSULE, DELAYED RELEASE ORAL DAILY
Qty: 30 CAPSULE | Refills: 5 | Status: SHIPPED | OUTPATIENT
Start: 2024-04-08

## 2024-04-08 RX ORDER — CALCIUM CARBONATE/VITAMIN D3 600 MG-10
TABLET ORAL
Qty: 60 TABLET | Refills: 1 | Status: CANCELLED | OUTPATIENT
Start: 2024-04-08

## 2024-04-08 NOTE — TELEPHONE ENCOUNTER
Last visit: 04/05/2024  Last Med refill: 12/15/2023  Does patient have enough medication for 72 hours: No:     Next Visit Date:  Future Appointments   Date Time Provider Department Center   4/17/2024 12:15 PM Zeus Hirsch DPM [facility]   5/9/2024 10:45 AM Shahzad Myers MD [facility]   6/26/2024 12:15 PM Zeus Hirsch DPM [facility]   8/15/2024  9:00 AM Ronaldo Barney MD Resp Spec [facility]   12/20/2024 10:00 AM [facility]MOGRAPHY RO[facility] AT [facility]   1/30/2025  9:00 AM [facility]       Health Maintenance   Topic Date Due    COVID-19 Vaccine (1) Never done    Respiratory Syncytial Virus (RSV) Pregnant or age 60 yrs+ (1 - 1-dose 60+ series) Never done    Diabetic retinal exam  03/06/2021    Diabetic Alb to Cr ratio (uACR) test  01/09/2025 (Originally 1/2/2021)    A1C test (Diabetic or Prediabetic)  08/15/2024    GFR test (Diabetes, CKD 3-4, OR last GFR 15-59)  01/09/2025    Diabetic foot exam  04/05/2025    Lipids  04/05/2025    Depression Monitoring  04/05/2025    Breast cancer screen  12/20/2025    DTaP/Tdap/Td vaccine (2 - Td or Tdap) 06/01/2027    Colorectal Cancer Screen  12/09/2029    Flu vaccine  Completed    Shingles vaccine  Completed    Pneumococcal 0-64 years Vaccine  Completed    Hepatitis C screen  Completed    HIV screen  Completed    Hepatitis A vaccine  Aged Out    Hepatitis B vaccine  Aged Out    Hib vaccine  Aged Out    Polio vaccine  Aged Out    Meningococcal (ACWY) vaccine  Aged Out    Cervical cancer screen  Discontinued       Hemoglobin A1C (%)   Date Value   08/15/2023 5.5   09/02/2022 5.6   06/11/2021 5.5             ( goal A1C is < 7)   No components found for: \"LABMICR\"  LDL Cholesterol (mg/dL)   Date Value   04/05/2024 75   12/14/2020 64     LDL Calculated (mg/dL)   Date Value   06/16/2017 68   05/19/2014 104       (goal LDL is <100)   AST (U/L)   Date Value   01/20/2021 26     ALT (U/L)   Date Value

## 2024-04-08 NOTE — TELEPHONE ENCOUNTER
Last visit: 04/05/2024  Last Med refill: 02/15/2024  Does patient have enough medication for 72 hours: No:     Next Visit Date:  Future Appointments   Date Time Provider Department Center   4/17/2024 12:15 PM Zeus Hirsch DPM [facility]   5/9/2024 10:45 AM Shahzad Myers MD [facility]   6/26/2024 12:15 PM Zeus Hirsch DPM [facility]   8/15/2024  9:00 AM Ronaldo Barney MD Resp Spec [facility]   12/20/2024 10:00 AM [facility] MAMMOGRAPHY ROOM AT [facility]   1/30/2025  9:00 [facility]       Health Maintenance   Topic Date Due    COVID-19 Vaccine (1) Never done    Respiratory Syncytial Virus (RSV) Pregnant or age 60 yrs+ (1 - 1-dose 60+ series) Never done    Diabetic retinal exam  03/06/2021    Diabetic Alb to Cr ratio (uACR) test  01/09/2025 (Originally 1/2/2021)    A1C test (Diabetic or Prediabetic)  08/15/2024    GFR test (Diabetes, CKD 3-4, OR last GFR 15-59)  01/09/2025    Diabetic foot exam  04/05/2025    Lipids  04/05/2025    Depression Monitoring  04/05/2025    Breast cancer screen  12/20/2025    DTaP/Tdap/Td vaccine (2 - Td or Tdap) 06/01/2027    Colorectal Cancer Screen  12/09/2029    Flu vaccine  Completed    Shingles vaccine  Completed    Pneumococcal 0-64 years Vaccine  Completed    Hepatitis C screen  Completed    HIV screen  Completed    Hepatitis A vaccine  Aged Out    Hepatitis B vaccine  Aged Out    Hib vaccine  Aged Out    Polio vaccine  Aged Out    Meningococcal (ACWY) vaccine  Aged Out    Cervical cancer screen  Discontinued       Hemoglobin A1C (%)   Date Value   08/15/2023 5.5   09/02/2022 5.6   06/11/2021 5.5             ( goal A1C is < 7)   No components found for: \"LABMICR\"  LDL Cholesterol (mg/dL)   Date Value   04/05/2024 75   12/14/2020 64     LDL Calculated (mg/dL)   Date Value   06/16/2017 68   05/19/2014 104       (goal LDL is <100)   AST (U/L)   Date Value   01/20/2021 26     ALT (U/L)   Date Value

## 2024-04-09 ENCOUNTER — TELEPHONE (OUTPATIENT)
Dept: FAMILY MEDICINE CLINIC | Age: 64
End: 2024-04-09

## 2024-04-09 DIAGNOSIS — R41.3 MEMORY DISTURBANCE: Primary | ICD-10-CM

## 2024-04-09 NOTE — TELEPHONE ENCOUNTER
Patient would  like to know if she could have an outside referral for Neurology .  The referral that was sent only has afternoon appointment she needs mornings. Please advise and thank you

## 2024-04-09 NOTE — TELEPHONE ENCOUNTER
Patient called back and found a Neurologist at [facility] that does morning appointments. She is asking for new referral and for it to be faxed to [phone].

## 2024-04-24 ENCOUNTER — HOSPITAL ENCOUNTER (OUTPATIENT)
Dept: WOMENS IMAGING | Age: 64
Discharge: HOME | End: 2024-04-26
Payer: COMMERCIAL

## 2024-04-24 ENCOUNTER — HOSPITAL ENCOUNTER (OUTPATIENT)
Age: 64
Discharge: HOME | End: 2024-04-24
Payer: COMMERCIAL

## 2024-04-24 DIAGNOSIS — E55.9 VITAMIN D DEFICIENCY: ICD-10-CM

## 2024-04-24 DIAGNOSIS — M85.852 OSTEOPENIA OF NECK OF LEFT FEMUR: ICD-10-CM

## 2024-04-24 LAB — 25(OH)D3 SERPL-MCNC: 48.3 NG/ML (ref 30–100)

## 2024-04-24 PROCEDURE — 82306 VITAMIN D 25 HYDROXY: CPT

## 2024-04-24 PROCEDURE — 77080 DXA BONE DENSITY AXIAL: CPT

## 2024-04-24 PROCEDURE — 36415 COLL VENOUS BLD VENIPUNCTURE: CPT

## 2024-05-01 ENCOUNTER — OFFICE VISIT (OUTPATIENT)
Dept: PODIATRY | Age: 64
End: 2024-05-01
Payer: COMMERCIAL

## 2024-05-01 VITALS
HEIGHT: 67 IN | BODY MASS INDEX: 45.99 KG/M2 | SYSTOLIC BLOOD PRESSURE: 144 MMHG | WEIGHT: 293 LBS | DIASTOLIC BLOOD PRESSURE: 72 MMHG | HEART RATE: 70 BPM

## 2024-05-01 DIAGNOSIS — L23.9 ALLERGIC CONTACT DERMATITIS OF LOWER LEG: Primary | ICD-10-CM

## 2024-05-01 PROCEDURE — 99213 OFFICE O/P EST LOW 20 MIN: CPT | Performed by: PODIATRIST

## 2024-05-01 PROCEDURE — 1036F TOBACCO NON-USER: CPT | Performed by: PODIATRIST

## 2024-05-01 PROCEDURE — G8417 CALC BMI ABV UP PARAM F/U: HCPCS | Performed by: PODIATRIST

## 2024-05-01 PROCEDURE — G8427 DOCREV CUR MEDS BY ELIG CLIN: HCPCS | Performed by: PODIATRIST

## 2024-05-01 PROCEDURE — 3017F COLORECTAL CA SCREEN DOC REV: CPT | Performed by: PODIATRIST

## 2024-05-01 NOTE — PROGRESS NOTES
Patient instructed to remove shoes and socks and instructed to sit in exam chair.  Current PCP is Trinity Rebollar MD and date of last visit was 04/05/2024.   Do you have a follow up visit scheduled?  No  If yes, the date is     
color (rubor or redness)  Class C Findings (Q9) - One Class B and two Class C findings  [x]Claudication   []Temperature changes   [x]Edema   []Paresthesia   []Burning    Q Modifier Met: Q8: Two Class B findings    Clinical Image:          Assessment   Kaci Banerjee is a 64 y.o. female with     Diagnosis Orders   1. Allergic contact dermatitis of lower leg                   Plan   Patient was examined and evaluated  Diagnosis was discussed with the pt and all of their questions were answered in detail.   Continue steroid cream  Referral sent to dermatology.  Biopsy result : Ddx includes contact dermatitis, drug induced dermatitis, ID reaction. Please see media panel for report.   Patient to [facility] in 3 months for diabetic nail care.  Discussed with Dr. Hirsch  Rx triamcinolone   Please, call the office with any questions or concerns.      Britton Ferraro DPM   Podiatric Medicine & Surgery   5/1/2024 at 12:46 PM      I performed a history and physical examination of the patient and discussed management with the resident. I reviewed the resident’s note and agree with the documented findings and plan of care. Any areas of disagreement are noted on the chart. I was personally present for the key portions of any procedures. I have documented in the chart those procedures where I was not present during the key portions. I have reviewed the Podiatry Resident progress note. I agree with the chief complaint, past medical history, past surgical history, allergies, medications, social and family history as documented unless otherwise noted below. Documentation of the HPI, Physical Exam and Medical Decision Making performed by medical students or scribes is based on my personal performance of the HPI, PE and MDM. I have personally evaluated this patient and have completed at least one if not all key elements of the E/M (history, physical exam, and MDM). Additional findings are as noted.     Electronically signed by Zeus Hirsch,

## 2024-05-30 DIAGNOSIS — Z76.0 MEDICATION REFILL: ICD-10-CM

## 2024-05-30 RX ORDER — CALCIUM CARBONATE/VITAMIN D3 600 MG-10
TABLET ORAL
Qty: 60 TABLET | Refills: 1 | Status: SHIPPED | OUTPATIENT
Start: 2024-05-30

## 2024-05-30 RX ORDER — FOLIC ACID/MV,IRON,MIN/LUTEIN 0.4-18-25
TABLET ORAL
Qty: 30 TABLET | Refills: 3 | Status: SHIPPED | OUTPATIENT
Start: 2024-05-30

## 2024-05-30 NOTE — TELEPHONE ENCOUNTER
Last visit:   Last Med refill:   Does patient have enough medication for 72 hours: No:     Next Visit Date:  Future Appointments   Date Time Provider Department Center   6/3/2024 11:20 AM Ania Sellers MD [facility] Neurology -   6/26/2024 12:15 PM Zeus Hirsch DPM [facility]   10/4/2024  9:00 AM Ronaldo Barney MD Resp Spec [facility]   10/16/2024 11:00 AM Anila Lira MD [facility]   11/26/2024 10:45 AM Shahzad Myers MD [facility]   12/20/2024 10:00 AM [facility] MAMMOGRAPHY ROOM AT [facility]   1/30/2025  9:00 AM Gaurav Ayala MD [facility] OB/GYN [facility]       Health Maintenance   Topic Date Due    COVID-19 Vaccine (1) Never done    Respiratory Syncytial Virus (RSV) Pregnant or age 60 yrs+ (1 - 1-dose 60+ series) Never done    Diabetic retinal exam  03/06/2021    Diabetic Alb to Cr ratio (uACR) test  01/09/2025 (Originally 1/2/2021)    A1C test (Diabetic or Prediabetic)  08/15/2024    GFR test (Diabetes, CKD 3-4, OR last GFR 15-59)  01/09/2025    Diabetic foot exam  04/05/2025    Lipids  04/05/2025    Depression Monitoring  04/05/2025    Breast cancer screen  12/20/2025    DTaP/Tdap/Td vaccine (2 - Td or Tdap) 06/01/2027    Colorectal Cancer Screen  12/09/2029    Flu vaccine  Completed    Shingles vaccine  Completed    Pneumococcal 0-64 years Vaccine  Completed    Hepatitis C screen  Completed    HIV screen  Completed    Hepatitis A vaccine  Aged Out    Hepatitis B vaccine  Aged Out    Hib vaccine  Aged Out    Polio vaccine  Aged Out    Meningococcal (ACWY) vaccine  Aged Out    Cervical cancer screen  Discontinued       Hemoglobin A1C (%)   Date Value   08/15/2023 5.5   09/02/2022 5.6   06/11/2021 5.5             ( goal A1C is < 7)   No components found for: \"LABMICR\"  No components found for: \"LDLCHOLESTEROL\", \"LDLCALC\"    (goal LDL is <100)   AST (U/L)   Date Value   01/20/2021 26     ALT (U/L)   Date Value   01/20/2021 35 (H)     BUN (mg/dL)   Date Value

## 2024-06-01 NOTE — PATIENT INSTRUCTIONS
"History  Chief Complaint   Patient presents with    Shortness of Breath     Patient \"c/o sob and fever x 3 days, left sided cp, no respiratory distress noted        History provided by:  Patient  Cough  Cough characteristics:  Dry  Sputum characteristics:  Nondescript  Severity:  Moderate  Onset quality:  Gradual  Duration:  3 days  Timing:  Constant  Progression:  Worsening  Chronicity:  New  Smoker: yes (1.5 packs/day)    Context: smoke exposure    Relieved by:  Nothing  Worsened by:  Nothing  Ineffective treatments:  None tried  Associated symptoms: chills, fever, shortness of breath and wheezing    Associated symptoms: no chest pain, no sore throat and no weight loss (had a 101 fever a few days ago)        Prior to Admission Medications   Prescriptions Last Dose Informant Patient Reported? Taking?   dicyclomine (BENTYL) 20 mg tablet   No No   Sig: Take 1 tablet (20 mg total) by mouth every 6 (six) hours as needed (abdominal pain)   methylPREDNISolone 4 MG tablet therapy pack   No No   Sig: Use as directed on package   oxygen gas   Yes No   Sig: Inhale continuous as needed   promethazine-dextromethorphan (PHENERGAN-DM) 6.25-15 mg/5 mL oral syrup  Self No No   Sig: Take 1.25 mL by mouth 4 (four) times a day as needed for cough   umeclidinium-vilanterol (Anoro Ellipta) 62.5-25 MCG/INH inhaler  Self Yes No   Sig: Inhale 1 puff daily Per Pt takes as needed only      Facility-Administered Medications: None       Past Medical History:   Diagnosis Date    COPD (chronic obstructive pulmonary disease) (HCC)     Diverticulitis of colon     Erectile dysfunction     Hernia of abdominal cavity     Liver cyst        History reviewed. No pertinent surgical history.    History reviewed. No pertinent family history.  I have reviewed and agree with the history as documented.    E-Cigarette/Vaping    E-Cigarette Use Never User      E-Cigarette/Vaping Substances    Nicotine No     THC No     CBD No     Flavoring No     Other No     " Thank you for letting us take care of you today. We hope all your questions were addressed. If a question was overlooked or something else comes to mind after you return home, please contact a member of your Care Team listed below. Your Care Team at [place] [address] is Team #4  Cecilia Jeffrey MD (Faculty)  Homa Garcia MD (Resident)  Bishop Donya MD (Resident)  Claritza Hart MD (Resident)  Max Vang MD (Resident)  HARRIS Mcgee, Rima Hawkins., [facility] office)  Renny Carey [place] (Clinical Practice Manager)  Td Orourke, [facility] (Clinical Pharmacist)       Office phone number: [phone]    If you need to get in right away due to illness, please be advised we have \"Same Day\" appointments available Monday-Friday. Please call us at [phone] option #3 to schedule your \"Same Day\" appointment. Unknown No      Social History     Tobacco Use    Smoking status: Every Day     Current packs/day: 1.50     Average packs/day: 1.5 packs/day for 50.0 years (75.0 ttl pk-yrs)     Types: Cigarettes    Smokeless tobacco: Never   Vaping Use    Vaping status: Never Used   Substance Use Topics    Alcohol use: Not Currently    Drug use: No       Review of Systems   Constitutional:  Positive for chills and fever. Negative for weight loss (had a 101 fever a few days ago).   HENT:  Negative for sore throat.    Respiratory:  Positive for cough, shortness of breath and wheezing.    Cardiovascular:  Negative for chest pain.   All other systems reviewed and are negative.      Physical Exam  Physical Exam  Vitals and nursing note reviewed.   Constitutional:       General: He is not in acute distress.     Appearance: He is well-developed. He is not diaphoretic.   HENT:      Head: Normocephalic and atraumatic.      Nose: Nose normal.      Mouth/Throat:      Mouth: Mucous membranes are moist.   Eyes:      Extraocular Movements: Extraocular movements intact.      Conjunctiva/sclera: Conjunctivae normal.   Cardiovascular:      Rate and Rhythm: Normal rate and regular rhythm.      Heart sounds: Normal heart sounds.   Pulmonary:      Effort: Pulmonary effort is normal. No respiratory distress.      Breath sounds: Wheezing present.   Abdominal:      General: There is no distension.      Palpations: Abdomen is soft.      Tenderness: There is no abdominal tenderness.   Musculoskeletal:         General: No deformity. Normal range of motion.      Cervical back: Neck supple.      Right lower leg: No edema.      Left lower leg: No edema.   Skin:     General: Skin is warm and dry.   Neurological:      General: No focal deficit present.      Mental Status: He is alert and oriented to person, place, and time.      Cranial Nerves: No cranial nerve deficit.   Psychiatric:         Mood and Affect: Mood normal.         Vital Signs  ED Triage Vitals  [06/01/24 1012]   Temperature Pulse Respirations Blood Pressure SpO2   97.8 °F (36.6 °C) 102 16 136/70 96 %      Temp Source Heart Rate Source Patient Position - Orthostatic VS BP Location FiO2 (%)   Temporal Monitor Sitting Left arm --      Pain Score       --           Vitals:    06/01/24 1012 06/01/24 1200 06/01/24 1212 06/01/24 1330   BP: 136/70  100/57 113/65   Pulse: 102 102 104 92   Patient Position - Orthostatic VS: Sitting  Lying          Visual Acuity      ED Medications  Medications   ipratropium-albuterol (DUO-NEB) 0.5-2.5 mg/3 mL inhalation solution 3 mL (3 mL Nebulization Given 6/1/24 1055)   predniSONE tablet 50 mg (50 mg Oral Given 6/1/24 1055)   doxycycline hyclate (VIBRAMYCIN) capsule 100 mg (100 mg Oral Given 6/1/24 1055)   albuterol (PROVENTIL HFA,VENTOLIN HFA) inhaler 2 puff (2 puffs Inhalation Given 6/1/24 1055)   sodium chloride 0.9 % bolus 1,000 mL (0 mL Intravenous Stopped 6/1/24 1326)   ipratropium-albuterol (DUO-NEB) 0.5-2.5 mg/3 mL inhalation solution 3 mL (3 mL Nebulization Given 6/1/24 1323)       Diagnostic Studies  Results Reviewed       Procedure Component Value Units Date/Time    HS Troponin I 2hr [[id number]]  (Normal) Collected: 06/01/24 1322    Lab Status: Final result Specimen: Blood from Arm, Right Updated: 06/01/24 1358     hs TnI 2hr 4 ng/L      Delta 2hr hsTnI -1 ng/L     Lactic acid, plasma (w/reflex if result > 2.0) [[id number]]  (Normal) Collected: 06/01/24 1129    Lab Status: Final result Specimen: Blood from Arm, Right Updated: 06/01/24 1214     LACTIC ACID 1.2 mmol/L     Narrative:      Result may be elevated if tourniquet was used during collection.    HS Troponin I 4hr [[id number]]     Lab Status: No result Specimen: Blood     HS Troponin 0hr (reflex protocol) [[id number]]  (Normal) Collected: 06/01/24 1129    Lab Status: Final result Specimen: Blood from Arm, Right Updated: 06/01/24 1208     hs TnI 0hr 5 ng/L     B-Type Natriuretic Peptide(BNP) [[id number]]  (Normal)  Collected: 06/01/24 1129    Lab Status: Final result Specimen: Blood from Arm, Right Updated: 06/01/24 1207     BNP 35 pg/mL     Comprehensive metabolic panel [[id number]]  (Abnormal) Collected: 06/01/24 1129    Lab Status: Final result Specimen: Blood from Arm, Right Updated: 06/01/24 1206     Sodium 137 mmol/L      Potassium 3.9 mmol/L      Chloride 101 mmol/L      CO2 26 mmol/L      ANION GAP 10 mmol/L      BUN 16 mg/dL      Creatinine 1.16 mg/dL      Glucose 109 mg/dL      Calcium 9.2 mg/dL      AST 12 U/L      ALT 5 U/L      Alkaline Phosphatase 49 U/L      Total Protein 7.2 g/dL      Albumin 4.0 g/dL      Total Bilirubin 0.63 mg/dL      eGFR 66 ml/min/1.73sq m     Narrative:      National Kidney Disease Foundation guidelines for Chronic Kidney Disease (CKD):     Stage 1 with normal or high GFR (GFR > 90 mL/min/1.73 square meters)    Stage 2 Mild CKD (GFR = 60-89 mL/min/1.73 square meters)    Stage 3A Moderate CKD (GFR = 45-59 mL/min/1.73 square meters)    Stage 3B Moderate CKD (GFR = 30-44 mL/min/1.73 square meters)    Stage 4 Severe CKD (GFR = 15-29 mL/min/1.73 square meters)    Stage 5 End Stage CKD (GFR <15 mL/min/1.73 square meters)  Note: GFR calculation is accurate only with a steady state creatinine    CBC and differential [[id number]]  (Abnormal) Collected: 06/01/24 1129    Lab Status: Final result Specimen: Blood from Arm, Right Updated: 06/01/24 1142     WBC 20.15 Thousand/uL      RBC 5.07 Million/uL      Hemoglobin 15.5 g/dL      Hematocrit 46.5 %      MCV 92 fL      MCH 30.6 pg      MCHC 33.3 g/dL      RDW 13.5 %      MPV 9.4 fL      Platelets 212 Thousands/uL      nRBC 0 /100 WBCs      Segmented % 79 %      Immature Grans % 1 %      Lymphocytes % 7 %      Monocytes % 12 %      Eosinophils Relative 1 %      Basophils Relative 0 %      Absolute Neutrophils 16.13 Thousands/µL      Absolute Immature Grans 0.13 Thousand/uL      Absolute Lymphocytes 1.33 Thousands/µL      Absolute Monocytes 2.35  Thousand/µL      Eosinophils Absolute 0.15 Thousand/µL      Basophils Absolute 0.06 Thousands/µL     Blood culture #2 [[id number]] Collected: 06/01/24 1129    Lab Status: In process Specimen: Blood from Arm, Right Updated: 06/01/24 1138    Blood culture #1 [[id number]] Collected: 06/01/24 1133    Lab Status: In process Specimen: Blood from Arm, Left Updated: 06/01/24 1138    FLU/RSV/COVID - if FLU/RSV clinically relevant [[id number]]     Lab Status: No result Specimen: Nares from Nose                    CT chest without contrast   Final Result by Tavon Padilla MD (06/01 1144)      Left upper lobe pneumonia. Small left pleural effusion.               Workstation performed: [id number]         XR chest 2 views   Final Result by Wendy Chadwick MD (06/01 1106)      Left upper lobe pneumonia.      Severe emphysema.               Workstation performed: [id number]                    Procedures  ECG 12 Lead Documentation Only    Date/Time: 6/1/2024 10:18 AM    Performed by: Johanne Nunez MD  Authorized by: Johanne Nunez MD    Indications / Diagnosis:  Dyspnea  ECG reviewed by me, the ED Provider: yes    Patient location:  ED  Interpretation:     Interpretation: normal    Rate:     ECG rate:  102    ECG rate assessment: tachycardic    Rhythm:     Rhythm: sinus tachycardia    Other findings:     Other findings: poor R wave progression             ED Course  ED Course as of 06/01/24 1505   Sat Jun 01, 2024   1201 First pt got xray b/c pt did not want blood work if did not want it. Wanted outpt treatment so given nebs, steroids and doxy. CXR with significant PNA so did blood work.    1321 D/w pt and his son the pneumonia. WBC count may have been do to recent steroid medrol dose pack he just finished. Offered pt admission for treatment of PNA, for oxygen/nebs and abx. Pt adament he doesn't want to stay. Feels much better. Has appt with pulm this Thursday. D/w him if does not want to stay will refill  meds/abd/steroids and d/w him if any worsening to return to hospital .                               SBIRT 22yo+      Flowsheet Row Most Recent Value   Initial Alcohol Screen: US AUDIT-C     1. How often do you have a drink containing alcohol? 0 Filed at: 06/01/2024 1014   2. How many drinks containing alcohol do you have on a typical day you are drinking?  0 Filed at: 06/01/2024 1014   3a. Male UNDER 65: How often do you have five or more drinks on one occasion? 0 Filed at: 06/01/2024 1014   Audit-C Score 0 Filed at: 06/01/2024 1014   LAZARA: How many times in the past year have you...    Used an illegal drug or used a prescription medication for non-medical reasons? Never Filed at: 06/01/2024 1014                      Medical Decision Making  64yo male with h/o Hep B and COPD, is supposed to see Pulm this coming week and was to have PFTs but missed them, is coming in with cough for the past few days and had a fever of 101 2-3 days ago and none since. No leg pain/swelling. Normally needs prednisone and inhalers and is out. No CP/SOB. Pt also with c/o left cheek redness/swelling, unsure if bug bite vs ingrown hair but has grown and got more red. Plan to eval for COPD exac vs PNA given fever. Pt rash on cheek c/w folliculitis and could be source of fever vs URI/PNA. Will get labs, viral swab and CXR.     Amount and/or Complexity of Data Reviewed  Labs: ordered.  Radiology: ordered and independent interpretation performed.    Risk  Prescription drug management.  Decision regarding hospitalization.             Disposition  Final diagnoses:   Pneumonia     Time reflects when diagnosis was documented in both MDM as applicable and the Disposition within this note       Time User Action Codes Description Comment    6/1/2024  1:28 PM Johanne Nunez Add [J18.9] Pneumonia           ED Disposition       ED Disposition   Discharge    Condition   Stable    Date/Time   Sat Jun 1, 2024 1327    Comment   Felipe Sanders III  discharge to home/self care.                   Follow-up Information       Follow up With Specialties Details Why Contact Info    Emil Silverman MD Internal Medicine Go to  If symptoms worsen [address]  [facility] [address]  [place] [address]  [phone]      Keep your appointment with your Pulmnologist this week.                Discharge Medication List as of 6/1/2024  1:31 PM        START taking these medications    Details   albuterol (2.5 mg/3 mL) 0.083 % nebulizer solution Take 3 mL (2.5 mg total) by nebulization every 6 (six) hours as needed for wheezing, Starting Sat 6/1/2024, Until Sun 6/1/2025 at 2359, Normal      albuterol (PROVENTIL HFA,VENTOLIN HFA) 90 mcg/act inhaler Inhale 2 puffs every 4 (four) hours as needed for wheezing, Starting Sat 6/1/2024, Until Sun 6/1/2025 at 2359, Normal      doxycycline hyclate (VIBRAMYCIN) 100 mg capsule Take 1 capsule (100 mg total) by mouth 2 (two) times a day for 10 days, Starting Sat 6/1/2024, Until Tue 6/11/2024, Normal      predniSONE 10 mg tablet Please take by mouth 5 tablets on days 1&2, 4 tabs on days 3&4, 3 tabs on days 5&6, 2 tabs on days 7&8, 1 tab on days 9&10., Normal      !! promethazine-dextromethorphan (PHENERGAN-DM) 6.25-15 mg/5 mL oral syrup Take 5 mL by mouth 4 (four) times a day as needed for cough, Starting Sat 6/1/2024, Normal       !! - Potential duplicate medications found. Please discuss with provider.        CONTINUE these medications which have NOT CHANGED    Details   dicyclomine (BENTYL) 20 mg tablet Take 1 tablet (20 mg total) by mouth every 6 (six) hours as needed (abdominal pain), Starting u 4/25/2024, Normal      methylPREDNISolone 4 MG tablet therapy pack Use as directed on package, Normal      oxygen gas Inhale continuous as needed, Historical Med      !! promethazine-dextromethorphan (PHENERGAN-DM) 6.25-15 mg/5 mL oral syrup Take 1.25 mL by mouth 4 (four) times a day as needed for cough, Starting Tue 4/2/2024, Normal       umeclidinium-vilanterol (Anoro Ellipta) 62.5-25 MCG/INH inhaler Inhale 1 puff daily Per Pt takes as needed only, Starting Wed 9/1/2021, Historical Med       !! - Potential duplicate medications found. Please discuss with provider.          No discharge procedures on file.    PDMP Review       None            ED Provider  Electronically Signed by             Johanne Nunez MD  06/01/24 5459

## 2024-06-03 ENCOUNTER — OFFICE VISIT (OUTPATIENT)
Dept: NEUROLOGY | Age: 64
End: 2024-06-03
Payer: COMMERCIAL

## 2024-06-03 VITALS
RESPIRATION RATE: 18 BRPM | SYSTOLIC BLOOD PRESSURE: 155 MMHG | BODY MASS INDEX: 45.99 KG/M2 | HEART RATE: 63 BPM | TEMPERATURE: 97.2 F | HEIGHT: 67 IN | WEIGHT: 293 LBS | DIASTOLIC BLOOD PRESSURE: 74 MMHG

## 2024-06-03 DIAGNOSIS — R41.3 MEMORY DEFICIT: Primary | ICD-10-CM

## 2024-06-03 PROCEDURE — G8417 CALC BMI ABV UP PARAM F/U: HCPCS | Performed by: NEUROMUSCULOSKELETAL MEDICINE, SPORTS MEDICINE

## 2024-06-03 PROCEDURE — G8427 DOCREV CUR MEDS BY ELIG CLIN: HCPCS | Performed by: NEUROMUSCULOSKELETAL MEDICINE, SPORTS MEDICINE

## 2024-06-03 PROCEDURE — 3017F COLORECTAL CA SCREEN DOC REV: CPT | Performed by: NEUROMUSCULOSKELETAL MEDICINE, SPORTS MEDICINE

## 2024-06-03 PROCEDURE — 1036F TOBACCO NON-USER: CPT | Performed by: NEUROMUSCULOSKELETAL MEDICINE, SPORTS MEDICINE

## 2024-06-03 PROCEDURE — 99203 OFFICE O/P NEW LOW 30 MIN: CPT | Performed by: NEUROMUSCULOSKELETAL MEDICINE, SPORTS MEDICINE

## 2024-06-03 NOTE — PATIENT INSTRUCTIONS
SURVEY:    Thank you for allowing us to care for you today.    You may be receiving a survey from [facility] regarding your visit today- electronically or via mail.      Please help us by completing the survey as this will provide the needed feedback to ensure we are providing the very best care for you and your family.    If you cannot score us a very good on any question, please call the office to discuss how we could have made your experience a very good one.    Thank you.       STAFF:    Bernadette Rivera, Dayana Blake, Griselda Bender      CLINICAL STAFF:    Marielle Ramirez LPN, Doretha Duran LPN, Alee Nieto LPN, Barbie Phillips CMA

## 2024-06-03 NOTE — PROGRESS NOTES
NEUROLOGY CONSULT    Patient Name:  Kaci Banerjee  :   1960  Clinic Visit Date: 6/3/2024    I saw Ms. Kaci Banerjee  in the neurology clinic today for evaluation of memory problems..  64-year-old right-handed lady with bilateral glaucoma , legal blindness, borderline hyperglycemia, hyperlipidemia, hypothyroidism, was seen in the office today with complaints of intermittent memory lapses for the past couple of years.  She describes her symptoms as as occasional forgetfulness for simple day-to-day events.  Onset of the symptoms was gradual have been stable stable.  She has no difficulty in taking her care of ADLs.  No history of headaches, confusion, or any other significant neurological symptoms of concern.  Blood tests at another facility revealed normal TSH, normal B12/folate levels and other routine laboratory tests.  She is particularly concerned because there is a strong family history of dementia.    REVIEW OF SYSTEMS    Constitutional Weight changes: absent, change in appetite: absent Fatigue: present;Fevers : absent, Any recent hospitalizations:  absent   HEENT Ears: normal,  Visual disturbance: present   Respiratory Shortness of breath: absent, choking:  absent, Cough: present, Snoring : absent   Cardiovascular Chest pain: absent, Leg swelling :present, palpitations : absent, fainting : absent   GI Constipation: present, Diarrhea: absent, Swallowing change: absent    Urinary frequency: absent, Urinary urgency: absent, Urinary incontinence: absent   Musculoskeletal Neck pain: present, Back pain: present, Stiffness: absent, Muscle pain: absent, Joint pain: present, restless leg : absent   Dermatological Hair loss: absent, Skin changes: absent   Neurological Confusion: present, Trouble concentrating: present, Seizures: absent;  Memory loss: present, balance problem: present, Dizziness: absent, vertigo: absent, Weakness: absent, Numbness present, Tremor: absent, Spasm: absent, involuntary

## 2024-07-03 NOTE — PROGRESS NOTES
Patient instructed to remove shoes and socks and instructed to sit in exam chair.  Current PCP is Trinity Rebollar MD and date of last visit was 04/05/2024.   Do you have a follow up visit scheduled?  No  If yes, the date is         Diabetic visit information    Blood pressure (Control is BP <140/90)  BP Readings from Last 3 Encounters:   06/03/24 (!) 155/74   05/09/24 124/76   05/01/24 (!) 144/72       BP taken with correct size cuff? - Yes   Repeated if > 140/90 Yes      Tobacco use:  Patient  reports that she has never smoked. She has never been exposed to tobacco smoke. She has never used smokeless tobacco.  If Smoker - Cessation materials given?- Yes       Diabetic Health Maintenance Items due  Diabetes Management   Topic Date Due    Diabetic retinal exam  03/06/2021       Diabetic retinal exam done in last year? - Yes   If No: remind patient that it is due and they should schedule an exam    Medications  Is patient taking any medications for diabetes? -   Yes  Have blood sugars been controlled?   Fasting blood sugars under 120   -   Yes   Random home sugars or today's POCT glucose is under 180 -   Yes   []  If No to the above then patient should schedule appt with PCP.     Diabetic Plan    A1C Plan  Lab Results   Component Value Date    LABA1C 5.5 08/15/2023    LABA1C 5.6 09/02/2022    LABA1C 5.5 06/11/2021      []  If A1C over 8 and last result >3 months ago - Order A1C and refer to PCP   []  If last A1C over 6 months ago - Order A1C and refer to PCP for follow up   []  If elevated blood sugars > 180 - refer to PCP for follow up    []  Blood sugar controlled - A1C under 8 and last check was < 6 months      Cholesterol Plan   No components found for: \"LDLCALC\", \"LDLCHOLESTEROL\"   []  If LDL > 100 and last result >3 months ago - order Fasting lipids and refer to PCP for follow up   []  If LDL < 100 and over 1 year ago - Order Fasting lipids and refer to PCP for follow up   [] LDL is controlled.  LDL < 100 and

## 2024-07-15 ENCOUNTER — OFFICE VISIT (OUTPATIENT)
Dept: PODIATRY | Age: 64
End: 2024-07-15
Payer: COMMERCIAL

## 2024-07-15 VITALS
DIASTOLIC BLOOD PRESSURE: 75 MMHG | BODY MASS INDEX: 45.99 KG/M2 | SYSTOLIC BLOOD PRESSURE: 151 MMHG | HEART RATE: 70 BPM | WEIGHT: 293 LBS | HEIGHT: 67 IN

## 2024-07-15 DIAGNOSIS — L23.9 ALLERGIC CONTACT DERMATITIS OF LOWER LEG: ICD-10-CM

## 2024-07-15 DIAGNOSIS — L30.8 OTHER ECZEMA: Primary | ICD-10-CM

## 2024-07-15 DIAGNOSIS — L98.9 SKIN LESION OF LEFT LOWER LIMB: ICD-10-CM

## 2024-07-15 PROCEDURE — G8427 DOCREV CUR MEDS BY ELIG CLIN: HCPCS

## 2024-07-15 PROCEDURE — 3017F COLORECTAL CA SCREEN DOC REV: CPT

## 2024-07-15 PROCEDURE — G8417 CALC BMI ABV UP PARAM F/U: HCPCS

## 2024-07-15 PROCEDURE — 99213 OFFICE O/P EST LOW 20 MIN: CPT

## 2024-07-15 PROCEDURE — 1036F TOBACCO NON-USER: CPT

## 2024-07-15 RX ORDER — PETROLATUM 42 G/100G
OINTMENT TOPICAL
Qty: 228 G | Refills: 1 | Status: SHIPPED | OUTPATIENT
Start: 2024-07-15

## 2024-07-15 RX ORDER — UREA 200 MG/G
CREAM TOPICAL
Qty: 85 G | Refills: 2 | Status: SHIPPED | OUTPATIENT
Start: 2024-07-15

## 2024-07-15 NOTE — PATIENT INSTRUCTIONS
Cerave Eczema Cream- OTC  Use Aquaphor daily   Use Urea cream if aquaphor is not working  Try different OTC options - Manuka Honey OTC products (lotions)

## 2024-07-19 NOTE — PROGRESS NOTES
[facility]  [address]   [address] [place] [address]  Tel: [phone]   Fax: [phone]    Subjective     CC: Left lower leg lesion, pain in bilateral feet    Interval History:    Patient was seen and examined clinic today. Has derm appt. Tried steroid cream which made it feel better but once stopped taking, burning pain came back. Would like to know other options    HPI:  Kaci Banerjee is a 64 y.o. female who presents to clinic today for follow-up of pain in her left foot she recently received orthotics however she has not obtained shoes to put them in outside of worn shoes understands importance of doing so.  She is not able to point to the exact location of the pain. She also relates burning to bilateral lower extremity. Relates that she normally wears compression stockings to bilateral lower extremities for edema.  Denies any rest pain or claudication symptoms.    Primary care physician is Trinity Rebollar MD.    ROS:    Constitutional: Denies nausea, vomiting, fever, chills.  Neurologic: Denies numbness, tingling, and burning in the feet.    Vascular: Denies symptoms of lower extremity claudication.    Skin: Thick, elongated toenails  Otherwise negative except as noted in the HPI.     PMH:  Past Medical History:   Diagnosis Date    Abdominal bloating 1/23/2019    Anxiety     Bilateral edema of lower extremity 12/16/2016    Bronchial asthma     mild, intermittent    Carpal tunnel syndrome     Cataract     Chronic back pain     Chronic sinusitis     Degenerative disc disease, lumbar 12/16/2016    Depression     Diabetes mellitus due to underlying condition with hyperosmolarity without coma (HCC)     Dizziness 10/27/2017    DJD (degenerative joint disease)     S1, L3, L4, L5, T12    Dysphagia 1/23/2019    Edema of leg     bilateral legs    Environmental allergies     Frozen shoulder 4/27/2015    GERD (gastroesophageal reflux disease)     Glaucoma     Glucose intolerance (impaired

## 2024-07-24 DIAGNOSIS — M54.50 CHRONIC BILATERAL LOW BACK PAIN, UNSPECIFIED WHETHER SCIATICA PRESENT: ICD-10-CM

## 2024-07-24 DIAGNOSIS — M51.36 DEGENERATIVE DISC DISEASE, LUMBAR: ICD-10-CM

## 2024-07-24 DIAGNOSIS — G89.29 CHRONIC BILATERAL LOW BACK PAIN, UNSPECIFIED WHETHER SCIATICA PRESENT: ICD-10-CM

## 2024-07-24 DIAGNOSIS — Z76.0 MEDICATION REFILL: ICD-10-CM

## 2024-07-24 DIAGNOSIS — E11.9 TYPE 2 DIABETES MELLITUS WITHOUT COMPLICATION, WITHOUT LONG-TERM CURRENT USE OF INSULIN (HCC): ICD-10-CM

## 2024-07-24 RX ORDER — ASPIRIN 81 MG/1
81 TABLET ORAL DAILY
Qty: 30 TABLET | Refills: 5 | Status: SHIPPED | OUTPATIENT
Start: 2024-07-24

## 2024-07-24 RX ORDER — FOLIC ACID/MV,IRON,MIN/LUTEIN 0.4-18-25
1 TABLET ORAL DAILY
Qty: 30 TABLET | Refills: 3 | Status: SHIPPED | OUTPATIENT
Start: 2024-07-24

## 2024-07-24 RX ORDER — PSEUDOEPHED/ACETAMINOPH/DIPHEN 30MG-500MG
TABLET ORAL
Qty: 120 TABLET | Refills: 3 | Status: SHIPPED | OUTPATIENT
Start: 2024-07-24

## 2024-07-24 NOTE — TELEPHONE ENCOUNTER
E-scribe request for aspirin and certavite . Please review and e-scribe if applicable.     Last Visit Date:  4/5/24  Next Visit Date:  Visit date not found    Hemoglobin A1C (%)   Date Value   08/15/2023 5.5   09/02/2022 5.6   06/11/2021 5.5             ( goal A1C is < 7)   No components found for: \"LABMICR\"  No components found for: \"LDLCHOLESTEROL\", \"LDLCALC\"    (goal LDL is <100)   AST (U/L)   Date Value   01/20/2021 26     ALT (U/L)   Date Value   01/20/2021 35 (H)     BUN (mg/dL)   Date Value   01/09/2024 17     BP Readings from Last 3 Encounters:   07/15/24 (!) 151/75   06/03/24 (!) 155/74   05/09/24 124/76          (goal 120/80)        Patient Active Problem List:     Glaucoma     GERD (gastroesophageal reflux disease)     DJD (degenerative joint disease)     Obesity     Polyneuropathy     Migraine     Mitral prolapse     Low back pain     CTS (carpal tunnel syndrome)     Supraspinatus syndrome     Impaired fasting glucose     Acute sinus infection     IBS (irritable bowel syndrome)     Back pain, chronic     Stress fracture, right 5th metatarsal      Upper airway cough syndrome     Ear fullness     Perioral numbness     Screening for osteoporosis     Headache     Left eye injury     Medication reaction     Osteopenia     Lumbosacral pain     Flu vaccine need     Hypothyroid     Urinary incontinence     Anxiety     Sleep apnea     Depression     Chronic back pain     Carpal tunnel syndrome     Neuropathy     Mitral valve problem     Morbid obesity with BMI of 45.0-49.9, adult (HCC)     Frozen shoulder     Skin tag     Degenerative disc disease, lumbar     Bilateral edema of lower extremity     Medication refill     Venous stasis of lower extremity     Dizziness     Dysphagia     Abdominal bloating     Mild intermittent asthma without complication     Skin lesion of left leg     Rotator cuff arthropathy of left shoulder     Dysgeusia     Neck pain, acute     Family history of Crohn's disease     Onychomycosis

## 2024-07-25 RX ORDER — CALCIUM CARBONATE/VITAMIN D3 600 MG-10
TABLET ORAL
Qty: 60 TABLET | Refills: 1 | Status: SHIPPED | OUTPATIENT
Start: 2024-07-25

## 2024-07-25 NOTE — TELEPHONE ENCOUNTER
Patient called in stating that the Multiple Vitamins-Minerals (certavit/antioxidants) tab patient states that the certavit part make her stomach upset. Patient is requesting for you to order her a different multiple vitamins-minerals without the certavit in it. Thank you

## 2024-08-30 NOTE — PROGRESS NOTES
Subjective:      Patient ID: Kaci Banerjee is a 64 y.o. female.     HPI  Patient here for sleep apnea. Last seen in office per Dr. Barney in February 2024      Compliance report reviewed from 6/5/2024 through 9/2/2024 with all days used for more than 4 hours.  Patient is on CPAP 12 cm H2O with a residual AHI of 1.3 with a median leak index of 4.8 maximum leak index of 105.4.  Patient states that she still continues to have persistent daytime fatigue, finds her self nodding off while doing self-care tasks such as using the toilet or soaking her foot.  Her Alder score today is an 11.  She does report vivid dreams but denies any symptoms of cataplexy.  She is on no sedating medications.  She is on thyroid medications and last TSH was normal.  Last hemoglobin was also normal.  No evidence of iron deficiency.  Sleep latency test ordered after a titration test.  Patient prefers to do testing and [place] as it is closer to her home.  Patient has been driving to [place] to follow-up with Dr. Barney, offered patient follow-up in [place] as it is a shorter drive and patient is happy to hear that she can come to [place] for further follow-up.    Medications:   Omeprazole  Albuterol HFA      Social:   Social smoker- no persistent smoking  No illicit no vaping  No drinking      Occupational:   Disabled- nurses aide  Chemical exposure: None  Travel out of country: None  Pets at home: none  Birds/Turtles: None   Family Hx: None  Saunas: None  IV drug use: None             PRIOR WORKUP:  PFT:     CT Imaging:      CTA 10/30/2021: Negative for pulmonary emboli.  Limited exam due to suboptimal bolus, subsegmental emboli cannot be excluded.  Minimal peripheral groundglass opacities in the lower lobes.     Sleep Study:  Titration study 5/16/2023: Patient titrated onto CPAP with a final setting of 12 cm H2O.  At this setting patient had 0 residual apneas or hypopneas.  SPO2 92%.  Patient had 0 leg movements.    Titration study

## 2024-09-03 ENCOUNTER — OFFICE VISIT (OUTPATIENT)
Dept: PULMONOLOGY | Age: 64
End: 2024-09-03
Payer: COMMERCIAL

## 2024-09-03 VITALS
BODY MASS INDEX: 45.99 KG/M2 | OXYGEN SATURATION: 96 % | SYSTOLIC BLOOD PRESSURE: 136 MMHG | RESPIRATION RATE: 16 BRPM | HEIGHT: 67 IN | WEIGHT: 293 LBS | DIASTOLIC BLOOD PRESSURE: 79 MMHG | HEART RATE: 66 BPM

## 2024-09-03 DIAGNOSIS — G47.33 OBSTRUCTIVE SLEEP APNEA SYNDROME: Primary | ICD-10-CM

## 2024-09-03 DIAGNOSIS — G47.429 NARCOLEPSY DUE TO UNDERLYING CONDITION WITHOUT CATAPLEXY: ICD-10-CM

## 2024-09-03 DIAGNOSIS — E66.01 MORBID OBESITY WITH BMI OF 45.0-49.9, ADULT (HCC): ICD-10-CM

## 2024-09-03 PROCEDURE — 1036F TOBACCO NON-USER: CPT | Performed by: NURSE PRACTITIONER

## 2024-09-03 PROCEDURE — 99214 OFFICE O/P EST MOD 30 MIN: CPT | Performed by: NURSE PRACTITIONER

## 2024-09-03 PROCEDURE — 3017F COLORECTAL CA SCREEN DOC REV: CPT | Performed by: NURSE PRACTITIONER

## 2024-09-03 PROCEDURE — G8427 DOCREV CUR MEDS BY ELIG CLIN: HCPCS | Performed by: NURSE PRACTITIONER

## 2024-09-03 PROCEDURE — G8417 CALC BMI ABV UP PARAM F/U: HCPCS | Performed by: NURSE PRACTITIONER

## 2024-09-03 ASSESSMENT — SLEEP AND FATIGUE QUESTIONNAIRES
HOW LIKELY ARE YOU TO NOD OFF OR FALL ASLEEP WHILE WATCHING TV: MODERATE CHANCE OF DOZING
HOW LIKELY ARE YOU TO NOD OFF OR FALL ASLEEP WHILE SITTING AND TALKING TO SOMEONE: WOULD NEVER DOZE
HOW LIKELY ARE YOU TO NOD OFF OR FALL ASLEEP WHILE LYING DOWN TO REST IN THE AFTERNOON WHEN CIRCUMSTANCES PERMIT: HIGH CHANCE OF DOZING
HOW LIKELY ARE YOU TO NOD OFF OR FALL ASLEEP WHILE SITTING QUIETLY AFTER LUNCH WITHOUT ALCOHOL: MODERATE CHANCE OF DOZING
HOW LIKELY ARE YOU TO NOD OFF OR FALL ASLEEP WHILE SITTING INACTIVE IN A PUBLIC PLACE: MODERATE CHANCE OF DOZING
HOW LIKELY ARE YOU TO NOD OFF OR FALL ASLEEP IN A CAR, WHILE STOPPED FOR A FEW MINUTES IN TRAFFIC: WOULD NEVER DOZE
HOW LIKELY ARE YOU TO NOD OFF OR FALL ASLEEP WHEN YOU ARE A PASSENGER IN A CAR FOR AN HOUR WITHOUT A BREAK: WOULD NEVER DOZE
HOW LIKELY ARE YOU TO NOD OFF OR FALL ASLEEP WHILE SITTING AND READING: MODERATE CHANCE OF DOZING
ESS TOTAL SCORE: 11

## 2024-09-03 NOTE — PATIENT INSTRUCTIONS
Call [facility] to schedule your follow up appointment.   [phone]    [facility] - [phone]    Pt will f/u in [place] - provided phone number to call. \  LS

## 2024-09-22 DIAGNOSIS — K21.9 GASTROESOPHAGEAL REFLUX DISEASE WITHOUT ESOPHAGITIS: ICD-10-CM

## 2024-09-24 RX ORDER — OMEPRAZOLE 40 MG/1
40 CAPSULE, DELAYED RELEASE ORAL DAILY
Qty: 30 CAPSULE | Refills: 5 | Status: SHIPPED | OUTPATIENT
Start: 2024-09-24

## 2024-09-26 RX ORDER — CALCIUM CARBONATE/VITAMIN D3 600 MG-10
TABLET ORAL
Qty: 60 TABLET | Refills: 1 | Status: SHIPPED | OUTPATIENT
Start: 2024-09-26

## 2024-09-27 ENCOUNTER — OFFICE VISIT (OUTPATIENT)
Dept: GASTROENTEROLOGY | Age: 64
End: 2024-09-27
Payer: COMMERCIAL

## 2024-09-27 VITALS
BODY MASS INDEX: 49.18 KG/M2 | TEMPERATURE: 97.2 F | SYSTOLIC BLOOD PRESSURE: 141 MMHG | DIASTOLIC BLOOD PRESSURE: 82 MMHG | WEIGHT: 293 LBS

## 2024-09-27 DIAGNOSIS — E66.01 MORBID OBESITY WITH BMI OF 45.0-49.9, ADULT: ICD-10-CM

## 2024-09-27 DIAGNOSIS — K21.9 GASTROESOPHAGEAL REFLUX DISEASE WITHOUT ESOPHAGITIS: Primary | ICD-10-CM

## 2024-09-27 DIAGNOSIS — K58.8 OTHER IRRITABLE BOWEL SYNDROME: ICD-10-CM

## 2024-09-27 DIAGNOSIS — Z83.79 FAMILY HISTORY OF CROHN'S DISEASE: ICD-10-CM

## 2024-09-27 DIAGNOSIS — R14.0 ABDOMINAL BLOATING: ICD-10-CM

## 2024-09-27 DIAGNOSIS — F41.9 ANXIETY: ICD-10-CM

## 2024-09-27 DIAGNOSIS — K58.2 IRRITABLE BOWEL SYNDROME WITH BOTH CONSTIPATION AND DIARRHEA: ICD-10-CM

## 2024-09-27 DIAGNOSIS — R13.19 ESOPHAGEAL DYSPHAGIA: ICD-10-CM

## 2024-09-27 DIAGNOSIS — K21.9 GASTROESOPHAGEAL REFLUX DISEASE, UNSPECIFIED WHETHER ESOPHAGITIS PRESENT: ICD-10-CM

## 2024-09-27 DIAGNOSIS — R07.89 ATYPICAL CHEST PAIN: ICD-10-CM

## 2024-09-27 PROCEDURE — G8427 DOCREV CUR MEDS BY ELIG CLIN: HCPCS | Performed by: INTERNAL MEDICINE

## 2024-09-27 PROCEDURE — 3017F COLORECTAL CA SCREEN DOC REV: CPT | Performed by: INTERNAL MEDICINE

## 2024-09-27 PROCEDURE — 1036F TOBACCO NON-USER: CPT | Performed by: INTERNAL MEDICINE

## 2024-09-27 PROCEDURE — 99214 OFFICE O/P EST MOD 30 MIN: CPT | Performed by: INTERNAL MEDICINE

## 2024-09-27 PROCEDURE — G8417 CALC BMI ABV UP PARAM F/U: HCPCS | Performed by: INTERNAL MEDICINE

## 2024-09-27 RX ORDER — ONDANSETRON 4 MG/1
4 TABLET, FILM COATED ORAL DAILY PRN
Qty: 30 TABLET | Refills: 0 | Status: SHIPPED | OUTPATIENT
Start: 2024-09-27

## 2024-09-27 RX ORDER — BACILLUS COAGULANS 1B CELL
1 CAPSULE ORAL ONCE
Qty: 90 EACH | Refills: 2 | Status: SHIPPED | OUTPATIENT
Start: 2024-09-27 | End: 2024-09-27

## 2024-09-27 ASSESSMENT — ENCOUNTER SYMPTOMS
ABDOMINAL PAIN: 1
DIARRHEA: 0
VOMITING: 0
SHORTNESS OF BREATH: 0
BLOOD IN STOOL: 0
WHEEZING: 0
COUGH: 0
ANAL BLEEDING: 0
CONSTIPATION: 1
ABDOMINAL DISTENTION: 0
RECTAL PAIN: 0
SORE THROAT: 0
TROUBLE SWALLOWING: 0
CHOKING: 0
VOICE CHANGE: 0
NAUSEA: 1

## 2024-10-09 NOTE — PROGRESS NOTES
Medication Dose Route Frequency Provider Last Rate Last Admin    methylPREDNISolone acetate (DEPO-MEDROL) injection 40 mg  40 mg Intra-artICUlar Once Leydi Junior PA-C           ALLERGIES:   Allergies   Allergen Reactions    Latex Rash     blisters  blisters    Adhesive Tape Rash     blisters    Amlodipine Other (See Comments)     Facial numbness  Facial numbness  Facial numbness  Facial numbness  Facial numbness  Facial numbness  Facial numbness    Bextra [Valdecoxib] Rash     swelling    Brimonidine Itching     Burning eyes severe    Daypro [Oxaprozin] Anaphylaxis    Diclofenac Sodium Swelling and Shortness Of Breath    Famotidine Other (See Comments)     Blisters down her arms    Hydrocodone-Acetaminophen Nausea Only     Severe chest pain    Hydromorphone Other (See Comments)     Rapid heart beat,  Nausea, spent 3 days in cardiac unit    Ibuprofen      Other reaction(s): bleeding  Other reaction(s): Unknown  Black stools  \"rips up stomach\", black tarry stool  Black stools  \"rips up stomach\", black tarry stool    Lisinopril Nausea Only, Nausea And Vomiting and Other (See Comments)     Other reaction(s): Hypotension    Metoprolol Other (See Comments)     Other reaction(s): Dizziness    Naproxen Other (See Comments)     Chest pain , swelling    Oxycodone-Acetaminophen      Chest pain severe    Rofecoxib Rash     swelling    Shellfish-Derived Products Anaphylaxis and Rash     shrimp  shrimp  shrimp    Simvastatin Nausea And Vomiting     Other reaction(s): muscle cramps    Statins      Other reaction(s): muscle cramps  Tolerates lovastatin. Pravachol caused numbness in head/face    Sulfa Antibiotics Hives    Tetracyclines & Related Itching and Rash    Timoptic [Timolol Maleate] Itching and Swelling     Eyes burning severely    Tramadol Itching and Rash    Fosamax [Alendronate] Nausea Only and Nausea And Vomiting    Nalbuphine Nausea And Vomiting    Alendronate Sodium     Alendronate Sodium     Alphagan

## 2024-10-16 ENCOUNTER — OFFICE VISIT (OUTPATIENT)
Dept: DERMATOLOGY | Age: 64
End: 2024-10-16
Payer: COMMERCIAL

## 2024-10-16 VITALS
DIASTOLIC BLOOD PRESSURE: 83 MMHG | HEIGHT: 67 IN | HEART RATE: 72 BPM | SYSTOLIC BLOOD PRESSURE: 158 MMHG | TEMPERATURE: 97.3 F | OXYGEN SATURATION: 99 % | BODY MASS INDEX: 45.99 KG/M2 | WEIGHT: 293 LBS

## 2024-10-16 DIAGNOSIS — I87.2 VENOUS STASIS DERMATITIS: Primary | ICD-10-CM

## 2024-10-16 PROCEDURE — 1036F TOBACCO NON-USER: CPT | Performed by: DERMATOLOGY

## 2024-10-16 PROCEDURE — 3017F COLORECTAL CA SCREEN DOC REV: CPT | Performed by: DERMATOLOGY

## 2024-10-16 PROCEDURE — G8417 CALC BMI ABV UP PARAM F/U: HCPCS | Performed by: DERMATOLOGY

## 2024-10-16 PROCEDURE — 99214 OFFICE O/P EST MOD 30 MIN: CPT | Performed by: DERMATOLOGY

## 2024-10-16 PROCEDURE — G8427 DOCREV CUR MEDS BY ELIG CLIN: HCPCS | Performed by: DERMATOLOGY

## 2024-10-16 PROCEDURE — G8484 FLU IMMUNIZE NO ADMIN: HCPCS | Performed by: DERMATOLOGY

## 2024-10-16 RX ORDER — TACROLIMUS 1 MG/G
OINTMENT TOPICAL
Qty: 30 G | Refills: 2 | Status: CANCELLED | OUTPATIENT
Start: 2024-10-16

## 2024-10-16 RX ORDER — FLUOCINONIDE 0.5 MG/G
OINTMENT TOPICAL
Qty: 60 G | Refills: 2 | Status: SHIPPED | OUTPATIENT
Start: 2024-10-16

## 2024-10-16 NOTE — PATIENT INSTRUCTIONS
- start fluocinonide ointment twice daily on the areas until skin is smooth, then switch to 2x weekly for maintenance   - restart use of compression socks daily   - can continue urea cream if she likes this product but it may not be necessary with fluocinonide use

## 2024-10-21 ENCOUNTER — OFFICE VISIT (OUTPATIENT)
Dept: PODIATRY | Age: 64
End: 2024-10-21
Payer: COMMERCIAL

## 2024-10-21 VITALS
DIASTOLIC BLOOD PRESSURE: 74 MMHG | HEART RATE: 74 BPM | SYSTOLIC BLOOD PRESSURE: 142 MMHG | HEIGHT: 67 IN | WEIGHT: 293 LBS | BODY MASS INDEX: 45.99 KG/M2

## 2024-10-21 DIAGNOSIS — M79.671 PAIN IN RIGHT FOOT: ICD-10-CM

## 2024-10-21 DIAGNOSIS — L23.9 ALLERGIC CONTACT DERMATITIS OF LOWER LEG: ICD-10-CM

## 2024-10-21 DIAGNOSIS — M79.5 FOREIGN BODY (FB) IN SOFT TISSUE: Primary | ICD-10-CM

## 2024-10-21 DIAGNOSIS — B35.1 ONYCHOMYCOSIS: ICD-10-CM

## 2024-10-21 DIAGNOSIS — E08.42 DIABETIC POLYNEUROPATHY ASSOCIATED WITH DIABETES MELLITUS DUE TO UNDERLYING CONDITION (HCC): ICD-10-CM

## 2024-10-21 PROCEDURE — 3017F COLORECTAL CA SCREEN DOC REV: CPT | Performed by: PODIATRIST

## 2024-10-21 PROCEDURE — G8417 CALC BMI ABV UP PARAM F/U: HCPCS | Performed by: PODIATRIST

## 2024-10-21 PROCEDURE — 10120 INC&RMVL FB SUBQ TISS SMPL: CPT | Performed by: PODIATRIST

## 2024-10-21 PROCEDURE — 99213 OFFICE O/P EST LOW 20 MIN: CPT | Performed by: PODIATRIST

## 2024-10-21 PROCEDURE — G8427 DOCREV CUR MEDS BY ELIG CLIN: HCPCS | Performed by: PODIATRIST

## 2024-10-21 PROCEDURE — 2022F DILAT RTA XM EVC RTNOPTHY: CPT | Performed by: PODIATRIST

## 2024-10-21 PROCEDURE — 1036F TOBACCO NON-USER: CPT | Performed by: PODIATRIST

## 2024-10-21 PROCEDURE — 11721 DEBRIDE NAIL 6 OR MORE: CPT | Performed by: PODIATRIST

## 2024-10-21 PROCEDURE — G8484 FLU IMMUNIZE NO ADMIN: HCPCS | Performed by: PODIATRIST

## 2024-10-21 NOTE — PROGRESS NOTES
Patient instructed to remove shoes and socks and instructed to sit in exam chair.  Current PCP is Trinity Rebollar MD and date of last visit was 04/05/2024.   Do you have a follow up visit scheduled?  No  If yes, the date is unknown      
report.   Patient to RTC in 6 weeks  Please, call the office with any questions or concerns.      Darrius White DPM   Podiatric Medicine & Surgery   10/21/2024 at 12:55 PM

## 2024-10-22 ENCOUNTER — HOSPITAL ENCOUNTER (OUTPATIENT)
Dept: SLEEP CENTER | Age: 64
Discharge: HOME | End: 2024-10-22
Payer: COMMERCIAL

## 2024-10-22 VITALS — WEIGHT: 293 LBS | BODY MASS INDEX: 45.99 KG/M2 | HEIGHT: 67 IN

## 2024-10-22 DIAGNOSIS — E66.01 MORBID OBESITY WITH BMI OF 45.0-49.9, ADULT: ICD-10-CM

## 2024-10-22 DIAGNOSIS — G47.33 OBSTRUCTIVE SLEEP APNEA SYNDROME: ICD-10-CM

## 2024-10-22 PROCEDURE — 95811 POLYSOM 6/>YRS CPAP 4/> PARM: CPT

## 2024-10-23 ENCOUNTER — HOSPITAL ENCOUNTER (OUTPATIENT)
Dept: SLEEP CENTER | Age: 64
Discharge: HOME | End: 2024-10-23
Payer: COMMERCIAL

## 2024-10-23 ENCOUNTER — HOSPITAL ENCOUNTER (OUTPATIENT)
Dept: GENERAL RADIOLOGY | Age: 64
Discharge: HOME | End: 2024-10-25
Payer: COMMERCIAL

## 2024-10-23 ENCOUNTER — HOSPITAL ENCOUNTER (OUTPATIENT)
Age: 64
Discharge: HOME | End: 2024-10-25
Payer: COMMERCIAL

## 2024-10-23 DIAGNOSIS — G47.429 NARCOLEPSY DUE TO UNDERLYING CONDITION WITHOUT CATAPLEXY: ICD-10-CM

## 2024-10-23 DIAGNOSIS — M79.5 FOREIGN BODY (FB) IN SOFT TISSUE: ICD-10-CM

## 2024-10-23 PROCEDURE — 73630 X-RAY EXAM OF FOOT: CPT

## 2024-10-23 PROCEDURE — 95805 MULTIPLE SLEEP LATENCY TEST: CPT

## 2024-10-23 NOTE — PROGRESS NOTES
Patient arrived for sleep testing. Testing explained, all questions answered, pt voices understanding.   Pt is here for a titration followed by an MSLT.  Mask is Simplus full face size small.   DME is [facility] in [place].

## 2024-10-24 LAB
STATUS: NORMAL
STATUS: NORMAL

## 2024-10-31 ENCOUNTER — TELEPHONE (OUTPATIENT)
Dept: PULMONOLOGY | Age: 64
End: 2024-10-31

## 2024-10-31 NOTE — TELEPHONE ENCOUNTER
Patient called in inquiring of sleep studies completed - read note from Thomas Youngblood CNP \"The MSLT indicates normal daytime alertness. The patient's daytime sleepiness is most likely a result of EMILIA, and continued use CPAP/BPAP is recommended to improve daytime alertness. The patient should maintain a consistent sleep/wake schedule and insure  adequate total sleep time per night.\".   Also read result note from 10/22 - \"the patient has a history of obstructive sleep apnea, which responded well to the use of CPAP.  A final pressure of 12 cm H2O is recommended\"    Patient states she consistently gets 7-8 hours of sleep each night and uses her CPAP every night.  She reports setting is at 12.   However she still gets sleepy during day.   She is asking if she gets sleepy/drowsy during day, should she put her CPAP on?   And if any other recommendations for plan of care?

## 2024-10-31 NOTE — TELEPHONE ENCOUNTER
Called patient, left voicemail that message will be in mychart or that she may call the office to further discuss.

## 2024-11-24 DIAGNOSIS — G89.29 CHRONIC BILATERAL LOW BACK PAIN, UNSPECIFIED WHETHER SCIATICA PRESENT: ICD-10-CM

## 2024-11-24 DIAGNOSIS — M54.50 CHRONIC BILATERAL LOW BACK PAIN, UNSPECIFIED WHETHER SCIATICA PRESENT: ICD-10-CM

## 2024-11-24 DIAGNOSIS — M51.369 DEGENERATIVE DISC DISEASE, LUMBAR: ICD-10-CM

## 2024-11-24 DIAGNOSIS — Z76.0 MEDICATION REFILL: ICD-10-CM

## 2024-11-25 RX ORDER — FOLIC ACID/MV,IRON,MIN/LUTEIN 0.4-18-25
1 TABLET ORAL DAILY
Qty: 30 TABLET | Refills: 3 | Status: SHIPPED | OUTPATIENT
Start: 2024-11-25

## 2024-11-25 RX ORDER — PSEUDOEPHED/ACETAMINOPH/DIPHEN 30MG-500MG
TABLET ORAL
Qty: 120 TABLET | Refills: 3 | Status: SHIPPED | OUTPATIENT
Start: 2024-11-25

## 2024-11-25 RX ORDER — CALCIUM CARBONATE/VITAMIN D3 600 MG-10
TABLET ORAL
Qty: 60 TABLET | Refills: 1 | Status: SHIPPED | OUTPATIENT
Start: 2024-11-25

## 2024-11-25 NOTE — TELEPHONE ENCOUNTER
Last visit: 4/5/24  Last Med refill: 7/24/24  Does patient have enough medication for 72 hours: no    Next Visit Date:  Future Appointments   Date Time Provider Department Center   11/26/2024 10:45 AM [facility] Myers [facility]   12/2/2024  9:00 AM [facility] -   12/20/2024 10:00 AM [facility] MAMMOGRAPHY ROOM AT [facility]   1/27/2025 12:15 PM Cordell Watt DPM [facility]   1/30/2025  9:00 AM [facility]   3/28/2025 10:00 AM Reno Lenz MD [facility]   4/7/2025 12:15 PM Ambrose Boss [facility]   4/16/2025 11:15 AM [facility]       Health Maintenance   Topic Date Due    Respiratory Syncytial Virus (RSV) Pregnant or age 60 yrs+ (1 - Risk 60-74 years 1-dose series) Never done    Diabetic retinal exam  03/06/2021    A1C test (Diabetic or Prediabetic)  08/15/2024    COVID-19 Vaccine (1 - 2023-24 season) Never done    Diabetic Alb to Cr ratio (uACR) test  01/09/2025 (Originally 1/2/2021)    GFR test (Diabetes, CKD 3-4, OR last GFR 15-59)  01/09/2025    Diabetic foot exam  04/05/2025    Lipids  04/05/2025    Depression Monitoring  04/05/2025    Breast cancer screen  12/20/2025    DTaP/Tdap/Td vaccine (2 - Td or Tdap) 06/01/2027    Colorectal Cancer Screen  12/09/2029    Flu vaccine  Completed    Shingles vaccine  Completed    Pneumococcal 0-64 years Vaccine  Completed    Hepatitis C screen  Completed    HIV screen  Completed    Hepatitis A vaccine  Aged Out    Hepatitis B vaccine  Aged Out    Hib vaccine  Aged Out    Polio vaccine  Aged Out    Meningococcal (ACWY) vaccine  Aged Out    Cervical cancer screen  Discontinued       Hemoglobin A1C (%)   Date Value   08/15/2023 5.5   09/02/2022 5.6   06/11/2021 5.5             ( goal A1C is < 7)   No components found for: \"LABMICR\"  No components found for: \"LDLCHOLESTEROL\", \"LDLCALC\"    (goal LDL is <100)   AST (U/L)

## 2024-12-02 ENCOUNTER — OFFICE VISIT (OUTPATIENT)
Dept: NEUROLOGY | Age: 64
End: 2024-12-02
Payer: COMMERCIAL

## 2024-12-02 VITALS
HEART RATE: 70 BPM | HEIGHT: 67 IN | SYSTOLIC BLOOD PRESSURE: 126 MMHG | BODY MASS INDEX: 45.99 KG/M2 | WEIGHT: 293 LBS | DIASTOLIC BLOOD PRESSURE: 73 MMHG | TEMPERATURE: 96.8 F | RESPIRATION RATE: 20 BRPM

## 2024-12-02 DIAGNOSIS — R41.3 MEMORY DEFICIT: Primary | ICD-10-CM

## 2024-12-02 PROCEDURE — G8484 FLU IMMUNIZE NO ADMIN: HCPCS | Performed by: NEUROMUSCULOSKELETAL MEDICINE, SPORTS MEDICINE

## 2024-12-02 PROCEDURE — G8417 CALC BMI ABV UP PARAM F/U: HCPCS | Performed by: NEUROMUSCULOSKELETAL MEDICINE, SPORTS MEDICINE

## 2024-12-02 PROCEDURE — 99212 OFFICE O/P EST SF 10 MIN: CPT | Performed by: NEUROMUSCULOSKELETAL MEDICINE, SPORTS MEDICINE

## 2024-12-02 PROCEDURE — G8427 DOCREV CUR MEDS BY ELIG CLIN: HCPCS | Performed by: NEUROMUSCULOSKELETAL MEDICINE, SPORTS MEDICINE

## 2024-12-02 PROCEDURE — 1036F TOBACCO NON-USER: CPT | Performed by: NEUROMUSCULOSKELETAL MEDICINE, SPORTS MEDICINE

## 2024-12-02 PROCEDURE — 3017F COLORECTAL CA SCREEN DOC REV: CPT | Performed by: NEUROMUSCULOSKELETAL MEDICINE, SPORTS MEDICINE

## 2024-12-02 NOTE — PROGRESS NOTES
NEUROLOGY follow-up.      Patient Name:  Kaci Banerjee  :   1960  Clinic Visit Date: 2024    I saw Ms. Kaci Banerjee  in the neurology clinic today.  64-year-old right-handed lady with bilateral glaucoma , legal blindness, borderline hyperglycemia, hyperlipidemia, hypothyroidism, was seen in the office today in follow-up of chronic intermittent memory lapses.  Doing okay.  She continues to experience intermittent minor memory lapses.  No confusion or any other significant neurological symptoms.  Workup in the past has included normal B12 levels, TSH level.    REVIEW OF SYSTEMS    Constitutional Weight changes: absent, change in appetite: absent Fatigue: absent;Fevers : absent, Any recent hospitalizations:  absent   HEENT Ears: normal,  Visual disturbance: absent   Respiratory Shortness of breath: absent, choking:  absent, Cough: absent, Snoring : absent   Cardiovascular Chest pain: absent, Leg swelling :absent, palpitations : absent, fainting : absent   GI Constipation: absent, Diarrhea: absent, Swallowing change: absent    Urinary frequency: absent, Urinary urgency: absent, Urinary incontinence: absent   Musculoskeletal Neck pain: absent, Back pain: absent, Stiffness: absent, Muscle pain: absent, Joint pain: absent, restless leg : absent   Dermatological Hair loss: absent, Skin changes: absent   Neurological Confusion: present, Trouble concentrating: absent, Seizures: absent;  Memory loss: present, balance problem: present, Dizziness: absent, vertigo: absent, Weakness: absent, Numbness present, Tremor: absent, Spasm: absent, involuntary movement: absent, Speech difficulty: absent, Headache: absent, Light sensitivity: absent   Psychiatric Anxiety: absent, Depression  absent, drug abuse: absent, Hallucination: absent, mood disorder: absent, Suicidal ideations absent   Hematologic Abnormal bleeding: absent, Anemia: absent, Lymph gland changes: absent Clotting disorder: absent     Past Medical

## 2024-12-02 NOTE — PATIENT INSTRUCTIONS
SURVEY:    Thank you for allowing us to care for you today.    You may be receiving a survey from [facility] regarding your visit today- electronically or via mail.      Please help us by completing the survey as this will provide the needed feedback to ensure we are providing the very best care for you and your family.    If you cannot score us a very good on any question, please call the office to discuss how we could have made your experience a very good one.    Thank you.       STAFF:    Bernadette ARNETT., Dayana BELLO, Griselda PENA CMA      CLINICAL STAFF:    Marielle SHERWOOD LPN, Doretha ROMAN LPN, Alee MCDONNELL LPN, Barbie ARNETT

## 2024-12-14 DIAGNOSIS — J45.20 MILD INTERMITTENT ASTHMA WITHOUT COMPLICATION: ICD-10-CM

## 2024-12-16 ENCOUNTER — HOSPITAL ENCOUNTER (OUTPATIENT)
Dept: NUCLEAR MEDICINE | Age: 64
Discharge: HOME | End: 2024-12-18
Attending: INTERNAL MEDICINE
Payer: COMMERCIAL

## 2024-12-16 ENCOUNTER — HOSPITAL ENCOUNTER (OUTPATIENT)
Age: 64
Discharge: HOME | End: 2024-12-16
Payer: COMMERCIAL

## 2024-12-16 ENCOUNTER — HOSPITAL ENCOUNTER (OUTPATIENT)
Age: 64
Discharge: HOME | End: 2024-12-18
Attending: INTERNAL MEDICINE
Payer: COMMERCIAL

## 2024-12-16 DIAGNOSIS — R94.31 ABNORMAL ELECTROCARDIOGRAPHY: ICD-10-CM

## 2024-12-16 DIAGNOSIS — R06.02 SHORTNESS OF BREATH: ICD-10-CM

## 2024-12-16 PROCEDURE — 3430000000 HC RX DIAGNOSTIC RADIOPHARMACEUTICAL: Performed by: INTERNAL MEDICINE

## 2024-12-16 PROCEDURE — A9500 TC99M SESTAMIBI: HCPCS | Performed by: INTERNAL MEDICINE

## 2024-12-16 PROCEDURE — 93017 CV STRESS TEST TRACING ONLY: CPT

## 2024-12-16 PROCEDURE — 93016 CV STRESS TEST SUPVJ ONLY: CPT | Performed by: INTERNAL MEDICINE

## 2024-12-16 PROCEDURE — 78452 HT MUSCLE IMAGE SPECT MULT: CPT | Performed by: INTERNAL MEDICINE

## 2024-12-16 PROCEDURE — 93005 ELECTROCARDIOGRAM TRACING: CPT | Performed by: INTERNAL MEDICINE

## 2024-12-16 PROCEDURE — 93018 CV STRESS TEST I&R ONLY: CPT | Performed by: INTERNAL MEDICINE

## 2024-12-16 PROCEDURE — 6360000002 HC RX W HCPCS: Performed by: FAMILY MEDICINE

## 2024-12-16 RX ORDER — TETRAKIS(2-METHOXYISOBUTYLISOCYANIDE)COPPER(I) TETRAFLUOROBORATE 1 MG/ML
30 INJECTION, POWDER, LYOPHILIZED, FOR SOLUTION INTRAVENOUS
Status: COMPLETED | OUTPATIENT
Start: 2024-12-16 | End: 2024-12-16

## 2024-12-16 RX ORDER — REGADENOSON 0.08 MG/ML
0.4 INJECTION, SOLUTION INTRAVENOUS
Status: DISCONTINUED | OUTPATIENT
Start: 2024-12-16 | End: 2024-12-19 | Stop reason: HOSPADM

## 2024-12-16 RX ORDER — ALBUTEROL SULFATE 90 UG/1
INHALANT RESPIRATORY (INHALATION)
Qty: 13.4 G | Refills: 2 | Status: SHIPPED | OUTPATIENT
Start: 2024-12-16

## 2024-12-16 RX ORDER — REGADENOSON 0.08 MG/ML
0.4 INJECTION, SOLUTION INTRAVENOUS
Status: COMPLETED | OUTPATIENT
Start: 2024-12-16 | End: 2024-12-16

## 2024-12-16 RX ADMIN — Medication 30 MILLICURIE: at 08:41

## 2024-12-16 RX ADMIN — REGADENOSON 0.4 MG: 0.08 INJECTION, SOLUTION INTRAVENOUS at 08:57

## 2024-12-16 NOTE — TELEPHONE ENCOUNTER
Last visit: 9/3/24-[facility]  Next visit: 4/7/25- Dr Boss in [place]--Patient previously seen Dr. Prince in [place].     Refill request for albuterol inhaler. Per last dictation, patient using albuterol. Last script sent 12/7/23 for 1 month supply and 9 refills. Please see pended script and advise.

## 2024-12-17 ENCOUNTER — HOSPITAL ENCOUNTER (OUTPATIENT)
Dept: NUCLEAR MEDICINE | Age: 64
Discharge: HOME | End: 2024-12-19
Attending: INTERNAL MEDICINE
Payer: COMMERCIAL

## 2024-12-17 ENCOUNTER — HOSPITAL ENCOUNTER (OUTPATIENT)
Age: 64
Discharge: HOME | End: 2024-12-19
Attending: INTERNAL MEDICINE
Payer: COMMERCIAL

## 2024-12-17 VITALS
WEIGHT: 293 LBS | DIASTOLIC BLOOD PRESSURE: 73 MMHG | BODY MASS INDEX: 45.99 KG/M2 | HEIGHT: 67 IN | SYSTOLIC BLOOD PRESSURE: 126 MMHG

## 2024-12-17 DIAGNOSIS — R06.02 SHORTNESS OF BREATH: ICD-10-CM

## 2024-12-17 LAB
ECHO AO ASC DIAM: 2.8 CM
ECHO AO ASCENDING AORTA INDEX: 1.15 CM/M2
ECHO AO SINUS VALSALVA DIAM: 2.7 CM
ECHO AO SINUS VALSALVA INDEX: 1.11 CM/M2
ECHO AO ST JNCT DIAM: 2.2 CM
ECHO AV CUSP MM: 1.6 CM
ECHO AV MEAN GRADIENT: 5 MMHG
ECHO AV MEAN VELOCITY: 1 M/S
ECHO AV PEAK GRADIENT: 9 MMHG
ECHO AV PEAK VELOCITY: 1.5 M/S
ECHO AV VELOCITY RATIO: 0.73
ECHO AV VTI: 32.7 CM
ECHO BSA: 2.58 M2
ECHO EST RA PRESSURE: 3 MMHG
ECHO LA AREA 2C: 16.2 CM2
ECHO LA AREA 4C: 20.8 CM2
ECHO LA MAJOR AXIS: 6 CM
ECHO LA MINOR AXIS: 5.8 CM
ECHO LA VOL BP: 46 ML (ref 22–52)
ECHO LA VOL MOD A2C: 37 ML (ref 22–52)
ECHO LA VOL MOD A4C: 56 ML (ref 22–52)
ECHO LA VOL/BSA BIPLANE: 19 ML/M2 (ref 16–34)
ECHO LA VOLUME INDEX MOD A2C: 15 ML/M2 (ref 16–34)
ECHO LA VOLUME INDEX MOD A4C: 23 ML/M2 (ref 16–34)
ECHO LV E' LATERAL VELOCITY: 10.2 CM/S
ECHO LV EDV A2C: 56 ML
ECHO LV EDV A4C: 57 ML
ECHO LV EDV INDEX A4C: 23 ML/M2
ECHO LV EDV NDEX A2C: 23 ML/M2
ECHO LV EJECTION FRACTION A2C: 68 %
ECHO LV EJECTION FRACTION A4C: 63 %
ECHO LV EJECTION FRACTION BIPLANE: 66 % (ref 55–100)
ECHO LV ESV A2C: 18 ML
ECHO LV ESV A4C: 21 ML
ECHO LV ESV INDEX A2C: 7 ML/M2
ECHO LV ESV INDEX A4C: 9 ML/M2
ECHO LV FRACTIONAL SHORTENING: 42 % (ref 28–44)
ECHO LV INTERNAL DIMENSION DIASTOLE INDEX: 1.84 CM/M2
ECHO LV INTERNAL DIMENSION DIASTOLIC: 4.5 CM (ref 3.9–5.3)
ECHO LV INTERNAL DIMENSION SYSTOLIC INDEX: 1.07 CM/M2
ECHO LV INTERNAL DIMENSION SYSTOLIC: 2.6 CM
ECHO LV IVSD: 1 CM (ref 0.6–0.9)
ECHO LV MASS 2D: 153.3 G (ref 67–162)
ECHO LV MASS INDEX 2D: 62.8 G/M2 (ref 43–95)
ECHO LV POSTERIOR WALL DIASTOLIC: 1 CM (ref 0.6–0.9)
ECHO LV RELATIVE WALL THICKNESS RATIO: 0.44
ECHO LVOT AV VTI INDEX: 0.79
ECHO LVOT MEAN GRADIENT: 3 MMHG
ECHO LVOT PEAK GRADIENT: 5 MMHG
ECHO LVOT PEAK VELOCITY: 1.1 M/S
ECHO LVOT VTI: 25.9 CM
ECHO MV A VELOCITY: 1 M/S
ECHO MV E DECELERATION TIME (DT): 195 MS
ECHO MV E VELOCITY: 0.85 M/S
ECHO MV E/A RATIO: 0.85
ECHO MV E/E' LATERAL: 8.33
ECHO PV MAX VELOCITY: 0.9 M/S
ECHO PV PEAK GRADIENT: 3 MMHG
ECHO RIGHT VENTRICULAR SYSTOLIC PRESSURE (RVSP): 21 MMHG
ECHO TV REGURGITANT MAX VELOCITY: 2.12 M/S
ECHO TV REGURGITANT PEAK GRADIENT: 18 MMHG
EKG ATRIAL RATE: 69 BPM
EKG P AXIS: 43 DEGREES
EKG P-R INTERVAL: 168 MS
EKG Q-T INTERVAL: 440 MS
EKG QRS DURATION: 88 MS
EKG QTC CALCULATION (BAZETT): 471 MS
EKG R AXIS: 9 DEGREES
EKG T AXIS: 9 DEGREES
EKG VENTRICULAR RATE: 69 BPM
STRESS BASELINE DIAS BP: 70 MMHG
STRESS BASELINE HR: 62 BPM
STRESS BASELINE SYS BP: 126 MMHG
STRESS ESTIMATED WORKLOAD: 1 METS
STRESS PEAK DIAS BP: 80 MMHG
STRESS PEAK SYS BP: 152 MMHG
STRESS PERCENT HR ACHIEVED: 67 %
STRESS POST PEAK HR: 104 BPM
STRESS RATE PRESSURE PRODUCT: NORMAL BPM*MMHG
STRESS TARGET HR: 156 BPM
TID: 1.41

## 2024-12-17 PROCEDURE — 3430000000 HC RX DIAGNOSTIC RADIOPHARMACEUTICAL: Performed by: INTERNAL MEDICINE

## 2024-12-17 PROCEDURE — 93306 TTE W/DOPPLER COMPLETE: CPT

## 2024-12-17 PROCEDURE — A9500 TC99M SESTAMIBI: HCPCS | Performed by: INTERNAL MEDICINE

## 2024-12-17 PROCEDURE — 93306 TTE W/DOPPLER COMPLETE: CPT | Performed by: FAMILY MEDICINE

## 2024-12-17 PROCEDURE — 93010 ELECTROCARDIOGRAM REPORT: CPT | Performed by: FAMILY MEDICINE

## 2024-12-17 RX ORDER — TETRAKIS(2-METHOXYISOBUTYLISOCYANIDE)COPPER(I) TETRAFLUOROBORATE 1 MG/ML
30 INJECTION, POWDER, LYOPHILIZED, FOR SOLUTION INTRAVENOUS
Status: COMPLETED | OUTPATIENT
Start: 2024-12-17 | End: 2024-12-17

## 2024-12-17 RX ADMIN — Medication 30 MILLICURIE: at 12:02

## 2025-01-02 ENCOUNTER — TELEPHONE (OUTPATIENT)
Dept: FAMILY MEDICINE CLINIC | Age: 65
End: 2025-01-02

## 2025-01-02 NOTE — TELEPHONE ENCOUNTER
Patient called office in regards to reoccurring burining with urination. Patient was seen on 12-21 with beatrice in [place] for this and was provided medication. Patient stated is now back again. Patient would need 48 hour notice to get transportation. Writer offered acute care appt for Monday 6th, 2025. Patient is scheduled. Writer informed could go to a urgent/walk in clinic if need to over the weekend as well. As patient declined appt today due to transportation.

## 2025-01-06 ENCOUNTER — HOSPITAL ENCOUNTER (OUTPATIENT)
Age: 65
Setting detail: SPECIMEN
Discharge: HOME | End: 2025-01-06

## 2025-01-06 ENCOUNTER — OFFICE VISIT (OUTPATIENT)
Dept: FAMILY MEDICINE CLINIC | Age: 65
End: 2025-01-06
Payer: COMMERCIAL

## 2025-01-06 VITALS
DIASTOLIC BLOOD PRESSURE: 73 MMHG | SYSTOLIC BLOOD PRESSURE: 139 MMHG | HEIGHT: 67 IN | HEART RATE: 75 BPM | BODY MASS INDEX: 45.99 KG/M2 | WEIGHT: 293 LBS

## 2025-01-06 DIAGNOSIS — R94.39 ABNORMAL STRESS TEST: ICD-10-CM

## 2025-01-06 DIAGNOSIS — K43.9 VENTRAL HERNIA WITHOUT OBSTRUCTION OR GANGRENE: ICD-10-CM

## 2025-01-06 DIAGNOSIS — E11.9 TYPE 2 DIABETES MELLITUS WITHOUT COMPLICATION, WITHOUT LONG-TERM CURRENT USE OF INSULIN (HCC): ICD-10-CM

## 2025-01-06 DIAGNOSIS — R30.0 BURNING WITH URINATION: Primary | ICD-10-CM

## 2025-01-06 DIAGNOSIS — R30.0 BURNING WITH URINATION: ICD-10-CM

## 2025-01-06 LAB
ANION GAP SERPL CALCULATED.3IONS-SCNC: 13 MMOL/L (ref 9–16)
BILIRUBIN, POC: NORMAL
BLOOD URINE, POC: NORMAL
BUN SERPL-MCNC: 16 MG/DL (ref 8–23)
CALCIUM SERPL-MCNC: 9.7 MG/DL (ref 8.6–10.4)
CANDIDA SPECIES: NEGATIVE
CHLORIDE SERPL-SCNC: 103 MMOL/L (ref 98–107)
CLARITY, POC: CLEAR
CO2 SERPL-SCNC: 26 MMOL/L (ref 20–31)
COLOR, POC: YELLOW
CREAT SERPL-MCNC: 0.8 MG/DL (ref 0.6–0.9)
ERYTHROCYTE [DISTWIDTH] IN BLOOD BY AUTOMATED COUNT: 12.5 % (ref 11.8–14.4)
GARDNERELLA VAGINALIS: NEGATIVE
GFR, ESTIMATED: 82 ML/MIN/1.73M2
GLUCOSE SERPL-MCNC: 96 MG/DL (ref 74–99)
GLUCOSE URINE, POC: NORMAL MG/DL
HBA1C MFR BLD: 5.4 %
HCT VFR BLD AUTO: 43.4 % (ref 36.3–47.1)
HGB BLD-MCNC: 14.1 G/DL (ref 11.9–15.1)
KETONES, POC: NORMAL MG/DL
LEUKOCYTE EST, POC: NORMAL
MCH RBC QN AUTO: 31.1 PG (ref 25.2–33.5)
MCHC RBC AUTO-ENTMCNC: 32.5 G/DL (ref 28.4–34.8)
MCV RBC AUTO: 95.6 FL (ref 82.6–102.9)
NITRITE, POC: NORMAL
NRBC BLD-RTO: 0 PER 100 WBC
PH, POC: 6
PLATELET # BLD AUTO: 225 K/UL (ref 138–453)
PMV BLD AUTO: 10.6 FL (ref 8.1–13.5)
POTASSIUM SERPL-SCNC: 4 MMOL/L (ref 3.7–5.3)
PROTEIN, POC: NORMAL MG/DL
RBC # BLD AUTO: 4.54 M/UL (ref 3.95–5.11)
SODIUM SERPL-SCNC: 142 MMOL/L (ref 136–145)
SOURCE: NORMAL
SPECIFIC GRAVITY, POC: 1.02
TRICHOMONAS: NEGATIVE
UROBILINOGEN, POC: 0.2 MG/DL
WBC OTHER # BLD: 8.5 K/UL (ref 3.5–11.3)

## 2025-01-06 PROCEDURE — 81002 URINALYSIS NONAUTO W/O SCOPE: CPT

## 2025-01-06 PROCEDURE — 3017F COLORECTAL CA SCREEN DOC REV: CPT

## 2025-01-06 PROCEDURE — 83036 HEMOGLOBIN GLYCOSYLATED A1C: CPT

## 2025-01-06 PROCEDURE — 99213 OFFICE O/P EST LOW 20 MIN: CPT

## 2025-01-06 PROCEDURE — 2022F DILAT RTA XM EVC RTNOPTHY: CPT

## 2025-01-06 PROCEDURE — G8417 CALC BMI ABV UP PARAM F/U: HCPCS

## 2025-01-06 PROCEDURE — 3044F HG A1C LEVEL LT 7.0%: CPT

## 2025-01-06 PROCEDURE — 1036F TOBACCO NON-USER: CPT

## 2025-01-06 PROCEDURE — G8427 DOCREV CUR MEDS BY ELIG CLIN: HCPCS

## 2025-01-06 RX ORDER — PHENAZOPYRIDINE HYDROCHLORIDE 100 MG/1
100 TABLET, FILM COATED ORAL 3 TIMES DAILY PRN
Qty: 30 TABLET | Refills: 0 | Status: SHIPPED | OUTPATIENT
Start: 2025-01-06 | End: 2025-01-21

## 2025-01-06 ASSESSMENT — PATIENT HEALTH QUESTIONNAIRE - PHQ9
8. MOVING OR SPEAKING SO SLOWLY THAT OTHER PEOPLE COULD HAVE NOTICED. OR THE OPPOSITE, BEING SO FIGETY OR RESTLESS THAT YOU HAVE BEEN MOVING AROUND A LOT MORE THAN USUAL: NOT AT ALL
3. TROUBLE FALLING OR STAYING ASLEEP: NOT AT ALL
SUM OF ALL RESPONSES TO PHQ9 QUESTIONS 1 & 2: 0
7. TROUBLE CONCENTRATING ON THINGS, SUCH AS READING THE NEWSPAPER OR WATCHING TELEVISION: NOT AT ALL
SUM OF ALL RESPONSES TO PHQ QUESTIONS 1-9: 0
9. THOUGHTS THAT YOU WOULD BE BETTER OFF DEAD, OR OF HURTING YOURSELF: NOT AT ALL
10. IF YOU CHECKED OFF ANY PROBLEMS, HOW DIFFICULT HAVE THESE PROBLEMS MADE IT FOR YOU TO DO YOUR WORK, TAKE CARE OF THINGS AT HOME, OR GET ALONG WITH OTHER PEOPLE: NOT DIFFICULT AT ALL
4. FEELING TIRED OR HAVING LITTLE ENERGY: NOT AT ALL
2. FEELING DOWN, DEPRESSED OR HOPELESS: NOT AT ALL
SUM OF ALL RESPONSES TO PHQ QUESTIONS 1-9: 0
SUM OF ALL RESPONSES TO PHQ QUESTIONS 1-9: 0
1. LITTLE INTEREST OR PLEASURE IN DOING THINGS: NOT AT ALL
SUM OF ALL RESPONSES TO PHQ QUESTIONS 1-9: 0
6. FEELING BAD ABOUT YOURSELF - OR THAT YOU ARE A FAILURE OR HAVE LET YOURSELF OR YOUR FAMILY DOWN: NOT AT ALL
5. POOR APPETITE OR OVEREATING: NOT AT ALL

## 2025-01-06 NOTE — PATIENT INSTRUCTIONS
Thank you for letting us take care of you today. We hope all your questions were addressed. If a question was overlooked or something else comes to mind after you return home, please contact a member of your Care Team listed below.      Your Care Team at [facility] is Team #1  Giselle Zaragoza M.D. (Faculty)  Bessy Hernandez M.D. (Resident)  Sona Ortega D.O. (Resident)  Johnathan Vasquez M.D. (Resident)  Laly Rodriguez M.D. (Resident)  Adriana Lopez, [facility]  Ryan Rice, [facility]  Lila Zambrano, [facility]  Guera Huggins, [facility]  Yumiko Davis, [facility]  Yazmin Basilio, [facility]  Yuri Hanley (LJ) Javier,   Maryjo Burton [facility] (Clinical Pharmacist)     Office phone number: [phone]    If you need to get in right away due to illness, please be advised we have \"Same Day\" appointments available Monday-Friday. Please call us at [phone] option #3 to schedule your \"Same Day\" appointment.

## 2025-01-06 NOTE — PROGRESS NOTES
Expand All Collapse All  Subjective:    Kaci Banerjee is a 64 y.o. female with  has a past medical history of Abdominal bloating, Anxiety, Bilateral edema of lower extremity, Bronchial asthma, Carpal tunnel syndrome, Cataract, Chronic back pain, Chronic sinusitis, Degenerative disc disease, lumbar, Depression, Diabetes mellitus due to underlying condition with hyperosmolarity without coma (HCC), Dizziness, DJD (degenerative joint disease), Dysphagia, Edema of leg, Environmental allergies, Frozen shoulder, GERD (gastroesophageal reflux disease), Glaucoma, Glucose intolerance (impaired glucose tolerance), Headache(784.0), History of bronchitis, HTN (hypertension), Hyperlipidemia, Hypothyroid, Hypothyroidism, IBS (irritable bowel syndrome), Legally blind, Medication refill, Mild intermittent asthma without complication, Morbid obesity with BMI of 45.0-49.9, adult, MVP (mitral valve prolapse), Neuropathy, OA (osteoarthritis), EMILIA on CPAP, Osteopenia, Pancreatic cyst, Polyneuropathy, Raynaud disease, Rhinopharyngitis, Skin lesion of left leg, Skin tag, Stress fracture, Syncope, TIA (transient ischemic attack), Urinary incontinence, Varicose vein of leg, Vasodepressor syncope, and Wears glasses.     Presented to the office today for:       Chief Complaint   Patient presents with    Urinary Pain       Burning with urination          64yoF patient presenting to clinic with dysuria and polyuria. Patient stated that symptoms of dysuria and polyuria initially started on 12/18, which she described severe with 8/10 pain and did not resolve and lead her to present to [facility] ED on 12/21. UA was done and she was dx with a UTI for which she was prescribed abxs. Symptoms resolved with trx, but returned on 12/27 after the course of her abx were finished. She presented to [facility] again for dysuria and polyuria on 12/27. UA was unremarkable and CT was negative for stones. She was given another course of abx and her symptoms 
Attending Physician Statement  I  have discussed the care of Kaci Banerjee including pertinent history and exam findings with the resident. I agree with the assessment, plan and orders as documented by the resident.      /73 (Site: Right Lower Arm, Position: Sitting, Cuff Size: Medium Adult)   Pulse 75   Ht 1.702 m (5' 7.01\")   Wt (!) 140.2 kg (309 lb)   BMI 48.38 kg/m²    BP Readings from Last 3 Encounters:   01/06/25 139/73   12/17/24 126/73   12/02/24 126/73     Wt Readings from Last 3 Encounters:   01/06/25 (!) 140.2 kg (309 lb)   12/17/24 (!) 141.1 kg (311 lb 1.1 oz)   12/02/24 (!) 141.1 kg (311 lb)          Diagnosis Orders   1. Burning with urination  POCT Urinalysis Dipstick no Micro    Vaginitis DNA Probe    phenazopyridine (PYRIDIUM) 100 MG tablet      2. Type 2 diabetes mellitus without complication, without long-term current use of insulin ([facility])  POCT glycosylated hemoglobin (Hb A1C)      3. Ventral hernia without obstruction or gangrene  Anson Wheatley IV, DO, General Surgery, [facility]        Addressed dysuria           Misael Leal MD 1/6/2025 2:49 PM      
Visit Information    Have you changed or started any medications since your last visit including any over-the-counter medicines, vitamins, or herbal medicines? no   Are you having any side effects from any of your medications? -  no  Have you stopped taking any of your medications? Is so, why? -  no    Have you seen any other physician or provider since your last visit? Yes - Records Obtained  Have you had any other diagnostic tests since your last visit? Yes - Records Obtained  Have you been seen in the emergency room and/or had an admission to a hospital since we last saw you? Yes - Records Obtained [facility]  Have you had your routine dental cleaning in the past 6 months? yes - 11/18/2024    Have you activated your [facility] account? If not, what are your barriers? Yes     Patient Care Team:  Trinity Rebollar MD as PCP - General (Family Medicine)  Bridger Toure DO as Surgeon (Orthopedic Surgery)  Ronaldo Barney MD as Consulting Physician (Pulmonology)  Dante Franco MD as Consulting Physician  Berenice Laboy MD as Consulting Physician (Endocrinology)  Gaurav Ayala MD as Consulting Physician (Obstetrics & Gynecology)  Reno Lenz MD as Consulting Physician (Gastroenterology)  Reno Lenz MD as Consulting Physician (Gastroenterology)  Shahzad Myers MD as Cardiologist (Cardiology)  Thomas Youngblood APRN - CNP as Nurse Practitioner (Pulmonology)    Medical History Review  Past Medical, Family, and Social History reviewed and does not contribute to the patient presenting condition    Health Maintenance   Topic Date Due    Respiratory Syncytial Virus (RSV) Pregnant or age 60 yrs+ (1 - Risk 60-74 years 1-dose series) Never done    Diabetic retinal exam  03/06/2021    A1C test (Diabetic or Prediabetic)  08/15/2024    COVID-19 Vaccine (1 - 2023-24 season) Never done    GFR test (Diabetes, CKD 3-4, OR last GFR 15-59)  01/09/2025    Diabetic Alb to Cr ratio (uACR) test  01/09/2025 
addressed.        All patient questions answered.  Pt voiced understanding.     Return in about 17 days (around 1/23/2025) for Urinary frequency and burning .        Disclaimer: Some orall of this note was transcribed using voice-recognition software.This may cause typographical errors occasionally. Although all effort is made to fix these errors, please do not hesitate to contact our office if there isany concern with the understanding of this note.

## 2025-01-07 ENCOUNTER — TELEPHONE (OUTPATIENT)
Dept: FAMILY MEDICINE CLINIC | Age: 65
End: 2025-01-07

## 2025-01-07 DIAGNOSIS — N34.3 DYSURIA-FREQUENCY SYNDROME: Primary | ICD-10-CM

## 2025-01-07 NOTE — TELEPHONE ENCOUNTER
Patient says she received a message from Dr. Hernandez to return her call. Writer unable to locate message or why patient was called. Please advise.

## 2025-01-07 NOTE — TELEPHONE ENCOUNTER
As discussed with patient in the clinic if BV is negative , will refer to urology due to chronic pelvic pain and dysuria. In the setting of negative UA for UTI.    Bessy Hernandez MD  Family medicine resident, PGY3  1/7/2025 at 12:00 PM

## 2025-01-17 ENCOUNTER — HOSPITAL ENCOUNTER (INPATIENT)
Age: 65
LOS: 4 days | Discharge: HOME | DRG: 191 | End: 2025-01-21
Attending: INTERNAL MEDICINE | Admitting: STUDENT IN AN ORGANIZED HEALTH CARE EDUCATION/TRAINING PROGRAM
Payer: COMMERCIAL

## 2025-01-17 DIAGNOSIS — I20.0 UNSTABLE ANGINA (HCC): ICD-10-CM

## 2025-01-17 LAB — GLUCOSE BLD-MCNC: 110 MG/DL (ref 65–105)

## 2025-01-17 PROCEDURE — 2060000000 HC ICU INTERMEDIATE R&B

## 2025-01-17 PROCEDURE — 82947 ASSAY GLUCOSE BLOOD QUANT: CPT

## 2025-01-17 RX ORDER — ONDANSETRON 2 MG/ML
4 INJECTION INTRAMUSCULAR; INTRAVENOUS EVERY 6 HOURS PRN
Status: DISCONTINUED | OUTPATIENT
Start: 2025-01-17 | End: 2025-01-21 | Stop reason: HOSPADM

## 2025-01-17 RX ORDER — ONDANSETRON 4 MG/1
4 TABLET, ORALLY DISINTEGRATING ORAL EVERY 8 HOURS PRN
Status: DISCONTINUED | OUTPATIENT
Start: 2025-01-17 | End: 2025-01-21 | Stop reason: HOSPADM

## 2025-01-17 RX ORDER — ASPIRIN 81 MG/1
81 TABLET, CHEWABLE ORAL DAILY
Status: DISCONTINUED | OUTPATIENT
Start: 2025-01-18 | End: 2025-01-21 | Stop reason: HOSPADM

## 2025-01-17 RX ORDER — ACETAMINOPHEN 650 MG/1
650 SUPPOSITORY RECTAL EVERY 6 HOURS PRN
Status: DISCONTINUED | OUTPATIENT
Start: 2025-01-17 | End: 2025-01-21 | Stop reason: HOSPADM

## 2025-01-17 RX ORDER — DEXTROSE MONOHYDRATE 100 MG/ML
INJECTION, SOLUTION INTRAVENOUS CONTINUOUS PRN
Status: DISCONTINUED | OUTPATIENT
Start: 2025-01-17 | End: 2025-01-21 | Stop reason: HOSPADM

## 2025-01-17 RX ORDER — ENOXAPARIN SODIUM 100 MG/ML
30 INJECTION SUBCUTANEOUS 2 TIMES DAILY
Status: DISCONTINUED | OUTPATIENT
Start: 2025-01-18 | End: 2025-01-18

## 2025-01-17 RX ORDER — INSULIN LISPRO 100 [IU]/ML
0-4 INJECTION, SOLUTION INTRAVENOUS; SUBCUTANEOUS EVERY 6 HOURS SCHEDULED
Status: DISCONTINUED | OUTPATIENT
Start: 2025-01-18 | End: 2025-01-21 | Stop reason: HOSPADM

## 2025-01-17 RX ORDER — NITROGLYCERIN 0.4 MG/1
0.4 TABLET SUBLINGUAL EVERY 5 MIN PRN
Status: DISCONTINUED | OUTPATIENT
Start: 2025-01-17 | End: 2025-01-21 | Stop reason: HOSPADM

## 2025-01-17 RX ORDER — SODIUM CHLORIDE 0.9 % (FLUSH) 0.9 %
10 SYRINGE (ML) INJECTION PRN
Status: DISCONTINUED | OUTPATIENT
Start: 2025-01-17 | End: 2025-01-21 | Stop reason: HOSPADM

## 2025-01-17 RX ORDER — MAGNESIUM SULFATE 1 G/100ML
1000 INJECTION INTRAVENOUS PRN
Status: DISCONTINUED | OUTPATIENT
Start: 2025-01-17 | End: 2025-01-21 | Stop reason: HOSPADM

## 2025-01-17 RX ORDER — POTASSIUM CHLORIDE 1500 MG/1
40 TABLET, EXTENDED RELEASE ORAL PRN
Status: DISCONTINUED | OUTPATIENT
Start: 2025-01-17 | End: 2025-01-21 | Stop reason: HOSPADM

## 2025-01-17 RX ORDER — MORPHINE SULFATE 2 MG/ML
2 INJECTION, SOLUTION INTRAMUSCULAR; INTRAVENOUS EVERY 4 HOURS PRN
Status: DISCONTINUED | OUTPATIENT
Start: 2025-01-17 | End: 2025-01-21 | Stop reason: HOSPADM

## 2025-01-17 RX ORDER — ACETAMINOPHEN 325 MG/1
650 TABLET ORAL EVERY 6 HOURS PRN
Status: DISCONTINUED | OUTPATIENT
Start: 2025-01-17 | End: 2025-01-21 | Stop reason: HOSPADM

## 2025-01-17 RX ORDER — GLUCAGON 1 MG/ML
1 KIT INJECTION PRN
Status: DISCONTINUED | OUTPATIENT
Start: 2025-01-17 | End: 2025-01-21 | Stop reason: HOSPADM

## 2025-01-17 RX ORDER — SODIUM CHLORIDE 9 MG/ML
INJECTION, SOLUTION INTRAVENOUS PRN
Status: DISCONTINUED | OUTPATIENT
Start: 2025-01-17 | End: 2025-01-21 | Stop reason: HOSPADM

## 2025-01-17 RX ORDER — POTASSIUM CHLORIDE 7.45 MG/ML
10 INJECTION INTRAVENOUS PRN
Status: DISCONTINUED | OUTPATIENT
Start: 2025-01-17 | End: 2025-01-21 | Stop reason: HOSPADM

## 2025-01-17 RX ORDER — SODIUM CHLORIDE 0.9 % (FLUSH) 0.9 %
5-40 SYRINGE (ML) INJECTION EVERY 12 HOURS SCHEDULED
Status: DISCONTINUED | OUTPATIENT
Start: 2025-01-17 | End: 2025-01-21 | Stop reason: HOSPADM

## 2025-01-17 ASSESSMENT — PAIN SCALES - GENERAL: PAINLEVEL_OUTOF10: 3

## 2025-01-18 PROBLEM — R94.39 POSITIVE CARDIAC STRESS TEST: Status: ACTIVE | Noted: 2025-01-18

## 2025-01-18 LAB
ANION GAP SERPL CALCULATED.3IONS-SCNC: 10 MMOL/L (ref 9–16)
ANTI-XA UNFRAC HEPARIN: 0.12 IU/L
ANTI-XA UNFRAC HEPARIN: <0.1 IU/L
BUN SERPL-MCNC: 13 MG/DL (ref 8–23)
CALCIUM SERPL-MCNC: 9.3 MG/DL (ref 8.6–10.4)
CHLORIDE SERPL-SCNC: 107 MMOL/L (ref 98–107)
CHOLEST SERPL-MCNC: 115 MG/DL (ref 0–199)
CHOLESTEROL/HDL RATIO: 2.7
CO2 SERPL-SCNC: 26 MMOL/L (ref 20–31)
CREAT SERPL-MCNC: 0.7 MG/DL (ref 0.6–0.9)
ERYTHROCYTE [DISTWIDTH] IN BLOOD BY AUTOMATED COUNT: 12.3 % (ref 11.8–14.4)
ERYTHROCYTE [DISTWIDTH] IN BLOOD BY AUTOMATED COUNT: 12.3 % (ref 11.8–14.4)
EST. AVERAGE GLUCOSE BLD GHB EST-MCNC: 105 MG/DL
GFR, ESTIMATED: >90 ML/MIN/1.73M2
GLUCOSE BLD-MCNC: 112 MG/DL (ref 65–105)
GLUCOSE BLD-MCNC: 113 MG/DL (ref 65–105)
GLUCOSE BLD-MCNC: 114 MG/DL (ref 65–105)
GLUCOSE BLD-MCNC: 145 MG/DL (ref 65–105)
GLUCOSE SERPL-MCNC: 111 MG/DL (ref 74–99)
HBA1C MFR BLD: 5.3 % (ref 4–6)
HCT VFR BLD AUTO: 40.4 % (ref 36.3–47.1)
HCT VFR BLD AUTO: 43.7 % (ref 36.3–47.1)
HDLC SERPL-MCNC: 42 MG/DL
HGB BLD-MCNC: 13.3 G/DL (ref 11.9–15.1)
HGB BLD-MCNC: 13.6 G/DL (ref 11.9–15.1)
INR PPP: 1
LDLC SERPL CALC-MCNC: 56 MG/DL (ref 0–100)
MAGNESIUM SERPL-MCNC: 2 MG/DL (ref 1.6–2.4)
MCH RBC QN AUTO: 30.2 PG (ref 25.2–33.5)
MCH RBC QN AUTO: 30.4 PG (ref 25.2–33.5)
MCHC RBC AUTO-ENTMCNC: 31.1 G/DL (ref 28.4–34.8)
MCHC RBC AUTO-ENTMCNC: 32.9 G/DL (ref 28.4–34.8)
MCV RBC AUTO: 92.2 FL (ref 82.6–102.9)
MCV RBC AUTO: 96.9 FL (ref 82.6–102.9)
NRBC BLD-RTO: 0 PER 100 WBC
NRBC BLD-RTO: 0 PER 100 WBC
PARTIAL THROMBOPLASTIN TIME: 26.3 SEC (ref 23–36.5)
PLATELET # BLD AUTO: 174 K/UL (ref 138–453)
PLATELET # BLD AUTO: 186 K/UL (ref 138–453)
PMV BLD AUTO: 10.1 FL (ref 8.1–13.5)
PMV BLD AUTO: 10.4 FL (ref 8.1–13.5)
POTASSIUM SERPL-SCNC: 4.3 MMOL/L (ref 3.7–5.3)
PROTHROMBIN TIME: 13.2 SEC (ref 11.7–14.9)
RBC # BLD AUTO: 4.38 M/UL (ref 3.95–5.11)
RBC # BLD AUTO: 4.51 M/UL (ref 3.95–5.11)
SODIUM SERPL-SCNC: 143 MMOL/L (ref 136–145)
TRIGL SERPL-MCNC: 83 MG/DL
TROPONIN I SERPL HS-MCNC: <6 NG/L (ref 0–14)
VLDLC SERPL CALC-MCNC: 17 MG/DL (ref 1–30)
WBC OTHER # BLD: 7.1 K/UL (ref 3.5–11.3)
WBC OTHER # BLD: 7.1 K/UL (ref 3.5–11.3)

## 2025-01-18 PROCEDURE — 6370000000 HC RX 637 (ALT 250 FOR IP): Performed by: NURSE PRACTITIONER

## 2025-01-18 PROCEDURE — 93005 ELECTROCARDIOGRAM TRACING: CPT | Performed by: INTERNAL MEDICINE

## 2025-01-18 PROCEDURE — 83036 HEMOGLOBIN GLYCOSYLATED A1C: CPT

## 2025-01-18 PROCEDURE — 6370000000 HC RX 637 (ALT 250 FOR IP)

## 2025-01-18 PROCEDURE — 85730 THROMBOPLASTIN TIME PARTIAL: CPT

## 2025-01-18 PROCEDURE — 2060000000 HC ICU INTERMEDIATE R&B

## 2025-01-18 PROCEDURE — 85610 PROTHROMBIN TIME: CPT

## 2025-01-18 PROCEDURE — 83735 ASSAY OF MAGNESIUM: CPT

## 2025-01-18 PROCEDURE — 82947 ASSAY GLUCOSE BLOOD QUANT: CPT

## 2025-01-18 PROCEDURE — 85027 COMPLETE CBC AUTOMATED: CPT

## 2025-01-18 PROCEDURE — 80048 BASIC METABOLIC PNL TOTAL CA: CPT

## 2025-01-18 PROCEDURE — 2500000003 HC RX 250 WO HCPCS: Performed by: NURSE PRACTITIONER

## 2025-01-18 PROCEDURE — 99254 IP/OBS CNSLTJ NEW/EST MOD 60: CPT | Performed by: INTERNAL MEDICINE

## 2025-01-18 PROCEDURE — 99222 1ST HOSP IP/OBS MODERATE 55: CPT | Performed by: NURSE PRACTITIONER

## 2025-01-18 PROCEDURE — 84484 ASSAY OF TROPONIN QUANT: CPT

## 2025-01-18 PROCEDURE — 6360000002 HC RX W HCPCS: Performed by: NURSE PRACTITIONER

## 2025-01-18 PROCEDURE — 36415 COLL VENOUS BLD VENIPUNCTURE: CPT

## 2025-01-18 PROCEDURE — 85520 HEPARIN ASSAY: CPT

## 2025-01-18 PROCEDURE — 80061 LIPID PANEL: CPT

## 2025-01-18 RX ORDER — VITAMIN B COMPLEX
2000 TABLET ORAL DAILY
Status: DISCONTINUED | OUTPATIENT
Start: 2025-01-18 | End: 2025-01-21 | Stop reason: HOSPADM

## 2025-01-18 RX ORDER — M-VIT,TX,IRON,MINS/CALC/FOLIC 27MG-0.4MG
1 TABLET ORAL DAILY
Status: DISCONTINUED | OUTPATIENT
Start: 2025-01-18 | End: 2025-01-21 | Stop reason: HOSPADM

## 2025-01-18 RX ORDER — ROSUVASTATIN CALCIUM 20 MG/1
40 TABLET, COATED ORAL DAILY
Status: DISCONTINUED | OUTPATIENT
Start: 2025-01-18 | End: 2025-01-19

## 2025-01-18 RX ORDER — HEPARIN SODIUM 1000 [USP'U]/ML
4000 INJECTION, SOLUTION INTRAVENOUS; SUBCUTANEOUS PRN
Status: DISCONTINUED | OUTPATIENT
Start: 2025-01-18 | End: 2025-01-21 | Stop reason: HOSPADM

## 2025-01-18 RX ORDER — FLUTICASONE PROPIONATE 50 MCG
1 SPRAY, SUSPENSION (ML) NASAL DAILY
Status: DISCONTINUED | OUTPATIENT
Start: 2025-01-18 | End: 2025-01-21 | Stop reason: HOSPADM

## 2025-01-18 RX ORDER — LACTOBACILLUS RHAMNOSUS GG 10B CELL
1 CAPSULE ORAL DAILY
Status: DISCONTINUED | OUTPATIENT
Start: 2025-01-18 | End: 2025-01-21 | Stop reason: HOSPADM

## 2025-01-18 RX ORDER — HEPARIN SODIUM 10000 [USP'U]/100ML
5-30 INJECTION, SOLUTION INTRAVENOUS CONTINUOUS
Status: DISCONTINUED | OUTPATIENT
Start: 2025-01-18 | End: 2025-01-21 | Stop reason: HOSPADM

## 2025-01-18 RX ORDER — LEVOTHYROXINE SODIUM 50 UG/1
100 TABLET ORAL DAILY
Status: DISCONTINUED | OUTPATIENT
Start: 2025-01-18 | End: 2025-01-21 | Stop reason: HOSPADM

## 2025-01-18 RX ORDER — HEPARIN SODIUM 1000 [USP'U]/ML
2000 INJECTION, SOLUTION INTRAVENOUS; SUBCUTANEOUS PRN
Status: DISCONTINUED | OUTPATIENT
Start: 2025-01-18 | End: 2025-01-21 | Stop reason: HOSPADM

## 2025-01-18 RX ORDER — HEPARIN SODIUM 1000 [USP'U]/ML
4000 INJECTION, SOLUTION INTRAVENOUS; SUBCUTANEOUS ONCE
Status: COMPLETED | OUTPATIENT
Start: 2025-01-18 | End: 2025-01-18

## 2025-01-18 RX ORDER — PANTOPRAZOLE SODIUM 40 MG/1
40 TABLET, DELAYED RELEASE ORAL
Status: DISCONTINUED | OUTPATIENT
Start: 2025-01-18 | End: 2025-01-21 | Stop reason: HOSPADM

## 2025-01-18 RX ORDER — ALBUTEROL SULFATE 90 UG/1
2 INHALANT RESPIRATORY (INHALATION) EVERY 6 HOURS PRN
Status: DISCONTINUED | OUTPATIENT
Start: 2025-01-18 | End: 2025-01-21 | Stop reason: HOSPADM

## 2025-01-18 RX ORDER — CALCIUM CARBONATE/VITAMIN D3 600 MG-10
1 TABLET ORAL 2 TIMES DAILY
Status: DISCONTINUED | OUTPATIENT
Start: 2025-01-18 | End: 2025-01-21 | Stop reason: HOSPADM

## 2025-01-18 RX ADMIN — SODIUM CHLORIDE, PRESERVATIVE FREE 10 ML: 5 INJECTION INTRAVENOUS at 20:46

## 2025-01-18 RX ADMIN — SODIUM CHLORIDE, PRESERVATIVE FREE 10 ML: 5 INJECTION INTRAVENOUS at 08:48

## 2025-01-18 RX ADMIN — HEPARIN SODIUM 4000 UNITS: 1000 INJECTION, SOLUTION INTRAVENOUS; SUBCUTANEOUS at 10:42

## 2025-01-18 RX ADMIN — ASPIRIN 81 MG 81 MG: 81 TABLET ORAL at 08:47

## 2025-01-18 RX ADMIN — LEVOTHYROXINE SODIUM 100 MCG: 0.05 TABLET ORAL at 08:47

## 2025-01-18 RX ADMIN — ROSUVASTATIN CALCIUM 40 MG: 20 TABLET, FILM COATED ORAL at 18:50

## 2025-01-18 RX ADMIN — HEPARIN SODIUM 2000 UNITS: 1000 INJECTION, SOLUTION INTRAVENOUS; SUBCUTANEOUS at 17:31

## 2025-01-18 RX ADMIN — SODIUM CHLORIDE, PRESERVATIVE FREE 10 ML: 5 INJECTION INTRAVENOUS at 00:34

## 2025-01-18 RX ADMIN — HEPARIN SODIUM 7 UNITS/KG/HR: 10000 INJECTION, SOLUTION INTRAVENOUS at 10:42

## 2025-01-18 RX ADMIN — Medication 1 TABLET: at 20:46

## 2025-01-18 RX ADMIN — ACETAMINOPHEN 650 MG: 325 TABLET ORAL at 13:30

## 2025-01-18 ASSESSMENT — PAIN DESCRIPTION - LOCATION: LOCATION: BACK

## 2025-01-18 ASSESSMENT — PAIN SCALES - GENERAL: PAINLEVEL_OUTOF10: 5

## 2025-01-18 NOTE — PLAN OF CARE
Problem: Chronic Conditions and Co-morbidities  Goal: Patient's chronic conditions and co-morbidity symptoms are monitored and maintained or improved  1/18/2025 1608 by Edita Winn RN  Outcome: Progressing  1/18/2025 0410 by Jared Jacinto RN  Outcome: Progressing     Problem: Safety - Adult  Goal: Free from fall injury  1/18/2025 1608 by Edita Winn RN  Outcome: Progressing  1/18/2025 0410 by Jared Jacinto RN  Outcome: Progressing     Problem: ABCDS Injury Assessment  Goal: Absence of physical injury  1/18/2025 1608 by Edita Winn RN  Outcome: Progressing  1/18/2025 0410 by Jared Jacinto RN  Outcome: Progressing     Problem: Pain  Goal: Verbalizes/displays adequate comfort level or baseline comfort level  1/18/2025 1608 by Edita iWnn RN  Outcome: Progressing  1/18/2025 0410 by Jared Jacinto RN  Outcome: Progressing

## 2025-01-18 NOTE — H&P
Mental Illness Paternal Grandmother     Cancer Maternal Grandmother         colorectal cancer    Arthritis Maternal Grandmother     Diabetes Maternal Grandmother     High Blood Pressure Maternal Grandmother     Stroke Maternal Grandmother     Arthritis Maternal Grandfather     Heart Disease Father     Arthritis Father     Depression Father     High Cholesterol Father     Mental Illness Father     Substance Abuse Father     Diabetes Mother     Heart Disease Mother         multiple heart attacks    Arthritis Mother     High Blood Pressure Mother     High Cholesterol Mother     Substance Abuse Mother     Diabetes Sister     High Blood Pressure Sister     Diabetes Maternal Aunt     Cancer Maternal Aunt         colorectal cancer    Ovarian Cancer Maternal Aunt     Diabetes Maternal Uncle     Cancer Paternal Uncle         esophageal cancer       Review of Systems:     Positive and Negative as described in HPI.    CONSTITUTIONAL:  negative for fevers, chills, +sweats, fatigue, weight loss  HEENT:  negative for vision, hearing changes, runny nose, throat pain  RESPIRATORY:  negative for shortness of breath, cough, congestion, wheezing  CARDIOVASCULAR:  + chest pain, palpitations  GASTROINTESTINAL:  negative for nausea, vomiting, diarrhea, constipation, change in bowel habits, abdominal pain   GENITOURINARY:  negative for difficulty of urination, burning with urination, frequency   INTEGUMENT:  negative for rash, skin lesions, easy bruising   HEMATOLOGIC/LYMPHATIC:  negative for swelling/edema   ALLERGIC/IMMUNOLOGIC:  negative for urticaria , itching  ENDOCRINE:  negative increase in drinking, increase in urination, hot or cold intolerance  MUSCULOSKELETAL:  negative joint pains, muscle aches, swelling of joints  NEUROLOGICAL:  negative for headaches, dizziness, lightheadedness, numbness, pain, tingling extremities  BEHAVIOR/PSYCH:  negative for depression, anxiety    Physical Exam:   BP (!) 112/56   Pulse 76   Temp

## 2025-01-18 NOTE — CONSULTS
[facility]    Consult / H&P               Today's Date: 1/17/2025  Patient Name: Kaci Banerjee  Date of admission: 1/17/2025 11:14 PM  Patient's age: 64 y.o., 1960  Admission Dx: Unstable angina (HCC) [I20.0]    Requesting Physician: Yobany Luciano MD    Cardiac Evaluation Reason: High risk unstable angina    History Obtained From: patient and chart review     History of Present Illness:    This patient 64 y.o. 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 Serra, preserved LVEF 60 to 65% on TTE 12/24 normal coronaries on [facility] 2002 in 2004, EMILIA, hypothyroidism, anxiety, GERD visual impairment/legally blind.    Presented to [facility] with left-sided chest pain started earlier morning of 1/15, lab work grossly unremarkable, troponin normal x 2 ( < 2 and 3), CXR was unremarkable, EKG not available for review but reportedly no acute ST-T changes, vitals were stable, patient was treated as unstable angina, elevated with aspirin, started on heparin drip and transfer to [facility] for cardiology eval.    Of note patient follows [facility]/Dr. Myers, recently had abnormal stress test, echo with preserved LVEF 60-65% with diastolic dysfunction back in December 17, 2024, patient is scheduled for LHC with Dr. Serra on 1/21/2025    Troponin rechecked in our facility and was negative <6    Past Medical History:   has a past medical history of Abdominal bloating, Anxiety, Bilateral edema of lower extremity, Bronchial asthma, Carpal tunnel syndrome, Cataract, Chronic back pain, Chronic sinusitis, Degenerative disc disease, lumbar, Depression, Diabetes mellitus due to underlying condition with hyperosmolarity without coma (HCC), Dizziness, DJD (degenerative joint disease), Dysphagia, Edema of leg, Environmental allergies,

## 2025-01-19 LAB
ANION GAP SERPL CALCULATED.3IONS-SCNC: 11 MMOL/L (ref 9–16)
ANTI-XA UNFRAC HEPARIN: 0.29 IU/L
ANTI-XA UNFRAC HEPARIN: 0.56 IU/L
ANTI-XA UNFRAC HEPARIN: 0.6 IU/L
BUN SERPL-MCNC: 17 MG/DL (ref 8–23)
CALCIUM SERPL-MCNC: 9.7 MG/DL (ref 8.6–10.4)
CHLORIDE SERPL-SCNC: 104 MMOL/L (ref 98–107)
CO2 SERPL-SCNC: 24 MMOL/L (ref 20–31)
CREAT SERPL-MCNC: 0.8 MG/DL (ref 0.6–0.9)
GFR, ESTIMATED: 82 ML/MIN/1.73M2
GLUCOSE BLD-MCNC: 101 MG/DL (ref 65–105)
GLUCOSE BLD-MCNC: 103 MG/DL (ref 65–105)
GLUCOSE BLD-MCNC: 113 MG/DL (ref 65–105)
GLUCOSE BLD-MCNC: 118 MG/DL (ref 65–105)
GLUCOSE SERPL-MCNC: 110 MG/DL (ref 74–99)
POTASSIUM SERPL-SCNC: 4 MMOL/L (ref 3.7–5.3)
SODIUM SERPL-SCNC: 139 MMOL/L (ref 136–145)
T4 FREE SERPL-MCNC: 1 NG/DL (ref 0.92–1.68)
TSH SERPL DL<=0.05 MIU/L-ACNC: 4.31 UIU/ML (ref 0.27–4.2)

## 2025-01-19 PROCEDURE — 6370000000 HC RX 637 (ALT 250 FOR IP): Performed by: NURSE PRACTITIONER

## 2025-01-19 PROCEDURE — 84439 ASSAY OF FREE THYROXINE: CPT

## 2025-01-19 PROCEDURE — 85520 HEPARIN ASSAY: CPT

## 2025-01-19 PROCEDURE — 99232 SBSQ HOSP IP/OBS MODERATE 35: CPT | Performed by: NURSE PRACTITIONER

## 2025-01-19 PROCEDURE — 2500000003 HC RX 250 WO HCPCS: Performed by: NURSE PRACTITIONER

## 2025-01-19 PROCEDURE — 2060000000 HC ICU INTERMEDIATE R&B

## 2025-01-19 PROCEDURE — 94660 CPAP INITIATION&MGMT: CPT

## 2025-01-19 PROCEDURE — 5A09457 ASSISTANCE WITH RESPIRATORY VENTILATION, 24-96 CONSECUTIVE HOURS, CONTINUOUS POSITIVE AIRWAY PRESSURE: ICD-10-PCS | Performed by: INTERNAL MEDICINE

## 2025-01-19 PROCEDURE — 84443 ASSAY THYROID STIM HORMONE: CPT

## 2025-01-19 PROCEDURE — 82947 ASSAY GLUCOSE BLOOD QUANT: CPT

## 2025-01-19 PROCEDURE — 80048 BASIC METABOLIC PNL TOTAL CA: CPT

## 2025-01-19 PROCEDURE — 6360000002 HC RX W HCPCS: Performed by: NURSE PRACTITIONER

## 2025-01-19 PROCEDURE — 36415 COLL VENOUS BLD VENIPUNCTURE: CPT

## 2025-01-19 RX ORDER — ROSUVASTATIN CALCIUM 5 MG/1
5 TABLET, COATED ORAL NIGHTLY
Status: DISCONTINUED | OUTPATIENT
Start: 2025-01-19 | End: 2025-01-21 | Stop reason: HOSPADM

## 2025-01-19 RX ORDER — ROSUVASTATIN CALCIUM 20 MG/1
40 TABLET, COATED ORAL NIGHTLY
Status: DISCONTINUED | OUTPATIENT
Start: 2025-01-19 | End: 2025-01-19

## 2025-01-19 RX ADMIN — PANTOPRAZOLE SODIUM 40 MG: 40 TABLET, DELAYED RELEASE ORAL at 06:30

## 2025-01-19 RX ADMIN — ASPIRIN 81 MG 81 MG: 81 TABLET ORAL at 08:09

## 2025-01-19 RX ADMIN — HEPARIN SODIUM 11 UNITS/KG/HR: 10000 INJECTION, SOLUTION INTRAVENOUS at 23:24

## 2025-01-19 RX ADMIN — LEVOTHYROXINE SODIUM 100 MCG: 0.05 TABLET ORAL at 06:30

## 2025-01-19 RX ADMIN — Medication 1 TABLET: at 20:07

## 2025-01-19 RX ADMIN — HEPARIN SODIUM 2000 UNITS: 1000 INJECTION, SOLUTION INTRAVENOUS; SUBCUTANEOUS at 00:33

## 2025-01-19 RX ADMIN — ROSUVASTATIN CALCIUM 5 MG: 5 TABLET, FILM COATED ORAL at 20:07

## 2025-01-19 RX ADMIN — SODIUM CHLORIDE, PRESERVATIVE FREE 10 ML: 5 INJECTION INTRAVENOUS at 20:07

## 2025-01-19 RX ADMIN — Medication 1 TABLET: at 08:09

## 2025-01-19 RX ADMIN — SODIUM CHLORIDE, PRESERVATIVE FREE 10 ML: 5 INJECTION INTRAVENOUS at 08:09

## 2025-01-19 RX ADMIN — Medication 1 TABLET: at 12:49

## 2025-01-19 RX ADMIN — HEPARIN SODIUM 11 UNITS/KG/HR: 10000 INJECTION, SOLUTION INTRAVENOUS at 06:58

## 2025-01-19 RX ADMIN — Medication 2000 UNITS: at 08:09

## 2025-01-19 NOTE — PLAN OF CARE
Problem: Chronic Conditions and Co-morbidities  Goal: Patient's chronic conditions and co-morbidity symptoms are monitored and maintained or improved  1/19/2025 0255 by Ileana Riggs RN  Outcome: Progressing  1/18/2025 1608 by Edita Winn RN  Outcome: Progressing     Problem: Safety - Adult  Goal: Free from fall injury  1/19/2025 0255 by Ileana Riggs RN  Outcome: Progressing  1/18/2025 1608 by Edita Winn RN  Outcome: Progressing     Problem: ABCDS Injury Assessment  Goal: Absence of physical injury  1/19/2025 0255 by Ileana Riggs RN  Outcome: Progressing  1/18/2025 1608 by Edita Winn RN  Outcome: Progressing     Problem: Pain  Goal: Verbalizes/displays adequate comfort level or baseline comfort level  1/19/2025 0255 by Ileana Riggs RN  Outcome: Progressing  1/18/2025 1608 by Edita Winn RN  Outcome: Progressing

## 2025-01-20 LAB
ANION GAP SERPL CALCULATED.3IONS-SCNC: 13 MMOL/L (ref 9–16)
ANTI-XA UNFRAC HEPARIN: 0.65 IU/L
BUN SERPL-MCNC: 17 MG/DL (ref 8–23)
CALCIUM SERPL-MCNC: 9.8 MG/DL (ref 8.6–10.4)
CHLORIDE SERPL-SCNC: 103 MMOL/L (ref 98–107)
CO2 SERPL-SCNC: 21 MMOL/L (ref 20–31)
CREAT SERPL-MCNC: 0.8 MG/DL (ref 0.6–0.9)
ERYTHROCYTE [DISTWIDTH] IN BLOOD BY AUTOMATED COUNT: 12.3 % (ref 11.8–14.4)
GFR, ESTIMATED: 82 ML/MIN/1.73M2
GLUCOSE BLD-MCNC: 107 MG/DL (ref 65–105)
GLUCOSE BLD-MCNC: 108 MG/DL (ref 65–105)
GLUCOSE BLD-MCNC: 119 MG/DL (ref 65–105)
GLUCOSE SERPL-MCNC: 120 MG/DL (ref 74–99)
HCT VFR BLD AUTO: 44.5 % (ref 36.3–47.1)
HGB BLD-MCNC: 15.2 G/DL (ref 11.9–15.1)
MCH RBC QN AUTO: 31.3 PG (ref 25.2–33.5)
MCHC RBC AUTO-ENTMCNC: 34.2 G/DL (ref 28.4–34.8)
MCV RBC AUTO: 91.6 FL (ref 82.6–102.9)
NRBC BLD-RTO: 0 PER 100 WBC
PLATELET # BLD AUTO: 202 K/UL (ref 138–453)
PMV BLD AUTO: 10.5 FL (ref 8.1–13.5)
POTASSIUM SERPL-SCNC: 4.2 MMOL/L (ref 3.7–5.3)
RBC # BLD AUTO: 4.86 M/UL (ref 3.95–5.11)
SODIUM SERPL-SCNC: 137 MMOL/L (ref 136–145)
WBC OTHER # BLD: 7.8 K/UL (ref 3.5–11.3)

## 2025-01-20 PROCEDURE — 6370000000 HC RX 637 (ALT 250 FOR IP): Performed by: NURSE PRACTITIONER

## 2025-01-20 PROCEDURE — 99233 SBSQ HOSP IP/OBS HIGH 50: CPT | Performed by: NURSE PRACTITIONER

## 2025-01-20 PROCEDURE — 85520 HEPARIN ASSAY: CPT

## 2025-01-20 PROCEDURE — 6360000002 HC RX W HCPCS: Performed by: NURSE PRACTITIONER

## 2025-01-20 PROCEDURE — 80048 BASIC METABOLIC PNL TOTAL CA: CPT

## 2025-01-20 PROCEDURE — 85027 COMPLETE CBC AUTOMATED: CPT

## 2025-01-20 PROCEDURE — 36415 COLL VENOUS BLD VENIPUNCTURE: CPT

## 2025-01-20 PROCEDURE — 93005 ELECTROCARDIOGRAM TRACING: CPT | Performed by: INTERNAL MEDICINE

## 2025-01-20 PROCEDURE — 82947 ASSAY GLUCOSE BLOOD QUANT: CPT

## 2025-01-20 PROCEDURE — 2060000000 HC ICU INTERMEDIATE R&B

## 2025-01-20 PROCEDURE — 2500000003 HC RX 250 WO HCPCS: Performed by: NURSE PRACTITIONER

## 2025-01-20 RX ORDER — SODIUM CHLORIDE 9 MG/ML
INJECTION, SOLUTION INTRAVENOUS CONTINUOUS
Status: DISCONTINUED | OUTPATIENT
Start: 2025-01-20 | End: 2025-01-20

## 2025-01-20 RX ADMIN — Medication 2000 UNITS: at 11:43

## 2025-01-20 RX ADMIN — ROSUVASTATIN CALCIUM 5 MG: 5 TABLET, FILM COATED ORAL at 20:56

## 2025-01-20 RX ADMIN — Medication 1 TABLET: at 20:56

## 2025-01-20 RX ADMIN — HEPARIN SODIUM 11 UNITS/KG/HR: 10000 INJECTION, SOLUTION INTRAVENOUS at 18:10

## 2025-01-20 RX ADMIN — ASPIRIN 81 MG 81 MG: 81 TABLET ORAL at 08:59

## 2025-01-20 RX ADMIN — PANTOPRAZOLE SODIUM 40 MG: 40 TABLET, DELAYED RELEASE ORAL at 08:59

## 2025-01-20 RX ADMIN — Medication 1 TABLET: at 11:43

## 2025-01-20 RX ADMIN — SODIUM CHLORIDE, PRESERVATIVE FREE 10 ML: 5 INJECTION INTRAVENOUS at 09:00

## 2025-01-20 RX ADMIN — LEVOTHYROXINE SODIUM 100 MCG: 0.05 TABLET ORAL at 08:58

## 2025-01-20 NOTE — PLAN OF CARE
Problem: Chronic Conditions and Co-morbidities  Goal: Patient's chronic conditions and co-morbidity symptoms are monitored and maintained or improved  1/20/2025 1608 by Edita Winn RN  Outcome: Progressing  1/20/2025 0305 by Ileana Riggs RN  Outcome: Progressing     Problem: Safety - Adult  Goal: Free from fall injury  1/20/2025 1608 by Edita Winn RN  Outcome: Progressing  1/20/2025 0305 by Ileana Riggs RN  Outcome: Progressing     Problem: ABCDS Injury Assessment  Goal: Absence of physical injury  1/20/2025 1608 by Edita Winn RN  Outcome: Progressing  1/20/2025 0305 by Ileana Riggs RN  Outcome: Progressing     Problem: Pain  Goal: Verbalizes/displays adequate comfort level or baseline comfort level  1/20/2025 1608 by Edita Winn RN  Outcome: Progressing  1/20/2025 0305 by Ileana Riggs RN  Outcome: Progressing

## 2025-01-20 NOTE — PLAN OF CARE
Problem: Chronic Conditions and Co-morbidities  Goal: Patient's chronic conditions and co-morbidity symptoms are monitored and maintained or improved  1/20/2025 0305 by Ileana Riggs RN  Outcome: Progressing  1/19/2025 1724 by Edita Winn RN  Outcome: Progressing     Problem: Safety - Adult  Goal: Free from fall injury  1/20/2025 0305 by Ileana Riggs RN  Outcome: Progressing  1/19/2025 1724 by Edita Winn RN  Outcome: Progressing     Problem: ABCDS Injury Assessment  Goal: Absence of physical injury  1/20/2025 0305 by Ileana Riggs RN  Outcome: Progressing  1/19/2025 1724 by Edita Winn RN  Outcome: Progressing     Problem: Pain  Goal: Verbalizes/displays adequate comfort level or baseline comfort level  1/20/2025 0305 by Ileana Riggs RN  Outcome: Progressing  1/19/2025 1724 by Edita Winn RN  Outcome: Progressing

## 2025-01-21 VITALS
BODY MASS INDEX: 45.99 KG/M2 | RESPIRATION RATE: 18 BRPM | DIASTOLIC BLOOD PRESSURE: 55 MMHG | TEMPERATURE: 97.9 F | HEART RATE: 78 BPM | HEIGHT: 67 IN | WEIGHT: 293 LBS | SYSTOLIC BLOOD PRESSURE: 122 MMHG | OXYGEN SATURATION: 94 %

## 2025-01-21 LAB
ANION GAP SERPL CALCULATED.3IONS-SCNC: 12 MMOL/L (ref 9–16)
ANTI-XA UNFRAC HEPARIN: 0.56 IU/L
BUN SERPL-MCNC: 17 MG/DL (ref 8–23)
CALCIUM SERPL-MCNC: 9.9 MG/DL (ref 8.6–10.4)
CHLORIDE SERPL-SCNC: 102 MMOL/L (ref 98–107)
CO2 SERPL-SCNC: 23 MMOL/L (ref 20–31)
CREAT SERPL-MCNC: 0.9 MG/DL (ref 0.6–0.9)
EKG ATRIAL RATE: 68 BPM
EKG P AXIS: 47 DEGREES
EKG P-R INTERVAL: 174 MS
EKG Q-T INTERVAL: 406 MS
EKG QRS DURATION: 88 MS
EKG QTC CALCULATION (BAZETT): 431 MS
EKG R AXIS: 7 DEGREES
EKG T AXIS: 10 DEGREES
EKG VENTRICULAR RATE: 68 BPM
ERYTHROCYTE [DISTWIDTH] IN BLOOD BY AUTOMATED COUNT: 12.3 % (ref 11.8–14.4)
GFR, ESTIMATED: 71 ML/MIN/1.73M2
GLUCOSE BLD-MCNC: 113 MG/DL (ref 65–105)
GLUCOSE BLD-MCNC: 120 MG/DL (ref 65–105)
GLUCOSE BLD-MCNC: 129 MG/DL (ref 65–105)
GLUCOSE SERPL-MCNC: 117 MG/DL (ref 74–99)
HCT VFR BLD AUTO: 48 % (ref 36.3–47.1)
HGB BLD-MCNC: 15.8 G/DL (ref 11.9–15.1)
MCH RBC QN AUTO: 30.7 PG (ref 25.2–33.5)
MCHC RBC AUTO-ENTMCNC: 32.9 G/DL (ref 28.4–34.8)
MCV RBC AUTO: 93.4 FL (ref 82.6–102.9)
NRBC BLD-RTO: 0 PER 100 WBC
PLATELET # BLD AUTO: 191 K/UL (ref 138–453)
PMV BLD AUTO: 10.3 FL (ref 8.1–13.5)
POTASSIUM SERPL-SCNC: 3.9 MMOL/L (ref 3.7–5.3)
RBC # BLD AUTO: 5.14 M/UL (ref 3.95–5.11)
SODIUM SERPL-SCNC: 137 MMOL/L (ref 136–145)
WBC OTHER # BLD: 8.3 K/UL (ref 3.5–11.3)

## 2025-01-21 PROCEDURE — 85520 HEPARIN ASSAY: CPT

## 2025-01-21 PROCEDURE — 99232 SBSQ HOSP IP/OBS MODERATE 35: CPT | Performed by: STUDENT IN AN ORGANIZED HEALTH CARE EDUCATION/TRAINING PROGRAM

## 2025-01-21 PROCEDURE — 82947 ASSAY GLUCOSE BLOOD QUANT: CPT

## 2025-01-21 PROCEDURE — B2111ZZ FLUOROSCOPY OF MULTIPLE CORONARY ARTERIES USING LOW OSMOLAR CONTRAST: ICD-10-PCS | Performed by: INTERNAL MEDICINE

## 2025-01-21 PROCEDURE — C1769 GUIDE WIRE: HCPCS | Performed by: INTERNAL MEDICINE

## 2025-01-21 PROCEDURE — C1894 INTRO/SHEATH, NON-LASER: HCPCS | Performed by: INTERNAL MEDICINE

## 2025-01-21 PROCEDURE — 93458 L HRT ARTERY/VENTRICLE ANGIO: CPT | Performed by: INTERNAL MEDICINE

## 2025-01-21 PROCEDURE — 2500000003 HC RX 250 WO HCPCS: Performed by: NURSE PRACTITIONER

## 2025-01-21 PROCEDURE — 99222 1ST HOSP IP/OBS MODERATE 55: CPT | Performed by: INTERNAL MEDICINE

## 2025-01-21 PROCEDURE — 80048 BASIC METABOLIC PNL TOTAL CA: CPT

## 2025-01-21 PROCEDURE — 2709999900 HC NON-CHARGEABLE SUPPLY: Performed by: INTERNAL MEDICINE

## 2025-01-21 PROCEDURE — 2500000003 HC RX 250 WO HCPCS: Performed by: INTERNAL MEDICINE

## 2025-01-21 PROCEDURE — 99152 MOD SED SAME PHYS/QHP 5/>YRS: CPT | Performed by: INTERNAL MEDICINE

## 2025-01-21 PROCEDURE — 6370000000 HC RX 637 (ALT 250 FOR IP): Performed by: NURSE PRACTITIONER

## 2025-01-21 PROCEDURE — 6360000004 HC RX CONTRAST MEDICATION: Performed by: INTERNAL MEDICINE

## 2025-01-21 PROCEDURE — 36415 COLL VENOUS BLD VENIPUNCTURE: CPT

## 2025-01-21 PROCEDURE — 85027 COMPLETE CBC AUTOMATED: CPT

## 2025-01-21 PROCEDURE — B2151ZZ FLUOROSCOPY OF LEFT HEART USING LOW OSMOLAR CONTRAST: ICD-10-PCS | Performed by: INTERNAL MEDICINE

## 2025-01-21 PROCEDURE — 6360000002 HC RX W HCPCS: Performed by: INTERNAL MEDICINE

## 2025-01-21 PROCEDURE — 4A023N7 MEASUREMENT OF CARDIAC SAMPLING AND PRESSURE, LEFT HEART, PERCUTANEOUS APPROACH: ICD-10-PCS | Performed by: INTERNAL MEDICINE

## 2025-01-21 RX ORDER — METHYLPREDNISOLONE SODIUM SUCCINATE 125 MG/2ML
INJECTION INTRAMUSCULAR; INTRAVENOUS PRN
Status: DISCONTINUED | OUTPATIENT
Start: 2025-01-21 | End: 2025-01-21 | Stop reason: HOSPADM

## 2025-01-21 RX ORDER — IOPAMIDOL 755 MG/ML
INJECTION, SOLUTION INTRAVASCULAR PRN
Status: DISCONTINUED | OUTPATIENT
Start: 2025-01-21 | End: 2025-01-21 | Stop reason: HOSPADM

## 2025-01-21 RX ORDER — DIPHENHYDRAMINE HYDROCHLORIDE 50 MG/ML
INJECTION INTRAMUSCULAR; INTRAVENOUS PRN
Status: DISCONTINUED | OUTPATIENT
Start: 2025-01-21 | End: 2025-01-21 | Stop reason: HOSPADM

## 2025-01-21 RX ADMIN — ASPIRIN 81 MG 81 MG: 81 TABLET ORAL at 08:38

## 2025-01-21 RX ADMIN — Medication 1 TABLET: at 12:38

## 2025-01-21 RX ADMIN — Medication 2000 UNITS: at 12:38

## 2025-01-21 RX ADMIN — PANTOPRAZOLE SODIUM 40 MG: 40 TABLET, DELAYED RELEASE ORAL at 08:38

## 2025-01-21 RX ADMIN — LEVOTHYROXINE SODIUM 100 MCG: 0.05 TABLET ORAL at 08:38

## 2025-01-21 RX ADMIN — SODIUM CHLORIDE, PRESERVATIVE FREE 10 ML: 5 INJECTION INTRAVENOUS at 08:30

## 2025-01-21 NOTE — DISCHARGE SUMMARY
[facility]  Office: [phone]  Per Gooden DO, Rakesh Multani DO, Shaan Shrestha DO, Marcelo Rowland DO, Maria Del Carmen López MD, Jeannine Colby MD, Alyssa Lenz MD, Lisa Huffman MD,  Jared Jolley MD, Michelle Key MD, Nik Cao MD,  Aisha Schmitz DO, Farhana Raza MD, Charbel Wilson MD, Jon Gooden DO, Eleanor Ugalde MD,  Zeus Tee DO, Krystyna Garrett MD, Eliana He MD, Leonela Christie MD, Frank Domingo MD,  Bret Velazquez MD, George Montelongo MD, Narda Denis MD, Varinder Samson MD, Yobany Luciano MD, Hilda Greenfield MD, Ross Martinez DO, Lico Summers MD, Aisha Adan MD, Mohsin Reza, MD, Shirley Waterhouse, CNP,  Manisha Arzola CNP, Ross Bailey, CNP,  Savita Bloom, MINDA, Anamaria Quiroz, CNP, Melissa Haque, CNP, Terese Gonzalez, CNP, Alicia Luu, CNP, Wendy Youssef, PA-C, Candace Horn PA-C, Joana Silvestre, CNP, Yahaira Copeland, CNP,  Janell Flanagan, CNP, Rhiannon Montes, CNP, Shirley Villalpando, CNP,  Marce Galindo, CNS, Jazmin Theodore, CNP, Karis Knowles, CNP,   Christina Albarran, CNP         [facility]   IN-PATIENT SERVICE   [facility]    Discharge Summary     Patient ID: Kaci Banerjee  :  1960   MRN: [id number]     ACCOUNT:  [id number]   Patient's PCP: Trinity Rebollar MD  Admit Date: 2025   Discharge Date: 2025  Length of Stay: 4  Code Status:  Full Code  Admitting Physician: No admitting provider for patient encounter.  Discharge Physician: Hilda Greenfield MD     Active Discharge Diagnoses:     Hospital Problem Lists:  Principal Problem:    Unstable angina (HCC)  Active Problems:    GERD (gastroesophageal reflux disease)    Hypothyroid    Morbid obesity with BMI of 45.0-49.9, adult    Mild intermittent asthma without complication    Abnormal nuclear stress test  Resolved Problems:    * No resolved hospital problems. *      Admission Condition:  fair     Discharged Condition: good    Hospital Stay:     Hospital

## 2025-01-21 NOTE — PROGRESS NOTES
[facility]  Occupational Therapy Not Seen Note    DATE: 2025    NAME: Kaci Banerjee  MRN: [id number]   : 1960      Patient not seen this date for Occupational Therapy due to:    Surgery/Procedure: Plan for cath today per chart    Next Scheduled Treatment: Ck      Electronically signed by Afshan Kaplan OT on 2025 at 7:30 AM    
LOVENOX: Patient weighs between 100-149 kg (current weight = 140.2 kg) and has adequate renal function. The recommended DVT prophylaxis dose is Lovenox (enoxaparin) 30 mg twice a day.  Thank you,    Dante Gonzalez Pharm.D.    1/17/2025  11:30 PM      
Physical Therapy          Physical Therapy Cancel Note      DATE: 2025    NAME: Kaci Banerjee  MRN: [id number]   : 1960      Patient not seen this date for Physical Therapy due to:    Pt schedules for cardiac cath on 25, will follow up after cardiac cath.      Electronically signed by Aisha Loya, PT on 2025 at 2:54 PM    
Physical Therapy        Physical Therapy Cancel Note      DATE: 2025    NAME: Kaci Banerjee  MRN: [id number]   : 1960      Patient not seen this date for Physical Therapy due to:    Surgery/Procedure: going to heart cath today      Electronically signed by Rhiannon Rivero PT on 2025 at 10:19 AM      
[facility]  Office: [phone]  Per Gooden DO, Rakesh Mulatni DO, Shaan Shrestha DO, Marcelo Rowland DO, Maria Del Carmen López MD, Jeannine Colby MD, Alyssa Lenz MD, Lisa Huffman MD,  Jared Jolley MD, Michelle Key MD, Nik Cao MD,  Aisha Schmitz DO, Farhana Raza MD, Charbel Wilson MD, Jon Gooden DO, Eleanor Ugalde MD,  Zeus Tee DO, Krystyna Garrett MD, Eliana He MD, Leonela Christie MD, Frank Domingo MD,  Bret Velazquez MD, George Montelongo MD, Narda Deins MD, Varinder Samson MD, Yobany Luciano MD, Hilda Greenfield MD, Ross Martinez DO, Lico Summers MD, Aisha Adan MD, Mohsin Reza, MD, Shirley Waterhouse, CNP,  Manisha Arzola CNP, Ross Bailey, CNP,  Savita Bloom, DNP, Anamaria Quiroz, CNP, Melissa Haque, CNP, Terese Gonzalez, CNP, Alicia Luu, CNP, Wendy Youssef, PA-C, Candace Horn PA-C, Joana Silvestre, CNP, Yahaira Copeland, CNP,  Janell Flanagan, CNP, Rhiannon Montes, CNP, Shirley Villalpando, CNP,  Marce Galindo, CNS, Jazmin Theodore, CNP, Karis Knowles, CNP,   Christina Albarran, CNP         [facility]   IN-PATIENT SERVICE   [facility]    Progress Note    1/21/2025    9:14 AM    Name:   Kaci Banerjee  MRN:     [id number]     Acct:      [id number]   Room:   0449/0449-01   Day:  4  Admit Date:  1/17/2025 11:14 PM    PCP:   Trinity Rebollar MD  Code Status:  Full Code    Subjective:     C/C: CP  Interval History Status: improved.     Patient seen and examined at bedside this morning. No acute events overnight. Patient resting in bed comfortably. Patient to have cardiac cath this morning. Patient continues on heparin gtt. Patient denies any current CP, SOB, HA, fever or chills. Only states that she feels \"worn out\"    Brief History:     64-year-old patient admitted as a transfer from outlying facility for evaluation of unstable angina. Has past medical history significant for asthma without exacerbation, GERD, hypothyroid. Follows 
positive tilt table testing  Hypertension, hyperlipidemia, type II DM  Morbid obesity  EMILIA  Hypothyroidism  Multiple allergies including metoprolol and statin    Patient Active Problem List:     Glaucoma     GERD (gastroesophageal reflux disease)     DJD (degenerative joint disease)     Obesity     Polyneuropathy     Migraine     Mitral prolapse     Low back pain     CTS (carpal tunnel syndrome)     Supraspinatus syndrome     Impaired fasting glucose     Acute sinus infection     IBS (irritable bowel syndrome)     Back pain, chronic     Stress fracture, right 5th metatarsal      Upper airway cough syndrome     Ear fullness     Perioral numbness     Screening for osteoporosis     Headache     Left eye injury     Medication reaction     Osteopenia     Lumbosacral pain     Flu vaccine need     Hypothyroid     Urinary incontinence     Anxiety     Sleep apnea     Depression     Chronic back pain     Carpal tunnel syndrome     Neuropathy     Mitral valve problem     Morbid obesity with BMI of 45.0-49.9, adult     Frozen shoulder     Skin tag     Degenerative disc disease, lumbar     Bilateral edema of lower extremity     Medication refill     Venous stasis of lower extremity     Dizziness     Dysphagia     Abdominal bloating     Mild intermittent asthma without complication     Skin lesion of left leg     Rotator cuff arthropathy of left shoulder     Dysgeusia     Neck pain, acute     Family history of Crohn's disease     Onychomycosis [B35.1 (ICD-10-CM)]     Atypical chest pain     Skin lesion of left lower limb     Bilateral foot pain     Unstable angina (HCC)     Positive cardiac stress test      Plan of Treatment:   Sable. No CP at present. Continue IV Heparin gtt  I have discussed risks (including but not limited to vascular injury, infection, hematoma, contrast induced kidney dysfunction, CVA and MI), benefits, alternatives in detail. All questions answered. Patient agrees to proceed.    NPO after midnight for cath 
answered. Patient agrees to proceed.    Cath lab unable to accommodate today Will plan for tomorrow .   NPO after midnight for cath onTuesday     Electronically signed by JULITA PARRA CNP on 1/20/2025 at 10:24 AM  [facility].  [phone]          
carb-cholecalciferol  1 tablet Oral BID    fluticasone  1 spray Each Nostril Daily    levothyroxine  100 mcg Oral Daily    therapeutic multivitamin-minerals  1 tablet Oral Daily    pantoprazole  40 mg Oral QAM AC    lactobacillus  1 capsule Oral Daily    Vitamin D  2,000 Units Oral Daily    sodium chloride flush  5-40 mL IntraVENous 2 times per day    insulin lispro  0-4 Units SubCUTAneous 4 times per day    aspirin  81 mg Oral Daily     Continuous Infusions:    heparin (PORCINE) Infusion 11 Units/kg/hr (01/19/25 0033)    dextrose      sodium chloride       PRN Meds: albuterol sulfate HFA, heparin (porcine), heparin (porcine), glucose, dextrose bolus **OR** dextrose bolus, glucagon (rDNA), dextrose, sodium chloride flush, sodium chloride, potassium chloride **OR** potassium alternative oral replacement **OR** potassium chloride, magnesium sulfate, ondansetron **OR** ondansetron, acetaminophen **OR** acetaminophen, magnesium hydroxide, nitroGLYCERIN, morphine    Data:     Past Medical History:   has a past medical history of Abdominal bloating, Anxiety, Bilateral edema of lower extremity, Bronchial asthma, Carpal tunnel syndrome, Cataract, Chronic back pain, Chronic sinusitis, Degenerative disc disease, lumbar, Depression, Diabetes mellitus due to underlying condition with hyperosmolarity without coma (HCC), Dizziness, DJD (degenerative joint disease), Dysphagia, Edema of leg, Environmental allergies, Frozen shoulder, GERD (gastroesophageal reflux disease), Glaucoma, Glucose intolerance (impaired glucose tolerance), Headache(784.0), History of bronchitis, HTN (hypertension), Hyperlipidemia, Hypothyroid, Hypothyroidism, IBS (irritable bowel syndrome), Legally blind, Medication refill, Mild intermittent asthma without complication, Morbid obesity with BMI of 45.0-49.9, adult, MVP (mitral valve prolapse), Neuropathy, OA (osteoarthritis), EMILIA on CPAP, Osteopenia, Pancreatic cyst, Polyneuropathy, Raynaud disease, 
Headache(784.0), History of bronchitis, HTN (hypertension), Hyperlipidemia, Hypothyroid, Hypothyroidism, IBS (irritable bowel syndrome), Legally blind, Medication refill, Mild intermittent asthma without complication, Morbid obesity with BMI of 45.0-49.9, adult, MVP (mitral valve prolapse), Neuropathy, OA (osteoarthritis), EMILIA on CPAP, Osteopenia, Pancreatic cyst, Polyneuropathy, Raynaud disease, Rhinopharyngitis, Skin lesion of left leg, Skin tag, Stress fracture, Syncope, TIA (transient ischemic attack), Urinary incontinence, Varicose vein of leg, Vasodepressor syncope, and Wears glasses.    Social History:   reports that she has never smoked. She has never been exposed to tobacco smoke. She has never used smokeless tobacco. She reports that she does not drink alcohol and does not use drugs.     Family History:   Family History   Problem Relation Age of Onset    Arthritis Paternal Grandfather     Arthritis Paternal Grandmother     Cancer Paternal Grandmother     Depression Paternal Grandmother     Heart Disease Paternal Grandmother     High Blood Pressure Paternal Grandmother     High Cholesterol Paternal Grandmother     Mental Illness Paternal Grandmother     Cancer Maternal Grandmother         colorectal cancer    Arthritis Maternal Grandmother     Diabetes Maternal Grandmother     High Blood Pressure Maternal Grandmother     Stroke Maternal Grandmother     Arthritis Maternal Grandfather     Heart Disease Father     Arthritis Father     Depression Father     High Cholesterol Father     Mental Illness Father     Substance Abuse Father     Diabetes Mother     Heart Disease Mother         multiple heart attacks    Arthritis Mother     High Blood Pressure Mother     High Cholesterol Mother     Substance Abuse Mother     Diabetes Sister     High Blood Pressure Sister     Diabetes Maternal Aunt     Cancer Maternal Aunt         colorectal cancer    Ovarian Cancer Maternal Aunt     Diabetes Maternal Uncle     Cancer

## 2025-01-21 NOTE — CARE COORDINATION
Case Management Assessment  Initial Evaluation    Date/Time of Evaluation: 1/18/2025 2:06 PM  Assessment Completed by: Terese Bianchi RN    If patient is discharged prior to next notation, then this note serves as note for discharge by case management.    Patient Name: Kaci Banerjee                   YOB: 1960  Diagnosis: Unstable angina (HCC) [I20.0]                   Date / Time: 1/17/2025 11:14 PM    Patient Admission Status: Inpatient   Readmission Risk (Low < 19, Mod (19-27), High > 27): Readmission Risk Score: 6.3    Current PCP: Trinity Rebollar MD  PCP verified by CM? Yes    Chart Reviewed: Yes      History Provided by: Patient  Patient Orientation: Alert and Oriented    Patient Cognition: Alert    Hospitalization in the last 30 days (Readmission):  No    If yes, Readmission Assessment in CM Navigator will be completed.    Advance Directives:      Code Status: Full Code   Patient's Primary Decision Maker is: Named in Scanned ACP Document    Primary Decision Maker: Jazmin Banerjee - Child - [phone]    Discharge Planning:    Patient lives with: Alone Type of Home: Apartment  Primary Care Giver: Self  Patient Support Systems include: Family Members   Current Financial resources:    Current community resources: None  Current services prior to admission: Durable Medical Equipment            Current DME:              Type of Home Care services:  None    ADLS  Prior functional level: Independent in ADLs/IADLs  Current functional level: Independent in ADLs/IADLs    PT AM-PAC:   /24  OT AM-PAC:   /24    Family can provide assistance at DC: No  Would you like Case Management to discuss the discharge plan with any other family members/significant others, and if so, who? Yes  Plans to Return to Present Housing: Yes  Other Identified Issues/Barriers to RETURNING to current housing: medical condition  Potential Assistance needed at discharge: N/A            Potential DME:    Patient expects to discharge 
Transitional planning: Met w/ patient about discharge plan and plans to return home independent. Has heart cath scheduled for today @ 4:15 PM.  
provided with basic dialogue that supports the patient's individualized plan of care/goals, treatment preferences, and shares the quality data associated with the providers?  Yes

## 2025-01-21 NOTE — PROCEDURES
[facility]        Date:   1/21/2025  Patient name: Kaci Banerjee  Date of admission:  1/17/2025 11:14 PM  MRN:   [id number]  YOB: 1960    CARDIAC CATHETERIZATION    Operators:  Primary: Nilton Serra MD.  Assistant:     Indications for cath: [place], Abnormal nuclear stress test.    Procedure performed: Cardiac cath.    Access: Right Radial artery      Procedure: After informed consent was obtained with explanation of the risks and benefits, patient was brought to the cath lab. The right wrist was prepped and draped in sterile fashion. 1% lidocaine was used for local block. The Radial artery was cannulated with 6  Fr sheath with brisk arterial blood return. The side port was frequently flushed and aspirated with normal saline.    EBL is 5 mL    Dominance is right    Findings:    Left main: Normal with 0% stenosis.    LAD: Luminal irregularities of 20 to 30%.    LCX: Normal with 0% stenosis    RCA: Normal with 0% stenosis    The LV gram was performed in the BOOTH 30 position.   LVEF: 60%. LV Wall Motion: Normal    Conclusions:  Normal Coronaries.  Overall preserved LV function    Recommendation:  Medical treatments.  Risk factors modifications.        Electronically signed by Nilton Serra MD on 1/21/2025 at 11:36 AM      [facility]  [phone]

## 2025-01-21 NOTE — DISCHARGE INSTR - COC
Continuity of Care Form    Patient Name: Kaci Banerjee   :  1960  MRN:  [id number]    Admit date:  2025  Discharge date:  ***    Code Status Order: Full Code   Advance Directives:   Advance Care Flowsheet Documentation             Admitting Physician:  No admitting provider for patient encounter.  PCP: Trinity Rebollar MD    Discharging Nurse: ***  Discharging Hospital Unit/Room#: 0449/0449-01  Discharging Unit Phone Number: ***    Emergency Contact:   Extended Emergency Contact Information  Primary Emergency Contact: Jazmin Banerjee   [facility]  Home Phone: [phone]  Work Phone: [phone]  Mobile Phone: [phone]  Relation: Child  Hearing or visual needs: None  Other needs: None  Preferred language: English   needed? No    Past Surgical History:  Past Surgical History:   Procedure Laterality Date    APPENDECTOMY      CARDIAC CATHETERIZATION      NML CORS  AND     CATARACT REMOVAL Left     CHOLECYSTECTOMY      COLONOSCOPY      COLONOSCOPY N/A 2019    COLORECTAL CANCER SCREENING, NOT HIGH RISK performed by Sekou Guidry MD at [facility] OR    ESOPHAGEAL DILATATION      EYE SURGERY Bilateral     right iridectomy, right laser, bilateral trabeculectomy, left drain    GLAUCOMA SURGERY      HAND SURGERY Right     HYSTERECTOMY (CERVIX STATUS UNKNOWN)  1982    KNEE SURGERY Right 2000    TUBAL LIGATION      UPPER GASTROINTESTINAL ENDOSCOPY      UPPER GASTROINTESTINAL ENDOSCOPY  2018    EGD DILATION SAVORY performed by Aj Schwab MD at [facility]    UPPER GASTROINTESTINAL ENDOSCOPY  2019    EGD DILATION SAVORY performed by Sekou Guidry MD at [facility]    UPPER GASTROINTESTINAL ENDOSCOPY N/A 2021    EGD DILATION SAVORY performed by Sekou Guidry MD at [facility]    UPPER GASTROINTESTINAL ENDOSCOPY N/A 2024    EGD ESOPHAGOGASTRODUODENOSCOPY WITH BIOPSIES performed by Reno Lenz MD at [facility] OR       Immunization

## 2025-01-21 NOTE — PLAN OF CARE
Problem: Chronic Conditions and Co-morbidities  Goal: Patient's chronic conditions and co-morbidity symptoms are monitored and maintained or improved  1/21/2025 0038 by Celso Austin RN  Outcome: Progressing  1/20/2025 1608 by Edita Winn RN  Outcome: Progressing     Problem: Safety - Adult  Goal: Free from fall injury  1/21/2025 0038 by Celso Austin RN  Outcome: Progressing  1/20/2025 1608 by Edita Winn RN  Outcome: Progressing     Problem: ABCDS Injury Assessment  Goal: Absence of physical injury  1/21/2025 0038 by Celso Austin RN  Outcome: Progressing  1/20/2025 1608 by Edita Winn RN  Outcome: Progressing     Problem: Pain  Goal: Verbalizes/displays adequate comfort level or baseline comfort level  1/21/2025 0038 by Celso Austin RN  Outcome: Progressing  1/20/2025 1608 by Edita Winn RN  Outcome: Progressing

## 2025-01-22 DIAGNOSIS — K21.9 GASTROESOPHAGEAL REFLUX DISEASE WITHOUT ESOPHAGITIS: ICD-10-CM

## 2025-01-22 DIAGNOSIS — E11.9 TYPE 2 DIABETES MELLITUS WITHOUT COMPLICATION, WITHOUT LONG-TERM CURRENT USE OF INSULIN (HCC): ICD-10-CM

## 2025-01-22 DIAGNOSIS — K59.01 CONSTIPATION BY DELAYED COLONIC TRANSIT: ICD-10-CM

## 2025-01-22 LAB
EKG ATRIAL RATE: 68 BPM
EKG P AXIS: 15 DEGREES
EKG P-R INTERVAL: 164 MS
EKG Q-T INTERVAL: 432 MS
EKG QRS DURATION: 84 MS
EKG QTC CALCULATION (BAZETT): 459 MS
EKG R AXIS: 20 DEGREES
EKG T AXIS: 9 DEGREES
EKG VENTRICULAR RATE: 68 BPM

## 2025-01-22 PROCEDURE — 93010 ELECTROCARDIOGRAM REPORT: CPT | Performed by: INTERNAL MEDICINE

## 2025-01-22 RX ORDER — SENNOSIDES 8.6 MG
TABLET ORAL
Qty: 60 TABLET | Refills: 5 | Status: SHIPPED | OUTPATIENT
Start: 2025-01-22

## 2025-01-22 RX ORDER — CALCIUM CARBONATE/VITAMIN D3 600 MG-10
TABLET ORAL
Qty: 60 TABLET | Refills: 1 | Status: SHIPPED | OUTPATIENT
Start: 2025-01-22

## 2025-01-22 RX ORDER — OMEPRAZOLE 40 MG/1
40 CAPSULE, DELAYED RELEASE ORAL DAILY
Qty: 30 CAPSULE | Refills: 5 | Status: SHIPPED | OUTPATIENT
Start: 2025-01-22

## 2025-01-22 RX ORDER — ASPIRIN 325 MG
81 TABLET ORAL DAILY
Qty: 30 TABLET | Refills: 5 | Status: SHIPPED | OUTPATIENT
Start: 2025-01-22

## 2025-01-24 ENCOUNTER — TELEPHONE (OUTPATIENT)
Dept: FAMILY MEDICINE CLINIC | Age: 65
End: 2025-01-24

## 2025-01-24 NOTE — TELEPHONE ENCOUNTER
Care Transitions Initial Follow Up Call    Outreach made within 2 business days of discharge: Yes    Patient: Kaci Banerjee Patient : 1960   MRN: [id number]  Reason for Admission: Unstable angina   Discharge Date: 25       Spoke with: Patient    Discharge department/facility: [facility] Interactive Patient Contact:  Was patient able to fill all prescriptions: No: Nothing new  Was patient instructed to bring all medications to the follow-up visit: Yes  Is patient taking all medications as directed in the discharge summary? Yes  Does patient understand their discharge instructions: Yes  Does patient have questions or concerns that need addressed prior to 7-14 day follow up office visit: no    Additional needs identified to be addressed with provider  No needs identified             Scheduled appointment with PCP within 7-14 days    Follow Up  Future Appointments   Date Time Provider Department Center   2025 12:15 PM Cordell Watt DPM ACC Podiatry [facility]   2025  9:00 AM Gaurav Ayala MD TIFF OB/GYN [facility]   2/3/2025  8:00 AM [facility] MAMMOGRAPHY ROOM AT [facility]   2025  9:15 AM [facility]  BS ECC DEP   2025 12:20 PM Emmett Yao MD [facility]   2025 10:00 AM SCHEDULE, [facility] SURG [facility]IC [place] [facility]   2025 10:45 AM Shahzad Myers MD AFL TCC TIFF AFL [facility]   3/28/2025 10:00 AM Reno Lenz MD [place] [facility]   2025 12:15 PM Ambrose Boss MD TIFF PULM [facility]   2025 11:15 [facility]   2025  9:00 AM Ania Sellers MD TIFF NEURO Neurology -       Adriana Lopez [facility]

## 2025-01-27 ENCOUNTER — OFFICE VISIT (OUTPATIENT)
Dept: PODIATRY | Age: 65
End: 2025-01-27
Payer: COMMERCIAL

## 2025-01-27 VITALS
HEART RATE: 75 BPM | SYSTOLIC BLOOD PRESSURE: 150 MMHG | BODY MASS INDEX: 45.99 KG/M2 | WEIGHT: 293 LBS | HEIGHT: 67 IN | DIASTOLIC BLOOD PRESSURE: 78 MMHG

## 2025-01-27 DIAGNOSIS — M79.672 HEEL PAIN, CHRONIC, LEFT: ICD-10-CM

## 2025-01-27 DIAGNOSIS — B35.1 ONYCHOMYCOSIS: ICD-10-CM

## 2025-01-27 DIAGNOSIS — M21.42 PES PLANUS OF BOTH FEET: ICD-10-CM

## 2025-01-27 DIAGNOSIS — G89.29 CHRONIC HEEL PAIN, RIGHT: Primary | ICD-10-CM

## 2025-01-27 DIAGNOSIS — L23.9 ALLERGIC CONTACT DERMATITIS OF LOWER LEG: ICD-10-CM

## 2025-01-27 DIAGNOSIS — L98.9 SKIN LESION OF LEFT LOWER LIMB: ICD-10-CM

## 2025-01-27 DIAGNOSIS — M72.2 PLANTAR FASCIITIS, BILATERAL: ICD-10-CM

## 2025-01-27 DIAGNOSIS — M79.671 CHRONIC HEEL PAIN, RIGHT: Primary | ICD-10-CM

## 2025-01-27 DIAGNOSIS — M21.41 PES PLANUS OF BOTH FEET: ICD-10-CM

## 2025-01-27 DIAGNOSIS — G89.29 HEEL PAIN, CHRONIC, LEFT: ICD-10-CM

## 2025-01-27 PROCEDURE — 3017F COLORECTAL CA SCREEN DOC REV: CPT

## 2025-01-27 PROCEDURE — 1111F DSCHRG MED/CURRENT MED MERGE: CPT

## 2025-01-27 PROCEDURE — 11721 DEBRIDE NAIL 6 OR MORE: CPT

## 2025-01-27 PROCEDURE — 11721 DEBRIDE NAIL 6 OR MORE: CPT | Performed by: PODIATRIST

## 2025-01-27 PROCEDURE — 99213 OFFICE O/P EST LOW 20 MIN: CPT

## 2025-01-27 PROCEDURE — G8427 DOCREV CUR MEDS BY ELIG CLIN: HCPCS

## 2025-01-27 PROCEDURE — 1036F TOBACCO NON-USER: CPT

## 2025-01-27 PROCEDURE — G8417 CALC BMI ABV UP PARAM F/U: HCPCS

## 2025-01-27 NOTE — PROGRESS NOTES
Patient instructed to remove shoes and socks and instructed to sit in exam chair.  Current PCP is Trinity Rebollar MD and date of last visit was [id number].   Do you have a follow up visit scheduled?  No  If yes, the date is unknown    
  Component Value Date    LABA1C 5.3 01/18/2025       Physical Exam:  General:  Alert and oriented x3. In no acute distress.     Lower Extremity Physical Exam:    Vascular: DP pulses are palpable, Bilateral. PT pulses non palpable bilaterally but audible on doppler. Varicose veins noted bilaterally. CFT <3 seconds to all digits, Bilateral.  Non pitting Edema noted to the bilateral lower extremity, Bilateral.  Hair growth is absent to the level of the digits, Bilateral.     Neuro: Saph/sural/SP/DP/plantar sensation diminished to light touch. Protective sensation is intact to 4/10 sites on the right foot and 3/10 sites on the left foot as tested with a 5.07g SWMF, Bilateral.     Musculoskeletal: EHL/FHL/GS/TA gross motor intact.  Tenderness to palpation to distal aspect of digits, specifically nails.    Dermatologic: Xerotic skin noted to the medial aspect anterior aspect of the left lower extremity.  Patient does have lymphedema changes.  2 separate lesions from previous steroid cream application with hemosiderin deposition.  Deposition circumscribe area roughly 4 cm.    Class A Findings (Q7) - One Class A finding  [] Non traumatic amputation of foot or integral skeletal portion thereof  Class B Findings (Q8) - Two Class B findings  [x]Absent posterior tibial pulse   []Absent dorsalis pedis pulse   []Advanced trophic changes; three of the following are required:   [x]hair growth (decrease or absence)   [x]nail changes (thickening)   []pigmentary changes (discoloration)   [x]skin texture (thin, shiny)   []skin color (rubor or redness)  Class C Findings (Q9) - One Class B and two Class C findings  [x]Claudication   []Temperature changes   [x]Edema   []Paresthesia   []Burning    Q Modifier Met: Q8: Two Class B findings        Assessment   Kaci Banerjee is a 64 y.o. female with     Diagnosis Orders   1. Chronic heel pain, right  Misc. Devices MISC      2. Heel pain, chronic, left  Misc. Devices MISC      3. Pes planus

## 2025-01-27 NOTE — PATIENT INSTRUCTIONS
Arch Pain: Exercises  Introduction  Here are some examples of exercises for you to try. The exercises may be suggested for a condition or for rehabilitation. Start each exercise slowly. Ease off the exercises if you start to have pain.  You will be told when to start these exercises and which ones will work best for you.  How to do the exercises  Plantar fascia stretch    Sit in a chair and put your affected foot on your other knee.  Hold the heel of your foot in one hand, and grasp your toes with the other hand.  Pull on your heel (toward your body), and at the same time pull your toes back with your other hand.  You should feel a stretch along the bottom of your foot.  Hold 15 to 30 seconds.  Repeat 2 to 4 times.     Plantar fascia stretch (kneeling)    Get on your hands and knees on the floor. Keep your heels pointing up and the balls of your feet and your toes on the floor.  Slowly sit back toward your ankles.  If this is too hard, you can try doing it one leg at a time. Stand up, and then kneel on one knee and keep the other leg forward. Place the foot of your forward leg flat on the ground and bend that knee. The heel on the leg still behind you should point up. The ball and toes of that foot should be on the floor. Sit back toward that ankle.  Hold 15 to 30 seconds.  Repeat 2 to 4 times. Switch legs if you are doing this one leg at a time.     Plantar fascia self-massage    Sit in a chair.  Place your affected foot on a firm, tube-shaped object, such as a can or water bottle.  Roll your foot back and forth over the object to massage the bottom of your foot.  If you want to do ice massage, fill a water bottle about three-fourths of the way full and freeze before using.  Continue for 2 to 5 minutes.     Bilateral calf stretch (knees straight)    Place a book on the floor a few inches from a wall or countertop, and put the balls of your feet on it. Your heels should be on the floor. The book needs to be thick

## 2025-01-30 ENCOUNTER — OFFICE VISIT (OUTPATIENT)
Dept: OBGYN | Age: 65
End: 2025-01-30
Payer: COMMERCIAL

## 2025-01-30 ENCOUNTER — HOSPITAL ENCOUNTER (OUTPATIENT)
Age: 65
Setting detail: SPECIMEN
Discharge: HOME | End: 2025-01-30
Payer: COMMERCIAL

## 2025-01-30 VITALS
SYSTOLIC BLOOD PRESSURE: 122 MMHG | DIASTOLIC BLOOD PRESSURE: 80 MMHG | WEIGHT: 293 LBS | HEIGHT: 67 IN | BODY MASS INDEX: 45.99 KG/M2

## 2025-01-30 DIAGNOSIS — R30.0 BURNING WITH URINATION: ICD-10-CM

## 2025-01-30 DIAGNOSIS — Z12.31 VISIT FOR SCREENING MAMMOGRAM: ICD-10-CM

## 2025-01-30 DIAGNOSIS — Z01.419 WOMEN'S ANNUAL ROUTINE GYNECOLOGICAL EXAMINATION: ICD-10-CM

## 2025-01-30 DIAGNOSIS — R35.0 INCREASED FREQUENCY OF URINATION: ICD-10-CM

## 2025-01-30 DIAGNOSIS — Z01.419 WOMEN'S ANNUAL ROUTINE GYNECOLOGICAL EXAMINATION: Primary | ICD-10-CM

## 2025-01-30 PROBLEM — H40.1330 PIGMENTARY GLAUCOMA OF BOTH EYES: Status: ACTIVE | Noted: 2024-05-06

## 2025-01-30 PROBLEM — Z96.1 PSEUDOPHAKIA OF LEFT EYE: Status: ACTIVE | Noted: 2024-05-06

## 2025-01-30 PROBLEM — M79.676 PAIN IN TOE: Status: ACTIVE | Noted: 2024-08-31

## 2025-01-30 LAB
APPEARANCE FLUID: CLEAR
BILIRUBIN, POC: NORMAL
BLOOD URINE, POC: NORMAL
CLARITY, POC: CLEAR
COLOR, POC: CLEAR
GLUCOSE URINE, POC: NORMAL MG/DL
KETONES, POC: NORMAL MG/DL
LEUKOCYTE EST, POC: NORMAL
NITRITE, POC: NORMAL
PH, POC: 5.5
PROTEIN, POC: NORMAL MG/DL
SPECIFIC GRAVITY, POC: 1
UROBILINOGEN, POC: 0.2 MG/DL

## 2025-01-30 PROCEDURE — G0145 SCR C/V CYTO,THINLAYER,RESCR: HCPCS

## 2025-01-30 PROCEDURE — 87070 CULTURE OTHR SPECIMN AEROBIC: CPT

## 2025-01-30 PROCEDURE — 99396 PREV VISIT EST AGE 40-64: CPT | Performed by: OBSTETRICS & GYNECOLOGY

## 2025-01-30 PROCEDURE — 81002 URINALYSIS NONAUTO W/O SCOPE: CPT | Performed by: OBSTETRICS & GYNECOLOGY

## 2025-01-30 PROCEDURE — 87086 URINE CULTURE/COLONY COUNT: CPT

## 2025-01-30 RX ORDER — CONJUGATED ESTROGENS 0.62 MG/G
CREAM VAGINAL
Qty: 30 G | Refills: 3 | Status: SHIPPED | OUTPATIENT
Start: 2025-01-30

## 2025-01-30 RX ORDER — PHENAZOPYRIDINE HYDROCHLORIDE 200 MG/1
200 TABLET, FILM COATED ORAL 3 TIMES DAILY PRN
Qty: 9 TABLET | Refills: 5 | Status: SHIPPED | OUTPATIENT
Start: 2025-01-30 | End: 2025-02-02

## 2025-01-30 NOTE — PROGRESS NOTES
YEARLY PHYSICAL    Date of service: 2025    Kaci Banerjee  Is a 64 y.o.  single female    PT's PCP is: Trinity Rebollar MD     : 1960                                             Subjective:       No LMP recorded. Patient has had a hysterectomy.     Are your menses regular: not applicable    OB History    Para Term  AB Living   1 1 1         SAB IAB Ectopic Molar Multiple Live Births                    # Outcome Date GA Lbr Panchito/2nd Weight Sex Type Anes PTL Lv   1 Term      Vag-Spont           Social History     Tobacco Use   Smoking Status Never    Passive exposure: Never   Smokeless Tobacco Never        Social History     Substance and Sexual Activity   Alcohol Use No    Alcohol/week: 0.0 standard drinks of alcohol       Family History   Problem Relation Age of Onset    Arthritis Paternal Grandfather     Arthritis Paternal Grandmother     Cancer Paternal Grandmother     Depression Paternal Grandmother     Heart Disease Paternal Grandmother     High Blood Pressure Paternal Grandmother     High Cholesterol Paternal Grandmother     Mental Illness Paternal Grandmother     Cancer Maternal Grandmother         colorectal cancer    Arthritis Maternal Grandmother     Diabetes Maternal Grandmother     High Blood Pressure Maternal Grandmother     Stroke Maternal Grandmother     Arthritis Maternal Grandfather     Heart Disease Father     Arthritis Father     Depression Father     High Cholesterol Father     Mental Illness Father     Substance Abuse Father     Diabetes Mother     Heart Disease Mother         multiple heart attacks    Arthritis Mother     High Blood Pressure Mother     High Cholesterol Mother     Substance Abuse Mother     Diabetes Sister     High Blood Pressure Sister     Diabetes Maternal Aunt     Cancer Maternal Aunt         colorectal cancer    Ovarian Cancer Maternal Aunt     Diabetes Maternal Uncle     Cancer

## 2025-01-31 LAB
MICROORGANISM SPEC CULT: NORMAL
SERVICE CMNT-IMP: NORMAL
SPECIMEN DESCRIPTION: NORMAL

## 2025-02-02 LAB
MICROORGANISM SPEC CULT: NORMAL
SERVICE CMNT-IMP: NORMAL
SPECIMEN DESCRIPTION: NORMAL

## 2025-02-03 ENCOUNTER — HOSPITAL ENCOUNTER (OUTPATIENT)
Dept: WOMENS IMAGING | Age: 65
Discharge: HOME | End: 2025-02-05
Payer: COMMERCIAL

## 2025-02-03 VITALS — BODY MASS INDEX: 45.99 KG/M2 | HEIGHT: 67 IN | WEIGHT: 293 LBS

## 2025-02-03 DIAGNOSIS — Z12.31 VISIT FOR SCREENING MAMMOGRAM: ICD-10-CM

## 2025-02-03 PROCEDURE — 77063 BREAST TOMOSYNTHESIS BI: CPT

## 2025-02-07 ENCOUNTER — TELEPHONE (OUTPATIENT)
Dept: GASTROENTEROLOGY | Age: 65
End: 2025-02-07

## 2025-02-07 ENCOUNTER — OFFICE VISIT (OUTPATIENT)
Dept: UROLOGY | Age: 65
End: 2025-02-07
Payer: COMMERCIAL

## 2025-02-07 ENCOUNTER — PREP FOR PROCEDURE (OUTPATIENT)
Dept: GASTROENTEROLOGY | Age: 65
End: 2025-02-07

## 2025-02-07 ENCOUNTER — OFFICE VISIT (OUTPATIENT)
Dept: GASTROENTEROLOGY | Age: 65
End: 2025-02-07

## 2025-02-07 VITALS
WEIGHT: 293 LBS | TEMPERATURE: 97.9 F | DIASTOLIC BLOOD PRESSURE: 85 MMHG | SYSTOLIC BLOOD PRESSURE: 130 MMHG | HEART RATE: 72 BPM | BODY MASS INDEX: 48.71 KG/M2

## 2025-02-07 VITALS
BODY MASS INDEX: 48.55 KG/M2 | WEIGHT: 293 LBS | SYSTOLIC BLOOD PRESSURE: 155 MMHG | DIASTOLIC BLOOD PRESSURE: 76 MMHG | TEMPERATURE: 97.5 F

## 2025-02-07 DIAGNOSIS — R13.19 ESOPHAGEAL DYSPHAGIA: ICD-10-CM

## 2025-02-07 DIAGNOSIS — K21.9 GASTROESOPHAGEAL REFLUX DISEASE WITHOUT ESOPHAGITIS: Primary | ICD-10-CM

## 2025-02-07 DIAGNOSIS — E66.01 MORBID OBESITY WITH BMI OF 45.0-49.9, ADULT: ICD-10-CM

## 2025-02-07 DIAGNOSIS — Z83.79 FAMILY HISTORY OF CROHN'S DISEASE: ICD-10-CM

## 2025-02-07 DIAGNOSIS — R35.0 URINARY FREQUENCY: Primary | ICD-10-CM

## 2025-02-07 DIAGNOSIS — R30.0 DYSURIA: ICD-10-CM

## 2025-02-07 DIAGNOSIS — F41.9 ANXIETY: ICD-10-CM

## 2025-02-07 DIAGNOSIS — R14.0 ABDOMINAL BLOATING: ICD-10-CM

## 2025-02-07 DIAGNOSIS — K58.8 OTHER IRRITABLE BOWEL SYNDROME: ICD-10-CM

## 2025-02-07 DIAGNOSIS — R07.89 ATYPICAL CHEST PAIN: ICD-10-CM

## 2025-02-07 DIAGNOSIS — K21.9 GASTROESOPHAGEAL REFLUX DISEASE WITHOUT ESOPHAGITIS: ICD-10-CM

## 2025-02-07 DIAGNOSIS — K21.9 GASTROESOPHAGEAL REFLUX DISEASE, UNSPECIFIED WHETHER ESOPHAGITIS PRESENT: ICD-10-CM

## 2025-02-07 DIAGNOSIS — K58.2 IRRITABLE BOWEL SYNDROME WITH BOTH CONSTIPATION AND DIARRHEA: ICD-10-CM

## 2025-02-07 PROCEDURE — 3017F COLORECTAL CA SCREEN DOC REV: CPT | Performed by: UROLOGY

## 2025-02-07 PROCEDURE — 99204 OFFICE O/P NEW MOD 45 MIN: CPT | Performed by: UROLOGY

## 2025-02-07 PROCEDURE — 1036F TOBACCO NON-USER: CPT | Performed by: UROLOGY

## 2025-02-07 PROCEDURE — G8427 DOCREV CUR MEDS BY ELIG CLIN: HCPCS | Performed by: UROLOGY

## 2025-02-07 PROCEDURE — G8417 CALC BMI ABV UP PARAM F/U: HCPCS | Performed by: UROLOGY

## 2025-02-07 PROCEDURE — 1111F DSCHRG MED/CURRENT MED MERGE: CPT | Performed by: UROLOGY

## 2025-02-07 RX ORDER — LEVOTHYROXINE SODIUM 100 UG/1
100 TABLET ORAL DAILY
COMMUNITY

## 2025-02-07 RX ORDER — MIRABEGRON 50 MG/1
50 TABLET, FILM COATED, EXTENDED RELEASE ORAL DAILY
Qty: 30 TABLET | Refills: 11 | Status: SHIPPED | OUTPATIENT
Start: 2025-02-07

## 2025-02-07 ASSESSMENT — ENCOUNTER SYMPTOMS
TROUBLE SWALLOWING: 1
SORE THROAT: 0
ABDOMINAL PAIN: 1
CHOKING: 0
SHORTNESS OF BREATH: 0
DIARRHEA: 1
CHOKING: 0
WHEEZING: 0
CONSTIPATION: 1
WHEEZING: 0
VOICE CHANGE: 0
SHORTNESS OF BREATH: 0
VOMITING: 0
COUGH: 0
RECTAL PAIN: 0
COUGH: 0
BLOOD IN STOOL: 0
ABDOMINAL PAIN: 0
ABDOMINAL DISTENTION: 0
BACK PAIN: 0
ANAL BLEEDING: 0
NAUSEA: 1

## 2025-02-07 NOTE — PROGRESS NOTES
[facility] PHYSICIANS [facility], [facility] UROLOGY CENTER  [address]  [facility] [address]  Dept: [phone]    [facility] Urology Office Note - New Patient    Patient:  Kaci Banerjee  YOB: 1960  Date: 2/7/2025    The patient is a 64 y.o. female who presentstoday for evaluation of the following problems:   Chief Complaint   Patient presents with    Urinary Frequency     New Patient. Patient states she has had frequency and painful urination since December.  She says it started with a UTI that was treated and the symptoms have not went away. She states for 4 days now, she has not had dysuria, but is still having frequency.     referred by Trinity Rebollar MD.    HPI  She is here for some urinary frequency.   She had a UTI in December.   Urine culture on 1/30 was negative.   The burning is in the vaginal area.   She was given Premarin cream, but she has not started it yet.   During the day, she voids every hour.   She has chronic lower back issues.      (Patient's old records have been requested, reviewed and summarized in today's note.)    Summary of old records: N/A    History: N/A    ProceduresToday: N/A    Urinalysis today:  No results found for this visit on 02/07/25.    AUA Symptom Score (2/7/2025):                               Last BUN andcreatinine:  Lab Results   Component Value Date    BUN 17 01/21/2025     Lab Results   Component Value Date    CREATININE 0.9 01/21/2025       Additional Lab/Culture results: UCx was negative on 1/30.   U/A was negative for bloodx2.       Reviewed during this Office Visit: CT was negative.     (results were independently reviewed byphysician and radiology report verified)    PAST MEDICAL, FAMILY AND SOCIAL HISTORY:  Past Medical History:   Diagnosis Date    Abdominal bloating 01/23/2019    Abnormal cardiovascular stress test 12/2024    Abnormal tilt table test     Anxiety     Bilateral edema of lower extremity

## 2025-02-07 NOTE — PROGRESS NOTES
Review of Systems   Constitutional:  Negative for fatigue.   Respiratory:  Negative for cough, choking, shortness of breath and wheezing.    Cardiovascular:  Negative for chest pain, palpitations and leg swelling.   Gastrointestinal:  Negative for abdominal pain.   Genitourinary:  Positive for dysuria, frequency and urgency. Negative for decreased urine volume, difficulty urinating, flank pain, genital sores, hematuria, pelvic pain, vaginal bleeding, vaginal discharge and vaginal pain.   Musculoskeletal:  Negative for back pain.

## 2025-02-07 NOTE — TELEPHONE ENCOUNTER
Procedure scheduled/Dr TOBIAS Lenz  Procedure: EGD   Dx:    1. Gastroesophageal reflux disease without esophagitis    2. Family history of Crohn's disease    3. Abdominal bloating    4. Morbid obesity with BMI of 45.0-49.9, adult    5. Irritable bowel syndrome with both constipation and diarrhea    6. Atypical chest pain    7. Anxiety    8. Esophageal dysphagia    9. Gastroesophageal reflux disease, unspecified whether esophagitis present    10. Other irritable bowel syndrome       Date: 5-29-25  Time: 11:15 a.m.  Hospital: [facility]   Bowel Prep instructions given:  In office/via phone: office   Clearance needed: yes  Cardiac clearance to Dr Myers in [place]  GLP - 1: N/A

## 2025-02-07 NOTE — PROGRESS NOTES
was instructed to start taking some OTC Probiotics products   These are available over the counter at the Pharmacy stores and Grocery stores  He was explained about the beneficial effects they have in the GI track  They will help to establish the good bacterial linda and will help with the digestion and bowel movements  The patient has verbalized understanding and agreement to this plan    More than half of patient's clinic visit time was spent in counseling about lifestyle and dietary modifications  Patient's  questions were answered in this regard as well  The patient has verbalized understanding and agreement       Thank you for allowing me to participate in the care of Ms. Banerjee. For any further questions please do not hesitate to contact me.    I have reviewed and agree with the ROS entered by the MA/Nurse.     This note is created with the assistance of the speech recognition program.  While intending to generate a document that actually reflects the content of the visit, document can still have some errors including those of syntax and sound like substitutions which may escape proof reading.  Actual meaning can be extrapolated by contextual diversion.     Reno Lenz MD, FACG  Board Certified in Gastroenterology and Internal Medicine  [facility] Gastroenterology  Office #: [phone]

## 2025-02-09 LAB — CYTOLOGY REPORT: NORMAL

## 2025-02-12 ENCOUNTER — OFFICE VISIT (OUTPATIENT)
Dept: SURGERY | Age: 65
End: 2025-02-12
Payer: COMMERCIAL

## 2025-02-12 VITALS
BODY MASS INDEX: 45.99 KG/M2 | DIASTOLIC BLOOD PRESSURE: 75 MMHG | SYSTOLIC BLOOD PRESSURE: 159 MMHG | HEART RATE: 71 BPM | WEIGHT: 293 LBS | HEIGHT: 67 IN

## 2025-02-12 DIAGNOSIS — K43.9 VENTRAL HERNIA WITHOUT OBSTRUCTION OR GANGRENE: Primary | ICD-10-CM

## 2025-02-12 PROCEDURE — G8427 DOCREV CUR MEDS BY ELIG CLIN: HCPCS

## 2025-02-12 PROCEDURE — G8417 CALC BMI ABV UP PARAM F/U: HCPCS

## 2025-02-12 PROCEDURE — 1036F TOBACCO NON-USER: CPT

## 2025-02-12 PROCEDURE — 3017F COLORECTAL CA SCREEN DOC REV: CPT

## 2025-02-12 PROCEDURE — 1111F DSCHRG MED/CURRENT MED MERGE: CPT

## 2025-02-12 PROCEDURE — 99203 OFFICE O/P NEW LOW 30 MIN: CPT

## 2025-02-12 NOTE — PROGRESS NOTES
Visit Information    Have you changed or started any medications since your last visit including any over-the-counter medicines, vitamins, or herbal medicines? no   Are you having any side effects from any of your medications? -  no  Have you stopped taking any of your medications? Is so, why? -  no    Have you seen any other physician or provider since your last visit? Yes - Records Obtained  Have you had any other diagnostic tests since your last visit? Yes - Records Obtained  Have you been seen in the emergency room and/or had an admission to a hospital since we last saw you? Yes - Records Obtained [facility] 01/15  Have you had your routine dental cleaning in the past 6 months? yes - [facility]      Have you activated your ZAL[employer] account? If not, what are your barriers? Yes     Patient Care Team:  Trinity Rebollar MD as PCP - General (Family Medicine)  Bridger Toure DO as Surgeon (Orthopedic Surgery)  Ronaldo Barney MD as Consulting Physician (Pulmonology)  Dante Franco MD as Consulting Physician  Berenice Laboy MD as Consulting Physician (Endocrinology)  Gaurav Ayala MD as Consulting Physician (Obstetrics & Gynecology)  Reno Lenz MD as Consulting Physician (Gastroenterology)  Reno Lenz MD as Consulting Physician (Gastroenterology)  Shahzad Myers MD as Cardiologist (Cardiology)  Thomas Youngblood APRN - CNP as Nurse Practitioner (Pulmonology)    Medical History Review  Past Medical, Family, and Social History reviewed and does not contribute to the patient presenting condition    Health Maintenance   Topic Date Due    Respiratory Syncytial Virus (RSV) Pregnant or age 60 yrs+ (1 - Risk 60-74 years 1-dose series) Never done    Diabetic Alb to Cr ratio (uACR) test  01/02/2021    Diabetic retinal exam  03/06/2021    COVID-19 Vaccine (1 - 2024-25 season) Never done    Diabetic foot exam  04/05/2025    Depression Monitoring  01/06/2026    A1C test (Diabetic

## 2025-02-12 NOTE — PATIENT INSTRUCTIONS
Thank you letting us take care of you today. We hope that all your questions were addressed. If a question was overlooked or something else comes to mind after you return home, please call our office at [phone].    If you need to cancel or change an appointment, surgery or procedure, please contact the office at [phone].

## 2025-02-18 DIAGNOSIS — M85.852 OSTEOPENIA OF NECK OF LEFT FEMUR: ICD-10-CM

## 2025-02-19 RX ORDER — MULTIVIT-MIN/IRON/FOLIC ACID/K 18-600-40
CAPSULE ORAL
Qty: 30 CAPSULE | Refills: 2 | Status: SHIPPED | OUTPATIENT
Start: 2025-02-19

## 2025-02-19 NOTE — PROGRESS NOTES
History and Physical  [facility]    Patient's Name/Date of Birth: Kaci GRANADOS McClain / 1960 (64 y.o.)    Date: February 12, 2025     HPI: Pt is a 64 y.o. female who presents to [facility] for evaluation of an umbilical hernia. She reports that she has had this hernia for a couple of years but in the past 6 months has had increased nausea and more regular pain. She also has a history of chronic constipation for which she occasionally takes senna, states that in the past 6 months her stools have been less firm. Reports that the CT scan that she had in December 2024 showed an increased size of the hernia.  She has a history of diabetes which is currently controlled with diet. She checks her blood sugar daily and reports that it is usually between 110-130 every morning. Reports that she has not had a prior hernia. Has had prior cholecystectomy (1978), appendectomy(1978), tubal ligation (1981), and hysterectomy (1982).  Denies any use of tobacco, erythema or skin changes around the hernia, vomiting, fever or chills.      Past Medical History:   Diagnosis Date    Abdominal bloating 01/23/2019    Abnormal cardiovascular stress test 12/2024    Abnormal tilt table test     Anxiety     Bilateral edema of lower extremity 12/16/2016    Bronchial asthma     mild, intermittent    Carpal tunnel syndrome     Cataract     Cerebrovascular disease     Chronic back pain     Chronic sinusitis     Degenerative disc disease, lumbar 12/16/2016    Depression     Diabetes mellitus due to underlying condition with hyperosmolarity without coma (HCC)     Dizziness 10/27/2017    DJD (degenerative joint disease)     S1, L3, L4, L5, T12    Dysphagia 01/23/2019    Edema of leg     bilateral legs    Environmental allergies     Frozen shoulder 04/27/2015    GERD (gastroesophageal reflux disease)     Glaucoma     Glucose intolerance (impaired glucose tolerance)     Headache(784.0)     History of bronchitis     Viral    HTN

## 2025-02-19 NOTE — TELEPHONE ENCOUNTER
Last visit: 01/16/2025  Last Med refill: 04/05/2024  Does patient have enough medication for 72 hours: No:     Next Visit Date:  Future Appointments   Date Time Provider Department Center   2/20/2025  9:30 AM Bessy Hernandez MD [facility] ECC [facility]   2/25/2025 10:45 AM Shahzad Myers MD AFL TCC TIFF [facility]   4/7/2025 12:15 PM Ambrose Boss MD TIFF PULM [facility]   4/16/2025 11:15 AM Anila Lira MD SLDERM [facility]   5/5/2025 12:15 PM Cordell Watt DPM ACC Podiatry [facility]   5/13/2025  9:50 AM Emmett Yao MD [facility]   6/2/2025  9:00 AM Ania Sellers MD TIFF NEURO Neurology -   2/3/2026  9:10 AM Arabella Hampton APRN - CNJOSÉ MIGUEL TIFF OB/GYN [facility]   2/4/2026 10:00 AM [facility] MAMMOGRAPHY ROOM AT [facility]       Health Maintenance   Topic Date Due    Respiratory Syncytial Virus (RSV) Pregnant or age 60 yrs+ (1 - Risk 60-74 years 1-dose series) Never done    Diabetic Alb to Cr ratio (uACR) test  01/02/2021    Diabetic retinal exam  03/06/2021    COVID-19 Vaccine (1 - 2024-25 season) Never done    Diabetic foot exam  04/05/2025    Depression Monitoring  01/06/2026    A1C test (Diabetic or Prediabetic)  01/18/2026    Lipids  01/18/2026    GFR test (Diabetes, CKD 3-4, OR last GFR 15-59)  01/21/2026    Breast cancer screen  02/03/2027    DTaP/Tdap/Td vaccine (2 - Td or Tdap) 06/01/2027    Colorectal Cancer Screen  12/09/2029    Flu vaccine  Completed    Shingles vaccine  Completed    Pneumococcal 50+ years Vaccine  Completed    Hepatitis C screen  Completed    HIV screen  Completed    Hepatitis A vaccine  Aged Out    Hepatitis B vaccine  Aged Out    Hib vaccine  Aged Out    Polio vaccine  Aged Out    Meningococcal (ACWY) vaccine  Aged Out    Pneumococcal 0-49 years Vaccine  Discontinued    Cervical cancer screen  Discontinued       Hemoglobin A1C (%)   Date Value   01/18/2025 5.3   01/06/2025 5.4   08/15/2023 5.5             ( goal A1C is < 7)   No components found for:

## 2025-02-20 ENCOUNTER — OFFICE VISIT (OUTPATIENT)
Dept: FAMILY MEDICINE CLINIC | Age: 65
End: 2025-02-20

## 2025-02-20 VITALS
HEIGHT: 67 IN | WEIGHT: 293 LBS | BODY MASS INDEX: 45.99 KG/M2 | DIASTOLIC BLOOD PRESSURE: 84 MMHG | SYSTOLIC BLOOD PRESSURE: 128 MMHG

## 2025-02-20 DIAGNOSIS — I20.0 UNSTABLE ANGINA (HCC): Primary | ICD-10-CM

## 2025-02-20 DIAGNOSIS — N34.3 DYSURIA-FREQUENCY SYNDROME: ICD-10-CM

## 2025-02-20 SDOH — ECONOMIC STABILITY: FOOD INSECURITY: WITHIN THE PAST 12 MONTHS, THE FOOD YOU BOUGHT JUST DIDN'T LAST AND YOU DIDN'T HAVE MONEY TO GET MORE.: NEVER TRUE

## 2025-02-20 SDOH — ECONOMIC STABILITY: FOOD INSECURITY: WITHIN THE PAST 12 MONTHS, YOU WORRIED THAT YOUR FOOD WOULD RUN OUT BEFORE YOU GOT MONEY TO BUY MORE.: NEVER TRUE

## 2025-02-20 NOTE — PROGRESS NOTES
Visit Information    Have you changed or started any medications since your last visit including any over-the-counter medicines, vitamins, or herbal medicines? no   Have you stopped taking any of your medications? Is so, why? -  no  Are you having any side effects from any of your medications? - no    Have you seen any other physician or provider since your last visit?  yes - Podiatry, Gastro, OBGYN   Have you had any other diagnostic tests since your last visit?  yes - Labs, mammogram, EKG, Heart Cath   Have you been seen in the emergency room and/or had an admission in a hospital since we last saw you?  yes - [facility]   Have you had your routine dental cleaning in the past 6 months?  no     Do you have an active MyChart account? If no, what is the barrier?  Yes    Patient Care Team:  Trinity Rebollar MD as PCP - General (Family Medicine)  Bridger Toure DO as Surgeon (Orthopedic Surgery)  Ronaldo Barney MD as Consulting Physician (Pulmonology)  Dante Franco MD as Consulting Physician  Berenice Laboy MD as Consulting Physician (Endocrinology)  Gaurav Ayala MD as Consulting Physician (Obstetrics & Gynecology)  Reno Lenz MD as Consulting Physician (Gastroenterology)  Reno Lenz MD as Consulting Physician (Gastroenterology)  Shahzad Myers MD as Cardiologist (Cardiology)  Thomas Youngblood APRN - CNP as Nurse Practitioner (Pulmonology)    Medical History Review  Past Medical, Family, and Social History reviewed and does contribute to the patient presenting condition    Health Maintenance   Topic Date Due    Respiratory Syncytial Virus (RSV) Pregnant or age 60 yrs+ (1 - Risk 60-74 years 1-dose series) Never done    Diabetic Alb to Cr ratio (uACR) test  01/02/2021    Diabetic retinal exam  03/06/2021    COVID-19 Vaccine (1 - 2024-25 season) Never done    Diabetic foot exam  04/05/2025    Depression Monitoring  01/06/2026    A1C test (Diabetic or Prediabetic)  01/18/2026    Lipids

## 2025-02-20 NOTE — PATIENT INSTRUCTIONS
Thank you for letting us take care of you today. We hope all your questions were addressed. If a question was overlooked or something else comes to mind after you return home, please contact a member of your Care Team listed below.      Your Care Team at [facility] is Team #1  Giselle Zaragoza M.D. (Faculty)  Bessy Hernandez M.D. (Resident)  Sona Ortega D.O. (Resident)  Johnathan Vasquez M.D. (Resident)  Laly Rodriguez M.D. (Resident)  Adriana Lopez, [facility]  Ryan Rice, [facility]  Lila Zambrano, [facility]  Guera Huggins, [facility]  Yumiko Davis, [facility]  Yazmin Basilio, [facility]  Yuri Hanley (LJ) Javier,   Maryjo Burton [facility] (Clinical Pharmacist)     Office phone number: [phone]    If you need to get in right away due to illness, please be advised we have \"Same Day\" appointments available Monday-Friday. Please call us at [phone] option #3 to schedule your \"Same Day\" appointment.

## 2025-02-20 NOTE — PROGRESS NOTES
Attending Physician Statement  I have discussed the care of Kaci Banerjee, 64 y.o. female,including pertinent history and exam findings,  with the resident Bessy Alfonso MD.  History:  Chief Complaint   Patient presents with    Follow[facility] from [facility] - Unstable angina, follow up for UTI - still burning off and on, patient has not used the premarin cream       I have reviewed the key elements of the encounter with the resident. Examination was done by resident as documented in residents note.    BP Readings from Last 3 Encounters:   02/20/25 128/84   02/12/25 (!) 159/75   02/07/25 130/85     /84 (Site: Right Upper Arm, Position: Sitting, Cuff Size: Large Adult)   Ht 1.702 m (5' 7\")   Wt (!) 140.8 kg (310 lb 6.4 oz)   BMI 48.62 kg/m²   Lab Results   Component Value Date    WBC 8.3 01/21/2025    HGB 15.8 (H) 01/21/2025    HCT 48.0 (H) 01/21/2025     01/21/2025    CHOL 115 01/18/2025    TRIG 83 01/18/2025    HDL 42 01/18/2025    ALT 35 (H) 01/20/2021    AST 26 01/20/2021     01/21/2025    K 3.9 01/21/2025     01/21/2025    CREATININE 0.9 01/21/2025    BUN 17 01/21/2025    CO2 23 01/21/2025    TSH 4.31 (H) 01/19/2025    INR 1.0 01/18/2025    LABA1C 5.3 01/18/2025     Lab Results   Component Value Date    CALCIUM 9.9 01/21/2025     No results found for: \"LDLDIRECT\"  I agree with the assessment, plan and diagnosis of    Diagnosis Orders   1. Unstable angina (HCC)        2. Dysuria-frequency syndrome          I agree with orders as documented by the resident.    Recommendations: Agree with resident assessment and plan.      Return in about 4 weeks (around 3/20/2025) for Chest pain .   (GE Modifier ) Dr. DYLAN GONCALVES MD

## 2025-02-21 ASSESSMENT — ENCOUNTER SYMPTOMS
BACK PAIN: 0
RHINORRHEA: 0
SHORTNESS OF BREATH: 0
CHEST TIGHTNESS: 0
ABDOMINAL PAIN: 0

## 2025-03-03 ENCOUNTER — TELEPHONE (OUTPATIENT)
Dept: UROLOGY | Age: 65
End: 2025-03-03

## 2025-03-03 NOTE — TELEPHONE ENCOUNTER
Patient called, she states that she picked up her Myrbetriq and started it on 2/27 - she wanted to double check with her cardiologist before starting the medication. She has taken 4 doses and has noted dizziness and anxiety since starting the medication.  Instructed patient to discontinue the medication and call office Thursday or Friday to give an update on symptoms.  Will discuss with Dr. Yao on 3/4 to determine next steps.  Patient voiced understanding.

## 2025-03-04 DIAGNOSIS — R35.0 URINARY FREQUENCY: ICD-10-CM

## 2025-03-04 DIAGNOSIS — R35.0 URINARY FREQUENCY: Primary | ICD-10-CM

## 2025-03-04 RX ORDER — SOLIFENACIN SUCCINATE 5 MG/1
5 TABLET, FILM COATED ORAL DAILY
Qty: 30 TABLET | Refills: 5 | Status: SHIPPED | OUTPATIENT
Start: 2025-03-04 | End: 2025-03-11

## 2025-03-11 RX ORDER — SOLIFENACIN SUCCINATE 5 MG/1
5 TABLET, FILM COATED ORAL DAILY
Qty: 90 TABLET | Refills: 1 | Status: SHIPPED | OUTPATIENT
Start: 2025-03-11

## 2025-03-25 NOTE — TELEPHONE ENCOUNTER
Radiation Oncology Clinical Treatment Plan Note    Patient Name:  Aleena Gaines  YOB: 1941  MRN:  [id number]  Account Number:  [id number]  Referring Physician:  No ref. provider found  Dictating Physician:  Sharyn Quinn MD    Diagnosis:  Cancer Staging   Malignant neoplasm of lower-outer quadrant of left breast of female, estrogen receptor positive (CMD)  Staging form: Breast, AJCC 8th Edition  - Pathologic stage from 2/11/2025: Stage Unknown (pT1b, pNX, cM0, G2, ER+, PA+, HER2-) - Signed by Marielle Hermosillo MD on 2/24/2025      Summary:  Aleena has been offered radiation therapy.  Clinical treatment plan was done for the patient for the above diagnosis. Plan section describes the radiation treatment plan and prescription. Orders section lists simulation orders and treatment device as well as treatment planning related orders.    Plan - Intent:   Adjuvant and curative.    We are planning to deliver conformal radiation therapy to the breast.  The planned prescription is listed below.  These elements change at the time of treatment planning or may be altered during the course of therapy based on patient tolerance and disease response.      Radiation Therapy Planning  Treatment Site 1    Treatment intent Curative   Line of treatment Adjuvant   Technique 3D CRT   Prescribed fraction dose 266 cGy    Prescribed total dose 4256 cGy    Prescribed number of fractions 16   Patient position Prone, head first   Immobilization Cradle     Boost Details  Boost treatment included Yes       Orders:  In order to implement the above plan, the following elements will be required and are therefore requested. Simulation orders are included as well as simulation devices. Treatment plan orders are included for physics staff for treatment planning. Imaging orders are included for setting up treatment fields and verifying the accuracy during the treatment as well.  These may change as needed before or during the  The order for xray was for a right femur which will not get the lower leg where the injury was at. Please place a new order for xray of the tibia/fibula. Please mail order to patient's home.  Please Advise treatment.        2/24/2025    11:39 AM   Radiation Orders   2.5MM SLICE THICKNESS Yes   BREAST BOARD - SUPINE Yes   BREAST: SCAN C1 - L2 Yes   COMPLEX TX DEVICE Yes   COMPUTER PLAN LEVEL - 3D Yes   CONTINUING MEDICAL PHYSICS Yes   DOSIMETRY CALC (QTY) Yes   HEAD FIRST Yes   PORT FILM EVERY 5TH FRAC Yes   PRONE Yes   RADIATION THERAPY Yes   SIMULATION COMPLEX Yes   VERIFICATION SIMULATION Yes   WIRE BORDERS OF FIELD Yes        Evaluation:  As the patient undergoes therapy, this patient will be evaluated at least weekly to assure tolerance to the therapy and so that any of the above elements can be changed as needed. Imaging studies will be done at least weekly using on-board imaging technology as ordered and compared to the reference images for accurate setup of the patient.     Additional Information:  Setup instruction, immobilization device information, contrast information and other simulation details are given in therapy notes.

## 2025-04-09 ENCOUNTER — INITIAL CONSULT (OUTPATIENT)
Dept: BARIATRICS/WEIGHT MGMT | Age: 65
End: 2025-04-09

## 2025-04-09 ENCOUNTER — TELEPHONE (OUTPATIENT)
Dept: BARIATRICS/WEIGHT MGMT | Age: 65
End: 2025-04-09

## 2025-04-09 VITALS
DIASTOLIC BLOOD PRESSURE: 80 MMHG | SYSTOLIC BLOOD PRESSURE: 130 MMHG | HEIGHT: 67 IN | WEIGHT: 293 LBS | BODY MASS INDEX: 45.99 KG/M2 | HEART RATE: 60 BPM

## 2025-04-09 DIAGNOSIS — K43.9 VENTRAL HERNIA WITHOUT OBSTRUCTION OR GANGRENE: Primary | ICD-10-CM

## 2025-04-09 DIAGNOSIS — E66.01 MORBID OBESITY WITH BMI OF 45.0-49.9, ADULT (HCC): ICD-10-CM

## 2025-04-09 NOTE — TELEPHONE ENCOUNTER
Left voicemail to schedule a follow up appointment either in May in [place] or June in [place] with Dr. Anna.

## 2025-04-09 NOTE — PROGRESS NOTES
10/23/2020, 09/21/2021, 09/06/2022    Influenza, FLUBLOK, (age 18 y+), Quadv PF, 0.5mL 09/12/2023    Pneumococcal, PCV20, PREVNAR 20, (age 6w+), IM, 0.5mL 11/01/2022    Pneumococcal, PPSV23, PNEUMOVAX 23, (age 2y+), SC/IM, 0.5mL 12/08/2008    TDaP, ADACEL (age 10y-64y), BOOSTRIX (age 10y+), IM, 0.5mL 06/01/2017    Zoster Recombinant (Shingrix) 01/15/2020, 03/19/2020         Physician Review    [x] Past medical, family, & social history reviewed and discussed with patient.     Review of surgery and post-surgical changes (by surgeon for surgical patients only)    [x] Lifelong diet expectations reviewed with patient    [x] Need for lifelong vitamin supplementation reviewed with patient    PHYSICAL EXAMINATION:      /80 (BP Site: Right Upper Arm, Patient Position: Sitting, BP Cuff Size: Large Adult)   Pulse 60   Ht 1.702 m (5' 7\")   Wt (!) 141.1 kg (311 lb)   BMI 48.71 kg/m²     Constitutional:  Vital signs are normal. The patient appears well-developed   HEENT:      Head: Normocephalic. Atraumatic     Eyes: pupils are equal and reactive.  No scleral icterus is present.     Neck: No mass and no thyromegaly present.   Cardiovascular: Normal rate, regular rhythm, S1 normal and S2 normal.  Bilateral pulses present.  Pulmonary/Chest: Effort normal and breath sounds normal. No retractions.  Abdominal: Soft. Normal appearance. There is no organomegaly. No tenderness. There is no rigidity, no rebound, no guarding and no Bryan's sign. Ventral Hernia  Musculoskeletal:      Right lower leg: Normal. No tenderness and no edema.      Left lower leg: Normal. No tenderness and no edema.   Lymphadenopathy:     No cervical adenopathy, No Exrtemity Adenopathy.   Neurological: The patient is alert and oriented. Moving all four extremities equally, sensation grossly intact bilateral.  Skin: Skin is warm, dry and intact.   Psychiatric: The patient has a normal mood and affect. Speech is normal and behavior is normal. Judgment and

## 2025-04-16 ENCOUNTER — OFFICE VISIT (OUTPATIENT)
Age: 65
End: 2025-04-16
Payer: COMMERCIAL

## 2025-04-16 VITALS
TEMPERATURE: 97.4 F | OXYGEN SATURATION: 96 % | DIASTOLIC BLOOD PRESSURE: 80 MMHG | BODY MASS INDEX: 45.99 KG/M2 | WEIGHT: 293 LBS | HEIGHT: 67 IN | SYSTOLIC BLOOD PRESSURE: 130 MMHG | HEART RATE: 70 BPM

## 2025-04-16 DIAGNOSIS — I87.2 VENOUS STASIS DERMATITIS: Primary | ICD-10-CM

## 2025-04-16 DIAGNOSIS — L30.8 OTHER ECZEMA: ICD-10-CM

## 2025-04-16 DIAGNOSIS — L20.84 INTRINSIC ECZEMA: ICD-10-CM

## 2025-04-16 PROCEDURE — 3017F COLORECTAL CA SCREEN DOC REV: CPT | Performed by: DERMATOLOGY

## 2025-04-16 PROCEDURE — 1036F TOBACCO NON-USER: CPT | Performed by: DERMATOLOGY

## 2025-04-16 PROCEDURE — 1090F PRES/ABSN URINE INCON ASSESS: CPT | Performed by: DERMATOLOGY

## 2025-04-16 PROCEDURE — G8417 CALC BMI ABV UP PARAM F/U: HCPCS | Performed by: DERMATOLOGY

## 2025-04-16 PROCEDURE — 99212 OFFICE O/P EST SF 10 MIN: CPT | Performed by: DERMATOLOGY

## 2025-04-16 PROCEDURE — 1123F ACP DISCUSS/DSCN MKR DOCD: CPT | Performed by: DERMATOLOGY

## 2025-04-16 PROCEDURE — G8427 DOCREV CUR MEDS BY ELIG CLIN: HCPCS | Performed by: DERMATOLOGY

## 2025-04-16 PROCEDURE — 99213 OFFICE O/P EST LOW 20 MIN: CPT | Performed by: DERMATOLOGY

## 2025-04-16 PROCEDURE — G8399 PT W/DXA RESULTS DOCUMENT: HCPCS | Performed by: DERMATOLOGY

## 2025-04-16 NOTE — PROGRESS NOTES
Facility-Administered Medications   Medication Dose Route Frequency Provider Last Rate Last Admin    methylPREDNISolone acetate (DEPO-MEDROL) injection 40 mg  40 mg Intra-artICUlar Once Leydi Junior PA-C           ALLERGIES:   Allergies   Allergen Reactions    Latex Rash     blisters  blisters    Adhesive Tape Rash     blisters    Amlodipine Other (See Comments)     Facial numbness  Facial numbness  Facial numbness  Facial numbness  Facial numbness  Facial numbness  Facial numbness    Bextra [Valdecoxib] Rash     swelling    Brimonidine Itching     Burning eyes severe    Daypro [Oxaprozin] Anaphylaxis    Diclofenac Sodium Swelling and Shortness Of Breath    Famotidine Other (See Comments)     Blisters down her arms    Hydrocodone-Acetaminophen Nausea Only     Severe chest pain    Hydromorphone Other (See Comments)     Rapid heart beat,  Nausea, spent 3 days in cardiac unit    Ibuprofen      Other reaction(s): bleeding  Other reaction(s): Unknown  Black stools  \"rips up stomach\", black tarry stool  Black stools  \"rips up stomach\", black tarry stool    Lisinopril Nausea Only, Nausea And Vomiting and Other (See Comments)     Other reaction(s): Hypotension    Metoprolol Other (See Comments)     Other reaction(s): Dizziness    Naproxen Other (See Comments)     Chest pain , swelling    Oxycodone-Acetaminophen      Chest pain severe    Rofecoxib Rash     swelling    Shellfish-Derived Products Anaphylaxis and Rash     shrimp  shrimp  shrimp    Simvastatin Nausea And Vomiting     Other reaction(s): muscle cramps    Statins      Other reaction(s): muscle cramps  Tolerates lovastatin. Pravachol caused numbness in head/face    Sulfa Antibiotics Hives    Tetracyclines & Related Itching and Rash    Timoptic [Timolol Maleate] Itching and Swelling     Eyes burning severely    Tramadol Itching and Rash    Fosamax [Alendronate] Nausea Only and Nausea And Vomiting    Nalbuphine Nausea And Vomiting    Alendronate Sodium

## 2025-04-16 NOTE — TELEPHONE ENCOUNTER
Request for refill.  Pt need refill for Urea Cream 20%. Pt states this cream work for her. Need a updated Rx. Thank you.  Requested Prescriptions     Pending Prescriptions Disp Refills    urea (CARMOL) 20 % cream 85 g 2     Sig: Apply topically as needed.    .      Please review and e-scribe to pharmacy listed in chart if appropriate. Thank you.      Last Visit Date: 1/27/2025  Next Visit Date: 5/5/2025    Future Appointments   Date Time Provider Department Center   4/22/2025  1:45 PM [facility] CAT SCAN ROOM [facility] CT [facility] Rad   5/5/2025 12:15 PM Cordell Watt DPM [facility] Podiatry [facility]   5/6/2025  8:30 AM [facility] NM 2 [facility] NUC MED [facility] Rad   5/6/2025 10:30 AM [facility]   5/6/2025 12:30 PM [facility] NM 2 [facility] NUC MED [facility] Rad   5/13/2025  9:50 AM Emmett Yao MD [facility]   6/2/2025  9:00 AM Ania Sellers MD TIFF NEURO Neurology -   7/24/2025 10:15 AM [facility]   2/3/2026  9:10 AM Arabella Hampton APRN - CNJOS[facility] MIGUEL TIFF OB/GYN [facility]   2/4/2026 10:00 AM [facility] MAMMOGRAPHY ROOM AT [facility] WOMENS [facility] Rad   4/16/2026 10:45 [facility]       Health Maintenance   Topic Date Due    Respiratory Syncytial Virus (RSV) Pregnant or age 60 yrs+ (1 - Risk 60-74 years 1-dose series) Never done    Diabetic Alb to Cr ratio (uACR) test  01/02/2021    Diabetic retinal exam  03/06/2021    COVID-19 Vaccine (1 - 2024-25 season) Never done    Diabetic foot exam  04/05/2025    Depression Monitoring  01/06/2026    A1C test (Diabetic or Prediabetic)  01/18/2026    Lipids  01/18/2026    GFR test (Diabetes, CKD 3-4, OR last GFR 15-59)  01/21/2026    Breast cancer screen  02/03/2027    DTaP/Tdap/Td vaccine (2 - Td or Tdap) 06/01/2027    Colorectal Cancer Screen  12/09/2029    DEXA (modify frequency per FRAX score)  Completed    Flu vaccine  Completed    Shingles vaccine  Completed    Pneumococcal 50+ years Vaccine  Completed    Hepatitis C screen  Completed    HIV screen

## 2025-04-16 NOTE — PATIENT INSTRUCTIONS
Venous stasis dermatitis with secondary eczematous dermatitis  - biopsy 03/2024 indicative of subacute spongiotic dermatitis  - chronic illness, responding to treatment but not yet at goal  - tried and failed triamcinolone 0.025% cream and 0.1% ointment  - continue fluocinonide ointment BID on the areas until skin is smooth, then switch to 2x weekly for maintenance on problem areas if recurring  - continue use of compression socks daily

## 2025-05-01 ENCOUNTER — TELEPHONE (OUTPATIENT)
Dept: GASTROENTEROLOGY | Age: 65
End: 2025-05-01

## 2025-05-01 NOTE — TELEPHONE ENCOUNTER
Patient called stating that she would like instructions for procedure scheduled 5-29-25 sent to her.

## 2025-05-06 ENCOUNTER — HOSPITAL ENCOUNTER (OUTPATIENT)
Dept: NUCLEAR MEDICINE | Age: 65
Discharge: HOME | End: 2025-05-08
Payer: COMMERCIAL

## 2025-05-06 DIAGNOSIS — K21.9 GASTROESOPHAGEAL REFLUX DISEASE WITHOUT ESOPHAGITIS: ICD-10-CM

## 2025-05-06 DIAGNOSIS — R07.89 ATYPICAL CHEST PAIN: ICD-10-CM

## 2025-05-06 DIAGNOSIS — R14.0 ABDOMINAL BLOATING: ICD-10-CM

## 2025-05-06 DIAGNOSIS — K21.9 GASTROESOPHAGEAL REFLUX DISEASE, UNSPECIFIED WHETHER ESOPHAGITIS PRESENT: ICD-10-CM

## 2025-05-06 DIAGNOSIS — R13.19 ESOPHAGEAL DYSPHAGIA: ICD-10-CM

## 2025-05-06 DIAGNOSIS — E66.01 MORBID OBESITY WITH BMI OF 45.0-49.9, ADULT (HCC): ICD-10-CM

## 2025-05-06 DIAGNOSIS — F41.9 ANXIETY: ICD-10-CM

## 2025-05-06 DIAGNOSIS — K58.8 OTHER IRRITABLE BOWEL SYNDROME: ICD-10-CM

## 2025-05-06 DIAGNOSIS — K58.2 IRRITABLE BOWEL SYNDROME WITH BOTH CONSTIPATION AND DIARRHEA: ICD-10-CM

## 2025-05-06 DIAGNOSIS — Z83.79 FAMILY HISTORY OF CROHN'S DISEASE: ICD-10-CM

## 2025-05-06 PROCEDURE — 3430000000 HC RX DIAGNOSTIC RADIOPHARMACEUTICAL: Performed by: INTERNAL MEDICINE

## 2025-05-06 PROCEDURE — 78264 GASTRIC EMPTYING IMG STUDY: CPT

## 2025-05-06 PROCEDURE — A9541 TC99M SULFUR COLLOID: HCPCS | Performed by: INTERNAL MEDICINE

## 2025-05-06 RX ADMIN — Medication 1 MILLICURIE: at 10:45

## 2025-05-07 ENCOUNTER — HOSPITAL ENCOUNTER (OUTPATIENT)
Age: 65
Discharge: HOME | End: 2025-05-07
Attending: SURGERY
Payer: COMMERCIAL

## 2025-05-07 ENCOUNTER — HOSPITAL ENCOUNTER (OUTPATIENT)
Dept: NUCLEAR MEDICINE | Age: 65
Discharge: HOME | End: 2025-05-09
Attending: SURGERY
Payer: COMMERCIAL

## 2025-05-07 ENCOUNTER — HOSPITAL ENCOUNTER (OUTPATIENT)
Dept: CT IMAGING | Age: 65
Discharge: HOME | End: 2025-05-09
Attending: SURGERY
Payer: COMMERCIAL

## 2025-05-07 DIAGNOSIS — K21.9 GASTROESOPHAGEAL REFLUX DISEASE WITHOUT ESOPHAGITIS: ICD-10-CM

## 2025-05-07 DIAGNOSIS — K43.9 VENTRAL HERNIA WITHOUT OBSTRUCTION OR GANGRENE: ICD-10-CM

## 2025-05-07 LAB
CREAT SERPL-MCNC: 0.8 MG/DL (ref 0.5–0.9)
GFR, ESTIMATED: 86 ML/MIN/1.73M2

## 2025-05-07 PROCEDURE — 78264 GASTRIC EMPTYING IMG STUDY: CPT

## 2025-05-07 PROCEDURE — 3430000000 HC RX DIAGNOSTIC RADIOPHARMACEUTICAL: Performed by: INTERNAL MEDICINE

## 2025-05-07 PROCEDURE — A9541 TC99M SULFUR COLLOID: HCPCS | Performed by: INTERNAL MEDICINE

## 2025-05-07 PROCEDURE — 6360000004 HC RX CONTRAST MEDICATION: Performed by: SURGERY

## 2025-05-07 PROCEDURE — 74177 CT ABD & PELVIS W/CONTRAST: CPT

## 2025-05-07 PROCEDURE — 82565 ASSAY OF CREATININE: CPT

## 2025-05-07 PROCEDURE — 36415 COLL VENOUS BLD VENIPUNCTURE: CPT

## 2025-05-07 RX ORDER — TECHNETIUM TC 99M SULFUR COLLOID 2 MG
1 KIT MISCELLANEOUS ONCE
Status: COMPLETED | OUTPATIENT
Start: 2025-05-07 | End: 2025-05-07

## 2025-05-07 RX ORDER — IOPAMIDOL 755 MG/ML
75 INJECTION, SOLUTION INTRAVASCULAR
Status: COMPLETED | OUTPATIENT
Start: 2025-05-07 | End: 2025-05-07

## 2025-05-07 RX ORDER — IOPAMIDOL 755 MG/ML
18 INJECTION, SOLUTION INTRAVASCULAR
Status: COMPLETED | OUTPATIENT
Start: 2025-05-07 | End: 2025-05-07

## 2025-05-07 RX ADMIN — IOPAMIDOL 18 ML: 755 INJECTION, SOLUTION INTRAVENOUS at 11:30

## 2025-05-07 RX ADMIN — IOPAMIDOL 75 ML: 755 INJECTION, SOLUTION INTRAVENOUS at 12:35

## 2025-05-07 RX ADMIN — Medication 1 MILLICURIE: at 11:24

## 2025-05-16 ENCOUNTER — TELEPHONE (OUTPATIENT)
Age: 65
End: 2025-05-16

## 2025-05-16 NOTE — TELEPHONE ENCOUNTER
Called and spoke to patient to reschedule appointment from 5/19/2025 to 6/9/2025 at then same time of 12:15 pm

## 2025-05-18 DIAGNOSIS — J45.20 MILD INTERMITTENT ASTHMA WITHOUT COMPLICATION: ICD-10-CM

## 2025-05-20 RX ORDER — ALBUTEROL SULFATE 90 UG/1
2 INHALANT RESPIRATORY (INHALATION) EVERY 6 HOURS PRN
Qty: 13.4 G | Refills: 2 | Status: SHIPPED | OUTPATIENT
Start: 2025-05-20

## 2025-05-22 ENCOUNTER — OFFICE VISIT (OUTPATIENT)
Age: 65
End: 2025-05-22
Payer: MEDICARE

## 2025-05-22 VITALS
BODY MASS INDEX: 45.99 KG/M2 | DIASTOLIC BLOOD PRESSURE: 71 MMHG | WEIGHT: 293 LBS | OXYGEN SATURATION: 97 % | HEART RATE: 71 BPM | SYSTOLIC BLOOD PRESSURE: 138 MMHG | HEIGHT: 67 IN

## 2025-05-22 DIAGNOSIS — J01.00 ACUTE MAXILLARY SINUSITIS, RECURRENCE NOT SPECIFIED: Primary | ICD-10-CM

## 2025-05-22 PROCEDURE — 99213 OFFICE O/P EST LOW 20 MIN: CPT

## 2025-05-22 PROCEDURE — 3017F COLORECTAL CA SCREEN DOC REV: CPT

## 2025-05-22 PROCEDURE — 1090F PRES/ABSN URINE INCON ASSESS: CPT

## 2025-05-22 PROCEDURE — G8399 PT W/DXA RESULTS DOCUMENT: HCPCS

## 2025-05-22 PROCEDURE — G8427 DOCREV CUR MEDS BY ELIG CLIN: HCPCS

## 2025-05-22 PROCEDURE — 1036F TOBACCO NON-USER: CPT

## 2025-05-22 PROCEDURE — G8417 CALC BMI ABV UP PARAM F/U: HCPCS

## 2025-05-22 PROCEDURE — 1123F ACP DISCUSS/DSCN MKR DOCD: CPT

## 2025-05-22 RX ORDER — BLOOD-GLUCOSE METER
EACH MISCELLANEOUS
COMMUNITY
Start: 2025-05-14

## 2025-05-22 ASSESSMENT — ENCOUNTER SYMPTOMS
SORE THROAT: 0
COUGH: 1
SINUS PRESSURE: 1
DIARRHEA: 0
VOMITING: 0
CONSTIPATION: 0
NAUSEA: 0
SINUS PAIN: 1
SHORTNESS OF BREATH: 0
ABDOMINAL PAIN: 0
RHINORRHEA: 1

## 2025-05-22 ASSESSMENT — PATIENT HEALTH QUESTIONNAIRE - PHQ9
7. TROUBLE CONCENTRATING ON THINGS, SUCH AS READING THE NEWSPAPER OR WATCHING TELEVISION: NOT AT ALL
SUM OF ALL RESPONSES TO PHQ QUESTIONS 1-9: 0
6. FEELING BAD ABOUT YOURSELF - OR THAT YOU ARE A FAILURE OR HAVE LET YOURSELF OR YOUR FAMILY DOWN: NOT AT ALL
1. LITTLE INTEREST OR PLEASURE IN DOING THINGS: NOT AT ALL
SUM OF ALL RESPONSES TO PHQ QUESTIONS 1-9: 0
SUM OF ALL RESPONSES TO PHQ QUESTIONS 1-9: 0
3. TROUBLE FALLING OR STAYING ASLEEP: NOT AT ALL
2. FEELING DOWN, DEPRESSED OR HOPELESS: NOT AT ALL
5. POOR APPETITE OR OVEREATING: NOT AT ALL
10. IF YOU CHECKED OFF ANY PROBLEMS, HOW DIFFICULT HAVE THESE PROBLEMS MADE IT FOR YOU TO DO YOUR WORK, TAKE CARE OF THINGS AT HOME, OR GET ALONG WITH OTHER PEOPLE: NOT DIFFICULT AT ALL
SUM OF ALL RESPONSES TO PHQ QUESTIONS 1-9: 0
8. MOVING OR SPEAKING SO SLOWLY THAT OTHER PEOPLE COULD HAVE NOTICED. OR THE OPPOSITE, BEING SO FIGETY OR RESTLESS THAT YOU HAVE BEEN MOVING AROUND A LOT MORE THAN USUAL: NOT AT ALL
9. THOUGHTS THAT YOU WOULD BE BETTER OFF DEAD, OR OF HURTING YOURSELF: NOT AT ALL

## 2025-05-22 NOTE — PROGRESS NOTES
Discharge Summary     Patient left against medical advice       PATIENT ID: Michael Rockwell  MRN: [id number]   YOB: 1967    DATE OF ADMISSION: 7/15/2024  1:48 PM    DATE OF DISCHARGE: 7/16/2024    PRIMARY CARE PROVIDER: Latisha Torres MD     ATTENDING PHYSICIAN: Dr. Eaton   DISCHARGING PROVIDER: Sushma Madala, MD    To contact this individual call [phone] and ask the  to page.  If unavailable ask to be transferred the Adult Hospitalist Department.    CONSULTATIONS: IP CONSULT TO HOSPITALIST  IP CONSULT TO NEUROLOGY  IP CONSULT TO NEPHROLOGY    PROCEDURES/SURGERIES: * No surgery found *    DIAGNOSES & HOSPITAL COURSE:   Per HPI:\"Michael Rockwell is a 56 y.o. male with HTN, CKD Stage 4, COPD, h/o polysubstance abuse, h/o syncope, h/o stroke with residual L-sided numbness, tobacco use disorder who presented with RLE weakness and slurred speech several times a day for the last month. Pt and his wife are difficult historians. He reports having difficulty urinating after he was catheterized at a free standing ER awhile back and having to punch his left lower abdominal area to feel the urge to urinate. He denies new L-sided sx, audiovisual or swallow deficits. Slurred speech has resolved. He had a headache last week. He reports only taking ASA 81mg daily, atorvastatin 80mg nightly and only taking labetalol and amlodipine for a total of ~30 days in the last year. He stopped taking hydralazine 1 year ago because it made him \"feel like a zombie.\" He smokes 5 cigs/day, denies EtOH use. He admits to smoking crack ~1 month ago while at a party. Pt and his wife state that his SBPs are always in the 200s, sometimes in the 240s but never less than 190s. He says the BP meds make him feel worse. They state he was never told he had kidney disease. In the ED, CT head showed no acute process. BP is 240s/130s \"    Resistant HTN, uncontrolled  - suspect secondary HTN  -  TTE: normal EF of 60 - 
Attending Physician Statement  I have discussed the care of RobertaPrebleincluding pertinent history and exam findings,  with the resident. I have reviewed the key elements of all parts of the encounter with the resident.  I agree with the assessment, plan and orders as documented by the resident.  (GE Modifier)    Acute Sinusitis- Augmentin 875 bid x 7 days  
Visit Information    Have you changed or started any medications since your last visit including any over-the-counter medicines, vitamins, or herbal medicines? no   Have you stopped taking any of your medications? Is so, why? -  no  Are you having any side effects from any of your medications? - no    Have you seen any other physician or provider since your last visit?  no   Have you had any other diagnostic tests since your last visit?  yes - Labs, NM Gastric Emptying, CT   Have you been seen in the emergency room and/or had an admission in a hospital since we last saw you?  yes - [facility]    Have you had your routine dental cleaning in the past 6 months?  no     Do you have an active MyChart account? If no, what is the barrier?  Yes    Patient Care Team:  Trinity Rebollar MD as PCP - General (Family Medicine)  Bridger Toure DO as Surgeon (Orthopedic Surgery)  Ronaldo Barney MD as Consulting Physician (Pulmonology)  Dante Franco MD as Consulting Physician  Berenice Laboy MD as Consulting Physician (Endocrinology)  Gaurav Ayala MD as Consulting Physician (Obstetrics & Gynecology)  Reno Lenz MD as Consulting Physician (Gastroenterology)  Reno Lenz MD as Consulting Physician (Gastroenterology)  Shahzad Myers MD as Cardiologist (Cardiology)  Thomas Youngblood APRN - CNP as Nurse Practitioner (Pulmonology)  Anila Lira MD as Consulting Physician (Dermatology)    Medical History Review  Past Medical, Family, and Social History reviewed and does contribute to the patient presenting condition    Health Maintenance   Topic Date Due    Respiratory Syncytial Virus (RSV) Pregnant or age 60 yrs+ (1 - Risk 60-74 years 1-dose series) Never done    Diabetic Alb to Cr ratio (uACR) test  01/02/2021    Diabetic retinal exam  03/06/2021    COVID-19 Vaccine (1 - 2024-25 season) Never done    Diabetic foot exam  04/05/2025    Depression Monitoring  01/06/2026    A1C test (Diabetic or 
05/07/2025    BUN 17 01/21/2025    CO2 23 01/21/2025    TSH 4.31 (H) 01/19/2025    INR 1.0 01/18/2025    LABA1C 5.3 01/18/2025     Lab Results   Component Value Date    CALCIUM 9.9 01/21/2025     No results found for: \"LDLDIRECT\"    Assessment and Plan:    1. Acute maxillary sinusitis, recurrence not specified  Patient presenting with 10 days of sinus pressure, congestion.  Tenderness of maxillary and frontal sinuses today.  Will treat for acute sinusitis with Augmentin twice daily for 7 days.  Patient informed to use Flonase for nasal congestion.  Patient can continue to take Tylenol as needed for any discomfort.  Patient given precautions including fever, chest pain, shortness of breath or worsening symptoms to come to the clinic or go to the ER.  Patient voiced understanding  - amoxicillin-clavulanate (AUGMENTIN) 875-125 MG per tablet; Take 1 tablet by mouth 2 times daily for 7 days  Dispense: 14 tablet; Refill: 0          Requested Prescriptions     Signed Prescriptions Disp Refills    amoxicillin-clavulanate (AUGMENTIN) 875-125 MG per tablet 14 tablet 0     Sig: Take 1 tablet by mouth 2 times daily for 7 days       There are no discontinued medications.    Kaci received counseling on the following healthy behaviors: nutrition, exercise and medication adherence    Discussed use,benefit, and side effects of prescribed medications.  Barriers to medication compliance addressed.      All patient questions answered.  Pt voiced understanding.     Return in about 2 months (around 7/22/2025) for  Rechk meds, Diabetes FU.        Disclaimer: Some orall of this note was transcribed using voice-recognition software.This may cause typographical errors occasionally. Although all effort is made to fix these errors, please do not hesitate to contact our office if there isany concern with the understanding of this note.

## 2025-05-22 NOTE — PATIENT INSTRUCTIONS
Thank you for letting us take care of you today. We hope all your questions were addressed. If a question was overlooked or something else comes to mind after you return home, please contact a member of your Care Team listed below.      Your Care Team at [facility] is Team #2  Misael Leal M.D. (Faculty)  Shanna Jon M.D. (Resident)  Trinity Rebollar M.D. (Resident)  Chino Brown M.D. (Resident)  Rachel Cooper M.D. (Resident)  Micki Mitchell M.D. (Resident)  Yuri Lopez, [facility]  Savita Rice, [facility]  Lila Zambrano,  [facility]  Guera Huggins, [facility]  Yumiko Davis, [facility]  Yazmin Basilio, [facility]  Ryan Hanley (LJ) Javier DARWIN (Clinical Practice Manager)  Maryjo Burton [facility] (Clinical Pharmacist)     Office phone number: [phone]    If you need to get in right away due to illness, please be advised we have \"Same Day\" appointments available Monday-Friday. Please call us at [phone] option #3 to schedule your \"Same Day\" appointment.

## 2025-05-24 ENCOUNTER — RESULTS FOLLOW-UP (OUTPATIENT)
Dept: BARIATRICS/WEIGHT MGMT | Age: 65
End: 2025-05-24

## 2025-05-29 ENCOUNTER — ANESTHESIA (OUTPATIENT)
Dept: OPERATING ROOM | Age: 65
End: 2025-05-29
Payer: COMMERCIAL

## 2025-05-29 ENCOUNTER — HOSPITAL ENCOUNTER (OUTPATIENT)
Age: 65
Setting detail: OUTPATIENT SURGERY
Discharge: HOME | End: 2025-05-29
Attending: INTERNAL MEDICINE | Admitting: INTERNAL MEDICINE
Payer: COMMERCIAL

## 2025-05-29 ENCOUNTER — ANESTHESIA EVENT (OUTPATIENT)
Dept: OPERATING ROOM | Age: 65
End: 2025-05-29
Payer: COMMERCIAL

## 2025-05-29 VITALS
TEMPERATURE: 97.3 F | RESPIRATION RATE: 15 BRPM | OXYGEN SATURATION: 99 % | BODY MASS INDEX: 45.99 KG/M2 | SYSTOLIC BLOOD PRESSURE: 139 MMHG | DIASTOLIC BLOOD PRESSURE: 83 MMHG | HEIGHT: 67 IN | HEART RATE: 68 BPM | WEIGHT: 293 LBS

## 2025-05-29 DIAGNOSIS — R13.19 ESOPHAGEAL DYSPHAGIA: ICD-10-CM

## 2025-05-29 DIAGNOSIS — K21.9 GASTROESOPHAGEAL REFLUX DISEASE, UNSPECIFIED WHETHER ESOPHAGITIS PRESENT: ICD-10-CM

## 2025-05-29 DIAGNOSIS — K21.9 GASTROESOPHAGEAL REFLUX DISEASE WITHOUT ESOPHAGITIS: ICD-10-CM

## 2025-05-29 LAB — GLUCOSE BLD-MCNC: 122 MG/DL (ref 65–105)

## 2025-05-29 PROCEDURE — 6360000002 HC RX W HCPCS: Performed by: NURSE ANESTHETIST, CERTIFIED REGISTERED

## 2025-05-29 PROCEDURE — 7100000010 HC PHASE II RECOVERY - FIRST 15 MIN: Performed by: INTERNAL MEDICINE

## 2025-05-29 PROCEDURE — 3609017700 HC EGD DILATION GASTRIC/DUODENAL STRICTURE: Performed by: INTERNAL MEDICINE

## 2025-05-29 PROCEDURE — 43239 EGD BIOPSY SINGLE/MULTIPLE: CPT | Performed by: INTERNAL MEDICINE

## 2025-05-29 PROCEDURE — 3609012400 HC EGD TRANSORAL BIOPSY SINGLE/MULTIPLE: Performed by: INTERNAL MEDICINE

## 2025-05-29 PROCEDURE — 82947 ASSAY GLUCOSE BLOOD QUANT: CPT

## 2025-05-29 PROCEDURE — 2709999900 HC NON-CHARGEABLE SUPPLY: Performed by: INTERNAL MEDICINE

## 2025-05-29 PROCEDURE — 7100000011 HC PHASE II RECOVERY - ADDTL 15 MIN: Performed by: INTERNAL MEDICINE

## 2025-05-29 PROCEDURE — 3700000000 HC ANESTHESIA ATTENDED CARE: Performed by: INTERNAL MEDICINE

## 2025-05-29 PROCEDURE — 88305 TISSUE EXAM BY PATHOLOGIST: CPT

## 2025-05-29 PROCEDURE — C1726 CATH, BAL DIL, NON-VASCULAR: HCPCS | Performed by: INTERNAL MEDICINE

## 2025-05-29 PROCEDURE — 2580000003 HC RX 258: Performed by: NURSE ANESTHETIST, CERTIFIED REGISTERED

## 2025-05-29 PROCEDURE — 43249 ESOPH EGD DILATION <30 MM: CPT | Performed by: INTERNAL MEDICINE

## 2025-05-29 RX ORDER — SODIUM CHLORIDE, SODIUM LACTATE, POTASSIUM CHLORIDE, CALCIUM CHLORIDE 600; 310; 30; 20 MG/100ML; MG/100ML; MG/100ML; MG/100ML
INJECTION, SOLUTION INTRAVENOUS
Status: DISCONTINUED | OUTPATIENT
Start: 2025-05-29 | End: 2025-05-29 | Stop reason: SDUPTHER

## 2025-05-29 RX ORDER — SODIUM CHLORIDE 0.9 % (FLUSH) 0.9 %
5-40 SYRINGE (ML) INJECTION PRN
Status: DISCONTINUED | OUTPATIENT
Start: 2025-05-29 | End: 2025-05-29 | Stop reason: HOSPADM

## 2025-05-29 RX ORDER — PROPOFOL 10 MG/ML
INJECTION, EMULSION INTRAVENOUS
Status: DISCONTINUED | OUTPATIENT
Start: 2025-05-29 | End: 2025-05-29 | Stop reason: SDUPTHER

## 2025-05-29 RX ORDER — LIDOCAINE HYDROCHLORIDE 20 MG/ML
INJECTION, SOLUTION EPIDURAL; INFILTRATION; INTRACAUDAL; PERINEURAL
Status: DISCONTINUED | OUTPATIENT
Start: 2025-05-29 | End: 2025-05-29 | Stop reason: SDUPTHER

## 2025-05-29 RX ORDER — SODIUM CHLORIDE 9 MG/ML
INJECTION, SOLUTION INTRAVENOUS PRN
Status: DISCONTINUED | OUTPATIENT
Start: 2025-05-29 | End: 2025-05-29 | Stop reason: HOSPADM

## 2025-05-29 RX ORDER — SODIUM CHLORIDE 9 MG/ML
INJECTION, SOLUTION INTRAVENOUS CONTINUOUS
Status: DISCONTINUED | OUTPATIENT
Start: 2025-05-29 | End: 2025-05-29 | Stop reason: HOSPADM

## 2025-05-29 RX ORDER — LIDOCAINE HYDROCHLORIDE 10 MG/ML
1 INJECTION, SOLUTION EPIDURAL; INFILTRATION; INTRACAUDAL; PERINEURAL
Status: DISCONTINUED | OUTPATIENT
Start: 2025-05-29 | End: 2025-05-29 | Stop reason: HOSPADM

## 2025-05-29 RX ORDER — SODIUM CHLORIDE 0.9 % (FLUSH) 0.9 %
5-40 SYRINGE (ML) INJECTION EVERY 12 HOURS SCHEDULED
Status: DISCONTINUED | OUTPATIENT
Start: 2025-05-29 | End: 2025-05-29 | Stop reason: HOSPADM

## 2025-05-29 RX ADMIN — SODIUM CHLORIDE, POTASSIUM CHLORIDE, SODIUM LACTATE AND CALCIUM CHLORIDE: 600; 310; 30; 20 INJECTION, SOLUTION INTRAVENOUS at 10:22

## 2025-05-29 RX ADMIN — PROPOFOL 50 MG: 10 INJECTION, EMULSION INTRAVENOUS at 11:30

## 2025-05-29 RX ADMIN — PROPOFOL 50 MG: 10 INJECTION, EMULSION INTRAVENOUS at 11:29

## 2025-05-29 RX ADMIN — LIDOCAINE HYDROCHLORIDE 60 MG: 20 INJECTION, SOLUTION EPIDURAL; INFILTRATION; INTRACAUDAL; PERINEURAL at 11:27

## 2025-05-29 ASSESSMENT — PAIN DESCRIPTION - DESCRIPTORS: DESCRIPTORS: ACHING;DULL

## 2025-05-29 ASSESSMENT — ENCOUNTER SYMPTOMS
RHINORRHEA: 1
BACK PAIN: 1
WHEEZING: 0
SHORTNESS OF BREATH: 0
SORE THROAT: 0
CHEST TIGHTNESS: 0
COUGH: 0

## 2025-05-29 ASSESSMENT — PAIN - FUNCTIONAL ASSESSMENT
PAIN_FUNCTIONAL_ASSESSMENT: NONE - DENIES PAIN
PAIN_FUNCTIONAL_ASSESSMENT: 0-10

## 2025-05-29 NOTE — DISCHARGE INSTRUCTIONS
Upper GI Endoscopy: What to Expect at Home  Your Recovery  You may have a sore throat for a day or two after the test.  How can you care for yourself at home?  Activity  Rest as much as you need to after you go home.  You should be able to go back to your usual activities the day after the test.  Diet  Drink plenty of fluids (unless your doctor has told you not to).  Follow-up care is a key part of your treatment and safety. Be sure to make and go to all appointments, and call your doctor if you are having problems.  When should you call for help?  Call 911 anytime you think you may need emergency care. For example, call if:  You passed out (lost consciousness).  You cough up blood.  You vomit blood or what looks like coffee grounds.  You pass maroon or very bloody stools.  Call your doctor now or seek immediate medical care if:  You have trouble swallowing.  You have belly pain.  Your stools are black and tarlike or have streaks of blood.  You are sick to your stomach or cannot keep fluids down.  Watch closely for changes in your health, and be sure to contact your doctor if:  Your throat still hurts after a day or two.  You do not get better as expected.   Where can you learn more?   Go to [email or web address] and sign in to your [employer] account. Enter J454 in the Search Health Information box to learn more about “Upper GI Endoscopy: What to Expect at Home.”    .     © 2333-1845 [employer]. Care instructions adapted under license by [employer]. This care instruction is for use with your licensed healthcare professional. If you have questions about a medical condition or this instruction, always ask your healthcare professional. [employer] disclaims any warranty or liability for your use of this information.  Content Version: 9.9.206678; Last Revised: February 20, 2013

## 2025-05-29 NOTE — ANESTHESIA PRE PROCEDURE
Department of Anesthesiology  Preprocedure Note       Name:  Kaci Banerjee   Age:  65 y.o.  :  1960                                          MRN:  [id number]         Date:  2025      Surgeon: Surgeon(s):  Reno Lenz MD    Procedure: Procedure(s):  ESOPHAGOGASTRODUODENOSCOPY BIOPSY    Medications prior to admission:   Prior to Admission medications    Medication Sig Start Date End Date Taking? Authorizing Provider   Blood Glucose Monitoring Suppl (ONETOUCH VERIO FLEX SYSTEM) w/Device KIT  25   Dulce Maria Loya MD   amoxicillin-clavulanate (AUGMENTIN) 875-125 MG per tablet Take 1 tablet by mouth 2 times daily for 7 days 25  Trinity Rebollar MD   albuterol sulfate HFA (PROVENTIL;VENTOLIN;PROAIR) 108 (90 Base) MCG/ACT inhaler INHALE 2 PUFFS INTO THE LUNGS EVERY 6 HOURS AS NEEDED 25   Marysol Ojeda MD   solifenacin (VESICARE) 5 MG tablet TAKE 1 TABLET BY MOUTH DAILY 3/11/25   Emmett Yao MD   vitamin D 50 MCG (2000 UT) CAPS capsule TAKE 1 CAPSULE BY MOUTH EVERY DAY 25   Trinity Rebollar MD   levothyroxine (SYNTHROID) 100 MCG tablet Take 1 tablet by mouth Daily    Dulce Maria Loya MD   MYRBETRIQ 50 MG TB24 Take 50 mg by mouth daily 25   Emmett Yao MD   estrogens conjugated (PREMARIN) 0.625 MG/GM CREA vaginal cream Use 0.5 g vaginally nightly for 2 weeks.  Then use 0.5 g vaginally 2 nights weekly 25   Gaurav Ayala MD   Misc. Devices MISC 1 each by Does not apply route once for 1 dose DME order for outpatient custom foot orthotics  Semi rigid, full cover, full length, fabricator recommendations  Diagnosis: Chronic bilateral heel pain, plantar fasciitis 25  Konrad Angel DPM   senna (SENOKOT) 8.6 MG TABS tablet TAKE 1 TABLET BY MOUTH TWICE DAILY AS NEEDED FOR CONSTIPATION 25   Gaurav Ayala MD   ABE LOW DOSE 81 MG EC tablet TAKE 1 TABLET BY MOUTH DAILY 25   Trinity Rebollar MD   omeprazole (PRILOSEC) 40 MG

## 2025-05-29 NOTE — ANESTHESIA POSTPROCEDURE EVALUATION
Department of Anesthesiology  Postprocedure Note    Patient: Kaci Banerjee  MRN: [id number]  YOB: 1960  Date of evaluation: 5/29/2025    Procedure Summary       Date: 05/29/25 Room / Location: [address]    Anesthesia Start: 1125 Anesthesia Stop: 1143    Procedure: ESOPHAGOGASTRODUODENOSCOPY BIOPSY, DILATION Diagnosis:       Gastroesophageal reflux disease without esophagitis      Esophageal dysphagia      Gastroesophageal reflux disease, unspecified whether esophagitis present      (Gastroesophageal reflux disease without esophagitis [K21.9])      (Esophageal dysphagia [R13.19])      (Gastroesophageal reflux disease, unspecified whether esophagitis present [K21.9])    Surgeons: Reno Lenz MD Responsible Provider: Gudelia Boucher MD    Anesthesia Type: MAC ASA Status: 3            Anesthesia Type: No value filed.    Stefani Phase I: Stefani Score: 10    Stefani Phase II: Stefani Score: 10    Anesthesia Post Evaluation    Airway patency: patent  Cardiovascular status: hemodynamically stable  Respiratory status: acceptable    No notable events documented.

## 2025-05-29 NOTE — H&P
for review. Automated exposure control, iterative reconstruction, and/or weight based adjustment of the mA/kV was utilized to reduce the radiation dose to as low as reasonably achievable. COMPARISON: 01/20/2021 HISTORY: ORDERING SYSTEM PROVIDED HISTORY: Ventral hernia without obstruction or gangrene TECHNOLOGIST PROVIDED HISTORY: STAT Creatinine as needed:->Yes FINDINGS: Lower Chest: The visualized heart and lungs show no acute abnormalities. Organs: Subcentimeter left lobe of liver cyst.  Unchanged.  The spleen, pancreas, adrenal glands, kidneys show no significant abnormalities. Cholecystectomy noted. GI/Bowel: Small sliding hiatal hernia otherwise stomach unremarkable. Sigmoid diverticulosis.  No acute infective process in the GI tract.  No bowel obstruction. Pelvis: Hysterectomy.  Urinary bladder unremarkable. Peritoneum/Retroperitoneum: No free fluid or significant lymphadenopathy. Major vessels enhance satisfactorily. Bones/Soft Tissues: Redemonstration of left-sided fat containing spigelian hernia demonstrating a 2.5 cm neck.  The sac size is estimated at about 6.7 x 6.6 x 6.6 cm.  Previously reported at 6.2 x 3.3 x 4.5 cm with a 1 5 cm neck. No acute bony abnormality.     1. No acute intra-abdominal or pelvic process. 2. Sigmoid diverticulosis. 3. Small sliding hiatal hernia. 4. Left-sided fat containing Spigelian hernia slightly larger than prior as described above.     NM GASTRIC EMPTYING  Result Date: 5/7/2025  EXAMINATION: NUCLEAR MEDICINE GASTRIC EMPTYING STUDY 5/7/2025 9:34 am TECHNIQUE: Following ingestion of a standard solid meal labeled with 1.1 mCi Tc 99m sulfur colloid, planar images of the chest and abdomen were obtained at 0, 1, 2 and 4 hours in both anterior and posterior projections.  Regions of interest were drawn around the stomach and geometric means were calculated. All medications capable of altering motility were held. COMPARISON: None. HISTORY: ORDERING SYSTEM PROVIDED HISTORY:

## 2025-05-29 NOTE — OP NOTE
.PROCEDURE NOTE    DATE OF PROCEDURE: 5/29/2025     SURGEON: Reno Lenz MD    ASSISTANT: None    PREOPERATIVE DIAGNOSIS:   Pre-Op Diagnosis Codes:      * Gastroesophageal reflux disease without esophagitis [K21.9]     * Esophageal dysphagia [R13.19]     * Gastroesophageal reflux disease, unspecified whether esophagitis present [K21.9]  POSTOPERATIVE DIAGNOSIS: As described below    OPERATION: Upper GI endoscopy with Biopsy  CRE BALLOON DILATINOS UP TO 18 MM HG   ANESTHESIA: MAC PER ANESTHESIA     ESTIMATED BLOOD LOSS: Less than 50 ml    COMPLICATIONS: None.     SPECIMENS:  Was Obtained:     HISTORY: The patient is a 65 y.o. year old female with history of above preop diagnosis.  I recommended esophagogastroduodenoscopy with possible biopsy and I explained the risk, benefits, expected outcome, and alternatives to the procedure.  Risks included but are not limited to bleeding, infection, respiratory distress, hypotension, and perforation of the esophagus, stomach, or duodenum.  Patient understands and is in agreement.    PROCEDURE: The patient was given IV conscious sedation.  The patient's SPO2 remained above 90% throughout the procedure. The gastroscope was inserted orally and advanced under direct vision through the esophagus, through the stomach, through the pylorus, and into the descending duodenum.      Findings:    Retropharyngeal area was grossly normal appearing    Esophagus: abnormal: SPASMATIC TERTIARY CONTRACTIONS  CRE BALOON DILATION WERE DONE UP TO 18 MM HG      Stomach:    Fundus: abnormal: MULTIPLE SMALL POLYPS BIOPSIES TAKEN     Body: normal    Antrum: normal    Duodenum:     Descending: normal    Bulb: normal    The scope was removed and the patient tolerated the procedure well.     Recommendations/Plan:   F/U Biopsies  F/U In Office in 3-4 weeks  Discussed with the family  Post sedation patient was stable with stable vital signs and stable O2 saturations    Electronically signed by Reno HORN

## 2025-05-30 LAB
GLUCOSE BLD-MCNC: 114 MG/DL (ref 65–105)
SURGICAL PATHOLOGY REPORT: NORMAL

## 2025-06-01 RX ORDER — UREA 200 MG/G
CREAM TOPICAL
Qty: 85 G | Refills: 2 | Status: SHIPPED | OUTPATIENT
Start: 2025-06-01

## 2025-06-02 ENCOUNTER — OFFICE VISIT (OUTPATIENT)
Dept: NEUROLOGY | Age: 65
End: 2025-06-02
Payer: COMMERCIAL

## 2025-06-02 VITALS
DIASTOLIC BLOOD PRESSURE: 68 MMHG | RESPIRATION RATE: 20 BRPM | TEMPERATURE: 96.6 F | BODY MASS INDEX: 45.99 KG/M2 | WEIGHT: 293 LBS | HEIGHT: 67 IN | HEART RATE: 69 BPM | SYSTOLIC BLOOD PRESSURE: 135 MMHG

## 2025-06-02 DIAGNOSIS — R41.3 MEMORY DEFICIT: Primary | ICD-10-CM

## 2025-06-02 PROCEDURE — G8417 CALC BMI ABV UP PARAM F/U: HCPCS | Performed by: NEUROMUSCULOSKELETAL MEDICINE, SPORTS MEDICINE

## 2025-06-02 PROCEDURE — 1123F ACP DISCUSS/DSCN MKR DOCD: CPT | Performed by: NEUROMUSCULOSKELETAL MEDICINE, SPORTS MEDICINE

## 2025-06-02 PROCEDURE — G8427 DOCREV CUR MEDS BY ELIG CLIN: HCPCS | Performed by: NEUROMUSCULOSKELETAL MEDICINE, SPORTS MEDICINE

## 2025-06-02 PROCEDURE — G8399 PT W/DXA RESULTS DOCUMENT: HCPCS | Performed by: NEUROMUSCULOSKELETAL MEDICINE, SPORTS MEDICINE

## 2025-06-02 PROCEDURE — 3017F COLORECTAL CA SCREEN DOC REV: CPT | Performed by: NEUROMUSCULOSKELETAL MEDICINE, SPORTS MEDICINE

## 2025-06-02 PROCEDURE — 1036F TOBACCO NON-USER: CPT | Performed by: NEUROMUSCULOSKELETAL MEDICINE, SPORTS MEDICINE

## 2025-06-02 PROCEDURE — 99213 OFFICE O/P EST LOW 20 MIN: CPT | Performed by: NEUROMUSCULOSKELETAL MEDICINE, SPORTS MEDICINE

## 2025-06-02 PROCEDURE — 1090F PRES/ABSN URINE INCON ASSESS: CPT | Performed by: NEUROMUSCULOSKELETAL MEDICINE, SPORTS MEDICINE

## 2025-06-02 NOTE — PROGRESS NOTES
NEUROLOGY follow-up.    Patient Name:  Kaci Banerjee  :   1960  Clinic Visit Date: 2025    I saw Ms. Kaci Banerjee  in the neurology clinic today for follow up of memory lapses.. 65-year-old right-handed lady with bilateral glaucoma , legal blindness, borderline hyperglycemia, hyperlipidemia, hypothyroidism, was seen in the office today in follow-up of chronic intermittent memory lapses.  Apparently doing okay without any new neurological problems.    REVIEW OF SYSTEMS    Constitutional Weight changes: absent, change in appetite: absent Fatigue: present;Fevers : absent, Any recent hospitalizations:  absent   HEENT Ears: normal,  Visual disturbance: absent   Respiratory Shortness of breath: absent, choking:  absent, Cough: absent, Snoring : absent   Cardiovascular Chest pain: absent, Leg swelling :absent, palpitations : absent, fainting : absent   GI Constipation: absent, Diarrhea: absent, Swallowing change: absent    Urinary frequency: absent, Urinary urgency: absent, Urinary incontinence: absent   Musculoskeletal Neck pain: absent, Back pain: present, Stiffness: absent, Muscle pain: absent, Joint pain: absent, restless leg : absent   Dermatological Hair loss: absent, Skin changes: absent   Neurological Confusion: present, Trouble concentrating: present, Seizures: absent;  Memory loss: present, balance problem: present, Dizziness: absent, vertigo: absent, Weakness: absent, Numbness absent, Tremor: present, Spasm: absent, involuntary movement: absent, Speech difficulty: absent, Headache: absent, Light sensitivity: absent   Psychiatric Anxiety: absent, Depression  absent, drug abuse: absent, Hallucination: absent, mood disorder: absent, Suicidal ideations absent   Hematologic Abnormal bleeding: absent, Anemia: absent, Lymph gland changes: absent Clotting disorder: absent     Past Medical History:   Diagnosis Date    Abdominal bloating 2019    Abnormal cardiovascular stress test 2024

## 2025-06-02 NOTE — PATIENT INSTRUCTIONS
SURVEY:    Thank you for allowing us to care for you today.    You may be receiving a survey from [facility] regarding your visit today- electronically or via mail.      Please help us by completing the survey as this will provide the needed feedback to ensure we are providing the very best care for you and your family.    If you cannot score us a very good on any question, please call the office to discuss how we could have made your experience a very good one.    Thank you.       STAFF:    Charley Anne, Griselda CUEVAS      CLINICAL STAFF:    Marielle ALEGRIA, Bernadette CUEVAS, Barbie CUEVAS, Doretha ALEGRIA

## 2025-06-09 ENCOUNTER — OFFICE VISIT (OUTPATIENT)
Age: 65
End: 2025-06-09
Payer: MEDICARE

## 2025-06-09 VITALS
SYSTOLIC BLOOD PRESSURE: 128 MMHG | DIASTOLIC BLOOD PRESSURE: 72 MMHG | HEART RATE: 70 BPM | WEIGHT: 293 LBS | BODY MASS INDEX: 45.99 KG/M2 | HEIGHT: 67 IN

## 2025-06-09 DIAGNOSIS — B35.1 ONYCHOMYCOSIS: Primary | ICD-10-CM

## 2025-06-09 DIAGNOSIS — B35.1 PAIN DUE TO ONYCHOMYCOSIS OF TOENAILS OF BOTH FEET: ICD-10-CM

## 2025-06-09 DIAGNOSIS — M79.674 PAIN DUE TO ONYCHOMYCOSIS OF TOENAILS OF BOTH FEET: ICD-10-CM

## 2025-06-09 DIAGNOSIS — M79.675 PAIN DUE TO ONYCHOMYCOSIS OF TOENAILS OF BOTH FEET: ICD-10-CM

## 2025-06-09 PROCEDURE — 11721 DEBRIDE NAIL 6 OR MORE: CPT

## 2025-06-09 PROCEDURE — G8417 CALC BMI ABV UP PARAM F/U: HCPCS

## 2025-06-09 PROCEDURE — 1036F TOBACCO NON-USER: CPT

## 2025-06-09 PROCEDURE — G8399 PT W/DXA RESULTS DOCUMENT: HCPCS

## 2025-06-09 PROCEDURE — 99213 OFFICE O/P EST LOW 20 MIN: CPT

## 2025-06-09 PROCEDURE — G8427 DOCREV CUR MEDS BY ELIG CLIN: HCPCS

## 2025-06-09 PROCEDURE — 3017F COLORECTAL CA SCREEN DOC REV: CPT

## 2025-06-09 PROCEDURE — 1090F PRES/ABSN URINE INCON ASSESS: CPT

## 2025-06-09 PROCEDURE — 1123F ACP DISCUSS/DSCN MKR DOCD: CPT

## 2025-06-09 NOTE — PROGRESS NOTES
Patient instructed to remove shoes and socks and instructed to sit in exam chair.  Current PCP is Trinity Rebollar MD and date of last visit was [id number].   Do you have a follow up visit scheduled?  No  If yes, the date is unknown.    
MD Dulce Maria   Accu-Chek FastClix Lancets MISC USE TO CHECK BLOOD SUGAR TWICE A DAY 9/20/22   Gaviota Dale MD   rosuvastatin (CRESTOR) 5 MG tablet Take 1 tablet by mouth daily 9/8/22   Mala Holden MD   ONETOUCH VERIO strip  12/20/21   Provider, MD Dulce Maria       Objective     Vitals:    06/09/25 1223   BP: 128/72   Pulse: 70         Lab Results   Component Value Date    LABA1C 5.3 01/18/2025       Physical Exam:  General:  Alert and oriented x3. In no acute distress.     Lower Extremity Physical Exam:    Vascular: DP pulses are palpable, Bilateral. PT pulses non palpable bilaterally but audible on doppler. Varicose veins noted bilaterally. CFT <3 seconds to all digits, Bilateral.  Non pitting Edema noted to the bilateral lower extremity, Bilateral.  Hair growth is absent to the level of the digits, Bilateral.     Neuro: Saph/sural/SP/DP/plantar sensation diminished to light touch. Protective sensation is intact to 4/10 sites on the right foot and 3/10 sites on the left foot as tested with a 5.07g SWMF, Bilateral.     Musculoskeletal: EHL/FHL/GS/TA gross motor intact.  Tenderness to palpation to distal aspect of digits, specifically nails.    Dermatologic: Xerotic skin noted to the medial aspect anterior aspect of the left lower extremity.  Patient does have lymphedema changes.  2 separate lesions from previous steroid cream application with hemosiderin deposition.  Deposition circumscribe area roughly 4 cm.    Class A Findings (Q7) - One Class A finding  [] Non traumatic amputation of foot or integral skeletal portion thereof  Class B Findings (Q8) - Two Class B findings  [x]Absent posterior tibial pulse   []Absent dorsalis pedis pulse   []Advanced trophic changes; three of the following are required:   [x]hair growth (decrease or absence)   [x]nail changes (thickening)   []pigmentary changes (discoloration)   [x]skin texture (thin, shiny)   []skin color (rubor or redness)  Class C Findings (Q9) -

## 2025-06-19 DIAGNOSIS — R14.0 ABDOMINAL BLOATING: ICD-10-CM

## 2025-06-19 DIAGNOSIS — K21.9 GASTROESOPHAGEAL REFLUX DISEASE, UNSPECIFIED WHETHER ESOPHAGITIS PRESENT: ICD-10-CM

## 2025-06-19 DIAGNOSIS — Z83.79 FAMILY HISTORY OF CROHN'S DISEASE: ICD-10-CM

## 2025-06-19 DIAGNOSIS — K58.8 OTHER IRRITABLE BOWEL SYNDROME: ICD-10-CM

## 2025-06-19 DIAGNOSIS — E66.01 MORBID OBESITY WITH BMI OF 45.0-49.9, ADULT (HCC): ICD-10-CM

## 2025-06-19 DIAGNOSIS — R13.19 ESOPHAGEAL DYSPHAGIA: ICD-10-CM

## 2025-06-19 DIAGNOSIS — F41.9 ANXIETY: ICD-10-CM

## 2025-06-19 DIAGNOSIS — K21.9 GASTROESOPHAGEAL REFLUX DISEASE WITHOUT ESOPHAGITIS: ICD-10-CM

## 2025-06-19 DIAGNOSIS — K58.2 IRRITABLE BOWEL SYNDROME WITH BOTH CONSTIPATION AND DIARRHEA: ICD-10-CM

## 2025-06-19 DIAGNOSIS — R07.89 ATYPICAL CHEST PAIN: ICD-10-CM

## 2025-07-02 DIAGNOSIS — K21.9 GASTROESOPHAGEAL REFLUX DISEASE WITHOUT ESOPHAGITIS: ICD-10-CM

## 2025-07-02 RX ORDER — OMEPRAZOLE 40 MG/1
40 CAPSULE, DELAYED RELEASE ORAL DAILY
Qty: 30 CAPSULE | Refills: 5 | Status: SHIPPED | OUTPATIENT
Start: 2025-07-02

## 2025-07-08 ENCOUNTER — OFFICE VISIT (OUTPATIENT)
Age: 65
End: 2025-07-08

## 2025-07-08 VITALS
OXYGEN SATURATION: 99 % | HEIGHT: 67 IN | WEIGHT: 293 LBS | BODY MASS INDEX: 45.99 KG/M2 | HEART RATE: 68 BPM | DIASTOLIC BLOOD PRESSURE: 62 MMHG | SYSTOLIC BLOOD PRESSURE: 138 MMHG

## 2025-07-08 DIAGNOSIS — M79.89 LEG SWELLING: Primary | ICD-10-CM

## 2025-07-08 DIAGNOSIS — E66.01 MORBID OBESITY WITH BMI OF 45.0-49.9, ADULT (HCC): ICD-10-CM

## 2025-07-08 ASSESSMENT — ENCOUNTER SYMPTOMS
CHEST TIGHTNESS: 0
SHORTNESS OF BREATH: 0
NAUSEA: 0
ABDOMINAL PAIN: 0
SINUS COMPLAINT: 1
APNEA: 0
VOMITING: 0

## 2025-07-08 NOTE — PROGRESS NOTES
Attending Physician Statement  I have discussed the care of Kaci Banerjee, including pertinent history and exam findings,  with the resident. I have reviewed the key elements of all parts of the encounter with the resident.  I agree with the assessment, plan and orders as documented by the resident.  (GE Modifier)    Jay Nails MD  
Diabetic visit information    BP Readings from Last 3 Encounters:   06/09/25 128/72   06/02/25 135/68   05/29/25 139/83       Hemoglobin A1C (%)   Date Value   01/18/2025 5.3   01/06/2025 5.4   08/15/2023 5.5               Have you changed or started any medications since your last visit including any over-the-counter medicines, vitamins, or herbal medicines? no   Have you stopped taking any of your medications? Is so, why? -  no  Are you having any side effects from any of your medications? - no    Have you seen any other physician or provider since your last visit?  yes - GI, Neurology, podiatry   Have you had any other diagnostic tests since your last visit?  yes - labs, EGD   Have you been seen in the emergency room and/or had an admission in a hospital since we last saw you?  yes - [facility]      Have you had your annual diabetic retinal (eye) exam? No   (ensure copy of exam is in the chart)    Have you had your routine dental cleaning in the past 6 months? no    Do you have an active MyChart account?  If not, what are your barriers?  Yes    Patient Care Team:  Trinity Rebollar MD as PCP - General (Family Medicine)  Bridger Toure DO as Surgeon (Orthopedic Surgery)  Ronaldo Barney MD as Consulting Physician (Pulmonology)  Dante Franco MD as Consulting Physician  Berenice Labyo MD as Consulting Physician (Endocrinology)  Gaurav Ayala MD as Consulting Physician (Obstetrics & Gynecology)  Reno Lenz MD as Consulting Physician (Gastroenterology)  Reno Lenz MD as Consulting Physician (Gastroenterology)  Shahzad Myers MD as Cardiologist (Cardiology)  Thomas Youngblood, JULITA - CNP as Nurse Practitioner (Pulmonology)  Anila Lira MD as Consulting Physician (Dermatology)    Medical history Review  Past Medical, Family, and Social History reviewed and does not contribute to the patient presenting condition.    Health Maintenance   Topic Date Due    Respiratory Syncytial Virus 
Nutrition Services- [facility]    2. Leg swelling  - Chronic, follow with cardiology, not on diuretic, no SOB, lungs clear  - Echo EF 55%, recent cardiac cath not concerning, LDL in goal  - Patient advised to buy and wear stockings, says DME does not cover stocking so she will buy them, and to lift legs when seated  - Follow up in 3 months, if does not improve will consider lasix          Requested Prescriptions      No prescriptions requested or ordered in this encounter       There are no discontinued medications.    Kaci received counseling on the following healthy behaviors: nutrition, exercise and medication adherence    Discussed use,benefit, and side effects of prescribed medications.  Barriers to medication compliance addressed.      All patient questions answered.  Pt voiced understanding.     Return in about 3 months (around 10/8/2025) for leg swelling.        Disclaimer: Some orall of this note was transcribed using voice-recognition software.This may cause typographical errors occasionally. Although all effort is made to fix these errors, please do not hesitate to contact our office if there isany concern with the understanding of this note.

## 2025-07-08 NOTE — PATIENT INSTRUCTIONS
Thank you for letting us take care of you today. We hope all your questions were addressed. If a question was overlooked or something else comes to mind after you return home, please contact a member of your Care Team listed below.      Your Care Team at [facility] is Team #2  Misael Leal M.D. (Faculty)  Shanna Jon M.D. (Resident)  Trinity Rebollar M.D. (Resident)  Chino Brown M.D. (Resident)  Rachel Cooper M.D. (Resident)  Micki Mitchell M.D. (Resident)  Yuri Lopez, [facility]  Savita Rice, [facility]  Lila Zambrano,  [facility]  Guera Huggins, [facility]  Yumiko Davis, [facility]  Yazmin Basilio, [facility]  Ryan Hanley (LJ) Javier DARWIN (Clinical Practice Manager)  Maryjo Burton [facility] (Clinical Pharmacist)     Office phone number: [phone]    If you need to get in right away due to illness, please be advised we have \"Same Day\" appointments available Monday-Friday. Please call us at [phone] option #3 to schedule your \"Same Day\" appointment.

## 2025-07-09 ENCOUNTER — OFFICE VISIT (OUTPATIENT)
Dept: BARIATRICS/WEIGHT MGMT | Age: 65
End: 2025-07-09
Payer: MEDICARE

## 2025-07-09 VITALS
HEIGHT: 67 IN | BODY MASS INDEX: 45.99 KG/M2 | OXYGEN SATURATION: 98 % | WEIGHT: 293 LBS | SYSTOLIC BLOOD PRESSURE: 138 MMHG | DIASTOLIC BLOOD PRESSURE: 84 MMHG | HEART RATE: 74 BPM

## 2025-07-09 DIAGNOSIS — K43.9 VENTRAL HERNIA WITHOUT OBSTRUCTION OR GANGRENE: Primary | ICD-10-CM

## 2025-07-09 DIAGNOSIS — E66.01 MORBID OBESITY WITH BMI OF 45.0-49.9, ADULT (HCC): ICD-10-CM

## 2025-07-09 PROCEDURE — 1123F ACP DISCUSS/DSCN MKR DOCD: CPT | Performed by: SURGERY

## 2025-07-09 PROCEDURE — 99213 OFFICE O/P EST LOW 20 MIN: CPT | Performed by: SURGERY

## 2025-07-09 NOTE — PROGRESS NOTES
[place] [facility] BARIATRIC PART OF [address]  [facility] [address]  Dept: [phone]    SURGICAL WEIGHT MANAGEMENT PROGRAM  PROGRESS NOTE EVALUATION     Patient: Kaci Banerjee        Service Date: 7/9/2025      HPI:     Chief Complaint   Patient presents with    Ventral hernia    Results       The patient is a pleasant 65 y.o. year old female  with morbid obesity and a ventral hernia, who stands Height: 170.2 cm (5' 7\") tall with a weight of Weight - Scale: (!) 138.8 kg (306 lb) , resulting in a BMI of Body mass index is 47.93 kg/m².  She states the hernia has increased and she has some occasional burning at the hernia.  She believes it reduces.  No diarrhea or constipation, no nausea or vomiting associated with the hernia.  She is also interested in weight loss, however, not interested in surgical weight loss        Medical History:  Past Medical History:   Diagnosis Date    Abdominal bloating 01/23/2019    Abnormal cardiovascular stress test 12/2024    Abnormal tilt table test     Anxiety     Bilateral edema of lower extremity 12/16/2016    Bronchial asthma     mild, intermittent    Carpal tunnel syndrome     Cataract     Cerebrovascular disease     Chronic back pain     Chronic sinusitis     Degenerative disc disease, lumbar 12/16/2016    Depression     Diabetes mellitus due to underlying condition with hyperosmolarity without coma (HCC)     Dizziness 10/27/2017    DJD (degenerative joint disease)     S1, L3, L4, L5, T12    Dysphagia 01/23/2019    Edema of leg     bilateral legs    Environmental allergies     Frozen shoulder 04/27/2015    GERD (gastroesophageal reflux disease)     Glaucoma     Glucose intolerance (impaired glucose tolerance)     Headache(784.0)     History of bronchitis     Viral    HTN (hypertension)     Hyperlipidemia     Hypothyroid     hypothyroid state    Hypothyroidism     IBS (irritable

## 2025-07-11 DIAGNOSIS — G47.33 OBSTRUCTIVE SLEEP APNEA SYNDROME: Primary | ICD-10-CM

## 2025-07-11 NOTE — PROGRESS NOTES
Patient needed a new Rx for her CPAP supplies, previously seen by Dr Barney in [place]. Order faxed to [facility] per patient's request

## 2025-07-28 DIAGNOSIS — R35.0 URINARY FREQUENCY: ICD-10-CM

## 2025-08-04 ENCOUNTER — TELEPHONE (OUTPATIENT)
Age: 65
End: 2025-08-04

## 2025-08-26 ENCOUNTER — TELEPHONE (OUTPATIENT)
Dept: PULMONOLOGY | Age: 65
End: 2025-08-26

## 2025-09-02 RX ORDER — SOLIFENACIN SUCCINATE 5 MG/1
5 TABLET, FILM COATED ORAL DAILY
Qty: 90 TABLET | Refills: 1 | Status: SHIPPED | OUTPATIENT
Start: 2025-09-02

## (undated) DEVICE — GUIDEWIRE 35/260/FC/PTFE/3J: Brand: GUIDEWIRE

## (undated) DEVICE — GLOVE SURG 8 11.7IN BEAD CUF LIGHT BRN SENSICARE LTX FREE

## (undated) DEVICE — FORCEPS BX OVL CUP SERR DISP CAP L 240CM RAD JAW 4

## (undated) DEVICE — DEFENDO AIR WATER SUCTION AND BIOPSY VALVE KIT FOR  OLYMPUS: Brand: DEFENDO AIR/WATER/SUCTION AND BIOPSY VALVE

## (undated) DEVICE — DEVICE COMPR LNG 27 CM VASC BND

## (undated) DEVICE — TRAY SURG CUST CRD CATH TOLEDO

## (undated) DEVICE — MEDICINE CUP, GRADUATED, STER: Brand: MEDLINE

## (undated) DEVICE — UNDERPAD HOSP W30XL36IN GRN POLYPR HI ABSRB DISP

## (undated) DEVICE — 4-PORT MANIFOLD: Brand: NEPTUNE 2

## (undated) DEVICE — GAUZE,SPONGE,4"X4",16PLY,STRL,LF,10/TRAY: Brand: MEDLINE

## (undated) DEVICE — GLIDESHEATH SLENDER STAINLESS STEEL KIT: Brand: GLIDESHEATH SLENDER

## (undated) DEVICE — ENDO KIT W/SYRINGE: Brand: MEDLINE INDUSTRIES, INC.

## (undated) DEVICE — ENDOSCOPIC KIT 1.1+ 10 FT DE 2 GWN AAMI LEVEL 3

## (undated) DEVICE — TOWEL,OR,DSP,ST,BLUE,DLX,XR,4/PK,20PK/CS: Brand: MEDLINE

## (undated) DEVICE — SAVARY GILLIARD WIRE GUIDE: Brand: SAVARY GILLIARD

## (undated) DEVICE — SYRINGE INFL 60ML DISP ALLIANCE II

## (undated) DEVICE — BITEBLOCK 54FR W/ DENT RIM BLOX

## (undated) DEVICE — GLOVE ORTHO 8   MSG9480

## (undated) DEVICE — SOLUTION IRRIG 1000ML H2O PIC PLAS SHATTERPROOF CONTAINER

## (undated) DEVICE — ANGIOGRAPHIC CATHETER: Brand: EXPO™

## (undated) DEVICE — CANNULA NSL AD L2IN ETCO2 SAMP SFT CRUSH RESIST FEM AIRLFE

## (undated) DEVICE — BITE BLOCK ENDOSCP AD 60 FR W/ ADJ STRP PLAS GRN BLOX

## (undated) DEVICE — GAUZE,SPONGE,4"X4",16PLY,XRAY,STRL,LF: Brand: MEDLINE

## (undated) DEVICE — FORCEPS BX L240CM JAW DIA2.4MM ORNG L CAP W/ NDL DISP RAD

## (undated) DEVICE — ANGIOGRAPHIC CATHETER: Brand: IMPULSE™

## (undated) DEVICE — ESOPHAGEAL BALLOON DILATATION CATHETER: Brand: CRE FIXED WIRE

## (undated) DEVICE — Device

## (undated) DEVICE — STAZ ENDO KIT: Brand: MEDLINE INDUSTRIES, INC.

## (undated) DEVICE — BASIN EMSIS 700ML GRAPHITE PLAS KID SHP GRAD

## (undated) DEVICE — SAVARY CLEANING BRUSH: Brand: SAVARY

## (undated) DEVICE — BITEBLOCK ENDOSCP 60FR MAXI WHT POLYETH STURDY W/ VELC WVN